# Patient Record
Sex: FEMALE | Race: BLACK OR AFRICAN AMERICAN | NOT HISPANIC OR LATINO | Employment: OTHER | URBAN - METROPOLITAN AREA
[De-identification: names, ages, dates, MRNs, and addresses within clinical notes are randomized per-mention and may not be internally consistent; named-entity substitution may affect disease eponyms.]

---

## 2017-02-16 RX ORDER — FLUTICASONE PROPIONATE 50 MCG
2 SPRAY, SUSPENSION (ML) NASAL DAILY PRN
Qty: 16 G | Refills: 5 | Status: SHIPPED | OUTPATIENT
Start: 2017-02-16 | End: 2017-06-26 | Stop reason: SDUPTHER

## 2017-02-23 ENCOUNTER — NUTRITION (OUTPATIENT)
Dept: DIABETES | Facility: CLINIC | Age: 67
End: 2017-02-23
Payer: MEDICARE

## 2017-02-23 VITALS
WEIGHT: 185.63 LBS | BODY MASS INDEX: 31.69 KG/M2 | SYSTOLIC BLOOD PRESSURE: 130 MMHG | DIASTOLIC BLOOD PRESSURE: 70 MMHG | HEIGHT: 64 IN

## 2017-02-23 DIAGNOSIS — N18.30 CKD (CHRONIC KIDNEY DISEASE) STAGE 3, GFR 30-59 ML/MIN: ICD-10-CM

## 2017-02-23 DIAGNOSIS — E11.9 TYPE II OR UNSPECIFIED TYPE DIABETES MELLITUS WITHOUT MENTION OF COMPLICATION, NOT STATED AS UNCONTROLLED: Primary | ICD-10-CM

## 2017-02-23 PROCEDURE — 99999 PR PBB SHADOW E&M-EST. PATIENT-LVL III: CPT | Mod: PBBFAC,,, | Performed by: DIETITIAN, REGISTERED

## 2017-02-23 PROCEDURE — 99213 OFFICE O/P EST LOW 20 MIN: CPT | Mod: PBBFAC,PO | Performed by: DIETITIAN, REGISTERED

## 2017-02-23 PROCEDURE — G0108 DIAB MANAGE TRN  PER INDIV: HCPCS | Mod: PBBFAC,PO | Performed by: DIETITIAN, REGISTERED
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
## 2017-02-23 NOTE — MR AVS SNAPSHOT
[facility]  [address] [facility] [address] [place] [address]  Phone: [phone]  Fax: [phone]                  Kristyn Garza   2017 9:30 AM   Nutrition    Description:  Female : 1950   Provider:  Arlet Stein RD, CDE   Department:  [facility]           Reason for Visit     Diabetes           Diagnoses this Visit        Comments    Type II or unspecified type diabetes mellitus without mention of complication, not stated as uncontrolled    -  Primary     CKD (chronic kidney disease) stage 3, GFR 30-59 ml/min                To Do List           Future Appointments        Provider Department Dept Phone    3/30/2017 8:30 AM Zoë Guzman MD [facility] [phone]    2017 9:00 AM Hammad Hardy OD [facility] [phone]      Goals (5 Years of Data)     None      Follow-Up and Disposition     Return in about 6 months (around 2017), or Rx accucheck sheela. E11.9 Jean Patel mid am.      [facility]     [facility] Nurse Care Line -  Assistance  Registered nurses in the [facility] provide clinical advisement, health education, appointment booking, and other advisory services.  Call for this free service at [phone].             Medications           Message regarding Medications     Verify the changes and/or additions to your medication regime listed below are the same as discussed with your clinician today.  If any of these changes or additions are incorrect, please notify your healthcare provider.             Verify that the below list of medications is an accurate representation of the medications you are currently taking.  If none reported, the list may be blank. If incorrect, please contact your healthcare provider. Carry this list with you in case of emergency.           Current Medications     alcohol swabs PadM Apply 1 each topically as needed.    amlodipine-benazepril 5-20 mg  "(LOTREL) 5-20 mg per capsule TAKE 1 CAPSULE BY MOUTH ONCE DAILY.    aspirin 81 MG Chew Take 81 mg by mouth once daily.    blood sugar diagnostic (ACCU-CHEK SMARTVIEW TEST STRIP) Strp 1 strip by Misc.(Non-Drug; Combo Route) route 3 (three) times daily.    calcium-vitamin D3 500 mg(1,250mg) -200 unit per tablet Take 1 tablet by mouth 2 (two) times daily with meals.    colesevelam (WELCHOL) 625 mg tablet Take 1 tablet (625 mg total) by mouth 2 (two) times daily with meals.    CRANBERRY EXTRACT ORAL Take 1 tablet by mouth once daily.    fluticasone (FLONASE) 50 mcg/actuation nasal spray 2 sprays by Each Nare route daily as needed.    lancets Misc 1 lancet by Misc.(Non-Drug; Combo Route) route 2 (two) times daily.    MULTIVITAMIN W-MINERALS/LUTEIN (CENTRUM SILVER ORAL) Take 1 tablet by mouth once daily.    spironolactone (ALDACTONE) 50 MG tablet Take 1 tablet (50 mg total) by mouth once daily.    BIOTIN ORAL Take 3 tablets by mouth once daily.    blood glucose control, normal Soln 1 Bottle by Misc.(Non-Drug; Combo Route) route once daily.    blood-glucose meter (ACCU-CHEK AMBER) Misc 1 kit by Misc.(Non-Drug; Combo Route) route once.    loratadine (CLARITIN) 10 mg tablet Take 1 tablet (10 mg total) by mouth once daily.    trazodone (DESYREL) 50 MG tablet Take 1 tablet (50 mg total) by mouth every evening.    UBIDECARENONE (CO Q-10 ORAL) Take by mouth once daily.           Clinical Reference Information           Your Vitals Were     BP Height Weight BMI       130/70 (BP Location: Right arm, Patient Position: Sitting, BP Method: Manual) 5' 4" (1.626 m) 84.2 kg (185 lb 10 oz) 31.86 kg/m2       Allergies as of 2/23/2017     No Known Allergies      Immunizations Administered on Date of Encounter - 2/23/2017     None      Language Assistance Services     ATTENTION: Language assistance services are available, free of charge. Please call [phone].      ATENCIÓN: Si nini mac, tiene a curry disposición servicios gratuitos " de asistencia lingüística. Cammy márquez [phone].     PATIENCE Ý: N?u b?n nói Ti?ng Vi?t, có các d?ch v? h? tr? ngôn ng? mi?n phí dành cho b?n. G?i s? [phone].         [facility] complies with applicable Federal civil rights laws and does not discriminate on the basis of race, color, national origin, age, disability, or sex.

## 2017-03-10 RX ORDER — COLESEVELAM HYDROCHLORIDE 625 MG/1
TABLET, FILM COATED ORAL
Qty: 180 TABLET | Refills: 0 | Status: SHIPPED | OUTPATIENT
Start: 2017-03-10 | End: 2017-06-08 | Stop reason: SDUPTHER

## 2017-03-30 ENCOUNTER — OFFICE VISIT (OUTPATIENT)
Dept: FAMILY MEDICINE | Facility: CLINIC | Age: 67
End: 2017-03-30
Payer: MEDICARE

## 2017-03-30 VITALS
DIASTOLIC BLOOD PRESSURE: 78 MMHG | RESPIRATION RATE: 18 BRPM | WEIGHT: 186.5 LBS | HEART RATE: 73 BPM | OXYGEN SATURATION: 99 % | BODY MASS INDEX: 31.84 KG/M2 | SYSTOLIC BLOOD PRESSURE: 134 MMHG | TEMPERATURE: 96 F | HEIGHT: 64 IN

## 2017-03-30 DIAGNOSIS — E66.9 OBESITY (BMI 30.0-34.9): ICD-10-CM

## 2017-03-30 DIAGNOSIS — E11.21 TYPE II DIABETES MELLITUS WITH NEPHROPATHY: ICD-10-CM

## 2017-03-30 DIAGNOSIS — E78.2 COMBINED HYPERLIPIDEMIA ASSOCIATED WITH TYPE 2 DIABETES MELLITUS: ICD-10-CM

## 2017-03-30 DIAGNOSIS — Z12.11 COLON CANCER SCREENING: ICD-10-CM

## 2017-03-30 DIAGNOSIS — N18.30 CKD (CHRONIC KIDNEY DISEASE) STAGE 3, GFR 30-59 ML/MIN: ICD-10-CM

## 2017-03-30 DIAGNOSIS — J30.9 ALLERGIC RHINITIS, CAUSE UNSPECIFIED: ICD-10-CM

## 2017-03-30 DIAGNOSIS — E11.59 HYPERTENSION ASSOCIATED WITH DIABETES: Primary | ICD-10-CM

## 2017-03-30 DIAGNOSIS — Z78.0 POSTMENOPAUSAL: ICD-10-CM

## 2017-03-30 DIAGNOSIS — E04.2 MULTIPLE THYROID NODULES: ICD-10-CM

## 2017-03-30 DIAGNOSIS — Z12.31 OTHER SCREENING MAMMOGRAM: ICD-10-CM

## 2017-03-30 DIAGNOSIS — E11.69 COMBINED HYPERLIPIDEMIA ASSOCIATED WITH TYPE 2 DIABETES MELLITUS: ICD-10-CM

## 2017-03-30 DIAGNOSIS — I15.2 HYPERTENSION ASSOCIATED WITH DIABETES: Primary | ICD-10-CM

## 2017-03-30 LAB
CTP QC/QA: YES
FECAL OCCULT BLOOD, POC: NEGATIVE

## 2017-03-30 PROCEDURE — 99999 PR PBB SHADOW E&M-EST. PATIENT-LVL IV: CPT | Mod: PBBFAC,,, | Performed by: FAMILY MEDICINE

## 2017-03-30 PROCEDURE — 99214 OFFICE O/P EST MOD 30 MIN: CPT | Mod: 25,S$PBB,, | Performed by: FAMILY MEDICINE

## 2017-03-30 PROCEDURE — G0101 CA SCREEN;PELVIC/BREAST EXAM: HCPCS | Mod: S$PBB,,, | Performed by: FAMILY MEDICINE

## 2017-03-30 PROCEDURE — 99214 OFFICE O/P EST MOD 30 MIN: CPT | Mod: PBBFAC,PO,25 | Performed by: FAMILY MEDICINE

## 2017-03-30 PROCEDURE — G0101 CA SCREEN;PELVIC/BREAST EXAM: HCPCS | Mod: PBBFAC,PO | Performed by: FAMILY MEDICINE

## 2017-03-30 PROCEDURE — 82270 OCCULT BLOOD FECES: CPT | Mod: PBBFAC,PO | Performed by: FAMILY MEDICINE

## 2017-03-30 PROCEDURE — 99499 UNLISTED E&M SERVICE: CPT | Mod: S$PBB,,, | Performed by: FAMILY MEDICINE

## 2017-03-30 RX ORDER — LORATADINE 10 MG/1
10 TABLET ORAL DAILY
Qty: 90 TABLET | Refills: 3 | Status: SHIPPED | OUTPATIENT
Start: 2017-03-30 | End: 2017-09-22 | Stop reason: SDUPTHER

## 2017-03-30 NOTE — PROGRESS NOTES
HISTORY OF PRESENT ILLNESS: Ms. Garza comes in today not fasting with taking blood pressure medication for annual wellness examination.    END OF LIFE DECISION: She has no living will and does not desire life support.     Current Outpatient Prescriptions   Medication Sig    alcohol swabs PadM Apply 1 each topically as needed.    amlodipine-benazepril 5-20 mg (LOTREL) 5-20 mg per capsule TAKE 1 CAPSULE BY MOUTH ONCE DAILY.    aspirin 81 MG Chew Take 81 mg by mouth once daily.    blood sugar diagnostic (ACCU-CHEK SMARTVIEW TEST STRIP) Strp 1 strip by Misc.(Non-Drug; Combo Route) route 3 (three) times daily.    calcium-vitamin D3 500 mg(1,250mg) -200 unit per tablet Take 1 tablet by mouth 2 (two) times daily with meals.    CRANBERRY EXTRACT ORAL Take 1 tablet by mouth once daily.    fluticasone (FLONASE) 50 mcg/actuation nasal spray 2 sprays by Each Nare route daily as needed.    lancets Misc 1 lancet by Misc.(Non-Drug; Combo Route) route 2 (two) times daily.    MULTIVITAMIN W-MINERALS/LUTEIN (CENTRUM SILVER ORAL) Take 1 tablet by mouth once daily.    spironolactone (ALDACTONE) 50 MG tablet Take 1 tablet (50 mg total) by mouth once daily.    WELCHOL 625 mg tablet TAKE 1 TABLET (625 MG TOTAL) BY MOUTH 2 (TWO) TIMES DAILY WITH MEALS.    blood glucose control, normal Soln 1 Bottle by Misc.(Non-Drug; Combo Route) route once daily.    blood-glucose meter (ACCU-CHEK AMBER) Misc 1 kit by Misc.(Non-Drug; Combo Route) route once.    loratadine (CLARITIN) 10 mg tablet Take 1 tablet (10 mg total) by mouth once daily.     SCREENINGS:   Cholesterol: March 30, 2016.  FFS/Colonoscopy: March 18, 2014 - okay; repeat in 10 years.  Mammogram: April 28, 2016 - okay.  GYN Exam: March 30, 2016 - okay.  Dexa Scan: April 28, 2016 - okay; repeat in 3 to 5 years.  Eye Exam: May 16, 2016; scheduled for May 17, 2017. She wears glasses.  Dental Exam: October or November 2014 per patient. She wears top dentures.  PPD: Negative in the  past.  Immunizations: Td/Tdap - February 23, 2011.  Gardasil - N./A.  Zostavax - February 23, 2011.  Pneumovax - March 3, 2014.  Prevnar-13 shot - March 23, 2015.  Seasonal Flu - November 1, 2016 at [facility] pharmacy per patient.     ROS:  GENERAL: Denies fever, chills, fatigue or unusual weight change. Appetite normal. Weight 84.8 kg (186 lb 15.2 oz) at September 28, 2016 visit. Exercises/walks a little and monitors her diet.   SKIN: Denies rashes, itching, changes in mole, color or texture of skin or easy bruising.   HEAD: Denies headaches or recent head trauma.  EYES: Denies change in vision, pain, diplopia, redness or discharge. She wears glasses.  EARS: Denies ear pain, discharge, tinnitus, vertigo or decreased hearing.  NOSE: Denies loss of smell, epistaxis or rhinitis except nasal congestion and uses Flonase and Claritin with help; requests refill of Claritin.   MOUTH & THROAT: Denies hoarseness or change in voice. Denies excessive gum bleeding or mouth sores. Denies sore throat.  NODES: Denies swollen glands.  CHEST: Denies ZHOU, wheezing, cough, or sputum production.  CARDIOVASCULAR: Denies chest pain, PND, orthopnea or reduced exercise tolerance. Denies palpitations.  ABDOMEN: Denies diarrhea, constipation, nausea, vomiting, abdominal pain, or blood in stool.  URINARY: Denies flank pain, dysuria or hematuria. Saw Nephrology Dr. Hernandez on December 29, 2016 for surveillance of chronic kidney disease with 6-month follow up advised.  GENITOURINARY: Denies flank pain, dysuria, frequency or hematuria. Performs monthly breast self exams.  ENDOCRINE: Performs home glucose checks every morning with levels ranging 's but 98 this morning. Saw Arlet Almanza, registered dietitian, for diabetes surveillance on February 23, 2017 with 6-month follow up advised. Saw ENT Dr. Irwin on October 20, 2015 for surveillance of thyroid nodules with 1-year follow up advised.   HEME//LYMPH: Denies bleeding  "problems.  PERIPHERAL VASCULAR:Denies claudication or cyanosis.  MUSCULOSKELETAL: Denies joint stiffness, pain or swelling.  NEUROLOGIC: Denies history of seizures, tremors, paralysis, alteration of gait or coordination.  PSYCHIATRIC: Denies mood swings, depression, anxiety, homicidal or suicidal thoughts. Denies sleep problems. Reports no longer takes Trazodone as states does not feel depressed or has sleep problems.    PE:   VS: /78 (BP Location: Left arm, Patient Position: Sitting, BP Method: Manual)  Pulse 73  Temp 96.1 °F (35.6 °C) (Tympanic)   Resp 18  Ht 5' 4" (1.626 m)  Wt 84.6 kg (186 lb 8.2 oz)  SpO2 99%  BMI 32.01 kg/m2  APPEARANCE: Well nourished, well developed female, pleasant and obese, alert and oriented in no acute distress.   HEAD: Non tender. Full range of motion.  EYES: PERRL, conjunctiva pink, lids no edema. She wears glasses.  EARS: External canal patent, no swelling or redness. TM's shiny and clear.  NOSE: Mucosa and turbinates pink, not swollen. No discharge. Non tender sinuses.  THROAT: No pharyngeal erythema or exudate. No stridor. She wears top dentures.   NECK: Supple, no mass. Chronic, non-tender goiter.  NODES: No cervical, axillary or inguinal lymph node enlargement.  CHEST: Normal respiratory effort. Lungs clear to auscultation.  CARDIOVASCULAR: Normal S1, S2. No rubs, murmurs or gallops. PMI not displaced. No carotid bruit. Pedal pulses palpable bilaterally. No edema. Feet look okay without ulcerations or skin breaks.  ABDOMEN: Bowel sounds present. Not distended. Soft. No tenderness, masses or organomegaly.  BREAST EXAM: Symmetrical, no external lesions, no discharge, no masses palpated.  PELVIC EXAM: No external lesions noted, no discharge, absent cervix and uterus, bimanual exam showed no adnexal tenderness or masses. Urethra and bladder intact.  RECTAL EXAM: No anal fissures but non-inflamed external hemorrhoids noted. Heme-negative stool in the rectal " vault.  MUSCULOSKELETAL: No joint deformities or stiffness. She is ambulatory without problems.  SKIN: No rashes or suspicious lesions, normal color and turgor.  NEUROLOGIC: Cranial Nerves: II-XII grossly intact. DTR's: Knees, Ankles 2+ and equal bilaterally. Gait & Posture: Normal gait and fine motion. Monofilament test unremarkable.  PSYCHIATRIC: Patient alert, oriented x 3. Mood/Affect normal without acute anxiety or depression noted. Judgment/insight good as she makes appropriate decisions on today's examination.    Protective Sensation (w/ 10 gram monofilament):  Right: Intact  Left: Intact    Visual Inspection:  Normal -  Bilateral    Pedal Pulses:   Right: Present  Left: Present    Posterior tibialis:   Right:Present  Left: Present    Lab Results   Component Value Date    HGBA1C 6.5 (H) 09/28/2016     Lab Results   Component Value Date    WBC 9.47 12/21/2016    HGB 13.1 12/21/2016    HCT 40.2 12/21/2016    MCV 92 12/21/2016     12/21/2016     Lab Results   Component Value Date    CREATININE 1.1 12/21/2016    BUN 15 12/21/2016     12/21/2016    K 3.0 (L) 12/21/2016     12/21/2016    CO2 32 (H) 12/21/2016     ASSESSMENT:    ICD-10-CM ICD-9-CM    1. Hypertension associated with diabetes E11.59 250.80 Comprehensive metabolic panel    I10 401.9 Lipid panel      TSH   2. Type II diabetes mellitus with nephropathy E11.21 250.40 Comprehensive metabolic panel     583.81 Hemoglobin A1c   3. Combined hyperlipidemia associated with type 2 diabetes mellitus E11.69 250.80 Comprehensive metabolic panel    E78.2 272.2 Lipid panel   4. CKD (chronic kidney disease) stage 3, GFR 30-59 ml/min N18.3 585.3    5. Multiple thyroid nodules E04.2 241.1 US Soft Tissue Head Neck Thyroid   6. Allergic rhinitis, cause unspecified J30.9 477.9    7. Obesity (BMI 30.0-34.9) E66.9 278.00    8. Postmenopausal Z78.0 V49.81    9. Colon cancer screening Z12.11 V76.51 POCT OCCULT BLOOD STOOL   10. Other screening mammogram  Z12.31 V76.12 Mammo Digital Screening Bilat with CAD     PLAN:  1. Age-appropriate counseling-appropriate low-sodium, low-cholesterol diet and exercise daily as tolerated, monthly breast self exam, annual wellness examination.  2. Patient advised to call for results.  3. Continue current medications.  4. Keep follow up with specialists - nephrology and diabetic dietitian for surveillance of chronic kidney disease/hypokalemia and diabetes respectively.  5. Annual dental and eye examination.  6. See me in 6 months for hypertension and diabetes follow up.  7. Flu shot this fall.  8. Prescription refill - Claritin 10 mg daily, #90, 3 refills.

## 2017-03-30 NOTE — MR AVS SNAPSHOT
[facility]  [address] [address]  Phone: [phone]                  Kristyn Garza   3/30/2017 8:30 AM   Office Visit    Description:  Female : 1950   Provider:  Zoë Guzman MD   Department:  [facility]           Reason for Visit     Annual Exam           Diagnoses this Visit        Comments    Hypertension associated with diabetes    -  Primary     Type II diabetes mellitus with nephropathy         Combined hyperlipidemia associated with type 2 diabetes mellitus         CKD (chronic kidney disease) stage 3, GFR 30-59 ml/min         Multiple thyroid nodules         Allergic rhinitis, cause unspecified         Obesity (BMI 30.0-34.9)         Postmenopausal         Colon cancer screening         Other screening mammogram                To Do List           Future Appointments        Provider Department Dept Phone    2017 7:50 AM LABORATORY, [facility] [phone]    2017 8:15 AM [facility] US2 [facility] [phone]    2017 9:45 AM [facility] MAMMO1-SCR [facility] [phone]    2017 9:00 AM Hammad Hardy, [facility] Op[facility] [phone]    10/2/2017 8:30 AM Zoë Guzman MD [facility] [phone]      Goals (5 Years of Data)     None      Follow-Up and Disposition     Return in about 6 months (around 2017) for diabetes and blood pressure follow up.       These Medications        Disp Refills Start End    loratadine (CLARITIN) 10 mg tablet 90 tablet 3 3/30/2017 3/30/2018    Take 1 tablet (10 mg total) by mouth once daily. - Oral    Pharmacy: [id number] - [place], [place] - [address].  #: [phone]         [facility]     [facility] -  Assistance  Unless otherwise directed by your provider, please contact [facility], our nurse care line that is available for  24/7 assistance.     Registered nurses in the [facility] provide: appointment scheduling, clinical advisement, health education, and other advisory services.  Call: [phone] (toll free)               Medications           Message regarding Medications     Verify the changes and/or additions to your medication regime listed below are the same as discussed with your clinician today.  If any of these changes or additions are incorrect, please notify your healthcare provider.        STOP taking these medications     BIOTIN ORAL Take 3 tablets by mouth once daily.    trazodone (DESYREL) 50 MG tablet Take 1 tablet (50 mg total) by mouth every evening.    UBIDECARENONE (CO Q-10 ORAL) Take by mouth once daily.           Verify that the below list of medications is an accurate representation of the medications you are currently taking.  If none reported, the list may be blank. If incorrect, please contact your healthcare provider. Carry this list with you in case of emergency.           Current Medications     alcohol swabs PadM Apply 1 each topically as needed.    amlodipine-benazepril 5-20 mg (LOTREL) 5-20 mg per capsule TAKE 1 CAPSULE BY MOUTH ONCE DAILY.    aspirin 81 MG Chew Take 81 mg by mouth once daily.    blood glucose control, normal Soln 1 Bottle by Misc.(Non-Drug; Combo Route) route once daily.    blood sugar diagnostic (ACCU-CHEK SMARTVIEW TEST STRIP) Strp 1 strip by Misc.(Non-Drug; Combo Route) route 3 (three) times daily.    blood-glucose meter (ACCU-CHEK AMBER) Misc 1 kit by Misc.(Non-Drug; Combo Route) route once.    calcium-vitamin D3 500 mg(1,250mg) -200 unit per tablet Take 1 tablet by mouth 2 (two) times daily with meals.    CRANBERRY EXTRACT ORAL Take 1 tablet by mouth once daily.    fluticasone (FLONASE) 50 mcg/actuation nasal spray 2 sprays by Each Nare route daily as needed.    lancets Misc 1 lancet by Misc.(Non-Drug; Combo Route) route 2 (two) times daily.    loratadine (CLARITIN)  "10 mg tablet Take 1 tablet (10 mg total) by mouth once daily.    MULTIVITAMIN W-MINERALS/LUTEIN (CENTRUM SILVER ORAL) Take 1 tablet by mouth once daily.    spironolactone (ALDACTONE) 50 MG tablet Take 1 tablet (50 mg total) by mouth once daily.    WELCHOL 625 mg tablet TAKE 1 TABLET (625 MG TOTAL) BY MOUTH 2 (TWO) TIMES DAILY WITH MEALS.           Clinical Reference Information           Your Vitals Were     BP Pulse Temp Resp    134/78 (BP Location: Left arm, Patient Position: Sitting, BP Method: Manual) 73 96.1 °F (35.6 °C) (Tympanic) 18    Height Weight SpO2 BMI    5' 4" (1.626 m) 84.6 kg (186 lb 8.2 oz) 99% 32.01 kg/m2      Blood Pressure          Most Recent Value    BP  134/78      Allergies as of 3/30/2017     No Known Allergies      Immunizations Administered on Date of Encounter - 3/30/2017     None      Orders Placed During Today's Visit      Normal Orders This Visit    POCT OCCULT BLOOD STOOL     Future Labs/Procedures Expected by Expires    Comprehensive metabolic panel  3/30/2017 5/29/2018    Hemoglobin A1c  3/30/2017 5/29/2018    Lipid panel  3/30/2017 5/29/2018    Mammo Digital Screening Bilat with CAD  3/30/2017 5/30/2018    TSH  3/30/2017 5/29/2018     Soft Tissue Head Neck Thyroid  3/30/2017 3/30/2018         3/30/2017  9:31 AM - oZë Guzman MD      Component Results     Component Value Flag Ref Range Units Status    Fecal Occult Blood Negative  Negative  Final     Acceptable Yes    Final            Instructions    Please call Dr. Guzman for your results.     Thanks.       Language Assistance Services     ATTENTION: Language assistance services are available, free of charge. Please call [phone].      ATENCIÓN: Si habla español, tiene a curry disposición servicios gratuitos de asistencia lingüística. Llame al [phone].     CHÚ Ý: N?u b?n nói Ti?ng Vi?t, có các d?ch v? h? tr? ngôn ng? mi?n phí dành cho b?n. G?i s? [phone].         [facility] " complies with applicable Federal civil rights laws and does not discriminate on the basis of race, color, national origin, age, disability, or sex.

## 2017-04-28 ENCOUNTER — TELEPHONE (OUTPATIENT)
Dept: RADIOLOGY | Facility: HOSPITAL | Age: 67
End: 2017-04-28

## 2017-05-01 ENCOUNTER — HOSPITAL ENCOUNTER (OUTPATIENT)
Dept: RADIOLOGY | Facility: HOSPITAL | Age: 67
Discharge: HOME | End: 2017-05-01
Attending: FAMILY MEDICINE
Payer: MEDICARE

## 2017-05-01 DIAGNOSIS — E04.2 MULTIPLE THYROID NODULES: ICD-10-CM

## 2017-05-01 DIAGNOSIS — Z12.31 OTHER SCREENING MAMMOGRAM: ICD-10-CM

## 2017-05-01 PROCEDURE — 76536 US EXAM OF HEAD AND NECK: CPT | Mod: TC,PO

## 2017-05-01 PROCEDURE — 76536 US EXAM OF HEAD AND NECK: CPT | Mod: 26,,, | Performed by: RADIOLOGY

## 2017-05-01 PROCEDURE — 77067 SCR MAMMO BI INCL CAD: CPT | Mod: 26,,, | Performed by: RADIOLOGY

## 2017-05-01 PROCEDURE — 77067 SCR MAMMO BI INCL CAD: CPT | Mod: TC

## 2017-05-01 PROCEDURE — 77063 BREAST TOMOSYNTHESIS BI: CPT | Mod: 26,,, | Performed by: RADIOLOGY

## 2017-05-17 ENCOUNTER — OFFICE VISIT (OUTPATIENT)
Dept: OPHTHALMOLOGY | Facility: CLINIC | Age: 67
End: 2017-05-17
Payer: MEDICARE

## 2017-05-17 DIAGNOSIS — H52.13 MYOPIA WITH PRESBYOPIA, BILATERAL: ICD-10-CM

## 2017-05-17 DIAGNOSIS — H25.013 CORTICAL AGE-RELATED CATARACT OF BOTH EYES: ICD-10-CM

## 2017-05-17 DIAGNOSIS — E11.3291 MILD NONPROLIFERATIVE DIABETIC RETINOPATHY OF RIGHT EYE WITHOUT MACULAR EDEMA ASSOCIATED WITH TYPE 2 DIABETES MELLITUS: Primary | ICD-10-CM

## 2017-05-17 DIAGNOSIS — H25.13 NUCLEAR SCLEROSIS, BILATERAL: ICD-10-CM

## 2017-05-17 DIAGNOSIS — H52.4 MYOPIA WITH PRESBYOPIA, BILATERAL: ICD-10-CM

## 2017-05-17 PROCEDURE — 99499 UNLISTED E&M SERVICE: CPT | Mod: S$PBB,,, | Performed by: OPTOMETRIST

## 2017-05-17 PROCEDURE — 99999 PR PBB SHADOW E&M-EST. PATIENT-LVL I: CPT | Mod: PBBFAC,,, | Performed by: OPTOMETRIST

## 2017-05-17 PROCEDURE — 92014 COMPRE OPH EXAM EST PT 1/>: CPT | Mod: S$PBB,,, | Performed by: OPTOMETRIST

## 2017-05-17 PROCEDURE — 99211 OFF/OP EST MAY X REQ PHY/QHP: CPT | Mod: PBBFAC,PO | Performed by: OPTOMETRIST

## 2017-05-17 PROCEDURE — 92015 DETERMINE REFRACTIVE STATE: CPT | Mod: ,,, | Performed by: OPTOMETRIST

## 2017-05-17 NOTE — PROGRESS NOTES
HPI     Diabetic Eye Exam    Additional comments: Yearly           Comments   Last seen by DNL on 5/16/16 for DM exam  1. DM  2. NSC OU  3. Myopia with Presbyopia  Diabetic eye exam  Diagnosed with diabetes 3-4 years ago  Recent vision fluctuations Yes  Blood sugar: 134 this morning    HPI    Any vision changes since last exam: Yes  Eye pain: No  Other ocular symptoms: No    Do you wear currently wear glasses or contacts? Glasses    Interested in contacts today? No    Do you plan on getting new glasses today? If needed         Last edited by Laverne Bernabe, PCT on 5/17/2017  9:07 AM. (History)              Assessment /Plan     For exam results, see Encounter Report.    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 Reviewed importance of yearly dilated eye exams. Continue close care with PCP regarding diabetes.    Nuclear sclerosis, bilateral  Cortical age-related cataract of both eyes  Surgery is not indicated at this time. Monitor 12 months.    Myopia with presbyopia, bilateral  Eyeglass Final Rx     Eyeglass Final Rx      Sphere Cylinder Axis Add   Right -3.25 +0.75 135 +2.50   Left -3.00 +0.25 140 +2.50       Expiration Date:  5/18/2018                  RTC 1 yr for dilated eye exam or PRN if any problems.   Discussed above and answered questions.

## 2017-06-08 RX ORDER — COLESEVELAM HYDROCHLORIDE 625 MG/1
TABLET, FILM COATED ORAL
Qty: 180 TABLET | Refills: 1 | Status: SHIPPED | OUTPATIENT
Start: 2017-06-08 | End: 2018-03-04 | Stop reason: SDUPTHER

## 2017-06-18 RX ORDER — AMLODIPINE AND BENAZEPRIL HYDROCHLORIDE 5; 20 MG/1; MG/1
CAPSULE ORAL
Qty: 90 CAPSULE | Refills: 1 | Status: SHIPPED | OUTPATIENT
Start: 2017-06-18 | End: 2017-12-11 | Stop reason: SDUPTHER

## 2017-06-21 DIAGNOSIS — E11.9 TYPE 2 DIABETES MELLITUS WITHOUT COMPLICATION, WITH LONG-TERM CURRENT USE OF INSULIN: ICD-10-CM

## 2017-06-21 DIAGNOSIS — Z79.4 TYPE 2 DIABETES MELLITUS WITHOUT COMPLICATION, WITH LONG-TERM CURRENT USE OF INSULIN: ICD-10-CM

## 2017-06-21 RX ORDER — LANCETS
1 EACH MISCELLANEOUS 3 TIMES DAILY
Qty: 100 EACH | Refills: 11 | Status: SHIPPED | OUTPATIENT
Start: 2017-06-21 | End: 2018-02-14 | Stop reason: SDUPTHER

## 2017-06-27 RX ORDER — FLUTICASONE PROPIONATE 50 MCG
2 SPRAY, SUSPENSION (ML) NASAL DAILY PRN
Qty: 16 G | Refills: 5 | Status: SHIPPED | OUTPATIENT
Start: 2017-06-27 | End: 2017-10-02

## 2017-09-22 ENCOUNTER — OFFICE VISIT (OUTPATIENT)
Dept: FAMILY MEDICINE | Facility: CLINIC | Age: 67
End: 2017-09-22
Payer: MEDICARE

## 2017-09-22 VITALS
RESPIRATION RATE: 16 BRPM | WEIGHT: 190.25 LBS | DIASTOLIC BLOOD PRESSURE: 81 MMHG | HEART RATE: 87 BPM | TEMPERATURE: 97 F | HEIGHT: 64 IN | SYSTOLIC BLOOD PRESSURE: 142 MMHG | OXYGEN SATURATION: 98 % | BODY MASS INDEX: 32.48 KG/M2

## 2017-09-22 DIAGNOSIS — H61.23 BILATERAL IMPACTED CERUMEN: ICD-10-CM

## 2017-09-22 DIAGNOSIS — J00 ACUTE RHINITIS, UNSPECIFIED TYPE: Primary | ICD-10-CM

## 2017-09-22 PROCEDURE — 3079F DIAST BP 80-89 MM HG: CPT | Mod: ,,, | Performed by: FAMILY MEDICINE

## 2017-09-22 PROCEDURE — 3077F SYST BP >= 140 MM HG: CPT | Mod: ,,, | Performed by: FAMILY MEDICINE

## 2017-09-22 PROCEDURE — 1159F MED LIST DOCD IN RCRD: CPT | Mod: ,,, | Performed by: FAMILY MEDICINE

## 2017-09-22 PROCEDURE — 99999 PR PBB SHADOW E&M-EST. PATIENT-LVL IV: CPT | Mod: PBBFAC,,, | Performed by: FAMILY MEDICINE

## 2017-09-22 PROCEDURE — 99214 OFFICE O/P EST MOD 30 MIN: CPT | Mod: PBBFAC,PO | Performed by: FAMILY MEDICINE

## 2017-09-22 PROCEDURE — 1126F AMNT PAIN NOTED NONE PRSNT: CPT | Mod: ,,, | Performed by: FAMILY MEDICINE

## 2017-09-22 PROCEDURE — 99214 OFFICE O/P EST MOD 30 MIN: CPT | Mod: S$PBB,,, | Performed by: FAMILY MEDICINE

## 2017-09-22 RX ORDER — MECLIZINE HCL 12.5 MG 12.5 MG/1
12.5 TABLET ORAL 3 TIMES DAILY PRN
Refills: 0 | Status: CANCELLED | OUTPATIENT
Start: 2017-09-22

## 2017-09-22 RX ORDER — AZELASTINE 1 MG/ML
1 SPRAY, METERED NASAL 2 TIMES DAILY
Qty: 30 ML | Refills: 0 | Status: SHIPPED | OUTPATIENT
Start: 2017-09-22 | End: 2018-08-06 | Stop reason: SDUPTHER

## 2017-09-22 RX ORDER — LORATADINE 10 MG/1
10 TABLET ORAL DAILY
Qty: 90 TABLET | Refills: 3 | Status: SHIPPED | OUTPATIENT
Start: 2017-09-22 | End: 2020-04-17 | Stop reason: SDUPTHER

## 2017-09-22 NOTE — PROGRESS NOTES
Subjective:       Patient ID: Kristyn Garza is a 66 y.o. female.    Chief Complaint: Dizziness and Nausea      HPI  Ms. Cabrales presents to clinic today for complaints of dizziness.   She states if she turns her head to the left she gets dizzy.   She states she feels the room is spinning.   She has not had any emesis, but has had nausea.   She also has had some congestion and sinus problems.   She was diagnosed with vertigo in the past and has taken meclizine.   She has been using otc Claritin and Flonase.   She states the dizziness has been going on for a few days.     Review of Systems   Constitutional: Negative for fever.   HENT: Positive for congestion and sinus pressure.    Respiratory: Negative for cough and shortness of breath.    Cardiovascular: Negative for chest pain.   Gastrointestinal: Positive for nausea. Negative for abdominal pain and vomiting.   Genitourinary: Negative for dysuria.   Neurological: Positive for dizziness.       Medication List with Changes/Refills   New Medications    AZELASTINE (ASTELIN) 137 MCG (0.1 %) NASAL SPRAY    1 spray (137 mcg total) by Nasal route 2 (two) times daily.    MECLIZINE (ANTIVERT) 25 MG TABLET    Take 1 tablet (25 mg total) by mouth 3 (three) times daily as needed.   Current Medications    ALCOHOL SWABS PADM    Apply 1 each topically as needed.    AMLODIPINE-BENAZEPRIL 5-20 MG (LOTREL) 5-20 MG PER CAPSULE    TAKE 1 CAPSULE BY MOUTH ONCE DAILY.    ASPIRIN 81 MG CHEW    Take 81 mg by mouth once daily.    BLOOD GLUCOSE CONTROL, NORMAL SOLN    1 Bottle by Misc.(Non-Drug; Combo Route) route once daily.    BLOOD SUGAR DIAGNOSTIC (ACCU-CHEK SMARTVIEW TEST STRIP) STRP    1 strip by Misc.(Non-Drug; Combo Route) route 3 (three) times daily. For Acc-Chek Amber   DX :E11.9    BLOOD-GLUCOSE METER (ACCU-CHEK AMBER) MISC    1 kit by Misc.(Non-Drug; Combo Route) route once.    CALCIUM-VITAMIN D3 500 MG(1,250MG) -200 UNIT PER TABLET    Take 1 tablet by mouth 2 (two) times daily  with meals.    CRANBERRY EXTRACT ORAL    Take 1 tablet by mouth once daily.    FLUTICASONE (FLONASE) 50 MCG/ACTUATION NASAL SPRAY    2 sprays by Each Nare route daily as needed.    LANCETS MISC    1 lancet by Misc.(Non-Drug; Combo Route) route 3 (three) times daily. For Acc-Chek Irais   DX :E11.9    MULTIVITAMIN W-MINERALS/LUTEIN (CENTRUM SILVER ORAL)    Take 1 tablet by mouth once daily.    SPIRONOLACTONE (ALDACTONE) 50 MG TABLET    Take 1 tablet (50 mg total) by mouth once daily.    WELCHOL 625 MG TABLET    TAKE 1 TABLET (625 MG TOTAL) BY MOUTH 2 (TWO) TIMES DAILY WITH MEALS.   Changed and/or Refilled Medications    Modified Medication Previous Medication    LORATADINE (CLARITIN) 10 MG TABLET loratadine (CLARITIN) 10 mg tablet       Take 1 tablet (10 mg total) by mouth once daily.    Take 1 tablet (10 mg total) by mouth once daily.       Patient Active Problem List   Diagnosis    Multiple thyroid nodules    Cupping of optic disc    Nuclear sclerosis    Combined hyperlipidemia associated with type 2 diabetes mellitus    Hypertension associated with diabetes    Type II diabetes mellitus with nephropathy    CKD (chronic kidney disease) stage 3, GFR 30-59 ml/min    Obesity (BMI 30.0-34.9)         Objective:     Physical Exam   Constitutional: She is oriented to person, place, and time. She appears well-developed and well-nourished. No distress.   HENT:   Head: Normocephalic and atraumatic.   Right Ear: External ear normal.   Left Ear: External ear normal.   B/l cerumen impaction that improved after ear irrigation    Eyes: EOM are normal. Right eye exhibits no discharge. Left eye exhibits no discharge.   Cardiovascular: Normal rate and regular rhythm.    Pulmonary/Chest: Effort normal and breath sounds normal. No respiratory distress. She has no wheezes.   Musculoskeletal: She exhibits no edema.   Neurological: She is alert and oriented to person, place, and time.   Skin: Skin is warm and dry. She is not  diaphoretic. No erythema.   Psychiatric: She has a normal mood and affect.   Vitals reviewed.    Vitals:    09/22/17 0853   BP: (!) 142/81   Pulse: 87   Resp: 16   Temp: 96.9 °F (36.1 °C)       Assessment/  PLAN     Acute rhinitis, unspecified type  -     loratadine (CLARITIN) 10 mg tablet; Take 1 tablet (10 mg total) by mouth once daily.  Dispense: 90 tablet; Refill: 3  -     azelastine (ASTELIN) 137 mcg (0.1 %) nasal spray; 1 spray (137 mcg total) by Nasal route 2 (two) times daily.  Dispense: 30 mL; Refill: 0    Bilateral impacted cerumen  - ear irrigated in clinic and dizziness has improved     Plan as above   rtc prn         Emma Friend MD  [facility]

## 2017-09-23 ENCOUNTER — NURSE TRIAGE (OUTPATIENT)
Dept: ADMINISTRATIVE | Facility: CLINIC | Age: 67
End: 2017-09-23

## 2017-09-23 ENCOUNTER — TELEPHONE (OUTPATIENT)
Dept: INTERNAL MEDICINE | Facility: CLINIC | Age: 67
End: 2017-09-23

## 2017-09-23 RX ORDER — MECLIZINE HYDROCHLORIDE 25 MG/1
25 TABLET ORAL 3 TIMES DAILY PRN
Qty: 30 TABLET | Refills: 0 | Status: SHIPPED | OUTPATIENT
Start: 2017-09-23 | End: 2017-12-01

## 2017-09-23 NOTE — TELEPHONE ENCOUNTER
"LM on VM at 1110am  Dizzy in am for past three mornings. Sinuses and ears - seen yest am. WC=671 today. Usually  normal range for pt.     Reason for Disposition   [1] MODERATE dizziness (e.g., interferes with normal activities) AND [2] has been evaluated by physician for this    Answer Assessment - Initial Assessment Questions  1. DESCRIPTION: "Describe your dizziness."     In mornings  2. LIGHTHEADED: "Do you feel lightheaded?" (e.g., somewhat faint, woozy, weak upon standing)     Starts when roll out bed and when about to get up   3. VERTIGO: "Do you feel like either you or the room is spinning or tilting?" (i.e. vertigo)    No   4. SEVERITY: "How bad is it?"  "Do you feel like you are going to faint?" "Can you stand and walk?"    - MILD - walking normally    - MODERATE - interferes with normal activities (e.g., work, school)     - SEVERE - unable to stand, requires support to walk, feels like passing out now.     Moderate - wait until feeling passing   5. ONSET:  "When did the dizziness begin?"    9/19-20  6. AGGRAVATING FACTORS: "Does anything make it worse?" (e.g., standing, change in head position)    Turn fast from one side to other   7. HEART RATE: "Can you tell me your heart rate?" "How many beats in 15 seconds?"  (Note: not all patients can do this)      /80 HR= 87  8. CAUSE: "What do you think is causing the dizziness?"     Got wax out of ear at OV   9. RECURRENT SYMPTOM: "Have you had dizziness before?" If so, ask: "When was the last time?" "What happened that time?"    Yes, years ago - vertigo and OM  10. OTHER SYMPTOMS: "Do you have any other symptoms?" (e.g., fever, chest pain, vomiting, diarrhea, bleeding)    No    Protocols used: ST DIZZINESS-A-AH  just ate bkft   Mentioned antivert - pt would like rx called in. Spoke with MD will call in now. Follow up with Dr Guzman on Monday or tues. Pt notified. Call back with questions.   [facility] [id number]   "

## 2017-09-23 NOTE — TELEPHONE ENCOUNTER
Called by [facility] for this patient. Needing antivert called in due to positional dizziness after having her ears cleaned out yesterday. BS good at 113. Worse when getting out of bed. Sent in anti-vert. Have pt see Dr Guzman on Monday if not improving for additional referrals/eval with Audiologist if not better with meds.

## 2017-10-02 ENCOUNTER — OFFICE VISIT (OUTPATIENT)
Dept: FAMILY MEDICINE | Facility: CLINIC | Age: 67
End: 2017-10-02
Payer: MEDICARE

## 2017-10-02 ENCOUNTER — LAB VISIT (OUTPATIENT)
Dept: LAB | Facility: HOSPITAL | Age: 67
End: 2017-10-02
Payer: MEDICARE

## 2017-10-02 VITALS
BODY MASS INDEX: 32.9 KG/M2 | HEART RATE: 80 BPM | SYSTOLIC BLOOD PRESSURE: 138 MMHG | OXYGEN SATURATION: 97 % | TEMPERATURE: 97 F | HEIGHT: 64 IN | WEIGHT: 192.69 LBS | RESPIRATION RATE: 18 BRPM | DIASTOLIC BLOOD PRESSURE: 62 MMHG

## 2017-10-02 DIAGNOSIS — E04.2 MULTIPLE THYROID NODULES: ICD-10-CM

## 2017-10-02 DIAGNOSIS — E11.69 COMBINED HYPERLIPIDEMIA ASSOCIATED WITH TYPE 2 DIABETES MELLITUS: ICD-10-CM

## 2017-10-02 DIAGNOSIS — E66.9 OBESITY (BMI 30.0-34.9): ICD-10-CM

## 2017-10-02 DIAGNOSIS — E11.59 HYPERTENSION ASSOCIATED WITH DIABETES: ICD-10-CM

## 2017-10-02 DIAGNOSIS — N18.30 CKD (CHRONIC KIDNEY DISEASE) STAGE 3, GFR 30-59 ML/MIN: ICD-10-CM

## 2017-10-02 DIAGNOSIS — E11.21 TYPE II DIABETES MELLITUS WITH NEPHROPATHY: Primary | ICD-10-CM

## 2017-10-02 DIAGNOSIS — I15.2 HYPERTENSION ASSOCIATED WITH DIABETES: ICD-10-CM

## 2017-10-02 DIAGNOSIS — E11.21 TYPE II DIABETES MELLITUS WITH NEPHROPATHY: ICD-10-CM

## 2017-10-02 DIAGNOSIS — E78.2 COMBINED HYPERLIPIDEMIA ASSOCIATED WITH TYPE 2 DIABETES MELLITUS: ICD-10-CM

## 2017-10-02 LAB
ALBUMIN SERPL BCP-MCNC: 3.8 G/DL
ALP SERPL-CCNC: 123 U/L
ALT SERPL W/O P-5'-P-CCNC: 26 U/L
ANION GAP SERPL CALC-SCNC: 12 MMOL/L
AST SERPL-CCNC: 27 U/L
BASOPHILS # BLD AUTO: 0.02 K/UL
BASOPHILS NFR BLD: 0.2 %
BILIRUB SERPL-MCNC: 0.4 MG/DL
BUN SERPL-MCNC: 14 MG/DL
CALCIUM SERPL-MCNC: 9.5 MG/DL
CHLORIDE SERPL-SCNC: 103 MMOL/L
CO2 SERPL-SCNC: 26 MMOL/L
CREAT SERPL-MCNC: 1.1 MG/DL
CREAT UR-MCNC: 102 MG/DL
DIFFERENTIAL METHOD: ABNORMAL
EOSINOPHIL # BLD AUTO: 0.1 K/UL
EOSINOPHIL NFR BLD: 1.4 %
ERYTHROCYTE [DISTWIDTH] IN BLOOD BY AUTOMATED COUNT: 14.5 %
EST. GFR  (AFRICAN AMERICAN): 59.6 ML/MIN/1.73 M^2
EST. GFR  (NON AFRICAN AMERICAN): 51.7 ML/MIN/1.73 M^2
ESTIMATED AVG GLUCOSE: 154 MG/DL
GLUCOSE SERPL-MCNC: 109 MG/DL
HBA1C MFR BLD HPLC: 7 %
HCT VFR BLD AUTO: 40.6 %
HGB BLD-MCNC: 13.4 G/DL
LYMPHOCYTES # BLD AUTO: 2.9 K/UL
LYMPHOCYTES NFR BLD: 31.5 %
MCH RBC QN AUTO: 31.4 PG
MCHC RBC AUTO-ENTMCNC: 33 G/DL
MCV RBC AUTO: 95 FL
MONOCYTES # BLD AUTO: 0.6 K/UL
MONOCYTES NFR BLD: 6.2 %
NEUTROPHILS # BLD AUTO: 5.6 K/UL
NEUTROPHILS NFR BLD: 60.4 %
PLATELET # BLD AUTO: 250 K/UL
PMV BLD AUTO: 10.5 FL
POTASSIUM SERPL-SCNC: 3.8 MMOL/L
PROT SERPL-MCNC: 8.3 G/DL
PROT UR-MCNC: <7 MG/DL
PROT/CREAT RATIO, UR: NORMAL
RBC # BLD AUTO: 4.27 M/UL
SODIUM SERPL-SCNC: 141 MMOL/L
WBC # BLD AUTO: 9.33 K/UL

## 2017-10-02 PROCEDURE — 1159F MED LIST DOCD IN RCRD: CPT | Mod: ,,, | Performed by: FAMILY MEDICINE

## 2017-10-02 PROCEDURE — 84156 ASSAY OF PROTEIN URINE: CPT

## 2017-10-02 PROCEDURE — 99999 PR PBB SHADOW E&M-EST. PATIENT-LVL IV: CPT | Mod: PBBFAC,,, | Performed by: FAMILY MEDICINE

## 2017-10-02 PROCEDURE — 99214 OFFICE O/P EST MOD 30 MIN: CPT | Mod: S$PBB,,, | Performed by: FAMILY MEDICINE

## 2017-10-02 PROCEDURE — 83036 HEMOGLOBIN GLYCOSYLATED A1C: CPT

## 2017-10-02 PROCEDURE — 3075F SYST BP GE 130 - 139MM HG: CPT | Mod: ,,, | Performed by: FAMILY MEDICINE

## 2017-10-02 PROCEDURE — 1126F AMNT PAIN NOTED NONE PRSNT: CPT | Mod: ,,, | Performed by: FAMILY MEDICINE

## 2017-10-02 PROCEDURE — 36415 COLL VENOUS BLD VENIPUNCTURE: CPT | Mod: PO

## 2017-10-02 PROCEDURE — 85025 COMPLETE CBC W/AUTO DIFF WBC: CPT

## 2017-10-02 PROCEDURE — 99214 OFFICE O/P EST MOD 30 MIN: CPT | Mod: PBBFAC,PO | Performed by: FAMILY MEDICINE

## 2017-10-02 PROCEDURE — 3078F DIAST BP <80 MM HG: CPT | Mod: ,,, | Performed by: FAMILY MEDICINE

## 2017-10-02 PROCEDURE — 80053 COMPREHEN METABOLIC PANEL: CPT
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
## 2017-11-27 ENCOUNTER — OFFICE VISIT (OUTPATIENT)
Dept: NEPHROLOGY | Facility: CLINIC | Age: 67
End: 2017-11-27
Payer: MEDICARE

## 2017-11-27 VITALS
DIASTOLIC BLOOD PRESSURE: 72 MMHG | WEIGHT: 194 LBS | SYSTOLIC BLOOD PRESSURE: 122 MMHG | BODY MASS INDEX: 33.12 KG/M2 | HEART RATE: 68 BPM | HEIGHT: 64 IN

## 2017-11-27 DIAGNOSIS — N18.30 CHRONIC KIDNEY DISEASE (CKD), STAGE III (MODERATE): Primary | ICD-10-CM

## 2017-11-27 PROCEDURE — 99499 UNLISTED E&M SERVICE: CPT | Mod: S$PBB,,, | Performed by: INTERNAL MEDICINE

## 2017-11-27 PROCEDURE — 99214 OFFICE O/P EST MOD 30 MIN: CPT | Mod: S$PBB,,, | Performed by: INTERNAL MEDICINE

## 2017-11-27 PROCEDURE — 99999 PR PBB SHADOW E&M-EST. PATIENT-LVL III: CPT | Mod: PBBFAC,,, | Performed by: INTERNAL MEDICINE

## 2017-11-27 PROCEDURE — 99213 OFFICE O/P EST LOW 20 MIN: CPT | Mod: PBBFAC,PO | Performed by: INTERNAL MEDICINE

## 2017-11-27 NOTE — LETTER
November 27, 2017      Zoë Guzman MD  [address] [place] [address]           [facility]  [address] [place] [place] [address]  Phone: [phone]  Fax: [phone]          Patient: Kristyn Garza   MR Number: [id number]   YOB: 1950   Date of Visit: 11/27/2017       Dear Dr. Zoë Guzman:    Thank you for referring Kristyn Garza to me for evaluation. Attached you will find relevant portions of my assessment and plan of care.    If you have questions, please do not hesitate to call me. I look forward to following Kristyn Garza along with you.    Sincerely,    Ronald Hernandez MD    Enclosure  CC:  No Recipients    If you would like to receive this communication electronically, please contact [email or web address] or [phone] to request more information on Referral[employer]IM Link access.    For providers and/or their staff who would like to refer a patient to [facility], please contact us through our one-stop-shop provider referral line, [facility], at [phone].    If you feel you have received this communication in error or would no longer like to receive these types of communications, please e-mail [email or web address]

## 2017-11-27 NOTE — PROGRESS NOTES
Subjective:       Patient ID: Kristyn Garza is a 66 y.o.   female who presents for follow-up evaluation of  CKD stage 3, HTN, Anemia, SHPT,     Zoë Guzman MD      HPI: Ms. Garza is a pleasant 66-year-old  woman seen in office today in f/u for above medical problems. I saw her in clinic about 10 months ago. She denies recent hospitalizations or ER visits. Her renal function remains stable with a serum creatinine of 1.1 mg/dl and GFR of about 50 mL per minute.  She has long-standing history of hypertension and diabetes.  recent labs discussed with pt,             Past Medical History:   Diagnosis Date    Carpal tunnel syndrome     CKD (chronic kidney disease) stage 3, GFR 30-59 ml/min     Followed by Nephrology    Colon cancer screening 3/18/2014    CTS (carpal tunnel syndrome) 6/18/2013    Diabetes mellitus, type 2     Eczema     Elevated CPK     History of hypokalemia 6/18/2013    History of hypokalemia 6/18/2013    Hypokalemia     Multinodular thyroid     Followed by ENT    Obesity     Other and unspecified hyperlipidemia     Pneumonia     in her 20s; hospitalized    Postmenopausal     No history of abnormal pap smear    Tobacco dependence     resolved    Unspecified essential hypertension          Current Outpatient Prescriptions on File Prior to Visit   Medication Sig Dispense Refill    alcohol swabs PadM Apply 1 each topically as needed. 100 each 0    amlodipine-benazepril 5-20 mg (LOTREL) 5-20 mg per capsule TAKE 1 CAPSULE BY MOUTH ONCE DAILY. 90 capsule 1    aspirin 81 MG Chew Take 81 mg by mouth once daily.      azelastine (ASTELIN) 137 mcg (0.1 %) nasal spray 1 spray (137 mcg total) by Nasal route 2 (two) times daily. 30 mL 0    blood glucose control, normal Soln 1 Bottle by Misc.(Non-Drug; Combo Route) route once daily. 1 each 0    blood sugar diagnostic (ACCU-CHEK SMARTVIEW TEST STRIP) Strp 1 strip by Misc.(Non-Drug; Combo Route) route 3 (three)  times daily. For Acc-Chek Amber   DX :E11.9 100 each 11    blood-glucose meter (ACCU-CHEK AMBER) Misc 1 kit by Misc.(Non-Drug; Combo Route) route once. 1 each 0    calcium-vitamin D3 500 mg(1,250mg) -200 unit per tablet Take 1 tablet by mouth once daily.       CRANBERRY EXTRACT ORAL Take 1 tablet by mouth once daily.      Lactobacillus rhamnosus GG (CULTURELLE) 10 billion cell capsule Take 1 capsule by mouth once daily.      lancets Misc 1 lancet by Misc.(Non-Drug; Combo Route) route 3 (three) times daily. For Acc-Chek Amber   DX :E11.9 100 each 11    loratadine (CLARITIN) 10 mg tablet Take 1 tablet (10 mg total) by mouth once daily. 90 tablet 3    meclizine (ANTIVERT) 25 mg tablet Take 1 tablet (25 mg total) by mouth 3 (three) times daily as needed. 30 tablet 0    MULTIVITAMIN W-MINERALS/LUTEIN (CENTRUM SILVER ORAL) Take 1 tablet by mouth once daily.      spironolactone (ALDACTONE) 50 MG tablet Take 1 tablet (50 mg total) by mouth once daily. 90 tablet 3    WELCHOL 625 mg tablet TAKE 1 TABLET (625 MG TOTAL) BY MOUTH 2 (TWO) TIMES DAILY WITH MEALS. 180 tablet 1     No current facility-administered medications on file prior to visit.                Review of Systems  :      Constitutional: Negative for fever, activity change, appetite change and fatigue.    HENT: Negative for congestion, sore throat, facial swelling, trouble swallowing and voice change.    Eyes: Negative for redness and visual disturbance.    Respiratory: Negative for apnea, cough, chest tightness, shortness of breath and wheezing.    Cardiovascular: Negative for chest pain, palpitations and leg swelling.    Gastrointestinal: Negative for nausea, vomiting, abdominal pain, diarrhea, constipation, blood in stool and abdominal distention.    Genitourinary: Negative for dysuria, urgency, frequency, hematuria, flank pain, decreased urine volume, difficulty urinating and pelvic pain.    Musculoskeletal: Negative for back pain, joint swelling and  gait problem.    Skin: Negative for color change and rash.    Neurological: Negative for dizziness, syncope, weakness and headaches.    Hematological: Does not bruise/bleed easily.    Psychiatric/Behavioral: Negative for behavioral problems, confusion and agitation. The patient is not nervous/anxious.              Objective:         Vitals:    11/27/17 0906   BP: 122/72   Pulse: 68       Respiratory rate 20, afebrile, weight 194 pounds, previous weight was 190 pounds      Physical Exam  :      Constitutional: She is oriented to person, place, and time. She appears well-developed and well-nourished. No distress.    HENT: Head: Normocephalic and atraumatic. Mouth/Throat: Oropharynx is clear and moist. No oropharyngeal exudate.    Eyes: Conjunctivae normal and EOM are normal. Pupils are equal, round, and reactive to light. No scleral icterus.    Neck: Normal range of motion. Neck supple. No JVD present. Carotid bruit is not present. No tracheal deviation present. No mass present.    Cardiovascular: Normal rate, regular rhythm, normal heart sounds and intact distal pulses. Exam reveals no gallop and no friction rub.    No murmur heard.    Pulmonary/Chest: Effort normal and breath sounds normal. No respiratory distress. She has no wheezes. She has no rales. She exhibits no tenderness.    Abdominal: Soft. Bowel sounds are normal. She exhibits no distension, no abdominal bruit, no ascites and no mass. There is no hepatosplenomegaly. There is no tenderness. There is no rebound, no guarding and no CVA tenderness.   Musculoskeletal: Normal range of motion. She exhibits no edema and no tenderness.   Lymphadenopathy: She has no cervical adenopathy.   Neurological: She is alert and oriented to person, place, and time. No cranial nerve deficit. She exhibits normal muscle tone. Coordination normal.    Skin: Skin is warm and intact. No rash noted. No erythema. No pallor. Burn scar right hand   Psychiatric: She has a normal mood and  affect. Her behavior is normal. Judgment normal.      Labs:    Lab Results   Component Value Date    CREATININE 1.1 10/02/2017    BUN 14 10/02/2017     10/02/2017    K 3.8 10/02/2017     10/02/2017    CO2 26 10/02/2017       Lab Results   Component Value Date    WBC 9.33 10/02/2017    HGB 13.4 10/02/2017    HCT 40.6 10/02/2017    MCV 95 10/02/2017     10/02/2017         Lab Results   Component Value Date    .0 (H) 12/21/2016    CALCIUM 9.5 10/02/2017    PHOS 3.2 12/21/2016       Lab Results   Component Value Date    ALBUMIN 3.8 10/02/2017       Lab Results   Component Value Date    URICACID 5.4 12/21/2016         Lab Results   Component Value Date    HGBA1C 7.0 (H) 10/02/2017       Lab Results   Component Value Date    CHOL 245 (H) 05/01/2017    CHOL 242 (H) 03/30/2016    CHOL 261 (H) 09/23/2015     Lab Results   Component Value Date    HDL 48 05/01/2017    HDL 49 03/30/2016    HDL 42 09/23/2015     Lab Results   Component Value Date    LDLCALC 180.6 (H) 05/01/2017    LDLCALC 175.4 (H) 03/30/2016    LDLCALC 197.8 (H) 09/23/2015     Lab Results   Component Value Date    TRIG 82 05/01/2017    TRIG 88 03/30/2016    TRIG 106 09/23/2015     Lab Results   Component Value Date    CHOLHDL 19.6 (L) 05/01/2017    CHOLHDL 20.2 03/30/2016    CHOLHDL 16.1 (L) 09/23/2015         ASSESSMENT AND PLAN: 66  -year-old  woman seen in office today in f/u for following medical problems    1. chronic kidney disease 3 - stable renal function. Most recent serum creatinine is 1.1 mg/dL.      2. Hypokalemia - K better , follow     3. Hypertension - blood pressure is well controlled on current medications.    4. hyperlipidemia - Discussed low cholesterol diet.  she is intolerant to statins.     5. Anemia - hemoglobin is currently stable at  13.4 g. No indication for Procrit therapy. We'll continue to follow.       6. Secondary hyperparathyroidism - PTH is acceptable at 121.  Vitamin D levels  acceptable.       7. Urine analysis is negative for proteinuria or hematuria       8. She is euvolemic on exam.      9. Diabetes mellitus type 2 - controlled    We will see her in followup in about 12 months. Time spent 25 mts , discussing labs, plan of care,      Sincerely,    Ronlad Hernandez M.D.

## 2017-12-01 ENCOUNTER — OFFICE VISIT (OUTPATIENT)
Dept: FAMILY MEDICINE | Facility: CLINIC | Age: 67
End: 2017-12-01
Payer: MEDICARE

## 2017-12-01 VITALS
RESPIRATION RATE: 16 BRPM | TEMPERATURE: 99 F | BODY MASS INDEX: 32.52 KG/M2 | HEIGHT: 64 IN | SYSTOLIC BLOOD PRESSURE: 131 MMHG | OXYGEN SATURATION: 98 % | DIASTOLIC BLOOD PRESSURE: 79 MMHG | HEART RATE: 89 BPM | WEIGHT: 190.5 LBS

## 2017-12-01 DIAGNOSIS — H66.002 ACUTE SUPPURATIVE OTITIS MEDIA OF LEFT EAR WITHOUT SPONTANEOUS RUPTURE OF TYMPANIC MEMBRANE, RECURRENCE NOT SPECIFIED: Primary | ICD-10-CM

## 2017-12-01 DIAGNOSIS — J00 ACUTE RHINITIS, UNSPECIFIED TYPE: ICD-10-CM

## 2017-12-01 DIAGNOSIS — I15.2 HYPERTENSION ASSOCIATED WITH DIABETES: ICD-10-CM

## 2017-12-01 DIAGNOSIS — E11.59 HYPERTENSION ASSOCIATED WITH DIABETES: ICD-10-CM

## 2017-12-01 DIAGNOSIS — N18.30 CKD (CHRONIC KIDNEY DISEASE) STAGE 3, GFR 30-59 ML/MIN: ICD-10-CM

## 2017-12-01 PROCEDURE — 99214 OFFICE O/P EST MOD 30 MIN: CPT | Mod: S$PBB,,, | Performed by: FAMILY MEDICINE

## 2017-12-01 PROCEDURE — 99214 OFFICE O/P EST MOD 30 MIN: CPT | Mod: PBBFAC,PO | Performed by: FAMILY MEDICINE

## 2017-12-01 PROCEDURE — 99999 PR PBB SHADOW E&M-EST. PATIENT-LVL IV: CPT | Mod: PBBFAC,,, | Performed by: FAMILY MEDICINE

## 2017-12-01 RX ORDER — AZITHROMYCIN 250 MG/1
TABLET, FILM COATED ORAL
Qty: 6 TABLET | Refills: 0 | Status: SHIPPED | OUTPATIENT
Start: 2017-12-01 | End: 2018-04-13

## 2017-12-01 NOTE — PROGRESS NOTES
Subjective:      Patient ID: Kristyn Garza is a 66 y.o. female.    Chief Complaint: Cough; Nasal Congestion; and Sore Throat      Past Medical History:   Diagnosis Date    Carpal tunnel syndrome     CKD (chronic kidney disease) stage 3, GFR 30-59 ml/min     Followed by Nephrology    Colon cancer screening 3/18/2014    CTS (carpal tunnel syndrome) 6/18/2013    Diabetes mellitus, type 2     Eczema     Elevated CPK     History of hypokalemia 6/18/2013    History of hypokalemia 6/18/2013    Hypokalemia     Multinodular thyroid     Followed by ENT    Obesity     Other and unspecified hyperlipidemia     Pneumonia     in her 20s; hospitalized    Postmenopausal     No history of abnormal pap smear    Tobacco dependence     resolved    Unspecified essential hypertension      Past Surgical History:   Procedure Laterality Date    COLONOSCOPY      CYST REMOVAL  2015    On the head    FINE NEEDLE ASPIRATION  3/2011    thyroid nodules x3 (negative per patient)    PARTIAL HYSTERECTOMY  1991    Due to fibroids     Family History   Problem Relation Age of Onset    Hypertension Mother     Diabetes Mother     Dementia Mother      Alzheimer's dementia    Hypertension Father     Heart disease Sister      MI/CAD    Cataracts Sister     Dementia Sister     No Known Problems Son     No Known Problems Son     Cataracts Brother     Cataracts Brother     Dementia Sister     Cancer Neg Hx     Stroke Neg Hx     Melanoma Neg Hx     Psoriasis Neg Hx     Lupus Neg Hx     Eczema Neg Hx     COPD Neg Hx     Kidney disease Neg Hx      Social History     Social History    Marital status:      Spouse name: N/A    Number of children: 2    Years of education: N/A     Occupational History    Retired      Social History Main Topics    Smoking status: Former Smoker     Packs/day: 0.50     Years: 25.00     Types: Cigarettes     Quit date: 5/20/2013    Smokeless tobacco: Never Used    Alcohol use 0.0  oz/week      Comment: Rarely drinks wine    Drug use: No    Sexual activity: Yes     Partners: Male     Birth control/ protection: Condom     Other Topics Concern    Not on file     Social History Narrative    She wears seatbelt. . 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        Current Outpatient Prescriptions:     alcohol swabs PadM, Apply 1 each topically as needed., Disp: 100 each, Rfl: 0    amlodipine-benazepril 5-20 mg (LOTREL) 5-20 mg per capsule, TAKE 1 CAPSULE BY MOUTH ONCE DAILY., Disp: 90 capsule, Rfl: 1    aspirin 81 MG Chew, Take 81 mg by mouth once daily., Disp: , Rfl:     azelastine (ASTELIN) 137 mcg (0.1 %) nasal spray, 1 spray (137 mcg total) by Nasal route 2 (two) times daily., Disp: 30 mL, Rfl: 0    blood glucose control, normal Soln, 1 Bottle by Misc.(Non-Drug; Combo Route) route once daily., Disp: 1 each, Rfl: 0    blood sugar diagnostic (ACCU-CHEK SMARTVIEW TEST STRIP) Strp, 1 strip by Misc.(Non-Drug; Combo Route) route 3 (three) times daily. For Acc-Chek Amber   DX :E11.9, Disp: 100 each, Rfl: 11    blood-glucose meter (ACCU-CHEK AMBER) Misc, 1 kit by Misc.(Non-Drug; Combo Route) route once., Disp: 1 each, Rfl: 0    calcium-vitamin D3 500 mg(1,250mg) -200 unit per tablet, Take 1 tablet by mouth once daily. , Disp: , Rfl:     CRANBERRY EXTRACT ORAL, Take 1 tablet by mouth once daily., Disp: , Rfl:     Lactobacillus rhamnosus GG (CULTURELLE) 10 billion cell capsule, Take 1 capsule by mouth once daily., Disp: , Rfl:     lancets Misc, 1 lancet by Misc.(Non-Drug; Combo Route) route 3 (three) times daily. For Acc-Chek Amber   DX :E11.9, Disp: 100 each, Rfl: 11    loratadine (CLARITIN) 10 mg tablet, Take 1 tablet (10 mg total) by mouth once daily., Disp: 90 tablet, Rfl: 3    meclizine (ANTIVERT) 25 mg tablet, Take 1 tablet (25 mg total) by mouth 3 (three) times daily as needed., Disp: 30 tablet, Rfl: 0    MULTIVITAMIN W-MINERALS/LUTEIN (CENTRUM SILVER ORAL), Take 1 tablet by  "mouth once daily., Disp: , Rfl:     spironolactone (ALDACTONE) 50 MG tablet, Take 1 tablet (50 mg total) by mouth once daily., Disp: 90 tablet, Rfl: 3    WELCHOL 625 mg tablet, TAKE 1 TABLET (625 MG TOTAL) BY MOUTH 2 (TWO) TIMES DAILY WITH MEALS., Disp: 180 tablet, Rfl: 1    azithromycin (ZITHROMAX Z-ALLAN) 250 MG tablet, Take 2 tablets for first dose today, then 1 tablet daily for 4 days., Disp: 6 tablet, Rfl: 0  Review of patient's allergies indicates:   Allergen Reactions    Statins-hmg-coa reductase inhibitors      Muscle Cramps       Review of Systems   Constitutional: Negative for chills and fever.   HENT: Positive for ear pain, postnasal drip, rhinorrhea and sore throat. Negative for sinus pain and sinus pressure.    Respiratory: Positive for cough. Negative for shortness of breath and wheezing.    Cardiovascular: Negative for chest pain and palpitations.   Gastrointestinal: Negative for abdominal pain and blood in stool.     Cough   Associated symptoms include ear pain, postnasal drip, rhinorrhea and a sore throat. Pertinent negatives include no chest pain, chills, fever, shortness of breath or wheezing.   Sore Throat    Associated symptoms include coughing and ear pain. Pertinent negatives include no abdominal pain or shortness of breath.   Main complaint is left ear pain and left sore throat.  Worse at night.  Persistent for one week.  She also has chronic allergy drainage, sneezing.  Mild intermittent cough.  BP's well controlled.  Renal function well controlled.      Objective:   /79 (BP Location: Right arm, Patient Position: Sitting, BP Method: Small (Manual))   Pulse 89   Temp 98.6 °F (37 °C) (Oral)   Resp 16   Ht 5' 4" (1.626 m)   Wt 86.4 kg (190 lb 7.6 oz)   SpO2 98%   BMI 32.70 kg/m²      Physical Exam   Constitutional: She is oriented to person, place, and time. She is cooperative. No distress.   HENT:   Right Ear: Hearing, tympanic membrane, external ear and ear canal normal.   Left " Ear: Hearing, external ear and ear canal normal. Tympanic membrane is erythematous. A middle ear effusion is present.   Mouth/Throat: Uvula is midline, oropharynx is clear and moist and mucous membranes are normal.   Eyes: Conjunctivae and EOM are normal.   Cardiovascular: Normal rate, regular rhythm and normal heart sounds.    Pulmonary/Chest: Effort normal and breath sounds normal.   Musculoskeletal: She exhibits no edema.   Neurological: She is alert and oriented to person, place, and time. Coordination and gait normal.   Skin: Skin is warm, dry and intact. No rash noted. She is not diaphoretic. No erythema.   Psychiatric: She has a normal mood and affect. Her speech is normal and behavior is normal.   Nursing note and vitals reviewed.          Assessment:       1. Acute suppurative otitis media of left ear without spontaneous rupture of tympanic membrane, recurrence not specified    2. CKD (chronic kidney disease) stage 3, GFR 30-59 ml/min    3. Hypertension associated with diabetes    4. Acute rhinitis, unspecified type            Plan:         Acute suppurative otitis media of left ear without spontaneous rupture of tympanic membrane, recurrence not specified  Comments:  Start zpack.  RTC if condition worsens/fails to improve in  a week.    CKD (chronic kidney disease) stage 3, GFR 30-59 ml/min  Comments:  Stable.    Hypertension associated with diabetes  Comments:  Well controlled.    Acute rhinitis, unspecified type  Comments:  Start back on azelastine and claritin.    Other orders  -     azithromycin (ZITHROMAX Z-ALLAN) 250 MG tablet; Take 2 tablets for first dose today, then 1 tablet daily for 4 days.  Dispense: 6 tablet; Refill: 0        Patient Care Team:  Zoë Guzman MD as PCP - General (Family Medicine)    Return if symptoms worsen or fail to improve.

## 2017-12-11 RX ORDER — AMLODIPINE AND BENAZEPRIL HYDROCHLORIDE 5; 20 MG/1; MG/1
CAPSULE ORAL
Qty: 90 CAPSULE | Refills: 1 | Status: SHIPPED | OUTPATIENT
Start: 2017-12-11 | End: 2018-06-06 | Stop reason: SDUPTHER

## 2017-12-12 RX ORDER — SPIRONOLACTONE 50 MG/1
50 TABLET, FILM COATED ORAL DAILY
Qty: 90 TABLET | Refills: 3 | Status: SHIPPED | OUTPATIENT
Start: 2017-12-12 | End: 2018-11-26 | Stop reason: SDUPTHER

## 2018-02-14 DIAGNOSIS — Z79.4 TYPE 2 DIABETES MELLITUS WITHOUT COMPLICATION, WITH LONG-TERM CURRENT USE OF INSULIN: ICD-10-CM

## 2018-02-14 DIAGNOSIS — E11.9 TYPE 2 DIABETES MELLITUS WITHOUT COMPLICATION, WITH LONG-TERM CURRENT USE OF INSULIN: ICD-10-CM

## 2018-02-15 RX ORDER — LANCETS
1 EACH MISCELLANEOUS 3 TIMES DAILY
Qty: 100 EACH | Refills: 11 | Status: SHIPPED | OUTPATIENT
Start: 2018-02-15 | End: 2020-04-17 | Stop reason: SDUPTHER

## 2018-03-05 RX ORDER — COLESEVELAM HYDROCHLORIDE 625 MG/1
TABLET, FILM COATED ORAL
Qty: 180 TABLET | Refills: 1 | Status: SHIPPED | OUTPATIENT
Start: 2018-03-05 | End: 2018-09-25 | Stop reason: SDUPTHER

## 2018-04-02 ENCOUNTER — TELEPHONE (OUTPATIENT)
Dept: FAMILY MEDICINE | Facility: CLINIC | Age: 68
End: 2018-04-02

## 2018-04-02 NOTE — TELEPHONE ENCOUNTER
----- Message from Zoë Guzman MD sent at 4/2/2018  1:16 PM CDT -----  Please reach out to pt. She missed physical appt today; that's unusual for her. Thanks

## 2018-04-13 ENCOUNTER — LAB VISIT (OUTPATIENT)
Dept: LAB | Facility: HOSPITAL | Age: 68
End: 2018-04-13
Attending: FAMILY MEDICINE
Payer: MEDICARE

## 2018-04-13 ENCOUNTER — OFFICE VISIT (OUTPATIENT)
Dept: FAMILY MEDICINE | Facility: CLINIC | Age: 68
End: 2018-04-13
Payer: MEDICARE

## 2018-04-13 VITALS
HEART RATE: 88 BPM | TEMPERATURE: 99 F | HEIGHT: 64 IN | DIASTOLIC BLOOD PRESSURE: 80 MMHG | RESPIRATION RATE: 17 BRPM | WEIGHT: 192.44 LBS | BODY MASS INDEX: 32.85 KG/M2 | SYSTOLIC BLOOD PRESSURE: 138 MMHG | OXYGEN SATURATION: 96 %

## 2018-04-13 DIAGNOSIS — E11.59 HYPERTENSION ASSOCIATED WITH DIABETES: Primary | ICD-10-CM

## 2018-04-13 DIAGNOSIS — E11.21 TYPE II DIABETES MELLITUS WITH NEPHROPATHY: ICD-10-CM

## 2018-04-13 DIAGNOSIS — E04.2 MULTIPLE THYROID NODULES: ICD-10-CM

## 2018-04-13 DIAGNOSIS — I15.2 HYPERTENSION ASSOCIATED WITH DIABETES: Primary | ICD-10-CM

## 2018-04-13 DIAGNOSIS — Z12.11 COLON CANCER SCREENING: ICD-10-CM

## 2018-04-13 DIAGNOSIS — E11.59 HYPERTENSION ASSOCIATED WITH DIABETES: ICD-10-CM

## 2018-04-13 DIAGNOSIS — E11.69 COMBINED HYPERLIPIDEMIA ASSOCIATED WITH TYPE 2 DIABETES MELLITUS: ICD-10-CM

## 2018-04-13 DIAGNOSIS — E78.2 COMBINED HYPERLIPIDEMIA ASSOCIATED WITH TYPE 2 DIABETES MELLITUS: ICD-10-CM

## 2018-04-13 DIAGNOSIS — I15.2 HYPERTENSION ASSOCIATED WITH DIABETES: ICD-10-CM

## 2018-04-13 DIAGNOSIS — N18.30 CKD (CHRONIC KIDNEY DISEASE) STAGE 3, GFR 30-59 ML/MIN: ICD-10-CM

## 2018-04-13 DIAGNOSIS — Z78.0 POSTMENOPAUSAL: ICD-10-CM

## 2018-04-13 DIAGNOSIS — E66.9 OBESITY (BMI 30.0-34.9): ICD-10-CM

## 2018-04-13 DIAGNOSIS — Z12.31 VISIT FOR SCREENING MAMMOGRAM: ICD-10-CM

## 2018-04-13 LAB
ALBUMIN SERPL BCP-MCNC: 3.8 G/DL
ALP SERPL-CCNC: 115 U/L
ALT SERPL W/O P-5'-P-CCNC: 23 U/L
ANION GAP SERPL CALC-SCNC: 8 MMOL/L
AST SERPL-CCNC: 23 U/L
BASOPHILS # BLD AUTO: 0.05 K/UL
BASOPHILS NFR BLD: 0.5 %
BILIRUB SERPL-MCNC: 0.4 MG/DL
BUN SERPL-MCNC: 15 MG/DL
CALCIUM SERPL-MCNC: 9.4 MG/DL
CHLORIDE SERPL-SCNC: 105 MMOL/L
CHOLEST SERPL-MCNC: 239 MG/DL
CHOLEST/HDLC SERPL: 4.7 {RATIO}
CO2 SERPL-SCNC: 28 MMOL/L
CREAT SERPL-MCNC: 1.1 MG/DL
CREAT UR-MCNC: 230 MG/DL
CTP QC/QA: YES
DIFFERENTIAL METHOD: ABNORMAL
EOSINOPHIL # BLD AUTO: 0.3 K/UL
EOSINOPHIL NFR BLD: 2.8 %
ERYTHROCYTE [DISTWIDTH] IN BLOOD BY AUTOMATED COUNT: 13.7 %
EST. GFR  (AFRICAN AMERICAN): 59.2 ML/MIN/1.73 M^2
EST. GFR  (NON AFRICAN AMERICAN): 51.3 ML/MIN/1.73 M^2
ESTIMATED AVG GLUCOSE: 140 MG/DL
FECAL OCCULT BLOOD, POC: NEGATIVE
GLUCOSE SERPL-MCNC: 97 MG/DL
HBA1C MFR BLD HPLC: 6.5 %
HCT VFR BLD AUTO: 39.2 %
HDLC SERPL-MCNC: 51 MG/DL
HDLC SERPL: 21.3 %
HGB BLD-MCNC: 12.8 G/DL
IMM GRANULOCYTES # BLD AUTO: 0.03 K/UL
IMM GRANULOCYTES NFR BLD AUTO: 0.3 %
LDLC SERPL CALC-MCNC: 170.8 MG/DL
LYMPHOCYTES # BLD AUTO: 3.5 K/UL
LYMPHOCYTES NFR BLD: 37.2 %
MCH RBC QN AUTO: 31.6 PG
MCHC RBC AUTO-ENTMCNC: 32.7 G/DL
MCV RBC AUTO: 97 FL
MONOCYTES # BLD AUTO: 0.7 K/UL
MONOCYTES NFR BLD: 7.8 %
NEUTROPHILS # BLD AUTO: 4.8 K/UL
NEUTROPHILS NFR BLD: 51.4 %
NONHDLC SERPL-MCNC: 188 MG/DL
NRBC BLD-RTO: 0 /100 WBC
PLATELET # BLD AUTO: 229 K/UL
PMV BLD AUTO: 11 FL
POTASSIUM SERPL-SCNC: 4.1 MMOL/L
PROT SERPL-MCNC: 7.7 G/DL
PROT UR-MCNC: 16 MG/DL
PROT/CREAT RATIO, UR: 0.07
RBC # BLD AUTO: 4.05 M/UL
SODIUM SERPL-SCNC: 141 MMOL/L
TRIGL SERPL-MCNC: 86 MG/DL
TSH SERPL DL<=0.005 MIU/L-ACNC: 0.97 UIU/ML
WBC # BLD AUTO: 9.32 K/UL

## 2018-04-13 PROCEDURE — 83036 HEMOGLOBIN GLYCOSYLATED A1C: CPT

## 2018-04-13 PROCEDURE — 80053 COMPREHEN METABOLIC PANEL: CPT

## 2018-04-13 PROCEDURE — 84443 ASSAY THYROID STIM HORMONE: CPT

## 2018-04-13 PROCEDURE — 99999 PR PBB SHADOW E&M-EST. PATIENT-LVL IV: CPT | Mod: PBBFAC,,, | Performed by: FAMILY MEDICINE

## 2018-04-13 PROCEDURE — 84156 ASSAY OF PROTEIN URINE: CPT

## 2018-04-13 PROCEDURE — 99214 OFFICE O/P EST MOD 30 MIN: CPT | Mod: S$PBB,,, | Performed by: FAMILY MEDICINE

## 2018-04-13 PROCEDURE — 36415 COLL VENOUS BLD VENIPUNCTURE: CPT | Mod: PO

## 2018-04-13 PROCEDURE — G0101 CA SCREEN;PELVIC/BREAST EXAM: HCPCS | Mod: 51,PBBFAC,PO | Performed by: FAMILY MEDICINE

## 2018-04-13 PROCEDURE — 85025 COMPLETE CBC W/AUTO DIFF WBC: CPT

## 2018-04-13 PROCEDURE — 80061 LIPID PANEL: CPT

## 2018-04-13 PROCEDURE — 99214 OFFICE O/P EST MOD 30 MIN: CPT | Mod: PBBFAC,PO | Performed by: FAMILY MEDICINE

## 2018-04-13 PROCEDURE — 82270 OCCULT BLOOD FECES: CPT | Mod: PBBFAC,PO | Performed by: FAMILY MEDICINE

## 2018-04-13 NOTE — PROGRESS NOTES
HISTORY OF PRESENT ILLNESS: Ms. Garza comes in today fasting with taking blood pressure medication for annual wellness examination.    END OF LIFE DECISION: She has no living will and does not desire life support.    Current Outpatient Prescriptions   Medication Sig    alcohol swabs PadM Apply 1 each topically as needed.    amlodipine-benazepril 5-20 mg (LOTREL) 5-20 mg per capsule TAKE 1 CAPSULE BY MOUTH ONCE DAILY.    aspirin 81 MG Chew Take 81 mg by mouth once daily.    azelastine (ASTELIN) 137 mcg (0.1 %) nasal spray 1 spray (137 mcg total) by Nasal route 2 (two) times daily.    blood sugar diagnostic Strp 1 strip by Misc.(Non-Drug; Combo Route) route 2 (two) times daily.    calcium-vitamin D3 500 mg(1,250mg) -200 unit per tablet Take 1 tablet by mouth once daily.     CRANBERRY EXTRACT ORAL Take 1 tablet by mouth once daily.    lancets Misc 1 lancet by Misc.(Non-Drug; Combo Route) route 3 (three) times daily. For Acc-Chek Amber   DX :E11.9    loratadine (CLARITIN) 10 mg tablet Take 1 tablet (10 mg total) by mouth once daily.    MULTIVITAMIN W-MINERALS/LUTEIN (CENTRUM SILVER ORAL) Take 1 tablet by mouth once daily.    spironolactone (ALDACTONE) 50 MG tablet TAKE 1 TABLET (50 MG TOTAL) BY MOUTH ONCE DAILY.    WELCHOL 625 mg tablet TAKE 1 TABLET (625 MG TOTAL) BY MOUTH 2 (TWO) TIMES DAILY WITH MEALS.    blood glucose control, normal Soln 1 Bottle by Misc.(Non-Drug; Combo Route) route once daily.    blood-glucose meter (ACCU-CHEK AMBER) Misc 1 kit by Misc.(Non-Drug; Combo Route) route once.    meclizine (ANTIVERT) 25 mg tablet Take 1 tablet (25 mg total) by mouth 3 (three) times daily as needed.      SCREENINGS:   Cholesterol: May 1, 2017.  FFS/Colonoscopy: March 18, 2014 - okay; repeat in 10 years.  Mammogram: May 1, 2017 - okay.  GYN Exam: March 30, 2017 - okay.  Dexa Scan: April 28, 2016 - okay; repeat in 3 to 5 years.  Eye Exam: May 17, 2017 with Dr. Hardy. She wears glasses.  Dental Exam:  October or November 2014 per patient. She wears top dentures.  PPD: Negative in the past.  Immunizations: Td/Tdap - February 23, 2011.  Gardasil - N./A.  Zostavax - February 23, 2011.  Pneumovax - March 3, 2014.  Prevnar-13 shot - March 23, 2015.  Seasonal Flu - September 29, 2017 at [facility] pharmacy per patient.     ROS:  GENERAL: Denies fever, chills, fatigue or unusual weight change. Appetite normal. Weight 87.4 kg (192 lb 10.9 oz) at October 2, 2017 visit. Exercises/walks 2 days then exercises 3 days at [employer] every week. Monitors her diet.   SKIN: Denies rashes, itching, changes in mole, color or texture of skin or easy bruising.   HEAD: Denies headaches or recent head trauma.  EYES: Denies change in vision, pain, diplopia, redness or discharge. She wears glasses.  EARS: Denies ear pain, discharge, tinnitus, vertigo or decreased hearing.  NOSE: Denies loss of smell, epistaxis or rhinitis except nasal congestion and uses Astelin and Claritin with help.   MOUTH & THROAT: Denies hoarseness or change in voice. Denies excessive gum bleeding or mouth sores. Denies sore throat.  NODES: Denies swollen glands.  CHEST: Denies ZHOU, wheezing, cough, or sputum production.  CARDIOVASCULAR: Denies chest pain, PND, orthopnea or reduced exercise tolerance. Denies palpitations.  ABDOMEN: Denies diarrhea, constipation, nausea, vomiting, abdominal pain, or blood in stool.  URINARY: Denies flank pain, dysuria or hematuria. Saw Nephrology Dr. Hernandez on November 27, 2017 for surveillance of stage 3 chronic kidney disease with 1-year follow up advised.  GENITOURINARY: Denies flank pain, dysuria, frequency or hematuria. Performs monthly breast self exams.  ENDOCRINE: Performs home glucose checks every morning with levels ranging 's but 140 this morning. Saw Arlet Almanza, registered dietitian, for diabetes surveillance on February 23, 2017 with 6-month follow up advised. Saw ENT Dr. Irwin on October 20, 2015 for  "surveillance of thyroid nodules with 1-year follow up advised. However, thyroid ultrasound on May 1, 2017 showed stable findings.  HEME//LYMPH: Denies bleeding problems.  PERIPHERAL VASCULAR:Denies claudication or cyanosis.  MUSCULOSKELETAL: Denies joint stiffness, pain or swelling today but mentions stiffness with getting up and after sitting for long periods.  NEUROLOGIC: Denies history of seizures, tremors, paralysis, alteration of gait or coordination.  PSYCHIATRIC: Denies mood swings, depression, anxiety, homicidal or suicidal thoughts. Denies sleep problems.     PE:   VS: /80 Comment: Rechecked by Dr. Guzman.  Pulse 88   Temp 98.6 °F (37 °C) (Tympanic)   Resp 17   Ht 5' 4" (1.626 m)   Wt 87.3 kg (192 lb 7.4 oz)   SpO2 96%   BMI 33.04 kg/m²   APPEARANCE: Well nourished, well developed female, pleasant and obese, alert and oriented in no acute distress.   HEAD: Non tender. Full range of motion.  EYES: PERRL, conjunctiva pink, lids no edema. She wears glasses.  EARS: External canal patent, no swelling or redness. TM's shiny and clear.  NOSE: Mucosa and turbinates pink, not swollen. No discharge. Non tender sinuses.  THROAT: No pharyngeal erythema or exudate. No stridor. She wears top dentures.   NECK: Supple, no mass. Chronic, non-tender goiter.  NODES: No cervical, axillary or inguinal lymph node enlargement.  CHEST: Normal respiratory effort. Lungs clear to auscultation.  CARDIOVASCULAR: Normal S1, S2. No rubs, murmurs or gallops. PMI not displaced. No carotid bruit. Pedal pulses palpable bilaterally. No edema. Feet look okay without ulcerations or skin breaks.  ABDOMEN: Bowel sounds present. Not distended. Soft. No tenderness, masses or organomegaly.  BREAST EXAM: Symmetrical, no external lesions, no discharge, no masses palpated.  PELVIC EXAM: No external lesions noted, no discharge, absent cervix and uterus, bimanual exam showed no adnexal tenderness or masses. Urethra and bladder intact.  RECTAL " EXAM: No anal fissures but non-inflamed external hemorrhoids noted. Heme-negative stool in the rectal vault.  MUSCULOSKELETAL: No joint deformities or stiffness. She is ambulatory without problems.  SKIN: No rashes or suspicious lesions, normal color and turgor.  NEUROLOGIC: Cranial Nerves: II-XII grossly intact. DTR's: Knees, Ankles 2+ and equal bilaterally. Gait & Posture: Normal gait and fine motion. Monofilament test unremarkable.  PSYCHIATRIC: Patient alert, oriented x 3. Mood/Affect normal without acute anxiety or depression noted. Judgment/insight good as she makes appropriate decisions on today's examination.    Protective Sensation (w/ 10 gram monofilament):  Right: Intact  Left: Intact    Visual Inspection:  Normal -  Bilateral    Pedal Pulses:   Right: Present  Left: Present    Posterior tibialis:   Right:Present  Left: Present     Lab Results   Component Value Date    HGBA1C 7.0 (H) 10/02/2017       ASSESSMENT:    ICD-10-CM ICD-9-CM    1. Hypertension associated with diabetes E11.59 250.80 Comprehensive metabolic panel    I10 401.9 Lipid panel      CBC auto differential      TSH   2. Combined hyperlipidemia associated with type 2 diabetes mellitus E11.69 250.80 Comprehensive metabolic panel    E78.2 272.2 Lipid panel   3. Type II diabetes mellitus with nephropathy E11.21 250.40 Protein / creatinine ratio, urine     583.81 Hemoglobin A1c      Ambulatory referral to Optometry   4. CKD (chronic kidney disease) stage 3, GFR 30-59 ml/min N18.3 585.3    5. Multiple thyroid nodules E04.2 241.1 US Soft Tissue Head Neck Thyroid   6. Obesity (BMI 30.0-34.9) E66.9 278.00    7. Postmenopausal Z78.0 V49.81    8. Colon cancer screening Z12.11 V76.51 POCT Occult Blood Stool   9. Visit for screening mammogram Z12.31 V76.12 Mammo Digital Screening Bilat with CAD     PLAN:  1. Age-appropriate counseling-appropriate low-sodium, low-cholesterol, low carbohydrate diet and exercise daily, monthly breast self exam, annual  wellness examination.   2. Patient advised to call for results.  3. Continue current medications.  4. Keep follow up with specialists.  5. Annual dental and eye examination.  6. Flu shot this fall.  7. Follow-up in about 6 months (around 10/12/2018) for diabetes and hypertension follow up.

## 2018-04-18 ENCOUNTER — TELEPHONE (OUTPATIENT)
Dept: RADIOLOGY | Facility: HOSPITAL | Age: 68
End: 2018-04-18

## 2018-04-19 ENCOUNTER — HOSPITAL ENCOUNTER (OUTPATIENT)
Dept: RADIOLOGY | Facility: HOSPITAL | Age: 68
Discharge: HOME | End: 2018-04-19
Attending: FAMILY MEDICINE
Payer: MEDICARE

## 2018-04-19 DIAGNOSIS — E04.2 MULTIPLE THYROID NODULES: ICD-10-CM

## 2018-04-19 PROCEDURE — 76536 US EXAM OF HEAD AND NECK: CPT | Mod: TC,PO

## 2018-04-19 PROCEDURE — 76536 US EXAM OF HEAD AND NECK: CPT | Mod: 26,,, | Performed by: RADIOLOGY

## 2018-04-24 ENCOUNTER — OFFICE VISIT (OUTPATIENT)
Dept: OPHTHALMOLOGY | Facility: CLINIC | Age: 68
End: 2018-04-24
Payer: MEDICARE

## 2018-04-24 DIAGNOSIS — H25.13 NUCLEAR SCLEROSIS, BILATERAL: ICD-10-CM

## 2018-04-24 DIAGNOSIS — E11.9 TYPE 2 DIABETES MELLITUS WITHOUT RETINOPATHY: Primary | ICD-10-CM

## 2018-04-24 DIAGNOSIS — H52.4 MYOPIA WITH PRESBYOPIA, BILATERAL: ICD-10-CM

## 2018-04-24 DIAGNOSIS — H52.13 MYOPIA WITH PRESBYOPIA, BILATERAL: ICD-10-CM

## 2018-04-24 DIAGNOSIS — H25.013 CORTICAL AGE-RELATED CATARACT OF BOTH EYES: ICD-10-CM

## 2018-04-24 PROCEDURE — 99211 OFF/OP EST MAY X REQ PHY/QHP: CPT | Mod: PBBFAC,PO | Performed by: OPTOMETRIST

## 2018-04-24 PROCEDURE — 92015 DETERMINE REFRACTIVE STATE: CPT | Mod: ,,, | Performed by: OPTOMETRIST

## 2018-04-24 PROCEDURE — 99999 PR PBB SHADOW E&M-EST. PATIENT-LVL I: CPT | Mod: PBBFAC,,, | Performed by: OPTOMETRIST

## 2018-04-24 PROCEDURE — 92014 COMPRE OPH EXAM EST PT 1/>: CPT | Mod: S$PBB,,, | Performed by: OPTOMETRIST

## 2018-04-24 NOTE — PROGRESS NOTES
HPI     HPI     Pts last exam was 5/17/17 with DNL. PT c/o overall blurred va and wears   gls full time.   Diabetic eye exam  Diagnosed with diabetes 4 years ago  Recent vision fluctuations no  Blood sugar: 6.5  HPI    Any vision changes since last exam: no  Eye pain: no  Other ocular symptoms: no     Do you wear currently wear glasses or contacts? gls     Interested in contacts today? no     Do you plan on getting new glasses today? If needed    Last edited by Joyce Garsia MA on 4/24/2018  8:08 AM. (History)            Assessment /Plan     For exam results, see Encounter Report.    Type 2 diabetes mellitus without retinopathy  No diabetic retinopathy OD, OS  Continue close care with PCP  Monitor 12 months    Nuclear sclerosis, bilateral  Cortical age-related cataract of both eyes  Surgery is not indicated at this time. Monitor 12 months.    Myopia with presbyopia, bilateral  Eyeglass Final Rx     Eyeglass Final Rx       Sphere Cylinder Axis Add    Right -3.00 +0.75 135 +2.50    Left -3.00 +0.50 050 +2.50    Expiration Date:  4/25/2019                  RTC 1 yr for dilated eye exam or PRN if any problems.   Discussed above and answered questions.

## 2018-04-24 NOTE — LETTER
April 24, 2018      Zoë Guzman MD  [address] [place] [address]           [facility]  [address] [place] [address]  Phone: [phone]  Fax: [phone]          Patient: Kristyn Garza   MR Number: [id number]   YOB: 1950   Date of Visit: 4/24/2018       Dear Dr. Zoë Guzman:    Thank you for referring Kristyn Garza to me for evaluation. Attached you will find relevant portions of my assessment and plan of care.    If you have questions, please do not hesitate to call me. I look forward to following Kristyn Garza along with you.    Sincerely,    Hammad Hardy, OD    Enclosure  CC:  No Recipients    If you would like to receive this communication electronically, please contact [email or web address] or [phone] to request more information on [employer]osk Link access.    For providers and/or their staff who would like to refer a patient to [facility], please contact us through our one-stop-shop provider referral line, Yennifer Simmons, at [phone].    If you feel you have received this communication in error or would no longer like to receive these types of communications, please e-mail [email or web address]

## 2018-05-22 ENCOUNTER — HOSPITAL ENCOUNTER (OUTPATIENT)
Dept: RADIOLOGY | Facility: HOSPITAL | Age: 68
Discharge: HOME | End: 2018-05-22
Attending: FAMILY MEDICINE
Payer: MEDICARE

## 2018-05-22 VITALS — BODY MASS INDEX: 32.78 KG/M2 | HEIGHT: 64 IN | WEIGHT: 192 LBS

## 2018-05-22 DIAGNOSIS — Z12.31 VISIT FOR SCREENING MAMMOGRAM: ICD-10-CM

## 2018-05-22 PROCEDURE — 77067 SCR MAMMO BI INCL CAD: CPT | Mod: TC,PO

## 2018-05-22 PROCEDURE — 77063 BREAST TOMOSYNTHESIS BI: CPT | Mod: 26,,, | Performed by: RADIOLOGY

## 2018-05-22 PROCEDURE — 77067 SCR MAMMO BI INCL CAD: CPT | Mod: 26,,, | Performed by: RADIOLOGY

## 2018-06-06 RX ORDER — AMLODIPINE AND BENAZEPRIL HYDROCHLORIDE 5; 20 MG/1; MG/1
CAPSULE ORAL
Qty: 90 CAPSULE | Refills: 1 | Status: SHIPPED | OUTPATIENT
Start: 2018-06-06 | End: 2018-11-26 | Stop reason: SDUPTHER

## 2018-06-08 ENCOUNTER — OFFICE VISIT (OUTPATIENT)
Dept: FAMILY MEDICINE | Facility: CLINIC | Age: 68
End: 2018-06-08
Payer: MEDICARE

## 2018-06-08 VITALS
TEMPERATURE: 97 F | RESPIRATION RATE: 16 BRPM | DIASTOLIC BLOOD PRESSURE: 81 MMHG | HEIGHT: 64 IN | SYSTOLIC BLOOD PRESSURE: 141 MMHG | HEART RATE: 61 BPM | OXYGEN SATURATION: 97 % | BODY MASS INDEX: 32.74 KG/M2 | WEIGHT: 191.81 LBS

## 2018-06-08 DIAGNOSIS — B35.1 ONYCHOMYCOSIS: ICD-10-CM

## 2018-06-08 DIAGNOSIS — L20.82 FLEXURAL ECZEMA: Primary | ICD-10-CM

## 2018-06-08 PROCEDURE — 99214 OFFICE O/P EST MOD 30 MIN: CPT | Mod: PBBFAC,PO | Performed by: FAMILY MEDICINE

## 2018-06-08 PROCEDURE — 99999 PR PBB SHADOW E&M-EST. PATIENT-LVL IV: CPT | Mod: PBBFAC,,, | Performed by: FAMILY MEDICINE

## 2018-06-08 PROCEDURE — 99214 OFFICE O/P EST MOD 30 MIN: CPT | Mod: S$PBB,,, | Performed by: FAMILY MEDICINE

## 2018-06-08 RX ORDER — CICLOPIROX 80 MG/ML
SOLUTION TOPICAL NIGHTLY
Qty: 6.6 ML | Refills: 0 | Status: SHIPPED | OUTPATIENT
Start: 2018-06-08 | End: 2020-07-29

## 2018-06-08 RX ORDER — TRIAMCINOLONE ACETONIDE 1 MG/G
OINTMENT TOPICAL 2 TIMES DAILY
Qty: 30 G | Refills: 0 | Status: SHIPPED | OUTPATIENT
Start: 2018-06-08 | End: 2019-02-28

## 2018-06-08 NOTE — PROGRESS NOTES
Subjective:      Patient ID: Kristyn Garza is a 67 y.o. female.    Chief Complaint: Rash      Past Medical History:   Diagnosis Date    Carpal tunnel syndrome     CKD (chronic kidney disease) stage 3, GFR 30-59 ml/min     Followed by Nephrology    Colon cancer screening 3/18/2014    CTS (carpal tunnel syndrome) 6/18/2013    Diabetes mellitus, type 2     Eczema     Elevated CPK     History of hypokalemia 6/18/2013    History of hypokalemia 6/18/2013    Hypokalemia     Multinodular thyroid     Followed by ENT    Obesity     Other and unspecified hyperlipidemia     Pneumonia     in her 20s; hospitalized    Postmenopausal     No history of abnormal pap smear    Statin intolerance     caused mylagia, elevated CPK    Tobacco dependence     resolved    Unspecified essential hypertension      Past Surgical History:   Procedure Laterality Date    COLONOSCOPY      CYST REMOVAL  2015    On the head    FINE NEEDLE ASPIRATION  3/2011    thyroid nodules x3 (negative per patient)    HYSTERECTOMY      PARTIAL HYSTERECTOMY  1991    Due to fibroids     Family History   Problem Relation Age of Onset    Hypertension Mother     Diabetes Mother     Dementia Mother         Alzheimer's dementia    Hypertension Father     Heart disease Sister         MI/CAD    Cataracts Sister     Dementia Sister     No Known Problems Son     No Known Problems Son     Cataracts Brother     Cataracts Brother     Dementia Sister     Cancer Neg Hx     Stroke Neg Hx     Melanoma Neg Hx     Psoriasis Neg Hx     Lupus Neg Hx     Eczema Neg Hx     COPD Neg Hx     Kidney disease Neg Hx      Social History     Social History    Marital status:      Spouse name: N/A    Number of children: 2    Years of education: N/A     Occupational History    Retired      Social History Main Topics    Smoking status: Former Smoker     Packs/day: 0.50     Years: 25.00     Types: Cigarettes     Quit date: 5/20/2013     Smokeless tobacco: Never Used    Alcohol use 0.0 oz/week      Comment: Rarely drinks wine    Drug use: No    Sexual activity: Yes     Partners: Male     Birth control/ protection: Condom     Other Topics Concern    Not on file     Social History Narrative    She wears seatbelt. . 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        Current Outpatient Prescriptions:     alcohol swabs PadM, Apply 1 each topically as needed., Disp: 100 each, Rfl: 0    amlodipine-benazepril 5-20 mg (LOTREL) 5-20 mg per capsule, TAKE 1 CAPSULE BY MOUTH ONCE DAILY., Disp: 90 capsule, Rfl: 1    aspirin 81 MG Chew, Take 81 mg by mouth once daily., Disp: , Rfl:     azelastine (ASTELIN) 137 mcg (0.1 %) nasal spray, 1 spray (137 mcg total) by Nasal route 2 (two) times daily., Disp: 30 mL, Rfl: 0    blood glucose control, normal Soln, 1 Bottle by Misc.(Non-Drug; Combo Route) route once daily., Disp: 1 each, Rfl: 0    blood sugar diagnostic Strp, 1 strip by Misc.(Non-Drug; Combo Route) route 2 (two) times daily., Disp: 200 each, Rfl: 3    blood-glucose meter (ACCU-CHEK AMBER) Misc, 1 kit by Misc.(Non-Drug; Combo Route) route once., Disp: 1 each, Rfl: 0    calcium-vitamin D3 500 mg(1,250mg) -200 unit per tablet, Take 1 tablet by mouth once daily. , Disp: , Rfl:     CRANBERRY EXTRACT ORAL, Take 1 tablet by mouth once daily., Disp: , Rfl:     lancets Misc, 1 lancet by Misc.(Non-Drug; Combo Route) route 3 (three) times daily. For Acc-Chek Amber   DX :E11.9, Disp: 100 each, Rfl: 11    loratadine (CLARITIN) 10 mg tablet, Take 1 tablet (10 mg total) by mouth once daily., Disp: 90 tablet, Rfl: 3    MULTIVITAMIN W-MINERALS/LUTEIN (CENTRUM SILVER ORAL), Take 1 tablet by mouth once daily., Disp: , Rfl:     spironolactone (ALDACTONE) 50 MG tablet, TAKE 1 TABLET (50 MG TOTAL) BY MOUTH ONCE DAILY., Disp: 90 tablet, Rfl: 3    WELCHOL 625 mg tablet, TAKE 1 TABLET (625 MG TOTAL) BY MOUTH 2 (TWO) TIMES DAILY WITH MEALS., Disp: 180 tablet,  "Rfl: 1    ciclopirox (PENLAC) 8 % Soln, Apply topically nightly., Disp: 6.6 mL, Rfl: 0    meclizine (ANTIVERT) 25 mg tablet, Take 1 tablet (25 mg total) by mouth 3 (three) times daily as needed., Disp: 30 tablet, Rfl: 0    triamcinolone acetonide 0.1% (KENALOG) 0.1 % ointment, Apply topically 2 (two) times daily., Disp: 30 g, Rfl: 0  Review of patient's allergies indicates:   Allergen Reactions    Statins-hmg-coa reductase inhibitors      Muscle Cramps       Review of Systems   Constitutional: Negative for chills and fever.   Respiratory: Negative for shortness of breath and wheezing.    Cardiovascular: Negative for chest pain and palpitations.   Gastrointestinal: Negative for abdominal pain and blood in stool.   Skin: Positive for color change and rash.     Rash   Pertinent negatives include no fever or shortness of breath.   Long h/o eczema - usually controlled with otc meds.  Needs stronger steroid at this time.  One week of itching and increased rash.  Also c/o fingernail fungus.      Objective:   BP (!) 141/81 (BP Location: Right arm, Patient Position: Sitting, BP Method: Small (Manual))   Pulse 61   Temp 97.3 °F (36.3 °C) (Tympanic)   Resp 16   Ht 5' 4" (1.626 m)   Wt 87 kg (191 lb 12.8 oz)   SpO2 97%   BMI 32.92 kg/m²      Physical Exam   Constitutional: She is oriented to person, place, and time. She is cooperative. No distress.   Eyes: Conjunctivae and EOM are normal.   Cardiovascular: Normal rate, regular rhythm and normal heart sounds.    Pulmonary/Chest: Effort normal and breath sounds normal.   Musculoskeletal: She exhibits no edema.   Neurological: She is alert and oriented to person, place, and time. No cranial nerve deficit. Coordination and gait normal.   Skin: Skin is warm, dry and intact. She is not diaphoretic.   Eczematous rash - right upper flexor crease of right arm - pruritic, lichenified patch  3rd digit right hand - thickened yellow distal nail   Psychiatric: She has a normal " mood and affect. Her speech is normal and behavior is normal.   Nursing note and vitals reviewed.          Assessment:       1. Flexural eczema    2. Onychomycosis            Plan:         Flexural eczema  Comments:  Eczema usually clears with otc hydrocortisone prn.  Has needed triamcinolone at times.  Refill today.    Onychomycosis  Comments:  One fingernail - penlac.  Discussed how to use.    Other orders  -     triamcinolone acetonide 0.1% (KENALOG) 0.1 % ointment; Apply topically 2 (two) times daily.  Dispense: 30 g; Refill: 0  -     ciclopirox (PENLAC) 8 % Soln; Apply topically nightly.  Dispense: 6.6 mL; Refill: 0        Patient Care Team:  Zoë Guzman MD as PCP - General (Family Medicine)  Emma Friend MD as PCP - [facility] Attributed    Follow-up if symptoms worsen or fail to improve.

## 2018-08-06 ENCOUNTER — OFFICE VISIT (OUTPATIENT)
Dept: FAMILY MEDICINE | Facility: CLINIC | Age: 68
End: 2018-08-06
Payer: MEDICARE

## 2018-08-06 VITALS
DIASTOLIC BLOOD PRESSURE: 80 MMHG | TEMPERATURE: 98 F | OXYGEN SATURATION: 97 % | WEIGHT: 190.69 LBS | HEART RATE: 70 BPM | HEIGHT: 64 IN | SYSTOLIC BLOOD PRESSURE: 140 MMHG | BODY MASS INDEX: 32.56 KG/M2

## 2018-08-06 DIAGNOSIS — M79.604 RIGHT LEG PAIN: ICD-10-CM

## 2018-08-06 DIAGNOSIS — R10.31 RIGHT GROIN PAIN: Primary | ICD-10-CM

## 2018-08-06 DIAGNOSIS — J30.1 SEASONAL ALLERGIC RHINITIS DUE TO POLLEN: ICD-10-CM

## 2018-08-06 PROCEDURE — 99214 OFFICE O/P EST MOD 30 MIN: CPT | Mod: PBBFAC,PO | Performed by: FAMILY MEDICINE

## 2018-08-06 PROCEDURE — 99999 PR PBB SHADOW E&M-EST. PATIENT-LVL IV: CPT | Mod: PBBFAC,,, | Performed by: FAMILY MEDICINE

## 2018-08-06 PROCEDURE — 99214 OFFICE O/P EST MOD 30 MIN: CPT | Mod: S$PBB,,, | Performed by: FAMILY MEDICINE

## 2018-08-06 RX ORDER — AZELASTINE 1 MG/ML
1 SPRAY, METERED NASAL 2 TIMES DAILY
Qty: 30 ML | Refills: 6 | Status: SHIPPED | OUTPATIENT
Start: 2018-08-06 | End: 2018-12-27 | Stop reason: SDUPTHER

## 2018-08-06 NOTE — PROGRESS NOTES
Subjective:      Patient ID: Kristyn Garza is a 67 y.o. female.    Chief Complaint: Hip Pain and Leg Pain      Past Medical History:   Diagnosis Date    Carpal tunnel syndrome     CKD (chronic kidney disease) stage 3, GFR 30-59 ml/min     Followed by Nephrology    Colon cancer screening 3/18/2014    CTS (carpal tunnel syndrome) 6/18/2013    Diabetes mellitus, type 2     Eczema     Elevated CPK     History of hypokalemia 6/18/2013    History of hypokalemia 6/18/2013    Hypokalemia     Multinodular thyroid     Followed by ENT    Obesity     Other and unspecified hyperlipidemia     Pneumonia     in her 20s; hospitalized    Postmenopausal     No history of abnormal pap smear    Statin intolerance     caused mylagia, elevated CPK    Tobacco dependence     resolved    Unspecified essential hypertension      Past Surgical History:   Procedure Laterality Date    COLONOSCOPY      CYST REMOVAL  2015    On the head    FINE NEEDLE ASPIRATION  3/2011    thyroid nodules x3 (negative per patient)    HYSTERECTOMY      PARTIAL HYSTERECTOMY  1991    Due to fibroids     Family History   Problem Relation Age of Onset    Hypertension Mother     Diabetes Mother     Dementia Mother         Alzheimer's dementia    Hypertension Father     Heart disease Sister         MI/CAD    Cataracts Sister     Dementia Sister     No Known Problems Son     No Known Problems Son     Cataracts Brother     Cataracts Brother     Dementia Sister     Cancer Neg Hx     Stroke Neg Hx     Melanoma Neg Hx     Psoriasis Neg Hx     Lupus Neg Hx     Eczema Neg Hx     COPD Neg Hx     Kidney disease Neg Hx      Social History     Social History    Marital status:      Spouse name: N/A    Number of children: 2    Years of education: N/A     Occupational History    Retired      Social History Main Topics    Smoking status: Former Smoker     Packs/day: 0.50     Years: 25.00     Types: Cigarettes     Quit date:  5/20/2013    Smokeless tobacco: Never Used    Alcohol use 0.0 oz/week      Comment: Rarely drinks wine    Drug use: No    Sexual activity: Yes     Partners: Male     Birth control/ protection: Condom     Other Topics Concern    Not on file     Social History Narrative    She wears seatbelt. . 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        Current Outpatient Prescriptions:     alcohol swabs PadM, Apply 1 each topically as needed., Disp: 100 each, Rfl: 0    amlodipine-benazepril 5-20 mg (LOTREL) 5-20 mg per capsule, TAKE 1 CAPSULE BY MOUTH ONCE DAILY., Disp: 90 capsule, Rfl: 1    aspirin 81 MG Chew, Take 81 mg by mouth once daily., Disp: , Rfl:     azelastine (ASTELIN) 137 mcg (0.1 %) nasal spray, 1 spray (137 mcg total) by Nasal route 2 (two) times daily., Disp: 30 mL, Rfl: 6    blood sugar diagnostic Strp, 1 strip by Misc.(Non-Drug; Combo Route) route 2 (two) times daily., Disp: 200 each, Rfl: 3    calcium-vitamin D3 500 mg(1,250mg) -200 unit per tablet, Take 1 tablet by mouth once daily. , Disp: , Rfl:     ciclopirox (PENLAC) 8 % Soln, Apply topically nightly., Disp: 6.6 mL, Rfl: 0    CRANBERRY EXTRACT ORAL, Take 1 tablet by mouth once daily., Disp: , Rfl:     lancets Misc, 1 lancet by Misc.(Non-Drug; Combo Route) route 3 (three) times daily. For Acc-Chek Irais   DX :E11.9, Disp: 100 each, Rfl: 11    loratadine (CLARITIN) 10 mg tablet, Take 1 tablet (10 mg total) by mouth once daily., Disp: 90 tablet, Rfl: 3    MULTIVITAMIN W-MINERALS/LUTEIN (CENTRUM SILVER ORAL), Take 1 tablet by mouth once daily., Disp: , Rfl:     spironolactone (ALDACTONE) 50 MG tablet, TAKE 1 TABLET (50 MG TOTAL) BY MOUTH ONCE DAILY., Disp: 90 tablet, Rfl: 3    WELCHOL 625 mg tablet, TAKE 1 TABLET (625 MG TOTAL) BY MOUTH 2 (TWO) TIMES DAILY WITH MEALS., Disp: 180 tablet, Rfl: 1    blood glucose control, normal Soln, 1 Bottle by Misc.(Non-Drug; Combo Route) route once daily., Disp: 1 each, Rfl: 0    blood-glucose  "meter (ACCU-CHEK AMBER) Misc, 1 kit by Misc.(Non-Drug; Combo Route) route once., Disp: 1 each, Rfl: 0    meclizine (ANTIVERT) 25 mg tablet, Take 1 tablet (25 mg total) by mouth 3 (three) times daily as needed., Disp: 30 tablet, Rfl: 0    triamcinolone acetonide 0.1% (KENALOG) 0.1 % ointment, Apply topically 2 (two) times daily., Disp: 30 g, Rfl: 0  Review of patient's allergies indicates:   Allergen Reactions    Statins-hmg-coa reductase inhibitors      Muscle Cramps       Review of Systems   Constitutional: Negative for chills and fever.   HENT: Positive for postnasal drip and rhinorrhea.    Respiratory: Negative for shortness of breath and wheezing.    Cardiovascular: Negative for chest pain and palpitations.   Gastrointestinal: Negative for abdominal pain and blood in stool.   Musculoskeletal: Positive for myalgias. Negative for back pain, gait problem and joint swelling.     Hip Pain      Leg Pain      Needs refill on allergy medication.      Hip and leg pain - On Friday, went to  and right groin pain came on suddenly along with anterior and posterior leg pain that radiated down posterior leg.  Her leg was giving way, but a friend steadied her.  No fall.  She exercises daily and walks a mile on the treadmill.  This past week she went to an incline of 10 to burn more calories likely straining anterior right leg which she favors.  Pain gone on Saturday and tylenol takes pain completely away. Pain comes back when she stretches anterior leg in certain way. She is no longer weak.  Pain localized to anterior thigh and right groin.  Currently 5/10 pain, but tylenol today.    Objective:   BP (!) 140/80   Pulse 70   Temp 98.3 °F (36.8 °C) (Oral)   Ht 5' 4" (1.626 m)   Wt 86.5 kg (190 lb 11.2 oz)   SpO2 97%   BMI 32.73 kg/m²      Physical Exam   Constitutional: She is oriented to person, place, and time. She is cooperative. No distress.   Eyes: Conjunctivae and EOM are normal.   Cardiovascular: Normal rate, " regular rhythm and normal heart sounds.    Pulmonary/Chest: Effort normal and breath sounds normal.   Musculoskeletal: She exhibits no edema.   Examination of right groin and upper thigh - no femoral or inguinal hernia appreciated; tender inguinal liagament - mild   Neurological: She is alert and oriented to person, place, and time. Coordination and gait normal.   Skin: Skin is warm, dry and intact. No rash noted. She is not diaphoretic. No erythema.   Psychiatric: She has a normal mood and affect. Her speech is normal and behavior is normal.   Nursing note and vitals reviewed.          Assessment:       1. Right groin pain    2. Right leg pain    3. Seasonal allergic rhinitis due to pollen            Plan:         Right groin pain  Comments:  Right groin pain - likely inguinal/thigh strain.  Improved this week.  Continue with tylenol twice daily.  Improving quickly.  consider PT if persistent.    Right leg pain  Comments:  Radicular leg pain resolved.  Now just anterior right leg/groin - 5/10 without any tylenol.  No weakness or radiating symptoms.  Can exercise lightly this week.  Walk without incline and lower speed.  If pain returns, rest 1-2 weeks.  Call if wrosening.    Seasonal allergic rhinitis due to pollen  Comments:  Refill on astelin requested.    Other orders  -     azelastine (ASTELIN) 137 mcg (0.1 %) nasal spray; 1 spray (137 mcg total) by Nasal route 2 (two) times daily.  Dispense: 30 mL; Refill: 6        Patient Care Team:  Zoë Guzman MD as PCP - General (Family Medicine)  Emma Friend MD as PCP - [facility]    Follow-up if symptoms worsen or fail to improve.

## 2018-09-25 RX ORDER — COLESEVELAM 180 1/1
625 TABLET ORAL 2 TIMES DAILY WITH MEALS
Qty: 180 TABLET | Refills: 1 | Status: SHIPPED | OUTPATIENT
Start: 2018-09-25 | End: 2019-10-16

## 2018-10-15 ENCOUNTER — LAB VISIT (OUTPATIENT)
Dept: LAB | Facility: HOSPITAL | Age: 68
End: 2018-10-15
Attending: FAMILY MEDICINE
Payer: MEDICARE

## 2018-10-15 ENCOUNTER — OFFICE VISIT (OUTPATIENT)
Dept: FAMILY MEDICINE | Facility: CLINIC | Age: 68
End: 2018-10-15
Payer: MEDICARE

## 2018-10-15 VITALS
RESPIRATION RATE: 18 BRPM | TEMPERATURE: 99 F | WEIGHT: 190.25 LBS | BODY MASS INDEX: 32.48 KG/M2 | HEIGHT: 64 IN | DIASTOLIC BLOOD PRESSURE: 80 MMHG | OXYGEN SATURATION: 95 % | SYSTOLIC BLOOD PRESSURE: 136 MMHG | HEART RATE: 71 BPM

## 2018-10-15 DIAGNOSIS — E11.69 COMBINED HYPERLIPIDEMIA ASSOCIATED WITH TYPE 2 DIABETES MELLITUS: ICD-10-CM

## 2018-10-15 DIAGNOSIS — E04.2 MULTIPLE THYROID NODULES: ICD-10-CM

## 2018-10-15 DIAGNOSIS — N18.30 CKD (CHRONIC KIDNEY DISEASE) STAGE 3, GFR 30-59 ML/MIN: ICD-10-CM

## 2018-10-15 DIAGNOSIS — E11.59 HYPERTENSION ASSOCIATED WITH DIABETES: ICD-10-CM

## 2018-10-15 DIAGNOSIS — E11.21 TYPE II DIABETES MELLITUS WITH NEPHROPATHY: ICD-10-CM

## 2018-10-15 DIAGNOSIS — E66.9 OBESITY (BMI 30.0-34.9): ICD-10-CM

## 2018-10-15 DIAGNOSIS — Z78.9 STATIN INTOLERANCE: ICD-10-CM

## 2018-10-15 DIAGNOSIS — M79.671 PAIN OF RIGHT HEEL: ICD-10-CM

## 2018-10-15 DIAGNOSIS — I15.2 HYPERTENSION ASSOCIATED WITH DIABETES: ICD-10-CM

## 2018-10-15 DIAGNOSIS — E78.2 COMBINED HYPERLIPIDEMIA ASSOCIATED WITH TYPE 2 DIABETES MELLITUS: ICD-10-CM

## 2018-10-15 LAB
ALBUMIN SERPL BCP-MCNC: 3.8 G/DL
ALP SERPL-CCNC: 114 U/L
ALT SERPL W/O P-5'-P-CCNC: 23 U/L
ANION GAP SERPL CALC-SCNC: 10 MMOL/L
AST SERPL-CCNC: 24 U/L
BILIRUB SERPL-MCNC: 0.5 MG/DL
BUN SERPL-MCNC: 13 MG/DL
CALCIUM SERPL-MCNC: 9.5 MG/DL
CHLORIDE SERPL-SCNC: 107 MMOL/L
CO2 SERPL-SCNC: 26 MMOL/L
CREAT SERPL-MCNC: 1 MG/DL
EST. GFR  (AFRICAN AMERICAN): >60 ML/MIN/1.73 M^2
EST. GFR  (NON AFRICAN AMERICAN): 57.6 ML/MIN/1.73 M^2
GLUCOSE SERPL-MCNC: 100 MG/DL
POTASSIUM SERPL-SCNC: 3.8 MMOL/L
PROT SERPL-MCNC: 8 G/DL
SODIUM SERPL-SCNC: 143 MMOL/L

## 2018-10-15 PROCEDURE — 99214 OFFICE O/P EST MOD 30 MIN: CPT | Mod: S$PBB,,, | Performed by: FAMILY MEDICINE

## 2018-10-15 PROCEDURE — 99213 OFFICE O/P EST LOW 20 MIN: CPT | Mod: PBBFAC,PO | Performed by: FAMILY MEDICINE

## 2018-10-15 PROCEDURE — 99999 PR PBB SHADOW E&M-EST. PATIENT-LVL III: CPT | Mod: PBBFAC,,, | Performed by: FAMILY MEDICINE

## 2018-10-15 PROCEDURE — 83036 HEMOGLOBIN GLYCOSYLATED A1C: CPT

## 2018-10-15 PROCEDURE — 36415 COLL VENOUS BLD VENIPUNCTURE: CPT | Mod: PO

## 2018-10-15 PROCEDURE — 80053 COMPREHEN METABOLIC PANEL: CPT

## 2018-10-16 LAB
ESTIMATED AVG GLUCOSE: 146 MG/DL
HBA1C MFR BLD HPLC: 6.7 %

## 2018-11-23 ENCOUNTER — OFFICE VISIT (OUTPATIENT)
Dept: FAMILY MEDICINE | Facility: CLINIC | Age: 68
End: 2018-11-23
Payer: MEDICARE

## 2018-11-23 VITALS
RESPIRATION RATE: 18 BRPM | DIASTOLIC BLOOD PRESSURE: 80 MMHG | SYSTOLIC BLOOD PRESSURE: 130 MMHG | BODY MASS INDEX: 32.45 KG/M2 | HEIGHT: 64 IN | OXYGEN SATURATION: 96 % | TEMPERATURE: 98 F | HEART RATE: 89 BPM | WEIGHT: 190.06 LBS

## 2018-11-23 DIAGNOSIS — J04.0 LARYNGITIS: ICD-10-CM

## 2018-11-23 DIAGNOSIS — H66.92 LEFT OTITIS MEDIA, UNSPECIFIED OTITIS MEDIA TYPE: Primary | ICD-10-CM

## 2018-11-23 DIAGNOSIS — E11.21 TYPE II DIABETES MELLITUS WITH NEPHROPATHY: ICD-10-CM

## 2018-11-23 PROCEDURE — 99999 PR PBB SHADOW E&M-EST. PATIENT-LVL III: CPT | Mod: PBBFAC,,, | Performed by: FAMILY MEDICINE

## 2018-11-23 PROCEDURE — 99213 OFFICE O/P EST LOW 20 MIN: CPT | Mod: PBBFAC,PO | Performed by: FAMILY MEDICINE

## 2018-11-23 PROCEDURE — 99214 OFFICE O/P EST MOD 30 MIN: CPT | Mod: S$PBB,,, | Performed by: FAMILY MEDICINE

## 2018-11-23 RX ORDER — PROMETHAZINE HYDROCHLORIDE AND DEXTROMETHORPHAN HYDROBROMIDE 6.25; 15 MG/5ML; MG/5ML
5 SYRUP ORAL
Qty: 180 ML | Refills: 0 | Status: SHIPPED | OUTPATIENT
Start: 2018-11-23 | End: 2018-12-03

## 2018-11-23 RX ORDER — AMOXICILLIN 875 MG/1
875 TABLET, FILM COATED ORAL 2 TIMES DAILY
Qty: 20 TABLET | Refills: 0 | Status: SHIPPED | OUTPATIENT
Start: 2018-11-23 | End: 2018-12-03

## 2018-11-23 NOTE — PROGRESS NOTES
CHIEF COMPLAINT:  This is a 67-year-old female complaining of respiratory illness.    SUBJECTIVE:  Patient complains of a 2 day history of sinus congestion, muscle aches, sore throat, left ear pain, and cough.  Patient reports increasingly worse hoarseness and stopped up feeling in left ear.  Her cough is productive of greenish mucus.  She denies fever, chills, chest congestion, shortness of breath or wheezing.  Positive ill contacts.  She has been taking Coricidin HBP, Claritin and Astelin nasal spray.    Patient has type 2 diabetes which is controlled on diet alone.  Last A1c was 6.7% 1 month ago.  She denies polyuria, polydipsia or polyphagia.  The patient monitors her blood sugar daily.    ROS:  GENERAL: Patient denies fever, chills, night sweats.  Patient denies weight gain or loss. Patient denies weakness or swollen glands.  Positive for anorexia and fatigue.  SKIN: Patient denies rash.  HEENT:  Patient denies runny nose.  Patient denies visual disturbance, eye irritation or discharge.  LUNGS: Patient denies wheeze or hemoptysis.  CARDIOVASCULAR: Patient denies chest pain, shortness of breath, palpitations, syncope or lower extremity edema.  GI: Patient denies abdominal pain, nausea, vomiting, diarrhea, constipation, blood in stool or melena.  MUSCULOSKELETAL: Patient denies joint pain, swelling, redness or warmth.  Positive for muscle aches.  NEUROLOGIC: Patient denies headache, vertigo, or abnormality of gait.    OBJECTIVE:   GENERAL: Well-developed well-nourished pleasant overweight black female alert and oriented x3 in no acute distress.  Memory, judgment and cognition without deficit.  No audible wheezing.  Hoarseness.  SKIN: Clear without rash.  Normal color and tone.  HEENT: Eyes: Clear conjunctivae.  No scleral icterus.  Ears: Clear canals.  Right TM clear.  Left TM dull and injected with fluid level.  Nose:  Moderate bilateral congestion.  Pharynx: Without injection or exudates.  NECK: Supple, normal  range of motion.  No lymphadenopathy.  No masses or enlarged thyroid.  No JVD.  Carotids 2+ and equal.  No bruits.  LUNGS: Clear to auscultation.  Normal respiratory effort.  CARDIOVASCULAR: Regular rhythm, normal S1, S2 without murmur, gallop or rub.  EXTREMITIES: Without cyanosis, clubbing or edema.  Distal pulses 2+ and equal.  Normal range of motion in all extremities.  No joint effusion, erythema or warmth.  NEUROLOGIC:   Gait without abnormality.  No tremor.      ASSESSMENT:  1. Left otitis media, unspecified otitis media type    2. Laryngitis    3. Type II diabetes mellitus with nephropathy      PLAN:   1.  Amoxicillin 875 mg twice daily for 10 days.  2.  Phenergan DM 1 tsp every 4-6 hours as needed for cough.  3.  Increase with daily water intake.  4.  Symptomatic treatment.  5.  Follow-up if no improvement or worsening symptoms.

## 2018-11-26 RX ORDER — SPIRONOLACTONE 50 MG/1
50 TABLET, FILM COATED ORAL DAILY
Qty: 90 TABLET | Refills: 3 | Status: SHIPPED | OUTPATIENT
Start: 2018-11-26 | End: 2019-05-24

## 2018-11-27 RX ORDER — AMLODIPINE AND BENAZEPRIL HYDROCHLORIDE 5; 20 MG/1; MG/1
1 CAPSULE ORAL DAILY
Qty: 90 CAPSULE | Refills: 1 | Status: SHIPPED | OUTPATIENT
Start: 2018-11-27 | End: 2019-05-06

## 2018-12-27 RX ORDER — AZELASTINE 1 MG/ML
1 SPRAY, METERED NASAL 2 TIMES DAILY
Qty: 30 ML | Refills: 1 | Status: SHIPPED | OUTPATIENT
Start: 2018-12-27 | End: 2019-04-16

## 2018-12-27 NOTE — TELEPHONE ENCOUNTER
Received a refill request from pt's pharmacy of [facility]. Pt is requesting 90 day rx for Azelastine 0.1%. Please review and advise. LV with  on 11/23/2018. Last rx was 08/06/2018.

## 2019-02-26 ENCOUNTER — HOSPITAL ENCOUNTER (EMERGENCY)
Facility: HOSPITAL | Age: 69
Discharge: HOME | End: 2019-02-26
Attending: EMERGENCY MEDICINE
Payer: MEDICARE

## 2019-02-26 VITALS
BODY MASS INDEX: 32.74 KG/M2 | SYSTOLIC BLOOD PRESSURE: 130 MMHG | HEART RATE: 66 BPM | HEIGHT: 64 IN | DIASTOLIC BLOOD PRESSURE: 70 MMHG | OXYGEN SATURATION: 98 % | WEIGHT: 191.81 LBS | TEMPERATURE: 98 F | RESPIRATION RATE: 16 BRPM

## 2019-02-26 DIAGNOSIS — R42 DIZZINESS: ICD-10-CM

## 2019-02-26 DIAGNOSIS — J32.2 ETHMOID SINUSITIS, UNSPECIFIED CHRONICITY: ICD-10-CM

## 2019-02-26 DIAGNOSIS — R42 VERTIGO: Primary | ICD-10-CM

## 2019-02-26 LAB
ALBUMIN SERPL BCP-MCNC: 3.9 G/DL
ALP SERPL-CCNC: 110 U/L
ALT SERPL W/O P-5'-P-CCNC: 21 U/L
ANION GAP SERPL CALC-SCNC: 12 MMOL/L
AST SERPL-CCNC: 20 U/L
BASOPHILS # BLD AUTO: 0.03 K/UL
BASOPHILS NFR BLD: 0.4 %
BILIRUB SERPL-MCNC: 0.3 MG/DL
BILIRUB UR QL STRIP: NEGATIVE
BUN SERPL-MCNC: 14 MG/DL
CALCIUM SERPL-MCNC: 9.5 MG/DL
CHLORIDE SERPL-SCNC: 104 MMOL/L
CLARITY UR: CLEAR
CO2 SERPL-SCNC: 24 MMOL/L
COLOR UR: YELLOW
CREAT SERPL-MCNC: 1.1 MG/DL
DIFFERENTIAL METHOD: ABNORMAL
EOSINOPHIL # BLD AUTO: 0.1 K/UL
EOSINOPHIL NFR BLD: 1.2 %
ERYTHROCYTE [DISTWIDTH] IN BLOOD BY AUTOMATED COUNT: 14.2 %
EST. GFR  (AFRICAN AMERICAN): 59 ML/MIN/1.73 M^2
EST. GFR  (NON AFRICAN AMERICAN): 51 ML/MIN/1.73 M^2
GLUCOSE SERPL-MCNC: 167 MG/DL
GLUCOSE UR QL STRIP: NEGATIVE
HCT VFR BLD AUTO: 40 %
HGB BLD-MCNC: 13.5 G/DL
HGB UR QL STRIP: NEGATIVE
KETONES UR QL STRIP: NEGATIVE
LEUKOCYTE ESTERASE UR QL STRIP: NEGATIVE
LYMPHOCYTES # BLD AUTO: 2.1 K/UL
LYMPHOCYTES NFR BLD: 25.6 %
MCH RBC QN AUTO: 31.7 PG
MCHC RBC AUTO-ENTMCNC: 33.8 G/DL
MCV RBC AUTO: 94 FL
MONOCYTES # BLD AUTO: 0.5 K/UL
MONOCYTES NFR BLD: 5.4 %
NEUTROPHILS # BLD AUTO: 5.6 K/UL
NEUTROPHILS NFR BLD: 67.4 %
NITRITE UR QL STRIP: NEGATIVE
PH UR STRIP: 8 [PH] (ref 5–8)
PLATELET # BLD AUTO: 226 K/UL
PMV BLD AUTO: 9.9 FL
POTASSIUM SERPL-SCNC: 3.7 MMOL/L
PROT SERPL-MCNC: 7.9 G/DL
PROT UR QL STRIP: NEGATIVE
RBC # BLD AUTO: 4.26 M/UL
SODIUM SERPL-SCNC: 140 MMOL/L
SP GR UR STRIP: 1.01 (ref 1–1.03)
TROPONIN I SERPL DL<=0.01 NG/ML-MCNC: 0.01 NG/ML
URN SPEC COLLECT METH UR: NORMAL
UROBILINOGEN UR STRIP-ACNC: NEGATIVE EU/DL
WBC # BLD AUTO: 8.35 K/UL

## 2019-02-26 PROCEDURE — 93010 ELECTROCARDIOGRAM REPORT: CPT | Mod: ,,, | Performed by: INTERNAL MEDICINE

## 2019-02-26 PROCEDURE — 99285 EMERGENCY DEPT VISIT HI MDM: CPT | Mod: 25

## 2019-02-26 PROCEDURE — 80053 COMPREHEN METABOLIC PANEL: CPT

## 2019-02-26 PROCEDURE — 93005 ELECTROCARDIOGRAM TRACING: CPT

## 2019-02-26 PROCEDURE — 93010 EKG 12-LEAD: ICD-10-PCS | Mod: ,,, | Performed by: INTERNAL MEDICINE

## 2019-02-26 PROCEDURE — 84484 ASSAY OF TROPONIN QUANT: CPT

## 2019-02-26 PROCEDURE — 25000003 PHARM REV CODE 250: Performed by: EMERGENCY MEDICINE

## 2019-02-26 PROCEDURE — 86803 HEPATITIS C AB TEST: CPT

## 2019-02-26 PROCEDURE — 81003 URINALYSIS AUTO W/O SCOPE: CPT

## 2019-02-26 PROCEDURE — 85025 COMPLETE CBC W/AUTO DIFF WBC: CPT

## 2019-02-26 RX ORDER — MECLIZINE HYDROCHLORIDE 25 MG/1
25 TABLET ORAL
Status: COMPLETED | OUTPATIENT
Start: 2019-02-26 | End: 2019-02-26

## 2019-02-26 RX ORDER — MECLIZINE HYDROCHLORIDE 25 MG/1
25 TABLET ORAL 3 TIMES DAILY PRN
Qty: 20 TABLET | Refills: 0 | Status: SHIPPED | OUTPATIENT
Start: 2019-02-26 | End: 2020-01-10 | Stop reason: SDUPTHER

## 2019-02-26 RX ORDER — MECLIZINE HYDROCHLORIDE 25 MG/1
25 TABLET ORAL 3 TIMES DAILY PRN
Qty: 20 TABLET | Refills: 0 | Status: SHIPPED | OUTPATIENT
Start: 2019-02-26 | End: 2019-02-26 | Stop reason: SDUPTHER

## 2019-02-26 RX ADMIN — MECLIZINE HYDROCLORIDE 25 MG: 25 TABLET ORAL at 07:02

## 2019-02-26 NOTE — ED PROVIDER NOTES
SCRIBE #1 NOTE: I, Diana Adorno, am scribing for, and in the presence of, Harinder Winter Jr., MD. I have scribed the entire note.       History     Chief Complaint   Patient presents with    Dizziness     with nausea upon waking.worse with movement     Review of patient's allergies indicates:   Allergen Reactions    Statins-hmg-coa reductase inhibitors      Muscle Cramps         History of Present Illness     HPI    2/26/2019, 7:10 AM  History obtained from the patient      History of Present Illness: Kristyn Garza is a 68 y.o. female patient with a PMHx of CKD who presents to the Emergency Department for evaluation of dizziness which onset suddenly this AM while turning over in bed. Sxs described as sensation of the rooming spinning. Symptoms are constant and moderate in severity. Sxs exacerbates with movement and mitigates when sitting still. Associated sxs include nausea, and L ear tinnitus, and mild HA. Patient denies any fever, chills, focal weakness, rhinorrhea, congestion, CP, palpitations, SOB, abd pain, and all other sxs at this time. Pt reports hx of vertigo and tx w/ Antivert in Nov 2018. She reports improvement with medication in the past. No further complaints or concerns at this time.       Arrival mode: Personal vehicle    PCP: Zoë Guzman MD        Past Medical History:  Past Medical History:   Diagnosis Date    Carpal tunnel syndrome     CKD (chronic kidney disease) stage 3, GFR 30-59 ml/min     Followed by Nephrology    Colon cancer screening 3/18/2014    CTS (carpal tunnel syndrome) 6/18/2013    Diabetes mellitus, type 2     Eczema     Elevated CPK     History of hypokalemia 6/18/2013    History of hypokalemia 6/18/2013    Hypokalemia     Multinodular thyroid     Followed by ENT    Obesity     Other and unspecified hyperlipidemia     Pneumonia     in her 20s; hospitalized    Postmenopausal     No history of abnormal pap smear    Statin intolerance     caused mylagia,  elevated CPK    Tobacco dependence     resolved    Unspecified essential hypertension        Past Surgical History:  Past Surgical History:   Procedure Laterality Date    COLONOSCOPY      COLONOSCOPY N/A 3/18/2014    [facility]    CYST REMOVAL  2015    On the head    FINE NEEDLE ASPIRATION  3/2011    thyroid nodules x3 (negative per patient)    HYSTERECTOMY      PARTIAL HYSTERECTOMY      Due to fibroids         Family History:  Family History   Problem Relation Age of Onset    Hypertension Mother     Diabetes Mother     Dementia Mother         Alzheimer's dementia    Hypertension Father     Heart disease Sister         MI/CAD    Cataracts Sister     Dementia Sister     No Known Problems Son     No Known Problems Son     Cataracts Brother     Cataracts Brother     Dementia Sister     Cancer Neg Hx     Stroke Neg Hx     Melanoma Neg Hx     Psoriasis Neg Hx     Lupus Neg Hx     Eczema Neg Hx     COPD Neg Hx     Kidney disease Neg Hx        Social History:  Social History     Tobacco Use    Smoking status: Former Smoker     Packs/day: 0.50     Years: 25.00     Pack years: 12.50     Types: Cigarettes     Last attempt to quit: 2013     Years since quittin.7    Smokeless tobacco: Never Used   Substance and Sexual Activity    Alcohol use: Yes     Alcohol/week: 0.0 oz     Comment: Rarely drinks wine    Drug use: No    Sexual activity: Yes     Partners: Male     Birth control/protection: Condom        Review of Systems     Review of Systems   Constitutional: Negative for fever.   HENT: Positive for tinnitus (L ear). Negative for sore throat.    Respiratory: Negative for shortness of breath.    Cardiovascular: Negative for chest pain and palpitations.   Gastrointestinal: Positive for nausea. Negative for abdominal pain.   Genitourinary: Negative for dysuria.   Musculoskeletal: Negative for back pain.   Skin: Negative for rash.   Neurological:  "Positive for dizziness and headaches (mild). Negative for weakness.   Hematological: Does not bruise/bleed easily.   All other systems reviewed and are negative.       Physical Exam     Initial Vitals [02/26/19 0706]   BP Pulse Resp Temp SpO2   (!) 169/83 75 18 98.3 °F (36.8 °C) 99 %      MAP       --          Physical Exam  Nursing Notes and Vital Signs Reviewed.  Constitutional: Patient is in no acute distress. Well-developed and well-nourished.  Head: Atraumatic. Normocephalic.  Eyes: PERRL. EOM intact. Conjunctivae are not pale. No scleral icterus.  ENT: Mucous membranes are moist. Oropharynx is clear and symmetric.    Neck: Supple. Full ROM. No lymphadenopathy.  Cardiovascular: Regular rate. Regular rhythm. No murmurs, rubs, or gallops. Distal pulses are 2+ and symmetric.  Pulmonary/Chest: No respiratory distress. Clear to auscultation bilaterally. No wheezing or rales.  Abdominal: Soft and non-distended.  There is no tenderness.  No rebound, guarding, or rigidity. Good bowel sounds.  Genitourinary: No CVA tenderness  Musculoskeletal: Moves all extremities. No obvious deformities. No edema. No calf tenderness.  Skin: Warm and dry.  Neurological: Patient is alert and oriented to person, place and time. Mild L nystagmus. Strength is full bilaterally; it is equal and 5/5 in bilateral upper and lower extremities. Speech is clear and normal. No acute focal neurological deficits noted.  Psychiatric: Normal affect. Good eye contact. Appropriate in content.     ED Course   Procedures  ED Vital Signs:  Vitals:    02/26/19 0706 02/26/19 0729 02/26/19 0749 02/26/19 0751   BP: (!) 169/83  (!) 160/78 (!) 154/77   Pulse: 75 69 67 78   Resp: 18  (!) 23 (!) 26   Temp: 98.3 °F (36.8 °C)      TempSrc: Oral      SpO2: 99%  99% 97%   Weight: 87 kg (191 lb 12.8 oz)      Height: 5' 4" (1.626 m)       02/26/19 0752 02/26/19 0830 02/26/19 0902   BP: (!) 155/88 (!) 146/85 134/65   Pulse: 79 65 64   Resp: (!) 26 18 16   Temp:    "   TempSrc:      SpO2: 99% 98% 95%   Weight:      Height:          Abnormal Lab Results:  Labs Reviewed   CBC W/ AUTO DIFFERENTIAL - Abnormal; Notable for the following components:       Result Value    MCH 31.7 (*)     All other components within normal limits   COMPREHENSIVE METABOLIC PANEL - Abnormal; Notable for the following components:    Glucose 167 (*)     eGFR if  59 (*)     eGFR if non  51 (*)     All other components within normal limits   TROPONIN I   URINALYSIS, REFLEX TO URINE CULTURE    Narrative:     Preferred Collection Type->Urine, Clean Catch   HEPATITIS C ANTIBODY        All Lab Results:  Results for orders placed or performed during the hospital encounter of 02/26/19   CBC auto differential   Result Value Ref Range    WBC 8.35 3.90 - 12.70 K/uL    RBC 4.26 4.00 - 5.40 M/uL    Hemoglobin 13.5 12.0 - 16.0 g/dL    Hematocrit 40.0 37.0 - 48.5 %    MCV 94 82 - 98 fL    MCH 31.7 (H) 27.0 - 31.0 pg    MCHC 33.8 32.0 - 36.0 g/dL    RDW 14.2 11.5 - 14.5 %    Platelets 226 150 - 350 K/uL    MPV 9.9 9.2 - 12.9 fL    Gran # (ANC) 5.6 1.8 - 7.7 K/uL    Lymph # 2.1 1.0 - 4.8 K/uL    Mono # 0.5 0.3 - 1.0 K/uL    Eos # 0.1 0.0 - 0.5 K/uL    Baso # 0.03 0.00 - 0.20 K/uL    Gran% 67.4 38.0 - 73.0 %    Lymph% 25.6 18.0 - 48.0 %    Mono% 5.4 4.0 - 15.0 %    Eosinophil% 1.2 0.0 - 8.0 %    Basophil% 0.4 0.0 - 1.9 %    Differential Method Automated    Comprehensive metabolic panel   Result Value Ref Range    Sodium 140 136 - 145 mmol/L    Potassium 3.7 3.5 - 5.1 mmol/L    Chloride 104 95 - 110 mmol/L    CO2 24 23 - 29 mmol/L    Glucose 167 (H) 70 - 110 mg/dL    BUN, Bld 14 8 - 23 mg/dL    Creatinine 1.1 0.5 - 1.4 mg/dL    Calcium 9.5 8.7 - 10.5 mg/dL    Total Protein 7.9 6.0 - 8.4 g/dL    Albumin 3.9 3.5 - 5.2 g/dL    Total Bilirubin 0.3 0.1 - 1.0 mg/dL    Alkaline Phosphatase 110 55 - 135 U/L    AST 20 10 - 40 U/L    ALT 21 10 - 44 U/L    Anion Gap 12 8 - 16 mmol/L    eGFR if African  American 59 (A) >60 mL/min/1.73 m^2    eGFR if non African American 51 (A) >60 mL/min/1.73 m^2   Troponin I   Result Value Ref Range    Troponin I 0.009 0.000 - 0.026 ng/mL   Urinalysis, Reflex to Urine Culture Urine, Clean Catch   Result Value Ref Range    Specimen UA Urine, Clean Catch     Color, UA Yellow Yellow, Straw, Anisa    Appearance, UA Clear Clear    pH, UA 8.0 5.0 - 8.0    Specific Gravity, UA 1.015 1.005 - 1.030    Protein, UA Negative Negative    Glucose, UA Negative Negative    Ketones, UA Negative Negative    Bilirubin (UA) Negative Negative    Occult Blood UA Negative Negative    Nitrite, UA Negative Negative    Urobilinogen, UA Negative <2.0 EU/dL    Leukocytes, UA Negative Negative         Imaging Results:  Imaging Results          CT Head Without Contrast (Final result)  Result time 02/26/19 08:27:39    Final result by AMELIA Munoz Sr., MD (02/26/19 08:27:39)                 Impression:      1. There are chronic appearing ischemic changes in the deep white matter both cerebral hemispheres. There is no evidence of an acute ischemic event.  2. There is no intracranial hemorrhage.  3. There is mild partial opacification of the posterior aspect of the right ethmoidal sinus.  This is characteristic of sinusitis.  All CT scans at this facility use dose modulation, iterative reconstruction, and/or weight base dosing when appropriate to reduce radiation dose when appropriate to reduce radiation dose to as low as reasonably achievable.      Electronically signed by: Jordan Munoz MD  Date:    02/26/2019  Time:    08:27             Narrative:    EXAMINATION:  CT HEAD WITHOUT CONTRAST    CLINICAL HISTORY:  Dizziness;Headache, acute, norm neuro exam;    TECHNIQUE:  Standard brain CT protocol without IV contrast was performed.    COMPARISON:  None    FINDINGS:  There are chronic appearing ischemic changes in the deep white matter both cerebral hemispheres.  There is no evidence of an acute ischemic  event.  There is no intracranial hemorrhage.  There is no calvarial fracture.  There is mild partial opacification of the posterior aspect of the right ethmoidal sinus.                                 The EKG was ordered, reviewed, and independently interpreted by the ED provider.  Interpretation time: 0736  Rate: 68 BPM  Rhythm: normal sinus rhythm  Interpretation: Moderate voltage criteria for LVH, may be normal variant. No STEMI.         The Emergency Provider reviewed the vital signs and test results, which are outlined above.     ED Discussion     9:27 AM: Reassessed pt at this time. Discussed with pt all pertinent ED information and results. Discussed pt dx and plan of tx. Gave pt all f/u and return to the ED instructions. All questions and concerns were addressed at this time. Pt expresses understanding of information and instructions, and is comfortable with plan to discharge. Pt is stable for discharge.    I discussed with patient and/or family/caretaker that evaluation in the ED does not suggest any emergent or life threatening medical conditions requiring immediate intervention beyond what was provided in the ED, and I believe patient is safe for discharge.  Regardless, an unremarkable evaluation in the ED does not preclude the development or presence of a serious of life threatening condition. As such, patient was instructed to return immediately for any worsening or change in current symptoms.    The patient was stable nontoxic at time of discharge. Had a long conversation with regarding diagnosis and pathophysiology of her symptoms. She has ethmoid all sinusitis with vertigo and history of vertigo.  She is symptomatically improved with interventions a negative cardiac workup.  She is safe for discharge in my opinion.      ED Medication(s):  Medications   meclizine tablet 25 mg (25 mg Oral Given 2/26/19 2894)       New Prescriptions    MECLIZINE (ANTIVERT) 25 MG TABLET    Take 1 tablet (25 mg total) by  mouth 3 (three) times daily as needed.       Follow-up Information     Zoë Guzman MD In 2 days.    Specialty:  Family Medicine  Contact information:  [address] [place] [id number] [phone]                         Medical Decision Making:   Clinical Tests:   Lab Tests: Ordered and Reviewed  Radiological Study: Ordered and Reviewed  Medical Tests: Ordered and Reviewed             Scribe Attestation:   Scribe #1: I performed the above scribed service and the documentation accurately describes the services I performed. I attest to the accuracy of the note.     Attending:   Physician Attestation Statement for Scribe #1: I, Harinder Winter Jr., MD, personally performed the services described in this documentation, as scribed by Diana Adorno, in my presence, and it is both accurate and complete.           Clinical Impression       ICD-10-CM ICD-9-CM   1. Vertigo R42 780.4   2. Dizziness R42 780.4   3. Ethmoid sinusitis, unspecified chronicity J32.2 473.2       Disposition:   Disposition: Discharged  Condition: Stable         Harinder Winter Jr., MD  02/26/19 1300

## 2019-02-26 NOTE — DISCHARGE INSTRUCTIONS
You have ethmoid sinusitis on her CT scan.  This is causing and change in the drainage from her in her ears and leading to vertigo.  Use Coricidin HBP which is available over-the-counter for congestion.  Use Antivert for dizziness.  Follow up with her doctor in 2 days.  Do not drive or operate any equipment or do any activities in which sleepiness will hinder her safety until he had been off of Antivert for 24 hr.  Return as needed for any worsening symptoms, problems, questions or concerns.

## 2019-02-27 LAB — HCV AB SERPL QL IA: NEGATIVE

## 2019-02-28 ENCOUNTER — OFFICE VISIT (OUTPATIENT)
Dept: FAMILY MEDICINE | Facility: CLINIC | Age: 69
End: 2019-02-28
Payer: MEDICARE

## 2019-02-28 VITALS
OXYGEN SATURATION: 96 % | BODY MASS INDEX: 32.6 KG/M2 | SYSTOLIC BLOOD PRESSURE: 118 MMHG | HEIGHT: 64 IN | HEART RATE: 82 BPM | TEMPERATURE: 98 F | WEIGHT: 190.94 LBS | DIASTOLIC BLOOD PRESSURE: 83 MMHG

## 2019-02-28 DIAGNOSIS — R42 VERTIGO: ICD-10-CM

## 2019-02-28 DIAGNOSIS — J32.2 ETHMOID SINUSITIS, UNSPECIFIED CHRONICITY: Primary | ICD-10-CM

## 2019-02-28 PROCEDURE — 99214 OFFICE O/P EST MOD 30 MIN: CPT | Mod: PBBFAC,PO | Performed by: REGISTERED NURSE

## 2019-02-28 PROCEDURE — 99214 OFFICE O/P EST MOD 30 MIN: CPT | Mod: S$PBB,,, | Performed by: REGISTERED NURSE

## 2019-02-28 PROCEDURE — 99999 PR PBB SHADOW E&M-EST. PATIENT-LVL IV: CPT | Mod: PBBFAC,,, | Performed by: REGISTERED NURSE

## 2019-02-28 PROCEDURE — 99999 PR PBB SHADOW E&M-EST. PATIENT-LVL IV: ICD-10-PCS | Mod: PBBFAC,,, | Performed by: REGISTERED NURSE

## 2019-02-28 PROCEDURE — 99214 PR OFFICE/OUTPT VISIT, EST, LEVL IV, 30-39 MIN: ICD-10-PCS | Mod: S$PBB,,, | Performed by: REGISTERED NURSE

## 2019-02-28 RX ORDER — AMOXICILLIN AND CLAVULANATE POTASSIUM 875; 125 MG/1; MG/1
1 TABLET, FILM COATED ORAL 2 TIMES DAILY
Qty: 20 TABLET | Refills: 0 | Status: SHIPPED | OUTPATIENT
Start: 2019-02-28 | End: 2019-03-10

## 2019-02-28 RX ORDER — TRIAMCINOLONE ACETONIDE 55 UG/1
2 SPRAY, METERED NASAL DAILY
Qty: 17 G | Refills: 3 | Status: SHIPPED | OUTPATIENT
Start: 2019-02-28 | End: 2019-05-06

## 2019-02-28 NOTE — PROGRESS NOTES
Subjective:       Patient ID: Kristyn Garza is a 68 y.o. female.    Chief Complaint   Patient presents with    ER follow up       HPI    Kristyn Garza is here today for ED follow-up appointment.  Seen at [facility] ED on 2/26/2019 for dizziness.  Per hospital note ---- History of Present Illness: Kristyn Garza is a 68 y.o. female patient with a PMHx of CKD who presents to the Emergency Department for evaluation of dizziness which onset suddenly this AM while turning over in bed. Sxs described as sensation of the rooming spinning. Symptoms are constant and moderate in severity. Sxs exacerbates with movement and mitigates when sitting still. Associated sxs include nausea, and L ear tinnitus, and mild HA. Patient denies any fever, chills, focal weakness, rhinorrhea, congestion, CP, palpitations, SOB, abd pain, and all other sxs at this time. Pt reports hx of vertigo and tx w/ Antivert in Nov 2018. She reports improvement with medication in the past. No further complaints or concerns at this time.    CT showed ethmoid sinusitis (not treated), Labs essentially WNL.  Given RX for Antivert.    She reports not feeling much better.  Having problems w/ LT ear fluid, tinnitus and nausea.  Ear pops w/ pain at times.      Review of Systems   Constitutional: Negative for chills and fever.   HENT: Positive for ear pain, sinus pressure and tinnitus. Negative for congestion, hearing loss and rhinorrhea.    Eyes: Negative.    Respiratory: Negative.    Cardiovascular: Negative.    Gastrointestinal: Positive for nausea. Negative for abdominal pain, diarrhea and vomiting.   Neurological: Positive for dizziness. Negative for weakness, numbness and headaches.   Hematological: Negative for adenopathy.       Review of patient's allergies indicates:   Allergen Reactions    Statins-hmg-coa reductase inhibitors      Muscle Cramps       Patient Active Problem List   Diagnosis    Multiple thyroid nodules    Cupping of optic disc     Nuclear sclerosis    Combined hyperlipidemia associated with type 2 diabetes mellitus    Hypertension associated with diabetes    Type II diabetes mellitus with nephropathy    CKD (chronic kidney disease) stage 3, GFR 30-59 ml/min    Obesity (BMI 30.0-34.9)    Postmenopausal    Statin intolerance       Current Outpatient Medications on File Prior to Visit   Medication Sig Dispense Refill    amlodipine-benazepril 5-20 mg (LOTREL) 5-20 mg per capsule Take 1 capsule by mouth once daily. 90 capsule 1    aspirin 81 MG Chew Take 81 mg by mouth once daily.      azelastine (ASTELIN) 137 mcg (0.1 %) nasal spray 1 spray (137 mcg total) by Nasal route 2 (two) times daily. 30 mL 1    blood glucose control, normal Soln 1 Bottle by Misc.(Non-Drug; Combo Route) route once daily. 1 each 0    blood sugar diagnostic Strp 1 strip by Misc.(Non-Drug; Combo Route) route 2 (two) times daily. 200 each 3    blood-glucose meter (ACCU-CHEK AMBER) Misc 1 kit by Misc.(Non-Drug; Combo Route) route once. 1 each 0    calcium-vitamin D3 500 mg(1,250mg) -200 unit per tablet Take 1 tablet by mouth once daily.       colesevelam (WELCHOL) 625 mg tablet Take 1 tablet (625 mg total) by mouth 2 (two) times daily with meals. 180 tablet 1    CRANBERRY EXTRACT ORAL Take 1 tablet by mouth once daily.      FLUZONE HIGH-DOSE 2018-19, PF, 180 mcg/0.5 mL vaccine TO BE ADMINISTERED BY PHARMACIST FOR IMMUNIZATION  0    lancets Misc 1 lancet by Misc.(Non-Drug; Combo Route) route 3 (three) times daily. For Acc-Chek Amber   DX :E11.9 100 each 11    loratadine (CLARITIN) 10 mg tablet Take 1 tablet (10 mg total) by mouth once daily. 90 tablet 3    meclizine (ANTIVERT) 25 mg tablet Take 1 tablet (25 mg total) by mouth 3 (three) times daily as needed. 20 tablet 0    MULTIVITAMIN W-MINERALS/LUTEIN (CENTRUM SILVER ORAL) Take 1 tablet by mouth once daily.      spironolactone (ALDACTONE) 50 MG tablet Take 1 tablet (50 mg total) by mouth once daily. 90  "tablet 3    ciclopirox (PENLAC) 8 % Soln Apply topically nightly. 6.6 mL 0     No current facility-administered medications on file prior to visit.        Past medical, surgical, family and social histories have been reviewed today.        Objective:     Vitals:    02/28/19 1048   BP: 118/83   Pulse: 82   Temp: 98.2 °F (36.8 °C)   TempSrc: Oral   SpO2: 96%   Weight: 86.6 kg (190 lb 14.7 oz)   Height: 5' 4" (1.626 m)   PainSc: 0-No pain         Physical Exam   Constitutional: She is oriented to person, place, and time. She appears well-developed and well-nourished.   HENT:   Head: Normocephalic and atraumatic.   Right Ear: External ear and ear canal normal. Tympanic membrane is retracted (B TM dull, absent LR). Tympanic membrane is not injected and not erythematous. No middle ear effusion.   Left Ear: External ear and ear canal normal. Tympanic membrane is retracted. Tympanic membrane is not injected and not erythematous.  No middle ear effusion.   Nose: No mucosal edema or rhinorrhea. Right sinus exhibits maxillary sinus tenderness. Left sinus exhibits maxillary sinus tenderness.   Mouth/Throat: Oropharynx is clear and moist and mucous membranes are normal.   Eyes: EOM are normal. Pupils are equal, round, and reactive to light.   Cardiovascular: Normal rate and regular rhythm.   Pulmonary/Chest: Effort normal and breath sounds normal.   Lymphadenopathy:     She has no cervical adenopathy.   Neurological: She is alert and oriented to person, place, and time.   Vitals reviewed.        Diagnosis       1. Ethmoid sinusitis, unspecified chronicity    2. Vertigo          Assessment/ Plan     Ethmoid sinusitis, unspecified chronicity  -     triamcinolone (NASACORT) 55 mcg nasal inhaler; 2 sprays by Nasal route once daily.  Dispense: 17 g; Refill: 3  -     amoxicillin-clavulanate 875-125mg (AUGMENTIN) 875-125 mg per tablet; Take 1 tablet by mouth 2 (two) times daily. for 10 days  Dispense: 20 tablet; Refill: " 0    Vertigo  · Continue Antivert prn.        Medication discussed, as directed.  May need to see ENT if symptoms worsen or persist.  Follow-up in clinic as needed.        [facility]

## 2019-03-08 ENCOUNTER — PES CALL (OUTPATIENT)
Dept: ADMINISTRATIVE | Facility: CLINIC | Age: 69
End: 2019-03-08

## 2019-03-15 ENCOUNTER — OFFICE VISIT (OUTPATIENT)
Dept: FAMILY MEDICINE | Facility: CLINIC | Age: 69
End: 2019-03-15
Payer: MEDICARE

## 2019-03-15 VITALS
WEIGHT: 191.38 LBS | SYSTOLIC BLOOD PRESSURE: 132 MMHG | OXYGEN SATURATION: 96 % | TEMPERATURE: 99 F | BODY MASS INDEX: 32.67 KG/M2 | HEIGHT: 64 IN | DIASTOLIC BLOOD PRESSURE: 67 MMHG | HEART RATE: 77 BPM

## 2019-03-15 DIAGNOSIS — H91.90 HEARING LOSS, UNSPECIFIED HEARING LOSS TYPE, UNSPECIFIED LATERALITY: Primary | ICD-10-CM

## 2019-03-15 DIAGNOSIS — H93.12 TINNITUS OF LEFT EAR: ICD-10-CM

## 2019-03-15 PROCEDURE — 99999 PR PBB SHADOW E&M-EST. PATIENT-LVL III: CPT | Mod: PBBFAC,,, | Performed by: FAMILY MEDICINE

## 2019-03-15 PROCEDURE — 99213 PR OFFICE/OUTPT VISIT, EST, LEVL III, 20-29 MIN: ICD-10-PCS | Mod: S$PBB,,, | Performed by: FAMILY MEDICINE

## 2019-03-15 PROCEDURE — 99213 OFFICE O/P EST LOW 20 MIN: CPT | Mod: S$PBB,,, | Performed by: FAMILY MEDICINE

## 2019-03-15 PROCEDURE — 99213 OFFICE O/P EST LOW 20 MIN: CPT | Mod: PBBFAC,PO | Performed by: FAMILY MEDICINE

## 2019-03-15 PROCEDURE — 99999 PR PBB SHADOW E&M-EST. PATIENT-LVL III: ICD-10-PCS | Mod: PBBFAC,,, | Performed by: FAMILY MEDICINE

## 2019-03-15 NOTE — PROGRESS NOTES
Subjective:      Patient ID: Kristyn Garza is a 68 y.o. female.    Chief Complaint: Follow-up and Dizziness    HPI    Notes she is having some ear issues   --left   --started 2-3 months ago   --did have an URI infection when it first started, was given abx and symptoms improved   --3 weeks ago - she started having dizziness with nausea, was given abx azithromycin, meclizine, and triamcinolone nasal spry   --patient notes she no longer has dizziness or nausea, resolved 2 weeks ago   --patient did go to the ER, 3 weeks ago - EKG and CT of head were performed CT showing ethmoid sinusitis   --now notes that the sound in her left ear is different, feels like she is listening to a seashell or a radio buzz   --no pain in the ear, no discharge from the ear   --constant       Past Medical History:   Diagnosis Date    Carpal tunnel syndrome     CKD (chronic kidney disease) stage 3, GFR 30-59 ml/min     Followed by Nephrology    Colon cancer screening 3/18/2014    CTS (carpal tunnel syndrome) 6/18/2013    Diabetes mellitus, type 2     Eczema     Elevated CPK     History of hypokalemia 6/18/2013    History of hypokalemia 6/18/2013    Hypokalemia     Multinodular thyroid     Followed by ENT    Obesity     Other and unspecified hyperlipidemia     Pneumonia     in her 20s; hospitalized    Postmenopausal     No history of abnormal pap smear    Statin intolerance     caused mylagia, elevated CPK    Tobacco dependence     resolved    Unspecified essential hypertension        Past Surgical History:   Procedure Laterality Date    COLONOSCOPY      COLONOSCOPY N/A 3/18/2014    [facility]    CYST REMOVAL  2015    On the head    FINE NEEDLE ASPIRATION  3/2011    thyroid nodules x3 (negative per patient)    HYSTERECTOMY      PARTIAL HYSTERECTOMY  1991    Due to fibroids       Family History   Problem Relation Age of Onset    Hypertension Mother     Diabetes Mother     Dementia  Mother         Alzheimer's dementia    Hypertension Father     Heart disease Sister         MI/CAD    Cataracts Sister     Dementia Sister     No Known Problems Son     No Known Problems Son     Cataracts Brother     Cataracts Brother     Dementia Sister     Cancer Neg Hx     Stroke Neg Hx     Melanoma Neg Hx     Psoriasis Neg Hx     Lupus Neg Hx     Eczema Neg Hx     COPD Neg Hx     Kidney disease Neg Hx        Social History     Socioeconomic History    Marital status:      Spouse name: Not on file    Number of children: 2    Years of education: Not on file    Highest education level: Not on file   Social Needs    Financial resource strain: Not on file    Food insecurity - worry: Not on file    Food insecurity - inability: Not on file    Transportation needs - medical: Not on file    Transportation needs - non-medical: Not on file   Occupational History    Occupation: Retired   Tobacco Use    Smoking status: Former Smoker     Packs/day: 0.50     Years: 25.00     Pack years: 12.50     Types: Cigarettes     Last attempt to quit: 2013     Years since quittin.8    Smokeless tobacco: Never Used   Substance and Sexual Activity    Alcohol use: Yes     Alcohol/week: 0.0 oz     Comment: Rarely drinks wine    Drug use: No    Sexual activity: Yes     Partners: Male     Birth control/protection: Condom   Other Topics Concern    Are you pregnant or think you may be? Not Asked    Breast-feeding Not Asked   Social History Narrative    She wears seatbelt. . Retired insulator at [employer]        Health Maintenance Topics with due status: Not Due       Topic Last Completion Date    TETANUS VACCINE 2011    Colonoscopy 2014    DEXA SCAN 2016    Lipid Panel 2018    Eye Exam 2018    Mammogram 2018    Foot Exam 10/15/2018    Hemoglobin A1c 10/15/2018       Medication List with Changes/Refills   Current Medications     AMLODIPINE-BENAZEPRIL 5-20 MG (LOTREL) 5-20 MG PER CAPSULE    Take 1 capsule by mouth once daily.    ASPIRIN 81 MG CHEW    Take 81 mg by mouth once daily.    AZELASTINE (ASTELIN) 137 MCG (0.1 %) NASAL SPRAY    1 spray (137 mcg total) by Nasal route 2 (two) times daily.    BLOOD GLUCOSE CONTROL, NORMAL SOLN    1 Bottle by Misc.(Non-Drug; Combo Route) route once daily.    BLOOD SUGAR DIAGNOSTIC STRP    1 strip by Misc.(Non-Drug; Combo Route) route 2 (two) times daily.    BLOOD-GLUCOSE METER (ACCU-CHEK AMBER) MISC    1 kit by Misc.(Non-Drug; Combo Route) route once.    CALCIUM-VITAMIN D3 500 MG(1,250MG) -200 UNIT PER TABLET    Take 1 tablet by mouth once daily.     CICLOPIROX (PENLAC) 8 % SOLN    Apply topically nightly.    COLESEVELAM (WELCHOL) 625 MG TABLET    Take 1 tablet (625 mg total) by mouth 2 (two) times daily with meals.    CRANBERRY EXTRACT ORAL    Take 1 tablet by mouth once daily.    FLUZONE HIGH-DOSE 2018-19, PF, 180 MCG/0.5 ML VACCINE    TO BE ADMINISTERED BY PHARMACIST FOR IMMUNIZATION    LANCETS MISC    1 lancet by Misc.(Non-Drug; Combo Route) route 3 (three) times daily. For Acc-Chek Amber   DX :E11.9    LORATADINE (CLARITIN) 10 MG TABLET    Take 1 tablet (10 mg total) by mouth once daily.    MECLIZINE (ANTIVERT) 25 MG TABLET    Take 1 tablet (25 mg total) by mouth 3 (three) times daily as needed.    MULTIVITAMIN W-MINERALS/LUTEIN (CENTRUM SILVER ORAL)    Take 1 tablet by mouth once daily.    SPIRONOLACTONE (ALDACTONE) 50 MG TABLET    Take 1 tablet (50 mg total) by mouth once daily.    TRIAMCINOLONE (NASACORT) 55 MCG NASAL INHALER    2 sprays by Nasal route once daily.       Review of patient's allergies indicates:   Allergen Reactions    Statins-hmg-coa reductase inhibitors      Muscle Cramps       Review of Systems   Constitutional: Negative for chills, fever and weight loss.   HENT: Positive for hearing loss and tinnitus. Negative for congestion, ear discharge and ear pain.    Eyes: Negative for  blurred vision.   Respiratory: Negative for shortness of breath.    Cardiovascular: Negative for chest pain.   Gastrointestinal: Negative for abdominal pain.   Genitourinary: Negative for urgency.   Musculoskeletal: Negative for myalgias.   Skin: Negative for rash.   Neurological: Negative for headaches.       Objective:     Vitals:    03/15/19 1430   BP: 132/67   Pulse: 77   Temp: 98.5 °F (36.9 °C)     Body mass index is 32.85 kg/m².    Physical Exam   Constitutional: She is oriented to person, place, and time. She appears well-developed and well-nourished. No distress.   HENT:   Head: Normocephalic and atraumatic.   Right Ear: External ear normal.   Left Ear: External ear normal.   Nose: Nose normal.   Mouth/Throat: Oropharynx is clear and moist.   Eyes: Conjunctivae are normal. Pupils are equal, round, and reactive to light.   Cardiovascular: Normal rate, regular rhythm, normal heart sounds and intact distal pulses.   No murmur heard.  Pulmonary/Chest: Effort normal and breath sounds normal. No respiratory distress. She has no wheezes. She has no rales. She exhibits no tenderness.   Abdominal: Soft. Bowel sounds are normal. She exhibits no distension. There is no tenderness.   Musculoskeletal: She exhibits no edema.   Lymphadenopathy:     She has no cervical adenopathy.   Neurological: She is alert and oriented to person, place, and time.   Skin: Skin is warm and dry.   Psychiatric: She has a normal mood and affect.   Nursing note and vitals reviewed.      Assessment and Plan:     Hearing loss, unspecified hearing loss type, unspecified laterality  -     Ambulatory referral to Audiology    Tinnitus of left ear  -     Ambulatory referral to Audiology        Follow-up for has follow up with Dr. Guzman .

## 2019-03-29 ENCOUNTER — CLINICAL SUPPORT (OUTPATIENT)
Dept: AUDIOLOGY | Facility: CLINIC | Age: 69
End: 2019-03-29
Payer: MEDICARE

## 2019-03-29 DIAGNOSIS — H93.12 TINNITUS, LEFT: ICD-10-CM

## 2019-03-29 DIAGNOSIS — H93.8X2 FULLNESS IN EAR, LEFT: ICD-10-CM

## 2019-03-29 DIAGNOSIS — R42 VERTIGO: ICD-10-CM

## 2019-03-29 PROCEDURE — 92567 TYMPANOMETRY: CPT | Mod: PBBFAC,PN | Performed by: AUDIOLOGIST-HEARING AID FITTER

## 2019-03-29 PROCEDURE — 92557 COMPREHENSIVE HEARING TEST: CPT | Mod: PBBFAC,PN | Performed by: AUDIOLOGIST-HEARING AID FITTER

## 2019-03-29 NOTE — PROGRESS NOTES
"Referring Provider:Dr. Thomas Garza was seen 03/29/2019 for an audiological evaluation.  Patient complains of vertigo and tinnitus, initial onset was November,2018. Her initial episode felt like a sensation of constant spinning and lasted days. Tinnitus sounded like a "seashell" and was in her left ear only. Her dizziness improved but she reports her tinnitus never improved. She reports having another episode of vertigo that woke her up out of her sleep several weeks ago. She went to the ED for this and reports that it took several days for her dizziness to resolve. The episode felt again like "spinning sensations and being off balance". Her tinnitus is still present today, left ear only. She denies hearing loss or hearing changes. Also denies otalgia. She does have a sensation of fullness in her left ear constantly. She has not taken any Meclizine recently. She denies having a high caffeine and sodium diet.     Otoscopy was unremarkable bilaterally. Results reveal a essentially normal hearing from 250-8000 Hz for the right ear, and a moderate rising to normal sensorineural hearing loss 250-8000 Hz for the left ear.   Speech Reception Thresholds were  20 dBHL for the right ear and 40 dBHL for the left ear.   Word recognition scores were excellent for the right ear and good for the left ear.   Tympanograms were Type A, normal for the right ear and Type A, normal for the left ear.    Patient was counseled on the above findings.    Recommendations:  1. Diagnostic EcochG and VNG to rule out Endolymphatic Hydrops/ Meniere's Disease. Patient was counseled regarding VNG test instructions.  2. ENT consult due to possible ELH/ Meniere's Disease as well as asymmetric SNHL of the left ear.    All appointments scheduled for 4/8/2019 prior to patient leaving office today.         "

## 2019-04-08 ENCOUNTER — OFFICE VISIT (OUTPATIENT)
Dept: OTOLARYNGOLOGY | Facility: CLINIC | Age: 69
End: 2019-04-08
Payer: MEDICARE

## 2019-04-08 ENCOUNTER — CLINICAL SUPPORT (OUTPATIENT)
Dept: AUDIOLOGY | Facility: CLINIC | Age: 69
End: 2019-04-08
Payer: MEDICARE

## 2019-04-08 VITALS — WEIGHT: 190.25 LBS | BODY MASS INDEX: 32.66 KG/M2

## 2019-04-08 DIAGNOSIS — H81.02 ENDOLYMPHATIC HYDROPS OF LEFT EAR: ICD-10-CM

## 2019-04-08 DIAGNOSIS — H81.02 ENDOLYMPHATIC HYDROPS OF LEFT EAR: Primary | ICD-10-CM

## 2019-04-08 DIAGNOSIS — H81.02 ENDOLYMPHATIC HYDROPS, LEFT: Primary | ICD-10-CM

## 2019-04-08 PROCEDURE — 99204 OFFICE O/P NEW MOD 45 MIN: CPT | Mod: S$PBB,,, | Performed by: ORTHOPAEDIC SURGERY

## 2019-04-08 PROCEDURE — 99204 PR OFFICE/OUTPT VISIT, NEW, LEVL IV, 45-59 MIN: ICD-10-PCS | Mod: S$PBB,,, | Performed by: ORTHOPAEDIC SURGERY

## 2019-04-08 PROCEDURE — 92540 BASIC VESTIBULAR EVALUATION: CPT | Mod: PBBFAC | Performed by: AUDIOLOGIST-HEARING AID FITTER

## 2019-04-08 PROCEDURE — 92537 CALORIC VSTBLR TEST W/REC: CPT | Mod: 26,S$PBB,, | Performed by: AUDIOLOGIST-HEARING AID FITTER

## 2019-04-08 PROCEDURE — 92540 PR VESTIBULAR EVAL NYSTAG FOVL&PERPH STIM OSCIL TRACKING: ICD-10-PCS | Mod: 26,S$PBB,, | Performed by: AUDIOLOGIST-HEARING AID FITTER

## 2019-04-08 PROCEDURE — 99999 PR PBB SHADOW E&M-EST. PATIENT-LVL III: CPT | Mod: PBBFAC,,, | Performed by: ORTHOPAEDIC SURGERY

## 2019-04-08 PROCEDURE — 92540 BASIC VESTIBULAR EVALUATION: CPT | Mod: 26,S$PBB,, | Performed by: AUDIOLOGIST-HEARING AID FITTER

## 2019-04-08 PROCEDURE — 99213 OFFICE O/P EST LOW 20 MIN: CPT | Mod: PBBFAC,25 | Performed by: ORTHOPAEDIC SURGERY

## 2019-04-08 PROCEDURE — 92537 PR CALORIC VSTBLR TEST W/REC BITHERMAL: ICD-10-PCS | Mod: 26,S$PBB,, | Performed by: AUDIOLOGIST-HEARING AID FITTER

## 2019-04-08 PROCEDURE — 99999 PR PBB SHADOW E&M-EST. PATIENT-LVL III: ICD-10-PCS | Mod: PBBFAC,,, | Performed by: ORTHOPAEDIC SURGERY

## 2019-04-08 PROCEDURE — 92584 ELECTROCOCHLEOGRAPHY: CPT | Mod: PBBFAC | Performed by: AUDIOLOGIST-HEARING AID FITTER

## 2019-04-08 PROCEDURE — 92537 CALORIC VSTBLR TEST W/REC: CPT | Mod: PBBFAC | Performed by: AUDIOLOGIST-HEARING AID FITTER

## 2019-04-08 NOTE — LETTER
April 8, 2019      Zoë Guzman MD  [address]  [facility] [address]           [facility]  [address] [place], [place] [address]on [facility] [address]  Phone: [phone]  Fax: [phone]          Patient: Kristyn Garza   MR Number: [id number]   YOB: 1950   Date of Visit: 4/8/2019       Dear Dr. Zoë Guzman:    Thank you for referring Kristyn Garza to me for evaluation. Attached you will find relevant portions of my assessment and plan of care.    If you have questions, please do not hesitate to call me. I look forward to following Kristyn Garza along with you.    Sincerely,    Olivia Irwin MD    Enclosure  CC:  No Recipients    If you would like to receive this communication electronically, please contact [email or web address] or [phone] to request more information on [employer] access.    For providers and/or their staff who would like to refer a patient to [facility], please contact us through our one-stop-shop provider referral line, [facility] , at [phone].    If you feel you have received this communication in error or would no longer like to receive these types of communications, please e-mail [email or web address]

## 2019-04-08 NOTE — PROGRESS NOTES
Referring provider: Dr. Alida Garza was seen 2019 for vestibular testing.    Patient reports fluctuating hearing loss in the left ear, tinnitus (seashell sounds) in the left ear, left aural fullness and episodic vertigo. This began in late 2018.  She has had problems with dizziness for years that would occur seasonally with allergies (typically winter season).  Recent audiogram 2019 revealed moderate low-frequency SNHL,left ear. She was previously seen in  for bilateral HF-tinnitus; audiogram was normal (was working around loud noises); and that tinnitus resolved after she retired.     Videonystagmography (VNG):  Oculomotor function tests (sinusoidal tracking, saccade and OPKs) were normal and symmetric.  Spontaneous nystagmus was absent.  Gaze nystagmus was absent.  Head-shake test was absent for after head shake nystagmus.  [place]-Hallpike Left was suspect positive for BPPV: 9 d/s UB + 4 d/s LB that was at times torsional and at times positional.  Did not fatigue but did suppress with fixation. Dizziness denied.   [place]-Hallpike Right was negative for BPPV.  Static Positional test:   Head Center: 5 d/s LB   Head Right: Absent   Head Left: 3 d/s LB  Bi-thermal caloric irrigations revealed a 9% caloric weakness in the right ear, which is within normal limits, and 1% directional preponderance to the right, which is within normal limits.  Fixation suppression following caloric irrigations were normal.  RC: 38 d/s    RW: 31 d/s   d/s    LW: 37 d/s    Hallpike Left was re-tested without the video goggles: very slight ageotropic nystagmus was observed; dizziness denied.     Impressions: Normal VNG. Left-beating nystagmus questionable for left BPPV vs positional nystagmus.   A Left Epley was completed due to possible left BPPV.  Patient tolerated the maneuver well.      Electrocochleography (ECoghG):  ECochG test results were obtained utilizing insert TIPTrodes with a click stimulus  rate of 9.3-11.3/sec presented at 95 dBnHL.  The average SP/AP ratio was .51 for the left ear.  The average SP/AP ratio was .31 for the right ear.    Summary: Utilizing an SP/AP criteria of .40 or greater as abnormal this finding is abnormal for the left ear, suggestive of Endolymphatic Hydrops (ELH) or Meniere's Disease (MD).      Recommendations:  1. ENT   2. Head restrictions X3 days post Epley    Tracings are scanned into computer.

## 2019-04-08 NOTE — PROGRESS NOTES
Referring provider: Dr. Irwin  DOS: 04/08/2019    Electrocochleography (ECoghG):  ECochG test results were obtained utilizing insert TIPTrodes with a click stimulus rate of 9.3-11.3/sec presented at 95 dBnHL.  The average SP/AP ratio was .51 for the left ear.  The average SP/AP ratio was .31 for the right ear.    Summary: Utilizing an SP/AP criteria of .40 or greater as abnormal this finding is abnormal for the left ear, suggestive of Endolymphatic Hydrops (ELH) or Meniere's Disease (MD).      Recommendations:  1. ENT   2. VNG today - WNL

## 2019-04-08 NOTE — PROGRESS NOTES
Subjective:       Patient ID: Kristyn Garza is a 68 y.o. female.    Chief Complaint: Follow-up (audio;VNG)    Patient is a very pleasant 68 y.o. female here to see me today for the first time for evaluation of dizziness.   She reports that the symptoms have been present for the last 1 month, but it has now improved.   She has not had any dizziness over the last month.  She describes the dizziness as a sensation of movement of surroundings and says that it lasts minutes.  She has noted that laying down and getting up acts as a trigger.  She has aural pressure, tinnitus and hearing loss in the left ear.  She has not started any new medications, and has not had any recent dietary changes.  I have seen her in the distant past for thyroid nodules, she is now following with US with her PCP and there has been no recent change in her nodules.      Review of Systems   Constitutional: Negative for chills, fatigue, fever and unexpected weight change.   HENT: Positive for congestion, hearing loss and tinnitus. Negative for dental problem, ear discharge, ear pain (left ear pressure), facial swelling, nosebleeds, postnasal drip, rhinorrhea, sinus pressure, sneezing, sore throat, trouble swallowing and voice change.    Eyes: Negative for redness, itching and visual disturbance.   Respiratory: Negative for cough, choking, shortness of breath and wheezing.    Cardiovascular: Negative for chest pain and palpitations.   Gastrointestinal: Negative for abdominal pain.        No reflux.   Musculoskeletal: Negative for gait problem.   Skin: Negative for rash.   Allergic/Immunologic: Positive for environmental allergies.   Neurological: Negative for dizziness, light-headedness and headaches.       Objective:      Physical Exam   Constitutional: She is oriented to person, place, and time. She appears well-developed and well-nourished. No distress.   HENT:   Head: Normocephalic and atraumatic.   Right Ear: Tympanic membrane, external ear  and ear canal normal.   Left Ear: Tympanic membrane, external ear and ear canal normal.   Nose: Nose normal. No mucosal edema, rhinorrhea, nasal deformity or septal deviation. No epistaxis. Right sinus exhibits no maxillary sinus tenderness and no frontal sinus tenderness. Left sinus exhibits no maxillary sinus tenderness and no frontal sinus tenderness.   Mouth/Throat: Uvula is midline, oropharynx is clear and moist and mucous membranes are normal. Mucous membranes are not pale and not dry. No dental caries. No oropharyngeal exudate or posterior oropharyngeal erythema.   Eyes: Pupils are equal, round, and reactive to light. Conjunctivae, EOM and lids are normal. Right eye exhibits no chemosis. Left eye exhibits no chemosis. Right conjunctiva is not injected. Left conjunctiva is not injected. No scleral icterus. Right eye exhibits normal extraocular motion and no nystagmus. Left eye exhibits normal extraocular motion and no nystagmus.   Neck: Trachea normal and phonation normal. No tracheal tenderness present. No tracheal deviation present. No thyroid mass and no thyromegaly present.   Cardiovascular: Intact distal pulses.   Pulmonary/Chest: Effort normal. No stridor. No respiratory distress.   Abdominal: She exhibits no distension.   Lymphadenopathy:        Head (right side): No submental, no submandibular, no preauricular, no posterior auricular and no occipital adenopathy present.        Head (left side): No submental, no submandibular, no preauricular, no posterior auricular and no occipital adenopathy present.     She has no cervical adenopathy.   Neurological: She is alert and oriented to person, place, and time. No cranial nerve deficit.   Skin: Skin is warm and dry. No rash noted. No erythema.   Psychiatric: She has a normal mood and affect. Her behavior is normal.       AUDIOGRAM:  Otoscopy was unremarkable bilaterally. Results reveal a essentially normal hearing from 250-8000 Hz for the right ear, and a  moderate rising to normal sensorineural hearing loss 250-8000 Hz for the left ear.   Speech Reception Thresholds were  20 dBHL for the right ear and 40 dBHL for the left ear.   Word recognition scores were excellent for the right ear and good for the left ear.   Tympanograms were Type A, normal for the right ear and Type A, normal for the left ear.    Videonystagmography (VNG):  Oculomotor function tests (sinusoidal tracking, saccade and OPKs) were normal and symmetric.  Spontaneous nystagmus was absent.  Gaze nystagmus was absent.  Head-shake test was absent for after head shake nystagmus.  Chet-Hallpike Left was suspect positive for BPPV: 9 d/s UB + 4 d/s LB that was at times torsional and at times positional.  Did not fatigue but did suppress with fixation. Dizziness denied.   Chet-Hallpike Right was negative for BPPV.  Static Positional test:              Head Center: 5 d/s LB              Head Right: Absent              Head Left: 3 d/s LB  Bi-thermal caloric irrigations revealed a 9% caloric weakness in the right ear, which is within normal limits, and 1% directional preponderance to the right, which is within normal limits.  Fixation suppression following caloric irrigations were normal.  RC: 38 d/s                                           RW: 31 d/s   d/s                                           LW: 37 d/s     Noépike Left was re-tested without the video goggles: very slight ageotropic nystagmus was observed; dizziness denied.      Impressions: Normal VNG. Left-beating nystagmus questionable for left BPPV vs positional nystagmus.   A Left Epley was completed due to possible left BPPV.  Patient tolerated the maneuver well.       Electrocochleography (ECoghG):  ECochG test results were obtained utilizing insert TIPTrodes with a click stimulus rate of 9.3-11.3/sec presented at 95 dBnHL.  The average SP/AP ratio was .51 for the left ear.  The average SP/AP ratio was .31 for the right ear.     Summary:  Utilizing an SP/AP criteria of .40 or greater as abnormal this finding is abnormal for the left ear, suggestive of Endolymphatic Hydrops (ELH) or Meniere's Disease (MD).         Assessment:       1. Asymmetrical left sensorineural hearing loss    2. Endolymphatic hydrops of left ear        Plan:       1.  Asymmetric left SNHL:  We reviewed the recent audiogram, and the fact that there is a distinct asymmetry of at least 20-40 dB across 4 frequencies.  We discussed that as humans we are not entirely symmetric, and that many people have one ear that hears better than the other.  However, considering the degree of asymmetry, I would recommend and MRI of the IAC with contrast to evaluate for any retrocochlear lesions.  If that is normal, the patient will continue to follow with annual audiograms.  She is a candidate for a hearing aid in the left ear if she is interested.  2.  Endolymphatic hydrops left ear:  We reviewed her recent testing in detail, and it supports the diagnosis of hydrops in the the left ear.  We discussed the relevant pathology of hydrops, and that it is caused by excess endolymphatic fluid in the inner ear.  Hydrops was previously classified as Meniere's disease in which the classic constellation of symptoms included dizziness, fullness, tinnitus, and fluctuating hearing loss.  However, as more research is being done, it is now accepted that a patient with hydrops can have one, some, or all of those symptoms.  I would recommend first starting on the hydrops diet, which is a low sodium diet.  If symptoms persist, she will then have a BMP done to check electrolytes and will then start on oral dyazide if approved by her nephrologist (she says she had potassium issues in the past).  Otherwise, may have her see otology for further evaluation.

## 2019-04-16 ENCOUNTER — LAB VISIT (OUTPATIENT)
Dept: LAB | Facility: HOSPITAL | Age: 69
End: 2019-04-16
Attending: FAMILY MEDICINE
Payer: MEDICARE

## 2019-04-16 ENCOUNTER — OFFICE VISIT (OUTPATIENT)
Dept: FAMILY MEDICINE | Facility: CLINIC | Age: 69
End: 2019-04-16
Payer: MEDICARE

## 2019-04-16 VITALS
HEART RATE: 76 BPM | BODY MASS INDEX: 32.48 KG/M2 | OXYGEN SATURATION: 99 % | RESPIRATION RATE: 16 BRPM | TEMPERATURE: 98 F | WEIGHT: 190.25 LBS | SYSTOLIC BLOOD PRESSURE: 138 MMHG | DIASTOLIC BLOOD PRESSURE: 78 MMHG | HEIGHT: 64 IN

## 2019-04-16 DIAGNOSIS — E11.69 COMBINED HYPERLIPIDEMIA ASSOCIATED WITH TYPE 2 DIABETES MELLITUS: ICD-10-CM

## 2019-04-16 DIAGNOSIS — E11.21 TYPE II DIABETES MELLITUS WITH NEPHROPATHY: ICD-10-CM

## 2019-04-16 DIAGNOSIS — E78.2 COMBINED HYPERLIPIDEMIA ASSOCIATED WITH TYPE 2 DIABETES MELLITUS: ICD-10-CM

## 2019-04-16 DIAGNOSIS — Z12.31 VISIT FOR SCREENING MAMMOGRAM: ICD-10-CM

## 2019-04-16 DIAGNOSIS — Z78.9 STATIN INTOLERANCE: ICD-10-CM

## 2019-04-16 DIAGNOSIS — E11.59 HYPERTENSION ASSOCIATED WITH DIABETES: ICD-10-CM

## 2019-04-16 DIAGNOSIS — I15.2 HYPERTENSION ASSOCIATED WITH DIABETES: ICD-10-CM

## 2019-04-16 DIAGNOSIS — E04.2 MULTIPLE THYROID NODULES: ICD-10-CM

## 2019-04-16 DIAGNOSIS — N18.30 CKD (CHRONIC KIDNEY DISEASE) STAGE 3, GFR 30-59 ML/MIN: ICD-10-CM

## 2019-04-16 DIAGNOSIS — Z12.11 SCREENING FOR COLON CANCER: ICD-10-CM

## 2019-04-16 DIAGNOSIS — E66.9 OBESITY (BMI 30.0-34.9): ICD-10-CM

## 2019-04-16 DIAGNOSIS — I15.2 HYPERTENSION ASSOCIATED WITH DIABETES: Primary | ICD-10-CM

## 2019-04-16 DIAGNOSIS — E11.59 HYPERTENSION ASSOCIATED WITH DIABETES: Primary | ICD-10-CM

## 2019-04-16 DIAGNOSIS — Z78.0 POSTMENOPAUSAL: ICD-10-CM

## 2019-04-16 LAB
ALBUMIN SERPL BCP-MCNC: 4 G/DL (ref 3.5–5.2)
ALP SERPL-CCNC: 117 U/L (ref 55–135)
ALT SERPL W/O P-5'-P-CCNC: 23 U/L (ref 10–44)
ANION GAP SERPL CALC-SCNC: 9 MMOL/L (ref 8–16)
AST SERPL-CCNC: 25 U/L (ref 10–40)
BASOPHILS # BLD AUTO: 0.07 K/UL (ref 0–0.2)
BASOPHILS NFR BLD: 0.7 % (ref 0–1.9)
BILIRUB SERPL-MCNC: 0.5 MG/DL (ref 0.1–1)
BUN SERPL-MCNC: 15 MG/DL (ref 8–23)
CALCIUM SERPL-MCNC: 10.1 MG/DL (ref 8.7–10.5)
CHLORIDE SERPL-SCNC: 104 MMOL/L (ref 95–110)
CHOLEST SERPL-MCNC: 242 MG/DL (ref 120–199)
CHOLEST/HDLC SERPL: 4.4 {RATIO} (ref 2–5)
CO2 SERPL-SCNC: 29 MMOL/L (ref 23–29)
CREAT SERPL-MCNC: 1 MG/DL (ref 0.5–1.4)
CREAT UR-MCNC: 155 MG/DL (ref 15–325)
CTP QC/QA: YES
DIFFERENTIAL METHOD: NORMAL
EOSINOPHIL # BLD AUTO: 0.2 K/UL (ref 0–0.5)
EOSINOPHIL NFR BLD: 2.2 % (ref 0–8)
ERYTHROCYTE [DISTWIDTH] IN BLOOD BY AUTOMATED COUNT: 14 % (ref 11.5–14.5)
EST. GFR  (AFRICAN AMERICAN): >60 ML/MIN/1.73 M^2
EST. GFR  (NON AFRICAN AMERICAN): 57.2 ML/MIN/1.73 M^2
ESTIMATED AVG GLUCOSE: 146 MG/DL (ref 68–131)
FECAL OCCULT BLOOD, POC: NEGATIVE
GLUCOSE SERPL-MCNC: 91 MG/DL (ref 70–110)
HBA1C MFR BLD HPLC: 6.7 % (ref 4–5.6)
HCT VFR BLD AUTO: 41.6 % (ref 37–48.5)
HDLC SERPL-MCNC: 55 MG/DL (ref 40–75)
HDLC SERPL: 22.7 % (ref 20–50)
HGB BLD-MCNC: 13.4 G/DL (ref 12–16)
IMM GRANULOCYTES # BLD AUTO: 0.03 K/UL (ref 0–0.04)
IMM GRANULOCYTES NFR BLD AUTO: 0.3 % (ref 0–0.5)
LDLC SERPL CALC-MCNC: 171.4 MG/DL (ref 63–159)
LYMPHOCYTES # BLD AUTO: 4.3 K/UL (ref 1–4.8)
LYMPHOCYTES NFR BLD: 40.3 % (ref 18–48)
MCH RBC QN AUTO: 30.9 PG (ref 27–31)
MCHC RBC AUTO-ENTMCNC: 32.2 G/DL (ref 32–36)
MCV RBC AUTO: 96 FL (ref 82–98)
MONOCYTES # BLD AUTO: 0.7 K/UL (ref 0.3–1)
MONOCYTES NFR BLD: 6.5 % (ref 4–15)
NEUTROPHILS # BLD AUTO: 5.4 K/UL (ref 1.8–7.7)
NEUTROPHILS NFR BLD: 50 % (ref 38–73)
NONHDLC SERPL-MCNC: 187 MG/DL
NRBC BLD-RTO: 0 /100 WBC
PLATELET # BLD AUTO: 252 K/UL (ref 150–350)
PMV BLD AUTO: 11.1 FL (ref 9.2–12.9)
POTASSIUM SERPL-SCNC: 3.6 MMOL/L (ref 3.5–5.1)
PROT SERPL-MCNC: 8.2 G/DL (ref 6–8.4)
PROT UR-MCNC: 10 MG/DL (ref 0–15)
PROT/CREAT UR: 0.06 MG/G{CREAT} (ref 0–0.2)
RBC # BLD AUTO: 4.33 M/UL (ref 4–5.4)
SODIUM SERPL-SCNC: 142 MMOL/L (ref 136–145)
TRIGL SERPL-MCNC: 78 MG/DL (ref 30–150)
TSH SERPL DL<=0.005 MIU/L-ACNC: 0.69 UIU/ML (ref 0.4–4)
WBC # BLD AUTO: 10.7 K/UL (ref 3.9–12.7)

## 2019-04-16 PROCEDURE — 82570 ASSAY OF URINE CREATININE: CPT

## 2019-04-16 PROCEDURE — 99214 OFFICE O/P EST MOD 30 MIN: CPT | Mod: 25,S$PBB,, | Performed by: FAMILY MEDICINE

## 2019-04-16 PROCEDURE — 84443 ASSAY THYROID STIM HORMONE: CPT

## 2019-04-16 PROCEDURE — 80053 COMPREHEN METABOLIC PANEL: CPT

## 2019-04-16 PROCEDURE — 36415 COLL VENOUS BLD VENIPUNCTURE: CPT | Mod: PO

## 2019-04-16 PROCEDURE — G0101 PR CA SCREEN;PELVIC/BREAST EXAM: ICD-10-PCS | Mod: S$PBB,,, | Performed by: FAMILY MEDICINE

## 2019-04-16 PROCEDURE — 85025 COMPLETE CBC W/AUTO DIFF WBC: CPT

## 2019-04-16 PROCEDURE — 82270 OCCULT BLOOD FECES: CPT | Mod: PBBFAC,PO | Performed by: FAMILY MEDICINE

## 2019-04-16 PROCEDURE — 99499 RISK ADDL DX/OHS AUDIT: ICD-10-PCS | Mod: S$PBB,,, | Performed by: FAMILY MEDICINE

## 2019-04-16 PROCEDURE — 99499 UNLISTED E&M SERVICE: CPT | Mod: S$PBB,,, | Performed by: FAMILY MEDICINE

## 2019-04-16 PROCEDURE — 99215 OFFICE O/P EST HI 40 MIN: CPT | Mod: PBBFAC,PO,25 | Performed by: FAMILY MEDICINE

## 2019-04-16 PROCEDURE — 80061 LIPID PANEL: CPT

## 2019-04-16 PROCEDURE — 99999 PR PBB SHADOW E&M-EST. PATIENT-LVL V: ICD-10-PCS | Mod: PBBFAC,,, | Performed by: FAMILY MEDICINE

## 2019-04-16 PROCEDURE — 83036 HEMOGLOBIN GLYCOSYLATED A1C: CPT

## 2019-04-16 PROCEDURE — 99214 PR OFFICE/OUTPT VISIT, EST, LEVL IV, 30-39 MIN: ICD-10-PCS | Mod: 25,S$PBB,, | Performed by: FAMILY MEDICINE

## 2019-04-16 PROCEDURE — 99999 PR PBB SHADOW E&M-EST. PATIENT-LVL V: CPT | Mod: PBBFAC,,, | Performed by: FAMILY MEDICINE

## 2019-04-16 PROCEDURE — G0101 CA SCREEN;PELVIC/BREAST EXAM: HCPCS | Mod: S$PBB,,, | Performed by: FAMILY MEDICINE

## 2019-04-16 PROCEDURE — G0101 CA SCREEN;PELVIC/BREAST EXAM: HCPCS | Mod: 51,PBBFAC,PO | Performed by: FAMILY MEDICINE

## 2019-04-16 NOTE — PROGRESS NOTES
HISTORY OF PRESENT ILLNESS: Ms. Garza comes in today fasting with taking blood pressure medication for annual wellness examination.    END OF LIFE DECISION: She has no living will and does not desire life support.    Current Outpatient Medications   Medication Sig    amlodipine-benazepril 5-20 mg (LOTREL) 5-20 mg per capsule Take 1 capsule by mouth once daily.    aspirin 81 MG Chew Take 81 mg by mouth once daily.    blood sugar diagnostic Strp 1 strip by Misc.(Non-Drug; Combo Route) route 2 (two) times daily.    calcium-vitamin D3 500 mg(1,250mg) -200 unit per tablet Take 1 tablet by mouth once daily.     ciclopirox (PENLAC) 8 % Soln Apply topically nightly.    colesevelam (WELCHOL) 625 mg tablet Take 1 tablet (625 mg total) by mouth 2 (two) times daily with meals.    CRANBERRY EXTRACT ORAL Take 1 tablet by mouth once daily.    FLUZONE HIGH-DOSE 2018-19, PF, 180 mcg/0.5 mL vaccine TO BE ADMINISTERED BY PHARMACIST FOR IMMUNIZATION    lancets Misc 1 lancet by Misc.(Non-Drug; Combo Route) route 3 (three) times daily. For Acc-Chek Amber   DX :E11.9    loratadine (CLARITIN) 10 mg tablet Take 1 tablet (10 mg total) by mouth once daily. (Patient taking differently: Take 10 mg by mouth daily as needed. )    meclizine (ANTIVERT) 25 mg tablet Take 1 tablet (25 mg total) by mouth 3 (three) times daily as needed.    MULTIVITAMIN W-MINERALS/LUTEIN (CENTRUM SILVER ORAL) Take 1 tablet by mouth once daily.    spironolactone (ALDACTONE) 50 MG tablet Take 1 tablet (50 mg total) by mouth once daily. (Patient taking differently: Take 100 mg by mouth once daily. )    triamcinolone (NASACORT) 55 mcg nasal inhaler 2 sprays by Nasal route once daily.    blood glucose control, normal Soln 1 Bottle by Misc.(Non-Drug; Combo Route) route once daily.    blood-glucose meter (ACCU-CHEK AMBER) Misc 1 kit by Misc.(Non-Drug; Combo Route) route once.      SCREENINGS:   Cholesterol: April 13, 2018.  FFS/Colonoscopy: March 18, 2014 -  edilia; repeat in 10 years.  Mammogram: May 22, 2018 - okay.  GYN Exam: April 13, 2018 - okay.  Dexa Scan: April 28, 2016 - okay; repeat in 3 to 5 years.  Eye Exam: April 24, 2018 with Dr. Hardy. She wears glasses.  Dental Exam: October or November 2014 per patient. She wears top dentures.  PPD: Negative in the past.  Immunizations: Td/Tdap - February 23, 2011.  Gardasil - N./A.  Zostavax - February 23, 2011.  Pneumovax - March 3, 2014.  Prevnar-13 shot - March 23, 2015.  Seasonal Flu - Fall 2018 [facility] per patient.     ROS:  GENERAL: Denies fever, chills, fatigue or unusual weight change. Appetite normal. Weight 86.3 kg (190 lb 4.1 oz) at October 15, 2018 visit. Exercises/walks every other day - 30 to 45 minutes daily. Monitors her diet.   SKIN: Denies rashes, itching, changes in mole, color or texture of skin or easy bruising.   HEAD: Denies headaches or recent head trauma.  EYES: Denies change in vision, pain, diplopia, redness or discharge. She wears glasses.  EARS: Denies ear pain, discharge, tinnitus, vertigo or decreased hearing. Saw ENT Dr. Irwin on April 8, 2019 for asymmetrical left sensorineural hearing loss, endolymphatic hydrops of left ear with MRI brain scheduled for April 25, 2019 with follow up visit on May 13, 2019. Reports now with occasional lightheadedness and no longer with dizziness.  NOSE: Denies loss of smell, epistaxis or rhinitis except nasal congestion and uses Nasacort and Claritin with help.   MOUTH & THROAT: Denies hoarseness or change in voice. Denies excessive gum bleeding or mouth sores. Denies sore throat.  NODES: Denies swollen glands.  CHEST: Denies ZHOU, wheezing, cough, or sputum production.  CARDIOVASCULAR: Denies chest pain, PND, orthopnea or reduced exercise tolerance. Denies palpitations.  ABDOMEN: Denies diarrhea, constipation, nausea, vomiting, abdominal pain, or blood in stool.  URINARY: Denies flank pain, dysuria or hematuria. Saw Nephrology Dr. Hernandez on  "November 27, 2017 for surveillance of stage 3 chronic kidney disease with 1-year follow up advised.  GENITOURINARY: Denies flank pain, dysuria, frequency or hematuria. Performs monthly breast self exams.  ENDOCRINE: Performs home glucose checks 1 to 2 times per day with levels ranging 's but 135 this morning. Saw ENT Dr. Irwin on October 20, 2015 for surveillance of thyroid nodules with 1-year follow up advised. However, thyroid ultrasound on April 19, 2018 showed stable findings.  HEME//LYMPH: Denies bleeding problems.  PERIPHERAL VASCULAR:Denies claudication or cyanosis.  MUSCULOSKELETAL: Denies joint stiffness, pain or swelling today but mentions chronic stiffness with getting up and after sitting for long periods but better as walks around during TV commercials.  NEUROLOGIC: Denies history of seizures, tremors, paralysis, alteration of gait or coordination.  PSYCHIATRIC: Denies mood swings, depression, anxiety, homicidal or suicidal thoughts. Denies sleep problems.     PE:   VS: /78 Comment: Rechecked by Dr. Guzman.  Pulse 76   Temp 98 °F (36.7 °C) (Oral)   Resp 16   Ht 5' 4" (1.626 m)   Wt 86.3 kg (190 lb 4.1 oz)   SpO2 99%   BMI 32.66 kg/m²   APPEARANCE: Well nourished, well developed female, pleasant and obese, alert and oriented in no acute distress.   HEAD: Non tender. Full range of motion.  EYES: PERRL, conjunctiva pink, lids no edema. She wears glasses.  EARS: External canal patent, no swelling or redness. TM's shiny and clear.  NOSE: Mucosa and turbinates pink, not swollen. No discharge. Non tender sinuses.  THROAT: No pharyngeal erythema or exudate. No stridor. She wears top dentures.   NECK: Supple, no mass. Chronic, non-tender goiter.  NODES: No cervical, axillary or inguinal lymph node enlargement.  CHEST: Normal respiratory effort. Lungs clear to auscultation.  CARDIOVASCULAR: Normal S1, S2. No rubs, murmurs or gallops. PMI not displaced. No carotid bruit. Pedal pulses palpable " bilaterally. No edema. Feet look okay without ulcerations or skin breaks.  ABDOMEN: Bowel sounds present. Not distended. Soft. No tenderness, masses or organomegaly.  BREAST EXAM: Symmetrical, no external lesions, no discharge, no masses palpated.  PELVIC EXAM: No external lesions noted, no discharge, absent cervix and uterus, bimanual exam showed no adnexal tenderness or masses. Urethra and bladder intact.  RECTAL EXAM: No anal fissures but non-inflamed external hemorrhoids noted. Heme-negative stool in the rectal vault.  MUSCULOSKELETAL: No joint deformities or stiffness. She is ambulatory without problems.  SKIN: No rashes or suspicious lesions, normal color and turgor.  NEUROLOGIC: Cranial Nerves: II-XII grossly intact. DTR's: Knees, Ankles 2+ and equal bilaterally. Gait & Posture: Normal gait and fine motion. Monofilament test unremarkable.  PSYCHIATRIC: Patient alert, oriented x 3. Mood/Affect normal without acute anxiety or depression noted. Judgment/insight good as she makes appropriate decisions on today's examination.    Protective Sensation (w/ 10 gram monofilament):  Right: Intact  Left: Intact    Visual Inspection:  Normal -  Bilateral    Pedal Pulses:   Right: Present  Left: Present    Posterior tibialis:   Right:Present  Left: Present     ASSESSMENT:    ICD-10-CM ICD-9-CM    1. Hypertension associated with diabetes E11.59 250.80 Comprehensive metabolic panel    I10 401.9 CBC auto differential      TSH      Lipid panel      Hypertension Digital Medicine ([facility]) Enrollment Order      Hypertension Digital Medicine ([facility]): Assign Onboarding Questionnaires   2. Combined hyperlipidemia associated with type 2 diabetes mellitus E11.69 250.80 Comprehensive metabolic panel    E78.2 272.2 Lipid panel   3. CKD (chronic kidney disease) stage 3, GFR 30-59 ml/min N18.3 585.3 Ambulatory referral to Nephrology   4. Multiple thyroid nodules E04.2 241.1 US Soft Tissue Head Neck Thyroid   5. Statin intolerance Z78.9  995.27    6. Type II diabetes mellitus with nephropathy E11.21 250.40 Comprehensive metabolic panel     583.81 Protein / creatinine ratio, urine      Ambulatory referral to Optometry      Hemoglobin A1c      Diabetes Digital Medicine (DDMP) Enrollment Order   7. Postmenopausal Z78.0 V49.81    8. Obesity (BMI 30.0-34.9) E66.9 278.00    9. Visit for screening mammogram Z12.31 V76.12 Mammo Digital Screening Bilat   10. Screening for colon cancer Z12.11 V76.51 POCT Occult Blood Stool     PLAN:  1. Age-appropriate counseling-appropriate low-sodium, low-cholesterol, low carbohydrate diet and exercise daily, monthly breast self exam, annual wellness examination.   2. Patient advised to call for results.  3. Continue current medications.  4. Annual dental and eye examination.  5. Keep follow up with specialists.  6. Flu shot this fall.  7. Follow up in about 6 months (around 10/16/2019) for diabetes and hypertension follow up.

## 2019-04-25 ENCOUNTER — HOSPITAL ENCOUNTER (OUTPATIENT)
Dept: RADIOLOGY | Facility: HOSPITAL | Age: 69
Discharge: HOME | End: 2019-04-25
Attending: ORTHOPAEDIC SURGERY
Payer: MEDICARE

## 2019-04-25 ENCOUNTER — TELEPHONE (OUTPATIENT)
Dept: OTOLARYNGOLOGY | Facility: CLINIC | Age: 69
End: 2019-04-25

## 2019-04-25 PROCEDURE — 25500020 PHARM REV CODE 255: Performed by: ORTHOPAEDIC SURGERY

## 2019-04-25 PROCEDURE — 70553 MRI BRAIN STEM W/O & W/DYE: CPT | Mod: TC

## 2019-04-25 PROCEDURE — A9585 GADOBUTROL INJECTION: HCPCS | Performed by: ORTHOPAEDIC SURGERY

## 2019-04-25 RX ORDER — GADOBUTROL 604.72 MG/ML
8 INJECTION INTRAVENOUS
Status: DISCONTINUED | OUTPATIENT
Start: 2019-04-25 | End: 2019-04-25

## 2019-04-25 RX ORDER — GADOBUTROL 604.72 MG/ML
10 INJECTION INTRAVENOUS
Status: COMPLETED | OUTPATIENT
Start: 2019-04-25 | End: 2019-04-25

## 2019-04-25 RX ADMIN — GADOBUTROL 8 ML: 604.72 INJECTION INTRAVENOUS at 10:04

## 2019-04-25 NOTE — TELEPHONE ENCOUNTER
Please let Ms. Garza know that her recent MRI of the brain and hearing and balance nerve was normal, and did not find any abnormal growths or lesions as the cause for her hearing loss in one ear.  she should continue with annual audiograms, and is a candidate for hearing aid on LEFT side if and when she is ready.

## 2019-04-29 DIAGNOSIS — E78.2 COMBINED HYPERLIPIDEMIA ASSOCIATED WITH TYPE 2 DIABETES MELLITUS: Primary | ICD-10-CM

## 2019-04-29 DIAGNOSIS — E11.69 COMBINED HYPERLIPIDEMIA ASSOCIATED WITH TYPE 2 DIABETES MELLITUS: Primary | ICD-10-CM

## 2019-04-30 ENCOUNTER — OFFICE VISIT (OUTPATIENT)
Dept: OPHTHALMOLOGY | Facility: CLINIC | Age: 69
End: 2019-04-30
Payer: MEDICARE

## 2019-04-30 DIAGNOSIS — E11.9 TYPE 2 DIABETES MELLITUS WITHOUT RETINOPATHY: Primary | ICD-10-CM

## 2019-04-30 DIAGNOSIS — H52.13 MYOPIA WITH PRESBYOPIA, BILATERAL: ICD-10-CM

## 2019-04-30 DIAGNOSIS — H52.4 MYOPIA WITH PRESBYOPIA, BILATERAL: ICD-10-CM

## 2019-04-30 DIAGNOSIS — H25.13 NUCLEAR SCLEROSIS, BILATERAL: ICD-10-CM

## 2019-04-30 DIAGNOSIS — H25.013 CORTICAL AGE-RELATED CATARACT OF BOTH EYES: ICD-10-CM

## 2019-04-30 DIAGNOSIS — H40.013 OPEN ANGLE WITH BORDERLINE FINDINGS OF BOTH EYES: ICD-10-CM

## 2019-04-30 PROCEDURE — 92014 PR EYE EXAM, EST PATIENT,COMPREHESV: ICD-10-PCS | Mod: S$PBB,,, | Performed by: OPTOMETRIST

## 2019-04-30 PROCEDURE — 92133 POSTERIOR SEGMENT OCT OPTIC NERVE(OCULAR COHERENCE TOMOGRAPHY) - OU - BOTH EYES: ICD-10-PCS | Mod: 26,S$PBB,, | Performed by: OPTOMETRIST

## 2019-04-30 PROCEDURE — 92014 COMPRE OPH EXAM EST PT 1/>: CPT | Mod: S$PBB,,, | Performed by: OPTOMETRIST

## 2019-04-30 PROCEDURE — 92015 DETERMINE REFRACTIVE STATE: CPT | Mod: ,,, | Performed by: OPTOMETRIST

## 2019-04-30 PROCEDURE — 92133 CPTRZD OPH DX IMG PST SGM ON: CPT | Mod: PBBFAC,PN | Performed by: OPTOMETRIST

## 2019-04-30 PROCEDURE — 99999 PR PBB SHADOW E&M-EST. PATIENT-LVL I: CPT | Mod: PBBFAC,,, | Performed by: OPTOMETRIST

## 2019-04-30 PROCEDURE — 99211 OFF/OP EST MAY X REQ PHY/QHP: CPT | Mod: PBBFAC,PN,25 | Performed by: OPTOMETRIST

## 2019-04-30 PROCEDURE — 92015 PR REFRACTION: ICD-10-PCS | Mod: ,,, | Performed by: OPTOMETRIST

## 2019-04-30 PROCEDURE — 99999 PR PBB SHADOW E&M-EST. PATIENT-LVL I: ICD-10-PCS | Mod: PBBFAC,,, | Performed by: OPTOMETRIST

## 2019-04-30 NOTE — PROGRESS NOTES
HPI     Pts last exam was 4/24/18  with DNL. PT c/o overall blurred va and wears   gls full time.   Diabetic eye exam  Diagnosed with diabetes 5 years ago  Recent vision fluctuations no  Blood sugar: 6.7  HPI    Any vision changes since last exam: no  Eye pain: no  Other ocular symptoms: no     Do you wear currently wear glasses or contacts? gls     Interested in contacts today? no     Do you plan on getting new glasses today? If needed       Last edited by Joyce Garsia MA on 4/30/2019 10:02 AM. (History)            Assessment /Plan     For exam results, see Encounter Report.    Type 2 diabetes mellitus without retinopathy  No diabetic retinopathy OD, OS  Continue close care with PCP  Monitor 12 months    Nuclear sclerosis, bilateral  Cortical age-related cataract of both eyes  Cataract accounts for change in vision. Patient is at the option stage. Cataract surgery will improve vision, but necessity is dependent on patient's symptoms. Patient prefers to wait on surgery at this time. Pt to call back if symptoms worsen before next appointment.    Open angle with borderline findings of both eyes  -     Posterior Segment OCT Optic Nerve- Both eyes  Suspect based on optic nerve cupping/appearance  Symmetric GCL  Monitor 12 months    Myopia with presbyopia, bilateral  Eyeglass Final Rx     Eyeglass Final Rx       Sphere Cylinder Axis Add    Right -3.00 +0.50 120 +2.50    Left -3.00 +0.50 065 +2.50    Expiration Date:  4/30/2020              RTC 1 yr for dilated eye exam and gOCT or PRN if any problems.   Discussed above and answered questions.

## 2019-04-30 NOTE — LETTER
April 30, 2019      Zoë Guzman MD  [address] [place] [address]           [facility]  [address]  [place] [address]  Phone: [phone]  Fax: [phone]          Patient: Kristyn Garza   MR Number: [id number]   YOB: 1950   Date of Visit: 4/30/2019       Dear Dr. Zoë Guzman:    Thank you for referring Kristyn Garza to me for evaluation. Attached you will find relevant portions of my assessment and plan of care.    If you have questions, please do not hesitate to call me. I look forward to following Kristyn Garza along with you.    Sincerely,    Hammad Hardy, OD    Enclosure  CC:  No Recipients    If you would like to receive this communication electronically, please contact [email or web address] or [phone] to request more information on [facility] access.    For providers and/or their staff who would like to refer a patient to [facility], please contact us through our one-stop-shop provider referral line, [facility], at [phone].    If you feel you have received this communication in error or would no longer like to receive these types of communications, please e-mail [email or web address]

## 2019-05-06 ENCOUNTER — OFFICE VISIT (OUTPATIENT)
Dept: CARDIOLOGY | Facility: CLINIC | Age: 69
End: 2019-05-06
Payer: MEDICARE

## 2019-05-06 ENCOUNTER — TELEPHONE (OUTPATIENT)
Dept: PHARMACY | Facility: CLINIC | Age: 69
End: 2019-05-06

## 2019-05-06 VITALS
HEART RATE: 60 BPM | SYSTOLIC BLOOD PRESSURE: 160 MMHG | HEIGHT: 64 IN | BODY MASS INDEX: 32.7 KG/M2 | DIASTOLIC BLOOD PRESSURE: 80 MMHG | WEIGHT: 191.56 LBS

## 2019-05-06 DIAGNOSIS — Z78.9 STATIN INTOLERANCE: Primary | ICD-10-CM

## 2019-05-06 DIAGNOSIS — R06.09 DOE (DYSPNEA ON EXERTION): ICD-10-CM

## 2019-05-06 DIAGNOSIS — Z82.49 FAMILY HISTORY OF CARDIOVASCULAR DISORDER: ICD-10-CM

## 2019-05-06 DIAGNOSIS — R94.31 ABNORMAL ECG: ICD-10-CM

## 2019-05-06 DIAGNOSIS — E66.9 OBESITY (BMI 30.0-34.9): ICD-10-CM

## 2019-05-06 DIAGNOSIS — I51.7 LVH (LEFT VENTRICULAR HYPERTROPHY): ICD-10-CM

## 2019-05-06 DIAGNOSIS — N18.30 CKD (CHRONIC KIDNEY DISEASE) STAGE 3, GFR 30-59 ML/MIN: ICD-10-CM

## 2019-05-06 DIAGNOSIS — I15.2 HYPERTENSION ASSOCIATED WITH DIABETES: ICD-10-CM

## 2019-05-06 DIAGNOSIS — E78.2 COMBINED HYPERLIPIDEMIA ASSOCIATED WITH TYPE 2 DIABETES MELLITUS: ICD-10-CM

## 2019-05-06 DIAGNOSIS — E11.59 HYPERTENSION ASSOCIATED WITH DIABETES: ICD-10-CM

## 2019-05-06 DIAGNOSIS — E11.21 TYPE II DIABETES MELLITUS WITH NEPHROPATHY: ICD-10-CM

## 2019-05-06 DIAGNOSIS — E11.69 COMBINED HYPERLIPIDEMIA ASSOCIATED WITH TYPE 2 DIABETES MELLITUS: ICD-10-CM

## 2019-05-06 PROCEDURE — 99999 PR PBB SHADOW E&M-EST. PATIENT-LVL III: ICD-10-PCS | Mod: PBBFAC,,, | Performed by: INTERNAL MEDICINE

## 2019-05-06 PROCEDURE — 99204 PR OFFICE/OUTPT VISIT, NEW, LEVL IV, 45-59 MIN: ICD-10-PCS | Mod: S$PBB,,, | Performed by: INTERNAL MEDICINE

## 2019-05-06 PROCEDURE — 99999 PR PBB SHADOW E&M-EST. PATIENT-LVL III: CPT | Mod: PBBFAC,,, | Performed by: INTERNAL MEDICINE

## 2019-05-06 PROCEDURE — 99213 OFFICE O/P EST LOW 20 MIN: CPT | Mod: PBBFAC,PN | Performed by: INTERNAL MEDICINE

## 2019-05-06 PROCEDURE — 99204 OFFICE O/P NEW MOD 45 MIN: CPT | Mod: S$PBB,,, | Performed by: INTERNAL MEDICINE

## 2019-05-06 RX ORDER — AMLODIPINE AND BENAZEPRIL HYDROCHLORIDE 5; 40 MG/1; MG/1
1 CAPSULE ORAL DAILY
Qty: 90 CAPSULE | Refills: 3 | Status: SHIPPED | OUTPATIENT
Start: 2019-05-06 | End: 2019-12-18 | Stop reason: SDUPTHER

## 2019-05-06 NOTE — TELEPHONE ENCOUNTER
Informed patient that [facility] received prescription for Repatha and prior authorization is required.  [facility] will be back in touch once insurance determination is received.

## 2019-05-06 NOTE — PROGRESS NOTES
Subjective:    Patient ID:  Kristyn Garza is a 68 y.o. female who presents for evaluation of Hypertension; Hyperlipidemia; and Risk Factor Management For Atherosclerosis    Pt referred by Dr. Zoë Guzman      HPI pt presents for cardiac eval.  Her current med conditions include DM, HTN, hyperlipidemia, CRI.  Former smoker (quit 4 years ago).  Statin intolerant.  Lipids elevated, above goal.  DM HGAIC well controlled.  Creatinine stable.   ecg 2/26/19 NSR, LVH.  She denies chest pain sxs.  Some ZHOU with real strenuous activity.  No pnd/orthopnea.  Some exercise.  No dyspnea with ordinary activity.   Brother has CAD, revascularization.   BP above goal.       Past Medical History:   Diagnosis Date    Carpal tunnel syndrome     CKD (chronic kidney disease) stage 3, GFR 30-59 ml/min     Followed by Nephrology    Colon cancer screening 3/18/2014    CTS (carpal tunnel syndrome) 6/18/2013    Diabetes mellitus, type 2     Eczema     Elevated CPK     History of hypokalemia 6/18/2013    History of hypokalemia 6/18/2013    Hypokalemia     Multinodular thyroid     Followed by ENT    Obesity     Other and unspecified hyperlipidemia     Pneumonia     in her 20s; hospitalized    Postmenopausal     No history of abnormal pap smear    Statin intolerance     caused mylagia, elevated CPK    Tobacco dependence     resolved    Unspecified essential hypertension        Current Outpatient Medications:     aspirin 81 MG Chew, Take 81 mg by mouth once daily., Disp: , Rfl:     blood sugar diagnostic Strp, 1 strip by Misc.(Non-Drug; Combo Route) route 2 (two) times daily., Disp: 200 each, Rfl: 3    calcium-vitamin D3 500 mg(1,250mg) -200 unit per tablet, Take 1 tablet by mouth once daily. , Disp: , Rfl:     ciclopirox (PENLAC) 8 % Soln, Apply topically nightly., Disp: 6.6 mL, Rfl: 0    colesevelam (WELCHOL) 625 mg tablet, Take 1 tablet (625 mg total) by mouth 2 (two) times daily with meals., Disp: 180 tablet, Rfl: 1     "CRANBERRY EXTRACT ORAL, Take 1 tablet by mouth once daily., Disp: , Rfl:     loratadine (CLARITIN) 10 mg tablet, Take 1 tablet (10 mg total) by mouth once daily. (Patient taking differently: Take 10 mg by mouth daily as needed. ), Disp: 90 tablet, Rfl: 3    meclizine (ANTIVERT) 25 mg tablet, Take 1 tablet (25 mg total) by mouth 3 (three) times daily as needed., Disp: 20 tablet, Rfl: 0    MULTIVITAMIN W-MINERALS/LUTEIN (CENTRUM SILVER ORAL), Take 1 tablet by mouth once daily., Disp: , Rfl:     spironolactone (ALDACTONE) 50 MG tablet, Take 1 tablet (50 mg total) by mouth once daily. (Patient taking differently: Take 100 mg by mouth once daily. ), Disp: 90 tablet, Rfl: 3    blood glucose control, normal Soln, 1 Bottle by Misc.(Non-Drug; Combo Route) route once daily., Disp: 1 each, Rfl: 0    blood-glucose meter (ACCU-CHEK AMBER) Misc, 1 kit by Misc.(Non-Drug; Combo Route) route once., Disp: 1 each, Rfl: 0    FLUZONE HIGH-DOSE 2018-19, PF, 180 mcg/0.5 mL vaccine, TO BE ADMINISTERED BY PHARMACIST FOR IMMUNIZATION, Disp: , Rfl: 0    lancets Misc, 1 lancet by Misc.(Non-Drug; Combo Route) route 3 (three) times daily. For Acc-Chek Amber   DX :E11.9, Disp: 100 each, Rfl: 11      Review of Systems   Constitution: Negative.   HENT: Negative.    Eyes: Negative.    Cardiovascular: Positive for dyspnea on exertion.   Respiratory: Positive for shortness of breath.    Endocrine: Negative.    Hematologic/Lymphatic: Negative.    Skin: Negative.    Musculoskeletal: Negative.    Gastrointestinal: Negative.    Genitourinary: Negative.    Neurological: Negative.    Psychiatric/Behavioral: Negative.    Allergic/Immunologic: Negative.        BP (!) 160/80 (BP Location: Right arm, Patient Position: Sitting, BP Method: Large (Manual))   Pulse 60   Ht 5' 4" (1.626 m)   Wt 86.9 kg (191 lb 9.3 oz)   BMI 32.88 kg/m²     Wt Readings from Last 3 Encounters:   05/06/19 86.9 kg (191 lb 9.3 oz)   04/16/19 86.3 kg (190 lb 4.1 oz)   04/08/19 " 86.3 kg (190 lb 4.1 oz)     Temp Readings from Last 3 Encounters:   04/16/19 98 °F (36.7 °C) (Oral)   03/15/19 98.5 °F (36.9 °C) (Oral)   02/28/19 98.2 °F (36.8 °C) (Oral)     BP Readings from Last 3 Encounters:   05/06/19 (!) 160/80   04/16/19 138/78   03/15/19 132/67     Pulse Readings from Last 3 Encounters:   05/06/19 60   04/16/19 76   03/15/19 77          Objective:    Physical Exam   Constitutional: She is oriented to person, place, and time. Vital signs are normal. She appears well-developed and well-nourished. She is active and cooperative. She does not have a sickly appearance. She does not appear ill. No distress.   HENT:   Head: Normocephalic.   Neck: Neck supple. Normal carotid pulses, no hepatojugular reflux and no JVD present. Carotid bruit is not present. No thyromegaly present.   Cardiovascular: Normal rate, regular rhythm, S1 normal, S2 normal, normal heart sounds and normal pulses. PMI is not displaced. Exam reveals no gallop and no friction rub.   No murmur heard.  Pulses:       Radial pulses are 2+ on the right side, and 2+ on the left side.   Pulmonary/Chest: Effort normal and breath sounds normal. She has no wheezes. She has no rales.   Abdominal: Soft. Normal appearance, normal aorta and bowel sounds are normal. She exhibits no pulsatile liver, no abdominal bruit, no ascites and no mass. There is no splenomegaly or hepatomegaly. There is no tenderness.   Musculoskeletal: She exhibits no edema.   Lymphadenopathy:     She has no cervical adenopathy.   Neurological: She is alert and oriented to person, place, and time.   Skin: Skin is warm. She is not diaphoretic.   Psychiatric: She has a normal mood and affect. Her behavior is normal.   Nursing note and vitals reviewed.      I have reviewed all pertinent labs and cardiac studies.    Lab Results   Component Value Date    HGBA1C 6.7 (H) 04/16/2019     Lab Results   Component Value Date    CHOL 242 (H) 04/16/2019    CHOL 239 (H) 04/13/2018    CHOL  245 (H) 05/01/2017     Lab Results   Component Value Date    HDL 55 04/16/2019    HDL 51 04/13/2018    HDL 48 05/01/2017     Lab Results   Component Value Date    LDLCALC 171.4 (H) 04/16/2019    LDLCALC 170.8 (H) 04/13/2018    LDLCALC 180.6 (H) 05/01/2017     Lab Results   Component Value Date    TRIG 78 04/16/2019    TRIG 86 04/13/2018    TRIG 82 05/01/2017     Lab Results   Component Value Date    CHOLHDL 22.7 04/16/2019    CHOLHDL 21.3 04/13/2018    CHOLHDL 19.6 (L) 05/01/2017       Chemistry        Component Value Date/Time     04/16/2019 1147    K 3.6 04/16/2019 1147     04/16/2019 1147    CO2 29 04/16/2019 1147    BUN 15 04/16/2019 1147    CREATININE 1.0 04/16/2019 1147    GLU 91 04/16/2019 1147        Component Value Date/Time    CALCIUM 10.1 04/16/2019 1147    ALKPHOS 117 04/16/2019 1147    AST 25 04/16/2019 1147    ALT 23 04/16/2019 1147    BILITOT 0.5 04/16/2019 1147    ESTGFRAFRICA >60.0 04/16/2019 1147    EGFRNONAA 57.2 (A) 04/16/2019 1147        Lab Results   Component Value Date    WBC 10.70 04/16/2019    HGB 13.4 04/16/2019    HCT 41.6 04/16/2019    MCV 96 04/16/2019     04/16/2019           Assessment:       1. Statin intolerance    2. Obesity (BMI 30.0-34.9)    3. CKD (chronic kidney disease) stage 3, GFR 30-59 ml/min    4. Type II diabetes mellitus with nephropathy    5. Hypertension associated with diabetes    6. Combined hyperlipidemia associated with type 2 diabetes mellitus    7. Abnormal ECG    8. LVH (left ventricular hypertrophy)    9. ZHOU (dyspnea on exertion)    10. Family history of cardiovascular disorder         Plan:             Increase Lotrel to 5/40 mg qd from 5/20 mg for more optimal HTN control.  Send in Repatha for hyperlipidemia, statin intolerance.  Indications for above meds, side effects discussed in detail.  Multiple risk factors for CAD.   Stress test + echo and rest AMITA for PAD screening.  Continue other meds.  Keep DM well controlled.  F/u with  Nephrology for CRI, stable.  Cardiac diet discussed.  Daily exercise  F/u 6 weeks at [facility].

## 2019-05-06 NOTE — LETTER
May 6, 2019      Zoë Guzman MD  [address] [place] [address]           [facility]  [address]  [place] [address]  Phone: [phone]  Fax: [phone]          Patient: Kristyn Garza   MR Number: [id number]   YOB: 1950   Date of Visit: 5/6/2019       Dear Dr. Zoë Guzman:    Thank you for referring Kristyn Garza to me for evaluation. Attached you will find relevant portions of my assessment and plan of care.    If you have questions, please do not hesitate to call me. I look forward to following Kristyn Garza along with you.    Sincerely,    Dale Matute MD    Enclosure  CC:  No Recipients    If you would like to receive this communication electronically, please contact [email or web address] or [phone] to request more information on [employer] Link access.    For providers and/or their staff who would like to refer a patient to [facility], please contact us through our one-stop-shop provider referral line, [facility], at [phone].    If you feel you have received this communication in error or would no longer like to receive these types of communications, please e-mail [email or web address]

## 2019-05-13 ENCOUNTER — OFFICE VISIT (OUTPATIENT)
Dept: OTOLARYNGOLOGY | Facility: CLINIC | Age: 69
End: 2019-05-13
Payer: MEDICARE

## 2019-05-13 VITALS
BODY MASS INDEX: 32.54 KG/M2 | WEIGHT: 189.63 LBS | TEMPERATURE: 98 F | SYSTOLIC BLOOD PRESSURE: 153 MMHG | DIASTOLIC BLOOD PRESSURE: 92 MMHG | HEART RATE: 76 BPM

## 2019-05-13 DIAGNOSIS — H81.02 ENDOLYMPHATIC HYDROPS OF LEFT EAR: ICD-10-CM

## 2019-05-13 PROCEDURE — 99213 OFFICE O/P EST LOW 20 MIN: CPT | Mod: S$PBB,,, | Performed by: ORTHOPAEDIC SURGERY

## 2019-05-13 PROCEDURE — 99214 OFFICE O/P EST MOD 30 MIN: CPT | Mod: PBBFAC | Performed by: ORTHOPAEDIC SURGERY

## 2019-05-13 PROCEDURE — 99999 PR PBB SHADOW E&M-EST. PATIENT-LVL IV: CPT | Mod: PBBFAC,,, | Performed by: ORTHOPAEDIC SURGERY

## 2019-05-13 PROCEDURE — 99213 PR OFFICE/OUTPT VISIT, EST, LEVL III, 20-29 MIN: ICD-10-PCS | Mod: S$PBB,,, | Performed by: ORTHOPAEDIC SURGERY

## 2019-05-13 PROCEDURE — 99999 PR PBB SHADOW E&M-EST. PATIENT-LVL IV: ICD-10-PCS | Mod: PBBFAC,,, | Performed by: ORTHOPAEDIC SURGERY

## 2019-05-13 RX ORDER — AZELASTINE 1 MG/ML
SPRAY, METERED NASAL
Refills: 1 | COMMUNITY
Start: 2019-04-23 | End: 2020-04-17

## 2019-05-13 NOTE — PROGRESS NOTES
Subjective:       Patient ID: Kristyn Garza is a 68 y.o. female.    Chief Complaint: Other (1 mo follow up)    Patient is a very pleasant 68-year-old female here to see me today in follow-up for evaluation of left endolymphatic hydrops.  She has had an audiogram showing a low frequency hearing loss in the left ear only.  She continues to note hearing loss in her left ear as well as left-sided tinnitus.  However, her dizziness has resolved at this time.  She is following a low-salt low caffeine diet, and has been for some time due to cardiac issues.  When she was extremely dizzy she also noted a pressure around her eyes, which is also now resolved.    Review of Systems   Constitutional: Negative for chills, fatigue, fever and unexpected weight change.   HENT: Positive for hearing loss and tinnitus. Negative for congestion, dental problem, ear discharge, ear pain (left ear pressure), facial swelling, nosebleeds, postnasal drip, rhinorrhea, sinus pressure, sneezing, sore throat, trouble swallowing and voice change.    Eyes: Negative for redness, itching and visual disturbance.   Respiratory: Negative for cough, choking, shortness of breath and wheezing.    Cardiovascular: Negative for chest pain and palpitations.   Gastrointestinal: Negative for abdominal pain.        No reflux.   Musculoskeletal: Negative for gait problem.   Skin: Negative for rash.   Allergic/Immunologic: Positive for environmental allergies.   Neurological: Negative for dizziness, light-headedness and headaches.       Objective:      Physical Exam   Constitutional: She is oriented to person, place, and time. She appears well-developed and well-nourished. No distress.   HENT:   Head: Normocephalic and atraumatic.   Right Ear: Tympanic membrane, external ear and ear canal normal.   Left Ear: Tympanic membrane, external ear and ear canal normal.   Nose: Nose normal. No mucosal edema, rhinorrhea, nasal deformity or septal deviation. No epistaxis.  Right sinus exhibits no maxillary sinus tenderness and no frontal sinus tenderness. Left sinus exhibits no maxillary sinus tenderness and no frontal sinus tenderness.   Mouth/Throat: Uvula is midline, oropharynx is clear and moist and mucous membranes are normal. Mucous membranes are not pale and not dry. No dental caries. No oropharyngeal exudate or posterior oropharyngeal erythema.   Eyes: Pupils are equal, round, and reactive to light. Conjunctivae, EOM and lids are normal. Right eye exhibits no chemosis. Left eye exhibits no chemosis. Right conjunctiva is not injected. Left conjunctiva is not injected. No scleral icterus. Right eye exhibits normal extraocular motion and no nystagmus. Left eye exhibits normal extraocular motion and no nystagmus.   Neck: Trachea normal and phonation normal. No tracheal tenderness present. No tracheal deviation present. No thyroid mass and no thyromegaly present.   Cardiovascular: Intact distal pulses.   Pulmonary/Chest: Effort normal. No stridor. No respiratory distress.   Abdominal: She exhibits no distension.   Lymphadenopathy:        Head (right side): No submental, no submandibular, no preauricular, no posterior auricular and no occipital adenopathy present.        Head (left side): No submental, no submandibular, no preauricular, no posterior auricular and no occipital adenopathy present.     She has no cervical adenopathy.   Neurological: She is alert and oriented to person, place, and time. No cranial nerve deficit.   Skin: Skin is warm and dry. No rash noted. No erythema.   Psychiatric: She has a normal mood and affect. Her behavior is normal.       MRI report reviewed:  Negative MRI, no acute intracranial process, no lesion of the internal auditory canal      Assessment:       1. Asymmetrical left sensorineural hearing loss    2. Endolymphatic hydrops of left ear        Plan:       1.  Endolymphatic hydrops of the left ear:  Stable at this time, she has not noted any  recent issues with dizziness.  Recommend she continue with a low-salt low caffeine diet, she will call if her dizziness returns and will then consider adjusting her medications.  If addition of a diuretic as needed, would have to be done in conjunction with her cardiologist as well as primary care physician.  2.  Asymmetric left sensorineural hearing loss:  She is a candidate for hearing aid on the left if and when she is interested.  Return to clinic in 1 year with audiogram for follow-up.

## 2019-05-17 NOTE — TELEPHONE ENCOUNTER
Repatha is approved by the patient's insurance with a high co pay. We will be assisting the patient in applying to the  for assistance.   Sending Dr Dale Matute a staff message regarding approval and faxing assistance application for their review and signature

## 2019-05-23 RX ORDER — AMLODIPINE AND BENAZEPRIL HYDROCHLORIDE 5; 20 MG/1; MG/1
CAPSULE ORAL
Qty: 90 CAPSULE | Refills: 1 | Status: SHIPPED | OUTPATIENT
Start: 2019-05-23 | End: 2019-09-04 | Stop reason: CLARIF

## 2019-05-24 ENCOUNTER — HOSPITAL ENCOUNTER (OUTPATIENT)
Dept: RADIOLOGY | Facility: HOSPITAL | Age: 69
Discharge: HOME | End: 2019-05-24
Attending: FAMILY MEDICINE
Payer: MEDICARE

## 2019-05-24 ENCOUNTER — OFFICE VISIT (OUTPATIENT)
Dept: NEPHROLOGY | Facility: CLINIC | Age: 69
End: 2019-05-24
Payer: MEDICARE

## 2019-05-24 VITALS — BODY MASS INDEX: 32.27 KG/M2 | HEIGHT: 64 IN | WEIGHT: 189 LBS

## 2019-05-24 VITALS
SYSTOLIC BLOOD PRESSURE: 136 MMHG | HEART RATE: 70 BPM | WEIGHT: 191.38 LBS | DIASTOLIC BLOOD PRESSURE: 77 MMHG | HEIGHT: 64 IN | BODY MASS INDEX: 32.67 KG/M2

## 2019-05-24 DIAGNOSIS — N18.30 CHRONIC KIDNEY DISEASE (CKD), STAGE III (MODERATE): Primary | ICD-10-CM

## 2019-05-24 DIAGNOSIS — Z12.31 VISIT FOR SCREENING MAMMOGRAM: ICD-10-CM

## 2019-05-24 DIAGNOSIS — E04.2 MULTIPLE THYROID NODULES: ICD-10-CM

## 2019-05-24 PROCEDURE — 99499 RISK ADDL DX/OHS AUDIT: ICD-10-PCS | Mod: S$PBB,,, | Performed by: INTERNAL MEDICINE

## 2019-05-24 PROCEDURE — 99999 PR PBB SHADOW E&M-EST. PATIENT-LVL III: CPT | Mod: PBBFAC,,, | Performed by: INTERNAL MEDICINE

## 2019-05-24 PROCEDURE — 99999 PR PBB SHADOW E&M-EST. PATIENT-LVL III: ICD-10-PCS | Mod: PBBFAC,,, | Performed by: INTERNAL MEDICINE

## 2019-05-24 PROCEDURE — 77063 MAMMO DIGITAL SCREENING BILAT WITH TOMOSYNTHESIS_CAD: ICD-10-PCS | Mod: 26,,, | Performed by: RADIOLOGY

## 2019-05-24 PROCEDURE — 76536 US SOFT TISSUE HEAD NECK THYROID: ICD-10-PCS | Mod: 26,,, | Performed by: RADIOLOGY

## 2019-05-24 PROCEDURE — 77067 SCR MAMMO BI INCL CAD: CPT | Mod: 26,,, | Performed by: RADIOLOGY

## 2019-05-24 PROCEDURE — 76536 US EXAM OF HEAD AND NECK: CPT | Mod: TC

## 2019-05-24 PROCEDURE — 77067 SCR MAMMO BI INCL CAD: CPT | Mod: TC

## 2019-05-24 PROCEDURE — 99213 OFFICE O/P EST LOW 20 MIN: CPT | Mod: PBBFAC,25,PN | Performed by: INTERNAL MEDICINE

## 2019-05-24 PROCEDURE — 99214 OFFICE O/P EST MOD 30 MIN: CPT | Mod: S$PBB,,, | Performed by: INTERNAL MEDICINE

## 2019-05-24 PROCEDURE — 77063 BREAST TOMOSYNTHESIS BI: CPT | Mod: 26,,, | Performed by: RADIOLOGY

## 2019-05-24 PROCEDURE — 77067 MAMMO DIGITAL SCREENING BILAT WITH TOMOSYNTHESIS_CAD: ICD-10-PCS | Mod: 26,,, | Performed by: RADIOLOGY

## 2019-05-24 PROCEDURE — 99499 UNLISTED E&M SERVICE: CPT | Mod: S$PBB,,, | Performed by: INTERNAL MEDICINE

## 2019-05-24 PROCEDURE — 99214 PR OFFICE/OUTPT VISIT, EST, LEVL IV, 30-39 MIN: ICD-10-PCS | Mod: S$PBB,,, | Performed by: INTERNAL MEDICINE

## 2019-05-24 PROCEDURE — 76536 US EXAM OF HEAD AND NECK: CPT | Mod: 26,,, | Performed by: RADIOLOGY

## 2019-05-24 RX ORDER — SPIRONOLACTONE 50 MG/1
50 TABLET, FILM COATED ORAL DAILY
Qty: 90 TABLET | Refills: 3
Start: 2019-05-24 | End: 2019-12-12 | Stop reason: SDUPTHER

## 2019-05-24 NOTE — LETTER
May 24, 2019      Zoë Guzman MD  [address] [place] [address]           [facility]  [address]  [place] [address]  Phone: [phone]  Fax: [phone]          Patient: Kristyn Garza   MR Number: [id number]   YOB: 1950   Date of Visit: 5/24/2019       Dear Dr. Zoë Guzman:    Thank you for referring Kristyn Garza to me for evaluation. Attached you will find relevant portions of my assessment and plan of care.    If you have questions, please do not hesitate to call me. I look forward to following Kristyn Garza along with you.    Sincerely,    Ronald Hernandez MD    Enclosure  CC:  No Recipients    If you would like to receive this communication electronically, please contact [email or web address] or [phone] to request more information on [email or web address] Link access.    For providers and/or their staff who would like to refer a patient to [facility], please contact us through our one-stop-shop provider referral line, [facility], at [phone].    If you feel you have received this communication in error or would no longer like to receive these types of communications, please e-mail [email or web address]

## 2019-05-24 NOTE — TELEPHONE ENCOUNTER
FOR DOCUMENTATION ONLY: Financial Assistance for Repatha is approved from 5/22/19 to 5/10/20  [employer] .Sending a staff message to Dr Dale Matute regarding assistance approval.

## 2019-05-24 NOTE — PROGRESS NOTES
Subjective:       Patient ID: Kristyn Garza is a 68 y.o.  female who presents for follow-up evaluation of CKD stage 2/3, HTN, Anemia, SHPT,        Zoë Guzman MD      HPI : Ms. Garza is a pleasant 68 year-old  woman seen in office today in f/u for above medical problems. I saw her in clinic about  2 years ago,  She denies recent hospitalizations or ER visits. Her renal function remains stable with a serum creatinine of 1 mg/dl and GFR of about 50 mL per minute.  She has long-standing history of hypertension and diabetes.  recent labs discussed with pt, recent ENT and cards note reviewed ,        Past Medical History:   Diagnosis Date    Carpal tunnel syndrome     CKD (chronic kidney disease) stage 3, GFR 30-59 ml/min     Followed by Nephrology    Colon cancer screening 3/18/2014    CTS (carpal tunnel syndrome) 6/18/2013    Diabetes mellitus, type 2     Eczema     Elevated CPK     History of hypokalemia 6/18/2013    History of hypokalemia 6/18/2013    Hypokalemia     Multinodular thyroid     Followed by ENT    Obesity     Other and unspecified hyperlipidemia     Pneumonia     in her 20s; hospitalized    Postmenopausal     No history of abnormal pap smear    Statin intolerance     caused mylagia, elevated CPK    Tobacco dependence     resolved    Unspecified essential hypertension        Current Outpatient Medications on File Prior to Visit   Medication Sig Dispense Refill    amlodipine-benazepril (LOTREL) 5-40 mg per capsule Take 1 capsule by mouth once daily. 90 capsule 3    aspirin 81 MG Chew Take 81 mg by mouth once daily.      azelastine (ASTELIN) 137 mcg (0.1 %) nasal spray USE 1 SPRAY (137 MCG TOTAL) BY NASAL ROUTE 2 (TWO) TIMES DAILY.  1    blood sugar diagnostic Strp 1 strip by Misc.(Non-Drug; Combo Route) route 2 (two) times daily. 200 each 3    calcium-vitamin D3 500 mg(1,250mg) -200 unit per tablet Take 1 tablet by mouth once daily.        ciclopirox (PENLAC) 8 % Soln Apply topically nightly. 6.6 mL 0    colesevelam (WELCHOL) 625 mg tablet Take 1 tablet (625 mg total) by mouth 2 (two) times daily with meals. 180 tablet 1    CRANBERRY EXTRACT ORAL Take 1 tablet by mouth once daily.      evolocumab (REPATHA SURECLICK) 140 mg/mL PnIj Inject 1 mL (140 mg total) into the skin every 14 (fourteen) days. 2 mL 12    FLUZONE HIGH-DOSE 2018-19, PF, 180 mcg/0.5 mL vaccine TO BE ADMINISTERED BY PHARMACIST FOR IMMUNIZATION  0    lancets Misc 1 lancet by Misc.(Non-Drug; Combo Route) route 3 (three) times daily. For Acc-Chek Amber   DX :E11.9 100 each 11    MULTIVITAMIN W-MINERALS/LUTEIN (CENTRUM SILVER ORAL) Take 1 tablet by mouth once daily.      spironolactone (ALDACTONE) 50 MG tablet Take 1 tablet (50 mg total) by mouth once daily. (Patient taking differently: Take 100 mg by mouth once daily. ) 90 tablet 3    amlodipine-benazepril 5-20 mg (LOTREL) 5-20 mg per capsule TAKE 1 CAPSULE BY MOUTH EVERY DAY 90 capsule 1    blood glucose control, normal Soln 1 Bottle by Misc.(Non-Drug; Combo Route) route once daily. 1 each 0    blood-glucose meter (ACCU-CHEK AMBER) Misc 1 kit by Misc.(Non-Drug; Combo Route) route once. 1 each 0    loratadine (CLARITIN) 10 mg tablet Take 1 tablet (10 mg total) by mouth once daily. (Patient taking differently: Take 10 mg by mouth daily as needed. ) 90 tablet 3    meclizine (ANTIVERT) 25 mg tablet Take 1 tablet (25 mg total) by mouth 3 (three) times daily as needed. 20 tablet 0     No current facility-administered medications on file prior to visit.          Review of Systems  :    Constitutional: Negative for fever, activity change, appetite change and fatigue.    HENT: Negative for congestion, sore throat, facial swelling, trouble swallowing and voice change.    Eyes: Negative for redness and visual disturbance.    Respiratory: Negative for apnea, cough, chest tightness, shortness of breath and wheezing.    Cardiovascular:  Negative for chest pain, palpitations and leg swelling.    Gastrointestinal: Negative for nausea, vomiting, abdominal pain, diarrhea, constipation, blood in stool and abdominal distention.    Genitourinary: Negative for dysuria, urgency, frequency, hematuria, flank pain, decreased urine volume, difficulty urinating and pelvic pain.    Musculoskeletal: Negative for back pain, joint swelling and gait problem.    Skin: Negative for color change and rash.    Neurological: Negative for dizziness, syncope, weakness and headaches.    Hematological: Does not bruise/bleed easily.    Psychiatric/Behavioral: Negative for behavioral problems, confusion and agitation. The patient is not nervous/anxious.         Objective:         Vitals:    05/24/19 1518   BP: 136/77   Pulse: 70     weight 191 lbs, stable       Physical Exam  :      Constitutional: She is oriented to person, place, and time. She appears well-developed and well-nourished. No distress.    HENT: Head: Normocephalic and atraumatic. Mouth/Throat: Oropharynx is clear and moist. No oropharyngeal exudate.    Eyes: Conjunctivae normal and EOM are normal. Pupils are equal, round, and reactive to light. No scleral icterus.    Neck: Normal range of motion. Neck supple. No JVD present. Carotid bruit is not present. No tracheal deviation present. No mass present.    Cardiovascular: Normal rate, regular rhythm, normal heart sounds and intact distal pulses. Exam reveals no gallop and no friction rub.    No murmur heard.    Pulmonary/Chest: Effort normal and breath sounds normal. No respiratory distress. She has no wheezes. She has no rales. She exhibits no tenderness.    Abdominal: Soft. Bowel sounds are normal. She exhibits no distension, no abdominal bruit, no ascites and no mass. There is no hepatosplenomegaly. There is no tenderness. There is no rebound, no guarding and no CVA tenderness.   Musculoskeletal: Normal range of motion. She exhibits no edema and no tenderness.    Lymphadenopathy: She has no cervical adenopathy.   Neurological: She is alert and oriented to person, place, and time. No cranial nerve deficit. She exhibits normal muscle tone. Coordination normal.    Skin: Skin is warm and intact. No rash noted. No erythema. No pallor. Burn scar right hand   Psychiatric: She has a normal mood and affect. Her behavior is normal. Judgment normal.         Labs:    Lab Results   Component Value Date    CREATININE 1.0 04/16/2019    BUN 15 04/16/2019     04/16/2019    K 3.6 04/16/2019     04/16/2019    CO2 29 04/16/2019       Lab Results   Component Value Date    WBC 10.70 04/16/2019    HGB 13.4 04/16/2019    HCT 41.6 04/16/2019    MCV 96 04/16/2019     04/16/2019       Lab Results   Component Value Date    .0 (H) 12/21/2016    CALCIUM 10.1 04/16/2019    PHOS 3.2 12/21/2016       Lab Results   Component Value Date    ALBUMIN 4.0 04/16/2019       Lab Results   Component Value Date    HGBA1C 6.7 (H) 04/16/2019          ASSESSMENT AND PLAN: 68  -year-old  woman seen in office today in f/u for following medical problems    1. chronic kidney disease 3 - stable renal function. Most recent serum creatinine is 1 mg/dL.      2. Hypokalemia - K better , follow , she is on aldactone 50 mg a day , tolerating well,     3. Hypertension - blood pressure is well controlled on current medications.    4. hyperlipidemia - Discussed low cholesterol diet.  she is intolerant to statins.     5. Anemia - hemoglobin is currently stable at  13.4 g. No indication for Procrit therapy. We'll continue to follow.       6. Secondary hyperparathyroidism - PTH is acceptable at 123.  Vitamin D levels acceptable.       7. Urine analysis is negative for proteinuria or hematuria       8. She is euvolemic on exam.      9. Diabetes mellitus type 2 - controlled    10. Hyperlipidemia - cards note reviewed     We will see her in followup in about 12 months. Time spent 25 mts ,  discussing labs, plan of care,      Sincerely,    Ronald Hernandez M.D.

## 2019-05-27 ENCOUNTER — CLINICAL SUPPORT (OUTPATIENT)
Dept: CARDIOLOGY | Facility: CLINIC | Age: 69
End: 2019-05-27
Attending: INTERNAL MEDICINE
Payer: MEDICARE

## 2019-05-27 ENCOUNTER — HOSPITAL ENCOUNTER (OUTPATIENT)
Dept: RADIOLOGY | Facility: HOSPITAL | Age: 69
Discharge: HOME | End: 2019-05-27
Attending: INTERNAL MEDICINE
Payer: MEDICARE

## 2019-05-27 DIAGNOSIS — N18.30 CKD (CHRONIC KIDNEY DISEASE) STAGE 3, GFR 30-59 ML/MIN: ICD-10-CM

## 2019-05-27 DIAGNOSIS — E11.21 TYPE II DIABETES MELLITUS WITH NEPHROPATHY: ICD-10-CM

## 2019-05-27 DIAGNOSIS — R06.09 DOE (DYSPNEA ON EXERTION): ICD-10-CM

## 2019-05-27 DIAGNOSIS — Z82.49 FAMILY HISTORY OF CARDIOVASCULAR DISORDER: ICD-10-CM

## 2019-05-27 DIAGNOSIS — E66.9 OBESITY (BMI 30.0-34.9): ICD-10-CM

## 2019-05-27 DIAGNOSIS — R94.31 ABNORMAL ECG: ICD-10-CM

## 2019-05-27 DIAGNOSIS — E78.2 COMBINED HYPERLIPIDEMIA ASSOCIATED WITH TYPE 2 DIABETES MELLITUS: ICD-10-CM

## 2019-05-27 DIAGNOSIS — E11.59 HYPERTENSION ASSOCIATED WITH DIABETES: ICD-10-CM

## 2019-05-27 DIAGNOSIS — E11.69 COMBINED HYPERLIPIDEMIA ASSOCIATED WITH TYPE 2 DIABETES MELLITUS: ICD-10-CM

## 2019-05-27 DIAGNOSIS — I51.7 LVH (LEFT VENTRICULAR HYPERTROPHY): ICD-10-CM

## 2019-05-27 DIAGNOSIS — I15.2 HYPERTENSION ASSOCIATED WITH DIABETES: ICD-10-CM

## 2019-05-27 LAB
DIASTOLIC DYSFUNCTION: NO
DIASTOLIC DYSFUNCTION: NO
ESTIMATED PA SYSTOLIC PRESSURE: 19
MITRAL VALVE MOBILITY: NORMAL
RETIRED EF AND QEF - SEE NOTES: 55 (ref 55–65)
VASCULAR ANKLE BRACHIAL INDEX (ABI) LEFT: 1.06 (ref 0.9–1.2)

## 2019-05-27 PROCEDURE — 93922 CAR US ANKLE BRACHIAL INDICES EXT LTD WO STR: ICD-10-PCS | Mod: 26,S$PBB,, | Performed by: INTERNAL MEDICINE

## 2019-05-27 PROCEDURE — 93922 UPR/L XTREMITY ART 2 LEVELS: CPT | Mod: PBBFAC,PN | Performed by: INTERNAL MEDICINE

## 2019-05-27 PROCEDURE — 78452 HT MUSCLE IMAGE SPECT MULT: CPT | Mod: 26,,, | Performed by: NUCLEAR MEDICINE

## 2019-05-27 PROCEDURE — A9502 TC99M TETROFOSMIN: HCPCS

## 2019-05-27 PROCEDURE — 93018 CV STRESS TEST I&R ONLY: CPT | Mod: S$PBB,,, | Performed by: NUCLEAR MEDICINE

## 2019-05-27 PROCEDURE — 93016 CV STRESS TEST SUPVJ ONLY: CPT | Mod: S$PBB,,, | Performed by: NUCLEAR MEDICINE

## 2019-05-27 PROCEDURE — 78452 NM MULTI TREAD STRESS CARDIAC COMPONENT: ICD-10-PCS | Mod: 26,,, | Performed by: NUCLEAR MEDICINE

## 2019-05-27 PROCEDURE — 93016 NM MULTI TREAD STRESS CARDIAC COMPONENT: ICD-10-PCS | Mod: S$PBB,,, | Performed by: NUCLEAR MEDICINE

## 2019-05-27 PROCEDURE — 93306 TTE W/DOPPLER COMPLETE: CPT | Mod: PBBFAC,PN | Performed by: NUCLEAR MEDICINE

## 2019-05-27 PROCEDURE — 93306 2D ECHO WITH COLOR FLOW DOPPLER: ICD-10-PCS | Mod: 26,S$PBB,, | Performed by: NUCLEAR MEDICINE

## 2019-05-27 PROCEDURE — 93018 NM MULTI TREAD STRESS CARDIAC COMPONENT: ICD-10-PCS | Mod: S$PBB,,, | Performed by: NUCLEAR MEDICINE

## 2019-07-15 ENCOUNTER — OUTPATIENT CASE MANAGEMENT (OUTPATIENT)
Dept: ADMINISTRATIVE | Facility: OTHER | Age: 69
End: 2019-07-15

## 2019-07-15 DIAGNOSIS — E78.2 COMBINED HYPERLIPIDEMIA ASSOCIATED WITH TYPE 2 DIABETES MELLITUS: Primary | ICD-10-CM

## 2019-07-15 DIAGNOSIS — I15.2 HYPERTENSION ASSOCIATED WITH DIABETES: ICD-10-CM

## 2019-07-15 DIAGNOSIS — E11.69 COMBINED HYPERLIPIDEMIA ASSOCIATED WITH TYPE 2 DIABETES MELLITUS: Primary | ICD-10-CM

## 2019-07-15 DIAGNOSIS — E11.59 HYPERTENSION ASSOCIATED WITH DIABETES: ICD-10-CM

## 2019-07-15 NOTE — PROGRESS NOTES
Please note, patient's information has been sent to Outpatient Care Management for high risk screening.    Reason for Referral:   Hypertension  Hyperlipidemia  Risk Score 62    Please contact [facility] with any questions (ext. [phone]).    Thank you,    Fatoumata Amaya RN,CCM

## 2019-07-23 ENCOUNTER — OUTPATIENT CASE MANAGEMENT (OUTPATIENT)
Dept: ADMINISTRATIVE | Facility: OTHER | Age: 69
End: 2019-07-23

## 2019-07-23 NOTE — LETTER
July 26, 2019    Kristyn Garza  [address] [place] [address]             [facility] [address] [place] [address]  Dear Ms. Garza,                   Welcome to the [facility] program. My goal is to help you function at the healthiest and highest level possible. You can contact me directly at [phone]. Here are the educational and resource materials that I believe you may find useful. Please take your time to review them. Do not hesitate to call me for any questions or concerns.      General appointment scheduling [phone]  [facility] (nurse line) [phone]    Sincerely,     Sari Dugan RN   Outpatient Case Management  [facility]  [phone]

## 2019-07-25 ENCOUNTER — OFFICE VISIT (OUTPATIENT)
Dept: FAMILY MEDICINE | Facility: CLINIC | Age: 69
End: 2019-07-25
Payer: MEDICARE

## 2019-07-25 VITALS
HEIGHT: 64 IN | BODY MASS INDEX: 31.81 KG/M2 | WEIGHT: 186.31 LBS | RESPIRATION RATE: 18 BRPM | OXYGEN SATURATION: 98 % | HEART RATE: 62 BPM | TEMPERATURE: 98 F | DIASTOLIC BLOOD PRESSURE: 90 MMHG | SYSTOLIC BLOOD PRESSURE: 140 MMHG

## 2019-07-25 DIAGNOSIS — F43.9 SITUATIONAL STRESS: ICD-10-CM

## 2019-07-25 DIAGNOSIS — I49.1 PAC (PREMATURE ATRIAL CONTRACTION): ICD-10-CM

## 2019-07-25 DIAGNOSIS — F51.02 ADJUSTMENT INSOMNIA: ICD-10-CM

## 2019-07-25 DIAGNOSIS — R00.2 PALPITATIONS: Primary | ICD-10-CM

## 2019-07-25 PROCEDURE — 99999 PR PBB SHADOW E&M-EST. PATIENT-LVL V: ICD-10-PCS | Mod: PBBFAC,,, | Performed by: FAMILY MEDICINE

## 2019-07-25 PROCEDURE — 99215 OFFICE O/P EST HI 40 MIN: CPT | Mod: PBBFAC,PO | Performed by: FAMILY MEDICINE

## 2019-07-25 PROCEDURE — 99999 PR PBB SHADOW E&M-EST. PATIENT-LVL V: CPT | Mod: PBBFAC,,, | Performed by: FAMILY MEDICINE

## 2019-07-25 PROCEDURE — 99214 PR OFFICE/OUTPT VISIT, EST, LEVL IV, 30-39 MIN: ICD-10-PCS | Mod: S$PBB,,, | Performed by: FAMILY MEDICINE

## 2019-07-25 PROCEDURE — 93010 ELECTROCARDIOGRAM REPORT: CPT | Mod: S$PBB,,, | Performed by: INTERNAL MEDICINE

## 2019-07-25 PROCEDURE — 93005 ELECTROCARDIOGRAM TRACING: CPT | Mod: PBBFAC,PO | Performed by: INTERNAL MEDICINE

## 2019-07-25 PROCEDURE — 99214 OFFICE O/P EST MOD 30 MIN: CPT | Mod: S$PBB,,, | Performed by: FAMILY MEDICINE

## 2019-07-25 PROCEDURE — 93010 EKG 12-LEAD: ICD-10-PCS | Mod: S$PBB,,, | Performed by: INTERNAL MEDICINE

## 2019-07-25 NOTE — PROGRESS NOTES
CHIEF COMPLAINT:  This is a 68-year-old female complaining of fluttering in chest.    SUBJECTIVE:  The patient complains of a 1-2 day history of fluttering in her chest that starts in substernal area and radiates into her throat.  The sensation comes and goes within a few seconds and causes the patient to feel anxious.  She has shortness of breath associated with a fluttering but denies chest pain, lightheadedness, syncope or lower extremity edema. Patient reports that fluttering is also associated with accumulation of thick phlegm in back of throat.  She complains of slightly stuffy nose but denies sore throat ear pain, fever or chills.  She denies heartburn.  She had negative cardiac workup including echocardiogram and nuclear stress test in May 2019.  Patient reports decreased sleep and increased situational stress recently.  She moved her granddaughter and great grandchild into her home.  She is having trouble sleeping because of sounds in her home which are different than usual since her granddaughter stays up late.  She is used to living alone.    Patient has type 2 diabetes which is controlled on diet alone.  Last A1c was 6.7% 3  month ago.  She denies polyuria, polydipsia or polyphagia.  The patient monitors her blood sugar daily.    ROS:  GENERAL: Patient denies fever, chills, night sweats.  Patient denies weight gain or loss. Patient denies anorexia, fatigue, weakness or swollen glands.  SKIN: Patient denies rash.  HEENT: Patient denies sore throat, ear pain, hearing loss, or runny nose. Patient denies visual disturbance, eye irritation or discharge. Positive for postnasal drip and stuffy nose.  LUNGS: Patient denies cough, wheeze or hemoptysis.  CARDIOVASCULAR: Patient denies chest pain, shortness of breath, syncope or lower extremity edema. Positive for fluttering in her chest.  GI: Patient denies abdominal pain, nausea, vomiting, diarrhea, constipation, blood in stool or melena.  GENITOURINARY:  Patient  denies dysuria, frequency, hematuria, nocturia, urgency or incontinence.  MUSCULOSKELETAL: Patient denies joint pain, swelling, redness or warmth.  NEUROLOGIC: Patient denies headache, vertigo, paresthesias, weakness in limb, dysarthria, dysphagia or abnormality of gait.  PSYCHIATRIC: Patient denies depression, anxiety or memory loss.    OBJECTIVE:   GENERAL: Well-developed well-nourished pleasant overweight black female alert and oriented x3 in no acute distress.  Memory, judgment and cognition without deficit.  No audible wheezing.  Hoarseness.  SKIN: Clear without rash.  Normal color and tone.  HEENT: Eyes: Clear conjunctivae.  No scleral icterus.  Ears: Clear canals.  Clear TMs.  Nose: Without congestion.  Pharynx: Without injection or exudates.  NECK: Supple, normal range of motion.  No lymphadenopathy.  No masses or enlarged thyroid.  No JVD.  Carotids 2+ and equal.  No bruits.  LUNGS: Clear to auscultation.  Normal respiratory effort.  CARDIOVASCULAR: Regular rhythm, with occasional irregular beat, normal S1, S2 without murmur, gallop or rub.  BACK: No CVA or spinal tenderness.  ABDOMEN:  Soft, nontender without mass or organomegaly.  No rebound or guarding.  EXTREMITIES: Without cyanosis, clubbing or edema.  Distal pulses 2+ and equal.  Normal range of motion in all extremities.  No joint effusion, erythema or warmth.  NEUROLOGIC:  Gait without abnormality.  No tremor.      EKG:  Sinus rhythm with premature atrial complexes.    ASSESSMENT:  1. Palpitations    2. PAC (premature atrial contraction)    3. Situational stress    4. Adjustment insomnia      PLAN:   1.  Discussed importance of sleep, at least 7-8 hours.  2.  Avoid caffeine and decongestants.  3.  Discuss situation with granddaughter and request decrease of noise in household after patient retires to bed.  4.  Consider Holter monitor if no improvement or worsening symptoms.    This note is generated with speech recognition software and is subject to  transcription error and sound alike phrases that may be missed by proofreading.

## 2019-07-26 NOTE — PATIENT INSTRUCTIONS
Established High Blood Pressure    High blood pressure (hypertension) is a chronic disease. Often, healthcare providers dont know what causes it. But it can be caused by certain health conditions and medicines.  If you have high blood pressure, you may not have any symptoms. If you do have symptoms, they may include headache, dizziness, changes in your vision, chest pain, and shortness of breath. But even without symptoms, high blood pressure thats not treated raises your risk for heart attack and stroke. High blood pressure is a serious health risk and shouldnt be ignored.  A blood pressure reading is made up of two numbers: a higher number over a lower number. The top number is the systolic pressure. The bottom number is the diastolic pressure. A normal blood pressure is a systolic pressure of  less than 120 over a diastolic pressure of less than 80. You will see your blood pressure readings written together. For example, a person with a systolic pressure of 188 and a diastolic pressure of 78 will have 118/78 written in the medical record.  High blood pressure is when either the top number is 140 or higher, or the bottom number is 90 or higher. This must be the result when taking your blood pressure a number of times. The blood pressures between normal and high are called prehypertension.  Home care  If you have high blood pressure, you should do what is listed below to lower your blood pressure. If you are taking medicines for high blood pressure, these methods may reduce or end your need for medicines in the future.  · Begin a weight-loss program if you are overweight.  · Cut back on how much salt you get in your diet. Heres how to do this:  ¨ Dont eat foods that have a lot of salt. These include olives, pickles, smoked meats, and salted potato chips.  ¨ Dont add salt to your food at the table.  ¨ Use only small amounts of salt when cooking.  · Start an exercise program. Talk with your healthcare  provider about the type of exercise program that would be best for you. It doesn't have to be hard. Even brisk walking for 20 minutes 3 times a week is a good form of exercise.  · Dont take medicines that stimulate the heart. This includes many over-the-counter cold and sinus decongestant pills and sprays, as well as diet pills. Check the warnings about hypertension on the label. Before buying any over-the-counter medicines or supplements, always ask the pharmacist about the product's potential interaction with your high blood pressure and your high blood pressure medicines.  · Stimulants such as amphetamine or cocaine could be deadly for someone with high blood pressure. Never take these.  · Limit how much caffeine you get in your diet. Switch to caffeine-free products.  · Stop smoking. If you are a long-time smoker, this can be hard. Talk to your healthcare provider about medicines and nicotine replacement options to help you. Also, enroll in a stop-smoking program to make it more likely that you will quit for good.  · Learn how to handle stress. This is an important part of any program to lower blood pressure. Learn about relaxation methods like meditation, yoga, or biofeedback.  · If your provider prescribed medicines, take them exactly as directed. Missing doses may cause your blood pressure get out of control.  · If you miss a dose or doses, check with your healthcare provider or pharmacist about what to do.  · Consider buying an automatic blood pressure machine. Ask your provider for a recommendation. You can get one of these at most pharmacies.     The American Heart Association recommends the following guidelines for home blood pressure monitoring:  · Don't smoke or drink coffee for 30 minutes before taking your blood pressure.  · Go to the bathroom before the test.  · Relax for 5 minutes before taking the measurement.  · Sit with your back supported (don't sit on a couch or soft chair); keep your feet on  the floor uncrossed. Place your arm on a solid flat surface (like a table) with the upper part of the arm at heart level. Place the middle of the cuff directly above the eye of the elbow. Check the monitor's instruction manual for an illustration.  · Take multiple readings. When you measure, take 2 to 3 readings one minute apart and record all of the results.  · Take your blood pressure at the same time every day, or as your healthcare provider recommends.  · Record the date, time, and blood pressure reading.  · Take the record with you to your next medical appointment. If your blood pressure monitor has a built-in memory, simply take the monitor with you to your next appointment.  · Call your provider if you have several high readings. Don't be frightened by a single high blood pressure reading, but if you get several high readings, check in with your healthcare provider.  · Note: When blood pressure reaches a systolic (top number) of 180 or higher OR diastolic (bottom number) of 110 or higher, seek emergency medical treatment.  Follow-up care  You will need to see your healthcare provider regularly. This is to check your blood pressure and to make changes to your medicines. Make a follow-up appointment as directed. Bring the record of your home blood pressure readings to the appointment.  When to seek medical advice  Call your healthcare provider right away if any of these occur:  · Blood pressure reaches a systolic (upper number) of 180 or higher OR a diastolic (bottom number) of 110 or higher  · Chest pain or shortness of breath  · Severe headache  · Throbbing or rushing sound in the ears  · Nosebleed  · Sudden severe pain in your belly (abdomen)  · Extreme drowsiness, confusion, or fainting  · Dizziness or spinning sensation (vertigo)  · Weakness of an arm or leg or one side of the face  · You have problems speaking or seeing   Date Last Reviewed: 12/1/2016  © 4963-1068 [email or web address]. [address] [place] [address]  [place] [address]. All rights reserved. This information is not intended as a substitute for professional medical care. Always follow your healthcare professional's instructions.        Diet: Diabetes  Food is an important tool that you can use to control diabetes and stay healthy. Eating well-balanced meals in the correct amounts will help you control your blood glucose levels and prevent low blood sugar reactions. It will also help you reduce the health risks of diabetes. There is no one specific diet that is right for everyone with diabetes. But there are general guidelines to follow. A registered dietitian (ARIN) will create a tailored diet approach thats just right for you. He or she will also help you plan healthy meals and snacks. If you have any questions, call your dietitian for advice.     Guidelines for success  Talk with your healthcare provider before starting a diabetes diet or weight loss program. If you haven't talked with a dietitian yet, ask your provider for a referral. The following guidelines can help you succeed:  · Select foods from the 6 food groups below. Your dietitian will help you find food choices within each group. He or she will also show you serving sizes and how many servings you can have at each meal.  ¨ Grains, beans, and starchy vegetables  ¨ Vegetables  ¨ Fruit  ¨ Milk or yogurt  ¨ Meat, poultry, fish, or tofu  ¨ Healthy fats  · Check your blood sugar levels as directed by your provider. Take any medicine as prescribed by your provider.  · Learn to read food labels and pick the right portion sizes.  · Eat only the amount of food in your meal plan. Eat about the same amount of food at regular times each day. Dont skip meals. Eat meals 4 to 5 hours apart, with snacks in between.  · Limit alcohol. It raises blood sugar levels. Drink water or calorie-free diet drinks that use safe sweeteners.  · Eat less fat to help lower your risk of heart disease. Use nonfat or low-fat  dairy products and lean meats. Avoid fried foods. Use cooking oils that are unsaturated, such as olive, canola, or peanut oil.  · Talk with your dietitian about safe sugar substitutes.  · Avoid added salt. It can contribute to high blood pressure, which can cause heart disease. People with diabetes already have a risk of high blood pressure and heart disease.  · Stay at a healthy weight. If you need to lose weight, cut down on your portion sizes. But dont skip meals. Exercise is an important part of any weight management program. Talk with your provider about an exercise program thats right for you.  · For more information about the best diet plan for you, talk with a registered dietitian (RD). To find an RD in your area, contact:  ¨ Academy of Nutrition and Dietetics www.eatright.org  ¨ The American Diabetes Association [phone] www.diabetes.org  Date Last Reviewed: 8/1/2016  © 3740-3333 [employer]. [address] [place] [address], [place] [address]. All rights reserved. This information is not intended as a substitute for professional medical care. Always follow your healthcare professional's instructions.        Eating Out When You Have Diabetes  Eating right is an important part of keeping your blood sugar in your target range. You just need to make healthy choices.    Tips for restaurant meals  When you eat away from home try these tips:  · Try to schedule your dining-out meal at your normal meal time. Make a reservation if possible, so you don't have to wait to eat. If you can't make a reservation, try to arrive at the restaurant at a less-busy time to cut down your wait time. Eat a small fruit or starch snack at your regular mealtime if your restaurant meal is going to be later than usual.   · Call ahead to see if the restaurant can meet your dietary needs if you've never been there before. Or you can go online to see the menu ahead of time.  · Carry some crackers with you in case the restaurant  needs you to wait until you can be served.  · Ask how foods are prepared before you order.  · Instead of fried, sautéed, or breaded foods, choose ones that are broiled, steamed, grilled, or baked.  · Ask for sauces, gravies, and dressings on the side.  · Only eat an amount that fits your meal plan. Remember: You can take home the leftovers.  · Save dessert for special occasions. Then choose a small dessert or share one with a friend or family member.  Make healthy choices  Fast food  · Garden salad with light dressing on the side  · Baked potato with vegetables or herbs  · Broiled, roasted, or grilled chicken sandwich  · Sliced turkey or lean roast beef sandwich  Mexican  · Chicken enchilada, without cheese or sour cream   · Small burrito with whole beans and chicken  · Whole beans (not refried) and rice  · Chicken or fish fajitas  Steakhouse  · Grilled or broiled lean cuts of beef  · Baked potato with vegetables or herbs  · Broiled or baked chicken. Dont eat the skin.  · Steamed vegetables  Asian  · Steamed dumplings or potstickers  · Broiled, boiled, or steamed meats or fish  · Sushi or sashimi  · Steamed rice or boiled noodles. One serving is equal to 1/3 cup.  Date Last Reviewed: 6/1/2016  © 5223-7090 [employer]. [address] [place] [address]. All rights reserved. This information is not intended as a substitute for professional medical care. Always follow your healthcare professional's instructions.        Healthy Meals for Diabetes     A healthcare provider will help you develop a meal plan that fits your needs.   Ask your healthcare team to help you make a meal plan that fits your needs. Your meal plan tells you when to eat your meals and snacks, what kinds of foods to eat, and how much of each food to eat. You dont have to give up all the foods you like. But you do need to follow some guidelines.  Choose healthy carbohydrates  Starches, sugars, and fiber are all types of  carbohydrates. Fiber can help lower your cholesterol and triglycerides. Fiber is also healthy for your heart. You should have 20 to 35 grams of total fiber each day. Fiber-rich foods include:  · Whole-grain breads and cereals  · Bulgur wheat  · Brown rice     · Whole-wheat pasta  · Fruits and vegetables  · Dry beans, and peas   Keep track of the amount of carbohydrates you eat. This can help you keep the right balance of physical activity and medicine. The amount of carbohydrates needed will vary for each person. It depends on many things such as your health, the medicines you take, and how active you are. Your healthcare team will help you figure out the right amount of carbohydrates for you. You may start with around 45 to 60 grams of carbohydrates per meal, depending on your situation.   Here are some examples of foods containing about 15 grams of carbohydrates (1 serving of carbohydrates):  · 1/2 cup of canned or frozen fruit  · A small piece of fresh fruit (4 ounces)  · 1 slice of bread  · 1/2 cup of oatmeal  · 1/3 cup of rice  · 4 to 6 crackers  · 1/2 English muffin  · 1/2 cup of black beans  · 1/4 of a large baked potato (3 ounces)  · 2/3 cup of plain fat-free yogurt  · 1 cup of soup  · 1/2 cup of casserole  · 6 chicken nuggets  · 2-inch-square brownie or cake without frosting  · 2 small cookies  · 1/2 cup of ice cream or sherbet  Choose healthy protein foods  Eating protein that is low in fat can help you control your weight. It also helps keep your heart healthy. Low-fat protein foods include:  · Fish  · Plant proteins, such as dry beans and peas, nuts, and soy products like tofu and soymilk  · Lean meat with all visible fat removed  · Poultry with the skin removed  · Low-fat or nonfat milk, cheese, and yogurt  Limit unhealthy fats and sugar  Saturated and trans fats are unhealthy for your heart. They raise LDL (bad) cholesterol. Fat is also high in calories, so it can make you gain weight. To cut down on  unhealthy fats and sugar, limit these foods:  · Butter or margarine  · Palm and palm kernel oils and coconut oil  · Cream  · Cheese  · Membreno  · Lunch meats     · Ice cream  · Sweet bakery goods such as pies, muffins, and donuts  · Jams and jellies  · Candy bars  · Regular sodas   How much to eat  The amount of food you eat affects your blood sugar. It also affects your weight. Your healthcare team will tell you how much of each type of food you should eat.  · Use measuring cups and spoons and a food scale to measure serving sizes.  · Learn what a correct serving size looks like on your plate. This will help when you are away from home and cant measure your servings.  · Eat only the number of servings given on your meal plan for each food. Dont take seconds.  · Learn to read food labels. Be sure to look at serving size, total carbohydrates, fiber, calories, sugar, and saturated and trans fats. Look for healthier alternatives to foods that have added sugar.  · Plan ahead for parties so you can still have a good time without going overboard with unhealthy food choices. Set a good example yourself by bringing a healthy dish to pot lucks.   Choose healthy snacks  When it comes to snacks, we usually think about foods with added sugar and fats. But there are many other options for healthier snack choices. Here are a few snack ideas to choose from:  Snacks with less than 5 grams of carbohydrates  · 1 piece of string cheese  · 3 celery sticks plus 1 tablespoon of peanut butter  · 5 cherry tomatoes plus 1 tablespoon of ranch dressing  · 1 hard-boiled egg  · 1/4 cup of fresh blueberries  ·  5 baby carrots  · 1 cup of light popcorn  · 1/2 cup of sugar-free gelatin  · 15 almonds  Snacks with about 10 to 20 grams of carbohydrates  · 1/3 cup of hummus plus 1 cup of fresh cut nonstarchy vegetables (carrots, green peppers, broccoli, celery, or a combination)  · 1/2 cup of fresh or canned fruit plus 1/4 cup of cottage cheese  · 1/2  cup of tuna salad with 4 crackers  · 2 rice cakes and a tablespoon of peanut butter  · 1 small apple or orange  · 3 cups light popcorn  · 1/2 of a turkey sandwich (1 slice of whole-wheat bread, 2 ounces of turkey, and mustard)  Portion sizes are important to controlling your blood sugar and staying at a healthy weight. Stock up on healthy snack items so you always have them on hand.  When to eat  Your meal plan will likely include breakfast, lunch, dinner, and some snacks.  · Try to eat your meals and snacks at about the same times each day.  · Eat all your meals and snacks. Skipping a meal or snack can make your blood sugar drop too low. It can also cause you to eat too much at the next meal or snack. Then your blood sugar could get too high.  Date Last Reviewed: 7/1/2016  © 5098-8393 [employer]. [address] [place] [address]. All rights reserved. This information is not intended as a substitute for professional medical care. Always follow your healthcare professional's instructions.        Diabetes: Meal Planning    You can help keep your blood sugar level in your target range by eating healthy foods. Your healthcare team can help you create a low-fat, nutritious meal plan. Take an active role in your diabetes management by following your meal plan and working with your healthcare team.  Make your meal plan  A meal plan gives guidelines for the types and amounts of food you should eat. The goal is to balance food and insulin (or other diabetes medications) so your blood sugars will be in your target range. Your dietitian will help you make a flexible meal plan that includes many foods that you like.  Watch serving sizes  Your meal plan will group foods by servings. To learn how much a serving is, start by measuring food portions at each meal. Soon youll know what a serving looks like on your plate. Ask your healthcare provider about how to balance servings of different foods.  Eat from  all the food groups  The basis of a healthy meal plan is variety (eating lots of different foods). Choose lean meats, fresh fruits and vegetables, whole grains, and low-fat or nonfat dairy products. Eating a wide variety of foods provides the nutrients your body needs. It can also keep you from getting bored with your meal plan.  Learn about carbohydrates, fats, and protein  · Carbohydrates are starches, sugars, and fiber. They are found in many foods, including fruit, bread, pasta, milk, and sweets. Of all the foods you eat, carbohydrates have the most effect on your blood sugar. Your dietitian may teach you about carb counting, a way to figure out the number of carbohydrates in a meal.  · Fats have the most calories. They also have the most effect on your weight and your risk of heart disease. When you have diabetes, its important to control your weight and protect your heart. Foods that are high in fat include whole milk, cheese, snack foods, and desserts.  · Protein is important for building and repairing muscles and bones. Choose low-fat protein sources, such as fish, egg whites, and skinless chicken.  Reduce liquid sugars  Extra calories from sodas, sports drinks, and fruit drinks make it hard to keep blood sugar in range. Cut as many liquid sugars from your meal plan as you can.  This includes most fruit juices, which are often high in natural or added sugar. Instead, drink plenty of water and other sugar-free beverages.  Eat less fat  If you need to lose weight, try to reduce the amount of fat in your diet. This can also help lower your cholesterol level to keep blood vessels healthier. Cut fat by using only small amounts of liquid oil for cooking. Read food labels carefully to avoid foods with unhealthy trans fats.  Timing your meals  When it comes to blood sugar control, when you eat is as important as what you eat. You may need to eat several small meals spaced evenly throughout the day to stay in your  target range. So dont skip breakfast or wait until late in the day to get most of your calories. Doing so can cause your blood sugar to rise too high or fall too low.   Date Last Reviewed: 3/1/2016  © 2450-7663 [employer]. [address] [place] [address], [place] [address]. All rights reserved. This information is not intended as a substitute for professional medical care. Always follow your healthcare professional's instructions.        Diabetes: Learning About Serving and Portion Sizes     A good rule of thumb: Devote half your plate to vegetables and green salad. Split the other half between protein and starchy carbohydrates. Fruit makes a good dessert.     Servings and portions. Whats the difference? These terms can be very confusing. But learning to measure serving sizes can help you figure out how many carbohydrates (carbs) and other foods you eat each day. They are also powerful tools for managing your weight.  Servings and portions  Many different words are used to describe amounts of food. If your health care provider uses a term youre not sure of, dont be afraid to ask. It helps to know the difference between servings and portions:  · A serving size is a fixed size. Food producers use this term to describe their products. For example, the label on a cereal box could say that 1 cup of dry cereal = 1 serving.  · A portion (also called a helping) is how much you eat or how much you put on your plate at a meal. For example, you might eat 2 cups of cereal at breakfast.  Using serving information  The portion you choose to eat (such as 2 cups of cereal) may be more than one serving as listed on the food label (such as 1 cup of cereal). Thats why it helps to measure or weigh the food you eat. Because the food label values are based on servings, youll need to know how many servings you eat at one sitting.     Ounces: 2 to 3 ounces is about the size of your palm.       1 Cup: 1 cup (or a  medium-sized piece) is about the size of your fist.       1/2 Cup: 1/2 cup is about the size of your cupped hand.      One tablespoon is about the size of your thumb.  One teaspoon is about the size of the tip of your thumb.  Keeping track of serving sizes  When youre planning for a snack or a meal, keep servings in mind. If you dont have measuring cups or a scale handy, there are ways to eyeball serving sizes, such as comparing your food to the size of your hand (see pictures above).  Managing portion sizes  If your weight is a concern, reducing your portions can help. You can eat more than one serving of a food at once. But to keep from eating too much at one meal, learn how to manage your portions. A portion is the amount of each type of food on your plate. See the plate diagram for an example of balanced portions.  Date Last Reviewed: 3/1/2016  © 6342-1982 [employer]. [address] [place] [address]. All rights reserved. This information is not intended as a substitute for professional medical care. Always follow your healthcare professional's instructions.        Understanding Carbohydrates    A car needs the right type of fuel to run. And you need the right kind of food to function. To keep your energy level up, your body needs food that has carbohydrates. But carbohydrates raise blood sugar levels higher and faster than other kinds of food. Your dietitian will work with you to figure out the amount of carbohydrates you need.  Starches  Starches are found in grains, some vegetables, and beans. Grain products include bread, pasta, cereal, and tortillas. Starchy vegetables include potatoes, peas, corn, lima beans, yams, and squash. Kidney beans, vela beans, black beans, garbanzo beans, and lentils also contain starches.  Sugars  Sugars are found naturally in many foods. Or sugar can be added. Foods that contain natural sugar include fruits and fruit juices, dairy products, honey,  and molasses. Added sugars are found in most desserts, processed foods, candy, regular soda, and fruit drinks. These are very helpful for treating low blood sugar, or hypoglycemia. They provide sugar quickly. Try to keep at least 15 to 20 grams of these simple sugars with you at all times. Eat this if you begin to have low blood sugar symptoms.  Fiber  Fiber comes from plant foods. Most fiber isnt digested by the body. Instead of raising blood sugar levels like other carbohydrates, it actually stops blood sugar from rising too fast. Fiber is found in fruits, vegetables, whole grains, beans, peas, and many nuts.  Carb counting  Keep track of the amount of carbohydrates you eat. This can help you keep the right balance of physical activity and medicine. The amount of carbohydrates needed will vary for each person. It depends on many things such as your health, the medicines you take, and how active you are. Your healthcare team will help you figure out the right amount of carbohydrates for you. You may start with around 45 to 60 grams of carbohydrate per meal, depending on your situation. Carb counting is a system that helps you keep track of the carbohydrates you eat at each meal.  Carbohydrates come from a variety of foods. These include grains, starchy vegetables, fruit, milk, beans, and snack foods. You can either count carbohydrate grams or carbohydrate servings. When you count carbohydrate servings, 1 carbohydrate serving = 15 grams of carbohydrates.  Here are some examples of foods containing about 15 grams of carbohydrates (1 serving of carbohydrates):  · 1/2 cup of canned or frozen fruit  · A small piece of fresh fruit (4 ounces)  · 1 slice of bread  · 1/2 cup of oatmeal  · 1/3 cup of rice  · 4 to 6 crackers  · 1/2 English muffin  · 1/2 cup of black beans  · 1/4 of a large baked potato (3 ounces)  · 2/3 cup of plain fat-free yogurt  · 1 cup of soup  · 1/2 cup of casserole  · 6 chicken  nuggets  · 2-inch-square brownie or cake without frosting  · 2 small cookies  · 1/2 cup of ice cream or sherbet  Carb counting is easier when food labels are available. Look at the label to see how many grams of total carbohydrates the food contains. Then you can figure out how much you should eat.  Two very important lines to look at on the label are the serving size and the total carbohydrate amount. Here are some tips for using food labels to count your carbohydrate intake:  · Check the serving size. The information on the label is based on that serving size. If you eat more than the listed serving size, you may have to double or triple the other information on the label.   · Check the total grams of carbohydrates. Total carbohydrate from the label includes sugar, starch, and fiber. Be sure to use the total carbohydrate number and not sugar alone.  · Know how many grams of carbohydrates you can have.  Be familiar with the matching portion sizes.  · Compare labels. Compare the labels of different products, looking at serving sizes and total carbohydrates to find the products that work best for you.   · Don't forget protein and fat. With all the focus on carb counting, it might be easy to forget protein and fat in your meals. Don't forget to include sources of protein and healthy fat to balance your meals.  Its also important to be consistent with the amount and time you eat when taking a fixed dose of diabetes medicine. Work with your healthcare provider or dietitian if you need additional help. He or she can help you keep track of your carbohydrate intake. He or she can also help you figure out how many grams of carbohydrates you should have.  Date Last Reviewed: 7/1/2016 © 2000-2017 [employer]. [address] [place] [address], [place] [address]. All rights reserved. This information is not intended as a substitute for professional medical care. Always follow your healthcare professional's  instructions.        Long-Term Complications of Diabetes    Diabetes can cause health problems over time. These are called complications. They are more likely to happen if your blood sugar is often too high. Over time, high blood sugar can damage blood vessels in your body. It is important to keep your blood sugar in your target range. This can help prevent or delay complications from diabetes.  Possible complications  Complications of diabetes include:  · Eye problems, including damage to the blood vessels in the eyes (retinopathy), pressure in the eye (glaucoma), and clouding of the eyes lens (a cataract). Eye problems can eventually lead to irreversible blindness.   · Tooth and gum problems (periodontal disease), causing loss of teeth and bone  · Blood vessel (vascular) disease leading to circulation problems, heart attack or stroke, or a need for amputation of a limb   · Problems with sexual function leading to erectile dysfunction in men and sexual discomfort in women   · Kidney disease (nephropathy) can eventually lead to kidney failure, which may require dialysis or kidney transplant   · Nerve problems (neuropathy), causing pain or loss of feeling in your feet and other parts of your body, potentially leading to an amputation of a limb   · High blood pressure (hypertension), putting strain on your heart and blood vessels  · Serious infections, possibly leading to loss of toes, feet, or limbs  How to avoid complications  The serious consequences of these complications may be avoidable for most people with diabetes by managing your blood glucose, blood pressure, and cholesterol levels. This can help you feel better and stay healthy. You can manage diabetes by tracking your blood sugar. You can also eat healthy and exercise to avoid gaining weight. And you should take medicine if directed by your healthcare provider.  Date Last Reviewed: 5/1/2016  © 2721-3433 [employer]. [address] [place] [address],  [place] [address]. All rights reserved. This information is not intended as a substitute for professional medical care. Always follow your healthcare professional's instructions.        Healthy Snacking  A common myth about snacking is that its not good for you. This might be true if youre helping yourself to candy, chips, or other junk foods throughout the day. But healthy snacking is possible -- its what you eat and how much you eat that matters.    How to make snacks work for you  The key to healthy snacking is to make smart choices about what youre eating. Snacks should come from one or more of the following food groups: grains, fruits, vegetables, dairy, and protein. They should also be high in nutrients, and low in fat, sugar, and salt. When snacking, its also important to watch how much youre eating. A snack should be treated as a very small meal. The point is to eat just enough to take the edge off your hunger, not to eat until youre full.  Pack snacks ahead of time  The bodys fuel runs out within several hours after eating a meal. If you dont eat, youll feel your energy level drop. You may also notice that youre less focused and alert. Try packing snacks to bring with you to work, school, or wherever your busy schedule takes you. Otherwise, youll end up relying on vending machines or convenience stores. Many of the snack options there are high in fat and low in nutrients.  Pick your snacks wisely  Low-fat choices to choose:  · Whole-wheat bagel with a smear of low-fat cream cheese  · Fat-free or low-fat muffin  · Pretzels  · Rice cakes  · Low-fat granola bars  · Whole-grain crackers and ej crackers  · Low-fat and unsalted microwave popcorn  High-fat choices to avoid:  · Belarusian and donuts  · Snack cakes, cupcakes  · Chips and crackers  · Chocolate bars  · Cream-filled cookies  · Ready-made popcorn  Snacks for anytime, anywhere  In addition to some of the snacks mentioned above, other good  packable snacks include:  · Single-serving boxes of dry and unsweetened cereal  · Fresh fruit (oranges, pears, grapes, and apples all travel well)  · Dried fruit  · Vegetable sticks or baby carrots  · Mini-boxes of raisins  · Unsweetened juice boxes  · Unsalted nuts and seeds  Snack supplies for home and work  · Raw vegetables  · Fat-free or low-fat yogurt  · Unsweetened applesauce or canned fruit  · Low-fat cheese slices or string cheese  · Fat-free or low-fat cottage cheese  · Whole-wheat tortillas  · Hard-boiled eggs  · Peanut butter  · Slices of lean turkey or chicken  Date Last Reviewed: 6/8/2015  © 8675-5794 [employer]. [address] [place] [address], [place] [address]. All rights reserved. This information is not intended as a substitute for professional medical care. Always follow your healthcare professional's instructions.        Eating Heart-Healthy Foods  Eating has a big impact on your heart health. In fact, eating healthier can improve several of your heart risks at once. For instance, it helps you manage weight, cholesterol, and blood pressure. Here are ideas to help you make heart-healthy changes without giving up all the foods and flavors you love.  Getting started  · Talk with your health care provider about eating plans, such as the DASH or Mediterranean diet. You may also be referred to a dietitian.  · Change a few things at a time. Give yourself time to get used to a few eating changes before adding more.  · Work to create a tasty, healthy eating plan that you can stick to for the rest of your life.    Goals for healthy eating  Below are some tips to improve your eating habits:  · Limit saturated fats and trans fats. Saturated fats raise your levels of cholesterol, so keep these fats to a minimum. They are found in foods such as fatty meats, whole milk, cheese, and palm and coconut oils. Avoid trans fats because they lower good cholesterol as well as raise bad cholesterol. Trans fats are  most often found in processed foods.  · Reduce sodium (salt) intake. Eating too much salt may increase your blood pressure. Limit your sodium intake to 2,300 milligrams (mg) per day, or less if your health care provider recommends it. Dining out less often and eating fewer processed foods are two great ways to decrease the amount of salt you consume.  · Managing calories. A calorie is a unit of energy. Your body burns calories for fuel, but if you eat more calories than your body burns, the extras are stored as fat. Your health care provider can help you create a diet plan to manage your calories. This will likely include eating healthier foods as well as exercising regularly. To help you track your progress, keep a diary to record what you eat and how often you exercise.  Choose the right foods  Aim to make these foods staples of your diet. If you have diabetes, you may have different recommendations than what is listed here:  · Fruits and vegetable provide plenty of nutrients without a lot of calories. At meals, fill half your plate with these foods. Split the other half of your plate between whole grains and lean protein.  · Whole grains are high in fiber and rich in vitamins and nutrients. Good choices include whole-wheat bread, pasta, and brown rice.  · Lean proteins give you nutrition with less fat. Good choices include fish, skinless chicken, and beans.  · Low-fat or nonfat dairy provides nutrients without a lot of fat. Try low-fat or nonfat milk, cheese, or yogurt.  · Healthy fats can be good for you in small amounts. These are unsaturated fats, such as olive oil, nuts, and fish. Try to have at least 2 servings per week of fatty fish such as salmon, sardines, mackerel, rainbow trout, and albacore tuna. These contain omega-3 fatty acids, which are good for your heart. Flaxseed is another source of a heart-healthy fat.  More on heart healthy eating    Read food labels  Healthy eating starts at the grocery  store. Be sure to pay attention to food labels on packaged foods. Look for products that are high in fiber and protein, and low in saturated fat, cholesterol, and sodium. Avoid products that contain trans fat. And pay close attention to serving size. For instance, if you plan to eat two servings, double all the numbers on the label.  Prepare food right  A key part of healthy cooking is cutting down on added fat and salt. Look on the internet for lower-fat, lower-sodium recipes. Also, try these tips:  · Remove fat from meat and skin from poultry before cooking.  · Skim fat from the surface of soups and sauces.  · Broil, boil, bake, steam, grill, and microwave food without added fats.  · Choose ingredients that spice up your food without adding calories, fat, or sodium. Try these items: horseradish, hot sauce, lemon, mustard, nonfat salad dressings, and vinegar. For salt-free herbs and spices, try basil, cilantro, cinnamon, pepper, and rosemary.  Date Last Reviewed: 6/25/2015  © 5197-8175 [employer]. [address] [place] [address]. All rights reserved. This information is not intended as a substitute for professional medical care. Always follow your healthcare professional's instructions.        Metabolic Syndrome: Lowering Your Blood Pressure    Metabolic syndrome is a set of five health factors that can lead to serious health problems. The factors greatly increase your risk for diabetes, heart attack, or stroke. High blood pressure (hypertension) is one of the factors of metabolic syndrome.  What is high blood pressure?  High blood pressure is when the force of blood against the inside of your blood vessels is higher than it should be. This makes your heart work harder. And it may damage your blood vessels. High blood pressure means a level of 130 mmHg/85 mm Hg or more.  Your healthcare provider will usually check your blood pressure each time you have an office visit. Having a high reading  at one office visit does not mean that you have high blood pressure. High blood pressure means that your blood pressure is high over a period of time. That is why your healthcare provider checks it at each office visit. He or she can then look at the pattern of your blood pressure. Try to arrive early enough to your appointment so you can rest before your blood pressure is taken. Avoid caffeine and smoking before your pressure is measured.   Making lifestyle changes  Lifestyle changes can help lower your blood pressure. These changes can include:  · Losing weight. Even a small amount of weight loss can lower your blood pressure. As you slowly lose weight, you may see your blood pressure gradually get lower.  · Quitting smoking. The nicotine in tobacco raises blood pressure. Smoking also increases the risk for other heart and blood vessel diseases, many cancers, and most lung conditions. The first step is to set a quit date. Talk with your healthcare provider about ways to quit smoking. There are programs and medicines that can help.  · Eating healthy. Eat plenty of fruits and vegetables. Eat fat-free or low-fat dairy foods, and drink less alcohol.  · Eating less salt (sodium). Salt can raise blood pressure. Try salt-free seasoning, or herbs and spices. Choose low-salt or no-salt snacks, deli meats, and canned foods.  · Being more physically active. Physical activity can help lower blood pressure. Get at least 30 minutes of activity on most days. If you are not very active, talk with your healthcare provider before you get started. He or she can help you figure out what is best for you.  · Managing stress. Stress can raise blood pressure. Talk with your healthcare provider about lowering your stress level. He or she may advise relaxation methods, a counselor, health , or class.  Taking medicine  Your healthcare provider may also prescribe medicine to help lower your blood pressure. The medicine will help lessen  the strain on your heart and help to protect your blood vessels. If you lose weight, you may be able to take less medicine. Or you may no longer need to take it.  Date Last Reviewed: 7/1/2016  © 7983-1371 [employer]. [address] [place] [address], [place] [address]. All rights reserved. This information is not intended as a substitute for professional medical care. Always follow your healthcare professional's instructions.        4 Steps for Eating Healthier  Changing the way you eat can improve your health. It can lower your cholesterol and blood pressure, and help you stay at a healthy weight. Your diet doesnt have to be bland and boring to be healthy. Just watch your calories and follow these steps:    1. Eat fewer unhealthy fats  · Choose more fish and lean meats instead of fatty cuts of meat.  · Skip butter and lard, and use less margarine.  · Pass on foods that have palm, coconut, or hydrogenated oils.  · Eat fewer high-fat dairy foods like cheese, ice cream, and whole milk.  · Get a heart-healthy cookbook and try some low-fat recipes.  2. Go light on salt  · Keep the saltshaker off the table.  · Limit high-salt ingredients, such as soy sauce, bouillon, and garlic salt.  · Instead of adding salt when cooking, season your food with herbs and flavorings. Try lemon, garlic, and onion.  · Limit convenience foods, such as boxed or canned foods and restaurant food.  · Read food labels and choose lower-sodium options.  3. Limit sugar  · Pause before you add sugars to pancakes, cereal, coffee, or tea. This includes white and brown table sugar, syrup, honey, and molasses. Cut your usual amount by half.  · Use non-sugar sweeteners. Stevia, aspartame, and sucralose can satisfy a sweet tooth without adding calories.  · Swap out sugar-filled soda and other drinks. Buy sugar-free or low-calorie beverages. Remember water is always the best choice.  · Read labels and choose foods with less added sugar. Keep in mind  "that dairy foods and foods with fruit will have some natural sugar.  · Cut the sugar in recipes by 1/3 to 1/2. Boost the flavor with extracts like almond, vanilla, or orange. Or add spices such as cinnamon or nutmeg.  4. Eat more fiber  · Eat fresh fruits and vegetables every day.  · Boost your diet with whole grains. Go for oats, whole-grain rice, and bran.  · Add beans and lentils to your meals.  · Drink more water to match your fiber increase. This is to help prevent constipation.  Date Last Reviewed: 5/11/2015  © 4612-6461 [employer]. [address] [place] [address], [place] [address]. All rights reserved. This information is not intended as a substitute for professional medical care. Always follow your healthcare professional's instructions.        Eating Healthy on the Run     Many grocery stores stock pre-made salads for eating on the run.     Need to eat on the run? This often means grabbing "junk" or fast food full of fat, salt, sugar, and cholesterol. But being in a rush doesn't mean that you can't eat healthy.  "Fast" food made healthy  Try these ways to get good nutrition, fast.  · Go to a grocery or convenience market instead of a fast-food restaurant. Look for choices like sandwiches, yogurt, fresh fruit, and juices.  · Buy precut, prepackaged fresh or frozen fruits and vegetables. You can open the package for a snack, salad, smoothie, or stir-hobson.  · Microwave a frozen dinner that has less than 15 grams (g) of fat and less than 800 milligrams (mg) of sodium. Complete the meal with a wholegrain roll, vegetables, and fresh fruit.  · If you must have fast food, consider your options. Go for veggie burgers, broiled and skinless chicken breast sandwiches, or dinner salads with low-fat dressing. Avoid large (super-sized) portions.  · Blot the extra oil from food with a napkin before you eat it.  · Instead of french fries, choose a baked potato with salsa.  Tip  Fast-food restaurants often have printed " nutrition information available. Ask for this information and look up your favorite items before you order.   Date Last Reviewed: 6/2/2015  © 7454-1388 [employer]. [address] [place] [address], [place] [address]. All rights reserved. This information is not intended as a substitute for professional medical care. Always follow your healthcare professional's instructions.        Healthier Fast Food Choices  These days its hard to go anywhere without spotting a fast food restaurant nearby. These restaurants offer simple and inexpensive meal options when you cant cook or eat at home. But there are some drawbacks to this convenience. Studies show that the more often you eat out and choose fast foods, the more likely you are to gain weight or become obese. This can lead to increased health risks over time. Does this mean you have to give up eating fast food? No. But you do need to start making healthier fast food choices. This includes choosing more nutrition-packed items and eating smaller portion sizes.  Fast food trade-offs  To begin with, dont get into a rut when you order. Choose healthier options over less healthy foods. Try the following trade-offs.  Instead of ordering a: Choose one of these:   Hamburger Grilled chicken or turkey sandwich   Side of fries Baked potato, cup of soup, or small salad with dressing on the side   Soda or milkshake Carton of fat-free or low-fat milk, water, unsweetened tea, sparkling water, or other drinks with no added sugars   Burrito or taco A lean-meat or all-vegetable sandwich wrap      Tips for Healthier Eating at Fast Food Restaurants  Fast food isn't limited to just whats on the menu. When you place an order, ask if you can make special requests. Most places will be glad to work with you.  · Choose the medium- or small-sized options when you order main dishes, sides, and beverages. If an adult meal is too large for you, try a kids' meal. Or split a meal with a  friend.  · Order your burger on whole-wheat buns or your sandwich on whole-wheat bread, if possible.  · Dont add salt to your meal. Fast foods tend to be higher in salt. Too much salt raises your risk for high blood pressure and heart disease.  · Avoid mayonnaise, sour cream, or special sauces. Ask for ketchup and mustard on the side.  · Avoid high-fat sides. If you dont want the fries or chips that come with your meal, ask that they not be included. If you can substitute something else, ask for extra lettuce and tomato instead.  · Choose fresh fruit or yogurt for dessert. Avoid ice cream, pies, and pastries. These are more likely to be high in fat and sugar.  · Dont overeat. If youre full, ask for a takeout box and save the leftovers for later.  · Put leftovers in the refrigerator as soon as you can. Any food left at room temperature for over 2 hours should be thrown out.  Date Last Reviewed: 6/8/2015 © 2000-2017 [employer]. [address] [place] [address]. All rights reserved. This information is not intended as a substitute for professional medical care. Always follow your healthcare professional's instructions.        Weight Management: Healthy Eating  Food is your bodys fuel. You cant live without it. The key is to give your body enough nutrients and energy without eating too much. Reading food labels can help you make healthy choices. Also, learn new eating habits to manage your weight.     All the values on the label are based on one serving. The serving size is the average portion. Remember to multiply the values on the label by the number of servings you eat.   Eat less fat  A gram of fat has almost 2.5 times the calories of a gram of protein or carbohydrates. Try to balance your food choices so that only 20% to 35% of your calories comes from total fat. This means an average of 2½ to 3½ grams of fat for each 100 calories you eat.  Eat more fiber  High-fiber foods are  digested more slowly than low-fiber foods, so you feel full longer. Try to get at least 25 grams of fiber each day for a 2000 calorie diet. Foods high in fiber include:  · Vegetables and fruits  · Whole-grain or bran breads, pastas, and cereals  · Legumes (beans) and peas  As you begin to eat more fiber, be sure to drink plenty of water to keep your digestive system working smoothly.  Tips  Do's and don'ts include:   · Dont skip meals. This often leads to overeating later on. Its best to spread your eating throughout the day.  · Eat a variety of foods, not just a few favorites.  · If you find yourself eating when youre not hungry, ask yourself why. Many of us eat when were bored, stressed, or just to be polite. Listen to your body. If youre not hungry, get busy doing something else instead of eating.  · Eat slower, shooting for 20 to 30 minutes for each meal. It takes 20 minutes for your stomach to tell your brain that its full. Slow eaters tend to eat less and are still satisfied, while fast eaters may tend to be overeaters.   · Pay attention to what you eat. Dont read or watch TV during your meal.  Date Last Reviewed: 1/31/2016  © 3880-1387 [employer]. [address] [place] [address], [place] [address]. All rights reserved. This information is not intended as a substitute for professional medical care. Always follow your healthcare professional's instructions.        Low-Salt Choices  Eating salt (sodium) can make your body retain too much water. Excess water makes your heart work harder. Canned, packaged, and frozen foods are easy to prepare, but they are often high in sodium. Here are some ideas for low-salt foods you can easily prepare yourself.    For breakfast  · Fruit or 100% fruit juice  · Whole-wheat bread or an English muffin. Compare sodium content on labels.  · Low-fat milk or yogurt  · Unsalted eggs  · Shredded wheat  · Corn tortillas  · Unsalted steamed rice  · Regular (not instant) hot  cereal, made without salt  Stay away from:  · Sausage, chang, and ham  · Flour tortillas  · Packaged muffins, pancakes, and biscuits  · Instant hot cereals  · Cottage cheese  For lunch and dinner  · Fresh fish, chicken, turkey, or meat--baked, broiled, or roasted without salt  · Dry beans, cooked without salt  · Tofu, stir-fried without salt  · Unsalted fresh fruit and vegetables, or frozen or canned fruit and vegetables with no added salt  Stay away from:  · Lunch or deli meat that is cured or smoked  · Cheese  · Tomato juice and catsup  · Canned vegetables, soups, and fish not labeled as no-salt-added or reduced sodium  · Packaged gravies and sauces  · Olives, pickles, and relish  · Bottled salad dressings  For snacks and desserts  · Yogurt  · Unsalted, air popped popcorn  · Unsalted nuts or seeds  Stay away from:  · Pies and cakes  · Packaged dessert mixes  · Pizza  · Canned and packaged puddings  · Pretzels, chips, crackers, and nuts--unless the label says unsalted  Date Last Reviewed: 6/17/2015  © 1145-8025 [employer] [address] [place] [address], [place] [address]. All rights reserved. This information is not intended as a substitute for professional medical care. Always follow your healthcare professional's instructions.        Tips for Using Less Salt    Most people with heart problems need to eat less salt (sodium). Reducing the amount of salt you eat may help control your blood pressure. The higher your blood pressure, the greater your risk for heart disease, stroke, blindness, and kidney problems.  At the store  · Make low-salt choices by reading labels carefully. Look for the total amount of sodium per serving.  · Use more fresh food. Buy more fruits and vegetables. Select lean meats, fish, and poultry.  · Use fewer frozen, canned, and packaged foods which often contain a lot of sodium.  · Use plain frozen vegetables without sauces or toppings. These products are often low- or no-sodium.  · Opt  for reduced-sodium or no-salt-added versions of canned vegetables and soups.  In the kitchen  · Don't add salt to food when you're cooking. Season with flavorings such as onion, garlic, pepper, salt-free herbal blends, and lemon or lime juice.  · Use a cookbook containing low-salt recipes. It can give you ideas for tasty meals that are healthy for your heart.  · Sprinkle salt-free herbal blends on vegetables and meat.  · Drain and rinse canned foods, such as canned beans and vegetables, before cooking or eating.  Eating out  · Tell the  you're on a low-salt diet. Ask questions about the menu.  · Order fish, chicken, and meat broiled, baked, poached, or grilled without salt, butter, or breading.  · Use lemon, pepper, and salt-free herb mixes to add flavor.  · Choose plain steamed rice, boiled noodles, and baked or boiled potatoes. Top potatoes with chives and a little sour cream.     Beware! Salt goes by many other names. Limit foods with these words listed as ingredients: salt, sodium, soy sauce, baking soda, baking powder, MSG, monosodium, Na (the chemical symbol for sodium). Some antacids are also high in salt.   Date Last Reviewed: 6/19/2015  © 7907-4979 [employer]. [address] [place] [address]. All rights reserved. This information is not intended as a substitute for professional medical care. Always follow your healthcare professional's instructions.        Low-Fat Cooking Tips  To eat less fat, you may need to learn some new ways to cook. But that doesn't mean you have to eat bland, boring food. And it doesn't mean cooking needs to take any more time. Here are some tips for cooking and seasoning foods with less fat.     Broil fish instead of frying it. And sprinkle on herbs to add flavor.    Try New Cooking Methods  · Broil, roast, bake, steam, or microwave fish, chicken, turkey, and meat.  · Remove skin from chicken and turkey and trim extra fat from meat before  cooking.  · Sprinkle herbs on meat, chicken, and fish, and in soups.  · Cook in broth instead of fat.  · Use nonstick cooking sprays or nonstick pans.  · Steam or microwave vegetables without adding fat. Serve with herbs, lemon juice, vinegar, or fat-free butter-flavored powder.  · To flavor beans and rice, add chopped onions, garlic, and peppers.  · Chill soups and stews. Before reheating and serving, skim off the fat.  · When you add fat, use canola or olive oil instead of butter or lard.  Lighten Up Your Recipes  · In soups and sauces: Replace whole milk or cream with low-fat milk, evaporated fat-free milk, or nonfat dry milk.  · In puddings and other desserts: Replace whole milk or cream with low-fat milk or fat-free condensed milk.  · To make dips and toppings: Use low-fat or nonfat cottage cheese or sour cream.  · To make salad dressings: Use nonfat yogurt or low-fat buttermilk.  · In place of 1 whole egg in recipes: Use 2 egg whites or 1/4 cup egg substitute.  · In place of regular cheese: Use fat-free or reduced-fat cheese.  Date Last Reviewed: 5/11/2015  © 8852-3139 [employer]. [address] [place] [address]. All rights reserved. This information is not intended as a substitute for professional medical care. Always follow your healthcare professional's instructions.        Quick, Healthy Ways to Cook  Here are some tips for quick and nutritious food preparation. These methods will save you time and help to cut down on fat.  Stir-frying  If you dont have a wok, use a cast-iron or non-stick skillet. Most dishes can be cooked with just a tablespoon of oil if you heat the pan first. Buy pre-cut vegetables to reduce preparation time. Try stir-frying sliced lean beef or boneless, skinless chicken and ready-cut broccoli. Add a dash of soy sauce and some slices of fresh nik root.  Microwaving  Microwaves cook very quickly. So most of the nutrients in the foods youre cooking dont have  time to escape. Read the cooking directions carefully. Its easy to overcook foods. Use the microwave to cook baked potatoes or winter squash. You can also reheat leftovers and soup. Fish filets can be microwaved in minutes. Just add seasoning and a dash of milk. Then cover with wax paper and cook.  Crock pots  This handy kitchen appliance cooks food slowly at low temperatures. Set it up in the morning. Dinner will be ready and waiting for you when you get home. Soups, stews, and pot roasts all make great crock pot meals. Be sure to trim fat from meat before cooking. Extra-lean, less marbled cuts of meat become tender and juicy when cooked in a crock pot. Add more flavor by using different types of canned tomatoes, herbs, and spices.  Baking, broiling, and grilling  Bake, broil, or grill foods on a rack to drain fats away during cooking. This is a healthier way to eat. And its delicious as well. [place] meat and vegetables, too. Add bell peppers, tomatoes, onions, and mushrooms to kebobs. Or put vegetables in foil-wrapped packets.  Steaming  Steaming can be done in a microwave or on the stove top. Either way, it keeps in nutrients and flavor without adding fat. Ready-cut broccoli, cauliflower, and baby carrots can go right from the bag to the steamer.  Pressure cooking  By using steam, pressure cookers can cook a pound of potatoes in just 4 minutes. Or a chicken stew in less than half an hour. A pressure cooker can also turn the toughest cut of meat into a tender main course. Dont over-season foods. Pressure cooking uses very little liquid, so flavors are stronger.  Poaching  In poaching, the food is covered with liquid. This could be broth, milk, or wine. The food is then gently simmered until done. Poaching uses less liquid than steaming or boiling. This means that delicate flavors are less diluted. Poaching works well for fish or eggs.  Date Last Reviewed: 6/8/2015  © 4940-9082 [employer] [address]  [place] [address]. All rights reserved. This information is not intended as a substitute for professional medical care. Always follow your healthcare professional's instructions.        Adding Flavor to Low-Fat Meals    There are endless ways to add more variety and flavor to your diet. You dont need salt or high-fat additions.  · Keep plenty of fresh fruit on hand. Try adding it to your main dishes. For example, peaches go well with chicken. They can be very flavorful in casseroles or with roasted poultry. Bananas or raisins add flavor to lyons dishes with a Tarik theme.  · Buy fruits and vegetables you havent tried before. Often, recipes or suggestions for their use will be listed in the produce section at the grocery store. This is often the case with more exotic or rare foods. Perrpers can add sweet or hot characteristics to your dish.  · Go to the cookbook section at the bookstore or library. Many of the unique flavors we associate with Dominican, , or Tarik dishes come from the seasonings used in their recipes. Try one new recipe a week. Youll soon know what spices to add to a dish to give it the taste of exotic places.  · Marinate meats, poultry, and fish before grilling or baking. Try mixtures of wine, fruit juice, low-sodium tomato juice, vinegar, lemon juice, herbs, and spices. Be aware that soy sauce and teriyaki sauce are high in sodium. Use low-sodium versions, and use less of them. Thelma, dry cesario, and sesame seeds can add Asian flavors to foods.  · High-heat cooking. Consider cooking meat, poultry and fish by pan-searing, grilling, or broiling. These methods use high-heat and add flavor.  · Hit the juice. Add citrus juice or peel to help boost flavor.  · [place] it up. Grilling vegetables add a different layer of flavor than other cooking methods.  Date Last Reviewed: 6/8/2015  © 1761-8063 [employer]. [address] [facility], [place] [address]. All  rights reserved. This information is not intended as a substitute for professional medical care. Always follow your healthcare professional's instructions.

## 2019-07-26 NOTE — PROGRESS NOTES
Summary:  Initial and RN Assessments completed with patient on the phone today. Patient has PMH of HTN, HLP, Diabetes Type 2, CKD 3 and Obesity. Patient reports she is totally independent with all of her ADLs and is ambulatory without assistance. Patient denies any falls in the last year. Patient reports she lives in the home she owns with a grandson and his wife. Patient reports she drives her self to her MD appointments and most places she needs to go. Patient reports she goes to the [facility] usually 4 times weekly for lunch and exercises. Patient states she does not have a blood pressure cuff at home but will buy one. We had discussions on that she needs to take her blood pressure reading daily and write it down on a log to share with MD at visits for possible medications adjustments and patient voiced understanding/agreement. We discussed how most people with HTN have no s/s. A few people may have headaches, dizziness, chest tightness, SOB or nosebleeds. Symptoms of Hypotension include fainting, dizziness, blurred vision and nausea and patient voiced understanding. Patient reports she checks her blood sugar one time daily and is averages between . Patient requested educational materials on Diabetic diet. Patient states she has and takes all of her medications as directed. Reminded patient of upcoming appointment with Dr. Rickie Matute on 9/4 at 2 pm and Annual Wellness eval on 10/16 at 0800 and she is aware. Encouraged patient to communicate with physician and call this RN if having any questions/concerns. OPCM will continue to follow. Patient is agreeable to follow up call in 2 weeks in am. Stacey RN OPCM     Interventions:  Mailed educational materials and discussed HTN (logs) and Diabetic/Heart Healthy diet  Medication Reconciliation completed    Plan:  F/u on receipt of educational materials. Continue education on HTN, and diets (Will discuss specific food and cooking techniques)    Todays OPCM  Self-Management Care Plan was developed with the patients/caregivers input and was based on identified barriers from todays assessment.  Goals were written today with the patient/caregiver and the patient has agreed to work towards these goals to improve his/her overall well-being. Patient verbalized understanding of the care plan, goals, and all of today's instructions. Encouraged patient/caregiver to communicate with his/her physician and health care team about health conditions and the treatment plan.  Provided my contact information today and encouraged patient/caregiver to call me with any questions as needed.

## 2019-07-29 RX ORDER — INSULIN PUMP SYRINGE, 3 ML
1 EACH MISCELLANEOUS DAILY
Qty: 1 EACH | Refills: 0 | Status: SHIPPED | OUTPATIENT
Start: 2019-07-29 | End: 2019-08-02 | Stop reason: SDUPTHER

## 2019-07-29 RX ORDER — DEXTROSE 4 G
1 TABLET,CHEWABLE ORAL ONCE
Qty: 1 EACH | Refills: 0 | Status: SHIPPED | OUTPATIENT
Start: 2019-07-29 | End: 2019-08-02 | Stop reason: SDUPTHER

## 2019-07-29 NOTE — TELEPHONE ENCOUNTER
Patient came into [facility] with blood glucose machine Accu Check Irais broken. Patient needs new rx sent to pharmacy thank you.

## 2019-08-01 ENCOUNTER — TELEPHONE (OUTPATIENT)
Dept: FAMILY MEDICINE | Facility: CLINIC | Age: 69
End: 2019-08-01

## 2019-08-01 NOTE — TELEPHONE ENCOUNTER
----- Message from Olivia Kaplan sent at 7/31/2019  4:54 PM CDT -----  Contact: [facility] [phone]  Type:  Pharmacy Calling to Clarify an RX    Name of Caller:Anshu  Pharmacy Name:[facility]  Prescription Name: Accu Check Irais Meter  What do they need to clarify?:   Best Call Back Number:[phone]  Additional Information: states that there is some information missing that is needed for ins. States that she needs diagnosis code and specific directions (how often pt test).

## 2019-08-01 NOTE — TELEPHONE ENCOUNTER
Can you give verbal order or does it need to be on the Rx? Use twice daily prn - dx: E11.9. Thanks

## 2019-08-02 ENCOUNTER — TELEPHONE (OUTPATIENT)
Dept: FAMILY MEDICINE | Facility: CLINIC | Age: 69
End: 2019-08-02

## 2019-08-02 DIAGNOSIS — E11.21 TYPE II DIABETES MELLITUS WITH NEPHROPATHY: Primary | ICD-10-CM

## 2019-08-02 RX ORDER — INSULIN PUMP SYRINGE, 3 ML
1 EACH MISCELLANEOUS DAILY
Qty: 1 EACH | Refills: 0 | Status: SHIPPED | OUTPATIENT
Start: 2019-08-02 | End: 2020-09-08 | Stop reason: SDUPTHER

## 2019-08-02 RX ORDER — DEXTROSE 4 G
1 TABLET,CHEWABLE ORAL ONCE
Qty: 1 EACH | Refills: 0 | Status: SHIPPED | OUTPATIENT
Start: 2019-08-02 | End: 2020-09-08 | Stop reason: SDUPTHER

## 2019-08-02 NOTE — TELEPHONE ENCOUNTER
----- Message from Zoë Guzman MD sent at 7/31/2019  5:57 PM CDT -----  Contact: [facility] [phone]  Please call pharmacy - advise Rxs sent were for meter and solution. Why instruction is needed for use? It's a meter and solution not strips or lancets (ie use twice daily prn)? Advise pharmacist - dx: E11.9 - diabetes. Thanks.  ----- Message -----  From: Audelia Mcgowan MA  Sent: 7/31/2019   5:01 PM  To: Zoë Guzman MD        ----- Message -----  From: Olivia Kaplan  Sent: 7/31/2019   4:54 PM  To: Thomas Monte[facility] Staff    Type:  Pharmacy Calling to Clarify an RX    Name of Caller:Anshu  [facility]  Prescription Name: Accu Check Irais Meter  What do they need to clarify?:   Best Call Back Number:[phone]  Additional Information: states that there is some information missing that is needed for ins. States that she needs diagnosis code and specific directions (how often pt test).

## 2019-08-02 NOTE — TELEPHONE ENCOUNTER
----- Message from Zoë Guzman MD sent at 7/31/2019  5:57 PM CDT -----  Contact: [facility] [phone]  Please call pharmacy - advise Rxs sent were for meter and solution. Why instruction is needed for use? It's a meter and solution not strips or lancets (ie use twice daily prn)? Advise pharmacist - dx: E11.9 - diabetes. Thanks.  ----- Message -----  From: Audelia Mcgowan MA  Sent: 7/31/2019   5:01 PM  To: Zoë Guzman MD        ----- Message -----  From: Olivia Kaplan  Sent: 7/31/2019   4:54 PM  To: Thomas Montejo Staff    Type:  Pharmacy Calling to Clarify an RX    Name of Caller:Anshu  [facility]  Prescription Name: Accu Check Irais Meter  What do they need to clarify?:   Best Call Back Number:[phone]  Additional Information: states that there is some information missing that is needed for ins. States that she needs diagnosis code and specific directions (how often pt test).

## 2019-08-02 NOTE — TELEPHONE ENCOUNTER
----- Message from Sofia Jaquez sent at 8/2/2019 10:23 AM CDT -----  Contact[facility]  Type:  Pharmacy Calling to Clarify an RX    Name of Caller[facility]  [facility]  Prescription Name:Diabetic Supplies/Escript  What do they need to clarify?:diagnose,how often test/  Best Call Back Number:[phone]  Additional Information: pharmacy need strip sent back over, can not take over phone/can not take it verbally

## 2019-08-08 ENCOUNTER — OUTPATIENT CASE MANAGEMENT (OUTPATIENT)
Dept: ADMINISTRATIVE | Facility: OTHER | Age: 69
End: 2019-08-08

## 2019-08-12 NOTE — PROGRESS NOTES
Summary:  Contacted patient by phone today for follow up visit. Patient reports she has continued blood sugar checks one time daily but her meter broke this past weekend and she is in need of another one. Patient states she requested it through her insurance at [facility] on Saturday and will check to see if they have heard anything today. OPCM advised patient to contact me if she has a problem getting meter and patient voiced agreement. We discussed DM preferred food choices like whole wheat cereals, breads, pasta, brown rice and fresh fruits, vegetables and lean protein choices like chicken, fish, yogurt and beans. Patient states she is taking all of her medications as directed. Patient reports she continues to attend [employer] usually 4 times weekly for lunch and exercises. Patient reports she still has not been able to afford her blood pressure cuff for home use yet but she has plans to get one soon. We continued discussions on that she needs to take her blood pressure reading daily and write it down on a log to share with MD at visits for possible medications adjustments and patient voiced understanding/agreement. We discussed how most people with HTN have no s/s. A few people may have headaches, dizziness, chest tightness, SOB or nosebleeds. Symptoms of Hypotension include fainting, dizziness, blurred vision and nausea and patient voiced understanding. Patient states she did receive educational materials on diet and HTN. Reminded patient of upcoming appointment with Dr. Rickie Matute on 9/4 at 2 pm and Annual Wellness eval on 10/16 at 0800 and she is aware. Encouraged patient to communicate with physician and call this RN if having any questions/concerns. OPCM will continue to follow. Patient is agreeable to follow up call in 2 weeks in am. MAXWELL Ibarra OPCM      Interventions:  Continued discussions on HTN (logs) and Diabetic/Heart Healthy diet     Plan:  Continue education on HTN, and diets (Will discuss specific food and  cooking techniques)      OPCM Self-Management Care Plan reviewed with patient/Caregiver. Goals were reviewed with patient/Caregiver and the patient/Caregiver has agreed to work towards these goals to improve his/her overall well-being. Patient/Caregiver verbalized understanding of the care plan, goals, and all of today's instructions. Encouraged patient/Caregiver to communicate with his/her physician and health care team about health conditions and the treatment plan. Provided my contact information today and encouraged patient/Caregiver to call me with any questions as needed.

## 2019-08-23 NOTE — PATIENT INSTRUCTIONS
Avoid NSAID pain medications such as advil, aleve, motrin, ibuprofen, naprosyn, meloxicam, diclofenac, mobic.           
n/a

## 2019-08-27 ENCOUNTER — OUTPATIENT CASE MANAGEMENT (OUTPATIENT)
Dept: ADMINISTRATIVE | Facility: OTHER | Age: 69
End: 2019-08-27

## 2019-08-27 NOTE — LETTER
September 5, 2019    Kristyn Garza  [address] [place] [address]             [facility] [address] [place] [address] Dear Ms. Garza,              I have been unsuccessful at reaching you to follow-up to see how you have been doing. I work with [facility] Outpatient Case Management Department. Please call me back at your earliest convenience to discuss your health care needs.            I can be reached at [phone] from 8:00AM to 4:30 PM on Monday thru Friday. [facility] is a program offered through [facility] where a nurse is available 24/7 to answer questions or provide medical advice, their number is [phone].    Thanks,    Sari Dugan, MAXWELL  Outpatient Case Management

## 2019-09-04 ENCOUNTER — OFFICE VISIT (OUTPATIENT)
Dept: CARDIOLOGY | Facility: CLINIC | Age: 69
End: 2019-09-04
Payer: MEDICARE

## 2019-09-04 VITALS
SYSTOLIC BLOOD PRESSURE: 120 MMHG | HEART RATE: 81 BPM | HEIGHT: 64 IN | DIASTOLIC BLOOD PRESSURE: 74 MMHG | BODY MASS INDEX: 32.18 KG/M2 | WEIGHT: 188.5 LBS

## 2019-09-04 DIAGNOSIS — I15.2 HYPERTENSION ASSOCIATED WITH DIABETES: ICD-10-CM

## 2019-09-04 DIAGNOSIS — R94.31 ABNORMAL ECG: ICD-10-CM

## 2019-09-04 DIAGNOSIS — Z82.49 FAMILY HISTORY OF CARDIOVASCULAR DISORDER: ICD-10-CM

## 2019-09-04 DIAGNOSIS — E11.59 HYPERTENSION ASSOCIATED WITH DIABETES: ICD-10-CM

## 2019-09-04 DIAGNOSIS — I51.7 LVH (LEFT VENTRICULAR HYPERTROPHY): ICD-10-CM

## 2019-09-04 DIAGNOSIS — N18.30 CKD (CHRONIC KIDNEY DISEASE) STAGE 3, GFR 30-59 ML/MIN: ICD-10-CM

## 2019-09-04 DIAGNOSIS — E78.2 COMBINED HYPERLIPIDEMIA ASSOCIATED WITH TYPE 2 DIABETES MELLITUS: Primary | ICD-10-CM

## 2019-09-04 DIAGNOSIS — Z78.9 STATIN INTOLERANCE: ICD-10-CM

## 2019-09-04 DIAGNOSIS — E66.9 OBESITY (BMI 30.0-34.9): ICD-10-CM

## 2019-09-04 DIAGNOSIS — E11.9 TYPE 2 DIABETES MELLITUS WITHOUT COMPLICATION, WITHOUT LONG-TERM CURRENT USE OF INSULIN: ICD-10-CM

## 2019-09-04 DIAGNOSIS — R06.09 DOE (DYSPNEA ON EXERTION): ICD-10-CM

## 2019-09-04 DIAGNOSIS — R00.2 PALPITATIONS: ICD-10-CM

## 2019-09-04 DIAGNOSIS — E11.69 COMBINED HYPERLIPIDEMIA ASSOCIATED WITH TYPE 2 DIABETES MELLITUS: Primary | ICD-10-CM

## 2019-09-04 DIAGNOSIS — I49.1 PAC (PREMATURE ATRIAL CONTRACTION): ICD-10-CM

## 2019-09-04 PROCEDURE — 99999 PR PBB SHADOW E&M-EST. PATIENT-LVL III: CPT | Mod: PBBFAC,,, | Performed by: INTERNAL MEDICINE

## 2019-09-04 PROCEDURE — 99214 OFFICE O/P EST MOD 30 MIN: CPT | Mod: S$PBB,,, | Performed by: INTERNAL MEDICINE

## 2019-09-04 PROCEDURE — 99999 PR PBB SHADOW E&M-EST. PATIENT-LVL III: ICD-10-PCS | Mod: PBBFAC,,, | Performed by: INTERNAL MEDICINE

## 2019-09-04 PROCEDURE — 99213 OFFICE O/P EST LOW 20 MIN: CPT | Mod: PBBFAC | Performed by: INTERNAL MEDICINE

## 2019-09-04 PROCEDURE — 99214 PR OFFICE/OUTPT VISIT, EST, LEVL IV, 30-39 MIN: ICD-10-PCS | Mod: S$PBB,,, | Performed by: INTERNAL MEDICINE

## 2019-09-04 NOTE — PROGRESS NOTES
Subjective:    Patient ID:  Kristyn Garza is a 68 y.o. female who presents for evaluation of Test Results, Palpitations,  Hypertension; Hyperlipidemia; Shortness of Breath; and Risk Factor Management For Atherosclerosis      HPI pt presents for f/u.  Her current med conditions include DM, PACs, HTN, hyperlipidemia, CRI.  Former smoker (quit 4 years ago).  Statin intolerant.  ecg 2/26/19 NSR, LVH.  Brother has CAD, revascularization.   Now here.  Seen earlier this year, Repatha prescribed.  She has been on for last 2 months.  Due for lipids to be checked.  Has some palpitations with stress, and saw PCP 7/19 and ECG 7/25/19 showed NSR, PACs, LVH.  Stable ZHOU, improved with exercise.  No angina.  BP is stable on current tx.  Lotrel increased last appt.  Weight stable.   Stress MPI 5/19 no ischemia.  Echo 5/19 normal LV function.  AMITA 5/19 normal.   DM HGACI 6.7%  HTN well controlled.  Compliant with meds, no adverse side effects.  CRI stable on last labs.      Current Outpatient Medications:     amlodipine-benazepril (LOTREL) 5-40 mg per capsule, Take 1 capsule by mouth once daily., Disp: 90 capsule, Rfl: 3    aspirin 81 MG Chew, Take 81 mg by mouth once daily., Disp: , Rfl:     azelastine (ASTELIN) 137 mcg (0.1 %) nasal spray, USE 1 SPRAY (137 MCG TOTAL) BY NASAL ROUTE 2 (TWO) TIMES DAILY., Disp: , Rfl: 1    calcium-vitamin D3 500 mg(1,250mg) -200 unit per tablet, Take 1 tablet by mouth once daily. , Disp: , Rfl:     ciclopirox (PENLAC) 8 % Soln, Apply topically nightly., Disp: 6.6 mL, Rfl: 0    colesevelam (WELCHOL) 625 mg tablet, Take 1 tablet (625 mg total) by mouth 2 (two) times daily with meals., Disp: 180 tablet, Rfl: 1    CRANBERRY EXTRACT ORAL, Take 1 tablet by mouth once daily., Disp: , Rfl:     evolocumab (REPATHA SURECLICK) 140 mg/mL PnIj, Inject 1 mL (140 mg total) into the skin every 14 (fourteen) days., Disp: 2 mL, Rfl: 12    FLUZONE HIGH-DOSE 2018-19, PF, 180 mcg/0.5 mL vaccine, TO BE  "ADMINISTERED BY PHARMACIST FOR IMMUNIZATION, Disp: , Rfl: 0    lancets Misc, 1 lancet by Misc.(Non-Drug; Combo Route) route 3 (three) times daily. For Acc-Chek Amber   DX :E11.9, Disp: 100 each, Rfl: 11    loratadine (CLARITIN) 10 mg tablet, Take 1 tablet (10 mg total) by mouth once daily. (Patient taking differently: Take 10 mg by mouth daily as needed. ), Disp: 90 tablet, Rfl: 3    meclizine (ANTIVERT) 25 mg tablet, Take 1 tablet (25 mg total) by mouth 3 (three) times daily as needed., Disp: 20 tablet, Rfl: 0    MULTIVITAMIN W-MINERALS/LUTEIN (CENTRUM SILVER ORAL), Take 1 tablet by mouth once daily., Disp: , Rfl:     spironolactone (ALDACTONE) 50 MG tablet, Take 1 tablet (50 mg total) by mouth once daily., Disp: 90 tablet, Rfl: 3    blood glucose control, normal Soln, 1 Bottle by Misc.(Non-Drug; Combo Route) route once daily. For Accu Check Amber  use twice daily prn dx: E11.9, Disp: 1 each, Rfl: 0    blood sugar diagnostic Strp, 1 strip by Misc.(Non-Drug; Combo Route) route 2 (two) times daily., Disp: 200 each, Rfl: 3    blood-glucose meter (ACCU-CHEK AMBER) Misc, 1 kit by Misc.(Non-Drug; Combo Route) route once. For Accu Check Amber  use twice daily prn dx: E11.9 for 1 dose, Disp: 1 each, Rfl: 0      Review of Systems   Constitution: Negative.   HENT: Negative.    Eyes: Negative.    Cardiovascular: Positive for dyspnea on exertion and palpitations.   Respiratory: Positive for shortness of breath.    Endocrine: Negative.    Hematologic/Lymphatic: Negative.    Skin: Negative.    Musculoskeletal: Negative.    Gastrointestinal: Negative.    Genitourinary: Negative.    Neurological: Negative.    Psychiatric/Behavioral: Negative.    Allergic/Immunologic: Negative.        /74 (BP Location: Right arm, Patient Position: Sitting, BP Method: Medium (Manual))   Pulse 81   Ht 5' 4" (1.626 m)   Wt 85.5 kg (188 lb 7.9 oz)   BMI 32.35 kg/m²     Wt Readings from Last 3 Encounters:   09/04/19 85.5 kg (188 lb 7.9 oz) "   07/25/19 84.5 kg (186 lb 4.6 oz)   05/24/19 86.8 kg (191 lb 5.8 oz)     Temp Readings from Last 3 Encounters:   07/25/19 98.4 °F (36.9 °C) (Oral)   05/13/19 97.8 °F (36.6 °C) (Tympanic)   04/16/19 98 °F (36.7 °C) (Oral)     BP Readings from Last 3 Encounters:   09/04/19 120/74   07/25/19 (!) 140/90   05/24/19 136/77     Pulse Readings from Last 3 Encounters:   09/04/19 81   07/25/19 62   05/24/19 70          Objective:    Physical Exam   Constitutional: She is oriented to person, place, and time. Vital signs are normal. She appears well-developed and well-nourished. She is active and cooperative. She does not have a sickly appearance. She does not appear ill. No distress.   HENT:   Head: Normocephalic.   Neck: Neck supple. Normal carotid pulses, no hepatojugular reflux and no JVD present. Carotid bruit is not present. No thyromegaly present.   Cardiovascular: Normal rate, regular rhythm, S1 normal, S2 normal, normal heart sounds and normal pulses. PMI is not displaced. Exam reveals no gallop and no friction rub.   No murmur heard.  Pulses:       Radial pulses are 2+ on the right side, and 2+ on the left side.   Pulmonary/Chest: Effort normal and breath sounds normal. She has no wheezes. She has no rales.   Abdominal: Soft. Normal appearance, normal aorta and bowel sounds are normal. She exhibits no pulsatile liver, no abdominal bruit, no ascites and no mass. There is no splenomegaly or hepatomegaly. There is no tenderness.   Musculoskeletal: She exhibits no edema.   Lymphadenopathy:     She has no cervical adenopathy.   Neurological: She is alert and oriented to person, place, and time.   Skin: Skin is warm. She is not diaphoretic.   Psychiatric: She has a normal mood and affect. Her behavior is normal.   Nursing note and vitals reviewed.      I have reviewed all pertinent labs and cardiac studies.    Lab Results   Component Value Date    WBC 10.70 04/16/2019    HGB 13.4 04/16/2019    HCT 41.6 04/16/2019    MCV 96  04/16/2019     04/16/2019           Chemistry        Component Value Date/Time     04/16/2019 1147    K 3.6 04/16/2019 1147     04/16/2019 1147    CO2 29 04/16/2019 1147    BUN 15 04/16/2019 1147    CREATININE 1.0 04/16/2019 1147    GLU 91 04/16/2019 1147        Component Value Date/Time    CALCIUM 10.1 04/16/2019 1147    ALKPHOS 117 04/16/2019 1147    AST 25 04/16/2019 1147    ALT 23 04/16/2019 1147    BILITOT 0.5 04/16/2019 1147    ESTGFRAFRICA >60.0 04/16/2019 1147    EGFRNONAA 57.2 (A) 04/16/2019 1147        Lab Results   Component Value Date    WBC 10.70 04/16/2019    HGB 13.4 04/16/2019    HCT 41.6 04/16/2019    MCV 96 04/16/2019     04/16/2019     Lab Results   Component Value Date    HGBA1C 6.7 (H) 04/16/2019     Lab Results   Component Value Date    CHOL 242 (H) 04/16/2019    CHOL 239 (H) 04/13/2018    CHOL 245 (H) 05/01/2017     Lab Results   Component Value Date    HDL 55 04/16/2019    HDL 51 04/13/2018    HDL 48 05/01/2017     Lab Results   Component Value Date    LDLCALC 171.4 (H) 04/16/2019    LDLCALC 170.8 (H) 04/13/2018    LDLCALC 180.6 (H) 05/01/2017     Lab Results   Component Value Date    TRIG 78 04/16/2019    TRIG 86 04/13/2018    TRIG 82 05/01/2017     Lab Results   Component Value Date    CHOLHDL 22.7 04/16/2019    CHOLHDL 21.3 04/13/2018    CHOLHDL 19.6 (L) 05/01/2017     Nuclear Quantitative Functional Analysis:   LVEF: >= 70 %    Impression: NORMAL MYOCARDIAL PERFUSION  1. The perfusion scan is free of evidence for myocardial ischemia or injury.   2. Resting wall motion is physiologic.   3. Resting LV function is normal.   4. The ventricular volumes are normal at rest and stress.   5. The extracardiac distribution of radioactivity is normal.                Narrative     Date of Procedure: 05/27/2019        TEST DESCRIPTION       Aorta: The aortic root is normal in size, measuring 2.3 cm at sinotubular junction and 2.9 cm at Sinuses of Valsalva. The proximal  ascending aorta is normal in size, measuring 3.1 cm across.     Left Atrium: The left atrial volume index is normal, measuring 22.11 cc/m2.     Left Ventricle: The left ventricle is normal in size, with an end-diastolic diameter of 3.7 cm, and an end-systolic diameter of 2.4 cm. LV wall thickness is normal, with the septum and the posterior wall each measuring 1.0 cm across. Relative wall   thickness was increased at 0.54, and the LV mass index was 65.8 g/m2 consistent with concentric remodeling. There are no regional wall motion abnormalities. Left ventricular systolic function appears normal. Visually estimated ejection fraction is   55-60%. The LV Doppler derived stroke volume equals 61.0 ccs.     Diastolic indices: E wave velocity 0.8 m/s, E/A ratio 0.9,  msec., E/e' ratio(avg) 10. Diastolic function is normal.     Right Atrium: The right atrium is normal in size, measuring 3.8 cm in length and 3.4 cm in width in the apical view.     Right Ventricle: The right ventricle is normal in size measuring 2.6 cm at the base in the apical right ventricle-focused view. Global right ventricular systolic function appears normal. Tricuspid annular plane systolic excursion (TAPSE) is 1.9 cm. The   estimated PA systolic pressure is 19 mmHg.     Aortic Valve:  The aortic valve is mildly sclerotic with normal leaflet mobility. The aortic valve is tri-leaflet in structure.     Mitral Valve:  The mitral valve is normal in structure with normal leaflet mobility.     Tricuspid Valve:  The tricuspid valve is normal in structure.     Pulmonary Valve:  The pulmonic valve is normal in structure. There is trivial to mild pulmonic regurgitation.     IVC: IVC is normal in size and collapses > 50% with a sniff, suggesting normal right atrial pressure of 3 mmHg.     Intracavitary: There is no evidence of pericardial effusion, intracavity mass, thrombi, or vegetation.         CONCLUSIONS     1 - No wall motion abnormalities.     2 -  Normal left ventricular systolic function (EF 55-60%).     3 - Normal left ventricular diastolic function.     4 - Normal right ventricular systolic function .     5 - The estimated PA systolic pressure is 19 mmHg.     6 - Trivial to mild pulmonic regurgitation.             This document has been electronically    SIGNED BY: Tye Rosales MD On: 05/27/2019 11:11         Narrative     Date of Procedure: 05/27/2019    PRE-TEST DATA   Resting AMITA:    The right ankle brachial index was 1.11 which is normal.   The left ankle brachial index was 1.06 which is normal.   The right TBI is 0.80.   The left TBI is 0.70.             This document has been electronically    SIGNED BY: Rudi Ortega MD On: 05/27/2019 14:54           Assessment:       1. Combined hyperlipidemia associated with type 2 diabetes mellitus    2. CKD (chronic kidney disease) stage 3, GFR 30-59 ml/min    3. Abnormal ECG    4. ZHOU (dyspnea on exertion)    5. Family history of cardiovascular disorder    6. Hypertension associated with diabetes    7. LVH (left ventricular hypertrophy)    8. Statin intolerance    9. Obesity (BMI 30.0-34.9)    10. PAC (premature atrial contraction)    11. Palpitations         Plan:             Stable CV conditions on current medical tx.  Reviewed all tests and above medical conditions with pt in detail and formulated tx plan.  Continue current medical tx.  Check lipids -- should be much improved on Repatha.  CMP + HGAIC.  Keep DM well controlled.  Cardiac diet.  Weight loss.  Daily exercise.  She will either call and/or see me should any palpitation sxs get worse, but seems resolved for now and will just sit tight and monitor at present time.  Phone review for lab results.    F/u 6 months.

## 2019-09-05 NOTE — PROGRESS NOTES
This patient will be closed by the OPCM RN, as I have been unable to reach patient times 3 unsuccessful attempts.

## 2019-09-11 ENCOUNTER — LAB VISIT (OUTPATIENT)
Dept: LAB | Facility: HOSPITAL | Age: 69
End: 2019-09-11
Attending: INTERNAL MEDICINE
Payer: MEDICARE

## 2019-09-11 DIAGNOSIS — E11.9 TYPE 2 DIABETES MELLITUS WITHOUT COMPLICATION, WITHOUT LONG-TERM CURRENT USE OF INSULIN: ICD-10-CM

## 2019-09-11 DIAGNOSIS — E78.2 COMBINED HYPERLIPIDEMIA ASSOCIATED WITH TYPE 2 DIABETES MELLITUS: ICD-10-CM

## 2019-09-11 DIAGNOSIS — E11.69 COMBINED HYPERLIPIDEMIA ASSOCIATED WITH TYPE 2 DIABETES MELLITUS: ICD-10-CM

## 2019-09-11 LAB
ALBUMIN SERPL BCP-MCNC: 4 G/DL (ref 3.5–5.2)
ALP SERPL-CCNC: 109 U/L (ref 55–135)
ALT SERPL W/O P-5'-P-CCNC: 21 U/L (ref 10–44)
ANION GAP SERPL CALC-SCNC: 9 MMOL/L (ref 8–16)
AST SERPL-CCNC: 22 U/L (ref 10–40)
BILIRUB SERPL-MCNC: 0.5 MG/DL (ref 0.1–1)
BUN SERPL-MCNC: 15 MG/DL (ref 8–23)
CALCIUM SERPL-MCNC: 9.5 MG/DL (ref 8.7–10.5)
CHLORIDE SERPL-SCNC: 104 MMOL/L (ref 95–110)
CHOLEST SERPL-MCNC: 150 MG/DL (ref 120–199)
CHOLEST/HDLC SERPL: 3.5 {RATIO} (ref 2–5)
CO2 SERPL-SCNC: 29 MMOL/L (ref 23–29)
CREAT SERPL-MCNC: 1.2 MG/DL (ref 0.5–1.4)
EST. GFR  (AFRICAN AMERICAN): 52.9 ML/MIN/1.73 M^2
EST. GFR  (NON AFRICAN AMERICAN): 45.9 ML/MIN/1.73 M^2
GLUCOSE SERPL-MCNC: 131 MG/DL (ref 70–110)
HDLC SERPL-MCNC: 43 MG/DL (ref 40–75)
HDLC SERPL: 28.7 % (ref 20–50)
LDLC SERPL CALC-MCNC: 90.2 MG/DL (ref 63–159)
NONHDLC SERPL-MCNC: 107 MG/DL
POTASSIUM SERPL-SCNC: 4.3 MMOL/L (ref 3.5–5.1)
PROT SERPL-MCNC: 7.9 G/DL (ref 6–8.4)
SODIUM SERPL-SCNC: 142 MMOL/L (ref 136–145)
TRIGL SERPL-MCNC: 84 MG/DL (ref 30–150)

## 2019-09-11 PROCEDURE — 80053 COMPREHEN METABOLIC PANEL: CPT

## 2019-09-11 PROCEDURE — 83036 HEMOGLOBIN GLYCOSYLATED A1C: CPT

## 2019-09-11 PROCEDURE — 36415 COLL VENOUS BLD VENIPUNCTURE: CPT | Mod: PO

## 2019-09-11 PROCEDURE — 80061 LIPID PANEL: CPT

## 2019-09-12 ENCOUNTER — TELEPHONE (OUTPATIENT)
Dept: CARDIOLOGY | Facility: HOSPITAL | Age: 69
End: 2019-09-12

## 2019-09-12 LAB
ESTIMATED AVG GLUCOSE: 140 MG/DL (ref 68–131)
HBA1C MFR BLD HPLC: 6.5 % (ref 4–5.6)

## 2019-09-13 NOTE — TELEPHONE ENCOUNTER
Please call pt.  Cholesterol is much improved, by about 100 points.  Stay on Repatha.  DM well controlled on labs.  Fu next appt      Dr Matute

## 2019-09-13 NOTE — TELEPHONE ENCOUNTER
Spoke with patient to advise her that her Cholesterol is much improved, by about 100 points.  Stay on Repatha.  DM well controlled on labs.  Fu next appt.  Denies questions/concerns.

## 2019-09-19 ENCOUNTER — OUTPATIENT CASE MANAGEMENT (OUTPATIENT)
Dept: ADMINISTRATIVE | Facility: OTHER | Age: 69
End: 2019-09-19

## 2019-09-19 NOTE — LETTER
October 23, 2019    Kristyn Garza  [address] [place] [address]             [facility] [address] [place] [address] Dear Ms Garza,     I work with [facility] Outpatient Case Management Department. I have been unsuccessful at reaching you to follow-up to see how you have been doing. Please call me back at your earliest convenience to discuss your health care needs.      I can be reached at [phone] from 8:00AM to 4:30 PM on Monday thru Friday. [facility] is a program offered through [facility] where a nurse is available 24/7 to answer questions or provide medical advice, their number is [phone].    Thanks,    Sari Dugan, RN   Outpatient Case Management

## 2019-10-16 ENCOUNTER — OFFICE VISIT (OUTPATIENT)
Dept: FAMILY MEDICINE | Facility: CLINIC | Age: 69
End: 2019-10-16
Payer: MEDICARE

## 2019-10-16 VITALS
OXYGEN SATURATION: 98 % | BODY MASS INDEX: 32.11 KG/M2 | RESPIRATION RATE: 18 BRPM | WEIGHT: 188.06 LBS | DIASTOLIC BLOOD PRESSURE: 72 MMHG | SYSTOLIC BLOOD PRESSURE: 136 MMHG | HEART RATE: 68 BPM | HEIGHT: 64 IN | TEMPERATURE: 98 F

## 2019-10-16 VITALS
TEMPERATURE: 98 F | DIASTOLIC BLOOD PRESSURE: 72 MMHG | OXYGEN SATURATION: 98 % | SYSTOLIC BLOOD PRESSURE: 136 MMHG | BODY MASS INDEX: 32.11 KG/M2 | HEIGHT: 64 IN | HEART RATE: 68 BPM | WEIGHT: 188.06 LBS

## 2019-10-16 DIAGNOSIS — E04.2 MULTIPLE THYROID NODULES: ICD-10-CM

## 2019-10-16 DIAGNOSIS — E66.9 OBESITY (BMI 30.0-34.9): ICD-10-CM

## 2019-10-16 DIAGNOSIS — N18.30 TYPE 2 DIABETES MELLITUS WITH STAGE 3 CHRONIC KIDNEY DISEASE, WITHOUT LONG-TERM CURRENT USE OF INSULIN: ICD-10-CM

## 2019-10-16 DIAGNOSIS — E78.2 COMBINED HYPERLIPIDEMIA ASSOCIATED WITH TYPE 2 DIABETES MELLITUS: ICD-10-CM

## 2019-10-16 DIAGNOSIS — Z23 NEED FOR INFLUENZA VACCINATION: ICD-10-CM

## 2019-10-16 DIAGNOSIS — I15.2 HYPERTENSION ASSOCIATED WITH DIABETES: ICD-10-CM

## 2019-10-16 DIAGNOSIS — Z78.9 STATIN INTOLERANCE: ICD-10-CM

## 2019-10-16 DIAGNOSIS — E11.22 TYPE 2 DIABETES MELLITUS WITH STAGE 3 CHRONIC KIDNEY DISEASE, WITHOUT LONG-TERM CURRENT USE OF INSULIN: ICD-10-CM

## 2019-10-16 DIAGNOSIS — E11.59 HYPERTENSION ASSOCIATED WITH DIABETES: ICD-10-CM

## 2019-10-16 DIAGNOSIS — E11.69 COMBINED HYPERLIPIDEMIA ASSOCIATED WITH TYPE 2 DIABETES MELLITUS: ICD-10-CM

## 2019-10-16 DIAGNOSIS — Z00.00 ENCOUNTER FOR PREVENTIVE HEALTH EXAMINATION: Primary | ICD-10-CM

## 2019-10-16 DIAGNOSIS — N18.30 CKD (CHRONIC KIDNEY DISEASE) STAGE 3, GFR 30-59 ML/MIN: ICD-10-CM

## 2019-10-16 DIAGNOSIS — I51.7 LVH (LEFT VENTRICULAR HYPERTROPHY): ICD-10-CM

## 2019-10-16 PROBLEM — R06.09 DOE (DYSPNEA ON EXERTION): Status: RESOLVED | Noted: 2019-05-06 | Resolved: 2019-10-16

## 2019-10-16 PROBLEM — E11.29 TYPE 2 DIABETES MELLITUS WITH KIDNEY COMPLICATION, WITHOUT LONG-TERM CURRENT USE OF INSULIN: Status: ACTIVE | Noted: 2019-09-04

## 2019-10-16 PROBLEM — R00.2 PALPITATIONS: Status: RESOLVED | Noted: 2019-09-04 | Resolved: 2019-10-16

## 2019-10-16 PROCEDURE — 99214 PR OFFICE/OUTPT VISIT, EST, LEVL IV, 30-39 MIN: ICD-10-PCS | Mod: S$PBB,,, | Performed by: FAMILY MEDICINE

## 2019-10-16 PROCEDURE — 99214 OFFICE O/P EST MOD 30 MIN: CPT | Mod: PBBFAC,PO,25 | Performed by: NURSE PRACTITIONER

## 2019-10-16 PROCEDURE — 99999 PR PBB SHADOW E&M-EST. PATIENT-LVL III: CPT | Mod: PBBFAC,,, | Performed by: FAMILY MEDICINE

## 2019-10-16 PROCEDURE — 99999 PR PBB SHADOW E&M-EST. PATIENT-LVL III: ICD-10-PCS | Mod: PBBFAC,,, | Performed by: FAMILY MEDICINE

## 2019-10-16 PROCEDURE — G0439 PR MEDICARE ANNUAL WELLNESS SUBSEQUENT VISIT: ICD-10-PCS | Mod: S$GLB,,, | Performed by: NURSE PRACTITIONER

## 2019-10-16 PROCEDURE — 99999 PR PBB SHADOW E&M-EST. PATIENT-LVL IV: CPT | Mod: PBBFAC,,, | Performed by: NURSE PRACTITIONER

## 2019-10-16 PROCEDURE — 99999 PR PBB SHADOW E&M-EST. PATIENT-LVL IV: ICD-10-PCS | Mod: PBBFAC,,, | Performed by: NURSE PRACTITIONER

## 2019-10-16 PROCEDURE — 99214 OFFICE O/P EST MOD 30 MIN: CPT | Mod: S$PBB,,, | Performed by: FAMILY MEDICINE

## 2019-10-16 PROCEDURE — 90662 IIV NO PRSV INCREASED AG IM: CPT | Mod: PBBFAC,PO

## 2019-10-16 PROCEDURE — G0439 PPPS, SUBSEQ VISIT: HCPCS | Mod: S$GLB,,, | Performed by: NURSE PRACTITIONER

## 2019-10-16 PROCEDURE — 99213 OFFICE O/P EST LOW 20 MIN: CPT | Mod: PBBFAC,27,PO | Performed by: FAMILY MEDICINE

## 2019-10-16 NOTE — Clinical Note
Your patient was seen today for a HRA visit. No acute changes.I have included a copy of my visit note, please review the note and feel free to contact me with any questions. Thank you for allowing me to participate in the care of your patients. Liz Neal NP

## 2019-10-16 NOTE — PROGRESS NOTES
Kristyn Garza    Chief Complaint   Patient presents with    Diabetes    Hypertension    Follow-up       History of Present Illness:   Ms. Garza comes in today for 6-month hypertension and diabetes follow-up.  She is fasting but has taken medications today.  She states she exercises and monitors her diet.  She performs home glucose checks every morning with levels ranging 110; 126 this morning.    She states she feels okay today.  She denies having fever, chills, fatigue, appetite changes; shortness of breath, cough, wheezing; chest pain, palpitations, leg swelling; abdominal pain, nausea, vomiting, diarrhea, constipation; polydipsia, polyuria, polyphagia; unusual urinary symptoms; back pain; headache; anxiety, depression, homicidal or suicidal thoughts.    She saw Dr. Matute, cardiologist, September 4, 2019 for surveillance of hyperlipidemia (now treated with Rapatha every 2 weeks), CKD stage 3, hypertension, statin intolerance, abnormal ECG with 6-month follow-up advised.  She saw Dr. Hernandez, nephrologist, May 24, 2019 for surveillance of CKD 3 with 1-year follow-up advised.    Labs:                     WBC                      10.70               04/16/2019                 HGB                      13.4                04/16/2019                 HCT                      41.6                04/16/2019                 PLT                      252                 04/16/2019                 CHOL                     150                 09/11/2019                 TRIG                     84                  09/11/2019                 HDL                      43                  09/11/2019                 ALT                      21                  09/11/2019                 AST                      22                  09/11/2019                 NA                       142                 09/11/2019                 K                        4.3                 09/11/2019                 CL                       104                  09/11/2019                 CREATININE               1.2                 09/11/2019                 BUN                      15                  09/11/2019                 CO2                      29                  09/11/2019                 TSH                      0.691               04/16/2019                 HGBA1C                   6.5 (H)             09/11/2019             LDLCALC                  90.2                09/11/2019                Current Outpatient Medications   Medication Sig    amlodipine-benazepril (LOTREL) 5-40 mg per capsule Take 1 capsule by mouth once daily.    aspirin 81 MG Chew Take 81 mg by mouth once daily.    azelastine (ASTELIN) 137 mcg (0.1 %) nasal spray USE 1 SPRAY (137 MCG TOTAL) BY NASAL ROUTE 2 (TWO) TIMES DAILY.    blood glucose control, normal Soln 1 Bottle by Misc.(Non-Drug; Combo Route) route once daily. For Accu Check Amber  use twice daily prn dx: E11.9    blood sugar diagnostic Strp 1 strip by Misc.(Non-Drug; Combo Route) route 2 (two) times daily.    calcium-vitamin D3 500 mg(1,250mg) -200 unit per tablet Take 1 tablet by mouth once daily.     ciclopirox (PENLAC) 8 % Soln Apply topically nightly.    CRANBERRY EXTRACT ORAL Take 1 tablet by mouth once daily.    evolocumab (REPATHA SURECLICK) 140 mg/mL PnIj Inject 1 mL (140 mg total) into the skin every 14 (fourteen) days.    FLUZONE HIGH-DOSE 2018-19, PF, 180 mcg/0.5 mL vaccine TO BE ADMINISTERED BY PHARMACIST FOR IMMUNIZATION    lancets Misc 1 lancet by Misc.(Non-Drug; Combo Route) route 3 (three) times daily. For Acc-Chek Amber   DX :E11.9    meclizine (ANTIVERT) 25 mg tablet Take 1 tablet (25 mg total) by mouth 3 (three) times daily as needed.    MULTIVITAMIN W-MINERALS/LUTEIN (CENTRUM SILVER ORAL) Take 1 tablet by mouth once daily.    spironolactone (ALDACTONE) 50 MG tablet Take 1 tablet (50 mg total) by mouth once daily.    blood-glucose meter (ACCU-CHEK AMBER) Misc 1 kit by  Misc.(Non-Drug; Combo Route) route once. For Accu Check Irais  use twice daily prn dx: E11.9 for 1 dose    loratadine (CLARITIN) 10 mg tablet Take 1 tablet (10 mg total) by mouth once daily. (Patient taking differently: Take 10 mg by mouth daily as needed. )       Review of Systems   Constitutional: Negative for activity change, appetite change, chills, fatigue and fever.        Weight 86.3 kg (190 lb 4.1 oz) at April 16, 2019 visit.   Respiratory: Negative for cough, shortness of breath and wheezing.    Cardiovascular: Negative for chest pain, palpitations and leg swelling.        See history of present illness.   Gastrointestinal: Negative for abdominal pain, constipation, diarrhea, nausea and vomiting.   Endocrine: Negative for polydipsia, polyphagia and polyuria.        See history of present illness.   Genitourinary: Negative for difficulty urinating.        See history of present illness.   Musculoskeletal: Negative for back pain and myalgias.        See history of present illness.   Neurological: Negative for headaches.   Psychiatric/Behavioral: Negative for dysphoric mood, sleep disturbance and suicidal ideas. The patient is not nervous/anxious.         Negative for homicidal ideas.        Objective:  Physical Exam   Constitutional: She is oriented to person, place, and time. She appears well-developed and well-nourished. No distress.   Pleasant.   Eyes:   She wears glasses.   Neck: Normal range of motion. Neck supple. Thyromegaly present.   Slight, chronic.   Cardiovascular: Normal rate, regular rhythm and intact distal pulses.   No murmur heard.  Pulses:       Dorsalis pedis pulses are 3+ on the right side, and 3+ on the left side.        Posterior tibial pulses are 3+ on the right side, and 3+ on the left side.   Pulmonary/Chest: Effort normal and breath sounds normal. No respiratory distress. She has no wheezes.   Abdominal: Soft. Bowel sounds are normal. She exhibits no distension and no mass. There is  no tenderness. There is no rebound and no guarding.   Musculoskeletal: Normal range of motion. She exhibits no edema or tenderness.   She is ambulatory without problems. Feet look okay without ulcerations or skin breaks noted.    Feet:   Right Foot:   Protective Sensation: 5 sites tested. 5 sites sensed.   Skin Integrity: Negative for ulcer or skin breakdown.   Left Foot:   Protective Sensation: 5 sites tested. 5 sites sensed.   Skin Integrity: Negative for ulcer or skin breakdown.   Lymphadenopathy:     She has no cervical adenopathy.   Neurological: She is alert and oriented to person, place, and time.   Skin: She is not diaphoretic.   Psychiatric: She has a normal mood and affect. Her behavior is normal. Judgment and thought content normal.   Vitals reviewed.      ASSESSMENT:  1. Hypertension associated with diabetes    2. Type 2 diabetes mellitus with stage 3 chronic kidney disease, without long-term current use of insulin    3. Combined hyperlipidemia associated with type 2 diabetes mellitus    4. Statin intolerance    5. LVH (left ventricular hypertrophy)    6. Multiple thyroid nodules    7. CKD (chronic kidney disease) stage 3, GFR 30-59 ml/min    8. Obesity (BMI 30.0-34.9)    9. Need for influenza vaccination        PLAN:  Kristyn was seen today for diabetes, hypertension and follow-up.    Diagnoses and all orders for this visit:    Hypertension associated with diabetes    Type 2 diabetes mellitus with stage 3 chronic kidney disease, without long-term current use of insulin    Combined hyperlipidemia associated with type 2 diabetes mellitus    Statin intolerance    LVH (left ventricular hypertrophy)    Multiple thyroid nodules    CKD (chronic kidney disease) stage 3, GFR 30-59 ml/min    Obesity (BMI 30.0-34.9)    Need for influenza vaccination  -     Influenza - High Dose (65+) (PF) (IM)    No labs today.  Continue current medications, follow low sodium, low cholesterol, low carb diet, daily walks.  Keep  follow up with specialists.  Follow up in about 6 months (around 4/16/2020) for physical.

## 2019-10-16 NOTE — PATIENT INSTRUCTIONS
Counseling and Referral of Other Preventative  (Italic type indicates deductible and co-insurance are waived)    Patient Name: Kristyn Garza  Today's Date: 10/16/2019    Health Maintenance       Date Due Completion Date    Shingles Vaccine (2 of 3) 04/20/2011 2/23/2011    Influenza Vaccine (1) 09/01/2019 9/15/2018 (Done)    Override on 9/15/2018: Done (At pharmacy per patient.)    Override on 9/26/2017: [facility])    Override on 11/1/2016: Done (at [facility])    Hemoglobin A1c 03/11/2020 9/11/2019    Foot Exam 04/16/2020 4/16/2019    Override on 9/23/2015: Done    Override on 7/16/2015: Done    Override on 3/23/2015: Done    Override on 12/3/2014: Done    Override on 4/29/2014: Done    Eye Exam 04/30/2020 4/30/2019    Override on 4/30/2019: Done    Override on 4/24/2018: Done    Override on 5/17/2017: Done    Override on 5/16/2016: Done    Override on 5/13/2015: Done    Mammogram 05/24/2020 5/24/2019    Override on 3/13/2013: Done    Lipid Panel 09/11/2020 9/11/2019    TETANUS VACCINE 02/23/2021 2/23/2011    DEXA SCAN 04/28/2021 4/28/2016    Override on 3/10/2011: Done (normal repeat in 5 years)    Colonoscopy 03/18/2024 3/18/2014    Override on 6/1/2006: Done        No orders of the defined types were placed in this encounter.    The following information is provided to all patients.  This information is to help you find resources for any of the problems found today that may be affecting your health:                Living healthy guide: www.[facility].[place].gov      Understanding Diabetes: www.diabetes.org      Eating healthy: www.cdc.gov/healthyweight      CDC home safety checklist: www.cdc.gov/steadi/patient.html      Agency on Aging: www.goea.[place].gov      Alcoholics anonymous (AA): www.aa.org      Physical Activity: www.barney.nih.gov/tg5ysff      Tobacco use: www.quitwithusla.org     Counseling and Referral of Other Preventative  (Italic type indicates deductible and co-insurance are  waived)    Patient Name: Kristyn Garza  Today's Date: 10/18/2019    Health Maintenance       Date Due Completion Date    Shingles Vaccine (2 of 3) 04/20/2011 2/23/2011    Hemoglobin A1c 03/11/2020 9/11/2019    Eye Exam 04/30/2020 4/30/2019    Override on 4/30/2019: Done    Override on 4/24/2018: Done    Override on 5/17/2017: Done    Override on 5/16/2016: Done    Override on 5/13/2015: Done    Mammogram 05/24/2020 5/24/2019    Override on 3/13/2013: Done    Lipid Panel 09/11/2020 9/11/2019    Foot Exam 10/16/2020 10/16/2019    Override on 9/23/2015: Done    Override on 7/16/2015: Done    Override on 3/23/2015: Done    Override on 12/3/2014: Done    Override on 4/29/2014: Done    TETANUS VACCINE 02/23/2021 2/23/2011    DEXA SCAN 04/28/2021 4/28/2016    Override on 3/10/2011: Done (normal repeat in 5 years)    Colonoscopy 03/18/2024 3/18/2014    Override on 6/1/2006: Done        No orders of the defined types were placed in this encounter.    The following information is provided to all patients.  This information is to help you find resources for any of the problems found today that may be affecting your health:                Living healthy guide: www.[facility].[place].[facility]      Understanding Diabetes: www.diabetes.org      Eating healthy: www.cdc.gov/healthyweight      CDC home safety checklist: www.cdc.gov/steadi/patient.html      Agency on Aging: www.goea.[place].[facility]      Alcoholics anonymous (AA): www.aa.org      Physical Activity: www.barney.nih.gov/kv7dcvv      Tobacco use: www.quitwithusla.org

## 2019-10-16 NOTE — PROGRESS NOTES
"Kristyn Garza presented for a  Medicare AWV and comprehensive Health Risk Assessment today. The following components were reviewed and updated:    · Medical history  · Family History  · Social history  · Allergies and Current Medications  · Health Risk Assessment  · Health Maintenance  · Care Team     ** See Completed Assessments for Annual Wellness Visit within the encounter summary.**       The following assessments were completed:  · Living Situation  · CAGE  · Depression Screening  · Timed Get Up and Go  · Whisper Test  · Cognitive Function Screening  · Nutrition Screening  · ADL Screening  · PAQ Screening    Vitals:    10/16/19 0806   BP: 136/72   BP Location: Right arm   Patient Position: Sitting   BP Method: Medium (Automatic)   Pulse: 68   Resp: 18   Temp: 98.2 °F (36.8 °C)   TempSrc: Oral   SpO2: 98%   Weight: 85.3 kg (188 lb 0.8 oz)   Height: 5' 4" (1.626 m)     Body mass index is 32.28 kg/m².  Physical Exam   Constitutional: She appears well-developed.   HENT:   Head: Normocephalic and atraumatic.   Eyes: Pupils are equal, round, and reactive to light.   Neck: Carotid bruit is not present.   Cardiovascular: Normal rate, regular rhythm, normal heart sounds, intact distal pulses and normal pulses. Exam reveals no gallop.   No murmur heard.  Pulmonary/Chest: Effort normal and breath sounds normal.   Abdominal: Soft. Normal appearance and bowel sounds are normal. She exhibits no distension. There is no tenderness.   Musculoskeletal: Normal range of motion. She exhibits no edema or tenderness.   Neurological: She is alert. She exhibits normal muscle tone. Gait normal.   Skin: Skin is warm, dry and intact.   Psychiatric: She has a normal mood and affect. Her speech is normal and behavior is normal. Judgment and thought content normal. Cognition and memory are normal.   Nursing note and vitals reviewed.        Diagnoses and health risks identified today and associated recommendations/orders:    1. Encounter for " preventive health examination  Completed     2. Type 2 diabetes mellitus with stage 3 chronic kidney disease, without long-term current use of insulin  Component      Latest Ref Rng & Units 9/11/2019   Hemoglobin A1C External      4.0 - 5.6 % 6.5 (H)   Estimated Avg Glucose      68 - 131 mg/dL 140 (H)   Chronic and Stable on no medications- UTD on DM eye exam. Continue current treatment plan as previously prescribed with your PCP    3. Hypertension associated with diabetes  Chronic and Stable on Lotrel and Aldactone  Continue current treatment plan as previously prescribed with your PCP    4. CKD (chronic kidney disease) stage 3, GFR 30-59 ml/min  eGFR if non African American >60 mL/min/1.73 m^2 45.Abnormal   57.Abnormal  CM 51 Abnormal  CM   Chronic and Stable renal function and blood pressure Continue current treatment plan as previously prescribed with your PCP    5. Combined hyperlipidemia associated with type 2 diabetes mellitus  Component      Latest Ref Rng & Units 9/11/2019   Cholesterol      120 - 199 mg/dL 150   Triglycerides      30 - 150 mg/dL 84   HDL      40 - 75 mg/dL 43   LDL Cholesterol External      63.0 - 159.0 mg/dL 90.2   Hdl/Cholesterol Ratio      20.0 - 50.0 % 28.7   Total Cholesterol/HDL Ratio      2.0 - 5.0 3.5   Non-HDL Cholesterol      mg/dL 107   Chronic and Stable on Lotrel and Welchol - unable to tolerate statin . Continue current treatment plan as previously prescribed with your PCP and cardiologist     6. Obesity (BMI 30.0-34.9)  This problem is currently not controlled  BMI 32. 28 kg - encourage diet and exercise . Please follow up with your PCP as planned to discuss adjustments to your treatment plan.    I offered to discuss end of life issues, including information on how to make advance directives that the patient could use to name someone who would make medical decisions on their behalf if they became too ill to make themselves.  _X_Patient is interested, I provided paper work  and offered to discuss.    Provided Kristyn with a 5-10 year written screening schedule and personal prevention plan. Recommendations were developed using the USPSTF age appropriate recommendations. Education, counseling, and referrals were provided as needed. After Visit Summary printed and given to patient which includes a list of additional screenings\tests needed.    Follow up in about 1 year (around 10/16/2020).    Liz Neal NP

## 2019-11-22 NOTE — TELEPHONE ENCOUNTER
Please check with pt to see if Rx requested is what she has been taking as we have Same medication with different dosage listed on medication list. Thanks.

## 2019-11-27 RX ORDER — AMLODIPINE AND BENAZEPRIL HYDROCHLORIDE 5; 20 MG/1; MG/1
CAPSULE ORAL
Qty: 90 CAPSULE | Refills: 1 | OUTPATIENT
Start: 2019-11-27

## 2019-12-12 RX ORDER — SPIRONOLACTONE 50 MG/1
50 TABLET, FILM COATED ORAL DAILY
Qty: 90 TABLET | Refills: 1
Start: 2019-12-12 | End: 2019-12-17 | Stop reason: SDUPTHER

## 2019-12-12 NOTE — TELEPHONE ENCOUNTER
----- Message from Gogo Ángel sent at 12/12/2019  3:38 PM CST -----  Contact: pt  1. What is the name of the medication you are requesting? Spironilacton  2. What is the dose? n/a  3. How do you take the medication? Orally, topically, etc? n/a  4. How often do you take this medication? n/a  5. Do you need a 30 day or 90 day supply? n/a  6. How many refills are you requesting? n/a  7. What is your preferred [facility]  8. Who can we contact with further questions? The pt at [phone]

## 2019-12-17 RX ORDER — SPIRONOLACTONE 50 MG/1
50 TABLET, FILM COATED ORAL DAILY
Qty: 90 TABLET | Refills: 1
Start: 2019-12-17 | End: 2020-06-07 | Stop reason: SDUPTHER

## 2019-12-18 DIAGNOSIS — E11.59 HYPERTENSION ASSOCIATED WITH DIABETES: ICD-10-CM

## 2019-12-18 DIAGNOSIS — I15.2 HYPERTENSION ASSOCIATED WITH DIABETES: ICD-10-CM

## 2019-12-18 NOTE — TELEPHONE ENCOUNTER
----- Message from Marj Swain sent at 12/18/2019  2:02 PM CST -----  Contact: Patient   .Type:  RX Refill Request    Who Called: Patient   Refill or New Rx: refill   RX Name and Strength: high blood pressure   How is the patient currently taking it? (ex. 1XDay):  Is this a 30 day or 90 day RX:  Preferred Pharmacy with phone number:   [facility]/pharmacy #[id number] - [place] - [address]  [address]  [place] [address]  Phone: [phone] Fax: [phone]          Local or Mail Order: local   Ordering Provider: tuan land   Would the patient rather a call back or a response via [facility]?  Call   Best Call Back Number: [phone]  Additional Information:  Patient stated the pharmacy is requesting a staff member to call the medication in because the computer is not pulling it up .

## 2019-12-19 RX ORDER — AMLODIPINE AND BENAZEPRIL HYDROCHLORIDE 5; 40 MG/1; MG/1
1 CAPSULE ORAL DAILY
Qty: 90 CAPSULE | Refills: 1 | Status: SHIPPED | OUTPATIENT
Start: 2019-12-19 | End: 2020-08-23

## 2019-12-19 RX ORDER — SPIRONOLACTONE 50 MG/1
TABLET, FILM COATED ORAL
Qty: 90 TABLET | Refills: 3 | OUTPATIENT
Start: 2019-12-19

## 2019-12-19 NOTE — TELEPHONE ENCOUNTER
----- Message from Phuong Bernabe sent at 12/19/2019 12:57 PM CST -----  Contact: pt  Pt states that she has called several times and getting the run around about high blood pressure medication. Pt can be reached at [phone]        Thanks,  Phuong Bernabe

## 2020-01-10 ENCOUNTER — HOSPITAL ENCOUNTER (EMERGENCY)
Facility: HOSPITAL | Age: 70
Discharge: HOME | End: 2020-01-10
Attending: EMERGENCY MEDICINE
Payer: MEDICARE

## 2020-01-10 VITALS
WEIGHT: 187.81 LBS | HEIGHT: 64 IN | HEART RATE: 60 BPM | BODY MASS INDEX: 32.06 KG/M2 | OXYGEN SATURATION: 97 % | DIASTOLIC BLOOD PRESSURE: 72 MMHG | RESPIRATION RATE: 20 BRPM | SYSTOLIC BLOOD PRESSURE: 142 MMHG | TEMPERATURE: 98 F

## 2020-01-10 DIAGNOSIS — R42 VERTIGO: Primary | ICD-10-CM

## 2020-01-10 DIAGNOSIS — R53.1 WEAKNESS: ICD-10-CM

## 2020-01-10 LAB
ALBUMIN SERPL BCP-MCNC: 3.9 G/DL (ref 3.5–5.2)
ALP SERPL-CCNC: 106 U/L (ref 55–135)
ALT SERPL W/O P-5'-P-CCNC: 27 U/L (ref 10–44)
ANION GAP SERPL CALC-SCNC: 9 MMOL/L (ref 8–16)
AST SERPL-CCNC: 24 U/L (ref 10–40)
BASOPHILS # BLD AUTO: 0.04 K/UL (ref 0–0.2)
BASOPHILS NFR BLD: 0.5 % (ref 0–1.9)
BILIRUB SERPL-MCNC: 0.5 MG/DL (ref 0.1–1)
BILIRUB UR QL STRIP: NEGATIVE
BUN SERPL-MCNC: 11 MG/DL (ref 8–23)
CALCIUM SERPL-MCNC: 9.2 MG/DL (ref 8.7–10.5)
CHLORIDE SERPL-SCNC: 104 MMOL/L (ref 95–110)
CLARITY UR: CLEAR
CO2 SERPL-SCNC: 28 MMOL/L (ref 23–29)
COLOR UR: YELLOW
CREAT SERPL-MCNC: 1 MG/DL (ref 0.5–1.4)
DIFFERENTIAL METHOD: ABNORMAL
EOSINOPHIL # BLD AUTO: 0.2 K/UL (ref 0–0.5)
EOSINOPHIL NFR BLD: 1.8 % (ref 0–8)
ERYTHROCYTE [DISTWIDTH] IN BLOOD BY AUTOMATED COUNT: 13.1 % (ref 11.5–14.5)
EST. GFR  (AFRICAN AMERICAN): >60 ML/MIN/1.73 M^2
EST. GFR  (NON AFRICAN AMERICAN): 57 ML/MIN/1.73 M^2
GLUCOSE SERPL-MCNC: 126 MG/DL (ref 70–110)
GLUCOSE UR QL STRIP: NEGATIVE
HCT VFR BLD AUTO: 41.3 % (ref 37–48.5)
HGB BLD-MCNC: 13.5 G/DL (ref 12–16)
HGB UR QL STRIP: NEGATIVE
IMM GRANULOCYTES # BLD AUTO: 0.03 K/UL (ref 0–0.04)
IMM GRANULOCYTES NFR BLD AUTO: 0.4 % (ref 0–0.5)
KETONES UR QL STRIP: NEGATIVE
LEUKOCYTE ESTERASE UR QL STRIP: NEGATIVE
LIPASE SERPL-CCNC: 15 U/L (ref 4–60)
LYMPHOCYTES # BLD AUTO: 2.6 K/UL (ref 1–4.8)
LYMPHOCYTES NFR BLD: 30.7 % (ref 18–48)
MCH RBC QN AUTO: 31.3 PG (ref 27–31)
MCHC RBC AUTO-ENTMCNC: 32.7 G/DL (ref 32–36)
MCV RBC AUTO: 96 FL (ref 82–98)
MONOCYTES # BLD AUTO: 0.7 K/UL (ref 0.3–1)
MONOCYTES NFR BLD: 7.7 % (ref 4–15)
NEUTROPHILS # BLD AUTO: 5 K/UL (ref 1.8–7.7)
NEUTROPHILS NFR BLD: 58.9 % (ref 38–73)
NITRITE UR QL STRIP: NEGATIVE
NRBC BLD-RTO: 0 /100 WBC
PH UR STRIP: 7 [PH] (ref 5–8)
PLATELET # BLD AUTO: 249 K/UL (ref 150–350)
PMV BLD AUTO: 10.4 FL (ref 9.2–12.9)
POTASSIUM SERPL-SCNC: 3.4 MMOL/L (ref 3.5–5.1)
PROT SERPL-MCNC: 8 G/DL (ref 6–8.4)
PROT UR QL STRIP: NEGATIVE
RBC # BLD AUTO: 4.32 M/UL (ref 4–5.4)
SODIUM SERPL-SCNC: 141 MMOL/L (ref 136–145)
SP GR UR STRIP: 1.01 (ref 1–1.03)
URN SPEC COLLECT METH UR: NORMAL
UROBILINOGEN UR STRIP-ACNC: NEGATIVE EU/DL
WBC # BLD AUTO: 8.46 K/UL (ref 3.9–12.7)

## 2020-01-10 PROCEDURE — 80053 COMPREHEN METABOLIC PANEL: CPT

## 2020-01-10 PROCEDURE — 93010 ELECTROCARDIOGRAM REPORT: CPT | Mod: ,,, | Performed by: INTERNAL MEDICINE

## 2020-01-10 PROCEDURE — 81003 URINALYSIS AUTO W/O SCOPE: CPT

## 2020-01-10 PROCEDURE — 99284 EMERGENCY DEPT VISIT MOD MDM: CPT | Mod: 25

## 2020-01-10 PROCEDURE — 85025 COMPLETE CBC W/AUTO DIFF WBC: CPT

## 2020-01-10 PROCEDURE — 93005 ELECTROCARDIOGRAM TRACING: CPT

## 2020-01-10 PROCEDURE — 25000003 PHARM REV CODE 250: Performed by: EMERGENCY MEDICINE

## 2020-01-10 PROCEDURE — 93010 EKG 12-LEAD: ICD-10-PCS | Mod: ,,, | Performed by: INTERNAL MEDICINE

## 2020-01-10 PROCEDURE — 83690 ASSAY OF LIPASE: CPT

## 2020-01-10 RX ORDER — MECLIZINE HYDROCHLORIDE 25 MG/1
25 TABLET ORAL 3 TIMES DAILY PRN
Qty: 20 TABLET | Refills: 0 | Status: SHIPPED | OUTPATIENT
Start: 2020-01-10 | End: 2020-07-21 | Stop reason: SDUPTHER

## 2020-01-10 RX ORDER — MECLIZINE HYDROCHLORIDE 25 MG/1
25 TABLET ORAL
Status: COMPLETED | OUTPATIENT
Start: 2020-01-10 | End: 2020-01-10

## 2020-01-10 RX ADMIN — MECLIZINE HYDROCHLORIDE 25 MG: 25 TABLET ORAL at 08:01

## 2020-01-10 NOTE — ED PROVIDER NOTES
SCRIBE #1 NOTE: I, Azalia Banks, am scribing for, and in the presence of, Joaquín Maria MD. I have scribed the entire note.       History     Chief Complaint   Patient presents with    Dizziness     c/o dizziness and nausea that started this morning      Review of patient's allergies indicates:   Allergen Reactions    Statins-hmg-coa reductase inhibitors      Muscle Cramps         History of Present Illness     HPI    1/10/2020, 8:39 AM  History obtained from the patient      History of Present Illness: Kristyn Garza is a 69 y.o. female patient with a PMHx of vertigo, CKD, and DM who presents to the Emergency Department for evaluation of dizziness which onset suddenly last night. She described symptoms as room-spinning. Patient states she is out of her vertigo medications so symptoms have not been resolved. Symptoms are constant and moderate in severity. No mitigating or exacerbating factors reported. Associated sxs include n/v. Patient denies any fever, chills, n/v/d, CP, SOB, cough, palpitations, leg swelling, HA, and all other sxs at this time. No prior tx. No further complaints or concerns at this time.         Arrival mode: Personal vehicle    PCP: Zoë Guzman MD        Past Medical History:  Past Medical History:   Diagnosis Date    Carpal tunnel syndrome     CKD (chronic kidney disease) stage 3, GFR 30-59 ml/min     Followed by Nephrology    Colon cancer screening 3/18/2014    CTS (carpal tunnel syndrome) 6/18/2013    Diabetes mellitus, type 2     Eczema     Elevated CPK     History of hypokalemia 6/18/2013    History of hypokalemia 6/18/2013    Hypokalemia     Multinodular thyroid     Followed by ENT    Obesity     Other and unspecified hyperlipidemia     Pneumonia     in her 20s; hospitalized    Postmenopausal     No history of abnormal pap smear    Statin intolerance     caused mylagia, elevated CPK    Tobacco dependence     resolved    Unspecified essential hypertension         Past Surgical History:  Past Surgical History:   Procedure Laterality Date    COLONOSCOPY      CYST REMOVAL      On the head    FINE NEEDLE ASPIRATION  3/2011    thyroid nodules x3 (negative per patient)    HYSTERECTOMY      PARTIAL HYSTERECTOMY      Due to fibroids         Family History:  Family History   Problem Relation Age of Onset    Hypertension Mother     Diabetes Mother     Dementia Mother         Alzheimer's dementia    Hypertension Father     Heart disease Sister         MI/CAD    Cataracts Sister     Dementia Sister     No Known Problems Son     No Known Problems Son     Cataracts Brother     Cataracts Brother     Dementia Sister     Cancer Neg Hx     Stroke Neg Hx     Melanoma Neg Hx     Psoriasis Neg Hx     Lupus Neg Hx     Eczema Neg Hx     COPD Neg Hx     Kidney disease Neg Hx        Social History:  Social History     Tobacco Use    Smoking status: Former Smoker     Packs/day: 0.50     Years: 25.00     Pack years: 12.50     Types: Cigarettes     Last attempt to quit: 2013     Years since quittin.6    Smokeless tobacco: Never Used   Substance and Sexual Activity    Alcohol use: Yes     Alcohol/week: 0.0 standard drinks     Frequency: 2-4 times a month     Drinks per session: 1 or 2     Binge frequency: Never     Comment: Rarely drinks wine    Drug use: No    Sexual activity: Yes     Partners: Male     Birth control/protection: Condom        Review of Systems     Review of Systems   Constitutional: Negative for activity change, appetite change, chills, diaphoresis, fatigue and fever.   HENT: Negative for congestion, ear pain, nosebleeds, rhinorrhea, sinus pain, sore throat and trouble swallowing.    Eyes: Negative for pain and discharge.   Respiratory: Negative for cough, chest tightness, shortness of breath, wheezing and stridor.    Cardiovascular: Negative for chest pain, palpitations and leg swelling.   Gastrointestinal: Positive for nausea and  vomiting. Negative for abdominal distention, abdominal pain, blood in stool, constipation and diarrhea.   Genitourinary: Negative for difficulty urinating, dysuria, flank pain, frequency, hematuria and urgency.   Musculoskeletal: Negative for arthralgias, back pain, myalgias and neck pain.   Skin: Negative for pallor, rash and wound.   Neurological: Positive for dizziness. Negative for syncope, weakness, light-headedness, numbness and headaches.   Hematological: Does not bruise/bleed easily.   Psychiatric/Behavioral: Negative for confusion and self-injury.   All other systems reviewed and are negative.       Physical Exam     Initial Vitals [01/10/20 0820]   BP Pulse Resp Temp SpO2   (!) 176/84 61 16 98.2 °F (36.8 °C) 98 %      MAP       --          Physical Exam  Nursing Notes and Vital Signs Reviewed.  Constitutional: Patient is in no acute distress. Well-developed and well-nourished.  Head: Atraumatic. Normocephalic.  Eyes: PERRL. EOM intact. Conjunctivae are not pale. No scleral icterus.  ENT: Mucous membranes are moist. Oropharynx is clear and symmetric.    Neck: Supple. Full ROM. No lymphadenopathy.  Cardiovascular: Regular rate. Regular rhythm. No murmurs, rubs, or gallops. Distal pulses are 2+ and symmetric.  Pulmonary/Chest: No respiratory distress. Clear to auscultation bilaterally. No wheezing or rales.  Abdominal: Soft and non-distended.  There is no tenderness.  No rebound, guarding, or rigidity. Good bowel sounds.  Genitourinary: No CVA tenderness  Musculoskeletal: Moves all extremities. No obvious deformities. No edema. No calf tenderness.  Skin: Warm and dry.  Neurological:  Alert, awake, and appropriate.  Normal speech.  No acute focal neurological deficits are appreciated.  Psychiatric: Normal affect. Good eye contact. Appropriate in content.     ED Course   Procedures  ED Vital Signs:  Vitals:    01/10/20 0820 01/10/20 0841 01/10/20 0931   BP: (!) 176/84  (!) 158/74   Pulse: 61 62 (!) 55   Resp:  "16 20   Temp: 98.2 °F (36.8 °C)  98.1 °F (36.7 °C)   TempSrc: Oral     SpO2: 98%  95%   Weight: 85.2 kg (187 lb 12.8 oz)     Height: 5' 4" (1.626 m)         Abnormal Lab Results:  Labs Reviewed   CBC W/ AUTO DIFFERENTIAL - Abnormal; Notable for the following components:       Result Value    Mean Corpuscular Hemoglobin 31.3 (*)     All other components within normal limits   COMPREHENSIVE METABOLIC PANEL - Abnormal; Notable for the following components:    Potassium 3.4 (*)     Glucose 126 (*)     eGFR if non  57 (*)     All other components within normal limits   LIPASE   URINALYSIS, REFLEX TO URINE CULTURE        All Lab Results:  Results for orders placed or performed during the hospital encounter of 01/10/20   CBC auto differential   Result Value Ref Range    WBC 8.46 3.90 - 12.70 K/uL    RBC 4.32 4.00 - 5.40 M/uL    Hemoglobin 13.5 12.0 - 16.0 g/dL    Hematocrit 41.3 37.0 - 48.5 %    Mean Corpuscular Volume 96 82 - 98 fL    Mean Corpuscular Hemoglobin 31.3 (H) 27.0 - 31.0 pg    Mean Corpuscular Hemoglobin Conc 32.7 32.0 - 36.0 g/dL    RDW 13.1 11.5 - 14.5 %    Platelets 249 150 - 350 K/uL    MPV 10.4 9.2 - 12.9 fL    Immature Granulocytes 0.4 0.0 - 0.5 %    Gran # (ANC) 5.0 1.8 - 7.7 K/uL    Immature Grans (Abs) 0.03 0.00 - 0.04 K/uL    Lymph # 2.6 1.0 - 4.8 K/uL    Mono # 0.7 0.3 - 1.0 K/uL    Eos # 0.2 0.0 - 0.5 K/uL    Baso # 0.04 0.00 - 0.20 K/uL    nRBC 0 0 /100 WBC    Gran% 58.9 38.0 - 73.0 %    Lymph% 30.7 18.0 - 48.0 %    Mono% 7.7 4.0 - 15.0 %    Eosinophil% 1.8 0.0 - 8.0 %    Basophil% 0.5 0.0 - 1.9 %    Differential Method Automated    Comprehensive metabolic panel   Result Value Ref Range    Sodium 141 136 - 145 mmol/L    Potassium 3.4 (L) 3.5 - 5.1 mmol/L    Chloride 104 95 - 110 mmol/L    CO2 28 23 - 29 mmol/L    Glucose 126 (H) 70 - 110 mg/dL    BUN, Bld 11 8 - 23 mg/dL    Creatinine 1.0 0.5 - 1.4 mg/dL    Calcium 9.2 8.7 - 10.5 mg/dL    Total Protein 8.0 6.0 - 8.4 g/dL    " Albumin 3.9 3.5 - 5.2 g/dL    Total Bilirubin 0.5 0.1 - 1.0 mg/dL    Alkaline Phosphatase 106 55 - 135 U/L    AST 24 10 - 40 U/L    ALT 27 10 - 44 U/L    Anion Gap 9 8 - 16 mmol/L    eGFR if African American >60 >60 mL/min/1.73 m^2    eGFR if non African American 57 (A) >60 mL/min/1.73 m^2   Lipase   Result Value Ref Range    Lipase 15 4 - 60 U/L              The Emergency Provider reviewed the vital signs and test results, which are outlined above.     ED Discussion     9:48 AM: Reassessed pt at this time.  Pt states her condition has improved at this time after the meclizine. Discussed with pt all pertinent ED information and results. Discussed pt dx and plan of tx. Gave pt all f/u and return to the ED instructions. All questions and concerns were addressed at this time. Pt expresses understanding of information and instructions, and is comfortable with plan to discharge. Pt is stable for discharge.    I discussed with patient that evaluation in the ED does not suggest any emergent or life threatening medical conditions requiring immediate intervention beyond what was provided in the ED, and I believe patient is safe for discharge.  Regardless, an unremarkable evaluation in the ED does not preclude the development or presence of a serious of life threatening condition. As such, patient was instructed to return immediately for any worsening or change in current symptoms.         Medical Decision Making:   Clinical Tests:   Lab Tests: Ordered and Reviewed           ED Medication(s):  Medications   meclizine tablet 25 mg (25 mg Oral Given 1/10/20 0857)       Current Discharge Medication List          Follow-up Information     Zoë Guzman MD.    Specialty:  Family Medicine  Contact information:  [address] [place] [address] [phone]                       Scribe Attestation:   Scribe #1: I performed the above scribed service and the documentation accurately describes the services I performed. I  attest to the accuracy of the note.     Attending:   Physician Attestation Statement for Scribe #1: I, Joaquín Maria MD, personally performed the services described in this documentation, as scribed by Azalia Banks, in my presence, and it is both accurate and complete.           Clinical Impression       ICD-10-CM ICD-9-CM   1. Vertigo R42 780.4   2. Weakness R53.1 780.79       Disposition:   Disposition: Discharged  Condition: Stable         Joaquín Maria MD  01/10/20 0972

## 2020-01-13 ENCOUNTER — LAB VISIT (OUTPATIENT)
Dept: LAB | Facility: HOSPITAL | Age: 70
End: 2020-01-13
Payer: MEDICARE

## 2020-01-13 ENCOUNTER — OFFICE VISIT (OUTPATIENT)
Dept: FAMILY MEDICINE | Facility: CLINIC | Age: 70
End: 2020-01-13
Payer: MEDICARE

## 2020-01-13 VITALS
SYSTOLIC BLOOD PRESSURE: 122 MMHG | HEART RATE: 72 BPM | TEMPERATURE: 98 F | WEIGHT: 184.94 LBS | HEIGHT: 64 IN | BODY MASS INDEX: 31.57 KG/M2 | DIASTOLIC BLOOD PRESSURE: 78 MMHG

## 2020-01-13 DIAGNOSIS — I15.2 HYPERTENSION ASSOCIATED WITH DIABETES: ICD-10-CM

## 2020-01-13 DIAGNOSIS — E11.59 HYPERTENSION ASSOCIATED WITH DIABETES: ICD-10-CM

## 2020-01-13 DIAGNOSIS — N18.30 CKD (CHRONIC KIDNEY DISEASE) STAGE 3, GFR 30-59 ML/MIN: ICD-10-CM

## 2020-01-13 DIAGNOSIS — R42 DIZZINESS: ICD-10-CM

## 2020-01-13 DIAGNOSIS — H69.93 DYSFUNCTION OF BOTH EUSTACHIAN TUBES: Primary | ICD-10-CM

## 2020-01-13 LAB
ALBUMIN SERPL BCP-MCNC: 3.9 G/DL (ref 3.5–5.2)
ALP SERPL-CCNC: 110 U/L (ref 55–135)
ALT SERPL W/O P-5'-P-CCNC: 23 U/L (ref 10–44)
ANION GAP SERPL CALC-SCNC: 5 MMOL/L (ref 8–16)
AST SERPL-CCNC: 21 U/L (ref 10–40)
BASOPHILS # BLD AUTO: 0.05 K/UL (ref 0–0.2)
BASOPHILS NFR BLD: 0.5 % (ref 0–1.9)
BILIRUB SERPL-MCNC: 0.4 MG/DL (ref 0.1–1)
BUN SERPL-MCNC: 12 MG/DL (ref 8–23)
CALCIUM SERPL-MCNC: 9.4 MG/DL (ref 8.7–10.5)
CHLORIDE SERPL-SCNC: 104 MMOL/L (ref 95–110)
CO2 SERPL-SCNC: 33 MMOL/L (ref 23–29)
CREAT SERPL-MCNC: 1 MG/DL (ref 0.5–1.4)
DIFFERENTIAL METHOD: ABNORMAL
EOSINOPHIL # BLD AUTO: 0.2 K/UL (ref 0–0.5)
EOSINOPHIL NFR BLD: 1.5 % (ref 0–8)
ERYTHROCYTE [DISTWIDTH] IN BLOOD BY AUTOMATED COUNT: 13.5 % (ref 11.5–14.5)
EST. GFR  (AFRICAN AMERICAN): >60 ML/MIN/1.73 M^2
EST. GFR  (NON AFRICAN AMERICAN): 56.8 ML/MIN/1.73 M^2
GLUCOSE SERPL-MCNC: 107 MG/DL (ref 70–110)
HCT VFR BLD AUTO: 41.7 % (ref 37–48.5)
HGB BLD-MCNC: 13.1 G/DL (ref 12–16)
IMM GRANULOCYTES # BLD AUTO: 0.02 K/UL (ref 0–0.04)
IMM GRANULOCYTES NFR BLD AUTO: 0.2 % (ref 0–0.5)
LYMPHOCYTES # BLD AUTO: 4 K/UL (ref 1–4.8)
LYMPHOCYTES NFR BLD: 36.3 % (ref 18–48)
MCH RBC QN AUTO: 31.7 PG (ref 27–31)
MCHC RBC AUTO-ENTMCNC: 31.4 G/DL (ref 32–36)
MCV RBC AUTO: 101 FL (ref 82–98)
MONOCYTES # BLD AUTO: 0.9 K/UL (ref 0.3–1)
MONOCYTES NFR BLD: 7.8 % (ref 4–15)
NEUTROPHILS # BLD AUTO: 6 K/UL (ref 1.8–7.7)
NEUTROPHILS NFR BLD: 53.7 % (ref 38–73)
NRBC BLD-RTO: 0 /100 WBC
PLATELET # BLD AUTO: 252 K/UL (ref 150–350)
PMV BLD AUTO: 10.6 FL (ref 9.2–12.9)
POTASSIUM SERPL-SCNC: 3.6 MMOL/L (ref 3.5–5.1)
PROT SERPL-MCNC: 7.8 G/DL (ref 6–8.4)
RBC # BLD AUTO: 4.13 M/UL (ref 4–5.4)
SODIUM SERPL-SCNC: 142 MMOL/L (ref 136–145)
WBC # BLD AUTO: 11.08 K/UL (ref 3.9–12.7)

## 2020-01-13 PROCEDURE — 36415 COLL VENOUS BLD VENIPUNCTURE: CPT | Mod: PO

## 2020-01-13 PROCEDURE — 1159F PR MEDICATION LIST DOCUMENTED IN MEDICAL RECORD: ICD-10-PCS | Mod: ,,, | Performed by: REGISTERED NURSE

## 2020-01-13 PROCEDURE — 99999 PR PBB SHADOW E&M-EST. PATIENT-LVL IV: ICD-10-PCS | Mod: PBBFAC,,, | Performed by: REGISTERED NURSE

## 2020-01-13 PROCEDURE — 99214 OFFICE O/P EST MOD 30 MIN: CPT | Mod: S$PBB,,, | Performed by: REGISTERED NURSE

## 2020-01-13 PROCEDURE — 1126F PR PAIN SEVERITY QUANTIFIED, NO PAIN PRESENT: ICD-10-PCS | Mod: ,,, | Performed by: REGISTERED NURSE

## 2020-01-13 PROCEDURE — 1159F MED LIST DOCD IN RCRD: CPT | Mod: ,,, | Performed by: REGISTERED NURSE

## 2020-01-13 PROCEDURE — 99214 PR OFFICE/OUTPT VISIT, EST, LEVL IV, 30-39 MIN: ICD-10-PCS | Mod: S$PBB,,, | Performed by: REGISTERED NURSE

## 2020-01-13 PROCEDURE — 99214 OFFICE O/P EST MOD 30 MIN: CPT | Mod: PBBFAC,PO | Performed by: REGISTERED NURSE

## 2020-01-13 PROCEDURE — 80053 COMPREHEN METABOLIC PANEL: CPT

## 2020-01-13 PROCEDURE — 85025 COMPLETE CBC W/AUTO DIFF WBC: CPT

## 2020-01-13 PROCEDURE — 99999 PR PBB SHADOW E&M-EST. PATIENT-LVL IV: CPT | Mod: PBBFAC,,, | Performed by: REGISTERED NURSE

## 2020-01-13 PROCEDURE — 1126F AMNT PAIN NOTED NONE PRSNT: CPT | Mod: ,,, | Performed by: REGISTERED NURSE

## 2020-01-13 RX ORDER — MONTELUKAST SODIUM 10 MG/1
10 TABLET ORAL DAILY
Qty: 30 TABLET | Refills: 6 | Status: SHIPPED | OUTPATIENT
Start: 2020-01-13 | End: 2020-07-02

## 2020-01-13 RX ORDER — TRIAMCINOLONE ACETONIDE 55 UG/1
2 SPRAY, METERED NASAL DAILY
Qty: 17 G | Refills: 3 | Status: SHIPPED | OUTPATIENT
Start: 2020-01-13 | End: 2020-04-17 | Stop reason: SDUPTHER

## 2020-01-13 NOTE — PROGRESS NOTES
"Subjective:       Patient ID: Kristyn Garza is a 69 y.o. female.    Chief Complaint   Patient presents with    Dizziness    Hospital Follow Up       HPI    Kristyn Garza is here today for hospital follow-up visit.  Seen at [facility] ED on 1/10/2020 for c/o dizziness and weakness.    Per ED note:  History of Present Illness: Kristyn Garza is a 69 y.o. female patient with a PMHx of vertigo, CKD, and DM who presents to the Emergency Department for evaluation of dizziness which onset suddenly last night. She described symptoms as room-spinning. Patient states she is out of her vertigo medications so symptoms have not been resolved. Symptoms are constant and moderate in severity. No mitigating or exacerbating factors reported. Associated sxs include n/v. Patient denies any fever, chills, n/v/d, CP, SOB, cough, palpitations, leg swelling, HA, and all other sxs at this time. No prior tx. No further complaints or concerns at this time.     ED Medication(s):  Medications   meclizine tablet 25 mg (25 mg Oral Given 1/10/20 0857)     9:48 AM: Reassessed pt at this time.  Pt states her condition has improved at this time after the meclizine. Discussed with pt all pertinent ED information and results. Discussed pt dx and plan of tx. Gave pt all f/u and return to the ED instructions. All questions and concerns were addressed at this time. Pt expresses understanding of information and instructions, and is comfortable with plan to discharge. Pt is stable for discharge.      She reports having had this before, was an inner ear issue.  Thinks the dizziness is due to her sinuses.  Last week started, using nasal spray but out of Claritin.  No f/c, or cough.  Dizzy when going from lying to sitting position, c/o "room spinning".  With some nausea at times.  Meclizine has helped some but causes increased sleepiness, trying 1/2 tab now b/c of the sedation.  Reports tinnitus, described as "paper crunching" to RT ear, "ocean noise" in " LT ear.  No problems sleeping.        Lab Results   Component Value Date    HGBA1C 6.5 (H) 09/11/2019       BP Readings from Last 3 Encounters:   01/13/20 122/78   01/10/20 (!) 142/72   10/16/19 136/72         Review of Systems   Constitutional: Negative.    HENT: Positive for tinnitus. Negative for ear discharge and ear pain.    Respiratory: Negative.    Cardiovascular: Negative.    Gastrointestinal: Negative.    Genitourinary: Negative.    Neurological: Positive for dizziness and light-headedness. Negative for tremors, syncope, weakness, numbness and headaches.         Review of patient's allergies indicates:   Allergen Reactions    Statins-hmg-coa reductase inhibitors      Muscle Cramps         Medication List with Changes/Refills   Current Medications    AMLODIPINE-BENAZEPRIL (LOTREL) 5-40 MG PER CAPSULE    Take 1 capsule by mouth once daily.    ASPIRIN 81 MG CHEW    Take 81 mg by mouth once daily.    AZELASTINE (ASTELIN) 137 MCG (0.1 %) NASAL SPRAY    USE 1 SPRAY (137 MCG TOTAL) BY NASAL ROUTE 2 (TWO) TIMES DAILY.    BLOOD GLUCOSE CONTROL, NORMAL SOLN    1 Bottle by Misc.(Non-Drug; Combo Route) route once daily. For Accu Check Amber  use twice daily prn dx: E11.9    BLOOD SUGAR DIAGNOSTIC STRP    1 strip by Misc.(Non-Drug; Combo Route) route 2 (two) times daily.    BLOOD-GLUCOSE METER (ACCU-CHEK AMBER) MISC    1 kit by Misc.(Non-Drug; Combo Route) route once. For Accu Check Amber  use twice daily prn dx: E11.9 for 1 dose    CALCIUM-VITAMIN D3 500 MG(1,250MG) -200 UNIT PER TABLET    Take 1 tablet by mouth once daily.     CICLOPIROX (PENLAC) 8 % SOLN    Apply topically nightly.    CRANBERRY EXTRACT ORAL    Take 1 tablet by mouth once daily.    EVOLOCUMAB (REPATHA SURECLICK) 140 MG/ML PNIJ    Inject 1 mL (140 mg total) into the skin every 14 (fourteen) days.    LANCETS MISC    1 lancet by Misc.(Non-Drug; Combo Route) route 3 (three) times daily. For Acc-Chek Amber   DX :E11.9    LORATADINE (CLARITIN) 10 MG TABLET     "Take 1 tablet (10 mg total) by mouth once daily.    MECLIZINE (ANTIVERT) 25 MG TABLET    Take 1 tablet (25 mg total) by mouth 3 (three) times daily as needed.    MULTIVITAMIN W-MINERALS/LUTEIN (CENTRUM SILVER ORAL)    Take 1 tablet by mouth once daily.    SPIRONOLACTONE (ALDACTONE) 50 MG TABLET    Take 1 tablet (50 mg total) by mouth once daily.   Discontinued Medications    FLUZONE HIGH-DOSE 2018-19, PF, 180 MCG/0.5 ML VACCINE    TO BE ADMINISTERED BY PHARMACIST FOR IMMUNIZATION       Patient Active Problem List   Diagnosis    Multiple thyroid nodules    Cupping of optic disc    Nuclear sclerosis    Combined hyperlipidemia associated with type 2 diabetes mellitus    Hypertension associated with diabetes    CKD (chronic kidney disease) stage 3, GFR 30-59 ml/min    Obesity (BMI 30.0-34.9)    Postmenopausal    Statin intolerance    Abnormal ECG    LVH (left ventricular hypertrophy)    Family history of cardiovascular disorder    PAC (premature atrial contraction)    Type 2 diabetes mellitus with kidney complication, without long-term current use of insulin         Past medical, surgical, family and social histories have been reviewed today.        Objective:     Vitals:    01/13/20 1310   BP: 122/78   Pulse: 72   Temp: 98.4 °F (36.9 °C)   Weight: 83.9 kg (184 lb 15.5 oz)   Height: 5' 4" (1.626 m)   PainSc: 0-No pain         Physical Exam   Constitutional: She appears well-developed and well-nourished.   HENT:   Head: Normocephalic and atraumatic.   Right Ear: Tympanic membrane is retracted (TM and LR dull on both sides). Tympanic membrane is not injected, not perforated and not erythematous. No middle ear effusion.   Left Ear: Tympanic membrane is retracted. Tympanic membrane is not injected, not perforated and not erythematous.  No middle ear effusion.   Nose: Mucosal edema present. No rhinorrhea.   Mouth/Throat: Oropharynx is clear and moist and mucous membranes are normal.   Eyes: Pupils are equal, " round, and reactive to light. EOM are normal.   Neck: Normal range of motion. Neck supple. No JVD present. No tracheal deviation present. No thyromegaly present.   Cardiovascular: Normal rate and regular rhythm.   Pulmonary/Chest: Effort normal and breath sounds normal.   Vitals reviewed.        Diagnosis       1. Dysfunction of both eustachian tubes    2. Dizziness    3. Hypertension associated with diabetes    4. CKD (chronic kidney disease) stage 3, GFR 30-59 ml/min          Assessment/ Plan     Dysfunction of both eustachian tubes  -     montelukast (SINGULAIR) 10 mg tablet; Take 1 tablet (10 mg total) by mouth once daily.  Dispense: 30 tablet; Refill: 6  -     triamcinolone (NASACORT) 55 mcg nasal inhaler; 2 sprays by Nasal route once daily.  Dispense: 17 g; Refill: 3    Dizziness  · Likely due to ETD and inner ear issue, meclizine prn.    Hypertension associated with diabetes  -     CBC auto differential; Future; Expected date: 01/13/2020  -     Comprehensive metabolic panel; Future; Expected date: 01/13/2020    CKD (chronic kidney disease) stage 3, GFR 30-59 ml/min  -     CBC auto differential; Future; Expected date: 01/13/2020  -     Comprehensive metabolic panel; Future; Expected date: 01/13/2020        Check lab post-ED visit.  Medication discussed, as directed.  To ENT if s/s worsen or persist.  Follow-up in clinic as needed.          Patient Care Team:  Zoë Guzman MD as PCP - General (Family Medicine)  Delia Liu LPN as Care Coordinator (Internal Medicine)      SHIRA Baig  [facility]

## 2020-03-06 ENCOUNTER — OFFICE VISIT (OUTPATIENT)
Dept: CARDIOLOGY | Facility: CLINIC | Age: 70
End: 2020-03-06
Payer: MEDICARE

## 2020-03-06 VITALS
WEIGHT: 181 LBS | DIASTOLIC BLOOD PRESSURE: 74 MMHG | HEART RATE: 68 BPM | BODY MASS INDEX: 31.07 KG/M2 | SYSTOLIC BLOOD PRESSURE: 136 MMHG | RESPIRATION RATE: 16 BRPM | OXYGEN SATURATION: 96 %

## 2020-03-06 DIAGNOSIS — R94.31 ABNORMAL ECG: ICD-10-CM

## 2020-03-06 DIAGNOSIS — I49.1 PAC (PREMATURE ATRIAL CONTRACTION): ICD-10-CM

## 2020-03-06 DIAGNOSIS — I51.7 LVH (LEFT VENTRICULAR HYPERTROPHY): ICD-10-CM

## 2020-03-06 DIAGNOSIS — Z82.49 FAMILY HISTORY OF CARDIOVASCULAR DISORDER: ICD-10-CM

## 2020-03-06 DIAGNOSIS — E11.22 TYPE 2 DIABETES MELLITUS WITH STAGE 3 CHRONIC KIDNEY DISEASE, WITHOUT LONG-TERM CURRENT USE OF INSULIN: ICD-10-CM

## 2020-03-06 DIAGNOSIS — Z78.9 STATIN INTOLERANCE: ICD-10-CM

## 2020-03-06 DIAGNOSIS — E11.59 HYPERTENSION ASSOCIATED WITH DIABETES: Primary | ICD-10-CM

## 2020-03-06 DIAGNOSIS — E66.9 OBESITY (BMI 30.0-34.9): ICD-10-CM

## 2020-03-06 DIAGNOSIS — E78.2 COMBINED HYPERLIPIDEMIA ASSOCIATED WITH TYPE 2 DIABETES MELLITUS: ICD-10-CM

## 2020-03-06 DIAGNOSIS — I15.2 HYPERTENSION ASSOCIATED WITH DIABETES: Primary | ICD-10-CM

## 2020-03-06 DIAGNOSIS — E11.69 COMBINED HYPERLIPIDEMIA ASSOCIATED WITH TYPE 2 DIABETES MELLITUS: ICD-10-CM

## 2020-03-06 DIAGNOSIS — N18.30 CKD (CHRONIC KIDNEY DISEASE) STAGE 3, GFR 30-59 ML/MIN: ICD-10-CM

## 2020-03-06 DIAGNOSIS — N18.30 TYPE 2 DIABETES MELLITUS WITH STAGE 3 CHRONIC KIDNEY DISEASE, WITHOUT LONG-TERM CURRENT USE OF INSULIN: ICD-10-CM

## 2020-03-06 PROCEDURE — 99214 OFFICE O/P EST MOD 30 MIN: CPT | Mod: S$PBB,,, | Performed by: INTERNAL MEDICINE

## 2020-03-06 PROCEDURE — 99213 OFFICE O/P EST LOW 20 MIN: CPT | Mod: PBBFAC | Performed by: INTERNAL MEDICINE

## 2020-03-06 PROCEDURE — 99999 PR PBB SHADOW E&M-EST. PATIENT-LVL III: CPT | Mod: PBBFAC,,, | Performed by: INTERNAL MEDICINE

## 2020-03-06 PROCEDURE — 99214 PR OFFICE/OUTPT VISIT, EST, LEVL IV, 30-39 MIN: ICD-10-PCS | Mod: S$PBB,,, | Performed by: INTERNAL MEDICINE

## 2020-03-06 PROCEDURE — 99999 PR PBB SHADOW E&M-EST. PATIENT-LVL III: ICD-10-PCS | Mod: PBBFAC,,, | Performed by: INTERNAL MEDICINE

## 2020-03-06 NOTE — PROGRESS NOTES
Subjective:    Patient ID:  Kristyn Garza is a 69 y.o. female who presents for evaluation of Hypertension; Hyperlipidemia; and Risk Factor Management For Atherosclerosis        HPI  Pt presents for f/u.  Her current med conditions include DM, PACs, HTN, hyperlipidemia, CRI.  Former smoker (quit 4 years ago).  Past hx pertinent for following:   Statin intolerant.  ecg 2/26/19 NSR, LVH.  Brother has CAD + revascularization.   ECG 7/25/19 showed NSR, PACs, LVH.  Stress MPI 5/19 no ischemia.  Echo 5/19 normal LV function.  AMITA 5/19 normal.   Now here.  On Repatha, and lipids much improved.  No chest pain sxs.  No CHF sxs.  HTN controlled on current meds.  Obesity stable, down 7 pounds since last visit.  DM well controlled.  bs 93 this am, 93 - 110.  No palpitations.  Compliant with meds.  CRI stable on last labs.  H/o abnl ecg, stable.    Current Outpatient Medications:     amlodipine-benazepril (LOTREL) 5-40 mg per capsule, Take 1 capsule by mouth once daily., Disp: 90 capsule, Rfl: 1    aspirin 81 MG Chew, Take 81 mg by mouth once daily., Disp: , Rfl:     azelastine (ASTELIN) 137 mcg (0.1 %) nasal spray, USE 1 SPRAY (137 MCG TOTAL) BY NASAL ROUTE 2 (TWO) TIMES DAILY., Disp: , Rfl: 1    blood glucose control, normal Soln, 1 Bottle by Misc.(Non-Drug; Combo Route) route once daily. For Accu Check Amber  use twice daily prn dx: E11.9, Disp: 1 each, Rfl: 0    blood sugar diagnostic Strp, 1 strip by Misc.(Non-Drug; Combo Route) route 2 (two) times daily., Disp: 200 each, Rfl: 3    blood-glucose meter (ACCU-CHEK AMBER) Misc, 1 kit by Misc.(Non-Drug; Combo Route) route once. For Accu Check Amber  use twice daily prn dx: E11.9 for 1 dose, Disp: 1 each, Rfl: 0    calcium-vitamin D3 500 mg(1,250mg) -200 unit per tablet, Take 1 tablet by mouth once daily. , Disp: , Rfl:     ciclopirox (PENLAC) 8 % Soln, Apply topically nightly., Disp: 6.6 mL, Rfl: 0    CRANBERRY EXTRACT ORAL, Take 1 tablet by mouth once daily., Disp: ,  Rfl:     evolocumab (REPATHA SURECLICK) 140 mg/mL PnIj, Inject 1 mL (140 mg total) into the skin every 14 (fourteen) days., Disp: 2 mL, Rfl: 12    lancets Misc, 1 lancet by Misc.(Non-Drug; Combo Route) route 3 (three) times daily. For Acc-Chek Irais   DX :E11.9, Disp: 100 each, Rfl: 11    loratadine (CLARITIN) 10 mg tablet, Take 1 tablet (10 mg total) by mouth once daily. (Patient taking differently: Take 10 mg by mouth daily as needed. ), Disp: 90 tablet, Rfl: 3    meclizine (ANTIVERT) 25 mg tablet, Take 1 tablet (25 mg total) by mouth 3 (three) times daily as needed., Disp: 20 tablet, Rfl: 0    MULTIVITAMIN W-MINERALS/LUTEIN (CENTRUM SILVER ORAL), Take 1 tablet by mouth once daily., Disp: , Rfl:     spironolactone (ALDACTONE) 50 MG tablet, Take 1 tablet (50 mg total) by mouth once daily., Disp: 90 tablet, Rfl: 1    triamcinolone (NASACORT) 55 mcg nasal inhaler, 2 sprays by Nasal route once daily., Disp: 17 g, Rfl: 3    Review of Systems   Constitution: Positive for weight loss.   HENT: Negative.    Eyes: Negative.    Cardiovascular: Negative.    Respiratory: Negative.    Endocrine: Negative.    Hematologic/Lymphatic: Negative.    Skin: Negative.    Musculoskeletal: Negative.    Gastrointestinal: Negative.    Genitourinary: Negative.    Neurological: Negative.    Psychiatric/Behavioral: Negative.    Allergic/Immunologic: Negative.        /74   Pulse 68   Resp 16   Wt 82.1 kg (181 lb)   SpO2 96%   BMI 31.07 kg/m²     Wt Readings from Last 3 Encounters:   03/06/20 82.1 kg (181 lb)   01/13/20 83.9 kg (184 lb 15.5 oz)   01/10/20 85.2 kg (187 lb 12.8 oz)     Temp Readings from Last 3 Encounters:   01/13/20 98.4 °F (36.9 °C)   01/10/20 98 °F (36.7 °C)   10/16/19 98.2 °F (36.8 °C) (Oral)     BP Readings from Last 3 Encounters:   03/06/20 136/74   01/13/20 122/78   01/10/20 (!) 142/72     Pulse Readings from Last 3 Encounters:   03/06/20 68   01/13/20 72   01/10/20 60          Objective:    Physical Exam    Constitutional: She is oriented to person, place, and time. Vital signs are normal. She appears well-developed and well-nourished. She is active and cooperative. She does not have a sickly appearance. She does not appear ill. No distress.   HENT:   Head: Normocephalic.   Neck: Neck supple. Normal carotid pulses, no hepatojugular reflux and no JVD present. Carotid bruit is not present. No thyromegaly present.   Cardiovascular: Normal rate, regular rhythm, S1 normal, S2 normal, normal heart sounds and normal pulses. PMI is not displaced. Exam reveals no gallop and no friction rub.   No murmur heard.  Pulses:       Radial pulses are 2+ on the right side, and 2+ on the left side.   Pulmonary/Chest: Effort normal and breath sounds normal. She has no wheezes. She has no rales.   Abdominal: Soft. Normal appearance, normal aorta and bowel sounds are normal. She exhibits no pulsatile liver, no abdominal bruit, no ascites and no mass. There is no splenomegaly or hepatomegaly. There is no tenderness.   Musculoskeletal: She exhibits no edema.   Lymphadenopathy:     She has no cervical adenopathy.   Neurological: She is alert and oriented to person, place, and time.   Skin: Skin is warm. She is not diaphoretic.   Psychiatric: She has a normal mood and affect. Her behavior is normal.   Nursing note and vitals reviewed.      I have reviewed all pertinent labs and cardiac studies.      Chemistry        Component Value Date/Time     01/13/2020 1355    K 3.6 01/13/2020 1355     01/13/2020 1355    CO2 33 (H) 01/13/2020 1355    BUN 12 01/13/2020 1355    CREATININE 1.0 01/13/2020 1355     01/13/2020 1355        Component Value Date/Time    CALCIUM 9.4 01/13/2020 1355    ALKPHOS 110 01/13/2020 1355    AST 21 01/13/2020 1355    ALT 23 01/13/2020 1355    BILITOT 0.4 01/13/2020 1355    ESTGFRAFRICA >60.0 01/13/2020 1355    EGFRNONAA 56.8 (A) 01/13/2020 1355        Lab Results   Component Value Date    HGBA1C 6.5 (H)  09/11/2019     Lab Results   Component Value Date    CHOL 150 09/11/2019    CHOL 242 (H) 04/16/2019    CHOL 239 (H) 04/13/2018     Lab Results   Component Value Date    HDL 43 09/11/2019    HDL 55 04/16/2019    HDL 51 04/13/2018     Lab Results   Component Value Date    LDLCALC 90.2 09/11/2019    LDLCALC 171.4 (H) 04/16/2019    LDLCALC 170.8 (H) 04/13/2018     Lab Results   Component Value Date    TRIG 84 09/11/2019    TRIG 78 04/16/2019    TRIG 86 04/13/2018     Lab Results   Component Value Date    CHOLHDL 28.7 09/11/2019    CHOLHDL 22.7 04/16/2019    CHOLHDL 21.3 04/13/2018           Assessment:       1. Hypertension associated with diabetes    2. CKD (chronic kidney disease) stage 3, GFR 30-59 ml/min    3. Abnormal ECG    4. Combined hyperlipidemia associated with type 2 diabetes mellitus    5. Family history of cardiovascular disorder    6. LVH (left ventricular hypertrophy)    7. Type 2 diabetes mellitus with stage 3 chronic kidney disease, without long-term current use of insulin    8. Statin intolerance    9. PAC (premature atrial contraction)    10. Obesity (BMI 30.0-34.9)         Plan:             Stable cardiovascular conditions at present time on current medical treatment.  Reviewed all tests and above medical conditions with patient in detail and formulated treatment plan.  Continue optimal medical treatment for cardiovascular conditions.  Cardiac low salt diet discussed.  Daily exercise encouraged, goal 30 +  minutes aerobic exercise as tolerated.  Maintaining healthy weight and weight loss goals (if needed) were discussed in clinic.  Keep DM well controlled.  Stay on Repatha.  Update lipids, HGAIC.  Monitor renal function in future w labs.  Phone review.  F/u in 6 months.

## 2020-03-09 ENCOUNTER — LAB VISIT (OUTPATIENT)
Dept: LAB | Facility: HOSPITAL | Age: 70
End: 2020-03-09
Attending: INTERNAL MEDICINE
Payer: MEDICARE

## 2020-03-09 DIAGNOSIS — E78.2 COMBINED HYPERLIPIDEMIA ASSOCIATED WITH TYPE 2 DIABETES MELLITUS: ICD-10-CM

## 2020-03-09 DIAGNOSIS — I15.2 HYPERTENSION ASSOCIATED WITH DIABETES: ICD-10-CM

## 2020-03-09 DIAGNOSIS — N18.30 CKD (CHRONIC KIDNEY DISEASE) STAGE 3, GFR 30-59 ML/MIN: ICD-10-CM

## 2020-03-09 DIAGNOSIS — N18.30 TYPE 2 DIABETES MELLITUS WITH STAGE 3 CHRONIC KIDNEY DISEASE, WITHOUT LONG-TERM CURRENT USE OF INSULIN: ICD-10-CM

## 2020-03-09 DIAGNOSIS — Z82.49 FAMILY HISTORY OF CARDIOVASCULAR DISORDER: ICD-10-CM

## 2020-03-09 DIAGNOSIS — E66.9 OBESITY (BMI 30.0-34.9): ICD-10-CM

## 2020-03-09 DIAGNOSIS — E11.22 TYPE 2 DIABETES MELLITUS WITH STAGE 3 CHRONIC KIDNEY DISEASE, WITHOUT LONG-TERM CURRENT USE OF INSULIN: ICD-10-CM

## 2020-03-09 DIAGNOSIS — I51.7 LVH (LEFT VENTRICULAR HYPERTROPHY): ICD-10-CM

## 2020-03-09 DIAGNOSIS — E11.59 HYPERTENSION ASSOCIATED WITH DIABETES: ICD-10-CM

## 2020-03-09 DIAGNOSIS — R94.31 ABNORMAL ECG: ICD-10-CM

## 2020-03-09 DIAGNOSIS — Z78.9 STATIN INTOLERANCE: ICD-10-CM

## 2020-03-09 DIAGNOSIS — E11.69 COMBINED HYPERLIPIDEMIA ASSOCIATED WITH TYPE 2 DIABETES MELLITUS: ICD-10-CM

## 2020-03-09 DIAGNOSIS — I49.1 PAC (PREMATURE ATRIAL CONTRACTION): ICD-10-CM

## 2020-03-09 LAB
CHOLEST SERPL-MCNC: 145 MG/DL (ref 120–199)
CHOLEST/HDLC SERPL: 3 {RATIO} (ref 2–5)
HDLC SERPL-MCNC: 49 MG/DL (ref 40–75)
HDLC SERPL: 33.8 % (ref 20–50)
LDLC SERPL CALC-MCNC: 86 MG/DL (ref 63–159)
NONHDLC SERPL-MCNC: 96 MG/DL
TRIGL SERPL-MCNC: 50 MG/DL (ref 30–150)

## 2020-03-09 PROCEDURE — 80061 LIPID PANEL: CPT

## 2020-03-09 PROCEDURE — 83036 HEMOGLOBIN GLYCOSYLATED A1C: CPT

## 2020-03-09 PROCEDURE — 80053 COMPREHEN METABOLIC PANEL: CPT

## 2020-03-09 PROCEDURE — 36415 COLL VENOUS BLD VENIPUNCTURE: CPT | Mod: PO

## 2020-03-10 ENCOUNTER — TELEPHONE (OUTPATIENT)
Dept: CARDIOLOGY | Facility: CLINIC | Age: 70
End: 2020-03-10

## 2020-03-10 LAB
ALBUMIN SERPL BCP-MCNC: 3.7 G/DL (ref 3.5–5.2)
ALP SERPL-CCNC: 100 U/L (ref 55–135)
ALT SERPL W/O P-5'-P-CCNC: 18 U/L (ref 10–44)
ANION GAP SERPL CALC-SCNC: 9 MMOL/L (ref 8–16)
AST SERPL-CCNC: 22 U/L (ref 10–40)
BILIRUB SERPL-MCNC: 0.5 MG/DL (ref 0.1–1)
BUN SERPL-MCNC: 13 MG/DL (ref 8–23)
CALCIUM SERPL-MCNC: 9.2 MG/DL (ref 8.7–10.5)
CHLORIDE SERPL-SCNC: 106 MMOL/L (ref 95–110)
CO2 SERPL-SCNC: 29 MMOL/L (ref 23–29)
CREAT SERPL-MCNC: 1 MG/DL (ref 0.5–1.4)
EST. GFR  (AFRICAN AMERICAN): >60 ML/MIN/1.73 M^2
EST. GFR  (NON AFRICAN AMERICAN): 56.8 ML/MIN/1.73 M^2
ESTIMATED AVG GLUCOSE: 140 MG/DL (ref 68–131)
GLUCOSE SERPL-MCNC: 82 MG/DL (ref 70–110)
HBA1C MFR BLD HPLC: 6.5 % (ref 4–5.6)
POTASSIUM SERPL-SCNC: 3.8 MMOL/L (ref 3.5–5.1)
PROT SERPL-MCNC: 7.5 G/DL (ref 6–8.4)
SODIUM SERPL-SCNC: 144 MMOL/L (ref 136–145)

## 2020-03-10 NOTE — TELEPHONE ENCOUNTER
Please call pt  Labs are stable.  Lipids and DM well controlled.  Continue current meds.  F/u next appt.    Dr Matute

## 2020-03-10 NOTE — TELEPHONE ENCOUNTER
Spoke with patient to advise her that Labs are stable.  Lipids and DM well controlled.  Continue current meds.  F/u next appt.  Denies questions/concerns.

## 2020-03-26 DIAGNOSIS — Z78.9 STATIN INTOLERANCE: ICD-10-CM

## 2020-03-26 DIAGNOSIS — N18.30 CKD (CHRONIC KIDNEY DISEASE) STAGE 3, GFR 30-59 ML/MIN: ICD-10-CM

## 2020-03-26 DIAGNOSIS — I51.7 LVH (LEFT VENTRICULAR HYPERTROPHY): ICD-10-CM

## 2020-03-26 DIAGNOSIS — E11.59 HYPERTENSION ASSOCIATED WITH DIABETES: ICD-10-CM

## 2020-03-26 DIAGNOSIS — R94.31 ABNORMAL ECG: ICD-10-CM

## 2020-03-26 DIAGNOSIS — E11.21 TYPE II DIABETES MELLITUS WITH NEPHROPATHY: ICD-10-CM

## 2020-03-26 DIAGNOSIS — E78.2 COMBINED HYPERLIPIDEMIA ASSOCIATED WITH TYPE 2 DIABETES MELLITUS: ICD-10-CM

## 2020-03-26 DIAGNOSIS — Z82.49 FAMILY HISTORY OF CARDIOVASCULAR DISORDER: ICD-10-CM

## 2020-03-26 DIAGNOSIS — E11.69 COMBINED HYPERLIPIDEMIA ASSOCIATED WITH TYPE 2 DIABETES MELLITUS: ICD-10-CM

## 2020-03-26 DIAGNOSIS — I15.2 HYPERTENSION ASSOCIATED WITH DIABETES: ICD-10-CM

## 2020-03-26 NOTE — TELEPHONE ENCOUNTER
Returned patient's call, patient called regarding some paper work fr Dr. Matute to fill out. She is requesting a refill on her Repatha. She says she filled out her portion of the paper work. I advised her that she needs to send her portion to [employer], and after they receive it they will contact us to have the  Complete his portion.

## 2020-03-30 NOTE — TELEPHONE ENCOUNTER
DOCUMENTATION ONLY:  Prior authorization for Repatha approved from 3/26/2020 to 3/25/2021.    Case ID# [id number]    Co-pay: $80.00    Patient Assistance IS required.     Forward to patient assistance for review. -HBR

## 2020-03-31 NOTE — TELEPHONE ENCOUNTER
Financial Assistance for Repatha approved from 03/01/20 to 02/28/21 [facility] BIN:[id number] PCN: [id number] Id: [id number] GRP: [id number] SAVINGS $ [id number]

## 2020-04-17 ENCOUNTER — OFFICE VISIT (OUTPATIENT)
Dept: FAMILY MEDICINE | Facility: CLINIC | Age: 70
End: 2020-04-17
Payer: MEDICARE

## 2020-04-17 DIAGNOSIS — N18.30 TYPE 2 DIABETES MELLITUS WITH STAGE 3 CHRONIC KIDNEY DISEASE, WITHOUT LONG-TERM CURRENT USE OF INSULIN: ICD-10-CM

## 2020-04-17 DIAGNOSIS — Z79.4 TYPE 2 DIABETES MELLITUS WITHOUT COMPLICATION, WITH LONG-TERM CURRENT USE OF INSULIN: ICD-10-CM

## 2020-04-17 DIAGNOSIS — I51.7 LVH (LEFT VENTRICULAR HYPERTROPHY): ICD-10-CM

## 2020-04-17 DIAGNOSIS — E04.2 MULTIPLE THYROID NODULES: ICD-10-CM

## 2020-04-17 DIAGNOSIS — J30.2 SEASONAL ALLERGIC RHINITIS, UNSPECIFIED TRIGGER: ICD-10-CM

## 2020-04-17 DIAGNOSIS — E11.59 HYPERTENSION ASSOCIATED WITH DIABETES: ICD-10-CM

## 2020-04-17 DIAGNOSIS — Z78.0 POSTMENOPAUSAL: ICD-10-CM

## 2020-04-17 DIAGNOSIS — Z78.9 STATIN INTOLERANCE: ICD-10-CM

## 2020-04-17 DIAGNOSIS — Z12.31 VISIT FOR SCREENING MAMMOGRAM: ICD-10-CM

## 2020-04-17 DIAGNOSIS — N18.30 CKD (CHRONIC KIDNEY DISEASE) STAGE 3, GFR 30-59 ML/MIN: ICD-10-CM

## 2020-04-17 DIAGNOSIS — E78.2 COMBINED HYPERLIPIDEMIA ASSOCIATED WITH TYPE 2 DIABETES MELLITUS: ICD-10-CM

## 2020-04-17 DIAGNOSIS — E11.22 TYPE 2 DIABETES MELLITUS WITH STAGE 3 CHRONIC KIDNEY DISEASE, WITHOUT LONG-TERM CURRENT USE OF INSULIN: ICD-10-CM

## 2020-04-17 DIAGNOSIS — E11.69 COMBINED HYPERLIPIDEMIA ASSOCIATED WITH TYPE 2 DIABETES MELLITUS: ICD-10-CM

## 2020-04-17 DIAGNOSIS — E11.9 TYPE 2 DIABETES MELLITUS WITHOUT COMPLICATION, WITH LONG-TERM CURRENT USE OF INSULIN: ICD-10-CM

## 2020-04-17 DIAGNOSIS — I15.2 HYPERTENSION ASSOCIATED WITH DIABETES: ICD-10-CM

## 2020-04-17 DIAGNOSIS — H69.93 DYSFUNCTION OF BOTH EUSTACHIAN TUBES: ICD-10-CM

## 2020-04-17 PROCEDURE — 99214 OFFICE O/P EST MOD 30 MIN: CPT | Mod: 95,,, | Performed by: FAMILY MEDICINE

## 2020-04-17 PROCEDURE — 99214 PR OFFICE/OUTPT VISIT, EST, LEVL IV, 30-39 MIN: ICD-10-PCS | Mod: 95,,, | Performed by: FAMILY MEDICINE

## 2020-04-17 RX ORDER — LANCETS
1 EACH MISCELLANEOUS 3 TIMES DAILY
Qty: 100 EACH | Refills: 11 | Status: SHIPPED | OUTPATIENT
Start: 2020-04-17 | End: 2020-06-16 | Stop reason: SDUPTHER

## 2020-04-17 RX ORDER — TRIAMCINOLONE ACETONIDE 55 UG/1
2 SPRAY, METERED NASAL DAILY
Qty: 17 G | Refills: 3 | Status: SHIPPED | OUTPATIENT
Start: 2020-04-17 | End: 2020-11-03

## 2020-04-17 RX ORDER — LORATADINE 10 MG/1
10 TABLET ORAL DAILY
Qty: 90 TABLET | Refills: 1 | Status: SHIPPED | OUTPATIENT
Start: 2020-04-17 | End: 2020-04-21 | Stop reason: SDUPTHER

## 2020-04-17 NOTE — PROGRESS NOTES
The patient location is: Home (due to Covid-19 pandemic)  The chief complaint leading to consultation is: Annual wellness examination  Visit type: audiovisual  Total time spent with patient: 25 minutes  Each patient to whom he or she provides medical services by telemedicine is:  (1) informed of the relationship between the physician and patient and the respective role of any other health care provider with respect to management of the patient; and (2) notified that he or she may decline to receive medical services by telemedicine and may withdraw from such care at any time.    Notes:     HISTORY OF PRESENT ILLNESS: Ms. Garza visits with me today with taking blood pressure medication for annual wellness examination.    END OF LIFE DECISION: She has no living will and does not desire life support.    Current Outpatient Medications   Medication Sig    amlodipine-benazepril (LOTREL) 5-40 mg per capsule Take 1 capsule by mouth once daily.    aspirin 81 MG Chew Take 81 mg by mouth once daily.    blood glucose control, normal Soln 1 Bottle by Misc.(Non-Drug; Combo Route) route once daily. For Accu Check Irais  use twice daily prn dx: E11.9    blood sugar diagnostic Strp 1 strip by Misc.(Non-Drug; Combo Route) route 2 (two) times daily.    calcium-vitamin D3 500 mg(1,250mg) -200 unit per tablet Take 1 tablet by mouth once daily.     CRANBERRY EXTRACT ORAL Take 1 tablet by mouth once daily.    evolocumab (REPATHA SURECLICK) 140 mg/mL PnIj Inject 1 mL (140 mg total) into the skin every 14 (fourteen) days.    lancets Misc 1 lancet by Misc.(Non-Drug; Combo Route) route 3 (three) times daily. For Acc-Chek Irais   DX :E11.9    meclizine (ANTIVERT) 25 mg tablet Take 1 tablet (25 mg total) by mouth 3 (three) times daily as needed.    MULTIVITAMIN W-MINERALS/LUTEIN (CENTRUM SILVER ORAL) Take 1 tablet by mouth once daily.    spironolactone (ALDACTONE) 50 MG tablet Take 1 tablet (50 mg total) by mouth once daily.     triamcinolone (NASACORT) 55 mcg nasal inhaler 2 sprays by Nasal route once daily.    blood-glucose meter (ACCU-CHEK AMBER) Misc 1 kit by Misc.(Non-Drug; Combo Route) route once. For Accu Check Amber  use twice daily prn dx: E11.9 for 1 dose    ciclopirox (PENLAC) 8 % Soln Apply topically nightly. (Patient not taking: Reported on 4/17/2020)    loratadine (CLARITIN) 10 mg tablet Take 1 tablet (10 mg total) by mouth once daily. (Patient not taking: Reported on 4/17/2020)     SCREENINGS:   Cholesterol: March 9, 2020.  FFS/Colonoscopy: March 18, 2014 - okay; repeat in 10 years.  Mammogram: May 24, 2019 - okay.  GYN Exam: April 16, 2019 - okay.  Dexa Scan: April 28, 2016 - okay; repeat in 3 to 5 years.  Eye Exam: April 30, 2019 with Dr. Hardy. She wears glasses.  Dental Exam: October or November 2014 per patient. She wears top dentures.   PPD: Negative in the past.  Immunizations: Td/Tdap - February 23, 2011.  Gardasil - N./A.  Zostavax - February 23, 2011.  Shingrix - Never. Advised patient insurance-covered benefit only at local pharmacy.  Pneumovax - March 3, 2014.  Prevnar-13 shot - March 23, 2015.  Seasonal Flu - October 16, 2019.     ROS:  GENERAL: Denies fever, chills, fatigue or unusual weight change. Appetite normal. Weight 85.3 kg (188 lb 0.8 oz) at October 16, 2019 visit. Exercises/walks around her home and wears facemask and gloves when outside of her home during Covid-19 pandemic. Monitors her diet.    SKIN: Denies rashes, itching, changes in mole, color or texture of skin or easy bruising.   HEAD: Denies headaches or recent head trauma.  EYES: Denies change in vision, pain, diplopia, redness or discharge. She wears glasses.  EARS: Denies ear pain, discharge, tinnitus, vertigo or decreased hearing. Saw ENT Dr. Irwin on May 13, 2019 for asymmetrical left sensorineural hearing loss, endolymphatic hydrops of left ear follow up.    NOSE: Denies loss of smell, epistaxis or rhinitis except nasal congestion  and uses Nasacort and Claritin with help.  Requests refill of Claritin.  MOUTH & THROAT: Denies hoarseness or change in voice. Denies excessive gum bleeding or mouth sores. Denies sore throat.  NODES: Denies swollen glands.  CHEST: Denies ZHOU, wheezing, cough, or sputum production.  CARDIOVASCULAR: Denies chest pain, PND, orthopnea or reduced exercise tolerance. Denies palpitations.  Saw Dr. Matute, cardiologist, on March 6, 2020 for surveillance of hypertension, hyperlipidemia, statin intolerance, LVH and PAC with 6-month follow up advised.  ABDOMEN: Denies diarrhea, constipation, nausea, vomiting, abdominal pain, or blood in stool.  URINARY: Denies flank pain, dysuria or hematuria. Saw Nephrology Dr. Hernandez on May 29, 2019 for surveillance of stage 3 chronic kidney disease with 1-year follow up advised.  GENITOURINARY: Denies flank pain, dysuria, frequency or hematuria. Performs monthly breast self exams.  ENDOCRINE: Performs home glucose checks 1 time per day with levels ranging 's but 119 this morning. Saw ENT Dr. Irwin on May 13, 2019 for surveillance of hearing loss but has also seen her in the past for thyroid nodules. However, thyroid ultrasound on May 24, 2019 showed stable findings and I recommended continue to follow annually with thyroid ultrasound.   HEME//LYMPH: Denies bleeding problems.  PERIPHERAL VASCULAR:Denies claudication or cyanosis.  MUSCULOSKELETAL: Denies joint stiffness, pain or swelling today but mentions chronic stiffness with getting up and after sitting for long periods but better as walks around during TV commercials.   NEUROLOGIC: Denies history of seizures, tremors, paralysis, alteration of gait or coordination.  PSYCHIATRIC: Denies mood swings, depression, anxiety, homicidal or suicidal thoughts. Denies sleep problems.     PE:   VS: There were no vitals taken for this visit.  APPEARANCE: Well nourished, well developed female, pleasant and obese, alert and oriented in no  acute distress.   CHEST: Normal respiratory effort.   BREAST EXAM: Not examined today.   BREAST EXAM: Not examined today.   BREAST EXAM: Not examined today.   MUSCULOSKELETAL: She moves around her home without problems during virtual visit.   NEUROLOGIC: Cranial Nerves: II-XII grossly intact.  Gait & Posture: Normal gait and fine motion.   PSYCHIATRIC: Patient alert, oriented x 3. Mood/Affect normal without acute anxiety or depression noted. Judgment/insight good as she makes appropriate decisions on today's examination.    Lab Results   Component Value Date    HGBA1C 6.5 (H) 03/09/2020     Lab Results   Component Value Date    LDLCALC 86.0 03/09/2020     CMP  Sodium   Date Value Ref Range Status   03/09/2020 144 136 - 145 mmol/L Final     Potassium   Date Value Ref Range Status   03/09/2020 3.8 3.5 - 5.1 mmol/L Final     Chloride   Date Value Ref Range Status   03/09/2020 106 95 - 110 mmol/L Final     CO2   Date Value Ref Range Status   03/09/2020 29 23 - 29 mmol/L Final     Glucose   Date Value Ref Range Status   03/09/2020 82 70 - 110 mg/dL Final     BUN, Bld   Date Value Ref Range Status   03/09/2020 13 8 - 23 mg/dL Final     Creatinine   Date Value Ref Range Status   03/09/2020 1.0 0.5 - 1.4 mg/dL Final     Calcium   Date Value Ref Range Status   03/09/2020 9.2 8.7 - 10.5 mg/dL Final     Total Protein   Date Value Ref Range Status   03/09/2020 7.5 6.0 - 8.4 g/dL Final     Albumin   Date Value Ref Range Status   03/09/2020 3.7 3.5 - 5.2 g/dL Final     Total Bilirubin   Date Value Ref Range Status   03/09/2020 0.5 0.1 - 1.0 mg/dL Final     Comment:     For infants and newborns, interpretation of results should be based  on gestational age, weight and in agreement with clinical  observations.  Premature Infant recommended reference ranges:  Up to 24 hours.............<8.0 mg/dL  Up to 48 hours............<12.0 mg/dL  3-5 days..................<15.0 mg/dL  6-29 days.................<15.0 mg/dL       Alkaline  Phosphatase   Date Value Ref Range Status   03/09/2020 100 55 - 135 U/L Final     AST   Date Value Ref Range Status   03/09/2020 22 10 - 40 U/L Final     ALT   Date Value Ref Range Status   03/09/2020 18 10 - 44 U/L Final     Anion Gap   Date Value Ref Range Status   03/09/2020 9 8 - 16 mmol/L Final     eGFR if    Date Value Ref Range Status   03/09/2020 >60.0 >60 mL/min/1.73 m^2 Final     eGFR if non    Date Value Ref Range Status   03/09/2020 56.8 (A) >60 mL/min/1.73 m^2 Final     Comment:     Calculation used to obtain the estimated glomerular filtration  rate (eGFR) is the CKD-EPI equation.        ASSESSMENT:    ICD-10-CM ICD-9-CM    1. Hypertension associated with diabetes E11.59 250.80 CBC auto differential    I10 401.9 TSH   2. Type 2 diabetes mellitus with stage 3 chronic kidney disease, without long-term current use of insulin E11.22 250.40 Protein / creatinine ratio, urine    N18.3 585.3 CANCELED: Protein / creatinine ratio, urine   3. CKD (chronic kidney disease) stage 3, GFR 30-59 ml/min N18.3 585.3 Ambulatory referral/consult to Nephrology   4. Combined hyperlipidemia associated with type 2 diabetes mellitus E11.69 250.80     E78.2 272.2    5. Statin intolerance Z78.9 995.27    6. LVH (left ventricular hypertrophy) I51.7 429.3    7. Multiple thyroid nodules E04.2 241.1 US Soft Tissue Head Neck Thyroid   8. Asymmetrical left sensorineural hearing loss H90.42 389.16 Ambulatory referral/consult to ENT   9. Seasonal allergic rhinitis, unspecified trigger J30.2 477.9 loratadine (CLARITIN) 10 mg tablet   10. Postmenopausal Z78.0 V49.81    11. Visit for screening mammogram Z12.31 V76.12 Mammo Digital Screening Bilat     PLAN:  1. Age-appropriate counseling-appropriate low-sodium, low-cholesterol, low carbohydrate diet and exercise daily, monthly breast self exam, annual wellness examination.   2. Patient advised to call for results.  3. Continue current medications.  4.  Prescription refill as noted above.  5. Annual dental and eye examination.  6. Keep follow up with specialists.  7. Flu shot this fall.  8. Follow up in about 26 weeks (around 10/16/2020) for diabetes and hypertension follow up.     Answers for HPI/ROS submitted by the patient on 4/17/2020   activity change: No  unexpected weight change: No  neck pain: No  hearing loss: Yes  rhinorrhea: No  trouble swallowing: No  eye discharge: No  visual disturbance: No  chest tightness: No  wheezing: No  chest pain: No  palpitations: No  blood in stool: No  constipation: No  vomiting: No  diarrhea: No  polydipsia: No  polyuria: No  difficulty urinating: No  hematuria: No  menstrual problem: No  dysuria: No  joint swelling: No  arthralgias: No  headaches: No  weakness: No  confusion: No  dysphoric mood: No

## 2020-04-21 DIAGNOSIS — J30.2 SEASONAL ALLERGIC RHINITIS, UNSPECIFIED TRIGGER: ICD-10-CM

## 2020-04-21 RX ORDER — LORATADINE 10 MG/1
10 TABLET ORAL DAILY
Qty: 90 TABLET | Refills: 1 | Status: SHIPPED | OUTPATIENT
Start: 2020-04-21 | End: 2020-10-22

## 2020-04-29 ENCOUNTER — TELEPHONE (OUTPATIENT)
Dept: PHARMACY | Facility: CLINIC | Age: 70
End: 2020-04-29

## 2020-05-07 DIAGNOSIS — E78.2 COMBINED HYPERLIPIDEMIA ASSOCIATED WITH TYPE 2 DIABETES MELLITUS: ICD-10-CM

## 2020-05-07 DIAGNOSIS — I15.2 HYPERTENSION ASSOCIATED WITH DIABETES: ICD-10-CM

## 2020-05-07 DIAGNOSIS — R94.31 ABNORMAL ECG: ICD-10-CM

## 2020-05-07 DIAGNOSIS — Z78.9 STATIN INTOLERANCE: ICD-10-CM

## 2020-05-07 DIAGNOSIS — Z82.49 FAMILY HISTORY OF CARDIOVASCULAR DISORDER: ICD-10-CM

## 2020-05-07 DIAGNOSIS — E11.21 TYPE II DIABETES MELLITUS WITH NEPHROPATHY: ICD-10-CM

## 2020-05-07 DIAGNOSIS — E11.69 COMBINED HYPERLIPIDEMIA ASSOCIATED WITH TYPE 2 DIABETES MELLITUS: ICD-10-CM

## 2020-05-07 DIAGNOSIS — E11.59 HYPERTENSION ASSOCIATED WITH DIABETES: ICD-10-CM

## 2020-05-07 DIAGNOSIS — N18.30 CKD (CHRONIC KIDNEY DISEASE) STAGE 3, GFR 30-59 ML/MIN: ICD-10-CM

## 2020-05-07 DIAGNOSIS — I51.7 LVH (LEFT VENTRICULAR HYPERTROPHY): ICD-10-CM

## 2020-05-08 ENCOUNTER — TELEPHONE (OUTPATIENT)
Dept: PHARMACY | Facility: CLINIC | Age: 70
End: 2020-05-08

## 2020-05-22 ENCOUNTER — TELEPHONE (OUTPATIENT)
Dept: RADIOLOGY | Facility: HOSPITAL | Age: 70
End: 2020-05-22

## 2020-05-25 ENCOUNTER — HOSPITAL ENCOUNTER (OUTPATIENT)
Dept: RADIOLOGY | Facility: HOSPITAL | Age: 70
Discharge: HOME | End: 2020-05-25
Attending: FAMILY MEDICINE
Payer: MEDICARE

## 2020-05-25 VITALS — WEIGHT: 180.75 LBS | HEIGHT: 64 IN | BODY MASS INDEX: 30.86 KG/M2

## 2020-05-25 DIAGNOSIS — E04.2 MULTIPLE THYROID NODULES: ICD-10-CM

## 2020-05-25 DIAGNOSIS — Z12.31 VISIT FOR SCREENING MAMMOGRAM: ICD-10-CM

## 2020-05-25 PROCEDURE — 77063 BREAST TOMOSYNTHESIS BI: CPT | Mod: 26,,, | Performed by: RADIOLOGY

## 2020-05-25 PROCEDURE — 76536 US SOFT TISSUE HEAD NECK THYROID: ICD-10-PCS | Mod: 26,,, | Performed by: RADIOLOGY

## 2020-05-25 PROCEDURE — 76536 US EXAM OF HEAD AND NECK: CPT | Mod: 26,,, | Performed by: RADIOLOGY

## 2020-05-25 PROCEDURE — 76536 US EXAM OF HEAD AND NECK: CPT | Mod: TC

## 2020-05-25 PROCEDURE — 77067 MAMMO DIGITAL SCREENING BILAT WITH TOMOSYNTHESIS_CAD: ICD-10-PCS | Mod: 26,,, | Performed by: RADIOLOGY

## 2020-05-25 PROCEDURE — 77067 SCR MAMMO BI INCL CAD: CPT | Mod: 26,,, | Performed by: RADIOLOGY

## 2020-05-25 PROCEDURE — 77063 MAMMO DIGITAL SCREENING BILAT WITH TOMOSYNTHESIS_CAD: ICD-10-PCS | Mod: 26,,, | Performed by: RADIOLOGY

## 2020-05-25 PROCEDURE — 77067 SCR MAMMO BI INCL CAD: CPT | Mod: TC

## 2020-05-28 ENCOUNTER — PATIENT OUTREACH (OUTPATIENT)
Dept: ADMINISTRATIVE | Facility: OTHER | Age: 70
End: 2020-05-28

## 2020-05-29 ENCOUNTER — TELEPHONE (OUTPATIENT)
Dept: NEPHROLOGY | Facility: CLINIC | Age: 70
End: 2020-05-29

## 2020-05-29 NOTE — TELEPHONE ENCOUNTER
----- Message from Jalen Clark sent at 5/29/2020  4:10 PM CDT -----  Contact: pt  Pt is calling the staff regarding ptient 3:00 up coming appt  Pt call back [phone]    Thanks

## 2020-06-01 ENCOUNTER — OFFICE VISIT (OUTPATIENT)
Dept: NEPHROLOGY | Facility: CLINIC | Age: 70
End: 2020-06-01
Payer: MEDICARE

## 2020-06-01 DIAGNOSIS — I12.9 STAGE 2 CHRONIC KIDNEY DISEASE DUE TO BENIGN HYPERTENSION: ICD-10-CM

## 2020-06-01 DIAGNOSIS — N18.30 CKD (CHRONIC KIDNEY DISEASE) STAGE 3, GFR 30-59 ML/MIN: ICD-10-CM

## 2020-06-01 DIAGNOSIS — Z71.89 ENCOUNTER FOR MEDICATION REVIEW AND COUNSELING: ICD-10-CM

## 2020-06-01 DIAGNOSIS — N18.2 STAGE 2 CHRONIC KIDNEY DISEASE DUE TO BENIGN HYPERTENSION: ICD-10-CM

## 2020-06-01 DIAGNOSIS — E87.6 HYPOKALEMIA: ICD-10-CM

## 2020-06-01 DIAGNOSIS — I10 ESSENTIAL HYPERTENSION: ICD-10-CM

## 2020-06-01 DIAGNOSIS — R94.4 DECREASED GFR: Primary | ICD-10-CM

## 2020-06-01 PROCEDURE — 99215 OFFICE O/P EST HI 40 MIN: CPT | Mod: 95,,, | Performed by: INTERNAL MEDICINE

## 2020-06-01 PROCEDURE — 99215 PR OFFICE/OUTPT VISIT, EST, LEVL V, 40-54 MIN: ICD-10-PCS | Mod: 95,,, | Performed by: INTERNAL MEDICINE

## 2020-06-01 NOTE — PROGRESS NOTES
Kristyn Garza is a 69 y.o. female for whom nephrology consult has been requested to evaluate and give opinion.   Renal clinic f/u note:  Pt was seen on virtual video visit  Date of clinic visit: 6/1/20  Reason for f/u and chief c/o: Decrease in GFR    HPI: Pt was seen and examined. Chart and h/o reviewed. Pt is a 70 y/o female with mild decrease in GFR (Cr 1.0), HTN, and DM, who presents for f/u. Pt was seen by me for the first time today. She was previously seen by Dr. Hernandez, last seen in May 2019. Pt has no new c/o's, no new events, no questions, no discomfort, no SOB.    PAST MEDICAL HISTORY:  CKD stage 1-2, HTN, DM-2, carpal tunnel syndrome, Eczema, Elevated CPK, Multinodular thyroid, Obesity, hyperlipidemia, Pneumonia, Postmenopausal, Statin intolerance, Tobacco dependence    PAST SURGICAL HISTORY:  She  has a past surgical history that includes Partial hysterectomy (1991); Colonoscopy; Fine needle aspiration (3/2011); Cyst Removal (2015); and Hysterectomy.    SOCIAL HISTORY:  She  reports that she quit smoking about 7 years ago. Her smoking use included cigarettes. She has a 12.50 pack-year smoking history. She has never used smokeless tobacco. She reports that she drinks alcohol. She reports that she does not use drugs.    FAMILY MEDICAL HISTORY:  Her family history includes Cataracts in her brother, brother, and sister; Dementia in her mother, sister, and sister; Diabetes in her mother; Heart disease in her sister; Hypertension in her father and mother; No Known Problems in her son and son.    Review of patient's allergies indicates:   Allergen Reactions    Statins-hmg-coa reductase inhibitors      Muscle Cramps           Prior to Admission medications    Medication Sig Start Date End Date Taking? Authorizing Provider   amlodipine-benazepril (LOTREL) 5-40 mg per capsule Take 1 capsule by mouth once daily. 12/19/19 6/16/20  Zoë Guzman MD   aspirin 81 MG Chew Take 81 mg by mouth once daily.     Historical Provider, MD   blood glucose control, normal Soln 1 Bottle by Misc.(Non-Drug; Combo Route) route once daily. For Accu Check Irais  use twice daily prn dx: E11.9 8/2/19 8/1/20  Zoë Guzman MD   blood sugar diagnostic Strp 1 strip by Misc.(Non-Drug; Combo Route) route 2 (two) times daily. 4/17/20   Zoë Guzman MD   blood-glucose meter (ACCU-CHEK IRAIS) Misc 1 kit by Misc.(Non-Drug; Combo Route) route once. For Accu Check Irais  use twice daily prn dx: E11.9 for 1 dose 8/2/19 8/2/19  Zoë Guzman MD   calcium-vitamin D3 500 mg(1,250mg) -200 unit per tablet Take 1 tablet by mouth once daily.     Historical Provider, MD   ciclopirox (PENLAC) 8 % Soln Apply topically nightly.  Patient not taking: Reported on 4/17/2020 6/8/18   Edwige Berry MD   CRANBERRY EXTRACT ORAL Take 1 tablet by mouth once daily.    Historical Provider, MD   evolocumab (REPATHA SURECLICK) 140 mg/mL PnIj Inject 1 mL (140 mg total) into the skin every 14 (fourteen) days. 5/7/20   Dale Matute MD   lancets Misc 1 lancet by Misc.(Non-Drug; Combo Route) route 3 (three) times daily. For Acc-Chek Irais   DX :E11.9 4/17/20   Zoë Guzman MD   loratadine (CLARITIN) 10 mg tablet Take 1 tablet (10 mg total) by mouth once daily. 4/21/20 4/21/21  Zoë Guzman MD   meclizine (ANTIVERT) 25 mg tablet Take 1 tablet (25 mg total) by mouth 3 (three) times daily as needed. 1/10/20   Joaquín Maria MD   MULTIVITAMIN W-MINERALS/LUTEIN (CENTRUM SILVER ORAL) Take 1 tablet by mouth once daily.    Historical Provider, MD   spironolactone (ALDACTONE) 50 MG tablet Take 1 tablet (50 mg total) by mouth once daily. 12/17/19 6/14/20  Zoë Guzman MD   triamcinolone (NASACORT) 55 mcg nasal inhaler 2 sprays by Nasal route once daily. 4/17/20   Zoë Guzman MD        REVIEW OF SYSTEMS:  Patient has no fever, fatigue, visual changes, chest pain, edema, cough, dyspnea, nausea, vomiting, constipation, diarrhea, arthralgias,  pruritis, dizziness, weakness, depression, confusion.      PHYSICAL EXAM:   vitals were not taken for this visit.  Pt was seen on video visit  Looks comfortable, in NAD  Speech and thought process normal    Labs reviewed  BMP  Lab Results   Component Value Date     03/09/2020    K 3.8 03/09/2020     03/09/2020    CO2 29 03/09/2020    BUN 13 03/09/2020    CREATININE 1.0 03/09/2020    CALCIUM 9.2 03/09/2020    ANIONGAP 9 03/09/2020    ESTGFRAFRICA >60.0 03/09/2020    EGFRNONAA 56.8 (A) 03/09/2020     Lab Results   Component Value Date    WBC 9.24 05/25/2020    HGB 13.0 05/25/2020    HCT 41.1 05/25/2020    MCV 99 (H) 05/25/2020     05/25/2020     Urine prot/cr zero      IMPRESSION AND RECOMMENDATIONS:  68 y/o female with mild decrease in GFR who presents for f/u:    1. Renal: stable renal function. No change. No active issues  The mild decrease in GFR is likely physiological.  CKD stage 1-2    K normal: noted prior mild and borderline hypokalemia  Prior VS's reviewed, mostly normal  Hyperaldosteronism is not suspected  K currently normal    2. HTN: BP mostly well controlled in prior VS's  Meds reviewed    3. DM-2: noted, reviewed.  No proteinuria noted.  Lack of proteinuria likely suggests that diabetic nephropathy is not present    Plans and recommendations:  As reviewed above  Opportunity for questions provided  Pt was new to me.  Total time spent 40 minutes including time needed to review the records, the   patient evaluation, documentation, face-to-face discussion with the patient,   more than 50% of the time was spent on coordination of care and counseling.    Level V visit.  RTC 1 year or sooner as needed.    Brenden Landaverde MD

## 2020-06-01 NOTE — LETTER
June 1, 2020      Zoë Guzman MD  [address] [place] [address]           [facility]  [address] [facility]  [place] [address]  Phone: [phone]  Fax: [phone]          Patient: Kristyn Garza   MR Number: [id number]   YOB: 1950   Date of Visit: 6/1/2020       Dear Dr. Zoë Guzman:    Thank you for referring Kristny Garza to me for evaluation. Attached you will find relevant portions of my assessment and plan of care.    If you have questions, please do not hesitate to call me. I look forward to following Kristyn Garza along with you.    Sincerely,    Brenden Landaverde MD    Enclosure  CC:  No Recipients    If you would like to receive this communication electronically, please contact [email or web address] or [phone] to request more information on [employer] Link access.    For providers and/or their staff who would like to refer a patient to [facility], please contact us through our one-stop-shop provider referral line, [facility], at [phone].    If you feel you have received this communication in error or would no longer like to receive these types of communications, please e-mail [email or web address]

## 2020-06-02 ENCOUNTER — CLINICAL SUPPORT (OUTPATIENT)
Dept: AUDIOLOGY | Facility: CLINIC | Age: 70
End: 2020-06-02
Payer: MEDICARE

## 2020-06-02 ENCOUNTER — OFFICE VISIT (OUTPATIENT)
Dept: OTOLARYNGOLOGY | Facility: CLINIC | Age: 70
End: 2020-06-02
Payer: MEDICARE

## 2020-06-02 VITALS
HEART RATE: 75 BPM | SYSTOLIC BLOOD PRESSURE: 148 MMHG | TEMPERATURE: 98 F | WEIGHT: 184.94 LBS | BODY MASS INDEX: 31.75 KG/M2 | DIASTOLIC BLOOD PRESSURE: 85 MMHG

## 2020-06-02 DIAGNOSIS — H81.02 ENDOLYMPHATIC HYDROPS OF LEFT EAR: Primary | ICD-10-CM

## 2020-06-02 DIAGNOSIS — H81.02 ENDOLYMPHATIC HYDROPS OF LEFT EAR: ICD-10-CM

## 2020-06-02 PROCEDURE — 99213 OFFICE O/P EST LOW 20 MIN: CPT | Mod: PBBFAC,25 | Performed by: PHYSICIAN ASSISTANT

## 2020-06-02 PROCEDURE — 99213 PR OFFICE/OUTPT VISIT, EST, LEVL III, 20-29 MIN: ICD-10-PCS | Mod: S$PBB,,, | Performed by: PHYSICIAN ASSISTANT

## 2020-06-02 PROCEDURE — 99999 PR PBB SHADOW E&M-EST. PATIENT-LVL III: ICD-10-PCS | Mod: PBBFAC,,, | Performed by: PHYSICIAN ASSISTANT

## 2020-06-02 PROCEDURE — 99213 OFFICE O/P EST LOW 20 MIN: CPT | Mod: S$PBB,,, | Performed by: PHYSICIAN ASSISTANT

## 2020-06-02 PROCEDURE — 99211 OFF/OP EST MAY X REQ PHY/QHP: CPT | Mod: PBBFAC,27 | Performed by: AUDIOLOGIST-HEARING AID FITTER

## 2020-06-02 PROCEDURE — 99999 PR PBB SHADOW E&M-EST. PATIENT-LVL III: CPT | Mod: PBBFAC,,, | Performed by: PHYSICIAN ASSISTANT

## 2020-06-02 PROCEDURE — 92567 TYMPANOMETRY: CPT | Mod: PBBFAC | Performed by: AUDIOLOGIST-HEARING AID FITTER

## 2020-06-02 PROCEDURE — 92557 COMPREHENSIVE HEARING TEST: CPT | Mod: PBBFAC | Performed by: AUDIOLOGIST-HEARING AID FITTER

## 2020-06-02 PROCEDURE — 99999 PR PBB SHADOW E&M-EST. PATIENT-LVL I: ICD-10-PCS | Mod: PBBFAC,,, | Performed by: AUDIOLOGIST-HEARING AID FITTER

## 2020-06-02 PROCEDURE — 99999 PR PBB SHADOW E&M-EST. PATIENT-LVL I: CPT | Mod: PBBFAC,,, | Performed by: AUDIOLOGIST-HEARING AID FITTER

## 2020-06-02 NOTE — PATIENT INSTRUCTIONS
"What is Meniere disease? -- Meniere disease is a condition that causes problems with balance and hearing. It is sometimes called Meniere's disease.  What causes Meniere disease? -- Meniere disease is caused by a build-up of fluid in the inside of the ear. Doctors are not sure why the fluid builds up.  What are the symptoms of Meniere disease? -- People with Meniere disease have these 3 symptoms:  ?Vertigo - This is the medical term for when you feel dizzy and like you are spinning, swaying, or tilting. You might also feel like the room is moving around you. Some people with vertigo feel sick to their stomach (nausea) and throw up. People with Meniere disease usually have episodes of vertigo which can start suddenly and last several minutes to a few hours.  ?Tinnitus - This is the medical term for when you hear a buzzing, ringing, or hissing noise in 1 or both ears. People with Meniere disease often say the noise sounds like listening to machinery or the inside of a seashell. This can come and go with vertigo episodes, or happen separately.  ?Hearing loss - You might lose your hearing during an episode of vertigo. But over time, usually about 8 to 10 years, hearing loss can slowly become permanent.  Some people with Meniere disease also get a feeling of pressure in their ears.  Is there a test for Meniere disease? -- There is no test for Meniere, but there are tests for some of its symptoms. Your doctor or nurse should be able to tell if you have Meniere disease by learning about your symptoms and doing an exam.  You will probably be tested for hearing loss and might be tested for balance problems.  ?Balance testing - For one of these tests, you will have small devices called "electrodes" put near your eyes to measure your eye movements while you move your head and eyes. For other tests, you sit in a special chair that spins, or you stand on a platform that moves. Special devices measure how your body responds to the " "movements.  ?A hearing test (also called "audiometry")  Your doctor might also do tests to make sure you do not have a different problem. These tests might include:  ?Blood tests to look for an infection or hormone problem, such as diabetes  ?An MRI of the head - This is an imaging test that allows the doctor to see parts of your brain.  Is there anything I can do on my own to feel better? -- Yes. You might feel better if you avoid eating and drinking things that make your symptoms worse. These things can include:  ?Salt and salty foods  ?Monosodium glutamate (also called "MSG") - This is a substance sometimes added to certain foods (such as Chinese food and packaged foods) to make them taste better.  ?Coffee, tea, sodas, or other drinks that have caffeine in them  ?Alcohol  You should also stop smoking, if you smoke.  How is Meniere disease treated? -- Treatments include:  ?Medicines to treat dizziness and nausea  ?Medicines called "diuretics - These can help with fluid or swelling in the ear.  ?A hearing aid  ?Special exercises to help with your balance - Your doctor might refer you to a physical therapist (exercise expert) to teach you these exercises.  ?Medicines that go into your ear - These are given as an injection (shot). Your doctor might recommend these if medicines taken in pill form do not work.  ?Surgery - Your doctor might recommend surgery if your symptoms are so bad that you are not able to drive, work, or do active things you used to be able to do. But surgery might make your hearing worse.  Some patients say that they feel better taking special vitamins or herbal treatments. But there are no studies that prove these treatments are helpful, and some might be costly.    "

## 2020-06-02 NOTE — PROGRESS NOTES
"Referring Provider:Dr. Thomas Garza was seen 06/02/2020 for an audiological evaluation.  Patient has a hx of left SNHL and endolymphatic hydrops of the left ear diagnosed one year ago. She reports a possible decrease in left ear hearing since her last audio in 2019. She reports hearing sounds in her left ear that sound like "a busted speaker" . She has a low salt and low caffeine diet.     Results reveal normal hearing from 250-8000 Hz for the right ear, and a severe rising to normal sensorineural hearing loss 250-8000 Hz for the left ear.   Speech Reception Thresholds were  20 dBHL for the right ear and 40 dBHL for the left ear.   Word recognition scores were excellent for the right ear and good for the left ear.   Tympanograms were Type A, normal for the right ear and Type A, normal for the left ear.    Patient was counseled on the above findings.    Recommendations:  1. ENT review.  2. Annual Audiograms.  3. Continue Hydrops diet.            "

## 2020-06-02 NOTE — LETTER
Ninoska 3, 2020      Zoë Guzman MD  [address] [place] [address]           [facility]  [address] [facility]  [place] [address]  Phone: [phone]  Fax: [phone]          Patient: Kristyn Garza   MR Number: [id number]   YOB: 1950   Date of Visit: 6/2/2020       Dear Dr. Zoë Guzman:    Thank you for referring Kristyn Garza to me for evaluation. Attached you will find relevant portions of my assessment and plan of care.    If you have questions, please do not hesitate to call me. I look forward to following Kristyn Garza along with you.    Sincerely,    Nia Paredes PA-C    Enclosure  CC:  No Recipients    If you would like to receive this communication electronically, please contact [email or web address] or [phone] to request more information on [employer] Link access.    For providers and/or their staff who would like to refer a patient to [facility], please contact us through our one-stop-shop provider referral line, [facility] , at [phone].    If you feel you have received this communication in error or would no longer like to receive these types of communications, please e-mail [email or web address]

## 2020-06-02 NOTE — PROGRESS NOTES
Subjective:   Patient ID: Kristyn Garza is a 69 y.o. female.    Chief Complaint: Hearing Loss and Dizziness    Patient is a very pleasant 69-year-old female here to see me today in follow-up for evaluation of left endolymphatic hydrops. She continues to note hearing loss in her left ear as well as left-sided tinnitus.  However, her dizziness has resolved at this time.  She is following a low-salt low caffeine diet, and has been for some time due to cardiac issues.  When she was extremely dizzy she also noted a pressure around her eyes, which is also now resolved.     Review of patient's allergies indicates:   Allergen Reactions    Statins-hmg-coa reductase inhibitors      Muscle Cramps           Review of Systems   Constitutional: Negative for chills, fatigue, fever and unexpected weight change.   HENT: Positive for hearing loss and tinnitus. Negative for congestion, dental problem, ear discharge, ear pain (left ear pressure), facial swelling, nosebleeds, postnasal drip, rhinorrhea, sinus pressure, sneezing, sore throat, trouble swallowing and voice change.    Eyes: Negative for redness, itching and visual disturbance.   Respiratory: Negative for cough, choking, shortness of breath and wheezing.    Cardiovascular: Negative for chest pain and palpitations.   Gastrointestinal: Negative for abdominal pain.        No reflux.   Musculoskeletal: Negative for gait problem.   Skin: Negative for rash.   Allergic/Immunologic: Positive for environmental allergies.   Neurological: Negative for dizziness, light-headedness and headaches.         Objective:   BP (!) 148/85   Pulse 75   Temp 97.5 °F (36.4 °C) (Tympanic)   Wt 83.9 kg (184 lb 15.5 oz)   BMI 31.75 kg/m²     Physical Exam   Constitutional: She is oriented to person, place, and time. She appears well-developed and well-nourished. No distress.   HENT:   Head: Normocephalic and atraumatic.   Right Ear: Tympanic membrane, external ear and ear canal normal.   Left  Ear: Tympanic membrane, external ear and ear canal normal.   Nose: Nose normal. No mucosal edema, rhinorrhea, nasal deformity or septal deviation. No epistaxis. Right sinus exhibits no maxillary sinus tenderness and no frontal sinus tenderness. Left sinus exhibits no maxillary sinus tenderness and no frontal sinus tenderness.   Mouth/Throat: Uvula is midline, oropharynx is clear and moist and mucous membranes are normal. Mucous membranes are not pale and not dry. No dental caries. No oropharyngeal exudate or posterior oropharyngeal erythema.   Eyes: Pupils are equal, round, and reactive to light. Conjunctivae, EOM and lids are normal. Right eye exhibits no chemosis. Left eye exhibits no chemosis. Right conjunctiva is not injected. Left conjunctiva is not injected. No scleral icterus. Right eye exhibits normal extraocular motion and no nystagmus. Left eye exhibits normal extraocular motion and no nystagmus.   Neck: Trachea normal and phonation normal. No tracheal tenderness present. No tracheal deviation present. No thyroid mass and no thyromegaly present.   Cardiovascular: Intact distal pulses.   Pulmonary/Chest: Effort normal. No stridor. No respiratory distress.   Abdominal: She exhibits no distension.   Lymphadenopathy:        Head (right side): No submental, no submandibular, no preauricular, no posterior auricular and no occipital adenopathy present.        Head (left side): No submental, no submandibular, no preauricular, no posterior auricular and no occipital adenopathy present.     She has no cervical adenopathy.   Neurological: She is alert and oriented to person, place, and time. No cranial nerve deficit.   Skin: Skin is warm and dry. No rash noted. No erythema.   Psychiatric: She has a normal mood and affect. Her behavior is normal.          Annual Audiograms    Assessment:     1. Asymmetrical left sensorineural hearing loss    2. Endolymphatic hydrops of left ear        Plan:     Asymmetrical left  sensorineural hearing loss    Endolymphatic hydrops of left ear      She had a normal MRI in 2018. She would like to go forth with hearing aid. All information was provided to patient. She will call her insurance company to check coverage.     Endolymphatic hydrops of the left ear:  Stable at this time, she has not noted any recent issues with dizziness.  Recommend she continue with a low-salt low caffeine diet, she will call if her dizziness returns and will then consider adjusting her medications.  If addition of a diuretic as needed, would have to be done in conjunction with her cardiologist as well as primary care physician.

## 2020-06-03 ENCOUNTER — TELEPHONE (OUTPATIENT)
Dept: PHARMACY | Facility: CLINIC | Age: 70
End: 2020-06-03

## 2020-06-16 DIAGNOSIS — Z79.4 TYPE 2 DIABETES MELLITUS WITHOUT COMPLICATION, WITH LONG-TERM CURRENT USE OF INSULIN: ICD-10-CM

## 2020-06-16 DIAGNOSIS — E11.9 TYPE 2 DIABETES MELLITUS WITHOUT COMPLICATION, WITH LONG-TERM CURRENT USE OF INSULIN: ICD-10-CM

## 2020-06-16 RX ORDER — SPIRONOLACTONE 50 MG/1
50 TABLET, FILM COATED ORAL DAILY
Qty: 90 TABLET | Refills: 1 | Status: SHIPPED | OUTPATIENT
Start: 2020-06-16 | End: 2020-12-14

## 2020-06-16 RX ORDER — LANCETS
1 EACH MISCELLANEOUS 3 TIMES DAILY
Qty: 100 EACH | Refills: 11 | Status: SHIPPED | OUTPATIENT
Start: 2020-06-16 | End: 2020-09-08 | Stop reason: SDUPTHER

## 2020-07-01 ENCOUNTER — TELEPHONE (OUTPATIENT)
Dept: PHARMACY | Facility: CLINIC | Age: 70
End: 2020-07-01

## 2020-07-17 ENCOUNTER — PATIENT OUTREACH (OUTPATIENT)
Dept: ADMINISTRATIVE | Facility: OTHER | Age: 70
End: 2020-07-17

## 2020-07-17 NOTE — PROGRESS NOTES
Patient's chart was reviewed for overdue LUIS topics.  Immunizations not reconciled due to LINKS not refreshing.    Eye exam scheduled 7/20/20.

## 2020-07-20 ENCOUNTER — OFFICE VISIT (OUTPATIENT)
Dept: OPHTHALMOLOGY | Facility: CLINIC | Age: 70
End: 2020-07-20
Payer: MEDICARE

## 2020-07-20 DIAGNOSIS — H40.013 OPEN ANGLE WITH BORDERLINE FINDINGS OF BOTH EYES: ICD-10-CM

## 2020-07-20 DIAGNOSIS — H25.013 CORTICAL AGE-RELATED CATARACT OF BOTH EYES: ICD-10-CM

## 2020-07-20 DIAGNOSIS — H52.13 MYOPIA WITH PRESBYOPIA, BILATERAL: ICD-10-CM

## 2020-07-20 DIAGNOSIS — H52.4 MYOPIA WITH PRESBYOPIA, BILATERAL: ICD-10-CM

## 2020-07-20 DIAGNOSIS — H25.13 NUCLEAR SCLEROSIS, BILATERAL: ICD-10-CM

## 2020-07-20 DIAGNOSIS — E11.9 TYPE 2 DIABETES MELLITUS WITHOUT RETINOPATHY: Primary | ICD-10-CM

## 2020-07-20 PROCEDURE — 92015 DETERMINE REFRACTIVE STATE: CPT | Mod: ,,, | Performed by: OPTOMETRIST

## 2020-07-20 PROCEDURE — 92015 PR REFRACTION: ICD-10-PCS | Mod: ,,, | Performed by: OPTOMETRIST

## 2020-07-20 PROCEDURE — 92133 POSTERIOR SEGMENT OCT OPTIC NERVE(OCULAR COHERENCE TOMOGRAPHY) - OU - BOTH EYES: ICD-10-PCS | Mod: 26,S$PBB,, | Performed by: OPTOMETRIST

## 2020-07-20 PROCEDURE — 92133 CPTRZD OPH DX IMG PST SGM ON: CPT | Mod: PBBFAC | Performed by: OPTOMETRIST

## 2020-07-20 PROCEDURE — 92014 PR EYE EXAM, EST PATIENT,COMPREHESV: ICD-10-PCS | Mod: S$PBB,,, | Performed by: OPTOMETRIST

## 2020-07-20 PROCEDURE — 99213 OFFICE O/P EST LOW 20 MIN: CPT | Mod: PBBFAC,25 | Performed by: OPTOMETRIST

## 2020-07-20 PROCEDURE — 92014 COMPRE OPH EXAM EST PT 1/>: CPT | Mod: S$PBB,,, | Performed by: OPTOMETRIST

## 2020-07-20 PROCEDURE — 99999 PR PBB SHADOW E&M-EST. PATIENT-LVL III: ICD-10-PCS | Mod: PBBFAC,,, | Performed by: OPTOMETRIST

## 2020-07-20 PROCEDURE — 99999 PR PBB SHADOW E&M-EST. PATIENT-LVL III: CPT | Mod: PBBFAC,,, | Performed by: OPTOMETRIST

## 2020-07-20 NOTE — PROGRESS NOTES
HPI     Diabetic Eye Exam     Comments: Yearly              Comments     Last seen by DNL on 4/30/19 for yearly DM eye exam  Patient here today for yearly eye exam  Diabetic eye exam  Diagnosed with diabetes 5+ years ago  Recent vision fluctuations No  Lab Results       Component                Value               Date                       HGBA1C                   6.5 (H)             03/09/2020              HPI    Any vision changes since last exam: No   Eye pain: No  Other ocular symptoms: No    Do you wear currently wear glasses or contacts? Glasses    Interested in contacts today? No    Do you plan on getting new glasses today? If needed            Last edited by Laverne Bernabe, PCT on 7/20/2020 10:02 AM. (History)              Assessment /Plan     For exam results, see Encounter Report.    Type 2 diabetes mellitus without retinopathy  No diabetic retinopathy in either eye  Continue close care with PCP   Monitor 12 months    Open angle with borderline findings of both eyes  -     Posterior Segment OCT Optic Nerve- Both eyes  Stable gOCT today compared to baseline  Monitor 12 months    Nuclear sclerosis, bilateral  Cortical age-related cataract of both eyes  Surgery is not indicated at this time.   Monitor 12 months.    Myopia with presbyopia, bilateral  Eyeglass Final Rx     Eyeglass Final Rx       Sphere Cylinder Axis Add    Right -3.00 +0.75 135 +2.50    Left -3.00 +0.25 050 +2.50    Expiration Date: 7/21/2021                  RTC 1 yr for dilated eye exam with gOCT or PRN if any problems.   Discussed above and answered questions.

## 2020-07-21 RX ORDER — MECLIZINE HYDROCHLORIDE 25 MG/1
25 TABLET ORAL 3 TIMES DAILY PRN
Qty: 20 TABLET | Refills: 0 | Status: SHIPPED | OUTPATIENT
Start: 2020-07-21 | End: 2020-07-29 | Stop reason: SDUPTHER

## 2020-07-21 NOTE — TELEPHONE ENCOUNTER
----- Message from Gogo Neal sent at 7/21/2020  7:56 AM CDT -----  Regarding: refill  Contact: pt  1. What is the name of the medication you are requesting? Vertigo medication  2. What is the dose? N/a  3. How do you take the medication? Orally, topically, etc? N/a  4. How often do you take this medication? N/a  5. Do you need a 30 day or 90 day supply? N/a  6. How many refills are you requesting? N/a  7. What is your preferred pharmacy and location of the pharmacy? [facility] [address]  8. Who can we contact with further questions? The pt at [phone]

## 2020-07-21 NOTE — TELEPHONE ENCOUNTER
----- Message from Jalen Clark sent at 7/21/2020  3:06 PM CDT -----  Regarding: Vertigo  Contact: Pt  Pt is calling the staff regarding a refill RX meclizine (ANTIVERT) 25 mg tablet  Pt 3 TIMES A DAY    Pt call back to be advised [phone]    Thanks    [facility]/pharmacy #[id number] - [place] - [address]  [address]  [place] [address]  Phone: [phone] Fax: [phone]

## 2020-07-29 ENCOUNTER — OFFICE VISIT (OUTPATIENT)
Dept: FAMILY MEDICINE | Facility: CLINIC | Age: 70
End: 2020-07-29
Payer: MEDICARE

## 2020-07-29 VITALS
WEIGHT: 182.56 LBS | OXYGEN SATURATION: 99 % | HEIGHT: 64 IN | SYSTOLIC BLOOD PRESSURE: 148 MMHG | BODY MASS INDEX: 31.17 KG/M2 | DIASTOLIC BLOOD PRESSURE: 68 MMHG | TEMPERATURE: 99 F | HEART RATE: 66 BPM

## 2020-07-29 DIAGNOSIS — K21.9 GASTROESOPHAGEAL REFLUX DISEASE, ESOPHAGITIS PRESENCE NOT SPECIFIED: ICD-10-CM

## 2020-07-29 DIAGNOSIS — E11.69 COMBINED HYPERLIPIDEMIA ASSOCIATED WITH TYPE 2 DIABETES MELLITUS: ICD-10-CM

## 2020-07-29 DIAGNOSIS — E78.2 COMBINED HYPERLIPIDEMIA ASSOCIATED WITH TYPE 2 DIABETES MELLITUS: ICD-10-CM

## 2020-07-29 DIAGNOSIS — Z78.9 STATIN INTOLERANCE: ICD-10-CM

## 2020-07-29 DIAGNOSIS — H81.02 ENDOLYMPHATIC HYDROPS OF LEFT EAR: Primary | ICD-10-CM

## 2020-07-29 PROCEDURE — 99999 PR PBB SHADOW E&M-EST. PATIENT-LVL IV: ICD-10-PCS | Mod: PBBFAC,,, | Performed by: REGISTERED NURSE

## 2020-07-29 PROCEDURE — 99214 OFFICE O/P EST MOD 30 MIN: CPT | Mod: PBBFAC,PO | Performed by: REGISTERED NURSE

## 2020-07-29 PROCEDURE — 99214 PR OFFICE/OUTPT VISIT, EST, LEVL IV, 30-39 MIN: ICD-10-PCS | Mod: S$PBB,,, | Performed by: REGISTERED NURSE

## 2020-07-29 PROCEDURE — 99214 OFFICE O/P EST MOD 30 MIN: CPT | Mod: S$PBB,,, | Performed by: REGISTERED NURSE

## 2020-07-29 PROCEDURE — 99999 PR PBB SHADOW E&M-EST. PATIENT-LVL IV: CPT | Mod: PBBFAC,,, | Performed by: REGISTERED NURSE

## 2020-07-29 RX ORDER — MECLIZINE HYDROCHLORIDE 25 MG/1
25 TABLET ORAL 3 TIMES DAILY PRN
Qty: 60 TABLET | Refills: 1 | Status: SHIPPED | OUTPATIENT
Start: 2020-07-29 | End: 2023-06-19 | Stop reason: SDUPTHER

## 2020-07-29 RX ORDER — PANTOPRAZOLE SODIUM 40 MG/1
40 TABLET, DELAYED RELEASE ORAL DAILY PRN
Qty: 30 TABLET | Refills: 2 | Status: SHIPPED | OUTPATIENT
Start: 2020-07-29 | End: 2020-10-19

## 2020-07-29 NOTE — PROGRESS NOTES
"Subjective:       Patient ID: Kristyn Garza is a 69 y.o. female.    Chief Complaint   Patient presents with    Dizziness    GI Problem     "bubbles coming up in my throat"       HPI    Patient here today with c/o chronic intermittent vertigo.  Needs meclizine refill.  She has tried Bonine otc but has not helped.  She has been previously evaluated by ENT and was told it was due to Meniere's.  Does report occ tinnitus, nausea and vomiting when the dizzy spells occur.  Last episode prior to this one was in Feb 2020.  This occurrence started a few days ago after eye exam/dilated.    Reports "bubbling in my throat" last night and today.  History of GERD, not currently taking any medication for such.  Usually tries to control with diet.  She ate chicken nuggets last night but cooked in the air fryer.  Does report irritated throat, burping and regurg.  No water brash or chest burning.      Review of Systems   Constitutional: Negative.    HENT: Positive for hearing loss, sore throat (irritation) and tinnitus. Negative for ear pain, facial swelling and trouble swallowing.    Respiratory: Negative.    Cardiovascular: Negative.    Gastrointestinal: Positive for nausea and vomiting. Negative for abdominal pain and diarrhea.        GERD   Genitourinary: Negative.    Neurological: Positive for dizziness and light-headedness. Negative for tremors, syncope, weakness, numbness and headaches.         Review of patient's allergies indicates:   Allergen Reactions    Statins-hmg-coa reductase inhibitors      Muscle Cramps         Patient Active Problem List   Diagnosis    Multiple thyroid nodules    Cupping of optic disc    Nuclear sclerosis    Combined hyperlipidemia associated with type 2 diabetes mellitus    Hypertension associated with diabetes    CKD (chronic kidney disease) stage 3, GFR 30-59 ml/min    Obesity (BMI 30.0-34.9)    Postmenopausal    Statin intolerance    Abnormal ECG    LVH (left ventricular " hypertrophy)    Family history of cardiovascular disorder    PAC (premature atrial contraction)    Type 2 diabetes mellitus with kidney complication, without long-term current use of insulin         Current Outpatient Medications:     amlodipine-benazepril (LOTREL) 5-40 mg per capsule, Take 1 capsule by mouth once daily., Disp: 90 capsule, Rfl: 1    aspirin 81 MG Chew, Take 81 mg by mouth once daily., Disp: , Rfl:     blood glucose control, normal Soln, 1 Bottle by Misc.(Non-Drug; Combo Route) route once daily. For Accu Check Amber  use twice daily prn dx: E11.9, Disp: 1 each, Rfl: 0    blood sugar diagnostic Strp, 1 strip by Misc.(Non-Drug; Combo Route) route 2 (two) times daily., Disp: 200 each, Rfl: 3    blood-glucose meter (ACCU-CHEK AMBER) Misc, 1 kit by Misc.(Non-Drug; Combo Route) route once. For Accu Check Amber  use twice daily prn dx: E11.9 for 1 dose, Disp: 1 each, Rfl: 0    calcium-vitamin D3 500 mg(1,250mg) -200 unit per tablet, Take 1 tablet by mouth once daily. , Disp: , Rfl:     CRANBERRY EXTRACT ORAL, Take 1 tablet by mouth once daily., Disp: , Rfl:     evolocumab (REPATHA SURECLICK) 140 mg/mL PnIj, Inject 1 mL (140 mg total) into the skin every 14 (fourteen) days., Disp: 2 mL, Rfl: 12    lancets Misc, 1 lancet by Misc.(Non-Drug; Combo Route) route 3 (three) times daily. For Acc-Chek Amber   DX :E11.9, Disp: 100 each, Rfl: 11    loratadine (CLARITIN) 10 mg tablet, Take 1 tablet (10 mg total) by mouth once daily., Disp: 90 tablet, Rfl: 1    meclizine (ANTIVERT) 25 mg tablet, Take 1 tablet (25 mg total) by mouth 3 (three) times daily as needed., Disp: 20 tablet, Rfl: 0    MULTIVITAMIN W-MINERALS/LUTEIN (CENTRUM SILVER ORAL), Take 1 tablet by mouth once daily., Disp: , Rfl:     spironolactone (ALDACTONE) 50 MG tablet, Take 1 tablet (50 mg total) by mouth once daily., Disp: 90 tablet, Rfl: 1    triamcinolone (NASACORT) 55 mcg nasal inhaler, 2 sprays by Nasal route once daily., Disp: 17 g,  "Rfl: 3        Past medical, surgical, family and social histories have been reviewed today.      Objective:     Vitals:    07/29/20 0845   BP: (!) 148/68   Pulse: 66   Temp: 98.6 °F (37 °C)   TempSrc: Temporal   SpO2: 99%   Weight: 82.8 kg (182 lb 8.7 oz)   Height: 5' 4" (1.626 m)   PainSc: 0-No pain         Physical Exam  Vitals signs reviewed.   Constitutional:       General: She is not in acute distress.  HENT:      Head: Normocephalic and atraumatic.      Right Ear: Tympanic membrane normal.      Left Ear: Tympanic membrane normal.      Nose: Nose normal.   Eyes:      Pupils: Pupils are equal, round, and reactive to light.   Neck:      Musculoskeletal: Normal range of motion and neck supple. No neck rigidity or muscular tenderness.      Vascular: No carotid bruit.   Cardiovascular:      Rate and Rhythm: Normal rate and regular rhythm.      Pulses: Normal pulses.      Heart sounds: Normal heart sounds. No murmur. No gallop.    Pulmonary:      Effort: Pulmonary effort is normal.      Breath sounds: Normal breath sounds.   Abdominal:      General: Bowel sounds are normal. There is no distension.      Palpations: Abdomen is soft.      Tenderness: There is no abdominal tenderness. There is no guarding.   Musculoskeletal: Normal range of motion.         General: No swelling or deformity.   Lymphadenopathy:      Cervical: No cervical adenopathy.   Skin:     General: Skin is warm and dry.      Capillary Refill: Capillary refill takes less than 2 seconds.   Neurological:      Mental Status: She is alert and oriented to person, place, and time.   Psychiatric:         Mood and Affect: Mood normal.         Behavior: Behavior normal.         Thought Content: Thought content normal.         Judgment: Judgment normal.           Diagnosis       1. Endolymphatic hydrops of left ear    2. Gastroesophageal reflux disease, esophagitis presence not specified    3. Combined hyperlipidemia associated with type 2 diabetes mellitus  "   4. Statin intolerance          Assessment/ Plan     Endolymphatic hydrops of left ear ---- per ENT note diagnosis --- Antivert refilled.  Advised on low-salt diet, low caffeine intake.  Ribo-flavanoid if needed.  -     meclizine (ANTIVERT) 25 mg tablet; Take 1 tablet (25 mg total) by mouth 3 (three) times daily as needed for Dizziness or Nausea.  Dispense: 60 tablet; Refill: 1    Gastroesophageal reflux disease, esophagitis presence not specified --- healthy diet, no late pm eating, elevate HOB if needed.  GERD triggers and prevention discussed.  -     pantoprazole (PROTONIX) 40 MG tablet; Take 1 tablet (40 mg total) by mouth daily as needed.  Dispense: 30 tablet; Refill: 2    Combined hyperlipidemia associated with type 2 diabetes mellitus --- stable, medication refilled.  Home FBS running 98 to 112.  -     evolocumab (REPATHA SURECLICK) 140 mg/mL PnIj; Inject 1 mL (140 mg total) into the skin every 14 (fourteen) days.  Dispense: 2 mL; Refill: 2    Statin intolerance -- med refilled.  -     evolocumab (REPATHA SURECLICK) 140 mg/mL PnIj; Inject 1 mL (140 mg total) into the skin every 14 (fourteen) days.  Dispense: 2 mL; Refill: 2        Follow-up:    Return prn.        SHIRA Baig  [facility]

## 2020-07-30 ENCOUNTER — TELEPHONE (OUTPATIENT)
Dept: PHARMACY | Facility: CLINIC | Age: 70
End: 2020-07-30

## 2020-08-27 ENCOUNTER — TELEPHONE (OUTPATIENT)
Dept: PHARMACY | Facility: CLINIC | Age: 70
End: 2020-08-27

## 2020-09-08 DIAGNOSIS — E11.9 TYPE 2 DIABETES MELLITUS WITHOUT COMPLICATION, WITH LONG-TERM CURRENT USE OF INSULIN: ICD-10-CM

## 2020-09-08 DIAGNOSIS — Z79.4 TYPE 2 DIABETES MELLITUS WITHOUT COMPLICATION, WITH LONG-TERM CURRENT USE OF INSULIN: ICD-10-CM

## 2020-09-08 DIAGNOSIS — E11.21 TYPE II DIABETES MELLITUS WITH NEPHROPATHY: ICD-10-CM

## 2020-09-08 RX ORDER — INSULIN PUMP SYRINGE, 3 ML
1 EACH MISCELLANEOUS DAILY
Qty: 1 EACH | Refills: 0 | Status: SHIPPED | OUTPATIENT
Start: 2020-09-08 | End: 2020-09-10 | Stop reason: SDUPTHER

## 2020-09-08 RX ORDER — LANCETS
1 EACH MISCELLANEOUS 3 TIMES DAILY
Qty: 100 EACH | Refills: 11 | Status: SHIPPED | OUTPATIENT
Start: 2020-09-08 | End: 2020-09-09 | Stop reason: SDUPTHER

## 2020-09-08 RX ORDER — DEXTROSE 4 G
1 TABLET,CHEWABLE ORAL ONCE
Qty: 1 EACH | Refills: 0 | Status: SHIPPED | OUTPATIENT
Start: 2020-09-08 | End: 2023-07-26

## 2020-09-08 NOTE — TELEPHONE ENCOUNTER
----- Message from Rebecca Griffin sent at 9/8/2020  8:09 AM CDT -----  Regarding: Diabetes supplies  Contact: Patient  Please have  the nurse to call patient's pharmacy to give diagnoses code for patient to get a refill on her diabetes test strips and supplies, patient is completely out. Please call patient to advise at  [phone]    [facility]/pharmacy #[id number] - [place] - [address]  [address]  [place] [address]  Phone: [phone] Fax: [phone]

## 2020-09-09 DIAGNOSIS — Z79.4 TYPE 2 DIABETES MELLITUS WITHOUT COMPLICATION, WITH LONG-TERM CURRENT USE OF INSULIN: ICD-10-CM

## 2020-09-09 DIAGNOSIS — E11.9 TYPE 2 DIABETES MELLITUS WITHOUT COMPLICATION, WITH LONG-TERM CURRENT USE OF INSULIN: ICD-10-CM

## 2020-09-12 RX ORDER — LANCETS
1 EACH MISCELLANEOUS 3 TIMES DAILY
Qty: 100 EACH | Refills: 11 | Status: SHIPPED | OUTPATIENT
Start: 2020-09-12 | End: 2021-08-19 | Stop reason: SDUPTHER

## 2020-09-18 DIAGNOSIS — E11.9 TYPE 2 DIABETES MELLITUS WITHOUT COMPLICATION, WITH LONG-TERM CURRENT USE OF INSULIN: ICD-10-CM

## 2020-09-18 DIAGNOSIS — Z79.4 TYPE 2 DIABETES MELLITUS WITHOUT COMPLICATION, WITH LONG-TERM CURRENT USE OF INSULIN: ICD-10-CM

## 2020-09-18 DIAGNOSIS — E11.9 TYPE 2 DIABETES MELLITUS WITHOUT COMPLICATION: ICD-10-CM

## 2020-09-23 ENCOUNTER — TELEPHONE (OUTPATIENT)
Dept: FAMILY MEDICINE | Facility: CLINIC | Age: 70
End: 2020-09-23

## 2020-09-23 NOTE — TELEPHONE ENCOUNTER
Pt still having issues getting test strips refilled. Called pt pharmacy [facility] on [address], per pharmacy [facility], new prescription needs to be sent in with diagnosis code E11.29 in order for insurance to cover the test strips.     ----- Message from Silvina West sent at 9/23/2020  3:20 PM CDT -----  Contact: patient   Patient need to speak to nurse regarding her medication.    Call back number [phone]. Beatrice

## 2020-09-25 DIAGNOSIS — E11.9 TYPE 2 DIABETES MELLITUS WITHOUT COMPLICATION: ICD-10-CM

## 2020-09-26 ENCOUNTER — TELEPHONE (OUTPATIENT)
Dept: PHARMACY | Facility: CLINIC | Age: 70
End: 2020-09-26

## 2020-10-01 ENCOUNTER — OFFICE VISIT (OUTPATIENT)
Dept: CARDIOLOGY | Facility: CLINIC | Age: 70
End: 2020-10-01
Payer: MEDICARE

## 2020-10-01 ENCOUNTER — PATIENT OUTREACH (OUTPATIENT)
Dept: ADMINISTRATIVE | Facility: OTHER | Age: 70
End: 2020-10-01

## 2020-10-01 ENCOUNTER — IMMUNIZATION (OUTPATIENT)
Dept: FAMILY MEDICINE | Facility: CLINIC | Age: 70
End: 2020-10-01
Payer: MEDICARE

## 2020-10-01 VITALS
RESPIRATION RATE: 16 BRPM | SYSTOLIC BLOOD PRESSURE: 144 MMHG | HEART RATE: 69 BPM | BODY MASS INDEX: 31.24 KG/M2 | HEIGHT: 64 IN | WEIGHT: 183 LBS | DIASTOLIC BLOOD PRESSURE: 80 MMHG | OXYGEN SATURATION: 97 %

## 2020-10-01 DIAGNOSIS — R94.31 ABNORMAL ECG: ICD-10-CM

## 2020-10-01 DIAGNOSIS — I15.2 HYPERTENSION ASSOCIATED WITH DIABETES: Primary | ICD-10-CM

## 2020-10-01 DIAGNOSIS — E11.59 HYPERTENSION ASSOCIATED WITH DIABETES: Primary | ICD-10-CM

## 2020-10-01 DIAGNOSIS — Z78.9 STATIN INTOLERANCE: ICD-10-CM

## 2020-10-01 DIAGNOSIS — R06.09 DOE (DYSPNEA ON EXERTION): ICD-10-CM

## 2020-10-01 DIAGNOSIS — E11.69 COMBINED HYPERLIPIDEMIA ASSOCIATED WITH TYPE 2 DIABETES MELLITUS: ICD-10-CM

## 2020-10-01 DIAGNOSIS — E66.9 OBESITY (BMI 30.0-34.9): ICD-10-CM

## 2020-10-01 DIAGNOSIS — I51.7 LVH (LEFT VENTRICULAR HYPERTROPHY): ICD-10-CM

## 2020-10-01 DIAGNOSIS — E78.2 COMBINED HYPERLIPIDEMIA ASSOCIATED WITH TYPE 2 DIABETES MELLITUS: ICD-10-CM

## 2020-10-01 DIAGNOSIS — E11.22 TYPE 2 DIABETES MELLITUS WITH CHRONIC KIDNEY DISEASE, WITHOUT LONG-TERM CURRENT USE OF INSULIN, UNSPECIFIED CKD STAGE: ICD-10-CM

## 2020-10-01 DIAGNOSIS — I49.1 PAC (PREMATURE ATRIAL CONTRACTION): ICD-10-CM

## 2020-10-01 DIAGNOSIS — Z82.49 FAMILY HISTORY OF CARDIOVASCULAR DISORDER: ICD-10-CM

## 2020-10-01 PROCEDURE — 99999 PR PBB SHADOW E&M-EST. PATIENT-LVL I: ICD-10-PCS | Mod: PBBFAC,,,

## 2020-10-01 PROCEDURE — 99213 OFFICE O/P EST LOW 20 MIN: CPT | Mod: PBBFAC,PO | Performed by: INTERNAL MEDICINE

## 2020-10-01 PROCEDURE — 99214 OFFICE O/P EST MOD 30 MIN: CPT | Mod: S$PBB,,, | Performed by: INTERNAL MEDICINE

## 2020-10-01 PROCEDURE — 99214 PR OFFICE/OUTPT VISIT, EST, LEVL IV, 30-39 MIN: ICD-10-PCS | Mod: S$PBB,,, | Performed by: INTERNAL MEDICINE

## 2020-10-01 PROCEDURE — 90694 VACC AIIV4 NO PRSRV 0.5ML IM: CPT | Mod: PBBFAC,PO

## 2020-10-01 PROCEDURE — 99211 OFF/OP EST MAY X REQ PHY/QHP: CPT | Mod: PBBFAC,27,PO

## 2020-10-01 PROCEDURE — 99999 PR PBB SHADOW E&M-EST. PATIENT-LVL I: CPT | Mod: PBBFAC,,,

## 2020-10-01 PROCEDURE — G0008 ADMIN INFLUENZA VIRUS VAC: HCPCS | Mod: PBBFAC,PO

## 2020-10-01 PROCEDURE — 99999 PR PBB SHADOW E&M-EST. PATIENT-LVL III: CPT | Mod: PBBFAC,,, | Performed by: INTERNAL MEDICINE

## 2020-10-01 PROCEDURE — 99999 PR PBB SHADOW E&M-EST. PATIENT-LVL III: ICD-10-PCS | Mod: PBBFAC,,, | Performed by: INTERNAL MEDICINE

## 2020-10-01 NOTE — PROGRESS NOTES
Subjective:    Patient ID:  Kristyn Garza is a 69 y.o. female who presents for evaluation of HTN, LVH, hyperlipidemia, Risk Factor Modification.        HPI  Pt presents for f/u.  Her current med conditions include DM, PACs, LVH, HTN, hyperlipidemia, CRI.  Former smoker (quit 4 years ago).  Past hx pertinent for following:   Statin intolerant.  ecg 2/26/19 NSR, LVH.  Brother has CAD + revascularization.   ECG 7/25/19 showed NSR, PACs, LVH.  Stress MPI 5/19 no ischemia.  Echo 5/19 normal LV function.  AMITA 5/19 normal.   Now here.  Some chronic ZHOU, unchanged.  No cp sxs.  BP above goal.  She states can run high in clinic.  Some exercise.  DM HGAIC at goal.  Lipids controlled on Repatha.  Weight stable.  CRI stable on 3/20 labs, normal creatinine, stable GFR.  No palpitations.       Current Outpatient Medications   Medication Instructions    amLODIPine-benazepriL (LOTREL) 5-40 mg per capsule TAKE 1 CAPSULE BY MOUTH EVERY DAY    aspirin 81 mg, Daily    blood glucose control, normal Soln 1 Bottle, Misc.(Non-Drug; Combo Route), Daily, For Accu Check Amber  use twice daily prn dx: E11.9    blood sugar diagnostic Strp 1 strip, Misc.(Non-Drug; Combo Route), 2 times daily    blood sugar diagnostic Strp 1 strip, Misc.(Non-Drug; Combo Route), 2 times daily    blood-glucose meter (ACCU-CHEK AMBER) Misc 1 kit, Misc.(Non-Drug; Combo Route), Once, For Accu Check Amber  use twice daily prn dx: E11.9    calcium-vitamin D3 500 mg(1,250mg) -200 unit per tablet 1 tablet, Oral, Daily    CRANBERRY EXTRACT ORAL 1 tablet, Oral, Daily    lancets Misc 1 lancet, Misc.(Non-Drug; Combo Route), 3 times daily, For Acc-Chek Amber   DX :E11.9    loratadine (CLARITIN) 10 mg, Oral, Daily    meclizine (ANTIVERT) 25 mg, Oral, 3 times daily PRN    MULTIVITAMIN W-MINERALS/LUTEIN (CENTRUM SILVER ORAL) 1 tablet, Daily    pantoprazole (PROTONIX) 40 mg, Oral, Daily PRN    REPATHA SURECLICK 140 mg, Subcutaneous, Every 14 days    spironolactone  "(ALDACTONE) 50 mg, Oral, Daily    triamcinolone (NASACORT) 55 mcg nasal inhaler 2 sprays, Nasal, Daily         Review of Systems   Constitution: Negative.   HENT: Negative.    Eyes: Negative.    Cardiovascular: Positive for dyspnea on exertion.   Respiratory: Positive for shortness of breath.    Endocrine: Negative.    Hematologic/Lymphatic: Negative.    Skin: Negative.    Musculoskeletal: Negative.    Gastrointestinal: Negative.    Genitourinary: Negative.    Neurological: Negative.    Psychiatric/Behavioral: Negative.    Allergic/Immunologic: Negative.        BP (!) 144/80 (BP Location: Left arm, Patient Position: Sitting, BP Method: Large (Manual))   Pulse 69   Resp 16   Ht 5' 4" (1.626 m)   Wt 83 kg (182 lb 15.7 oz)   SpO2 97%   BMI 31.41 kg/m²     Wt Readings from Last 3 Encounters:   10/01/20 83 kg (182 lb 15.7 oz)   07/29/20 82.8 kg (182 lb 8.7 oz)   06/02/20 83.9 kg (184 lb 15.5 oz)     Temp Readings from Last 3 Encounters:   07/29/20 98.6 °F (37 °C) (Temporal)   06/02/20 97.5 °F (36.4 °C) (Tympanic)   01/13/20 98.4 °F (36.9 °C)     BP Readings from Last 3 Encounters:   10/01/20 (!) 144/80   07/29/20 (!) 148/68   06/02/20 (!) 148/85     Pulse Readings from Last 3 Encounters:   10/01/20 69   07/29/20 66   06/02/20 75          Objective:    Physical Exam   Constitutional: She is oriented to person, place, and time. Vital signs are normal. She appears well-developed and well-nourished. She is active and cooperative. She does not have a sickly appearance. She does not appear ill. No distress.   HENT:   Head: Normocephalic.   Neck: Neck supple. Normal carotid pulses, no hepatojugular reflux and no JVD present. Carotid bruit is not present. No thyromegaly present.   Cardiovascular: Normal rate, regular rhythm, S1 normal, S2 normal, normal heart sounds and normal pulses. PMI is not displaced. Exam reveals no gallop and no friction rub.   No murmur heard.  Pulses:       Radial pulses are 2+ on the right side " and 2+ on the left side.   Pulmonary/Chest: Effort normal and breath sounds normal. She has no wheezes. She has no rales.   Abdominal: Soft. Normal appearance, normal aorta and bowel sounds are normal. She exhibits no pulsatile liver, no abdominal bruit, no ascites and no mass. There is no splenomegaly or hepatomegaly. There is no abdominal tenderness.   Musculoskeletal:         General: No edema.   Lymphadenopathy:     She has no cervical adenopathy.   Neurological: She is alert and oriented to person, place, and time.   Skin: Skin is warm. She is not diaphoretic.   Psychiatric: She has a normal mood and affect. Her behavior is normal.   Nursing note and vitals reviewed.      I have reviewed all pertinent labs and cardiac studies.    Lab Results   Component Value Date    HGBA1C 6.5 (H) 03/09/2020           Chemistry        Component Value Date/Time     03/09/2020 0812    K 3.8 03/09/2020 0812     03/09/2020 0812    CO2 29 03/09/2020 0812    BUN 13 03/09/2020 0812    CREATININE 1.0 03/09/2020 0812    GLU 82 03/09/2020 0812        Component Value Date/Time    CALCIUM 9.2 03/09/2020 0812    ALKPHOS 100 03/09/2020 0812    AST 22 03/09/2020 0812    ALT 18 03/09/2020 0812    BILITOT 0.5 03/09/2020 0812    ESTGFRAFRICA >60.0 03/09/2020 0812    EGFRNONAA 56.8 (A) 03/09/2020 0812        Lab Results   Component Value Date    WBC 9.24 05/25/2020    HGB 13.0 05/25/2020    HCT 41.1 05/25/2020    MCV 99 (H) 05/25/2020     05/25/2020       Lab Results   Component Value Date    HGBA1C 6.5 (H) 03/09/2020     Lab Results   Component Value Date    CHOL 145 03/09/2020    CHOL 150 09/11/2019    CHOL 242 (H) 04/16/2019     Lab Results   Component Value Date    HDL 49 03/09/2020    HDL 43 09/11/2019    HDL 55 04/16/2019     Lab Results   Component Value Date    LDLCALC 86.0 03/09/2020    LDLCALC 90.2 09/11/2019    LDLCALC 171.4 (H) 04/16/2019     Lab Results   Component Value Date    TRIG 50 03/09/2020    TRIG 84  09/11/2019    TRIG 78 04/16/2019     Lab Results   Component Value Date    CHOLHDL 33.8 03/09/2020    CHOLHDL 28.7 09/11/2019    CHOLHDL 22.7 04/16/2019           Assessment:       1. Hypertension associated with diabetes    2. ZHOU (dyspnea on exertion)    3. PAC (premature atrial contraction)    4. Family history of cardiovascular disorder    5. LVH (left ventricular hypertrophy)    6. Abnormal ECG    7. Statin intolerance    8. Obesity (BMI 30.0-34.9)    9. Combined hyperlipidemia associated with type 2 diabetes mellitus    10. Type 2 diabetes mellitus with chronic kidney disease, without long-term current use of insulin, unspecified CKD stage         Plan:               Stable cardiovascular conditions at present time on current medical treatment.  Enroll in digital HTN program  Goal BP < 130/80.  Continue current meds.  Reviewed all tests and above medical conditions with patient in detail and formulated treatment plan.  Continue optimal medical treatment for cardiovascular conditions.  Cardiac low salt diet discussed.  Daily exercise encouraged, goal 30 +  minutes aerobic exercise as tolerated.  Maintaining healthy weight and weight loss goals (if needed) were discussed in clinic.  Optimal DM HGAIC control discussed.  Update lipids -- phone review.  F/u in 6 months.

## 2020-10-01 NOTE — PROGRESS NOTES
Health Maintenance Due   Topic Date Due    Shingles Vaccine (2 of 3) 04/20/2011    Influenza Vaccine (1) 08/01/2020    Hemoglobin A1c  09/09/2020    Foot Exam  10/16/2020       Chart was reviewed for overdue Proactive [facility] Encounters (LUIS) topics (CRS, Breast Cancer Screening, Eye exam)  Health Maintenance has been updated.  LINKS immunization registry triggered.  Immunizations were reconciled.

## 2020-10-02 ENCOUNTER — LAB VISIT (OUTPATIENT)
Dept: LAB | Facility: HOSPITAL | Age: 70
End: 2020-10-02
Attending: INTERNAL MEDICINE
Payer: MEDICARE

## 2020-10-02 ENCOUNTER — PATIENT MESSAGE (OUTPATIENT)
Dept: ADMINISTRATIVE | Facility: OTHER | Age: 70
End: 2020-10-02

## 2020-10-02 DIAGNOSIS — E78.2 COMBINED HYPERLIPIDEMIA ASSOCIATED WITH TYPE 2 DIABETES MELLITUS: ICD-10-CM

## 2020-10-02 DIAGNOSIS — E11.69 COMBINED HYPERLIPIDEMIA ASSOCIATED WITH TYPE 2 DIABETES MELLITUS: ICD-10-CM

## 2020-10-02 LAB
ALBUMIN SERPL BCP-MCNC: 3.8 G/DL (ref 3.5–5.2)
ALP SERPL-CCNC: 94 U/L (ref 55–135)
ALT SERPL W/O P-5'-P-CCNC: 23 U/L (ref 10–44)
ANION GAP SERPL CALC-SCNC: 8 MMOL/L (ref 8–16)
AST SERPL-CCNC: 21 U/L (ref 10–40)
BILIRUB SERPL-MCNC: 0.4 MG/DL (ref 0.1–1)
BUN SERPL-MCNC: 13 MG/DL (ref 8–23)
CALCIUM SERPL-MCNC: 9.1 MG/DL (ref 8.7–10.5)
CHLORIDE SERPL-SCNC: 104 MMOL/L (ref 95–110)
CHOLEST SERPL-MCNC: 158 MG/DL (ref 120–199)
CHOLEST/HDLC SERPL: 3.2 {RATIO} (ref 2–5)
CO2 SERPL-SCNC: 30 MMOL/L (ref 23–29)
CREAT SERPL-MCNC: 1.2 MG/DL (ref 0.5–1.4)
EST. GFR  (AFRICAN AMERICAN): 52.5 ML/MIN/1.73 M^2
EST. GFR  (NON AFRICAN AMERICAN): 45.6 ML/MIN/1.73 M^2
GLUCOSE SERPL-MCNC: 92 MG/DL (ref 70–110)
HDLC SERPL-MCNC: 50 MG/DL (ref 40–75)
HDLC SERPL: 31.6 % (ref 20–50)
LDLC SERPL CALC-MCNC: 97.2 MG/DL (ref 63–159)
NONHDLC SERPL-MCNC: 108 MG/DL
POTASSIUM SERPL-SCNC: 4.1 MMOL/L (ref 3.5–5.1)
PROT SERPL-MCNC: 7.6 G/DL (ref 6–8.4)
SODIUM SERPL-SCNC: 142 MMOL/L (ref 136–145)
TRIGL SERPL-MCNC: 54 MG/DL (ref 30–150)

## 2020-10-02 PROCEDURE — 80061 LIPID PANEL: CPT

## 2020-10-02 PROCEDURE — 36415 COLL VENOUS BLD VENIPUNCTURE: CPT | Mod: PO

## 2020-10-02 PROCEDURE — 80053 COMPREHEN METABOLIC PANEL: CPT

## 2020-10-04 ENCOUNTER — TELEPHONE (OUTPATIENT)
Dept: CARDIOLOGY | Facility: CLINIC | Age: 70
End: 2020-10-04

## 2020-10-07 ENCOUNTER — PES CALL (OUTPATIENT)
Dept: ADMINISTRATIVE | Facility: CLINIC | Age: 70
End: 2020-10-07

## 2020-10-08 ENCOUNTER — TELEPHONE (OUTPATIENT)
Dept: INTERNAL MEDICINE | Facility: CLINIC | Age: 70
End: 2020-10-08

## 2020-10-08 NOTE — TELEPHONE ENCOUNTER
----- Message from Leslienae  sent at 10/8/2020  8:39 AM CDT -----  Type:  Pharmacy Calling to Clarify an RX    Name of Caller:Michelle  Pharmacy Name:[facility]  Prescription Name:  What do they need to clarify?:  Best Call Back Number:[phone]  Additional Information:   Never received diagnosis code on prescription sent on 9/8/2020. Please resend

## 2020-10-08 NOTE — TELEPHONE ENCOUNTER
----- Message from Leslienae  sent at 10/8/2020  8:39 AM CDT -----  Type:  Pharmacy Calling to Clarify an RX    Name of Caller:Michelle  Pharmacy Name:[facility]  Prescription Name:  What do they need to clarify?:  Best Call Back Number:[phone]  Additional Information:   Never received diagnosis code on prescription sent on 9/8/2020. Please resend

## 2020-10-08 NOTE — TELEPHONE ENCOUNTER
Spoke with pharmacy, they stated they have the ICD10 code but the insurance company is rejecting due to her already getting a new meter within the last 5 years. Insurance will not cover another one until 5 years. Pt will have to come out of pocket. Called pt's number, no answer.

## 2020-10-09 ENCOUNTER — PATIENT MESSAGE (OUTPATIENT)
Dept: ADMINISTRATIVE | Facility: OTHER | Age: 70
End: 2020-10-09

## 2020-10-13 ENCOUNTER — OFFICE VISIT (OUTPATIENT)
Dept: INTERNAL MEDICINE | Facility: CLINIC | Age: 70
End: 2020-10-13
Payer: MEDICARE

## 2020-10-13 VITALS
DIASTOLIC BLOOD PRESSURE: 82 MMHG | BODY MASS INDEX: 31.27 KG/M2 | WEIGHT: 183.19 LBS | HEART RATE: 61 BPM | OXYGEN SATURATION: 98 % | HEIGHT: 64 IN | SYSTOLIC BLOOD PRESSURE: 150 MMHG

## 2020-10-13 DIAGNOSIS — N18.31 STAGE 3A CHRONIC KIDNEY DISEASE: ICD-10-CM

## 2020-10-13 DIAGNOSIS — E78.5 HYPERLIPIDEMIA ASSOCIATED WITH TYPE 2 DIABETES MELLITUS: ICD-10-CM

## 2020-10-13 DIAGNOSIS — R20.2 NUMBNESS AND TINGLING: ICD-10-CM

## 2020-10-13 DIAGNOSIS — E11.69 HYPERLIPIDEMIA ASSOCIATED WITH TYPE 2 DIABETES MELLITUS: ICD-10-CM

## 2020-10-13 DIAGNOSIS — E04.2 MULTIPLE THYROID NODULES: ICD-10-CM

## 2020-10-13 DIAGNOSIS — R20.0 NUMBNESS AND TINGLING: ICD-10-CM

## 2020-10-13 DIAGNOSIS — I10 HYPERTENSION, UNSPECIFIED TYPE: ICD-10-CM

## 2020-10-13 DIAGNOSIS — E66.9 OBESITY (BMI 30.0-34.9): ICD-10-CM

## 2020-10-13 DIAGNOSIS — Z00.00 ENCOUNTER FOR PREVENTIVE HEALTH EXAMINATION: Primary | ICD-10-CM

## 2020-10-13 PROCEDURE — G0439 PR MEDICARE ANNUAL WELLNESS SUBSEQUENT VISIT: ICD-10-PCS | Mod: S$GLB,,, | Performed by: NURSE PRACTITIONER

## 2020-10-13 PROCEDURE — 99999 PR PBB SHADOW E&M-EST. PATIENT-LVL V: CPT | Mod: PBBFAC,,, | Performed by: NURSE PRACTITIONER

## 2020-10-13 PROCEDURE — 99999 PR PBB SHADOW E&M-EST. PATIENT-LVL V: ICD-10-PCS | Mod: PBBFAC,,, | Performed by: NURSE PRACTITIONER

## 2020-10-13 PROCEDURE — G0439 PPPS, SUBSEQ VISIT: HCPCS | Mod: S$GLB,,, | Performed by: NURSE PRACTITIONER

## 2020-10-13 PROCEDURE — 99215 OFFICE O/P EST HI 40 MIN: CPT | Mod: PBBFAC | Performed by: NURSE PRACTITIONER

## 2020-10-13 NOTE — Clinical Note
Your patient was seen today for AWV. Abnormalities bolded. Please review.   Thank you,   YSABEL Briones

## 2020-10-13 NOTE — PROGRESS NOTES
"  Kristyn Garza presented for a  Medicare AWV and comprehensive Health Risk Assessment today. The following components were reviewed and updated:    · Medical history  · Family History  · Social history  · Allergies and Current Medications  · Health Risk Assessment  · Health Maintenance  · Care Team     ** See Completed Assessments for Annual Wellness Visit within the encounter summary.**         The following assessments were completed:  · Living Situation  · CAGE  · Depression Screening  · Timed Get Up and Go  · Whisper Test-na  · Cognitive Function Screening  · Nutrition Screening  · ADL Screening  · PAQ Screening        Vitals:    10/13/20 0841   BP: (!) 150/82   BP Location: Right arm   Patient Position: Sitting   Pulse: 61   SpO2: 98%   Weight: 83.1 kg (183 lb 3.2 oz)   Height: 5' 4" (1.626 m)     Body mass index is 31.45 kg/m².  Physical Exam  Vitals signs and nursing note reviewed.   Constitutional:       Appearance: She is well-developed.   HENT:      Head: Normocephalic.   Cardiovascular:      Rate and Rhythm: Normal rate and regular rhythm.      Heart sounds: Normal heart sounds. No murmur.   Pulmonary:      Effort: Pulmonary effort is normal. No respiratory distress.      Breath sounds: Normal breath sounds.   Abdominal:      Palpations: Abdomen is soft. There is no mass.      Tenderness: There is no abdominal tenderness.   Musculoskeletal: Normal range of motion.   Skin:     General: Skin is warm and dry.   Neurological:      Mental Status: She is alert and oriented to person, place, and time.      Motor: No abnormal muscle tone.   Psychiatric:         Speech: Speech normal.         Behavior: Behavior normal.               Diagnoses and health risks identified today and associated recommendations/orders:    1. Encounter for preventive health examination  Patient will receive Shingrix at pharmacy.      2. Hypertension, unspecified type  BP elevated at today's visit. Discussed s/s of MI and stroke " (patient denies any s/s) and advised to go to the ER if occur. Advised patient to monitor BP (keep a log). She will follow up with PCP at upcoming visit. She is enrolled in digital HTN medicine program.   Advised to follow up with PCP for further evaluation and treatment. SHe expressed understanding.    - Ambulatory referral/consult to Outpatient Case Management    3. Hyperlipidemia associated with type 2 diabetes mellitus  A1C 6.5  Continue current treatment plan as previously prescribed with your PCP and cardiologist.   - Ambulatory referral/consult to Outpatient Case Management    4. Stage 3a chronic kidney disease  Continue current treatment plan as previously prescribed with your PCP and nephrologist.   - Ambulatory referral/consult to Outpatient Case Management    5. Numbness and tingling  Reports numbness and tingling to bilateral feet, not new and not worsening.   Encouraged daily foot inspections.   Advised to follow up with PCP for further evaluation and treatment. She expressed understanding.      6. Multiple thyroid nodules  US 5/2020  Continue current treatment plan as previously prescribed with your PCP.     7. Obesity (BMI 30.0-34.9)  Continue current treatment plan as previously prescribed with your PCP.       Provided Kristyn with a 5-10 year written screening schedule and personal prevention plan. Recommendations were developed using the USPSTF age appropriate recommendations. Education, counseling, and referrals were provided as needed. After Visit Summary printed and given to patient which includes a list of additional screenings\tests needed.    Follow up in about 1 year (around 10/13/2021) for awv.    Liz Rich NP  I offered to discuss end of life issues, including information on how to make advance directives that the patient could use to name someone who would make medical decisions on their behalf if they became too ill to make themselves.    ___Patient declined  _X_Patient is  interested, I provided paper work and offered to discuss.

## 2020-10-13 NOTE — PATIENT INSTRUCTIONS
Counseling and Referral of Other Preventative  (Italic type indicates deductible and co-insurance are waived)    Patient Name: Kristyn Garza  Today's Date: 10/13/2020    Health Maintenance       Date Due Completion Date    Shingles Vaccine (2 of 3) 04/20/2011 2/23/2011    Hemoglobin A1c 09/09/2020 3/9/2020    Foot Exam 10/16/2020 10/16/2019    Override on 9/23/2015: Done    Override on 7/16/2015: Done    Override on 3/23/2015: Done    Override on 12/3/2014: Done    Override on 4/29/2014: Done    TETANUS VACCINE 02/23/2021 2/23/2011    DEXA SCAN 04/28/2021 4/28/2016    Override on 3/10/2011: Done (normal repeat in 5 years)    Mammogram 05/25/2021 5/25/2020    Override on 3/13/2013: Done    Eye Exam 07/20/2021 7/20/2020 (Done)    Override on 7/20/2020: Done    Override on 4/30/2019: Done    Override on 4/24/2018: Done    Override on 5/17/2017: Done    Override on 5/16/2016: Done    Override on 5/13/2015: Done    Lipid Panel 10/02/2021 10/2/2020    Colorectal Cancer Screening 03/18/2024 4/16/2019    Override on 6/1/2006: Done        No orders of the defined types were placed in this encounter.    The following information is provided to all patients.  This information is to help you find resources for any of the problems found today that may be affecting your health:                Living healthy guide: www.[facility].gov      Understanding Diabetes: www.diabetes.org      Eating healthy: www.cdc.gov/healthyweight      CDC home safety checklist: www.cdc.gov/steadi/patient.html      Agency on Aging: www.goea.[place].gov      Alcoholics anonymous (AA): www.aa.org      Physical Activity: www.barney.nih.gov/fi7tgpd      Tobacco use: www.quitwithusla.org

## 2020-10-14 ENCOUNTER — OUTPATIENT CASE MANAGEMENT (OUTPATIENT)
Dept: ADMINISTRATIVE | Facility: OTHER | Age: 70
End: 2020-10-14

## 2020-10-14 NOTE — LETTER
October 15, 2020    Kristyn Garza  [address] [place] [address]             [facility]  [address]  [place] [address] Welcome to [facility]s Complex Care Management Program.  It was a pleasure talking with you today.  My name is Elsy Jolley. I look forward to working with you as your Care Manager.  My goal is to help you function at the healthiest and highest level possible.  You can contact me directly at [phone].    As an [facility] patient, some of the services we may be able to provide include:      Development of an individualized care plan with a Registered Nurse    Connection with a    Connection with available resources and services     Coordinate communication among your care team members    Provide coaching and education    Help you understand your doctors treatment plan   Help you obtain information about your insurance coverage.     All services provided by [facility]s Complex Care Managers and other care team members are coordinated with and communicated to your primary care team.    As part of your enrollment, you will be receiving education materials and more information about these services in your [facility] account, by phone or through the mail.  If you do not wish to participate or receive information, please contact our office at [phone].      Sincerely,        Elsy Jolley RN, CCM [facility]   Out-patient RN Complex Care Manager

## 2020-10-14 NOTE — LETTER
October 15, 2020    Kristyn Garza  [address] [place] [address]             [facility] [address] [facility] [place] [address] Dear Ms Garza,    I work with [facility] Outpatient Case Management Department. We received a referral to call you to discuss your medical history.These services are free of charge and are offered to [facility] patients who have recently been discharged from any of our facilities or who have complex medical conditions that may require the skill of a nurse to assist with management.             I am a Registered Nurse who specializes in connecting patients with available medical and financial resources as well as addressing any educational needs that may be indicated.      I attempted to reach you by telephone, but I was unsuccessful. Please call our department so that we can go over some questions with you regarding your health.    The Outpatient Case Management Department can be reached at [phone] from 8:00AM to 4:30 PM on Monday thru Friday. [facility] also has a program where a nurse is available 24/7 to answer questions or provide medical advice. Their number is [phone].      Thank you,      Elsy Galicia RN  Outpatient Case Management  [phone]

## 2020-10-15 ENCOUNTER — PATIENT OUTREACH (OUTPATIENT)
Dept: OTHER | Facility: OTHER | Age: 70
End: 2020-10-15

## 2020-10-15 ENCOUNTER — TELEPHONE (OUTPATIENT)
Dept: FAMILY MEDICINE | Facility: CLINIC | Age: 70
End: 2020-10-15

## 2020-10-15 DIAGNOSIS — Z79.4 TYPE 2 DIABETES MELLITUS WITHOUT COMPLICATION, WITH LONG-TERM CURRENT USE OF INSULIN: ICD-10-CM

## 2020-10-15 DIAGNOSIS — E11.9 TYPE 2 DIABETES MELLITUS WITHOUT COMPLICATION, WITH LONG-TERM CURRENT USE OF INSULIN: ICD-10-CM

## 2020-10-15 PROCEDURE — 99453 REM MNTR PHYSIOL PARAM SETUP: CPT | Mod: S$GLB,,, | Performed by: INTERNAL MEDICINE

## 2020-10-15 PROCEDURE — 99453 PR REMOTE MONITR, PHYSIOL PARAM, INITIAL: ICD-10-PCS | Mod: S$GLB,,, | Performed by: INTERNAL MEDICINE

## 2020-10-15 NOTE — PATIENT INSTRUCTIONS
Eating Heart-Healthy Foods  Eating has a big impact on your heart health. In fact, eating healthier can improve several of your heart risks at once. For instance, it helps you manage weight, cholesterol, and blood pressure. Here are ideas to help you make heart-healthy changes without giving up all the foods and flavors you love.  Getting started  · Talk with your health care provider about eating plans, such as the DASH or Mediterranean diet. You may also be referred to a dietitian.  · Change a few things at a time. Give yourself time to get used to a few eating changes before adding more.  · Work to create a tasty, healthy eating plan that you can stick to for the rest of your life.    Goals for healthy eating  Below are some tips to improve your eating habits:  · Limit saturated fats and trans fats. Saturated fats raise your levels of cholesterol, so keep these fats to a minimum. They are found in foods such as fatty meats, whole milk, cheese, and palm and coconut oils. Avoid trans fats because they lower good cholesterol as well as raise bad cholesterol. Trans fats are most often found in processed foods.  · Reduce sodium (salt) intake. Eating too much salt may increase your blood pressure. Limit your sodium intake to 2,300 milligrams (mg) per day, or less if your health care provider recommends it. Dining out less often and eating fewer processed foods are two great ways to decrease the amount of salt you consume.  · Managing calories. A calorie is a unit of energy. Your body burns calories for fuel, but if you eat more calories than your body burns, the extras are stored as fat. Your health care provider can help you create a diet plan to manage your calories. This will likely include eating healthier foods as well as exercising regularly. To help you track your progress, keep a diary to record what you eat and how often you exercise.  Choose the right foods  Aim to make these foods staples of your diet. If  you have diabetes, you may have different recommendations than what is listed here:  · Fruits and vegetable provide plenty of nutrients without a lot of calories. At meals, fill half your plate with these foods. Split the other half of your plate between whole grains and lean protein.  · Whole grains are high in fiber and rich in vitamins and nutrients. Good choices include whole-wheat bread, pasta, and brown rice.  · Lean proteins give you nutrition with less fat. Good choices include fish, skinless chicken, and beans.  · Low-fat or nonfat dairy provides nutrients without a lot of fat. Try low-fat or nonfat milk, cheese, or yogurt.  · Healthy fats can be good for you in small amounts. These are unsaturated fats, such as olive oil, nuts, and fish. Try to have at least 2 servings per week of fatty fish such as salmon, sardines, mackerel, rainbow trout, and albacore tuna. These contain omega-3 fatty acids, which are good for your heart. Flaxseed is another source of a heart-healthy fat.  More on heart healthy eating    Read food labels  Healthy eating starts at the grocery store. Be sure to pay attention to food labels on packaged foods. Look for products that are high in fiber and protein, and low in saturated fat, cholesterol, and sodium. Avoid products that contain trans fat. And pay close attention to serving size. For instance, if you plan to eat two servings, double all the numbers on the label.  Prepare food right  A key part of healthy cooking is cutting down on added fat and salt. Look on the internet for lower-fat, lower-sodium recipes. Also, try these tips:  · Remove fat from meat and skin from poultry before cooking.  · Skim fat from the surface of soups and sauces.  · Broil, boil, bake, steam, grill, and microwave food without added fats.  · Choose ingredients that spice up your food without adding calories, fat, or sodium. Try these items: horseradish, hot sauce, lemon, mustard, nonfat salad dressings,  and vinegar. For salt-free herbs and spices, try basil, cilantro, cinnamon, pepper, and rosemary.  Date Last Reviewed: 6/25/2015  © 8030-0076 [employer]. [address] [place] [address], [place] [address]. All rights reserved. This information is not intended as a substitute for professional medical care. Always follow your healthcare professional's instructions.        Metabolic Syndrome: Lowering Your Blood Pressure    Metabolic syndrome is a set of five health factors that can lead to serious health problems. The factors greatly increase your risk for diabetes, heart attack, or stroke. High blood pressure (hypertension) is one of the factors of metabolic syndrome.  What is high blood pressure?  High blood pressure is when the force of blood against the inside of your blood vessels is higher than it should be. This makes your heart work harder. And it may damage your blood vessels. High blood pressure means a level of 130 mmHg/85 mm Hg or more.  Your healthcare provider will usually check your blood pressure each time you have an office visit. Having a high reading at one office visit does not mean that you have high blood pressure. High blood pressure means that your blood pressure is high over a period of time. That is why your healthcare provider checks it at each office visit. He or she can then look at the pattern of your blood pressure. Try to arrive early enough to your appointment so you can rest before your blood pressure is taken. Avoid caffeine and smoking before your pressure is measured.   Making lifestyle changes  Lifestyle changes can help lower your blood pressure. These changes can include:  · Losing weight. Even a small amount of weight loss can lower your blood pressure. As you slowly lose weight, you may see your blood pressure gradually get lower.  · Quitting smoking. The nicotine in tobacco raises blood pressure. Smoking also increases the risk for other heart and blood vessel diseases,  many cancers, and most lung conditions. The first step is to set a quit date. Talk with your healthcare provider about ways to quit smoking. There are programs and medicines that can help.  · Eating healthy. Eat plenty of fruits and vegetables. Eat fat-free or low-fat dairy foods, and drink less alcohol.  · Eating less salt (sodium). Salt can raise blood pressure. Try salt-free seasoning, or herbs and spices. Choose low-salt or no-salt snacks, deli meats, and canned foods.  · Being more physically active. Physical activity can help lower blood pressure. Get at least 30 minutes of activity on most days. If you are not very active, talk with your healthcare provider before you get started. He or she can help you figure out what is best for you.  · Managing stress. Stress can raise blood pressure. Talk with your healthcare provider about lowering your stress level. He or she may advise relaxation methods, a counselor, health , or class.  Taking medicine  Your healthcare provider may also prescribe medicine to help lower your blood pressure. The medicine will help lessen the strain on your heart and help to protect your blood vessels. If you lose weight, you may be able to take less medicine. Or you may no longer need to take it.  Date Last Reviewed: 7/1/2016 © 2000-2017 [employer]. [address] [place] [address], [place] [address]. All rights reserved. This information is not intended as a substitute for professional medical care. Always follow your healthcare professional's instructions.        Chronic Kidney Disease (CKD)     The role of the kidneys is to remove waste products and extra water from the blood.  When the kidneys do not work as they should, waste products begin to build up in the blood. This is called chronic kidney disease (CKD). CKD means that you have kidney damage or a decrease in kidney function lasting at least 3 months. CKD allows extra water, waste, and toxins to build up in the  body. This can eventually become life-threatening. You might need dialysis or a kidney transplant to stay alive. This most severe form is called end stage renal disease.  Diabetes is the leading causes of chronic renal failure. Other causes include high blood pressure, hardening of the arteries (atherosclerosis), lupus, inflammation of the blood vessels (vasculitis), and past viral or bacterial infections. Certain over-the-counter pain medicines can cause renal failure when taken often over a long period of time. These include aspirin, ibuprofen, and related anti-inflammatory medicines called NSAIDs (nonsteroidal anti-inflammatory drugs).  Home care  The following guidelines will help you care for yourself at home:  · If you have diabetes, talk with your healthcare provider about keeping your blood sugar under control. Ask if you need to make and changes to your diet, lifestyle, or medicines.  · If you have high blood pressure:  ¨ Take prescribed medicine to lower your blood pressure to the recommended goal of less than 130/80.  ¨ Start a regular exercise program that you enjoy. Check with your healthcare provider to be sure your planned exercise program is right for you.  ¨ Eat less salt (sodium). Your healthcare provider can tell you how much salt per day is safe for you.  · If you are overweight, talk with your healthcare provider about a weight loss plan.  · If you smoke, you must quit. Smoking makes kidney disease worse. Talk with your healthcare provider about ways to help you quit.  For more information, visit the following links:  ¨ www.smokefree.gov/sites/default/files/pdf/clearing-the-air-accessible.pdf  ¨ www.smokefree.gov  ¨ www.cancer.org/healthy/stayawayfromtobacco/guidetoquittingsmoking/  · Most people with CKD need to follow a special diet.  Be sure you understand yours. In general, you will need to limit protein, salt, potassium, and phosphorus. You also need to limit how much fluid you  drink.   · CKD is a risk factor for heart disease. Talk with your healthcare provider about any other risk factors you might have and what you can do to lessen them.  · Talk with your healthcare provider about any medicines you are taking to find out if they need to be reduced or stopped.  · Don't use the following over-the-counter medicines, or consult your healthcare provider before using:  ¨ Aspirin and NSAIDs such as ibuprofen or naproxen. Using acetaminophen for fever or pain is OK.  ¨ Laxatives and antacids containing magnesium or aluminum  ¨ Fleet or phospho soda enemas containing phosphorus  ¨ Certain stomach acid-blocking medicine such as cimetidine or ranitidine   ¨ Decongestants containing pseudoephedrine   ¨ Herbal supplements  Follow-up care  Follow up with your healthcare provider, or as advised. Contact one of the following for more information:  · American Association of Kidney Patients 248-879-8299 www.aakp.org  · National Kidney Foundation 513-732-7403 www.kidney.org  · American Kidney Fund 133-681-7199 www.kidneyfund.org  · National Kidney Disease Education Program 866-4KIDNEY www.nkdep.nih.gov  If an X-ray, ECG (cardiogram), or other diagnostic test was taken, you will be told of any new findings that may affect your care.  Call 911  Call 911 if you have any of the following:  · Severe weakness, dizziness, fainting, drowsiness, or confusion  · Chest pain or shortness of breath  · Heart beating fast, slow, or irregularly  When to seek medical advice  Call your healthcare provider right away if any of these occur:  · Nausea or vomiting  · Fever of 100.4°F (38°C) or higher, or as directed by your healthcare provider  · Unexpected weight gain or swelling in the legs, ankles, or around the eyes  · Decrease or absent urine output  Date Last Reviewed: 9/1/2016 © 2000-2017 [employer]. [address] [place] [address], [place] [address]. All rights reserved. This information is not intended as a  substitute for professional medical care. Always follow your healthcare professional's instructions.        Diet for Chronic Kidney Disease  Following a special diet when you have kidney disease can help you stay as healthy as possible. Your healthcare provider or dietitian should make a special diet plan just for you.    Eating right  Here are some good eating rules to follow:  · Protein. Eating protein is important for your body. But too much protein can put a strain on your kidneys. Eating less protein may slow the progression of chronic kidney disease. Foods high in protein include meat, fish, eggs, cheese, and other dairy products. A registered dietitian can help you plan a diet that has the right amount of protein for you.  · Sodium. Having too much salt in your diet can make your body hold onto (retain) water. Ask your provider or dietitian how much sodium per day you are allowed. This will help you avoid fluid buildup in your body (fluid retention). It can also help control high blood pressure. Learn to read food labels to know how much sodium is in one serving. Foods high in salt include processed meats, canned and boxed foods, sauces, salted chips and snacks, pickled foods, frozen dinners, and restaurant and fast food.  · Fluids. If you have advanced kidney disease, you will need to limit the water and fluids you drink. If you dont, then too much water will build up in your body. The exact amount of fluid you can drink depends on how well your kidneys are working. Ask your provider how much water you can safely drink each day.  · Potassium. In advanced kidney disease, your potassium level can go dangerously high. This affects your heart. It can cause an irregular heartbeat (arrhythmia). Ask your provider or dietitian if you should limit potassium in your diet. Foods high in potassium include dairy products (milk, yogurt, cheese), dried beans, bananas, oranges, potatoes, tomatoes, spinach, cantaloupe,  honeydew melon, dried fruits, and nuts.   · Calcium. Calcium is important to build strong bones. But foods high in calcium are also high in phosphorus, which can take calcium from your bones. Limiting foods high in phosphorus will help keep calcium in your bones. Ask your provider how much calcium you should get each day.  · Phosphorus. In advanced kidney disease, your phosphorus level can go dangerously high. This affects many systems in the body and can damage your heart. Limit your intake of phosphorus-rich foods. These include dried beans and peas, nuts, peanut butter, cocoa, beer, cola drinks, and dairy products.  Date Last Reviewed: 8/1/2016  © 0810-0710 [employer]. [address] [place] [address], [place] [address]. All rights reserved. This information is not intended as a substitute for professional medical care. Always follow your healthcare professional's instructions.

## 2020-10-15 NOTE — PROGRESS NOTES
Outpatient Care Management  Initial Patient Assessment    Patient: Kristyn Garza  MRN: [id number]  Date of Service: 10/14/2020  Completed by: Elsy Jolley RN  Referral Date: 10/13/2020  Program: Case Management (High Risk)    Reason for Visit   Patient presents with    \[facility]\"" Enrollment Call     Medication Reconciliation    Initial Assessment    PHQ-9    Plan Of Care       Brief Summary: Ms Garza is a 70 y/o female that was referred to \[facility]\"" related to diagnoses of CKD3, HTN and hyperlipidemia. This CM contacted pt and explained program and she agreed to participate and complete initial screen at this time. She reports she lives in her own home alone, but her grand daughter stays with her 4-5 nites a week. She has steps up to the door, but reports there is a hand rail and she does not feel she needs any assistance with any home modifications at this time. She does not use any type of assistive device for mobility and has had no falls in the past 12 months. She is independent with all daily activities, including housekeeping, driving and shopping. She reports she tries to follow low Na and carb restrictions in her diet, but feels she could benefit from some teaching about heart healthy and renal diets as well as CKD disease process. Advised this CM will mail some educational literature about heart healthy and renal diets and CKD and will include an advanced directive packet as she reports she has never completed one and would like to. Advised this CM will also refer her to the \[facility]\"" LCSW to assist with applying for financial assistance with hearing aid cost as she reports she has been told she could benefit from having a hearing aid in her L ear, but she has not gotten one related to the cost. Advised this CM will follow up in two weeks to see if she has rec'd literature and had the chance to review it and answer any questions she may have. She voiced understanding and agreement with this plan for follow up.   This CM contacted her pharmacy also as she reported she had to pay for her diabetic test strips the last time she had them filled. After some research the pharmacist advised her insurance will only pay for blood sugar testing one time daily as she is a non-insulin dependent diabetic and her current test strip prescription is for BID testing frequency. This CM messaged her PCP to explain this and request a new test strip script for daily testing frequency.     Assessment Documentation     OPCM Initial Assessment    Involvement of Care  Do I have permission to speak with other family members about your care?: Yes (Comment: Dale, Son)  Assessment completed by: Patient  Identified Areas of Need  Advanced Care Planning: Yes  Housing: no  Medication Adherence: No  *Active medication list was reviewed and reconciled with patient and/or caregiver:   Nutrition: yes (Comment: Needs education about renal and heart heatlhy diet. )  Lab Adherence: no  Depression: No  Cognitive/Behavioral Health: no  Communication: no  Health Literacy: no  Fall risk?: No  Equipment/Supplies/Services: no          Problem List and History     Problems Addressed This Visit    Hypertension: Identified as current problem  Diabetes: Not identified by patient as current problem  Chronic Kidney Disease: Identified as current problem  Hyperlipidemia: Identified as current problem  Glaucoma: Not identified by patient as current problem  Heart Disease: Not identified by patient as current problem         Reviewed medical and social history with patient and/or caregiver. A complex care plan was discussed and completed today, with input from patient and/or caregiver.    Patient Instructions     Instructions were provided via the [employer] patient resources and are available for the patient to view on the patient portal, if active.    Next steps: Discuss mailed literature and answer any questions. Begin to review care plan tasks. Elsy Jolley RN    Follow up  in about 1 day (around 10/15/2020).    Todays OPCM Self-Management Care Plan was developed with the patients/caregivers input and was based on identified barriers from todays assessment.  Goals were written today with the patient/caregiver and the patient has agreed to work towards these goals to improve his/her overall well-being. Patient verbalized understanding of the care plan, goals, and all of today's instructions. Encouraged patient/caregiver to communicate with his/her physician and health care team about health conditions and the treatment plan.  Provided my contact information today and encouraged patient/caregiver to call me with any questions as needed.

## 2020-10-15 NOTE — PROGRESS NOTES
Digital Medicine: Health  Introduction    Introduced Kristyn Garza to Digital Medicine. Discussed health  role and recommended lifestyle modifications.    The history is provided by the patient.           Additional Enrollment Details: Patient reports feeling well.     She decided to join the digital medicine program by the recommendation of her cardiologist.    She confirmed taking her medications every morning.         HYPERTENSION  Explained hypertension digital medicine goals including BP goal less than or equal to 130/80mmHg, improved convenience of BP management and reduced risk of heart attack, kidney failure, stroke, eye disease, dementia, and death.      Explained non-pharmacologic therapies like low salt diet and physical activity can reduce blood pressure. .      Explained that we expect patient to submit several blood pressure readings per week at random times of the day, but at least 30 minutes after taking blood pressure medications. Instructed patient not to allow anyone else to use their blood pressure monitor and phone as data submitted is directly entered into medical record. Reviewed and confirmed appropriate blood pressure monitoring technique.         Patient's BP goal is less than or equal to 130/80.Patient's BP average is 153/86 mmHg, which is above goal, per 2017 ACC/AHA Hypertension Guidelines.              Diet-Assessed  24 hour dietary recall  Breakfast is typically between. Toast + decaf coffee.  Lunch is typically between. White lima beans.   Dinner is typically between. Baked chicken + rice.   Snacks are typically. Fruit.    Patient reports eating or drinking the following: Patient uses [employer] to cook.  She does not read food labels for sodium content.  She drinks 4 16 oz water bottles daily.     Patient and I discussed how to read the food labels and what to look for; sodium, serving size. Also reviewed the recommendations from the AHA to limit her sodium intake <1500mg  per day. Encouraged a salt alternative like . Patient was receptive.        Physical Activity-Assessed      Additional physical activity details: She started doing [place] for 30 min 3x per week.   She tracks about 3000 steps daily using a fitbit.      Medication Adherence-Medication adherence was assessed.  Patient continue taking medication as prescribed.            Provided patient education. Low sodium/ Dash   Reviewed Device Techniques.     Addressed patient questions and patient has my contact information if needed prior to next outreach. Patient verbalizes understanding.      Explained the importance of self-monitoring and medication adherence. Encouraged the patient to communicate with their health  for lifestyle modifications to help improve or maintain a healthy lifestyle.                   Topic    Hemoglobin A1C          Last 5 Patient Entered Readings                                      Current 30 Day Average: 153/86     Recent Readings 10/14/2020 10/13/2020 10/10/2020 10/9/2020 10/8/2020    SBP (mmHg) 119 160 148 148 192    DBP (mmHg) 76 81 88 85 99    Pulse 65 69 74 71 92

## 2020-10-16 ENCOUNTER — PATIENT OUTREACH (OUTPATIENT)
Dept: OTHER | Facility: OTHER | Age: 70
End: 2020-10-16

## 2020-10-16 NOTE — PROGRESS NOTES
Digital Medicine: Clinician Introduction    Kristyn Garza is a 69 y.o. female who is newly enrolled in the Digital Medicine Clinic.    Patient reports two false readings at [facility] and requests deletion from chart. Went to [facility] on 10/13 and found out she had incorrect technique. Since changing, her BP have been lower.    The history is provided by the patient.      Review of patient's allergies indicates:   -- Statins-hmg-coa reductase inhibitors     --  Muscle Cramps  Completed Medication Reconciliation  Verified pharmacy information.    HYPERTENSION  Explained hypertension digital medicine goals including BP goal less than or equal to 130/80mmHg, improved convenience of BP management and reduced risk of heart attack, kidney failure, stroke, eye disease, dementia, and death.   Reviewed signs/symptoms of hypertension (headache, changes in vision, chest pain, shortness of breath)   Reviewed signs/symptoms of hypotension (lightheaded, dizziness, weakness)             Last 5 Patient Entered Readings                                      Current 30 Day Average: 148/84     Recent Readings 10/16/2020 10/15/2020 10/14/2020 10/13/2020 10/10/2020    SBP (mmHg) 141 129 119 160 148    DBP (mmHg) 77 84 76 81 88    Pulse 69 77 65 69 74                Depression Screening  Kristyn Garza screened negative on the depression screening.     Sleep Apnea Screening  Patient not previously diagnosed with JACINTO       Medication Affordability Screening  Patient did not answer the medication affordability questionnaires.     Medication Adherence-Medication adherence was assessed.  Patient continue taking medication as prescribed.            ASSESSMENT(S)  Patients BP average is 148/84 mmHg, which is above goal. Patient's BP goal is less than or equal to 130/80.     Hypertension Plan  Continue current therapy.       Addressed patient questions and patient has my contact information if needed prior to next outreach. Patient verbalizes  understanding.      Explained the importance of self-monitoring and medication adherence. Encouraged the patient to communicate with their health  for lifestyle modifications to help improve or maintain a healthy lifestyle.        Sent link to [facility] webpages and my contact information via [employer] for future questions.        Explained to the patient that the Digital Medicine team is not available for emergencies. Advised patient call [facility] ([phone] or [phone]) or [phone] if needed.                Topic    Hemoglobin A1C           Current Medication Regimen:  Hypertension Medications             amLODIPine-benazepriL (LOTREL) 5-40 mg per capsule TAKE 1 CAPSULE BY MOUTH EVERY DAY    spironolactone (ALDACTONE) 50 MG tablet Take 1 tablet (50 mg total) by mouth once daily.

## 2020-10-22 ENCOUNTER — PATIENT MESSAGE (OUTPATIENT)
Dept: ADMINISTRATIVE | Facility: OTHER | Age: 70
End: 2020-10-22

## 2020-10-24 DIAGNOSIS — Z78.9 STATIN INTOLERANCE: ICD-10-CM

## 2020-10-24 DIAGNOSIS — E11.69 COMBINED HYPERLIPIDEMIA ASSOCIATED WITH TYPE 2 DIABETES MELLITUS: ICD-10-CM

## 2020-10-24 DIAGNOSIS — E78.2 COMBINED HYPERLIPIDEMIA ASSOCIATED WITH TYPE 2 DIABETES MELLITUS: ICD-10-CM

## 2020-10-26 ENCOUNTER — LAB VISIT (OUTPATIENT)
Dept: LAB | Facility: HOSPITAL | Age: 70
End: 2020-10-26
Payer: MEDICARE

## 2020-10-26 ENCOUNTER — OFFICE VISIT (OUTPATIENT)
Dept: FAMILY MEDICINE | Facility: CLINIC | Age: 70
End: 2020-10-26
Payer: MEDICARE

## 2020-10-26 VITALS
SYSTOLIC BLOOD PRESSURE: 126 MMHG | HEART RATE: 91 BPM | HEIGHT: 64 IN | BODY MASS INDEX: 31.17 KG/M2 | WEIGHT: 182.56 LBS | DIASTOLIC BLOOD PRESSURE: 70 MMHG | RESPIRATION RATE: 16 BRPM | OXYGEN SATURATION: 98 % | TEMPERATURE: 98 F

## 2020-10-26 DIAGNOSIS — E11.22 TYPE 2 DIABETES MELLITUS WITH STAGE 3A CHRONIC KIDNEY DISEASE, WITHOUT LONG-TERM CURRENT USE OF INSULIN: ICD-10-CM

## 2020-10-26 DIAGNOSIS — N18.31 TYPE 2 DIABETES MELLITUS WITH STAGE 3A CHRONIC KIDNEY DISEASE, WITHOUT LONG-TERM CURRENT USE OF INSULIN: Primary | ICD-10-CM

## 2020-10-26 DIAGNOSIS — E11.22 TYPE 2 DIABETES MELLITUS WITH STAGE 3A CHRONIC KIDNEY DISEASE, WITHOUT LONG-TERM CURRENT USE OF INSULIN: Primary | ICD-10-CM

## 2020-10-26 DIAGNOSIS — E66.9 OBESITY (BMI 30.0-34.9): ICD-10-CM

## 2020-10-26 DIAGNOSIS — E11.69 COMBINED HYPERLIPIDEMIA ASSOCIATED WITH TYPE 2 DIABETES MELLITUS: ICD-10-CM

## 2020-10-26 DIAGNOSIS — E11.59 HYPERTENSION ASSOCIATED WITH DIABETES: ICD-10-CM

## 2020-10-26 DIAGNOSIS — N18.31 STAGE 3A CHRONIC KIDNEY DISEASE: ICD-10-CM

## 2020-10-26 DIAGNOSIS — E04.2 MULTIPLE THYROID NODULES: ICD-10-CM

## 2020-10-26 DIAGNOSIS — N18.31 TYPE 2 DIABETES MELLITUS WITH STAGE 3A CHRONIC KIDNEY DISEASE, WITHOUT LONG-TERM CURRENT USE OF INSULIN: ICD-10-CM

## 2020-10-26 DIAGNOSIS — I15.2 HYPERTENSION ASSOCIATED WITH DIABETES: ICD-10-CM

## 2020-10-26 DIAGNOSIS — E78.2 COMBINED HYPERLIPIDEMIA ASSOCIATED WITH TYPE 2 DIABETES MELLITUS: ICD-10-CM

## 2020-10-26 PROCEDURE — 99999 PR PBB SHADOW E&M-EST. PATIENT-LVL IV: ICD-10-PCS | Mod: PBBFAC,,, | Performed by: FAMILY MEDICINE

## 2020-10-26 PROCEDURE — 83036 HEMOGLOBIN GLYCOSYLATED A1C: CPT

## 2020-10-26 PROCEDURE — 99214 OFFICE O/P EST MOD 30 MIN: CPT | Mod: S$PBB,,, | Performed by: FAMILY MEDICINE

## 2020-10-26 PROCEDURE — 36415 COLL VENOUS BLD VENIPUNCTURE: CPT | Mod: PO

## 2020-10-26 PROCEDURE — 99214 PR OFFICE/OUTPT VISIT, EST, LEVL IV, 30-39 MIN: ICD-10-PCS | Mod: S$PBB,,, | Performed by: FAMILY MEDICINE

## 2020-10-26 PROCEDURE — 99214 OFFICE O/P EST MOD 30 MIN: CPT | Mod: PBBFAC,PO | Performed by: FAMILY MEDICINE

## 2020-10-26 PROCEDURE — 99999 PR PBB SHADOW E&M-EST. PATIENT-LVL IV: CPT | Mod: PBBFAC,,, | Performed by: FAMILY MEDICINE

## 2020-10-26 NOTE — PROGRESS NOTES
Kristyn Garza    Chief Complaint   Patient presents with    Diabetes    Hypertension    Follow-up       History of Present Illness:   Ms. Garza comes in today for 6-month diabetes and hypertension follow-up.  She is fasting but has taken medications today.  She states she exercises by walking 3K steps daily and monitors her diet.  She performs home glucose checks every morning with levels ranging 80's; 75 yesterday morning and 101 this morning. She states she now follows with [facility] and notes blood pressure levels range 116-136/60-70's.     She states she feels okay today except reports having occasional left ear congestion, decreased hearing x few days.  She states she has been clearing wax from her ears.  She states she uses Nasacort nasal spray with help.  She does not smoke.  She saw RADHIKA Paredes with ENT on June 2, 2020 and states she was told she need hearing aid for her left ear.    Otherwise, she denies having fever, chills, fatigue, appetite changes; shortness of breath, cough, wheezing; other sinus or ocular symptoms; chest pain, palpitations, leg swelling; abdominal pain, nausea, vomiting, diarrhea, constipation; polydipsia, polyuria, polyphagia; unusual urinary symptoms; back pain; headache; anxiety, depression, homicidal or suicidal thoughts.     She saw Dr. Matute, cardiologist, on October 1, 2020 for surveillance of hyperlipidemia (now treated with Rapatha every 2 weeks), CKD stage 3, hypertension, statin intolerance, LVH, abnormal ECG with 6-month follow-up advised.  She saw Dr. Landaverde, nephrologist, on June 1, 2020 for surveillance of CKD 3 with 1-year follow-up advised.    Labs:                       WBC                      9.24                05/25/2020                 HGB                      13.0                05/25/2020                 HCT                      41.1                05/25/2020                 PLT                      241                 05/25/2020                 CHOL                      158                 10/02/2020                 TRIG                     54                  10/02/2020                 HDL                      50                  10/02/2020                 ALT                      23                  10/02/2020                 AST                      21                  10/02/2020                 NA                       142                 10/02/2020                 K                        4.1                 10/02/2020                 CL                       104                 10/02/2020                 CREATININE               1.2                 10/02/2020                 BUN                      13                  10/02/2020                 CO2                      30 (H)              10/02/2020                 TSH                      0.809               05/25/2020                 HGBA1C                   6.5 (H)             03/09/2020             LDLCALC                  97.2                10/02/2020                Current Outpatient Medications   Medication Sig    amLODIPine-benazepriL (LOTREL) 5-40 mg per capsule TAKE 1 CAPSULE BY MOUTH EVERY DAY    aspirin 81 MG Chew Take 81 mg by mouth once daily.    blood glucose control, normal Soln 1 Bottle by Misc.(Non-Drug; Combo Route) route once daily. For Accu Check Irais  use twice daily prn dx: E11.9    blood sugar diagnostic Strp 1 strip by Misc.(Non-Drug; Combo Route) route once daily.    calcium-vitamin D3 500 mg(1,250mg) -200 unit per tablet Take 1 tablet by mouth once daily.     CRANBERRY EXTRACT ORAL Take 1 tablet by mouth once daily.    evolocumab (REPATHA SURECLICK) 140 mg/mL PnIj Inject 1 mL (140 mg total) into the skin every 14 (fourteen) days.    lancets Misc 1 lancet by Misc.(Non-Drug; Combo Route) route 3 (three) times daily. For Acc-Chek Irais   DX :E11.9    loratadine (CLARITIN) 10 mg tablet TAKE 1 TABLET BY MOUTH EVERY DAY    meclizine (ANTIVERT) 25 mg tablet Take 1 tablet (25 mg total)  by mouth 3 (three) times daily as needed for Dizziness or Nausea.    MULTIVITAMIN W-MINERALS/LUTEIN (CENTRUM SILVER ORAL) Take 1 tablet by mouth once daily.    pantoprazole (PROTONIX) 40 MG tablet TAKE 1 TABLET BY MOUTH DAILY AS NEEDED    spironolactone (ALDACTONE) 50 MG tablet Take 1 tablet (50 mg total) by mouth once daily.    triamcinolone (NASACORT) 55 mcg nasal inhaler 2 sprays by Nasal route once daily.    blood-glucose meter (ACCU-CHEK AMBER) Misc 1 kit by Misc.(Non-Drug; Combo Route) route once. For Accu Check Amber  use twice daily prn dx: E11.9 for 1 dose     Review of Systems   Constitutional: Negative for activity change, appetite change, chills, fatigue, fever and unexpected weight change.        Weight 85.3 kg (188 lb 0.8 oz) at October 16, 2019 visit.   HENT: Positive for ear pain and hearing loss. Negative for congestion, postnasal drip, rhinorrhea, sinus pressure, sinus pain, sneezing, sore throat and tinnitus.         See history of present illness.   Eyes: Negative for pain, discharge, redness and itching.   Respiratory: Negative for cough, shortness of breath and wheezing.    Cardiovascular: Negative for chest pain, palpitations and leg swelling.        See history of present illness.   Gastrointestinal: Negative for abdominal pain, constipation, diarrhea, nausea and vomiting.   Endocrine: Negative for polydipsia, polyphagia and polyuria.        See history of present illness.   Genitourinary: Negative for difficulty urinating.        See history of present illness.   Musculoskeletal: Negative for back pain.   Neurological: Negative for headaches.   Psychiatric/Behavioral: Negative for dysphoric mood, sleep disturbance and suicidal ideas. The patient is not nervous/anxious.         Negative for homicidal ideas.        Objective:  Physical Exam  Vitals signs reviewed.   Constitutional:       General: She is not in acute distress.     Appearance: Normal appearance. She is well-developed. She is  obese. She is not ill-appearing or diaphoretic.      Comments: Pleasant.   HENT:      Head: Normocephalic and atraumatic.      Right Ear: Tympanic membrane, ear canal and external ear normal. There is no impacted cerumen.      Left Ear: Tympanic membrane, ear canal and external ear normal. There is no impacted cerumen.      Nose: Nose normal. No congestion or rhinorrhea.      Comments: Non tender sinuses.     Mouth/Throat:      Mouth: Mucous membranes are moist.      Pharynx: Oropharynx is clear. No oropharyngeal exudate or posterior oropharyngeal erythema.   Eyes:      General:         Right eye: No discharge.         Left eye: No discharge.      Extraocular Movements: Extraocular movements intact.      Conjunctiva/sclera: Conjunctivae normal.      Pupils: Pupils are equal, round, and reactive to light.      Comments: She wears glasses.   Neck:      Musculoskeletal: Normal range of motion and neck supple.      Thyroid: Thyromegaly present.      Comments: Slight, chronic.  Cardiovascular:      Rate and Rhythm: Normal rate and regular rhythm.      Pulses:           Dorsalis pedis pulses are 3+ on the right side and 3+ on the left side.        Posterior tibial pulses are 3+ on the right side and 3+ on the left side.      Heart sounds: No murmur.   Pulmonary:      Effort: Pulmonary effort is normal. No respiratory distress.      Breath sounds: Normal breath sounds. No wheezing.   Abdominal:      General: Bowel sounds are normal. There is no distension.      Palpations: Abdomen is soft. There is no mass.      Tenderness: There is no abdominal tenderness. There is no guarding or rebound.   Musculoskeletal: Normal range of motion.         General: No swelling or tenderness.      Comments: She is ambulatory without problems. Feet look okay without ulcerations or skin breaks noted.    Feet:      Right foot:      Protective Sensation: 5 sites tested. 5 sites sensed.      Skin integrity: No ulcer or skin breakdown.      Left  foot:      Protective Sensation: 5 sites tested. 5 sites sensed.      Skin integrity: No ulcer or skin breakdown.   Lymphadenopathy:      Cervical: No cervical adenopathy.   Neurological:      General: No focal deficit present.      Mental Status: She is alert and oriented to person, place, and time.   Psychiatric:         Mood and Affect: Mood normal.         Behavior: Behavior normal.         Thought Content: Thought content normal.         Judgment: Judgment normal.         ASSESSMENT:  1. Type 2 diabetes mellitus with stage 3a chronic kidney disease, without long-term current use of insulin    2. Hypertension associated with diabetes    3. Combined hyperlipidemia associated with type 2 diabetes mellitus    4. Stage 3a chronic kidney disease    5. Multiple thyroid nodules    6. Obesity (BMI 30.0-34.9)        PLAN:  Kristyn was seen today for diabetes, hypertension and follow-up.    Diagnoses and all orders for this visit:    Type 2 diabetes mellitus with stage 3a chronic kidney disease, without long-term current use of insulin  -     Hemoglobin A1C; Future    Hypertension associated with diabetes    Combined hyperlipidemia associated with type 2 diabetes mellitus    Stage 3a chronic kidney disease    Multiple thyroid nodules    Obesity (BMI 30.0-34.9)       Patient advised to call for results.  Continue current medications, follow low sodium, low cholesterol, low carb diet, daily walks.  Keep follow up with specialists.  Follow up in about 25 weeks (around 4/19/2021) for physical.

## 2020-10-27 LAB
ESTIMATED AVG GLUCOSE: 131 MG/DL (ref 68–131)
HBA1C MFR BLD HPLC: 6.2 % (ref 4–5.6)

## 2020-10-29 ENCOUNTER — OUTPATIENT CASE MANAGEMENT (OUTPATIENT)
Dept: ADMINISTRATIVE | Facility: OTHER | Age: 70
End: 2020-10-29

## 2020-10-29 NOTE — PROGRESS NOTES
Outpatient Care Management  Plan of Care Follow Up Visit    Patient: Kristyn Garza  MRN: [id number]  Date of Service: 10/29/2020  Completed by: Elsy Jolley RN  Referral Date: 10/13/2020  Program: Case Management (High Risk)    No chief complaint on file.      Brief Summary: Phone contact with pt for follow up. She has not yet rec'd mail out, but asks appropriate questions when discussing HTN and CKD disease processes. She is taking HTN meds as prescribed and has recently started participating in Digital HTN program. She was able to get her diabetic test strip for just regular copay after Dr Guzman corrected script to one time a day testing. Several care plan tasks discussed as documented on care plan. Advised this CM will follow up in two weeks. Encouraged to read mailed literature when it arrives and she agreed she will.     Patient Summary     Involvement of Care:  Do I have permission to speak with other family members about your care?   Yes, son, Dale    Patient Reported Labs & Vitals:  1.  Any Patient Reported Labs & Vitals?   Yes  2.  Patient Reported Blood Pressure:    123/79, 148/78, 138/71  3.  Patient Reported Pulse:     4.  Patient Reported Weight (Kg):     5.  Patient Reported Blood Glucose (mg/dl):       Medical and social history was reviewed with patient and/or caregiver.     Clinical Assessment     Reviewed and provided basic information on available community resources for mental health, transportation, wellness resources, and palliative care programs with patient and/or caregiver.     Complex Care Plan     Care plan was discussed and completed today with input from patient and/or caregiver.    Patient Instructions     Instructions were provided via the [employer] patient resources and are available for the patient to view on the patient portal.    Next Steps: Continue to review care plan tasks. Elsy Jolley RN    No follow-ups on file.    Todays OPCM Self-Management Care Plan was developed with  the patients/caregivers input and was based on identified barriers from todays assessment.  Goals were written today with the patient/caregiver and the patient has agreed to work towards these goals to improve his/her overall well-being. Patient verbalized understanding of the care plan, goals, and all of today's instructions. Encouraged patient/caregiver to communicate with his/her physician and health care team about health conditions and the treatment plan.  Provided my contact information today and encouraged patient/caregiver to call me with any questions as needed.

## 2020-11-02 ENCOUNTER — OUTPATIENT CASE MANAGEMENT (OUTPATIENT)
Dept: ADMINISTRATIVE | Facility: OTHER | Age: 70
End: 2020-11-02

## 2020-11-02 NOTE — PROGRESS NOTES
Outpatient Care Management   - High Risk Patient Assessment    Patient: Kristyn Garza  MRN:  [id number]  Date of Service:  11/2/2020  Completed by:  Araseli Carballo LCSW  Referral Date: 10/13/2020  Program: Case Management (High Risk)    Reason for Visit   Patient presents with    Social Work Assessment - High Risk     11/02/20    Update Plan Of Care     11/02/20    OPC Chart Review     11/02/20       Brief Summary:  received a referral from [facility] RN Elsy Jolley for the following patient identified psycho-social needs hearing aid resources.  Patient is a 70 yo female whose granddaughter lives with her. 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 She is currently independent with ADLs and iADLs.    Care plan was created in collaboration with patient/caregiver input. By next encounter, patient agrees to review resources for hearing aid. Next steps mail hearing aid information.     Patient Summary     [facility] Social Work Assessment (High Risk)    Involvement of Care  Do I have permission to speak with other family members about your care?: Yes (Comment: son)  Assessment completed by: Patient  Cognitive  Which of the following can you state?: Name, Date of birth, Address, Year, President  Cognitive barriers?: No  General  Marital status:   Current employment status: Retired and not working  Support  Level of Caregiver support: Member independent and does not need caregiver assistance  Support system: Grandchildren  Is the caregiver reporting burnout?: No  Support Barriers?: No  Advanced Care Planning  Do you have any of the following?: None  If yes, do we have a copy?: N/A  If no, would you like Advance Directive resources?: Yes  Advance Care Planning resources provided?: Yes (Comment: Mailed by [facility] RN, has copies, intends to complete and bring to clinic)  Is Advance Care Planning an area of need?: Yes  Financial  Current medical coverage: Medicare, Private  Insurance  Have you applied for government assistance programs?: No  Are you unable to pay any of the following?: None  Gross monthly income:  (Comment: Declined)  Other assets: owns home  Financial Support Barriers?: No  Safety  Safety barriers?: No   History  Do you or your spouse currently or formerly serve in the ?: Yes, formerly   branch: [employer]  Wartime service?: Yes   discharge type: Honorable  Current use of VA services?: Yes  Disaster Plan  Established evacuation plan?: No  Sargeant residence: [facility]  Evacuation location: with family  Registered for evacuation?:  (Comment: NA)  Ability to evacuate: Able to ride bus  Mental Health Status  Emotional status: Telephonic/Unable to assess  Have you recenetly lost a loved one?: No  Psychiatric diagnosis: None  Current mental health treatment: No  Would you like mental health resources?: No  Current symptoms: None  Mental/Behavioral/Environmental risk: None  Mental Health Barriers?: No  Addictive Behaviors  Current alcohol consumption?: No  Current substance abuse?: No  Gambling habits?: No  Was the PHQ depression screening completed?: No  Was the MARIE-7 completed?: No         Complex Care Plan     Care plan was discussed and completed today with input from patient and/or caregiver.    Patient Instructions     Follow up in about 2 weeks (around 11/16/2020) for review of resources.    [facility] OPCM Self-Management Care Plan was developed with the patients/caregivers input and was based on identified barriers from todays assessment.  Goals were written today with the patient/caregiver and the patient has agreed to work towards these goals to improve his/her overall well-being. Patient verbalized understanding of the care plan, goals, and all of today's instructions. Encouraged patient/caregiver to communicate with his/her physician and health care team about health conditions and the treatment plan.  Provided my contact information today and  encouraged patient/caregiver to call me with any questions as needed.

## 2020-11-02 NOTE — LETTER
November 2, 2020    Kristyn Garza  [address] [place] [address]             [facility]  [address] [address] Dear  Greg:    I have enclosed hearing aid resources as discussed via telephone today. I am also including [facility].  I hope this will be helpful.    If any additional needs arise, please contact me at [phone].    Sincerely,        Araseli Carballo LCSW

## 2020-11-03 DIAGNOSIS — H69.93 DYSFUNCTION OF BOTH EUSTACHIAN TUBES: ICD-10-CM

## 2020-11-03 RX ORDER — TRIAMCINOLONE ACETONIDE 55 UG/1
SPRAY, METERED NASAL
Qty: 16.9 G | Refills: 5 | Status: SHIPPED | OUTPATIENT
Start: 2020-11-03 | End: 2021-04-19 | Stop reason: SDUPTHER

## 2020-11-05 ENCOUNTER — PATIENT OUTREACH (OUTPATIENT)
Dept: OTHER | Facility: OTHER | Age: 70
End: 2020-11-05

## 2020-11-05 NOTE — PROGRESS NOTES
Digital Medicine: Health  Follow-Up    The history is provided by the patient.                     Topics Covered on Call: physical activity and Diet    Additional Follow-up details: Patient feels wel.            Diet-Change    Patient reports eating or drinking the following: Eats mostly beans and vegetables.    Dietary Improvements:Started using Mrs. Levine to cook.           Additional diet details: She mentioned that she's looking for a diabetic sweet potato pie or pound cake recipe. We also discussed alternatives to sugar like Stevia and Monkfruit. Patient requested resources to be emaild to [email or web address]    Physical Activity-no change to routine  No change to exercise routine.       Additional physical activity details: [place] and walking 20 min 2 times per week             Continue current diet/physical activity routine.       Addressed patient questions and patient has my contact information if needed prior to next outreach. Patient verbalizes understanding.      Explained the importance of self-monitoring and medication adherence. Encouraged the patient to communicate with their health  for lifestyle modifications to help improve or maintain a healthy lifestyle.               There are no preventive care reminders to display for this patient.      Last 5 Patient Entered Readings                                      Current 30 Day Average: 134/76     Recent Readings 11/5/2020 11/4/2020 11/3/2020 11/2/2020 11/1/2020    SBP (mmHg) 136 137 131 151 145    DBP (mmHg) 69 82 74 82 78    Pulse 64 64 68 59 60

## 2020-11-13 ENCOUNTER — SPECIALTY PHARMACY (OUTPATIENT)
Dept: PHARMACY | Facility: CLINIC | Age: 70
End: 2020-11-13

## 2020-11-16 ENCOUNTER — OUTPATIENT CASE MANAGEMENT (OUTPATIENT)
Dept: ADMINISTRATIVE | Facility: OTHER | Age: 70
End: 2020-11-16

## 2020-11-16 NOTE — PROGRESS NOTES
Outpatient Care Management   - Care Plan Follow Up    Patient: Kristyn Garza  MRN:  [id number]  Date of Service:  11/16/2020  Completed by:  Araseli Carballo LCSW  Referral Date: 10/13/2020  Program: Case Management (High Risk)    Reason for Visit   Patient presents with    Update Plan Of Care     11/16/20    Case Closure     11/16/20       Brief Summary: Phoned patient, confirmed receipt of resources for hearing aids. Patient stated that she had reviewed the resources, no questions voiced.   Case closure, encouraged patient to call if she had questions. Patient agreed.   In basket message sent to OPCM RN.     Complex Care Plan     Care plan was discussed and completed today with input from patient and/or caregiver.    Patient Instructions     Instructions were provided via the [employer] patient [employer] and are available for the patient to view on the patient portal.    No follow-ups on file.    Todays OPCM Self-Management Care Plan was developed with the patients/caregivers input and was based on identified barriers from todays assessment.  Goals were written today with the patient/caregiver and the patient has agreed to work towards these goals to improve his/her overall well-being. Patient verbalized understanding of the care plan, goals, and all of today's instructions. Encouraged patient/caregiver to communicate with his/her physician and health care team about health conditions and the treatment plan.  Provided my contact information today and encouraged patient/caregiver to call me with any questions as needed.

## 2020-11-19 ENCOUNTER — OUTPATIENT CASE MANAGEMENT (OUTPATIENT)
Dept: ADMINISTRATIVE | Facility: OTHER | Age: 70
End: 2020-11-19

## 2020-11-19 NOTE — PROGRESS NOTES
Outpatient Care Management  Plan of Care Follow Up Visit    Patient: Kristyn Garza  MRN: [id number]  Date of Service: 11/19/2020  Completed by: Elsy Jolley RN  Referral Date: 10/13/2020  Program: Case Management (High Risk)    Reason for Visit   Patient presents with    Update Plan Of Care       Brief Summary: Phone contact with pt today and reviewed several care plan tasks. She has rec'd mailed literature and looked over it, but not reviewed it closely. Discussed low Na diet to help with both HTN and CKD. Discussed other restrictions to help with management of CKD. Encouraged her to read all literature and advised this CM will follow up in 3 weeks to review again. She voiced understanding and agreement with this plan for follow up.     Patient Summary     Involvement of Care:  Do I have permission to speak with other family members about your care?   Yes, son     Patient Reported Labs & Vitals:  1.  Any Patient Reported Labs & Vitals?   No  2.  Patient Reported Blood Pressure:     3.  Patient Reported Pulse:     4.  Patient Reported Weight (Kg):     5.  Patient Reported Blood Glucose (mg/dl):       Medical and social history was reviewed with patient and/or caregiver.     Clinical Assessment     Reviewed and provided basic information on available community resources for mental health, transportation, wellness resources, and palliative care programs with patient and/or caregiver.     Complex Care Plan     Care plan was discussed and completed today with input from patient and/or caregiver.    Patient Instructions     Instructions were provided via the [employer] patient resources and are available for the patient to view on the patient portal.    Next Steps: Continue reviewing care plan tasks. Elsy Jolley RN    No follow-ups on file.    Todays OPCM Self-Management Care Plan was developed with the patients/caregivers input and was based on identified barriers from todays assessment.  Goals were written  today with the patient/caregiver and the patient has agreed to work towards these goals to improve his/her overall well-being. Patient verbalized understanding of the care plan, goals, and all of today's instructions. Encouraged patient/caregiver to communicate with his/her physician and health care team about health conditions and the treatment plan.  Provided my contact information today and encouraged patient/caregiver to call me with any questions as needed.

## 2020-12-07 ENCOUNTER — PATIENT OUTREACH (OUTPATIENT)
Dept: OTHER | Facility: OTHER | Age: 70
End: 2020-12-07

## 2020-12-07 NOTE — PROGRESS NOTES
"Digital Medicine: Health  Follow-Up    The history is provided by the patient.             Reason for review: Blood pressure at goal        Topics Covered on Call: physical activity and Diet    Additional Follow-up details: Patient feels well today. She states that she's been checking her blood pressure at random times, and she noticed that when she's up doing things around the house her blood pressure tends to be elevated. We reviewed the appropriate blood pressure monitoring technique and I encouraged patient to chose the best time when she can sit and rest for 3-5 min prior to checking her blood pressure to ensure accurate readings. Patient was receptive.    She also mentioned that she would like to lose about 30 lbs for better blood pressure control. She explained that she's been cutting back on starchy type of foods and she's focused on eating more seafood and vegetables.             Diet-Change      Dietary Improvements:Less starchy food          Dietary Indiscretions:She feels that she snacks " all day ". Patient states that she would like to start a food journal starting today to monitor her food intake.       Physical Activity-Change      Additional physical activity details: Her daughter in law gave her a pedometer watch. She would like to meet a 5,000 daily step goal.                Addressed patient questions and patient has my contact information if needed prior to next outreach. Patient verbalizes understanding.      Explained the importance of self-monitoring and medication adherence. Encouraged the patient to communicate with their health  for lifestyle modifications to help improve or maintain a healthy lifestyle.               There are no preventive care reminders to display for this patient.      Last 5 Patient Entered Readings                                      Current 30 Day Average: 130/77     Recent Readings 12/7/2020 12/6/2020 12/5/2020 12/4/2020 12/3/2020    SBP (mmHg) 127 140 " 126 144 127    DBP (mmHg) 77 69 76 79 71    Pulse 67 82 66 70 68

## 2020-12-09 ENCOUNTER — SPECIALTY PHARMACY (OUTPATIENT)
Dept: PHARMACY | Facility: CLINIC | Age: 70
End: 2020-12-09

## 2020-12-09 NOTE — TELEPHONE ENCOUNTER
Specialty Pharmacy - Refill Coordination    Specialty Medication Orders Linked to Encounter      Most Recent Value   Medication #1  evolocumab (REPATHA SURECLICK) 140 mg/mL PnIj (Order#[id number], Rx#[id number])          Refill Questions - Documented Responses      Most Recent Value   Relationship to patient of person spoken to?  Self   HIPAA/medical authority confirmed?  Yes   How will the patient receive the medication?  Mail   When does the patient need to receive the medication?  12/15/20   Shipping Address  Home   Address in [facility] confirmed and updated if neccessary?  Yes   Expected Copay ($)  0   Is the patient able to afford the medication copay?  Yes   Payment Method  zero copay   Days supply of Refill  28   Would patient like to speak to a pharmacist?  No   Do you want to trigger an intervention?  No   Do you want to trigger an additional referral task?  No   Refill activity completed?  Yes   Refill activity plan  Refill scheduled   Shipment/Pickup Date:  12/10/20          Current Outpatient Medications   Medication Sig    amLODIPine-benazepriL (LOTREL) 5-40 mg per capsule TAKE 1 CAPSULE BY MOUTH EVERY DAY    aspirin 81 MG Chew Take 81 mg by mouth once daily.    blood glucose control, normal Soln 1 Bottle by Misc.(Non-Drug; Combo Route) route once daily. For Accu Check Amber  use twice daily prn dx: E11.9    blood sugar diagnostic Strp 1 strip by Misc.(Non-Drug; Combo Route) route once daily.    blood-glucose meter (ACCU-CHEK AMBER) Misc 1 kit by Misc.(Non-Drug; Combo Route) route once. For Accu Check Amber  use twice daily prn dx: E11.9 for 1 dose    calcium-vitamin D3 500 mg(1,250mg) -200 unit per tablet Take 1 tablet by mouth once daily.     CRANBERRY EXTRACT ORAL Take 1 tablet by mouth once daily.    evolocumab (REPATHA SURECLICK) 140 mg/mL PnIj Inject 1 mL (140 mg total) into the skin every 14 (fourteen) days.    lancets Misc 1 lancet by Misc.(Non-Drug; Combo Route) route 3 (three)  times daily. For Acc-Chek Irais   DX :E11.9    loratadine (CLARITIN) 10 mg tablet TAKE 1 TABLET BY MOUTH EVERY DAY    meclizine (ANTIVERT) 25 mg tablet Take 1 tablet (25 mg total) by mouth 3 (three) times daily as needed for Dizziness or Nausea.    MULTIVITAMIN W-MINERALS/LUTEIN (CENTRUM SILVER ORAL) Take 1 tablet by mouth once daily.    pantoprazole (PROTONIX) 40 MG tablet TAKE 1 TABLET BY MOUTH DAILY AS NEEDED    spironolactone (ALDACTONE) 50 MG tablet Take 1 tablet (50 mg total) by mouth once daily.    triamcinolone (NASACORT) 55 mcg nasal inhaler USE 2 SPRAYS BY NASAL ROUTE ONCE DAILY   Last reviewed on 10/26/2020  8:50 AM by Zoë Guzman MD    Review of patient's allergies indicates:   Allergen Reactions    Statins-hmg-coa reductase inhibitors      Muscle Cramps    Last reviewed on  11/13/2020 1:25 PM by Geeta Oquendo      Tasks added this encounter   No tasks added.   Tasks due within next 3 months   12/6/2020 - Refill Call (Auto Added)     Omaira Davis  [facility]  [address] [place] [id number]  Phone: [phone]  Fax: [phone]

## 2020-12-10 ENCOUNTER — OUTPATIENT CASE MANAGEMENT (OUTPATIENT)
Dept: ADMINISTRATIVE | Facility: OTHER | Age: 70
End: 2020-12-10

## 2020-12-17 NOTE — PROGRESS NOTES
Outpatient Care Management  Plan of Care Follow Up Visit    Patient: Kristyn Garza  MRN: [id number]  Date of Service: 12/10/2020  Completed by: Elsy Jolley RN  Referral Date: 10/13/2020  Program: Case Management (High Risk)    Reason for Visit   Patient presents with    Update Plan Of Care       Brief Summary: Chart review completed and phone contact made with pt. She is participating in the Digital HTN program and following a low Na diet as much as possible. She is currently cooking vegetable beef soup while talking with this CM. This CM commended her for all the heart healthy life choices she is making. Remaining HTN care plan tasks reviewed and advised this CM will follow up in 2-3 weeks to discuss remaining CKD care plan tasks. She voiced understanding and agreement with this plan for follow up.     Patient Summary     Involvement of Care:  Do I have permission to speak with other family members about your care?   Yes, son    Patient Reported Labs & Vitals:  1.  Any Patient Reported Labs & Vitals?   No  2.  Patient Reported Blood Pressure:     3.  Patient Reported Pulse:     4.  Patient Reported Weight (Kg):     5.  Patient Reported Blood Glucose (mg/dl):       Medical and social history was reviewed with patient and/or caregiver.     Clinical Assessment     Reviewed and provided basic information on available community resources for mental health, transportation, wellness resources, and palliative care programs with patient and/or caregiver.     Complex Care Plan     Care plan was discussed and completed today with input from patient and/or caregiver.    Patient Instructions     Instructions were provided via the [employer] patient resources and are available for the patient to view on the patient portal.    Next Steps: Review remaining CKD care plan tasks and advise of eligibility to participate in Digital DM program if interested. Plan to d/c from OPCM if no new problems or concerns. Elsy Jolley  RN    Follow up in about 1 week (around 12/17/2020) for address care plan tasks.    Todays OPCM Self-Management Care Plan was developed with the patients/caregivers input and was based on identified barriers from todays assessment.  Goals were written today with the patient/caregiver and the patient has agreed to work towards these goals to improve his/her overall well-being. Patient verbalized understanding of the care plan, goals, and all of today's instructions. Encouraged patient/caregiver to communicate with his/her physician and health care team about health conditions and the treatment plan.  Provided my contact information today and encouraged patient/caregiver to call me with any questions as needed.

## 2020-12-18 ENCOUNTER — PATIENT OUTREACH (OUTPATIENT)
Dept: OTHER | Facility: OTHER | Age: 70
End: 2020-12-18

## 2020-12-18 NOTE — PROGRESS NOTES
Digital Medicine: Clinician Follow-Up    Pt indicates very happy with BP results. Takes BP meds 7-8am and BP reading 7-9pm.     The history is provided by the patient.   Follow-up reason(s): routine follow up.     Hypertension    Patient's blood pressure is stable.   Patient is not experiencing signs/symptoms of hypotension.  Patient is not experiencing signs/symptoms of hypertension.            Last 5 Patient Entered Readings                                      Current 30 Day Average: 129/76     Recent Readings 12/17/2020 12/16/2020 12/15/2020 12/14/2020 12/13/2020    SBP (mmHg) 132 130 126 123 123    DBP (mmHg) 76 70 71 68 69    Pulse 71 75 68 70 79                 Depression Screening  Did not address depression screening.    Sleep Apnea Screening    Did not address sleep apnea screening.     Medication Affordability Screening  Did not address medication affordability screening.     Medication Adherence-Medication adherence was assessed.          ASSESSMENT(S)  Patients BP average is 129/76 mmHg, which is at goal. Patient's BP goal is less than or equal to 130/80.    Hypertension Plan  Continue current therapy.  Provided patient education. Reviewed pharmacokinetics and pharmacodynamics of BP meds and why dosed once daily.       Addressed patient questions and patient has my contact information if needed prior to next outreach. Patient verbalizes understanding.             There are no preventive care reminders to display for this patient.  There are no preventive care reminders to display for this patient.      Hypertension Medications             amLODIPine-benazepriL (LOTREL) 5-40 mg per capsule TAKE 1 CAPSULE BY MOUTH EVERY DAY    spironolactone (ALDACTONE) 50 MG tablet TAKE 1 TABLET BY MOUTH EVERY DAY

## 2020-12-31 PROCEDURE — 99457 PR MONITORING, PHYSIOL PARAM, REMOTE, 1ST 20 MINS, PER MONTH: ICD-10-PCS | Mod: S$GLB,ICN,, | Performed by: INTERNAL MEDICINE

## 2020-12-31 PROCEDURE — 99457 RPM TX MGMT 1ST 20 MIN: CPT | Mod: S$GLB,ICN,, | Performed by: INTERNAL MEDICINE

## 2021-01-07 ENCOUNTER — OUTPATIENT CASE MANAGEMENT (OUTPATIENT)
Dept: ADMINISTRATIVE | Facility: OTHER | Age: 71
End: 2021-01-07

## 2021-01-11 ENCOUNTER — SPECIALTY PHARMACY (OUTPATIENT)
Dept: PHARMACY | Facility: CLINIC | Age: 71
End: 2021-01-11

## 2021-01-15 ENCOUNTER — PATIENT OUTREACH (OUTPATIENT)
Dept: OTHER | Facility: OTHER | Age: 71
End: 2021-01-15

## 2021-02-01 ENCOUNTER — SPECIALTY PHARMACY (OUTPATIENT)
Dept: PHARMACY | Facility: CLINIC | Age: 71
End: 2021-02-01

## 2021-02-04 ENCOUNTER — SPECIALTY PHARMACY (OUTPATIENT)
Dept: PHARMACY | Facility: CLINIC | Age: 71
End: 2021-02-04

## 2021-02-17 ENCOUNTER — PATIENT OUTREACH (OUTPATIENT)
Dept: OTHER | Facility: OTHER | Age: 71
End: 2021-02-17

## 2021-02-17 PROCEDURE — 99454 PR REMOTE MNTR, PHYS PARAM, INITIAL, EA 30 DAYS: ICD-10-PCS | Mod: S$GLB,,, | Performed by: INTERNAL MEDICINE

## 2021-02-17 PROCEDURE — 99454 REM MNTR PHYSIOL PARAM 16-30: CPT | Mod: S$GLB,,, | Performed by: INTERNAL MEDICINE

## 2021-03-08 ENCOUNTER — SPECIALTY PHARMACY (OUTPATIENT)
Dept: PHARMACY | Facility: CLINIC | Age: 71
End: 2021-03-08

## 2021-03-18 ENCOUNTER — PATIENT OUTREACH (OUTPATIENT)
Dept: OTHER | Facility: OTHER | Age: 71
End: 2021-03-18

## 2021-03-23 ENCOUNTER — TELEPHONE (OUTPATIENT)
Dept: CARDIOLOGY | Facility: CLINIC | Age: 71
End: 2021-03-23

## 2021-03-23 DIAGNOSIS — I51.7 LVH (LEFT VENTRICULAR HYPERTROPHY): Primary | ICD-10-CM

## 2021-04-01 ENCOUNTER — CLINICAL SUPPORT (OUTPATIENT)
Dept: CARDIOLOGY | Facility: CLINIC | Age: 71
End: 2021-04-01
Payer: MEDICARE

## 2021-04-01 ENCOUNTER — PATIENT OUTREACH (OUTPATIENT)
Dept: ADMINISTRATIVE | Facility: OTHER | Age: 71
End: 2021-04-01

## 2021-04-01 ENCOUNTER — OFFICE VISIT (OUTPATIENT)
Dept: CARDIOLOGY | Facility: CLINIC | Age: 71
End: 2021-04-01
Payer: MEDICARE

## 2021-04-01 VITALS
DIASTOLIC BLOOD PRESSURE: 70 MMHG | OXYGEN SATURATION: 98 % | WEIGHT: 182.13 LBS | BODY MASS INDEX: 31.09 KG/M2 | SYSTOLIC BLOOD PRESSURE: 136 MMHG | HEIGHT: 64 IN | HEART RATE: 66 BPM | RESPIRATION RATE: 16 BRPM

## 2021-04-01 DIAGNOSIS — I49.1 PAC (PREMATURE ATRIAL CONTRACTION): ICD-10-CM

## 2021-04-01 DIAGNOSIS — R06.09 DOE (DYSPNEA ON EXERTION): ICD-10-CM

## 2021-04-01 DIAGNOSIS — E11.22 TYPE 2 DIABETES MELLITUS WITH STAGE 3A CHRONIC KIDNEY DISEASE, WITHOUT LONG-TERM CURRENT USE OF INSULIN: ICD-10-CM

## 2021-04-01 DIAGNOSIS — N18.31 STAGE 3A CHRONIC KIDNEY DISEASE: ICD-10-CM

## 2021-04-01 DIAGNOSIS — I15.2 HYPERTENSION ASSOCIATED WITH DIABETES: Primary | ICD-10-CM

## 2021-04-01 DIAGNOSIS — R94.31 ABNORMAL ECG: ICD-10-CM

## 2021-04-01 DIAGNOSIS — Z82.49 FAMILY HISTORY OF CARDIOVASCULAR DISORDER: ICD-10-CM

## 2021-04-01 DIAGNOSIS — E66.9 OBESITY (BMI 30.0-34.9): ICD-10-CM

## 2021-04-01 DIAGNOSIS — Z78.9 STATIN INTOLERANCE: ICD-10-CM

## 2021-04-01 DIAGNOSIS — E78.2 COMBINED HYPERLIPIDEMIA ASSOCIATED WITH TYPE 2 DIABETES MELLITUS: ICD-10-CM

## 2021-04-01 DIAGNOSIS — I51.7 LVH (LEFT VENTRICULAR HYPERTROPHY): ICD-10-CM

## 2021-04-01 DIAGNOSIS — N18.31 TYPE 2 DIABETES MELLITUS WITH STAGE 3A CHRONIC KIDNEY DISEASE, WITHOUT LONG-TERM CURRENT USE OF INSULIN: ICD-10-CM

## 2021-04-01 DIAGNOSIS — E11.59 HYPERTENSION ASSOCIATED WITH DIABETES: Primary | ICD-10-CM

## 2021-04-01 DIAGNOSIS — E11.69 COMBINED HYPERLIPIDEMIA ASSOCIATED WITH TYPE 2 DIABETES MELLITUS: ICD-10-CM

## 2021-04-01 PROCEDURE — 3075F SYST BP GE 130 - 139MM HG: CPT | Mod: CPTII,S$GLB,, | Performed by: INTERNAL MEDICINE

## 2021-04-01 PROCEDURE — 99999 PR PBB SHADOW E&M-EST. PATIENT-LVL IV: CPT | Mod: PBBFAC,,, | Performed by: INTERNAL MEDICINE

## 2021-04-01 PROCEDURE — 3288F FALL RISK ASSESSMENT DOCD: CPT | Mod: CPTII,S$GLB,, | Performed by: INTERNAL MEDICINE

## 2021-04-01 PROCEDURE — 3044F HG A1C LEVEL LT 7.0%: CPT | Mod: CPTII,S$GLB,, | Performed by: INTERNAL MEDICINE

## 2021-04-01 PROCEDURE — 1159F PR MEDICATION LIST DOCUMENTED IN MEDICAL RECORD: ICD-10-PCS | Mod: S$GLB,,, | Performed by: INTERNAL MEDICINE

## 2021-04-01 PROCEDURE — 93005 EKG 12-LEAD: ICD-10-PCS | Mod: S$GLB,,, | Performed by: INTERNAL MEDICINE

## 2021-04-01 PROCEDURE — 99214 OFFICE O/P EST MOD 30 MIN: CPT | Mod: S$GLB,,, | Performed by: INTERNAL MEDICINE

## 2021-04-01 PROCEDURE — 3008F PR BODY MASS INDEX (BMI) DOCUMENTED: ICD-10-PCS | Mod: CPTII,S$GLB,, | Performed by: INTERNAL MEDICINE

## 2021-04-01 PROCEDURE — 99499 RISK ADDL DX/OHS AUDIT: ICD-10-PCS | Mod: S$PBB,,, | Performed by: INTERNAL MEDICINE

## 2021-04-01 PROCEDURE — 3072F PR LOW RISK FOR RETINOPATHY: ICD-10-PCS | Mod: S$GLB,,, | Performed by: INTERNAL MEDICINE

## 2021-04-01 PROCEDURE — 3288F PR FALLS RISK ASSESSMENT DOCUMENTED: ICD-10-PCS | Mod: CPTII,S$GLB,, | Performed by: INTERNAL MEDICINE

## 2021-04-01 PROCEDURE — 99999 PR PBB SHADOW E&M-EST. PATIENT-LVL IV: ICD-10-PCS | Mod: PBBFAC,,, | Performed by: INTERNAL MEDICINE

## 2021-04-01 PROCEDURE — 93005 ELECTROCARDIOGRAM TRACING: CPT | Mod: S$GLB,,, | Performed by: INTERNAL MEDICINE

## 2021-04-01 PROCEDURE — 93010 EKG 12-LEAD: ICD-10-PCS | Mod: S$GLB,,, | Performed by: INTERNAL MEDICINE

## 2021-04-01 PROCEDURE — 3044F PR MOST RECENT HEMOGLOBIN A1C LEVEL <7.0%: ICD-10-PCS | Mod: CPTII,S$GLB,, | Performed by: INTERNAL MEDICINE

## 2021-04-01 PROCEDURE — 1159F MED LIST DOCD IN RCRD: CPT | Mod: S$GLB,,, | Performed by: INTERNAL MEDICINE

## 2021-04-01 PROCEDURE — 99499 UNLISTED E&M SERVICE: CPT | Mod: S$PBB,,, | Performed by: INTERNAL MEDICINE

## 2021-04-01 PROCEDURE — 1101F PR PT FALLS ASSESS DOC 0-1 FALLS W/OUT INJ PAST YR: ICD-10-PCS | Mod: CPTII,S$GLB,, | Performed by: INTERNAL MEDICINE

## 2021-04-01 PROCEDURE — 3078F PR MOST RECENT DIASTOLIC BLOOD PRESSURE < 80 MM HG: ICD-10-PCS | Mod: CPTII,S$GLB,, | Performed by: INTERNAL MEDICINE

## 2021-04-01 PROCEDURE — 93010 ELECTROCARDIOGRAM REPORT: CPT | Mod: S$GLB,,, | Performed by: INTERNAL MEDICINE

## 2021-04-01 PROCEDURE — 1126F PR PAIN SEVERITY QUANTIFIED, NO PAIN PRESENT: ICD-10-PCS | Mod: S$GLB,,, | Performed by: INTERNAL MEDICINE

## 2021-04-01 PROCEDURE — 3072F LOW RISK FOR RETINOPATHY: CPT | Mod: S$GLB,,, | Performed by: INTERNAL MEDICINE

## 2021-04-01 PROCEDURE — 3008F BODY MASS INDEX DOCD: CPT | Mod: CPTII,S$GLB,, | Performed by: INTERNAL MEDICINE

## 2021-04-01 PROCEDURE — 1126F AMNT PAIN NOTED NONE PRSNT: CPT | Mod: S$GLB,,, | Performed by: INTERNAL MEDICINE

## 2021-04-01 PROCEDURE — 99214 PR OFFICE/OUTPT VISIT, EST, LEVL IV, 30-39 MIN: ICD-10-PCS | Mod: S$GLB,,, | Performed by: INTERNAL MEDICINE

## 2021-04-01 PROCEDURE — 3075F PR MOST RECENT SYSTOLIC BLOOD PRESS GE 130-139MM HG: ICD-10-PCS | Mod: CPTII,S$GLB,, | Performed by: INTERNAL MEDICINE

## 2021-04-01 PROCEDURE — 1101F PT FALLS ASSESS-DOCD LE1/YR: CPT | Mod: CPTII,S$GLB,, | Performed by: INTERNAL MEDICINE

## 2021-04-01 PROCEDURE — 3078F DIAST BP <80 MM HG: CPT | Mod: CPTII,S$GLB,, | Performed by: INTERNAL MEDICINE

## 2021-04-05 ENCOUNTER — SPECIALTY PHARMACY (OUTPATIENT)
Dept: PHARMACY | Facility: CLINIC | Age: 71
End: 2021-04-05

## 2021-04-05 ENCOUNTER — LAB VISIT (OUTPATIENT)
Dept: LAB | Facility: HOSPITAL | Age: 71
End: 2021-04-05
Attending: INTERNAL MEDICINE
Payer: MEDICARE

## 2021-04-05 DIAGNOSIS — E11.69 COMBINED HYPERLIPIDEMIA ASSOCIATED WITH TYPE 2 DIABETES MELLITUS: ICD-10-CM

## 2021-04-05 DIAGNOSIS — E78.2 COMBINED HYPERLIPIDEMIA ASSOCIATED WITH TYPE 2 DIABETES MELLITUS: ICD-10-CM

## 2021-04-05 LAB
ALBUMIN SERPL BCP-MCNC: 3.7 G/DL (ref 3.5–5.2)
ALP SERPL-CCNC: 89 U/L (ref 55–135)
ALT SERPL W/O P-5'-P-CCNC: 20 U/L (ref 10–44)
ANION GAP SERPL CALC-SCNC: 9 MMOL/L (ref 8–16)
AST SERPL-CCNC: 23 U/L (ref 10–40)
BILIRUB SERPL-MCNC: 0.4 MG/DL (ref 0.1–1)
BUN SERPL-MCNC: 14 MG/DL (ref 8–23)
CALCIUM SERPL-MCNC: 9.1 MG/DL (ref 8.7–10.5)
CHLORIDE SERPL-SCNC: 103 MMOL/L (ref 95–110)
CHOLEST SERPL-MCNC: 143 MG/DL (ref 120–199)
CHOLEST/HDLC SERPL: 3.1 {RATIO} (ref 2–5)
CO2 SERPL-SCNC: 31 MMOL/L (ref 23–29)
CREAT SERPL-MCNC: 1 MG/DL (ref 0.5–1.4)
EST. GFR  (AFRICAN AMERICAN): >60 ML/MIN/1.73 M^2
EST. GFR  (NON AFRICAN AMERICAN): 56.4 ML/MIN/1.73 M^2
GLUCOSE SERPL-MCNC: 102 MG/DL (ref 70–110)
HDLC SERPL-MCNC: 46 MG/DL (ref 40–75)
HDLC SERPL: 32.2 % (ref 20–50)
LDLC SERPL CALC-MCNC: 86.4 MG/DL (ref 63–159)
NONHDLC SERPL-MCNC: 97 MG/DL
POTASSIUM SERPL-SCNC: 3.2 MMOL/L (ref 3.5–5.1)
PROT SERPL-MCNC: 7.5 G/DL (ref 6–8.4)
SODIUM SERPL-SCNC: 143 MMOL/L (ref 136–145)
TRIGL SERPL-MCNC: 53 MG/DL (ref 30–150)

## 2021-04-05 PROCEDURE — 36415 COLL VENOUS BLD VENIPUNCTURE: CPT | Mod: PO | Performed by: INTERNAL MEDICINE

## 2021-04-05 PROCEDURE — 80061 LIPID PANEL: CPT | Performed by: INTERNAL MEDICINE

## 2021-04-05 PROCEDURE — 80053 COMPREHEN METABOLIC PANEL: CPT | Performed by: INTERNAL MEDICINE

## 2021-04-06 ENCOUNTER — TELEPHONE (OUTPATIENT)
Dept: CARDIOLOGY | Facility: CLINIC | Age: 71
End: 2021-04-06

## 2021-04-19 ENCOUNTER — OFFICE VISIT (OUTPATIENT)
Dept: FAMILY MEDICINE | Facility: CLINIC | Age: 71
End: 2021-04-19
Payer: MEDICARE

## 2021-04-19 ENCOUNTER — LAB VISIT (OUTPATIENT)
Dept: LAB | Facility: HOSPITAL | Age: 71
End: 2021-04-19
Attending: FAMILY MEDICINE
Payer: MEDICARE

## 2021-04-19 VITALS
TEMPERATURE: 98 F | DIASTOLIC BLOOD PRESSURE: 70 MMHG | HEART RATE: 71 BPM | RESPIRATION RATE: 16 BRPM | SYSTOLIC BLOOD PRESSURE: 122 MMHG | WEIGHT: 181.56 LBS | BODY MASS INDEX: 31.16 KG/M2 | OXYGEN SATURATION: 98 %

## 2021-04-19 DIAGNOSIS — N18.31 TYPE 2 DIABETES MELLITUS WITH STAGE 3A CHRONIC KIDNEY DISEASE, WITHOUT LONG-TERM CURRENT USE OF INSULIN: ICD-10-CM

## 2021-04-19 DIAGNOSIS — E04.2 MULTIPLE THYROID NODULES: ICD-10-CM

## 2021-04-19 DIAGNOSIS — N18.31 STAGE 3A CHRONIC KIDNEY DISEASE: ICD-10-CM

## 2021-04-19 DIAGNOSIS — I15.2 HYPERTENSION ASSOCIATED WITH DIABETES: ICD-10-CM

## 2021-04-19 DIAGNOSIS — I51.7 LVH (LEFT VENTRICULAR HYPERTROPHY): ICD-10-CM

## 2021-04-19 DIAGNOSIS — E66.9 OBESITY (BMI 30.0-34.9): ICD-10-CM

## 2021-04-19 DIAGNOSIS — E11.59 HYPERTENSION ASSOCIATED WITH DIABETES: ICD-10-CM

## 2021-04-19 DIAGNOSIS — E11.22 TYPE 2 DIABETES MELLITUS WITH STAGE 3A CHRONIC KIDNEY DISEASE, WITHOUT LONG-TERM CURRENT USE OF INSULIN: ICD-10-CM

## 2021-04-19 DIAGNOSIS — E11.69 COMBINED HYPERLIPIDEMIA ASSOCIATED WITH TYPE 2 DIABETES MELLITUS: ICD-10-CM

## 2021-04-19 DIAGNOSIS — K21.9 GASTROESOPHAGEAL REFLUX DISEASE, UNSPECIFIED WHETHER ESOPHAGITIS PRESENT: ICD-10-CM

## 2021-04-19 DIAGNOSIS — Z78.9 STATIN INTOLERANCE: ICD-10-CM

## 2021-04-19 DIAGNOSIS — I49.1 PAC (PREMATURE ATRIAL CONTRACTION): ICD-10-CM

## 2021-04-19 DIAGNOSIS — H69.93 DYSFUNCTION OF BOTH EUSTACHIAN TUBES: ICD-10-CM

## 2021-04-19 DIAGNOSIS — Z00.00 ANNUAL PHYSICAL EXAM: ICD-10-CM

## 2021-04-19 DIAGNOSIS — Z78.0 POSTMENOPAUSAL: ICD-10-CM

## 2021-04-19 DIAGNOSIS — E78.2 COMBINED HYPERLIPIDEMIA ASSOCIATED WITH TYPE 2 DIABETES MELLITUS: ICD-10-CM

## 2021-04-19 DIAGNOSIS — Z12.31 OTHER SCREENING MAMMOGRAM: ICD-10-CM

## 2021-04-19 LAB
BASOPHILS # BLD AUTO: 0.06 K/UL (ref 0–0.2)
BASOPHILS NFR BLD: 0.6 % (ref 0–1.9)
CREAT UR-MCNC: 273 MG/DL (ref 15–325)
DIFFERENTIAL METHOD: ABNORMAL
EOSINOPHIL # BLD AUTO: 0.2 K/UL (ref 0–0.5)
EOSINOPHIL NFR BLD: 2.2 % (ref 0–8)
ERYTHROCYTE [DISTWIDTH] IN BLOOD BY AUTOMATED COUNT: 13.7 % (ref 11.5–14.5)
ESTIMATED AVG GLUCOSE: 131 MG/DL (ref 68–131)
HBA1C MFR BLD: 6.2 % (ref 4–5.6)
HCT VFR BLD AUTO: 41.7 % (ref 37–48.5)
HGB BLD-MCNC: 13.6 G/DL (ref 12–16)
IMM GRANULOCYTES # BLD AUTO: 0.04 K/UL (ref 0–0.04)
IMM GRANULOCYTES NFR BLD AUTO: 0.4 % (ref 0–0.5)
LYMPHOCYTES # BLD AUTO: 4.1 K/UL (ref 1–4.8)
LYMPHOCYTES NFR BLD: 41 % (ref 18–48)
MCH RBC QN AUTO: 31.3 PG (ref 27–31)
MCHC RBC AUTO-ENTMCNC: 32.6 G/DL (ref 32–36)
MCV RBC AUTO: 96 FL (ref 82–98)
MONOCYTES # BLD AUTO: 0.7 K/UL (ref 0.3–1)
MONOCYTES NFR BLD: 7.1 % (ref 4–15)
NEUTROPHILS # BLD AUTO: 4.9 K/UL (ref 1.8–7.7)
NEUTROPHILS NFR BLD: 48.7 % (ref 38–73)
NRBC BLD-RTO: 0 /100 WBC
PLATELET # BLD AUTO: 272 K/UL (ref 150–450)
PMV BLD AUTO: 10.6 FL (ref 9.2–12.9)
PROT UR-MCNC: 15 MG/DL (ref 0–15)
PROT/CREAT UR: 0.05 MG/G{CREAT} (ref 0–0.2)
RBC # BLD AUTO: 4.34 M/UL (ref 4–5.4)
TSH SERPL DL<=0.005 MIU/L-ACNC: 0.84 UIU/ML (ref 0.4–4)
WBC # BLD AUTO: 10.02 K/UL (ref 3.9–12.7)

## 2021-04-19 PROCEDURE — 84156 ASSAY OF PROTEIN URINE: CPT | Performed by: FAMILY MEDICINE

## 2021-04-19 PROCEDURE — 99397 PER PM REEVAL EST PAT 65+ YR: CPT | Mod: S$GLB,,, | Performed by: FAMILY MEDICINE

## 2021-04-19 PROCEDURE — 1126F AMNT PAIN NOTED NONE PRSNT: CPT | Mod: S$GLB,,, | Performed by: FAMILY MEDICINE

## 2021-04-19 PROCEDURE — 3008F PR BODY MASS INDEX (BMI) DOCUMENTED: ICD-10-PCS | Mod: CPTII,S$GLB,, | Performed by: FAMILY MEDICINE

## 2021-04-19 PROCEDURE — 3008F BODY MASS INDEX DOCD: CPT | Mod: CPTII,S$GLB,, | Performed by: FAMILY MEDICINE

## 2021-04-19 PROCEDURE — 3288F PR FALLS RISK ASSESSMENT DOCUMENTED: ICD-10-PCS | Mod: CPTII,S$GLB,, | Performed by: FAMILY MEDICINE

## 2021-04-19 PROCEDURE — 1126F PR PAIN SEVERITY QUANTIFIED, NO PAIN PRESENT: ICD-10-PCS | Mod: S$GLB,,, | Performed by: FAMILY MEDICINE

## 2021-04-19 PROCEDURE — 83036 HEMOGLOBIN GLYCOSYLATED A1C: CPT | Performed by: FAMILY MEDICINE

## 2021-04-19 PROCEDURE — 85025 COMPLETE CBC W/AUTO DIFF WBC: CPT | Performed by: FAMILY MEDICINE

## 2021-04-19 PROCEDURE — 99397 PR PREVENTIVE VISIT,EST,65 & OVER: ICD-10-PCS | Mod: S$GLB,,, | Performed by: FAMILY MEDICINE

## 2021-04-19 PROCEDURE — 1101F PR PT FALLS ASSESS DOC 0-1 FALLS W/OUT INJ PAST YR: ICD-10-PCS | Mod: CPTII,S$GLB,, | Performed by: FAMILY MEDICINE

## 2021-04-19 PROCEDURE — 36415 COLL VENOUS BLD VENIPUNCTURE: CPT | Mod: PO | Performed by: FAMILY MEDICINE

## 2021-04-19 PROCEDURE — 3072F LOW RISK FOR RETINOPATHY: CPT | Mod: S$GLB,,, | Performed by: FAMILY MEDICINE

## 2021-04-19 PROCEDURE — 84443 ASSAY THYROID STIM HORMONE: CPT | Performed by: FAMILY MEDICINE

## 2021-04-19 PROCEDURE — 99999 PR PBB SHADOW E&M-EST. PATIENT-LVL V: ICD-10-PCS | Mod: PBBFAC,,, | Performed by: FAMILY MEDICINE

## 2021-04-19 PROCEDURE — 3288F FALL RISK ASSESSMENT DOCD: CPT | Mod: CPTII,S$GLB,, | Performed by: FAMILY MEDICINE

## 2021-04-19 PROCEDURE — 3072F PR LOW RISK FOR RETINOPATHY: ICD-10-PCS | Mod: S$GLB,,, | Performed by: FAMILY MEDICINE

## 2021-04-19 PROCEDURE — 1101F PT FALLS ASSESS-DOCD LE1/YR: CPT | Mod: CPTII,S$GLB,, | Performed by: FAMILY MEDICINE

## 2021-04-19 PROCEDURE — 99999 PR PBB SHADOW E&M-EST. PATIENT-LVL V: CPT | Mod: PBBFAC,,, | Performed by: FAMILY MEDICINE

## 2021-04-19 RX ORDER — AMLODIPINE AND BENAZEPRIL HYDROCHLORIDE 5; 40 MG/1; MG/1
1 CAPSULE ORAL DAILY
Qty: 90 CAPSULE | Refills: 1 | Status: SHIPPED | OUTPATIENT
Start: 2021-04-19 | End: 2021-10-25

## 2021-04-19 RX ORDER — TRIAMCINOLONE ACETONIDE 55 UG/1
SPRAY, METERED NASAL
Qty: 50.7 G | Refills: 1 | Status: SHIPPED | OUTPATIENT
Start: 2021-04-19 | End: 2021-05-03 | Stop reason: SDUPTHER

## 2021-04-19 RX ORDER — SPIRONOLACTONE 50 MG/1
50 TABLET, FILM COATED ORAL DAILY
Qty: 90 TABLET | Refills: 1 | Status: SHIPPED | OUTPATIENT
Start: 2021-04-19 | End: 2021-11-01

## 2021-04-20 ENCOUNTER — PATIENT OUTREACH (OUTPATIENT)
Dept: OTHER | Facility: OTHER | Age: 71
End: 2021-04-20

## 2021-05-03 DIAGNOSIS — E11.69 COMBINED HYPERLIPIDEMIA ASSOCIATED WITH TYPE 2 DIABETES MELLITUS: ICD-10-CM

## 2021-05-03 DIAGNOSIS — Z78.9 STATIN INTOLERANCE: ICD-10-CM

## 2021-05-03 DIAGNOSIS — E78.2 COMBINED HYPERLIPIDEMIA ASSOCIATED WITH TYPE 2 DIABETES MELLITUS: ICD-10-CM

## 2021-05-03 RX ORDER — EVOLOCUMAB 140 MG/ML
140 INJECTION, SOLUTION SUBCUTANEOUS
Qty: 2 ML | Refills: 5 | OUTPATIENT
Start: 2021-05-03

## 2021-05-04 DIAGNOSIS — Z78.9 STATIN INTOLERANCE: ICD-10-CM

## 2021-05-04 DIAGNOSIS — E11.69 COMBINED HYPERLIPIDEMIA ASSOCIATED WITH TYPE 2 DIABETES MELLITUS: ICD-10-CM

## 2021-05-04 DIAGNOSIS — E78.2 COMBINED HYPERLIPIDEMIA ASSOCIATED WITH TYPE 2 DIABETES MELLITUS: ICD-10-CM

## 2021-05-04 RX ORDER — EVOLOCUMAB 140 MG/ML
140 INJECTION, SOLUTION SUBCUTANEOUS
Qty: 2 ML | Refills: 5 | Status: SHIPPED | OUTPATIENT
Start: 2021-05-04 | End: 2021-11-03 | Stop reason: SDUPTHER

## 2021-05-07 ENCOUNTER — SPECIALTY PHARMACY (OUTPATIENT)
Dept: PHARMACY | Facility: CLINIC | Age: 71
End: 2021-05-07

## 2021-05-25 ENCOUNTER — PATIENT OUTREACH (OUTPATIENT)
Dept: OTHER | Facility: OTHER | Age: 71
End: 2021-05-25

## 2021-05-26 ENCOUNTER — HOSPITAL ENCOUNTER (OUTPATIENT)
Dept: RADIOLOGY | Facility: HOSPITAL | Age: 71
Discharge: HOME | End: 2021-05-26
Attending: FAMILY MEDICINE
Payer: MEDICARE

## 2021-05-26 DIAGNOSIS — E04.2 MULTIPLE THYROID NODULES: ICD-10-CM

## 2021-05-26 DIAGNOSIS — Z12.31 OTHER SCREENING MAMMOGRAM: ICD-10-CM

## 2021-05-26 PROCEDURE — 76536 US SOFT TISSUE HEAD NECK THYROID: ICD-10-PCS | Mod: 26,,, | Performed by: RADIOLOGY

## 2021-05-26 PROCEDURE — 77063 MAMMO DIGITAL SCREENING BILAT WITH TOMO: ICD-10-PCS | Mod: 26,,, | Performed by: RADIOLOGY

## 2021-05-26 PROCEDURE — 76536 US EXAM OF HEAD AND NECK: CPT | Mod: 26,,, | Performed by: RADIOLOGY

## 2021-05-26 PROCEDURE — 77063 BREAST TOMOSYNTHESIS BI: CPT | Mod: 26,,, | Performed by: RADIOLOGY

## 2021-05-26 PROCEDURE — 77067 SCR MAMMO BI INCL CAD: CPT | Mod: 26,,, | Performed by: RADIOLOGY

## 2021-05-26 PROCEDURE — 77067 SCR MAMMO BI INCL CAD: CPT | Mod: TC

## 2021-05-26 PROCEDURE — 77067 MAMMO DIGITAL SCREENING BILAT WITH TOMO: ICD-10-PCS | Mod: 26,,, | Performed by: RADIOLOGY

## 2021-05-26 PROCEDURE — 76536 US EXAM OF HEAD AND NECK: CPT | Mod: TC

## 2021-06-04 ENCOUNTER — SPECIALTY PHARMACY (OUTPATIENT)
Dept: PHARMACY | Facility: CLINIC | Age: 71
End: 2021-06-04

## 2021-06-21 ENCOUNTER — PATIENT OUTREACH (OUTPATIENT)
Dept: ADMINISTRATIVE | Facility: HOSPITAL | Age: 71
End: 2021-06-21

## 2021-07-02 ENCOUNTER — SPECIALTY PHARMACY (OUTPATIENT)
Dept: PHARMACY | Facility: CLINIC | Age: 71
End: 2021-07-02

## 2021-07-12 ENCOUNTER — PATIENT OUTREACH (OUTPATIENT)
Dept: OTHER | Facility: OTHER | Age: 71
End: 2021-07-12

## 2021-08-05 ENCOUNTER — SPECIALTY PHARMACY (OUTPATIENT)
Dept: PHARMACY | Facility: CLINIC | Age: 71
End: 2021-08-05

## 2021-08-06 ENCOUNTER — PATIENT OUTREACH (OUTPATIENT)
Dept: OTHER | Facility: OTHER | Age: 71
End: 2021-08-06
Payer: MEDICARE

## 2021-08-12 ENCOUNTER — TELEPHONE (OUTPATIENT)
Dept: FAMILY MEDICINE | Facility: CLINIC | Age: 71
End: 2021-08-12

## 2021-09-13 DIAGNOSIS — H69.93 DYSFUNCTION OF BOTH EUSTACHIAN TUBES: ICD-10-CM

## 2021-09-14 ENCOUNTER — SPECIALTY PHARMACY (OUTPATIENT)
Dept: PHARMACY | Facility: CLINIC | Age: 71
End: 2021-09-14

## 2021-09-14 RX ORDER — TRIAMCINOLONE ACETONIDE 55 UG/1
SPRAY, METERED NASAL
Qty: 50.7 G | Refills: 1 | Status: SHIPPED | OUTPATIENT
Start: 2021-09-14 | End: 2023-05-08

## 2021-10-03 ENCOUNTER — PATIENT MESSAGE (OUTPATIENT)
Dept: ADMINISTRATIVE | Facility: OTHER | Age: 71
End: 2021-10-03

## 2021-10-05 ENCOUNTER — SPECIALTY PHARMACY (OUTPATIENT)
Dept: PHARMACY | Facility: CLINIC | Age: 71
End: 2021-10-05

## 2021-10-06 ENCOUNTER — PATIENT OUTREACH (OUTPATIENT)
Dept: ADMINISTRATIVE | Facility: OTHER | Age: 71
End: 2021-10-06

## 2021-10-07 ENCOUNTER — OFFICE VISIT (OUTPATIENT)
Dept: CARDIOLOGY | Facility: CLINIC | Age: 71
End: 2021-10-07
Payer: MEDICARE

## 2021-10-07 VITALS
DIASTOLIC BLOOD PRESSURE: 70 MMHG | HEART RATE: 75 BPM | OXYGEN SATURATION: 98 % | WEIGHT: 181.19 LBS | BODY MASS INDEX: 30.93 KG/M2 | HEIGHT: 64 IN | SYSTOLIC BLOOD PRESSURE: 130 MMHG

## 2021-10-07 DIAGNOSIS — N18.31 TYPE 2 DIABETES MELLITUS WITH STAGE 3A CHRONIC KIDNEY DISEASE, WITHOUT LONG-TERM CURRENT USE OF INSULIN: ICD-10-CM

## 2021-10-07 DIAGNOSIS — R94.31 ABNORMAL ECG: ICD-10-CM

## 2021-10-07 DIAGNOSIS — Z86.39 HISTORY OF HYPOKALEMIA: ICD-10-CM

## 2021-10-07 DIAGNOSIS — E11.59 HYPERTENSION ASSOCIATED WITH DIABETES: Primary | ICD-10-CM

## 2021-10-07 DIAGNOSIS — K21.9 GASTROESOPHAGEAL REFLUX DISEASE, UNSPECIFIED WHETHER ESOPHAGITIS PRESENT: ICD-10-CM

## 2021-10-07 DIAGNOSIS — R00.2 PALPITATIONS: ICD-10-CM

## 2021-10-07 DIAGNOSIS — I51.7 LVH (LEFT VENTRICULAR HYPERTROPHY): ICD-10-CM

## 2021-10-07 DIAGNOSIS — I15.2 HYPERTENSION ASSOCIATED WITH DIABETES: Primary | ICD-10-CM

## 2021-10-07 DIAGNOSIS — E66.9 OBESITY (BMI 30.0-34.9): ICD-10-CM

## 2021-10-07 DIAGNOSIS — I49.1 PAC (PREMATURE ATRIAL CONTRACTION): ICD-10-CM

## 2021-10-07 DIAGNOSIS — Z82.49 FAMILY HISTORY OF CARDIOVASCULAR DISORDER: ICD-10-CM

## 2021-10-07 DIAGNOSIS — E11.22 TYPE 2 DIABETES MELLITUS WITH STAGE 3A CHRONIC KIDNEY DISEASE, WITHOUT LONG-TERM CURRENT USE OF INSULIN: ICD-10-CM

## 2021-10-07 DIAGNOSIS — E78.2 COMBINED HYPERLIPIDEMIA ASSOCIATED WITH TYPE 2 DIABETES MELLITUS: ICD-10-CM

## 2021-10-07 DIAGNOSIS — R06.09 DOE (DYSPNEA ON EXERTION): ICD-10-CM

## 2021-10-07 DIAGNOSIS — E11.69 COMBINED HYPERLIPIDEMIA ASSOCIATED WITH TYPE 2 DIABETES MELLITUS: ICD-10-CM

## 2021-10-07 PROCEDURE — 1160F PR REVIEW ALL MEDS BY PRESCRIBER/CLIN PHARMACIST DOCUMENTED: ICD-10-PCS | Mod: HCNC,CPTII,S$GLB, | Performed by: INTERNAL MEDICINE

## 2021-10-07 PROCEDURE — 99999 PR PBB SHADOW E&M-EST. PATIENT-LVL III: ICD-10-PCS | Mod: PBBFAC,HCNC,, | Performed by: INTERNAL MEDICINE

## 2021-10-07 PROCEDURE — 99999 PR PBB SHADOW E&M-EST. PATIENT-LVL III: CPT | Mod: PBBFAC,HCNC,, | Performed by: INTERNAL MEDICINE

## 2021-10-07 PROCEDURE — 99214 OFFICE O/P EST MOD 30 MIN: CPT | Mod: HCNC,S$GLB,, | Performed by: INTERNAL MEDICINE

## 2021-10-07 PROCEDURE — 3044F HG A1C LEVEL LT 7.0%: CPT | Mod: HCNC,CPTII,S$GLB, | Performed by: INTERNAL MEDICINE

## 2021-10-07 PROCEDURE — 4010F ACE/ARB THERAPY RXD/TAKEN: CPT | Mod: HCNC,CPTII,S$GLB, | Performed by: INTERNAL MEDICINE

## 2021-10-07 PROCEDURE — 3075F PR MOST RECENT SYSTOLIC BLOOD PRESS GE 130-139MM HG: ICD-10-PCS | Mod: HCNC,CPTII,S$GLB, | Performed by: INTERNAL MEDICINE

## 2021-10-07 PROCEDURE — 4010F PR ACE/ARB THEARPY RXD/TAKEN: ICD-10-PCS | Mod: HCNC,CPTII,S$GLB, | Performed by: INTERNAL MEDICINE

## 2021-10-07 PROCEDURE — 1159F PR MEDICATION LIST DOCUMENTED IN MEDICAL RECORD: ICD-10-PCS | Mod: HCNC,CPTII,S$GLB, | Performed by: INTERNAL MEDICINE

## 2021-10-07 PROCEDURE — 1160F RVW MEDS BY RX/DR IN RCRD: CPT | Mod: HCNC,CPTII,S$GLB, | Performed by: INTERNAL MEDICINE

## 2021-10-07 PROCEDURE — 3078F PR MOST RECENT DIASTOLIC BLOOD PRESSURE < 80 MM HG: ICD-10-PCS | Mod: HCNC,CPTII,S$GLB, | Performed by: INTERNAL MEDICINE

## 2021-10-07 PROCEDURE — 3078F DIAST BP <80 MM HG: CPT | Mod: HCNC,CPTII,S$GLB, | Performed by: INTERNAL MEDICINE

## 2021-10-07 PROCEDURE — 3075F SYST BP GE 130 - 139MM HG: CPT | Mod: HCNC,CPTII,S$GLB, | Performed by: INTERNAL MEDICINE

## 2021-10-07 PROCEDURE — 3072F PR LOW RISK FOR RETINOPATHY: ICD-10-PCS | Mod: HCNC,CPTII,S$GLB, | Performed by: INTERNAL MEDICINE

## 2021-10-07 PROCEDURE — 3008F BODY MASS INDEX DOCD: CPT | Mod: HCNC,CPTII,S$GLB, | Performed by: INTERNAL MEDICINE

## 2021-10-07 PROCEDURE — 1126F AMNT PAIN NOTED NONE PRSNT: CPT | Mod: HCNC,CPTII,S$GLB, | Performed by: INTERNAL MEDICINE

## 2021-10-07 PROCEDURE — 3044F PR MOST RECENT HEMOGLOBIN A1C LEVEL <7.0%: ICD-10-PCS | Mod: HCNC,CPTII,S$GLB, | Performed by: INTERNAL MEDICINE

## 2021-10-07 PROCEDURE — 1159F MED LIST DOCD IN RCRD: CPT | Mod: HCNC,CPTII,S$GLB, | Performed by: INTERNAL MEDICINE

## 2021-10-07 PROCEDURE — 99214 PR OFFICE/OUTPT VISIT, EST, LEVL IV, 30-39 MIN: ICD-10-PCS | Mod: HCNC,S$GLB,, | Performed by: INTERNAL MEDICINE

## 2021-10-07 PROCEDURE — 1126F PR PAIN SEVERITY QUANTIFIED, NO PAIN PRESENT: ICD-10-PCS | Mod: HCNC,CPTII,S$GLB, | Performed by: INTERNAL MEDICINE

## 2021-10-07 PROCEDURE — 3072F LOW RISK FOR RETINOPATHY: CPT | Mod: HCNC,CPTII,S$GLB, | Performed by: INTERNAL MEDICINE

## 2021-10-07 PROCEDURE — 3008F PR BODY MASS INDEX (BMI) DOCUMENTED: ICD-10-PCS | Mod: HCNC,CPTII,S$GLB, | Performed by: INTERNAL MEDICINE

## 2021-10-15 ENCOUNTER — PATIENT MESSAGE (OUTPATIENT)
Dept: ADMINISTRATIVE | Facility: OTHER | Age: 71
End: 2021-10-15
Payer: MEDICARE

## 2021-10-15 ENCOUNTER — PATIENT MESSAGE (OUTPATIENT)
Dept: ADMINISTRATIVE | Facility: OTHER | Age: 71
End: 2021-10-15

## 2021-10-26 PROBLEM — N25.81 SECONDARY HYPERPARATHYROIDISM OF RENAL ORIGIN: Status: ACTIVE | Noted: 2021-10-26

## 2021-10-27 ENCOUNTER — OFFICE VISIT (OUTPATIENT)
Dept: INTERNAL MEDICINE | Facility: CLINIC | Age: 71
End: 2021-10-27
Payer: MEDICARE

## 2021-10-27 VITALS
DIASTOLIC BLOOD PRESSURE: 60 MMHG | HEIGHT: 64 IN | SYSTOLIC BLOOD PRESSURE: 116 MMHG | WEIGHT: 181 LBS | BODY MASS INDEX: 30.9 KG/M2

## 2021-10-27 DIAGNOSIS — E78.2 COMBINED HYPERLIPIDEMIA ASSOCIATED WITH TYPE 2 DIABETES MELLITUS: ICD-10-CM

## 2021-10-27 DIAGNOSIS — E04.2 MULTIPLE THYROID NODULES: ICD-10-CM

## 2021-10-27 DIAGNOSIS — R45.0 JITTERY FEELING: ICD-10-CM

## 2021-10-27 DIAGNOSIS — N25.81 SECONDARY HYPERPARATHYROIDISM OF RENAL ORIGIN: ICD-10-CM

## 2021-10-27 DIAGNOSIS — J30.9 ALLERGIC RHINITIS, UNSPECIFIED SEASONALITY, UNSPECIFIED TRIGGER: ICD-10-CM

## 2021-10-27 DIAGNOSIS — N18.31 TYPE 2 DIABETES MELLITUS WITH STAGE 3A CHRONIC KIDNEY DISEASE, WITHOUT LONG-TERM CURRENT USE OF INSULIN: ICD-10-CM

## 2021-10-27 DIAGNOSIS — E11.59 HYPERTENSION ASSOCIATED WITH DIABETES: ICD-10-CM

## 2021-10-27 DIAGNOSIS — E66.9 OBESITY (BMI 30.0-34.9): ICD-10-CM

## 2021-10-27 DIAGNOSIS — H81.02 ENDOLYMPHATIC HYDROPS OF LEFT EAR: ICD-10-CM

## 2021-10-27 DIAGNOSIS — Z00.00 ENCOUNTER FOR PREVENTIVE HEALTH EXAMINATION: Primary | ICD-10-CM

## 2021-10-27 DIAGNOSIS — H90.3 ASYMMETRICAL SENSORINEURAL HEARING LOSS: ICD-10-CM

## 2021-10-27 DIAGNOSIS — E11.22 TYPE 2 DIABETES MELLITUS WITH STAGE 3A CHRONIC KIDNEY DISEASE, WITHOUT LONG-TERM CURRENT USE OF INSULIN: ICD-10-CM

## 2021-10-27 DIAGNOSIS — I15.2 HYPERTENSION ASSOCIATED WITH DIABETES: ICD-10-CM

## 2021-10-27 DIAGNOSIS — E11.69 COMBINED HYPERLIPIDEMIA ASSOCIATED WITH TYPE 2 DIABETES MELLITUS: ICD-10-CM

## 2021-10-27 DIAGNOSIS — N18.31 STAGE 3A CHRONIC KIDNEY DISEASE: ICD-10-CM

## 2021-10-27 PROCEDURE — G0439 PR MEDICARE ANNUAL WELLNESS SUBSEQUENT VISIT: ICD-10-PCS | Mod: HCNC,S$GLB,, | Performed by: PHYSICIAN ASSISTANT

## 2021-10-27 PROCEDURE — G0439 PPPS, SUBSEQ VISIT: HCPCS | Mod: HCNC,S$GLB,, | Performed by: PHYSICIAN ASSISTANT

## 2021-10-27 PROCEDURE — 99499 RISK ADDL DX/OHS AUDIT: ICD-10-PCS | Mod: S$GLB,,, | Performed by: PHYSICIAN ASSISTANT

## 2021-10-27 PROCEDURE — 1101F PT FALLS ASSESS-DOCD LE1/YR: CPT | Mod: HCNC,CPTII,S$GLB, | Performed by: PHYSICIAN ASSISTANT

## 2021-10-27 PROCEDURE — 99999 PR PBB SHADOW E&M-EST. PATIENT-LVL III: ICD-10-PCS | Mod: PBBFAC,HCNC,, | Performed by: PHYSICIAN ASSISTANT

## 2021-10-27 PROCEDURE — 1159F PR MEDICATION LIST DOCUMENTED IN MEDICAL RECORD: ICD-10-PCS | Mod: HCNC,CPTII,S$GLB, | Performed by: PHYSICIAN ASSISTANT

## 2021-10-27 PROCEDURE — 3288F FALL RISK ASSESSMENT DOCD: CPT | Mod: HCNC,CPTII,S$GLB, | Performed by: PHYSICIAN ASSISTANT

## 2021-10-27 PROCEDURE — 3074F PR MOST RECENT SYSTOLIC BLOOD PRESSURE < 130 MM HG: ICD-10-PCS | Mod: HCNC,CPTII,S$GLB, | Performed by: PHYSICIAN ASSISTANT

## 2021-10-27 PROCEDURE — 1126F PR PAIN SEVERITY QUANTIFIED, NO PAIN PRESENT: ICD-10-PCS | Mod: HCNC,CPTII,S$GLB, | Performed by: PHYSICIAN ASSISTANT

## 2021-10-27 PROCEDURE — 99499 UNLISTED E&M SERVICE: CPT | Mod: S$GLB,,, | Performed by: PHYSICIAN ASSISTANT

## 2021-10-27 PROCEDURE — 99999 PR PBB SHADOW E&M-EST. PATIENT-LVL III: CPT | Mod: PBBFAC,HCNC,, | Performed by: PHYSICIAN ASSISTANT

## 2021-10-27 PROCEDURE — 1101F PR PT FALLS ASSESS DOC 0-1 FALLS W/OUT INJ PAST YR: ICD-10-PCS | Mod: HCNC,CPTII,S$GLB, | Performed by: PHYSICIAN ASSISTANT

## 2021-10-27 PROCEDURE — 3078F DIAST BP <80 MM HG: CPT | Mod: HCNC,CPTII,S$GLB, | Performed by: PHYSICIAN ASSISTANT

## 2021-10-27 PROCEDURE — 3008F PR BODY MASS INDEX (BMI) DOCUMENTED: ICD-10-PCS | Mod: HCNC,CPTII,S$GLB, | Performed by: PHYSICIAN ASSISTANT

## 2021-10-27 PROCEDURE — 1170F PR FUNCTIONAL STATUS ASSESSED: ICD-10-PCS | Mod: HCNC,CPTII,S$GLB, | Performed by: PHYSICIAN ASSISTANT

## 2021-10-27 PROCEDURE — 3072F LOW RISK FOR RETINOPATHY: CPT | Mod: HCNC,CPTII,S$GLB, | Performed by: PHYSICIAN ASSISTANT

## 2021-10-27 PROCEDURE — 1160F PR REVIEW ALL MEDS BY PRESCRIBER/CLIN PHARMACIST DOCUMENTED: ICD-10-PCS | Mod: HCNC,CPTII,S$GLB, | Performed by: PHYSICIAN ASSISTANT

## 2021-10-27 PROCEDURE — 1126F AMNT PAIN NOTED NONE PRSNT: CPT | Mod: HCNC,CPTII,S$GLB, | Performed by: PHYSICIAN ASSISTANT

## 2021-10-27 PROCEDURE — 3044F HG A1C LEVEL LT 7.0%: CPT | Mod: HCNC,CPTII,S$GLB, | Performed by: PHYSICIAN ASSISTANT

## 2021-10-27 PROCEDURE — 3288F PR FALLS RISK ASSESSMENT DOCUMENTED: ICD-10-PCS | Mod: HCNC,CPTII,S$GLB, | Performed by: PHYSICIAN ASSISTANT

## 2021-10-27 PROCEDURE — 3044F PR MOST RECENT HEMOGLOBIN A1C LEVEL <7.0%: ICD-10-PCS | Mod: HCNC,CPTII,S$GLB, | Performed by: PHYSICIAN ASSISTANT

## 2021-10-27 PROCEDURE — 3072F PR LOW RISK FOR RETINOPATHY: ICD-10-PCS | Mod: HCNC,CPTII,S$GLB, | Performed by: PHYSICIAN ASSISTANT

## 2021-10-27 PROCEDURE — 1160F RVW MEDS BY RX/DR IN RCRD: CPT | Mod: HCNC,CPTII,S$GLB, | Performed by: PHYSICIAN ASSISTANT

## 2021-10-27 PROCEDURE — 4010F PR ACE/ARB THEARPY RXD/TAKEN: ICD-10-PCS | Mod: HCNC,CPTII,S$GLB, | Performed by: PHYSICIAN ASSISTANT

## 2021-10-27 PROCEDURE — 1170F FXNL STATUS ASSESSED: CPT | Mod: HCNC,CPTII,S$GLB, | Performed by: PHYSICIAN ASSISTANT

## 2021-10-27 PROCEDURE — 3008F BODY MASS INDEX DOCD: CPT | Mod: HCNC,CPTII,S$GLB, | Performed by: PHYSICIAN ASSISTANT

## 2021-10-27 PROCEDURE — 3078F PR MOST RECENT DIASTOLIC BLOOD PRESSURE < 80 MM HG: ICD-10-PCS | Mod: HCNC,CPTII,S$GLB, | Performed by: PHYSICIAN ASSISTANT

## 2021-10-27 PROCEDURE — 1159F MED LIST DOCD IN RCRD: CPT | Mod: HCNC,CPTII,S$GLB, | Performed by: PHYSICIAN ASSISTANT

## 2021-10-27 PROCEDURE — 3074F SYST BP LT 130 MM HG: CPT | Mod: HCNC,CPTII,S$GLB, | Performed by: PHYSICIAN ASSISTANT

## 2021-10-27 PROCEDURE — 4010F ACE/ARB THERAPY RXD/TAKEN: CPT | Mod: HCNC,CPTII,S$GLB, | Performed by: PHYSICIAN ASSISTANT

## 2021-10-29 PROCEDURE — 99454 REM MNTR PHYSIOL PARAM 16-30: CPT | Mod: S$GLB,,, | Performed by: INTERNAL MEDICINE

## 2021-10-29 PROCEDURE — 99454 PR REMOTE MNTR, PHYS PARAM, INITIAL, EA 30 DAYS: ICD-10-PCS | Mod: S$GLB,,, | Performed by: INTERNAL MEDICINE

## 2021-11-01 ENCOUNTER — LAB VISIT (OUTPATIENT)
Dept: LAB | Facility: HOSPITAL | Age: 71
End: 2021-11-01
Attending: FAMILY MEDICINE
Payer: MEDICARE

## 2021-11-01 ENCOUNTER — OFFICE VISIT (OUTPATIENT)
Dept: FAMILY MEDICINE | Facility: CLINIC | Age: 71
End: 2021-11-01
Payer: MEDICARE

## 2021-11-01 VITALS
HEIGHT: 64 IN | SYSTOLIC BLOOD PRESSURE: 150 MMHG | BODY MASS INDEX: 31.32 KG/M2 | TEMPERATURE: 98 F | HEART RATE: 86 BPM | WEIGHT: 183.44 LBS | OXYGEN SATURATION: 99 % | DIASTOLIC BLOOD PRESSURE: 70 MMHG

## 2021-11-01 DIAGNOSIS — E78.2 COMBINED HYPERLIPIDEMIA ASSOCIATED WITH TYPE 2 DIABETES MELLITUS: ICD-10-CM

## 2021-11-01 DIAGNOSIS — E66.9 OBESITY (BMI 30.0-34.9): ICD-10-CM

## 2021-11-01 DIAGNOSIS — Z78.9 STATIN INTOLERANCE: ICD-10-CM

## 2021-11-01 DIAGNOSIS — N18.31 TYPE 2 DIABETES MELLITUS WITH STAGE 3A CHRONIC KIDNEY DISEASE, WITHOUT LONG-TERM CURRENT USE OF INSULIN: ICD-10-CM

## 2021-11-01 DIAGNOSIS — E11.69 COMBINED HYPERLIPIDEMIA ASSOCIATED WITH TYPE 2 DIABETES MELLITUS: ICD-10-CM

## 2021-11-01 DIAGNOSIS — E11.59 HYPERTENSION ASSOCIATED WITH DIABETES: ICD-10-CM

## 2021-11-01 DIAGNOSIS — E04.2 MULTIPLE THYROID NODULES: ICD-10-CM

## 2021-11-01 DIAGNOSIS — I15.2 HYPERTENSION ASSOCIATED WITH DIABETES: ICD-10-CM

## 2021-11-01 DIAGNOSIS — I15.2 HYPERTENSION ASSOCIATED WITH DIABETES: Primary | ICD-10-CM

## 2021-11-01 DIAGNOSIS — E11.22 TYPE 2 DIABETES MELLITUS WITH STAGE 3A CHRONIC KIDNEY DISEASE, WITHOUT LONG-TERM CURRENT USE OF INSULIN: ICD-10-CM

## 2021-11-01 DIAGNOSIS — N18.31 STAGE 3A CHRONIC KIDNEY DISEASE: ICD-10-CM

## 2021-11-01 DIAGNOSIS — F41.8 SITUATIONAL ANXIETY: ICD-10-CM

## 2021-11-01 DIAGNOSIS — E11.59 HYPERTENSION ASSOCIATED WITH DIABETES: Primary | ICD-10-CM

## 2021-11-01 LAB
ALBUMIN SERPL BCP-MCNC: 3.8 G/DL (ref 3.5–5.2)
ALBUMIN/CREAT UR: 11.9 UG/MG (ref 0–30)
ALP SERPL-CCNC: 99 U/L (ref 55–135)
ALT SERPL W/O P-5'-P-CCNC: 21 U/L (ref 10–44)
ANION GAP SERPL CALC-SCNC: 10 MMOL/L (ref 8–16)
AST SERPL-CCNC: 19 U/L (ref 10–40)
BILIRUB SERPL-MCNC: 0.3 MG/DL (ref 0.1–1)
BUN SERPL-MCNC: 12 MG/DL (ref 8–23)
CALCIUM SERPL-MCNC: 9.9 MG/DL (ref 8.7–10.5)
CHLORIDE SERPL-SCNC: 103 MMOL/L (ref 95–110)
CO2 SERPL-SCNC: 28 MMOL/L (ref 23–29)
CREAT SERPL-MCNC: 1.2 MG/DL (ref 0.5–1.4)
CREAT UR-MCNC: 243 MG/DL (ref 15–325)
EST. GFR  (AFRICAN AMERICAN): 52.2 ML/MIN/1.73 M^2
EST. GFR  (NON AFRICAN AMERICAN): 45.3 ML/MIN/1.73 M^2
ESTIMATED AVG GLUCOSE: 137 MG/DL (ref 68–131)
GLUCOSE SERPL-MCNC: 157 MG/DL (ref 70–110)
HBA1C MFR BLD: 6.4 % (ref 4–5.6)
MICROALBUMIN UR DL<=1MG/L-MCNC: 29 UG/ML
POTASSIUM SERPL-SCNC: 3.9 MMOL/L (ref 3.5–5.1)
PROT SERPL-MCNC: 7.7 G/DL (ref 6–8.4)
SODIUM SERPL-SCNC: 141 MMOL/L (ref 136–145)

## 2021-11-01 PROCEDURE — 36415 COLL VENOUS BLD VENIPUNCTURE: CPT | Mod: HCNC,PO | Performed by: FAMILY MEDICINE

## 2021-11-01 PROCEDURE — 99499 RISK ADDL DX/OHS AUDIT: ICD-10-PCS | Mod: S$GLB,,, | Performed by: FAMILY MEDICINE

## 2021-11-01 PROCEDURE — 3077F PR MOST RECENT SYSTOLIC BLOOD PRESSURE >= 140 MM HG: ICD-10-PCS | Mod: HCNC,CPTII,S$GLB, | Performed by: FAMILY MEDICINE

## 2021-11-01 PROCEDURE — 3008F BODY MASS INDEX DOCD: CPT | Mod: HCNC,CPTII,S$GLB, | Performed by: FAMILY MEDICINE

## 2021-11-01 PROCEDURE — 80053 COMPREHEN METABOLIC PANEL: CPT | Mod: HCNC | Performed by: FAMILY MEDICINE

## 2021-11-01 PROCEDURE — 4010F PR ACE/ARB THEARPY RXD/TAKEN: ICD-10-PCS | Mod: HCNC,CPTII,S$GLB, | Performed by: FAMILY MEDICINE

## 2021-11-01 PROCEDURE — 3044F HG A1C LEVEL LT 7.0%: CPT | Mod: HCNC,CPTII,S$GLB, | Performed by: FAMILY MEDICINE

## 2021-11-01 PROCEDURE — 3078F DIAST BP <80 MM HG: CPT | Mod: HCNC,CPTII,S$GLB, | Performed by: FAMILY MEDICINE

## 2021-11-01 PROCEDURE — 3072F PR LOW RISK FOR RETINOPATHY: ICD-10-PCS | Mod: HCNC,CPTII,S$GLB, | Performed by: FAMILY MEDICINE

## 2021-11-01 PROCEDURE — 1159F MED LIST DOCD IN RCRD: CPT | Mod: HCNC,CPTII,S$GLB, | Performed by: FAMILY MEDICINE

## 2021-11-01 PROCEDURE — 3044F PR MOST RECENT HEMOGLOBIN A1C LEVEL <7.0%: ICD-10-PCS | Mod: HCNC,CPTII,S$GLB, | Performed by: FAMILY MEDICINE

## 2021-11-01 PROCEDURE — 1126F AMNT PAIN NOTED NONE PRSNT: CPT | Mod: HCNC,CPTII,S$GLB, | Performed by: FAMILY MEDICINE

## 2021-11-01 PROCEDURE — 3008F PR BODY MASS INDEX (BMI) DOCUMENTED: ICD-10-PCS | Mod: HCNC,CPTII,S$GLB, | Performed by: FAMILY MEDICINE

## 2021-11-01 PROCEDURE — 3072F LOW RISK FOR RETINOPATHY: CPT | Mod: HCNC,CPTII,S$GLB, | Performed by: FAMILY MEDICINE

## 2021-11-01 PROCEDURE — 1159F PR MEDICATION LIST DOCUMENTED IN MEDICAL RECORD: ICD-10-PCS | Mod: HCNC,CPTII,S$GLB, | Performed by: FAMILY MEDICINE

## 2021-11-01 PROCEDURE — 1160F RVW MEDS BY RX/DR IN RCRD: CPT | Mod: HCNC,CPTII,S$GLB, | Performed by: FAMILY MEDICINE

## 2021-11-01 PROCEDURE — 99499 UNLISTED E&M SERVICE: CPT | Mod: S$GLB,,, | Performed by: FAMILY MEDICINE

## 2021-11-01 PROCEDURE — 99214 PR OFFICE/OUTPT VISIT, EST, LEVL IV, 30-39 MIN: ICD-10-PCS | Mod: HCNC,S$GLB,, | Performed by: FAMILY MEDICINE

## 2021-11-01 PROCEDURE — 99999 PR PBB SHADOW E&M-EST. PATIENT-LVL V: ICD-10-PCS | Mod: PBBFAC,HCNC,, | Performed by: FAMILY MEDICINE

## 2021-11-01 PROCEDURE — 82570 ASSAY OF URINE CREATININE: CPT | Mod: HCNC | Performed by: FAMILY MEDICINE

## 2021-11-01 PROCEDURE — 83036 HEMOGLOBIN GLYCOSYLATED A1C: CPT | Mod: HCNC | Performed by: FAMILY MEDICINE

## 2021-11-01 PROCEDURE — 3077F SYST BP >= 140 MM HG: CPT | Mod: HCNC,CPTII,S$GLB, | Performed by: FAMILY MEDICINE

## 2021-11-01 PROCEDURE — 1160F PR REVIEW ALL MEDS BY PRESCRIBER/CLIN PHARMACIST DOCUMENTED: ICD-10-PCS | Mod: HCNC,CPTII,S$GLB, | Performed by: FAMILY MEDICINE

## 2021-11-01 PROCEDURE — 3288F PR FALLS RISK ASSESSMENT DOCUMENTED: ICD-10-PCS | Mod: HCNC,CPTII,S$GLB, | Performed by: FAMILY MEDICINE

## 2021-11-01 PROCEDURE — 3288F FALL RISK ASSESSMENT DOCD: CPT | Mod: HCNC,CPTII,S$GLB, | Performed by: FAMILY MEDICINE

## 2021-11-01 PROCEDURE — 99214 OFFICE O/P EST MOD 30 MIN: CPT | Mod: HCNC,S$GLB,, | Performed by: FAMILY MEDICINE

## 2021-11-01 PROCEDURE — 1101F PR PT FALLS ASSESS DOC 0-1 FALLS W/OUT INJ PAST YR: ICD-10-PCS | Mod: HCNC,CPTII,S$GLB, | Performed by: FAMILY MEDICINE

## 2021-11-01 PROCEDURE — 1101F PT FALLS ASSESS-DOCD LE1/YR: CPT | Mod: HCNC,CPTII,S$GLB, | Performed by: FAMILY MEDICINE

## 2021-11-01 PROCEDURE — 3078F PR MOST RECENT DIASTOLIC BLOOD PRESSURE < 80 MM HG: ICD-10-PCS | Mod: HCNC,CPTII,S$GLB, | Performed by: FAMILY MEDICINE

## 2021-11-01 PROCEDURE — 4010F ACE/ARB THERAPY RXD/TAKEN: CPT | Mod: HCNC,CPTII,S$GLB, | Performed by: FAMILY MEDICINE

## 2021-11-01 PROCEDURE — 99999 PR PBB SHADOW E&M-EST. PATIENT-LVL V: CPT | Mod: PBBFAC,HCNC,, | Performed by: FAMILY MEDICINE

## 2021-11-01 PROCEDURE — 1126F PR PAIN SEVERITY QUANTIFIED, NO PAIN PRESENT: ICD-10-PCS | Mod: HCNC,CPTII,S$GLB, | Performed by: FAMILY MEDICINE

## 2021-11-01 RX ORDER — ALPRAZOLAM 0.5 MG/1
0.5 TABLET ORAL DAILY PRN
Qty: 30 TABLET | Refills: 0 | Status: SHIPPED | OUTPATIENT
Start: 2021-11-01 | End: 2023-06-02

## 2021-11-03 ENCOUNTER — PATIENT MESSAGE (OUTPATIENT)
Dept: PHARMACY | Facility: CLINIC | Age: 71
End: 2021-11-03
Payer: MEDICARE

## 2021-11-03 DIAGNOSIS — Z78.9 STATIN INTOLERANCE: ICD-10-CM

## 2021-11-03 DIAGNOSIS — E78.2 COMBINED HYPERLIPIDEMIA ASSOCIATED WITH TYPE 2 DIABETES MELLITUS: ICD-10-CM

## 2021-11-03 DIAGNOSIS — E11.69 COMBINED HYPERLIPIDEMIA ASSOCIATED WITH TYPE 2 DIABETES MELLITUS: ICD-10-CM

## 2021-11-03 RX ORDER — EVOLOCUMAB 140 MG/ML
140 INJECTION, SOLUTION SUBCUTANEOUS
Qty: 2 ML | Refills: 5 | Status: SHIPPED | OUTPATIENT
Start: 2021-11-03 | End: 2022-05-23 | Stop reason: SDUPTHER

## 2021-11-09 ENCOUNTER — SPECIALTY PHARMACY (OUTPATIENT)
Dept: PHARMACY | Facility: CLINIC | Age: 71
End: 2021-11-09
Payer: MEDICARE

## 2021-11-11 ENCOUNTER — PATIENT OUTREACH (OUTPATIENT)
Dept: ADMINISTRATIVE | Facility: OTHER | Age: 71
End: 2021-11-11
Payer: MEDICARE

## 2021-11-12 ENCOUNTER — OFFICE VISIT (OUTPATIENT)
Dept: NEPHROLOGY | Facility: CLINIC | Age: 71
End: 2021-11-12
Payer: MEDICARE

## 2021-11-12 VITALS
HEART RATE: 88 BPM | BODY MASS INDEX: 31.24 KG/M2 | SYSTOLIC BLOOD PRESSURE: 164 MMHG | WEIGHT: 183 LBS | RESPIRATION RATE: 20 BRPM | HEIGHT: 64 IN | DIASTOLIC BLOOD PRESSURE: 82 MMHG

## 2021-11-12 DIAGNOSIS — N18.31 STAGE 3A CHRONIC KIDNEY DISEASE: ICD-10-CM

## 2021-11-12 DIAGNOSIS — I15.2 HYPERTENSION DUE TO ENDOCRINE DISORDER: Primary | ICD-10-CM

## 2021-11-12 PROCEDURE — 3072F LOW RISK FOR RETINOPATHY: CPT | Mod: HCNC,CPTII,S$GLB, | Performed by: INTERNAL MEDICINE

## 2021-11-12 PROCEDURE — 3077F SYST BP >= 140 MM HG: CPT | Mod: HCNC,CPTII,S$GLB, | Performed by: INTERNAL MEDICINE

## 2021-11-12 PROCEDURE — 3072F PR LOW RISK FOR RETINOPATHY: ICD-10-PCS | Mod: HCNC,CPTII,S$GLB, | Performed by: INTERNAL MEDICINE

## 2021-11-12 PROCEDURE — 4010F ACE/ARB THERAPY RXD/TAKEN: CPT | Mod: HCNC,CPTII,S$GLB, | Performed by: INTERNAL MEDICINE

## 2021-11-12 PROCEDURE — 3008F PR BODY MASS INDEX (BMI) DOCUMENTED: ICD-10-PCS | Mod: HCNC,CPTII,S$GLB, | Performed by: INTERNAL MEDICINE

## 2021-11-12 PROCEDURE — 3066F PR DOCUMENTATION OF TREATMENT FOR NEPHROPATHY: ICD-10-PCS | Mod: HCNC,CPTII,S$GLB, | Performed by: INTERNAL MEDICINE

## 2021-11-12 PROCEDURE — 3008F BODY MASS INDEX DOCD: CPT | Mod: HCNC,CPTII,S$GLB, | Performed by: INTERNAL MEDICINE

## 2021-11-12 PROCEDURE — 1126F PR PAIN SEVERITY QUANTIFIED, NO PAIN PRESENT: ICD-10-PCS | Mod: HCNC,CPTII,S$GLB, | Performed by: INTERNAL MEDICINE

## 2021-11-12 PROCEDURE — 3079F DIAST BP 80-89 MM HG: CPT | Mod: HCNC,CPTII,S$GLB, | Performed by: INTERNAL MEDICINE

## 2021-11-12 PROCEDURE — 3077F PR MOST RECENT SYSTOLIC BLOOD PRESSURE >= 140 MM HG: ICD-10-PCS | Mod: HCNC,CPTII,S$GLB, | Performed by: INTERNAL MEDICINE

## 2021-11-12 PROCEDURE — 3288F PR FALLS RISK ASSESSMENT DOCUMENTED: ICD-10-PCS | Mod: HCNC,CPTII,S$GLB, | Performed by: INTERNAL MEDICINE

## 2021-11-12 PROCEDURE — 3061F PR NEG MICROALBUMINURIA RESULT DOCUMENTED/REVIEW: ICD-10-PCS | Mod: HCNC,CPTII,S$GLB, | Performed by: INTERNAL MEDICINE

## 2021-11-12 PROCEDURE — 1126F AMNT PAIN NOTED NONE PRSNT: CPT | Mod: HCNC,CPTII,S$GLB, | Performed by: INTERNAL MEDICINE

## 2021-11-12 PROCEDURE — 99999 PR PBB SHADOW E&M-EST. PATIENT-LVL IV: CPT | Mod: PBBFAC,HCNC,, | Performed by: INTERNAL MEDICINE

## 2021-11-12 PROCEDURE — 1159F PR MEDICATION LIST DOCUMENTED IN MEDICAL RECORD: ICD-10-PCS | Mod: HCNC,CPTII,S$GLB, | Performed by: INTERNAL MEDICINE

## 2021-11-12 PROCEDURE — 1101F PT FALLS ASSESS-DOCD LE1/YR: CPT | Mod: HCNC,CPTII,S$GLB, | Performed by: INTERNAL MEDICINE

## 2021-11-12 PROCEDURE — 1101F PR PT FALLS ASSESS DOC 0-1 FALLS W/OUT INJ PAST YR: ICD-10-PCS | Mod: HCNC,CPTII,S$GLB, | Performed by: INTERNAL MEDICINE

## 2021-11-12 PROCEDURE — 99215 OFFICE O/P EST HI 40 MIN: CPT | Mod: HCNC,S$GLB,, | Performed by: INTERNAL MEDICINE

## 2021-11-12 PROCEDURE — 99999 PR PBB SHADOW E&M-EST. PATIENT-LVL IV: ICD-10-PCS | Mod: PBBFAC,HCNC,, | Performed by: INTERNAL MEDICINE

## 2021-11-12 PROCEDURE — 3079F PR MOST RECENT DIASTOLIC BLOOD PRESSURE 80-89 MM HG: ICD-10-PCS | Mod: HCNC,CPTII,S$GLB, | Performed by: INTERNAL MEDICINE

## 2021-11-12 PROCEDURE — 4010F PR ACE/ARB THEARPY RXD/TAKEN: ICD-10-PCS | Mod: HCNC,CPTII,S$GLB, | Performed by: INTERNAL MEDICINE

## 2021-11-12 PROCEDURE — 3066F NEPHROPATHY DOC TX: CPT | Mod: HCNC,CPTII,S$GLB, | Performed by: INTERNAL MEDICINE

## 2021-11-12 PROCEDURE — 1159F MED LIST DOCD IN RCRD: CPT | Mod: HCNC,CPTII,S$GLB, | Performed by: INTERNAL MEDICINE

## 2021-11-12 PROCEDURE — 3288F FALL RISK ASSESSMENT DOCD: CPT | Mod: HCNC,CPTII,S$GLB, | Performed by: INTERNAL MEDICINE

## 2021-11-12 PROCEDURE — 3044F PR MOST RECENT HEMOGLOBIN A1C LEVEL <7.0%: ICD-10-PCS | Mod: HCNC,CPTII,S$GLB, | Performed by: INTERNAL MEDICINE

## 2021-11-12 PROCEDURE — 3044F HG A1C LEVEL LT 7.0%: CPT | Mod: HCNC,CPTII,S$GLB, | Performed by: INTERNAL MEDICINE

## 2021-11-12 PROCEDURE — 99215 PR OFFICE/OUTPT VISIT, EST, LEVL V, 40-54 MIN: ICD-10-PCS | Mod: HCNC,S$GLB,, | Performed by: INTERNAL MEDICINE

## 2021-11-12 PROCEDURE — 3061F NEG MICROALBUMINURIA REV: CPT | Mod: HCNC,CPTII,S$GLB, | Performed by: INTERNAL MEDICINE

## 2021-11-12 RX ORDER — BENAZEPRIL HYDROCHLORIDE 40 MG/1
40 TABLET ORAL DAILY
Qty: 90 TABLET | Refills: 3 | Status: SHIPPED | OUTPATIENT
Start: 2021-11-12 | End: 2022-09-13

## 2021-11-12 RX ORDER — AMLODIPINE BESYLATE 10 MG/1
10 TABLET ORAL DAILY
Qty: 30 TABLET | Refills: 11 | Status: SHIPPED | OUTPATIENT
Start: 2021-11-12 | End: 2022-09-13

## 2021-11-18 ENCOUNTER — PATIENT MESSAGE (OUTPATIENT)
Dept: OTHER | Facility: OTHER | Age: 71
End: 2021-11-18
Payer: MEDICARE

## 2021-11-30 ENCOUNTER — OFFICE VISIT (OUTPATIENT)
Dept: OPHTHALMOLOGY | Facility: CLINIC | Age: 71
End: 2021-11-30
Payer: MEDICARE

## 2021-11-30 DIAGNOSIS — H25.013 CORTICAL AGE-RELATED CATARACT OF BOTH EYES: ICD-10-CM

## 2021-11-30 DIAGNOSIS — H52.13 MYOPIA WITH ASTIGMATISM AND PRESBYOPIA, BILATERAL: ICD-10-CM

## 2021-11-30 DIAGNOSIS — E11.36 DIABETIC CATARACT OF BOTH EYES: ICD-10-CM

## 2021-11-30 DIAGNOSIS — H40.013 OPEN ANGLE WITH BORDERLINE FINDINGS OF BOTH EYES: ICD-10-CM

## 2021-11-30 DIAGNOSIS — H52.4 MYOPIA WITH ASTIGMATISM AND PRESBYOPIA, BILATERAL: ICD-10-CM

## 2021-11-30 DIAGNOSIS — E11.9 TYPE 2 DIABETES MELLITUS WITHOUT RETINOPATHY: Primary | ICD-10-CM

## 2021-11-30 DIAGNOSIS — H52.203 MYOPIA WITH ASTIGMATISM AND PRESBYOPIA, BILATERAL: ICD-10-CM

## 2021-11-30 DIAGNOSIS — H25.13 NUCLEAR SCLEROSIS, BILATERAL: ICD-10-CM

## 2021-11-30 DIAGNOSIS — E11.22 TYPE 2 DIABETES MELLITUS WITH STAGE 3A CHRONIC KIDNEY DISEASE, WITHOUT LONG-TERM CURRENT USE OF INSULIN: ICD-10-CM

## 2021-11-30 DIAGNOSIS — N18.31 TYPE 2 DIABETES MELLITUS WITH STAGE 3A CHRONIC KIDNEY DISEASE, WITHOUT LONG-TERM CURRENT USE OF INSULIN: ICD-10-CM

## 2021-11-30 PROCEDURE — 2023F DILAT RTA XM W/O RTNOPTHY: CPT | Mod: HCNC,CPTII,S$GLB, | Performed by: OPTOMETRIST

## 2021-11-30 PROCEDURE — 92015 PR REFRACTION: ICD-10-PCS | Mod: HCNC,S$GLB,, | Performed by: OPTOMETRIST

## 2021-11-30 PROCEDURE — 3061F NEG MICROALBUMINURIA REV: CPT | Mod: HCNC,CPTII,S$GLB, | Performed by: OPTOMETRIST

## 2021-11-30 PROCEDURE — 99499 UNLISTED E&M SERVICE: CPT | Mod: S$GLB,,, | Performed by: OPTOMETRIST

## 2021-11-30 PROCEDURE — 92014 PR EYE EXAM, EST PATIENT,COMPREHESV: ICD-10-PCS | Mod: HCNC,S$GLB,, | Performed by: OPTOMETRIST

## 2021-11-30 PROCEDURE — 4010F PR ACE/ARB THEARPY RXD/TAKEN: ICD-10-PCS | Mod: HCNC,CPTII,S$GLB, | Performed by: OPTOMETRIST

## 2021-11-30 PROCEDURE — 99999 PR PBB SHADOW E&M-EST. PATIENT-LVL III: CPT | Mod: PBBFAC,HCNC,, | Performed by: OPTOMETRIST

## 2021-11-30 PROCEDURE — 92014 COMPRE OPH EXAM EST PT 1/>: CPT | Mod: HCNC,S$GLB,, | Performed by: OPTOMETRIST

## 2021-11-30 PROCEDURE — 2023F PR DILATED RETINAL EXAM W/O EVID OF RETINOPATHY: ICD-10-PCS | Mod: HCNC,CPTII,S$GLB, | Performed by: OPTOMETRIST

## 2021-11-30 PROCEDURE — 4010F ACE/ARB THERAPY RXD/TAKEN: CPT | Mod: HCNC,CPTII,S$GLB, | Performed by: OPTOMETRIST

## 2021-11-30 PROCEDURE — 92133 POSTERIOR SEGMENT OCT OPTIC NERVE(OCULAR COHERENCE TOMOGRAPHY) - OU - BOTH EYES: ICD-10-PCS | Mod: HCNC,S$GLB,, | Performed by: OPTOMETRIST

## 2021-11-30 PROCEDURE — 3066F PR DOCUMENTATION OF TREATMENT FOR NEPHROPATHY: ICD-10-PCS | Mod: HCNC,CPTII,S$GLB, | Performed by: OPTOMETRIST

## 2021-11-30 PROCEDURE — 3061F PR NEG MICROALBUMINURIA RESULT DOCUMENTED/REVIEW: ICD-10-PCS | Mod: HCNC,CPTII,S$GLB, | Performed by: OPTOMETRIST

## 2021-11-30 PROCEDURE — 92015 DETERMINE REFRACTIVE STATE: CPT | Mod: HCNC,S$GLB,, | Performed by: OPTOMETRIST

## 2021-11-30 PROCEDURE — 92133 CPTRZD OPH DX IMG PST SGM ON: CPT | Mod: HCNC,S$GLB,, | Performed by: OPTOMETRIST

## 2021-11-30 PROCEDURE — 99999 PR PBB SHADOW E&M-EST. PATIENT-LVL III: ICD-10-PCS | Mod: PBBFAC,HCNC,, | Performed by: OPTOMETRIST

## 2021-11-30 PROCEDURE — 3066F NEPHROPATHY DOC TX: CPT | Mod: HCNC,CPTII,S$GLB, | Performed by: OPTOMETRIST

## 2021-11-30 PROCEDURE — 99499 RISK ADDL DX/OHS AUDIT: ICD-10-PCS | Mod: S$GLB,,, | Performed by: OPTOMETRIST

## 2021-12-09 ENCOUNTER — SPECIALTY PHARMACY (OUTPATIENT)
Dept: PHARMACY | Facility: CLINIC | Age: 71
End: 2021-12-09
Payer: MEDICARE

## 2021-12-20 ENCOUNTER — PATIENT MESSAGE (OUTPATIENT)
Dept: ADMINISTRATIVE | Facility: OTHER | Age: 71
End: 2021-12-20
Payer: MEDICARE

## 2021-12-22 ENCOUNTER — PATIENT MESSAGE (OUTPATIENT)
Dept: ADMINISTRATIVE | Facility: OTHER | Age: 71
End: 2021-12-22
Payer: MEDICARE

## 2021-12-22 ENCOUNTER — LAB VISIT (OUTPATIENT)
Dept: LAB | Facility: HOSPITAL | Age: 71
End: 2021-12-22
Payer: MEDICARE

## 2021-12-22 DIAGNOSIS — I15.2 HYPERTENSION DUE TO ENDOCRINE DISORDER: ICD-10-CM

## 2021-12-22 LAB
POTASSIUM 24H UR-SRATE: 1.8 MMOL/HR (ref 1–5)
POTASSIUM UR-SCNC: 28 MMOL/L (ref 15–95)
POTASSIUM URINE (MMOL/SPEC): 42.7 MMOL/SPEC
URINE COLLECTION DURATION: 24 HR
URINE VOLUME: 1525 ML

## 2021-12-22 PROCEDURE — 82384 ASSAY THREE CATECHOLAMINES: CPT | Mod: HCNC | Performed by: INTERNAL MEDICINE

## 2021-12-22 PROCEDURE — 84133 ASSAY OF URINE POTASSIUM: CPT | Mod: HCNC | Performed by: INTERNAL MEDICINE

## 2021-12-22 PROCEDURE — 82530 CORTISOL FREE: CPT | Mod: HCNC | Performed by: INTERNAL MEDICINE

## 2021-12-22 PROCEDURE — 82088 ASSAY OF ALDOSTERONE: CPT | Mod: HCNC | Performed by: INTERNAL MEDICINE

## 2021-12-26 ENCOUNTER — PATIENT MESSAGE (OUTPATIENT)
Dept: ADMINISTRATIVE | Facility: OTHER | Age: 71
End: 2021-12-26
Payer: MEDICARE

## 2021-12-27 LAB
COLLECT DURATION TIME UR: NORMAL H
DOPAMINE 24H UR-MRATE: NORMAL
EPINEPH 24H UR-MRATE: NORMAL
NOREPINEPH 24H UR-MRATE: NORMAL
SPECIMEN VOL 24H UR: NORMAL L

## 2021-12-28 LAB
COLLECT DURATION TIME UR: 24 H
CORTIS 24H UR-MRATE: 14 MCG/24 H (ref 3.5–45)
SPECIMEN VOL ?TM UR: 1525 ML

## 2021-12-29 LAB
ALDOST 24H UR-MRATE: 34.3 UG/D (ref 1.2–28.1)
COLLECT DURATION TIME SPEC: 24 HR
CREAT 24H UR-MRATE: 762 MG/D (ref 500–1400)
CREAT UR-MCNC: 50 MG/DL
SPECIMEN VOL ?TM UR: 1525 ML

## 2022-01-05 DIAGNOSIS — N18.30 STAGE 3 CHRONIC KIDNEY DISEASE, UNSPECIFIED WHETHER STAGE 3A OR 3B CKD: Primary | ICD-10-CM

## 2022-01-18 ENCOUNTER — LAB VISIT (OUTPATIENT)
Dept: LAB | Facility: HOSPITAL | Age: 72
End: 2022-01-18
Attending: INTERNAL MEDICINE
Payer: MEDICARE

## 2022-01-18 DIAGNOSIS — I15.2 HYPERTENSION DUE TO ENDOCRINE DISORDER: ICD-10-CM

## 2022-01-18 DIAGNOSIS — N18.30 STAGE 3 CHRONIC KIDNEY DISEASE, UNSPECIFIED WHETHER STAGE 3A OR 3B CKD: ICD-10-CM

## 2022-01-18 LAB
ALBUMIN SERPL BCP-MCNC: 3.9 G/DL (ref 3.5–5.2)
ALP SERPL-CCNC: 118 U/L (ref 55–135)
ALT SERPL W/O P-5'-P-CCNC: 19 U/L (ref 10–44)
ANION GAP SERPL CALC-SCNC: 9 MMOL/L (ref 8–16)
AST SERPL-CCNC: 20 U/L (ref 10–40)
BILIRUB SERPL-MCNC: 0.3 MG/DL (ref 0.1–1)
BUN SERPL-MCNC: 17 MG/DL (ref 8–23)
CALCIUM SERPL-MCNC: 9.3 MG/DL (ref 8.7–10.5)
CHLORIDE SERPL-SCNC: 105 MMOL/L (ref 95–110)
CO2 SERPL-SCNC: 26 MMOL/L (ref 23–29)
CREAT SERPL-MCNC: 1 MG/DL (ref 0.5–1.4)
EST. GFR  (AFRICAN AMERICAN): >60 ML/MIN/1.73 M^2
EST. GFR  (NON AFRICAN AMERICAN): 56 ML/MIN/1.73 M^2
GLUCOSE SERPL-MCNC: 110 MG/DL (ref 70–110)
POTASSIUM SERPL-SCNC: 4.2 MMOL/L (ref 3.5–5.1)
PROT SERPL-MCNC: 8 G/DL (ref 6–8.4)
SODIUM SERPL-SCNC: 140 MMOL/L (ref 136–145)

## 2022-01-18 PROCEDURE — 85025 COMPLETE CBC W/AUTO DIFF WBC: CPT | Mod: HCNC | Performed by: INTERNAL MEDICINE

## 2022-01-18 PROCEDURE — 84244 ASSAY OF RENIN: CPT | Mod: HCNC | Performed by: INTERNAL MEDICINE

## 2022-01-18 PROCEDURE — 83835 ASSAY OF METANEPHRINES: CPT | Mod: HCNC | Performed by: INTERNAL MEDICINE

## 2022-01-18 PROCEDURE — 80053 COMPREHEN METABOLIC PANEL: CPT | Mod: HCNC | Performed by: INTERNAL MEDICINE

## 2022-01-18 PROCEDURE — 36415 COLL VENOUS BLD VENIPUNCTURE: CPT | Mod: HCNC | Performed by: INTERNAL MEDICINE

## 2022-01-19 LAB
BASOPHILS # BLD AUTO: 0.06 K/UL (ref 0–0.2)
BASOPHILS NFR BLD: 0.5 % (ref 0–1.9)
DIFFERENTIAL METHOD: ABNORMAL
EOSINOPHIL # BLD AUTO: 0.2 K/UL (ref 0–0.5)
EOSINOPHIL NFR BLD: 2.1 % (ref 0–8)
ERYTHROCYTE [DISTWIDTH] IN BLOOD BY AUTOMATED COUNT: 13.3 % (ref 11.5–14.5)
HCT VFR BLD AUTO: 40.3 % (ref 37–48.5)
HGB BLD-MCNC: 13 G/DL (ref 12–16)
IMM GRANULOCYTES # BLD AUTO: 0.03 K/UL (ref 0–0.04)
IMM GRANULOCYTES NFR BLD AUTO: 0.3 % (ref 0–0.5)
LYMPHOCYTES # BLD AUTO: 4.2 K/UL (ref 1–4.8)
LYMPHOCYTES NFR BLD: 37.1 % (ref 18–48)
MCH RBC QN AUTO: 31.1 PG (ref 27–31)
MCHC RBC AUTO-ENTMCNC: 32.3 G/DL (ref 32–36)
MCV RBC AUTO: 96 FL (ref 82–98)
MONOCYTES # BLD AUTO: 0.9 K/UL (ref 0.3–1)
MONOCYTES NFR BLD: 8.2 % (ref 4–15)
NEUTROPHILS # BLD AUTO: 5.8 K/UL (ref 1.8–7.7)
NEUTROPHILS NFR BLD: 51.8 % (ref 38–73)
NRBC BLD-RTO: 0 /100 WBC
PLATELET # BLD AUTO: 284 K/UL (ref 150–450)
PMV BLD AUTO: 10.8 FL (ref 9.2–12.9)
RBC # BLD AUTO: 4.18 M/UL (ref 4–5.4)
WBC # BLD AUTO: 11.28 K/UL (ref 3.9–12.7)

## 2022-01-21 ENCOUNTER — TELEPHONE (OUTPATIENT)
Dept: NEPHROLOGY | Facility: CLINIC | Age: 72
End: 2022-01-21
Payer: MEDICARE

## 2022-01-21 ENCOUNTER — PATIENT OUTREACH (OUTPATIENT)
Dept: ADMINISTRATIVE | Facility: OTHER | Age: 72
End: 2022-01-21
Payer: MEDICARE

## 2022-01-21 ENCOUNTER — OFFICE VISIT (OUTPATIENT)
Dept: NEPHROLOGY | Facility: CLINIC | Age: 72
End: 2022-01-21
Payer: MEDICARE

## 2022-01-21 VITALS
RESPIRATION RATE: 18 BRPM | BODY MASS INDEX: 31.35 KG/M2 | HEART RATE: 80 BPM | HEIGHT: 64 IN | WEIGHT: 183.63 LBS | DIASTOLIC BLOOD PRESSURE: 76 MMHG | SYSTOLIC BLOOD PRESSURE: 124 MMHG

## 2022-01-21 DIAGNOSIS — E26.9 HYPERALDOSTERONISM: Primary | ICD-10-CM

## 2022-01-21 DIAGNOSIS — I15.9 SECONDARY HYPERTENSION, UNSPECIFIED: ICD-10-CM

## 2022-01-21 PROCEDURE — 99215 OFFICE O/P EST HI 40 MIN: CPT | Mod: HCNC,S$GLB,, | Performed by: INTERNAL MEDICINE

## 2022-01-21 PROCEDURE — 99499 RISK ADDL DX/OHS AUDIT: ICD-10-PCS | Mod: S$GLB,,, | Performed by: INTERNAL MEDICINE

## 2022-01-21 PROCEDURE — 99215 PR OFFICE/OUTPT VISIT, EST, LEVL V, 40-54 MIN: ICD-10-PCS | Mod: HCNC,S$GLB,, | Performed by: INTERNAL MEDICINE

## 2022-01-21 PROCEDURE — 3074F PR MOST RECENT SYSTOLIC BLOOD PRESSURE < 130 MM HG: ICD-10-PCS | Mod: HCNC,CPTII,S$GLB, | Performed by: INTERNAL MEDICINE

## 2022-01-21 PROCEDURE — 3008F BODY MASS INDEX DOCD: CPT | Mod: HCNC,CPTII,S$GLB, | Performed by: INTERNAL MEDICINE

## 2022-01-21 PROCEDURE — 1126F PR PAIN SEVERITY QUANTIFIED, NO PAIN PRESENT: ICD-10-PCS | Mod: HCNC,CPTII,S$GLB, | Performed by: INTERNAL MEDICINE

## 2022-01-21 PROCEDURE — 1101F PR PT FALLS ASSESS DOC 0-1 FALLS W/OUT INJ PAST YR: ICD-10-PCS | Mod: HCNC,CPTII,S$GLB, | Performed by: INTERNAL MEDICINE

## 2022-01-21 PROCEDURE — 3078F PR MOST RECENT DIASTOLIC BLOOD PRESSURE < 80 MM HG: ICD-10-PCS | Mod: HCNC,CPTII,S$GLB, | Performed by: INTERNAL MEDICINE

## 2022-01-21 PROCEDURE — 3074F SYST BP LT 130 MM HG: CPT | Mod: HCNC,CPTII,S$GLB, | Performed by: INTERNAL MEDICINE

## 2022-01-21 PROCEDURE — 1159F MED LIST DOCD IN RCRD: CPT | Mod: HCNC,CPTII,S$GLB, | Performed by: INTERNAL MEDICINE

## 2022-01-21 PROCEDURE — 1101F PT FALLS ASSESS-DOCD LE1/YR: CPT | Mod: HCNC,CPTII,S$GLB, | Performed by: INTERNAL MEDICINE

## 2022-01-21 PROCEDURE — 99999 PR PBB SHADOW E&M-EST. PATIENT-LVL IV: ICD-10-PCS | Mod: PBBFAC,HCNC,, | Performed by: INTERNAL MEDICINE

## 2022-01-21 PROCEDURE — 3072F LOW RISK FOR RETINOPATHY: CPT | Mod: HCNC,CPTII,S$GLB, | Performed by: INTERNAL MEDICINE

## 2022-01-21 PROCEDURE — 3066F NEPHROPATHY DOC TX: CPT | Mod: HCNC,CPTII,S$GLB, | Performed by: INTERNAL MEDICINE

## 2022-01-21 PROCEDURE — 3288F FALL RISK ASSESSMENT DOCD: CPT | Mod: HCNC,CPTII,S$GLB, | Performed by: INTERNAL MEDICINE

## 2022-01-21 PROCEDURE — 3288F PR FALLS RISK ASSESSMENT DOCUMENTED: ICD-10-PCS | Mod: HCNC,CPTII,S$GLB, | Performed by: INTERNAL MEDICINE

## 2022-01-21 PROCEDURE — 3072F PR LOW RISK FOR RETINOPATHY: ICD-10-PCS | Mod: HCNC,CPTII,S$GLB, | Performed by: INTERNAL MEDICINE

## 2022-01-21 PROCEDURE — 3078F DIAST BP <80 MM HG: CPT | Mod: HCNC,CPTII,S$GLB, | Performed by: INTERNAL MEDICINE

## 2022-01-21 PROCEDURE — 99999 PR PBB SHADOW E&M-EST. PATIENT-LVL IV: CPT | Mod: PBBFAC,HCNC,, | Performed by: INTERNAL MEDICINE

## 2022-01-21 PROCEDURE — 1159F PR MEDICATION LIST DOCUMENTED IN MEDICAL RECORD: ICD-10-PCS | Mod: HCNC,CPTII,S$GLB, | Performed by: INTERNAL MEDICINE

## 2022-01-21 PROCEDURE — 99499 UNLISTED E&M SERVICE: CPT | Mod: S$GLB,,, | Performed by: INTERNAL MEDICINE

## 2022-01-21 PROCEDURE — 1126F AMNT PAIN NOTED NONE PRSNT: CPT | Mod: HCNC,CPTII,S$GLB, | Performed by: INTERNAL MEDICINE

## 2022-01-21 PROCEDURE — 3008F PR BODY MASS INDEX (BMI) DOCUMENTED: ICD-10-PCS | Mod: HCNC,CPTII,S$GLB, | Performed by: INTERNAL MEDICINE

## 2022-01-21 PROCEDURE — 3066F PR DOCUMENTATION OF TREATMENT FOR NEPHROPATHY: ICD-10-PCS | Mod: HCNC,CPTII,S$GLB, | Performed by: INTERNAL MEDICINE

## 2022-01-21 NOTE — TELEPHONE ENCOUNTER
----- Message from Echo Harris LPN sent at 1/21/2022  4:09 PM CST -----  Hey would you please schedule her follow up on march 8th in afternoon? I cant open it. thanks

## 2022-01-21 NOTE — PROGRESS NOTES
Renal clinic f/u note:  Date of clinic visit: 1/21/22  Reason for f/u and chief c/o: HTN and hypokalemia     HPI: Pt is a 70 y/o female with HTN and DM who presents for f/u. Pt was noted to have periodic hypokalemia despite being on spironolactone and an ACE-I. Secondary HTN due to an endocrine cause was suspected. Pt presents for f/u after w/u was initiated. Pt has no new c/o's, no new events, no discomfort, no SOB.     PAST MEDICAL HISTORY: HTN, DM-2, carpal tunnel syndrome, Eczema, Elevated CPK, Multinodular thyroid, Obesity, hyperlipidemia, Pneumonia, Postmenopausal, Statin intolerance, Tobacco dependence     PAST SURGICAL HISTORY:  She  has a past surgical history that includes Partial hysterectomy (1991); Colonoscopy; Fine needle aspiration (3/2011); Cyst Removal (2015); and Hysterectomy.     SOCIAL HISTORY:  She  reports that she quit smoking about 7 years ago. Her smoking use included cigarettes. She has a 12.50 pack-year smoking history. She has never used smokeless tobacco. She reports that she drinks alcohol. She reports that she does not use drugs.     FAMILY MEDICAL HISTORY:  Her family history includes Cataracts in her brother, brother, and sister; Dementia in her mother, sister, and sister; Diabetes in her mother; Heart disease in her sister; Hypertension in her father and mother; No Known Problems in her son and son.               Review of patient's allergies indicates:   Allergen Reactions    Statins-hmg-coa reductase inhibitors         Muscle Cramps       Meds reviewed     Current Outpatient Medications:     ACCU-CHEK SMARTVIEW TEST STRIP Strp, 1 STRIP BY MISC.(NON-DRUG COMBO ROUTE) ROUTE ONCE DAILY., Disp: 100 strip, Rfl: 1    ALPRAZolam (XANAX) 0.5 MG tablet, Take 1 tablet (0.5 mg total) by mouth daily as needed for Anxiety., Disp: 30 tablet, Rfl: 0    amLODIPine (NORVASC) 10 MG tablet, Take 1 tablet (10 mg total) by mouth once daily., Disp: 30 tablet, Rfl: 11    aspirin 81 MG Chew, Take 81  mg by mouth once daily., Disp: , Rfl:     benazepriL (LOTENSIN) 40 MG tablet, Take 1 tablet (40 mg total) by mouth once daily., Disp: 90 tablet, Rfl: 3    blood glucose control, normal Soln, 1 Bottle by Misc.(Non-Drug; Combo Route) route once daily. For Accu Check Amber  use twice daily prn dx: E11.9, Disp: 1 each, Rfl: 0    blood-glucose meter (ACCU-CHEK AMBER) Misc, 1 kit by Misc.(Non-Drug; Combo Route) route once. For Accu Check Amber  use twice daily prn dx: E11.9 for 1 dose, Disp: 1 each, Rfl: 0    calcium-vitamin D3 500 mg(1,250mg) -200 unit per tablet, Take 1 tablet by mouth once daily. , Disp: , Rfl:     CRANBERRY EXTRACT ORAL, Take 1 tablet by mouth once daily., Disp: , Rfl:     evolocumab (REPATHA SURECLICK) 140 mg/mL PnIj, Inject 1 mL (140 mg total) into the skin every 14 (fourteen) days., Disp: 2 mL, Rfl: 5    Lactobacillus rhamnosus GG (CULTURELLE) 10 billion cell capsule, Take 1 capsule by mouth once daily., Disp: , Rfl:     lancets Misc, 1 lancet by Misc.(Non-Drug; Combo Route) route 3 (three) times daily. For Acc-Chek Amber   DX :E11.9, Disp: 100 each, Rfl: 0    loratadine (CLARITIN) 10 mg tablet, Take 1 tablet (10 mg total) by mouth once daily., Disp: 90 tablet, Rfl: 1    meclizine (ANTIVERT) 25 mg tablet, Take 1 tablet (25 mg total) by mouth 3 (three) times daily as needed for Dizziness or Nausea., Disp: 60 tablet, Rfl: 1    MULTIVITAMIN W-MINERALS/LUTEIN (CENTRUM SILVER ORAL), Take 1 tablet by mouth once daily., Disp: , Rfl:     spironolactone (ALDACTONE) 50 MG tablet, TAKE 1 TABLET BY MOUTH EVERY DAY, Disp: 90 tablet, Rfl: 1    triamcinolone (NASACORT) 55 mcg nasal inhaler, USE 2 SPRAYS BY NASAL ROUTE ONCE DAILY, Disp: 50.7 g, Rfl: 1        REVIEW OF SYSTEMS:  Patient has no fever, fatigue, visual changes, chest pain, edema, cough, dyspnea, nausea, vomiting, constipation, diarrhea, arthralgias, pruritis, dizziness, weakness, depression, confusion.        PHYSICAL EXAM: Blood pressure  154/76, pulse 88, weight 83.3 Kg  in NAD, ambulatory  Speech and thought process: normal  RRR s1 and s2 audible  CTA B no rales no wheezed  Abd soft, NT  Ext's no edema        Labs reviewed  BMP  Lab Results   Component Value Date     01/18/2022    K 4.2 01/18/2022     01/18/2022    CO2 26 01/18/2022    BUN 17 01/18/2022    CREATININE 1.0 01/18/2022    CALCIUM 9.3 01/18/2022    ANIONGAP 9 01/18/2022    ESTGFRAFRICA >60.0 01/18/2022    EGFRNONAA 56.0 (A) 01/18/2022     Lab Results   Component Value Date    WBC 11.28 01/18/2022    HGB 13.0 01/18/2022    HCT 40.3 01/18/2022    MCV 96 01/18/2022     01/18/2022     24 hour urine reviewed, K 42, aldosterone 34, cortisol normal  Serum metanephrine and early am ziggy/renin pending     Urine prot/cr zero        IMPRESSION AND RECOMMENDATIONS:  70 y/o female with periodic hypokalemia and uncontrolled BP presents for f/u     1. Renal: stable renal function. Cr normal.     HTN: uncontrolled, though not severe  Intermittent hypokalemia, despite being on RAAS inhibitors  Metabolic alkalosis  Elevated 24 hour urine K: c/w renal route being the source for hypokalemia  Elevated 24 hour urine for aldosterone  Urine cortisol was normal    Based on above hyperaldosteronism is suspected     Will order CT abd to r/o adrenal gland adenomas    2. HTN: BP better controlled  Meds reviewed     3. DM-2: noted, reviewed.  No proteinuria noted.  Lack of proteinuria likely suggests that diabetic nephropathy is not present     Plans and recommendations:  As reviewed above  Opportunity for questions provided.  Total time spent 40 minutes including time needed to review the records, the   patient evaluation, documentation, face-to-face discussion with the patient,   more than 50% of the time was spent on coordination of care and counseling.    Level V visit.  RTC 2 months, OK to overbook     Brenden Landaverde MD

## 2022-01-24 LAB
ALDOST SERPL-MCNC: 95 NG/DL
ALDOST/RENIN PLAS-RTO: 12.8 RATIO
RENIN PLAS-CCNC: 7.4 NG/ML/HR

## 2022-01-25 ENCOUNTER — SPECIALTY PHARMACY (OUTPATIENT)
Dept: PHARMACY | Facility: CLINIC | Age: 72
End: 2022-01-25
Payer: MEDICARE

## 2022-01-25 NOTE — TELEPHONE ENCOUNTER
Specialty Pharmacy - Refill Coordination    Specialty Medication Orders Linked to Encounter    Flowsheet Row Most Recent Value   Medication #1 evolocumab (REPATHA SURECLICK) 140 mg/mL PnIj (Order#[id number], Rx#[id number])          Refill Questions - Documented Responses    Flowsheet Row Most Recent Value   Patient Availability and HIPAA Verification    Does patient want to proceed with activity? Yes   HIPAA/medical authority confirmed? Yes   Relationship to patient of person spoken to? Self   Refill Screening Questions    Would patient like to speak to a pharmacist? No   When does the patient need to receive the medication? 02/01/22   Refill Delivery Questions    How will the patient receive the medication? Delivery Jessica   When does the patient need to receive the medication? 02/01/22   Shipping Address Home   Address in [facility] confirmed and updated if neccessary? Yes   Expected Copay ($) 0   Is the patient able to afford the medication copay? Yes   Payment Method zero copay   Days supply of Refill 28   Supplies needed? No supplies needed   Refill activity completed? Yes   Refill activity plan Refill scheduled   Shipment/Pickup Date: 01/27/22          Current Outpatient Medications   Medication Sig    ACCU-CHEK SMARTVIEW TEST STRIP Strp 1 STRIP [place] [facility]NON-DRUG COMBO ROUTE) ROUTE ONCE DAILY.    ALPRAZolam (XANAX) 0.5 MG tablet Take 1 tablet (0.5 mg total) by mouth daily as needed for Anxiety.    amLODIPine (NORVASC) 10 MG tablet Take 1 tablet (10 mg total) by mouth once daily.    aspirin 81 MG Chew Take 81 mg by mouth once daily.    benazepriL (LOTENSIN) 40 MG tablet Take 1 tablet (40 mg total) by mouth once daily.    blood glucose control, normal Soln 1 Bottle by Misc.(Non-Drug; Combo Route) route once daily. For Accu Check Amber  use twice daily prn dx: E11.9    blood-glucose meter (ACCU-CHEK AMBER) Misc 1 kit by Misc.(Non-Drug; Combo Route) route once. For Accu Check Amber  use twice daily prn  dx: E11.9 for 1 dose    calcium-vitamin D3 500 mg(1,250mg) -200 unit per tablet Take 1 tablet by mouth once daily.     CRANBERRY EXTRACT ORAL Take 1 tablet by mouth once daily.    evolocumab (REPATHA SURECLICK) 140 mg/mL PnIj Inject 1 mL (140 mg total) into the skin every 14 (fourteen) days.    Lactobacillus rhamnosus GG (CULTURELLE) 10 billion cell capsule Take 1 capsule by mouth once daily.    lancets Misc 1 lancet by Misc.(Non-Drug; Combo Route) route 3 (three) times daily. For Acc-Chek Irais   DX :E11.9    loratadine (CLARITIN) 10 mg tablet Take 1 tablet (10 mg total) by mouth once daily.    meclizine (ANTIVERT) 25 mg tablet Take 1 tablet (25 mg total) by mouth 3 (three) times daily as needed for Dizziness or Nausea.    MULTIVITAMIN W-MINERALS/LUTEIN (CENTRUM SILVER ORAL) Take 1 tablet by mouth once daily.    spironolactone (ALDACTONE) 50 MG tablet TAKE 1 TABLET BY MOUTH EVERY DAY    triamcinolone (NASACORT) 55 mcg nasal inhaler USE 2 SPRAYS BY NASAL ROUTE ONCE DAILY   Last reviewed on 1/21/2022  3:14 PM by Echo Harris LPN    Review of patient's allergies indicates:   Allergen Reactions    Statins-hmg-coa reductase inhibitors      Muscle Cramps    Last reviewed on  11/30/2021 2:30 PM by Hammad Hardy      Tasks added this encounter   2/22/2022 - Refill Call (Auto Added)   Tasks due within next 3 months   No tasks due.     Trinity Pino  [facility]  [address] [place] [id number]  Phone: [phone]  Fax: [phone]

## 2022-01-27 LAB
METANEPH FREE SERPL-MCNC: <25 PG/ML
METANEPHS SERPL-MCNC: 138 PG/ML
NORMETANEPH FREE SERPL-MCNC: 138 PG/ML

## 2022-02-11 NOTE — PROGRESS NOTES
Digital Medicine: Health  Follow-Up    The history is provided by the patient.             Reviewed BG readings. Patient's A1C goal is less than or equal to 7. Patient's most recent A1C result is at goal. Lab Results     Component                Value               Date                     HGBA1C                   6.4 (H)             11/01/2021          .  Reviewed BP Readings. Patients BP average is 111/64 mmHg, which is at goal. Patient's BP goal is less than 130/80 mmHg.             Topics Covered on Call: physical activity and Diet    Additional Follow-up details: Patient is doing well today. She is pleased with her bp and sugar readings.            Diet-no change to diet    No change to diet.  Patient reports eating or drinking the following: Patient doesn't add salt to her meals  She drinks a cup of coffee with brown sugar  She loves to eat vegetables green beans, cauliflower, carrots  She eats banana every day- she tries to eat a lot of fiber    She drinks 4 16 oz water bottles      Physical Activity-no change to routine  No change to exercise routine.       Additional physical activity details: She keeps track of her steps with a fitbit; 5000 steps daily.            Continue current diet/physical activity routine.       Addressed patient questions and patient has my contact information if needed prior to next outreach.   Explained the importance of self-monitoring and medication adherence. Encouraged the patient to communicate with their health  for lifestyle modifications to help improve or maintain a healthy lifestyle.            There are no preventive care reminders to display for this patient.       Last 5 Patient Entered Readings                Current 30 Day Average: 111/64  Recent Readings 2/10/2022 2/9/2022 2/8/2022 2/7/2022 2/6/2022   SBP (mmHg) 111 102 99 101 109   DBP (mmHg) 66 63 65 58 60   Pulse 77 71 83 72 73             Last 6 Patient Entered Readings                                           Most Recent A1c:      Recent Readings 2/11/2022 2/10/2022 2/9/2022 2/8/2022 2/7/2022    Blood Glucose (mg/dL) 120 111 122 117 112

## 2022-02-16 DIAGNOSIS — I15.2 HYPERTENSION ASSOCIATED WITH DIABETES: ICD-10-CM

## 2022-02-16 DIAGNOSIS — E11.59 HYPERTENSION ASSOCIATED WITH DIABETES: ICD-10-CM

## 2022-02-16 RX ORDER — SPIRONOLACTONE 50 MG/1
50 TABLET, FILM COATED ORAL DAILY
Qty: 90 TABLET | Refills: 2 | Status: SHIPPED | OUTPATIENT
Start: 2022-02-16 | End: 2022-06-28 | Stop reason: SDUPTHER

## 2022-02-16 NOTE — TELEPHONE ENCOUNTER
No new care gaps identified.  Powered by [employer]. Reference number: [id number].   2/16/2022 10:09:39 AM CST

## 2022-02-16 NOTE — TELEPHONE ENCOUNTER
Refill Authorization Note   Kristyn Garza  is requesting a refill authorization.  Brief Assessment and Rationale for Refill:  Approve     Medication Therapy Plan:       Medication Reconciliation Completed: No   Comments:   --->Care Gap information included below if applicable.   Orders Placed This Encounter    spironolactone (ALDACTONE) 50 MG tablet      Requested Prescriptions   Signed Prescriptions Disp Refills    spironolactone (ALDACTONE) 50 MG tablet 90 tablet 2     Sig: Take 1 tablet (50 mg total) by mouth once daily.       Cardiovascular: Diuretics - Aldosterone Antagonist Passed - 2/16/2022 10:09 AM        Passed - Patient is at least 18 years old        Passed - Last BP in normal range within 360 days     BP Readings from Last 1 Encounters:   01/21/22 124/76               Passed - Valid encounter within last 15 months     Recent Visits  Date Type Provider Dept   11/01/21 Office Visit Zoë Guzman MD [facility] Family Medicine   04/19/21 Office Visit Zoë Guzman MD [facility]   10/26/20 Office Visit Zoë Guzman MD [facility]   04/17/20 Office Visit Zoë Guzman MD [facility]   Showing recent visits within past 720 days and meeting all other requirements  Future Appointments  No visits were found meeting these conditions.  Showing future appointments within next 150 days and meeting all other requirements      Future Appointments              In 2 weeks [facility] CT1 LIMIT 500 LBS [facility] [place] - Ct Scan 1st Fl, [place]    In 1 month LABORATORY, [facility] [place] - Lab 1st Fl, [place]    In 1 month Brenden Landaverde MD [facility] Nephrology 4th Fl, [place]    In 1 month [facility] NM2 CAM1 CARDIAC [facility] Nuclear Medicine 3rdfl, [facility]    In 1 month [place], NUCLEAR MEDICINE [facility] Cardiology, [facility]    In 1 month HG NM2 CAM1 CARDIAC [facility] Nuclear Medicine 3rdfl, [place]    In 1 month [facility]    In  1 month Dale Matute MD [facility] - Cardiology, [address]    In 2 months Zoë Guzman MD [facility] - Family Medicine, [address]                Passed - Cr is 1.4 or below and within 360 days     Lab Results   Component Value Date    CREATININE 1.0 01/18/2022    CREATININE 1.2 11/01/2021    CREATININE 1.0 04/05/2021              Passed - K in normal range and within 360 days     Potassium   Date Value Ref Range Status   01/18/2022 4.2 3.5 - 5.1 mmol/L Final   11/01/2021 3.9 3.5 - 5.1 mmol/L Final   04/05/2021 3.2 (L) 3.5 - 5.1 mmol/L Final              Passed - Na is between 130 and 148 and within 360 days     Sodium   Date Value Ref Range Status   01/18/2022 140 136 - 145 mmol/L Final   11/01/2021 141 136 - 145 mmol/L Final   04/05/2021 143 136 - 145 mmol/L Final              Passed - eGFR within 360 days     Lab Results   Component Value Date    EGFRNONAA 56.0 (A) 01/18/2022    EGFRNONAA 45.3 (A) 11/01/2021    EGFRNONAA 56.4 (A) 04/05/2021                    Appointments  past 12m or future 3m with PCP    Date Provider   Last Visit   11/1/2021 Zoë Guzman MD   Next Visit   4/19/2022 Zoë Guzman MD   ED visits in past 90 days: 0     Note composed:11:52 AM 02/16/2022

## 2022-02-17 PROCEDURE — 99454 PR REMOTE MNTR, PHYS PARAM, INITIAL, EA 30 DAYS: ICD-10-PCS | Mod: S$GLB,,, | Performed by: FAMILY MEDICINE

## 2022-02-17 PROCEDURE — 99454 REM MNTR PHYSIOL PARAM 16-30: CPT | Mod: S$GLB,,, | Performed by: FAMILY MEDICINE

## 2022-02-22 ENCOUNTER — SPECIALTY PHARMACY (OUTPATIENT)
Dept: PHARMACY | Facility: CLINIC | Age: 72
End: 2022-02-22
Payer: MEDICARE

## 2022-02-28 ENCOUNTER — TELEPHONE (OUTPATIENT)
Dept: NEPHROLOGY | Facility: CLINIC | Age: 72
End: 2022-02-28
Payer: MEDICARE

## 2022-02-28 DIAGNOSIS — I15.2 HYPERTENSION ASSOCIATED WITH DIABETES: Primary | ICD-10-CM

## 2022-02-28 DIAGNOSIS — E11.59 HYPERTENSION ASSOCIATED WITH DIABETES: Primary | ICD-10-CM

## 2022-02-28 PROCEDURE — 99457 PR MONITORING, PHYSIOL PARAM, REMOTE, 1ST 20 MINS, PER MONTH: ICD-10-PCS | Mod: S$GLB,,, | Performed by: FAMILY MEDICINE

## 2022-02-28 PROCEDURE — 99457 RPM TX MGMT 1ST 20 MIN: CPT | Mod: S$GLB,,, | Performed by: FAMILY MEDICINE

## 2022-02-28 PROCEDURE — 99457 RPM TX MGMT 1ST 20 MIN: CPT | Mod: S$GLB,,, | Performed by: INTERNAL MEDICINE

## 2022-02-28 PROCEDURE — 99457 PR MONITORING, PHYSIOL PARAM, REMOTE, 1ST 20 MINS, PER MONTH: ICD-10-PCS | Mod: S$GLB,,, | Performed by: INTERNAL MEDICINE

## 2022-02-28 NOTE — TELEPHONE ENCOUNTER
----- Message from Karolina Watson, RT sent at 2/28/2022  8:35 AM CST -----  Regarding: STAT Labs for CT  Ms Garza needs a STAT CREATININE SERUM ordered and booked at least 1 1/2 hours prior to her CT on Wednesday.   If the patient prefers, she can come in any day before then to have them drawn so she will not have to wait on results the day of the scan.     Thanks,   Ольга   [id number]

## 2022-03-01 ENCOUNTER — LAB VISIT (OUTPATIENT)
Dept: LAB | Facility: HOSPITAL | Age: 72
End: 2022-03-01
Attending: INTERNAL MEDICINE
Payer: MEDICARE

## 2022-03-01 DIAGNOSIS — I15.2 HYPERTENSION ASSOCIATED WITH DIABETES: ICD-10-CM

## 2022-03-01 DIAGNOSIS — E11.59 HYPERTENSION ASSOCIATED WITH DIABETES: ICD-10-CM

## 2022-03-01 LAB
CREAT SERPL-MCNC: 1.1 MG/DL (ref 0.5–1.4)
EST. GFR  (AFRICAN AMERICAN): 58 ML/MIN/1.73 M^2
EST. GFR  (NON AFRICAN AMERICAN): 50 ML/MIN/1.73 M^2

## 2022-03-01 PROCEDURE — 82565 ASSAY OF CREATININE: CPT | Performed by: INTERNAL MEDICINE

## 2022-03-01 PROCEDURE — 36415 COLL VENOUS BLD VENIPUNCTURE: CPT | Mod: PO | Performed by: INTERNAL MEDICINE

## 2022-03-02 ENCOUNTER — HOSPITAL ENCOUNTER (OUTPATIENT)
Dept: RADIOLOGY | Facility: HOSPITAL | Age: 72
Discharge: HOME | End: 2022-03-02
Attending: INTERNAL MEDICINE
Payer: MEDICARE

## 2022-03-02 DIAGNOSIS — I15.9 SECONDARY HYPERTENSION, UNSPECIFIED: ICD-10-CM

## 2022-03-02 PROCEDURE — 74178 CT ABD&PLV WO CNTR FLWD CNTR: CPT | Mod: 26,,, | Performed by: RADIOLOGY

## 2022-03-02 PROCEDURE — 74178 CT ABD&PLV WO CNTR FLWD CNTR: CPT | Mod: TC

## 2022-03-02 PROCEDURE — 74178 CT ABDOMEN PELVIS W WO CONTRAST: ICD-10-PCS | Mod: 26,,, | Performed by: RADIOLOGY

## 2022-03-02 PROCEDURE — A9698 NON-RAD CONTRAST MATERIALNOC: HCPCS | Performed by: INTERNAL MEDICINE

## 2022-03-02 PROCEDURE — 25500020 PHARM REV CODE 255: Performed by: INTERNAL MEDICINE

## 2022-03-02 RX ADMIN — IOHEXOL 1000 ML: 12 SOLUTION ORAL at 07:03

## 2022-03-02 RX ADMIN — IOHEXOL 100 ML: 350 INJECTION, SOLUTION INTRAVENOUS at 08:03

## 2022-03-03 NOTE — TELEPHONE ENCOUNTER
Specialty Pharmacy - Refill Coordination  Specialty Pharmacy - Medication/Referral Authorization    Specialty Medication Orders Linked to Encounter    Flowsheet Row Most Recent Value   Medication #1 evolocumab (REPATHA SURECLICK) 140 mg/mL PnIj (Order#[id number], Rx#[id number])        Patient called with updated tata information. Added on file. Copay $0.   Refill Questions - Documented Responses    Flowsheet Row Most Recent Value   Patient Availability and HIPAA Verification    Does patient want to proceed with activity? Yes   HIPAA/medical authority confirmed? Yes   Relationship to patient of person spoken to? Self   Refill Screening Questions    Would patient like to speak to a pharmacist? No   When does the patient need to receive the medication? 03/04/22   Refill Delivery Questions    How will the patient receive the medication? Delivery Jessica   When does the patient need to receive the medication? 03/04/22   Shipping Address Home   Address in [facility] confirmed and updated if neccessary? Yes   Expected Copay ($) 0   Is the patient able to afford the medication copay? Yes   Payment Method zero copay   Days supply of Refill 28   Supplies needed? No supplies needed   Refill activity completed? Yes   Refill activity plan Refill scheduled   Shipment/Pickup Date: 03/04/22          Current Outpatient Medications   Medication Sig    ACCU-CHEK SMARTVIEW TEST STRIP Strp 1 STRIP BY [facility]NON-DRUG COMBO ROUTE) ROUTE ONCE DAILY.    ALPRAZolam (XANAX) 0.5 MG tablet Take 1 tablet (0.5 mg total) by mouth daily as needed for Anxiety.    amLODIPine (NORVASC) 10 MG tablet Take 1 tablet (10 mg total) by mouth once daily.    aspirin 81 MG Chew Take 81 mg by mouth once daily.    benazepriL (LOTENSIN) 40 MG tablet Take 1 tablet (40 mg total) by mouth once daily.    blood glucose control, normal Soln 1 Bottle by Misc.(Non-Drug; Combo Route) route once daily. For Accu Check Irais  use twice daily prn dx: E11.9     blood-glucose meter (ACCU-CHEK IRAIS) Misc 1 kit by Misc.(Non-Drug; Combo Route) route once. For Accu Check Irais  use twice daily prn dx: E11.9 for 1 dose    calcium-vitamin D3 500 mg(1,250mg) -200 unit per tablet Take 1 tablet by mouth once daily.     CRANBERRY EXTRACT ORAL Take 1 tablet by mouth once daily.    evolocumab (REPATHA SURECLICK) 140 mg/mL PnIj Inject 1 mL (140 mg total) into the skin every 14 (fourteen) days.    Lactobacillus rhamnosus GG (CULTURELLE) 10 billion cell capsule Take 1 capsule by mouth once daily.    lancets Misc 1 lancet by Misc.(Non-Drug; Combo Route) route 3 (three) times daily. For Acc-Chek Irais   DX :E11.9    loratadine (CLARITIN) 10 mg tablet TAKE 1 TABLET BY MOUTH EVERY DAY    meclizine (ANTIVERT) 25 mg tablet Take 1 tablet (25 mg total) by mouth 3 (three) times daily as needed for Dizziness or Nausea.    MULTIVITAMIN W-MINERALS/LUTEIN (CENTRUM SILVER ORAL) Take 1 tablet by mouth once daily.    spironolactone (ALDACTONE) 50 MG tablet Take 1 tablet (50 mg total) by mouth once daily.    triamcinolone (NASACORT) 55 mcg nasal inhaler USE 2 SPRAYS BY NASAL ROUTE ONCE DAILY   Last reviewed on 1/21/2022  3:14 PM by Echo Harris LPN    Review of patient's allergies indicates:   Allergen Reactions    Statins-hmg-coa reductase inhibitors      Muscle Cramps    Last reviewed on  3/2/2022 8:33 AM by Karolina Watson      Tasks added this encounter   2/22/2022 - Referral Authorization  3/25/2022 - Refill Call (Auto Added)   Tasks due within next 3 months   No tasks due.     Miki Mccoy, PharmD  [facility]  [address] [place] [id number]  Phone: [phone]  Fax: [phone]

## 2022-03-05 NOTE — PROGRESS NOTES
Kristyn Garza    Chief Complaint   Patient presents with    Diabetes    Hypertension    Follow-up       History of Present Illness:   Ms. Garza comes in today for 6-month diabetes and hypertension follow-up.  She is fasting and has not taken medications today.  She states she exercises 3 to 5 times a week, 1 hr each time.  She monitors her diet.  She states she performs home glucose checks fasting daily with levels ranging  (when using Splenda) but 130 this morning with using raw sugar.    She states she received flu shot last month at the pharmacy.    She reports having occasional intermittent right heel pain with bearing weight for 1 week.  She denies associated trauma, swelling, warmth or redness.  She states pain does not interfere with her activity.  She states she has been soaking her heel and Epson salt soaks with help.    Otherwise, she states she has no other acute problems. She denies having fever, chills, fatigue, appetite changes; shortness of breath, cough, wheezing; chest pain, palpitations, leg swelling; abdominal pain, nausea, vomiting, diarrhea, constipation; unusual urinary symptoms; polydipsia, polyuria, polyphagia; back pain; headache; anxiety, depression, homicidal or suicidal thoughts.    She saw Nephrology Dr. Hernandez on November 27, 2017 for surveillance of stage 3 chronic kidney disease with 1-year follow up advised. She saw ENT Dr. Irwin on October 20, 2015 for surveillance of thyroid nodules with 1-year follow up advised; thyroid ultrasound was performed on April 19, 2018 with stable, chronic thyroid nodules noted; I recommended we continue to monitor/repeat thyroid ultrasound in 1 - 2 years.     She saw Dr. Hardy on April 24, 2018 for type 2 diabetes mellitus without retinopathy with 1-year follow up advised.        04/19/2018  Thyroid ultrasound - Multinodular thyroid gland.      Labs:                     WBC                      9.32                04/13/2018                  HGB                      12.8                04/13/2018                 HCT                      39.2                04/13/2018                 PLT                      229                 04/13/2018                 CHOL                     239 (H)             04/13/2018                 TRIG                     86                  04/13/2018                 HDL                      51                  04/13/2018                 ALT                      23                  04/13/2018                 AST                      23                  04/13/2018                 NA                       141                 04/13/2018                 K                        4.1                 04/13/2018                 CL                       105                 04/13/2018                 CREATININE               1.1                 04/13/2018                 BUN                      15                  04/13/2018                 CO2                      28                  04/13/2018                 TSH                      0.972               04/13/2018                 HGBA1C                   6.5 (H)             04/13/2018          LDLCALC                  170.8 (H)           04/13/2018                  Current Outpatient Medications   Medication Sig    alcohol swabs PadM Apply 1 each topically as needed.    amlodipine-benazepril 5-20 mg (LOTREL) 5-20 mg per capsule TAKE 1 CAPSULE BY MOUTH ONCE DAILY.    aspirin 81 MG Chew Take 81 mg by mouth once daily.    azelastine (ASTELIN) 137 mcg (0.1 %) nasal spray 1 spray (137 mcg total) by Nasal route 2 (two) times daily.    blood sugar diagnostic Strp 1 strip by Misc.(Non-Drug; Combo Route) route 2 (two) times daily.    calcium-vitamin D3 500 mg(1,250mg) -200 unit per tablet Take 1 tablet by mouth once daily.     ciclopirox (PENLAC) 8 % Soln Apply topically nightly.    colesevelam (WELCHOL) 625 mg tablet Take 1 tablet (625 mg total) by mouth 2 (two) times daily  with meals.    CRANBERRY EXTRACT ORAL Take 1 tablet by mouth once daily.    lancets Misc 1 lancet by Misc.(Non-Drug; Combo Route) route 3 (three) times daily. For Acc-Chek Amber   DX :E11.9    loratadine (CLARITIN) 10 mg tablet Take 1 tablet (10 mg total) by mouth once daily.    MULTIVITAMIN W-MINERALS/LUTEIN (CENTRUM SILVER ORAL) Take 1 tablet by mouth once daily.    spironolactone (ALDACTONE) 50 MG tablet TAKE 1 TABLET (50 MG TOTAL) BY MOUTH ONCE DAILY.    blood glucose control, normal Soln 1 Bottle by Misc.(Non-Drug; Combo Route) route once daily.    blood-glucose meter (ACCU-CHEK AMBER) Misc 1 kit by Misc.(Non-Drug; Combo Route) route once.    meclizine (ANTIVERT) 25 mg tablet Take 1 tablet (25 mg total) by mouth 3 (three) times daily as needed.    triamcinolone acetonide 0.1% (KENALOG) 0.1 % ointment Apply topically 2 (two) times daily.       Review of Systems   Constitutional: Negative for activity change, appetite change, chills, fatigue and fever.        Weight  87.3 kg (192 lb 7.4 oz) at April 13, 2018 visit.   Eyes:        See history of present illness.   Respiratory: Negative for cough, shortness of breath and wheezing.    Cardiovascular: Negative for chest pain, palpitations and leg swelling.   Gastrointestinal: Negative for abdominal pain, constipation, diarrhea, nausea and vomiting.   Endocrine: Negative for polydipsia, polyphagia and polyuria.        See history of present illness.   Genitourinary: Negative for difficulty urinating.        See history of present illness.   Musculoskeletal: Positive for myalgias. Negative for back pain.        See history of present illness.   Skin:        See history of present illness.   Neurological: Negative for headaches.   Psychiatric/Behavioral: Negative for dysphoric mood, sleep disturbance and suicidal ideas. The patient is not nervous/anxious.         Negative for homicidal ideas.        Objective:  Physical Exam   Constitutional: She is oriented to  person, place, and time. She appears well-developed and well-nourished. No distress.   Pleasant.   Eyes:   She wears glasses.   Neck: Normal range of motion. Neck supple. Thyromegaly present.   Slight, chronic.   Cardiovascular: Normal rate, regular rhythm and intact distal pulses.   No murmur heard.  Pulses:       Dorsalis pedis pulses are 3+ on the right side, and 3+ on the left side.        Posterior tibial pulses are 3+ on the right side, and 3+ on the left side.   Pulmonary/Chest: Effort normal and breath sounds normal. No respiratory distress. She has no wheezes.   Abdominal: Soft. Bowel sounds are normal. She exhibits no distension and no mass. There is no tenderness. There is no rebound and no guarding.   Musculoskeletal: Normal range of motion. She exhibits tenderness. She exhibits no edema.   She is ambulatory without problems. Feet look okay without ulcerations or skin breaks noted. Slightly tender at right sole/heel without swelling and with full range motion noted.   Feet:   Right Foot:   Protective Sensation: 5 sites tested. 5 sites sensed.   Skin Integrity: Negative for ulcer or skin breakdown.   Left Foot:   Protective Sensation: 5 sites tested. 5 sites sensed.   Skin Integrity: Negative for ulcer or skin breakdown.   Lymphadenopathy:     She has no cervical adenopathy.   Neurological: She is alert and oriented to person, place, and time.   Skin: She is not diaphoretic.   Psychiatric: She has a normal mood and affect. Her behavior is normal. Judgment and thought content normal.   Vitals reviewed.      ASSESSMENT:  1. Type II diabetes mellitus with nephropathy    2. Hypertension associated with diabetes    3. Combined hyperlipidemia associated with type 2 diabetes mellitus    4. CKD (chronic kidney disease) stage 3, GFR 30-59 ml/min    5. Multiple thyroid nodules    6. Statin intolerance    7. Pain of right heel    8. Obesity (BMI 30.0-34.9)        PLAN:  Kristyn was seen today for diabetes,  hypertension and follow-up.    Diagnoses and all orders for this visit:    Type II diabetes mellitus with nephropathy  -     Hemoglobin A1c; Future  -     Comprehensive metabolic panel; Future    Hypertension associated with diabetes    Combined hyperlipidemia associated with type 2 diabetes mellitus    CKD (chronic kidney disease) stage 3, GFR 30-59 ml/min    Multiple thyroid nodules    Statin intolerance    Pain of right heel    Obesity (BMI 30.0-34.9)    Patient advised to call for results.  Continue current medications, follow low sodium, low cholesterol, low carb diet, daily walks.  Keep follow up with specialists.  Encouraged patient to wear inserts in her shoes.  Continue symptomatic treatment but follow up sooner if worsening symptoms noted.  Follow-up in about 6 months (around 4/15/2019) for physical.       Yes

## 2022-03-08 ENCOUNTER — PATIENT MESSAGE (OUTPATIENT)
Dept: OTHER | Facility: OTHER | Age: 72
End: 2022-03-08
Payer: MEDICARE

## 2022-03-17 PROCEDURE — 99454 REM MNTR PHYSIOL PARAM 16-30: CPT | Mod: S$GLB,,, | Performed by: FAMILY MEDICINE

## 2022-03-17 PROCEDURE — 99454 PR REMOTE MNTR, PHYS PARAM, INITIAL, EA 30 DAYS: ICD-10-PCS | Mod: S$GLB,,, | Performed by: FAMILY MEDICINE

## 2022-03-24 ENCOUNTER — LAB VISIT (OUTPATIENT)
Dept: LAB | Facility: HOSPITAL | Age: 72
End: 2022-03-24
Attending: INTERNAL MEDICINE
Payer: MEDICARE

## 2022-03-24 DIAGNOSIS — E26.9 HYPERALDOSTERONISM: ICD-10-CM

## 2022-03-24 DIAGNOSIS — E78.49 OTHER HYPERLIPIDEMIA: ICD-10-CM

## 2022-03-24 LAB
ALBUMIN SERPL BCP-MCNC: 3.8 G/DL (ref 3.5–5.2)
ANION GAP SERPL CALC-SCNC: 8 MMOL/L (ref 8–16)
BUN SERPL-MCNC: 13 MG/DL (ref 8–23)
CALCIUM SERPL-MCNC: 9.8 MG/DL (ref 8.7–10.5)
CHLORIDE SERPL-SCNC: 102 MMOL/L (ref 95–110)
CHOLEST SERPL-MCNC: 139 MG/DL (ref 120–199)
CHOLEST/HDLC SERPL: 2.8 {RATIO} (ref 2–5)
CO2 SERPL-SCNC: 31 MMOL/L (ref 23–29)
CREAT SERPL-MCNC: 1.1 MG/DL (ref 0.5–1.4)
EST. GFR  (AFRICAN AMERICAN): 57.6 ML/MIN/1.73 M^2
EST. GFR  (NON AFRICAN AMERICAN): 49.9 ML/MIN/1.73 M^2
GLUCOSE SERPL-MCNC: 122 MG/DL (ref 70–110)
HDLC SERPL-MCNC: 49 MG/DL (ref 40–75)
HDLC SERPL: 35.3 % (ref 20–50)
LDLC SERPL CALC-MCNC: 76.6 MG/DL (ref 63–159)
NONHDLC SERPL-MCNC: 90 MG/DL
PHOSPHATE SERPL-MCNC: 3.2 MG/DL (ref 2.7–4.5)
POTASSIUM SERPL-SCNC: 4.2 MMOL/L (ref 3.5–5.1)
SODIUM SERPL-SCNC: 141 MMOL/L (ref 136–145)
TRIGL SERPL-MCNC: 67 MG/DL (ref 30–150)

## 2022-03-24 PROCEDURE — 80069 RENAL FUNCTION PANEL: CPT | Performed by: INTERNAL MEDICINE

## 2022-03-24 PROCEDURE — 36415 COLL VENOUS BLD VENIPUNCTURE: CPT | Performed by: FAMILY MEDICINE

## 2022-03-24 PROCEDURE — 80061 LIPID PANEL: CPT | Performed by: FAMILY MEDICINE

## 2022-03-25 ENCOUNTER — SPECIALTY PHARMACY (OUTPATIENT)
Dept: PHARMACY | Facility: CLINIC | Age: 72
End: 2022-03-25
Payer: MEDICARE

## 2022-03-25 NOTE — TELEPHONE ENCOUNTER
Specialty Pharmacy - Refill Coordination    Specialty Medication Orders Linked to Encounter    Flowsheet Row Most Recent Value   Medication #1 evolocumab (REPATHA SURECLICK) 140 mg/mL PnIj (Order#[id number], Rx#[id number])          Refill Questions - Documented Responses    Flowsheet Row Most Recent Value   Patient Availability and HIPAA Verification    Does patient want to proceed with activity? Yes   HIPAA/medical authority confirmed? Yes   Relationship to patient of person spoken to? Self   Refill Screening Questions    Would patient like to speak to a pharmacist? No   When does the patient need to receive the medication? 04/01/22   Refill Delivery Questions    How will the patient receive the medication? Delivery Jessica   When does the patient need to receive the medication? 04/01/22   Shipping Address Home   Address in [facility] confirmed and updated if neccessary? Yes   Expected Copay ($) 0   Is the patient able to afford the medication copay? Yes   Payment Method zero copay   Days supply of Refill 28   Supplies needed? No supplies needed   Refill activity completed? Yes   Refill activity plan Refill scheduled   Shipment/Pickup Date: 03/29/22          Current Outpatient Medications   Medication Sig    ACCU-CHEK SMARTVIEW TEST STRIP Strp 1 STRIP BY [facility]NON-DRUG COMBO ROUTE) ROUTE ONCE DAILY.    ALPRAZolam (XANAX) 0.5 MG tablet Take 1 tablet (0.5 mg total) by mouth daily as needed for Anxiety.    amLODIPine (NORVASC) 10 MG tablet Take 1 tablet (10 mg total) by mouth once daily.    aspirin 81 MG Chew Take 81 mg by mouth once daily.    benazepriL (LOTENSIN) 40 MG tablet Take 1 tablet (40 mg total) by mouth once daily.    blood glucose control, normal Soln 1 Bottle by Misc.(Non-Drug; Combo Route) route once daily. For Accu Check Amber  use twice daily prn dx: E11.9    blood-glucose meter (ACCU-CHEK AMBER) Misc 1 kit by Misc.(Non-Drug; Combo Route) route once. For Accu Check Amber  use twice daily prn  dx: E11.9 for 1 dose    calcium-vitamin D3 500 mg(1,250mg) -200 unit per tablet Take 1 tablet by mouth once daily.     CRANBERRY EXTRACT ORAL Take 1 tablet by mouth once daily.    evolocumab (REPATHA SURECLICK) 140 mg/mL PnIj Inject 1 mL (140 mg total) into the skin every 14 (fourteen) days.    Lactobacillus rhamnosus GG (CULTURELLE) 10 billion cell capsule Take 1 capsule by mouth once daily.    lancets Misc 1 lancet by Misc.(Non-Drug; Combo Route) route 3 (three) times daily. For Acc-Chek Irais   DX :E11.9    loratadine (CLARITIN) 10 mg tablet TAKE 1 TABLET BY MOUTH EVERY DAY    meclizine (ANTIVERT) 25 mg tablet Take 1 tablet (25 mg total) by mouth 3 (three) times daily as needed for Dizziness or Nausea.    MULTIVITAMIN W-MINERALS/LUTEIN (CENTRUM SILVER ORAL) Take 1 tablet by mouth once daily.    spironolactone (ALDACTONE) 50 MG tablet Take 1 tablet (50 mg total) by mouth once daily.    triamcinolone (NASACORT) 55 mcg nasal inhaler USE 2 SPRAYS BY NASAL ROUTE ONCE DAILY   Last reviewed on 1/21/2022  3:14 PM by Echo Harris LPN    Review of patient's allergies indicates:   Allergen Reactions    Statins-hmg-coa reductase inhibitors      Muscle Cramps    Last reviewed on  3/2/2022 8:33 AM by Karolina Watson      Tasks added this encounter   4/22/2022 - Refill Call (Auto Added)   Tasks due within next 3 months   No tasks due.     Diane Barnhart, PharmD  [facility]  [address] [place] [id number]  Phone: [phone]  Fax: [phone]

## 2022-03-31 ENCOUNTER — PATIENT OUTREACH (OUTPATIENT)
Dept: ADMINISTRATIVE | Facility: OTHER | Age: 72
End: 2022-03-31
Payer: MEDICARE

## 2022-03-31 ENCOUNTER — OFFICE VISIT (OUTPATIENT)
Dept: NEPHROLOGY | Facility: CLINIC | Age: 72
End: 2022-03-31
Payer: MEDICARE

## 2022-03-31 VITALS
HEIGHT: 64 IN | HEART RATE: 77 BPM | BODY MASS INDEX: 31.32 KG/M2 | RESPIRATION RATE: 18 BRPM | SYSTOLIC BLOOD PRESSURE: 150 MMHG | WEIGHT: 183.44 LBS | DIASTOLIC BLOOD PRESSURE: 82 MMHG

## 2022-03-31 DIAGNOSIS — I15.8 OTHER SECONDARY HYPERTENSION: ICD-10-CM

## 2022-03-31 DIAGNOSIS — I10 PRIMARY HYPERTENSION: Primary | ICD-10-CM

## 2022-03-31 PROCEDURE — 3077F SYST BP >= 140 MM HG: CPT | Mod: CPTII,S$GLB,, | Performed by: INTERNAL MEDICINE

## 2022-03-31 PROCEDURE — 3008F PR BODY MASS INDEX (BMI) DOCUMENTED: ICD-10-PCS | Mod: CPTII,S$GLB,, | Performed by: INTERNAL MEDICINE

## 2022-03-31 PROCEDURE — 3066F PR DOCUMENTATION OF TREATMENT FOR NEPHROPATHY: ICD-10-PCS | Mod: CPTII,S$GLB,, | Performed by: INTERNAL MEDICINE

## 2022-03-31 PROCEDURE — 99499 UNLISTED E&M SERVICE: CPT | Mod: S$GLB,,, | Performed by: INTERNAL MEDICINE

## 2022-03-31 PROCEDURE — 1101F PT FALLS ASSESS-DOCD LE1/YR: CPT | Mod: CPTII,S$GLB,, | Performed by: INTERNAL MEDICINE

## 2022-03-31 PROCEDURE — 3288F PR FALLS RISK ASSESSMENT DOCUMENTED: ICD-10-PCS | Mod: CPTII,S$GLB,, | Performed by: INTERNAL MEDICINE

## 2022-03-31 PROCEDURE — 4010F ACE/ARB THERAPY RXD/TAKEN: CPT | Mod: CPTII,S$GLB,, | Performed by: INTERNAL MEDICINE

## 2022-03-31 PROCEDURE — 1126F PR PAIN SEVERITY QUANTIFIED, NO PAIN PRESENT: ICD-10-PCS | Mod: CPTII,S$GLB,, | Performed by: INTERNAL MEDICINE

## 2022-03-31 PROCEDURE — 99215 OFFICE O/P EST HI 40 MIN: CPT | Mod: S$GLB,,, | Performed by: INTERNAL MEDICINE

## 2022-03-31 PROCEDURE — 3066F NEPHROPATHY DOC TX: CPT | Mod: CPTII,S$GLB,, | Performed by: INTERNAL MEDICINE

## 2022-03-31 PROCEDURE — 3072F LOW RISK FOR RETINOPATHY: CPT | Mod: CPTII,S$GLB,, | Performed by: INTERNAL MEDICINE

## 2022-03-31 PROCEDURE — 99999 PR PBB SHADOW E&M-EST. PATIENT-LVL IV: ICD-10-PCS | Mod: PBBFAC,,, | Performed by: INTERNAL MEDICINE

## 2022-03-31 PROCEDURE — 1101F PR PT FALLS ASSESS DOC 0-1 FALLS W/OUT INJ PAST YR: ICD-10-PCS | Mod: CPTII,S$GLB,, | Performed by: INTERNAL MEDICINE

## 2022-03-31 PROCEDURE — 3072F PR LOW RISK FOR RETINOPATHY: ICD-10-PCS | Mod: CPTII,S$GLB,, | Performed by: INTERNAL MEDICINE

## 2022-03-31 PROCEDURE — 1159F MED LIST DOCD IN RCRD: CPT | Mod: CPTII,S$GLB,, | Performed by: INTERNAL MEDICINE

## 2022-03-31 PROCEDURE — 1159F PR MEDICATION LIST DOCUMENTED IN MEDICAL RECORD: ICD-10-PCS | Mod: CPTII,S$GLB,, | Performed by: INTERNAL MEDICINE

## 2022-03-31 PROCEDURE — 3008F BODY MASS INDEX DOCD: CPT | Mod: CPTII,S$GLB,, | Performed by: INTERNAL MEDICINE

## 2022-03-31 PROCEDURE — 3288F FALL RISK ASSESSMENT DOCD: CPT | Mod: CPTII,S$GLB,, | Performed by: INTERNAL MEDICINE

## 2022-03-31 PROCEDURE — 3079F DIAST BP 80-89 MM HG: CPT | Mod: CPTII,S$GLB,, | Performed by: INTERNAL MEDICINE

## 2022-03-31 PROCEDURE — 99215 PR OFFICE/OUTPT VISIT, EST, LEVL V, 40-54 MIN: ICD-10-PCS | Mod: S$GLB,,, | Performed by: INTERNAL MEDICINE

## 2022-03-31 PROCEDURE — 4010F PR ACE/ARB THEARPY RXD/TAKEN: ICD-10-PCS | Mod: CPTII,S$GLB,, | Performed by: INTERNAL MEDICINE

## 2022-03-31 PROCEDURE — 3079F PR MOST RECENT DIASTOLIC BLOOD PRESSURE 80-89 MM HG: ICD-10-PCS | Mod: CPTII,S$GLB,, | Performed by: INTERNAL MEDICINE

## 2022-03-31 PROCEDURE — 3077F PR MOST RECENT SYSTOLIC BLOOD PRESSURE >= 140 MM HG: ICD-10-PCS | Mod: CPTII,S$GLB,, | Performed by: INTERNAL MEDICINE

## 2022-03-31 PROCEDURE — 1126F AMNT PAIN NOTED NONE PRSNT: CPT | Mod: CPTII,S$GLB,, | Performed by: INTERNAL MEDICINE

## 2022-03-31 PROCEDURE — 99499 RISK ADDL DX/OHS AUDIT: ICD-10-PCS | Mod: S$GLB,,, | Performed by: INTERNAL MEDICINE

## 2022-03-31 PROCEDURE — 99999 PR PBB SHADOW E&M-EST. PATIENT-LVL IV: CPT | Mod: PBBFAC,,, | Performed by: INTERNAL MEDICINE

## 2022-03-31 RX ORDER — METOPROLOL SUCCINATE 25 MG/1
25 TABLET, EXTENDED RELEASE ORAL DAILY
Qty: 30 TABLET | Refills: 11 | Status: SHIPPED | OUTPATIENT
Start: 2022-03-31 | End: 2023-03-02

## 2022-03-31 NOTE — PROGRESS NOTES
Renal clinic f/u note:  Date of clinic visit: 3/31/22  Reason for f/u and chief c/o: HTN and hypokalemia     HPI: Pt is a 70 y/o female with HTN and DM who presents for f/u. Based on seeing hypokalemia, a secondary (endocrine) form of HTn was suspected. Hypokalemia is periodic despite pt being on spironolactone and an ACE-I. TSH has been normal. Pt has no thyroid issues. W/u for endocrine causes of HTN was initiated. A 24 hour urine showed somewhat elevated aldosterone. CT abd was ordered. Pt presents for f/u. Pt has no new c/o's, no new events, no discomfort, no SOB. Per pt, BP has been well controlled.     PAST MEDICAL HISTORY: HTN, DM-2, carpal tunnel syndrome, Eczema, Elevated CPK, Multinodular thyroid, Obesity, hyperlipidemia, Pneumonia, Postmenopausal, Statin intolerance, Tobacco dependence     PAST SURGICAL HISTORY:  She  has a past surgical history that includes Partial hysterectomy (1991); Colonoscopy; Fine needle aspiration (3/2011); Cyst Removal (2015); and Hysterectomy.     SOCIAL HISTORY:  She  reports that she quit smoking about 7 years ago. Her smoking use included cigarettes. She has a 12.50 pack-year smoking history. She has never used smokeless tobacco. She reports that she drinks alcohol. She reports that she does not use drugs.     FAMILY MEDICAL HISTORY:  Her family history includes Cataracts in her brother, brother, and sister; Dementia in her mother, sister, and sister; Diabetes in her mother; Heart disease in her sister; Hypertension in her father and mother; No Known Problems in her son and son.               Review of patient's allergies indicates:   Allergen Reactions    Statins-hmg-coa reductase inhibitors         Muscle Cramps       Meds reviewed     Current Outpatient Medications:     ACCU-CHEK SMARTVIEW TEST STRIP Strp, 1 STRIP BY MISC.(NON-DRUG COMBO ROUTE) ROUTE ONCE DAILY., Disp: 100 strip, Rfl: 1    ALPRAZolam (XANAX) 0.5 MG tablet, Take 1 tablet (0.5 mg total) by mouth daily as  needed for Anxiety., Disp: 30 tablet, Rfl: 0    amLODIPine (NORVASC) 10 MG tablet, Take 1 tablet (10 mg total) by mouth once daily., Disp: 30 tablet, Rfl: 11    aspirin 81 MG Chew, Take 81 mg by mouth once daily., Disp: , Rfl:     benazepriL (LOTENSIN) 40 MG tablet, Take 1 tablet (40 mg total) by mouth once daily., Disp: 90 tablet, Rfl: 3    blood glucose control, normal Soln, 1 Bottle by Misc.(Non-Drug; Combo Route) route once daily. For Accu Check Amber  use twice daily prn dx: E11.9, Disp: 1 each, Rfl: 0    blood-glucose meter (ACCU-CHEK AMBER) Misc, 1 kit by Misc.(Non-Drug; Combo Route) route once. For Accu Check Amber  use twice daily prn dx: E11.9 for 1 dose, Disp: 1 each, Rfl: 0    calcium-vitamin D3 500 mg(1,250mg) -200 unit per tablet, Take 1 tablet by mouth once daily. , Disp: , Rfl:     CRANBERRY EXTRACT ORAL, Take 1 tablet by mouth once daily., Disp: , Rfl:     evolocumab (REPATHA SURECLICK) 140 mg/mL PnIj, Inject 1 mL (140 mg total) into the skin every 14 (fourteen) days., Disp: 2 mL, Rfl: 5    lancets Misc, 1 lancet by Misc.(Non-Drug; Combo Route) route 3 (three) times daily. For Acc-Chek Amber   DX :E11.9, Disp: 100 each, Rfl: 0    loratadine (CLARITIN) 10 mg tablet, TAKE 1 TABLET BY MOUTH EVERY DAY, Disp: 90 tablet, Rfl: 1    meclizine (ANTIVERT) 25 mg tablet, Take 1 tablet (25 mg total) by mouth 3 (three) times daily as needed for Dizziness or Nausea., Disp: 60 tablet, Rfl: 1    MULTIVITAMIN W-MINERALS/LUTEIN (CENTRUM SILVER ORAL), Take 1 tablet by mouth once daily., Disp: , Rfl:     spironolactone (ALDACTONE) 50 MG tablet, Take 1 tablet (50 mg total) by mouth once daily., Disp: 90 tablet, Rfl: 2    triamcinolone (NASACORT) 55 mcg nasal inhaler, USE 2 SPRAYS BY NASAL ROUTE ONCE DAILY, Disp: 50.7 g, Rfl: 1    Lactobacillus rhamnosus GG (CULTURELLE) 10 billion cell capsule, Take 1 capsule by mouth once daily., Disp: , Rfl:     metoprolol succinate (TOPROL-XL) 25 MG 24 hr tablet, Take 1  tablet (25 mg total) by mouth once daily., Disp: 30 tablet, Rfl: 11        REVIEW OF SYSTEMS:  Patient has no fever, fatigue, visual changes, chest pain, edema, cough, dyspnea, nausea, vomiting, constipation, diarrhea, arthralgias, pruritis, dizziness, weakness, depression, confusion.        PHYSICAL EXAM: Blood pressure 150/82 on arrival, repeat after 5 min of rest 140/70, pulse 88, weight 83.3 Kg  in NAD, ambulatory  Speech and thought process: normal  RRR s1 and s2 audible  CTA B no rales no wheezed  Abd soft, NT  Ext's no edema        Labs reviewed  BMP  Lab Results   Component Value Date     03/24/2022    K 4.2 03/24/2022     03/24/2022    CO2 31 (H) 03/24/2022    BUN 13 03/24/2022    CREATININE 1.1 03/24/2022    CALCIUM 9.8 03/24/2022    ANIONGAP 8 03/24/2022    ESTGFRAFRICA 57.6 (A) 03/24/2022    EGFRNONAA 49.9 (A) 03/24/2022     Lab Results   Component Value Date    WBC 11.28 01/18/2022    HGB 13.0 01/18/2022    HCT 40.3 01/18/2022    MCV 96 01/18/2022     01/18/2022            24 hour urine reviewed, K 42, aldosterone 34, cortisol normal  Serum metanephrine 25  Early am ziggy/renin 95/7.4     Urine prot/cr zero    CT abd: reviewed  Bilateral small adrenal gland nodules which are technically indeterminate.  Further evaluation/MRI could be performed for characterization.        IMPRESSION AND RECOMMENDATIONS:  70 y/o female with periodic hypokalemia and h/o of HTN presents for f/u     1. Renal: stable renal function. Cr normal.      HTN: uncontrolled but not to a severe degree.  BP in fact quite controlled recently on minimal meds (3), though not severe  Hypokalemia is not consistent, K currently normal/ TSH has been normal  Metabolic alkalosis  Elevated 24 hour urine K: c/w renal route being the source for hypokalemia  Elevated 24 hour urine for aldosterone  But early am aldosterone/renin ratio < 20  Urine cortisol was normal  Has small adrenal nodules bilaterally, not unilaterally    Case  is challenging and indeterminate for hyperaldosteronism  Most striking observation is that BP is relatively well controlled with minimal meds  This is not what one would expect from hyperaldosteronism    DDX: primary HTN. The adrenal nodules are incidentalomas  VS: mild hyperaldosteronism with hyperplasia  VS: early stage adrenal gland adenoma    Recommend:  Continue to take spironolactone  Control BP: toprol XL 25 mg po qd was added  Refer to endocrinology for their opinion       2. HTN: BP controlled  Meds reviewed     3. DM-2: noted, reviewed.  No proteinuria noted.  Lack of proteinuria likely suggests that diabetic nephropathy is not present  Noted normal 24 hour urine for cortisol      Plans and recommendations:  As reviewed above  Opportunity for questions provided.  Total time spent 40 minutes including time needed to review the records, the   patient evaluation, documentation, face-to-face discussion with the patient,   more than 50% of the time was spent on coordination of care and counseling.    Level V visit.  RTC 6 months     Brenden Landaverde MD

## 2022-04-07 ENCOUNTER — HOSPITAL ENCOUNTER (OUTPATIENT)
Dept: CARDIOLOGY | Facility: HOSPITAL | Age: 72
Discharge: HOME | End: 2022-04-07
Attending: INTERNAL MEDICINE
Payer: MEDICARE

## 2022-04-07 ENCOUNTER — HOSPITAL ENCOUNTER (OUTPATIENT)
Dept: RADIOLOGY | Facility: HOSPITAL | Age: 72
Discharge: HOME | End: 2022-04-07
Attending: INTERNAL MEDICINE
Payer: MEDICARE

## 2022-04-07 VITALS — BODY MASS INDEX: 31.24 KG/M2 | HEIGHT: 64 IN | WEIGHT: 183 LBS

## 2022-04-07 DIAGNOSIS — N18.31 TYPE 2 DIABETES MELLITUS WITH STAGE 3A CHRONIC KIDNEY DISEASE, WITHOUT LONG-TERM CURRENT USE OF INSULIN: ICD-10-CM

## 2022-04-07 DIAGNOSIS — Z82.49 FAMILY HISTORY OF CARDIOVASCULAR DISORDER: ICD-10-CM

## 2022-04-07 DIAGNOSIS — E11.22 TYPE 2 DIABETES MELLITUS WITH STAGE 3A CHRONIC KIDNEY DISEASE, WITHOUT LONG-TERM CURRENT USE OF INSULIN: ICD-10-CM

## 2022-04-07 DIAGNOSIS — R00.2 PALPITATIONS: ICD-10-CM

## 2022-04-07 DIAGNOSIS — I15.2 HYPERTENSION ASSOCIATED WITH DIABETES: ICD-10-CM

## 2022-04-07 DIAGNOSIS — R94.31 ABNORMAL ECG: ICD-10-CM

## 2022-04-07 DIAGNOSIS — I51.7 LVH (LEFT VENTRICULAR HYPERTROPHY): ICD-10-CM

## 2022-04-07 DIAGNOSIS — E66.9 OBESITY (BMI 30.0-34.9): ICD-10-CM

## 2022-04-07 DIAGNOSIS — I49.1 PAC (PREMATURE ATRIAL CONTRACTION): ICD-10-CM

## 2022-04-07 DIAGNOSIS — R06.09 DOE (DYSPNEA ON EXERTION): ICD-10-CM

## 2022-04-07 DIAGNOSIS — E11.59 HYPERTENSION ASSOCIATED WITH DIABETES: ICD-10-CM

## 2022-04-07 LAB
AV INDEX (PROSTH): 0.78
AV MEAN GRADIENT: 5 MMHG
AV PEAK GRADIENT: 9 MMHG
AV VALVE AREA: 2.24 CM2
AV VELOCITY RATIO: 0.77
BSA FOR ECHO PROCEDURE: 1.94 M2
CV ECHO LV RWT: 0.46 CM
CV STRESS BASE HR: 76 BPM
DIASTOLIC BLOOD PRESSURE: 67 MMHG
DOP CALC AO PEAK VEL: 1.54 M/S
DOP CALC AO VTI: 31 CM
DOP CALC LVOT AREA: 2.9 CM2
DOP CALC LVOT DIAMETER: 1.91 CM
DOP CALC LVOT PEAK VEL: 1.19 M/S
DOP CALC LVOT STROKE VOLUME: 69.59 CM3
DOP CALC RVOT PEAK VEL: 0.64 M/S
DOP CALC RVOT VTI: 12.9 CM
DOP CALCLVOT PEAK VEL VTI: 24.3 CM
E WAVE DECELERATION TIME: 259.48 MSEC
E/A RATIO: 0.74
ECHO EF ESTIMATED: 61 %
ECHO LV POSTERIOR WALL: 0.94 CM (ref 0.6–1.1)
EJECTION FRACTION: 60 %
FRACTIONAL SHORTENING: 32 % (ref 28–44)
INTERVENTRICULAR SEPTUM: 0.81 CM (ref 0.6–1.1)
IVRT: 77.07 MSEC
LA WIDTH: 4.08 CM
LEFT ATRIUM SIZE: 3.72 CM
LEFT INTERNAL DIMENSION IN SYSTOLE: 2.78 CM (ref 2.1–4)
LEFT VENTRICLE DIASTOLIC VOLUME INDEX: 39.34 ML/M2
LEFT VENTRICLE DIASTOLIC VOLUME: 73.95 ML
LEFT VENTRICLE MASS INDEX: 58 G/M2
LEFT VENTRICLE SYSTOLIC VOLUME INDEX: 15.4 ML/M2
LEFT VENTRICLE SYSTOLIC VOLUME: 28.98 ML
LEFT VENTRICULAR INTERNAL DIMENSION IN DIASTOLE: 4.09 CM (ref 3.5–6)
LEFT VENTRICULAR MASS: 109.39 G
LVOT MG: 2.74 MMHG
LVOT MV: 0.78 CM/S
MV PEAK A VEL: 1.13 M/S
MV PEAK E VEL: 0.84 M/S
NUC REST EJECTION FRACTION: 76
NUC STRESS EJECTION FRACTION: 72 %
OHS CV CPX 85 PERCENT MAX PREDICTED HEART RATE MALE: 120
OHS CV CPX MAX PREDICTED HEART RATE: 142
OHS CV CPX PATIENT IS FEMALE: 1
OHS CV CPX PATIENT IS MALE: 0
OHS CV CPX PEAK DIASTOLIC BLOOD PRESSURE: 73 MMHG
OHS CV CPX PEAK HEAR RATE: 127 BPM
OHS CV CPX PEAK RATE PRESSURE PRODUCT: NORMAL
OHS CV CPX PEAK SYSTOLIC BLOOD PRESSURE: 173 MMHG
OHS CV CPX PERCENT MAX PREDICTED HEART RATE ACHIEVED: 90
OHS CV CPX RATE PRESSURE PRODUCT PRESENTING: 9500
PULM VEIN S/D RATIO: 1.77
PV MEAN GRADIENT: 0.82 MMHG
PV PEAK D VEL: 0.42 M/S
PV PEAK S VEL: 0.74 M/S
PV PEAK VELOCITY: 1.12 CM/S
RA MAJOR: 4.21 CM
RA PRESSURE: 3 MMHG
RIGHT VENTRICULAR END-DIASTOLIC DIMENSION: 2.6 CM
STJ: 2.18 CM
STRESS ECHO POST EXERCISE DUR MIN: 1 MINUTES
STRESS ECHO POST EXERCISE DUR SEC: 7 SECONDS
SYSTOLIC BLOOD PRESSURE: 125 MMHG

## 2022-04-07 PROCEDURE — 93018 CV STRESS TEST I&R ONLY: CPT | Mod: ,,, | Performed by: INTERNAL MEDICINE

## 2022-04-07 PROCEDURE — 78452 STRESS TEST WITH MYOCARDIAL PERFUSION (CUPID ONLY): ICD-10-PCS | Mod: 26,,, | Performed by: INTERNAL MEDICINE

## 2022-04-07 PROCEDURE — 93018 STRESS TEST WITH MYOCARDIAL PERFUSION (CUPID ONLY): ICD-10-PCS | Mod: ,,, | Performed by: INTERNAL MEDICINE

## 2022-04-07 PROCEDURE — A9502 TC99M TETROFOSMIN: HCPCS

## 2022-04-07 PROCEDURE — 78452 HT MUSCLE IMAGE SPECT MULT: CPT | Mod: 26,,, | Performed by: INTERNAL MEDICINE

## 2022-04-07 PROCEDURE — 93016 STRESS TEST WITH MYOCARDIAL PERFUSION (CUPID ONLY): ICD-10-PCS | Mod: ,,, | Performed by: INTERNAL MEDICINE

## 2022-04-07 PROCEDURE — 93017 CV STRESS TEST TRACING ONLY: CPT

## 2022-04-07 PROCEDURE — 93306 ECHO (CUPID ONLY): ICD-10-PCS | Mod: 26,,, | Performed by: INTERNAL MEDICINE

## 2022-04-07 PROCEDURE — 93306 TTE W/DOPPLER COMPLETE: CPT | Mod: 26,,, | Performed by: INTERNAL MEDICINE

## 2022-04-07 PROCEDURE — 93306 TTE W/DOPPLER COMPLETE: CPT

## 2022-04-07 PROCEDURE — 93016 CV STRESS TEST SUPVJ ONLY: CPT | Mod: ,,, | Performed by: INTERNAL MEDICINE

## 2022-04-07 PROCEDURE — 63600175 PHARM REV CODE 636 W HCPCS: Performed by: INTERNAL MEDICINE

## 2022-04-07 RX ORDER — REGADENOSON 0.08 MG/ML
0.4 INJECTION, SOLUTION INTRAVENOUS ONCE
Status: COMPLETED | OUTPATIENT
Start: 2022-04-07 | End: 2022-04-07

## 2022-04-07 RX ADMIN — REGADENOSON 0.4 MG: 0.08 INJECTION, SOLUTION INTRAVENOUS at 09:04

## 2022-04-13 ENCOUNTER — PATIENT MESSAGE (OUTPATIENT)
Dept: ADMINISTRATIVE | Facility: OTHER | Age: 72
End: 2022-04-13
Payer: MEDICARE

## 2022-04-13 DIAGNOSIS — I15.2 HYPERTENSION ASSOCIATED WITH DIABETES: Primary | ICD-10-CM

## 2022-04-13 DIAGNOSIS — E11.59 HYPERTENSION ASSOCIATED WITH DIABETES: Primary | ICD-10-CM

## 2022-04-14 ENCOUNTER — OFFICE VISIT (OUTPATIENT)
Dept: CARDIOLOGY | Facility: CLINIC | Age: 72
End: 2022-04-14
Payer: MEDICARE

## 2022-04-14 ENCOUNTER — CLINICAL SUPPORT (OUTPATIENT)
Dept: CARDIOLOGY | Facility: CLINIC | Age: 72
End: 2022-04-14
Payer: MEDICARE

## 2022-04-14 VITALS
WEIGHT: 182.31 LBS | OXYGEN SATURATION: 100 % | DIASTOLIC BLOOD PRESSURE: 72 MMHG | HEART RATE: 75 BPM | SYSTOLIC BLOOD PRESSURE: 132 MMHG | HEIGHT: 64 IN | BODY MASS INDEX: 31.12 KG/M2

## 2022-04-14 DIAGNOSIS — E78.2 COMBINED HYPERLIPIDEMIA ASSOCIATED WITH TYPE 2 DIABETES MELLITUS: ICD-10-CM

## 2022-04-14 DIAGNOSIS — R06.09 DOE (DYSPNEA ON EXERTION): ICD-10-CM

## 2022-04-14 DIAGNOSIS — Z78.9 STATIN INTOLERANCE: ICD-10-CM

## 2022-04-14 DIAGNOSIS — E11.59 HYPERTENSION ASSOCIATED WITH DIABETES: Primary | ICD-10-CM

## 2022-04-14 DIAGNOSIS — E11.59 HYPERTENSION ASSOCIATED WITH DIABETES: ICD-10-CM

## 2022-04-14 DIAGNOSIS — I49.1 PAC (PREMATURE ATRIAL CONTRACTION): ICD-10-CM

## 2022-04-14 DIAGNOSIS — I51.89 DIASTOLIC DYSFUNCTION: ICD-10-CM

## 2022-04-14 DIAGNOSIS — E66.9 OBESITY (BMI 30.0-34.9): ICD-10-CM

## 2022-04-14 DIAGNOSIS — Z82.49 FAMILY HISTORY OF CARDIOVASCULAR DISORDER: ICD-10-CM

## 2022-04-14 DIAGNOSIS — E11.69 COMBINED HYPERLIPIDEMIA ASSOCIATED WITH TYPE 2 DIABETES MELLITUS: ICD-10-CM

## 2022-04-14 DIAGNOSIS — E11.22 TYPE 2 DIABETES MELLITUS WITH STAGE 3A CHRONIC KIDNEY DISEASE, WITHOUT LONG-TERM CURRENT USE OF INSULIN: ICD-10-CM

## 2022-04-14 DIAGNOSIS — N18.31 TYPE 2 DIABETES MELLITUS WITH STAGE 3A CHRONIC KIDNEY DISEASE, WITHOUT LONG-TERM CURRENT USE OF INSULIN: ICD-10-CM

## 2022-04-14 DIAGNOSIS — I51.7 LVH (LEFT VENTRICULAR HYPERTROPHY): ICD-10-CM

## 2022-04-14 DIAGNOSIS — N18.31 STAGE 3A CHRONIC KIDNEY DISEASE: ICD-10-CM

## 2022-04-14 DIAGNOSIS — I15.2 HYPERTENSION ASSOCIATED WITH DIABETES: Primary | ICD-10-CM

## 2022-04-14 DIAGNOSIS — R94.31 ABNORMAL ECG: ICD-10-CM

## 2022-04-14 DIAGNOSIS — I15.2 HYPERTENSION ASSOCIATED WITH DIABETES: ICD-10-CM

## 2022-04-14 PROCEDURE — 99499 UNLISTED E&M SERVICE: CPT | Mod: S$GLB,,, | Performed by: INTERNAL MEDICINE

## 2022-04-14 PROCEDURE — 3008F BODY MASS INDEX DOCD: CPT | Mod: CPTII,S$GLB,, | Performed by: INTERNAL MEDICINE

## 2022-04-14 PROCEDURE — 3075F PR MOST RECENT SYSTOLIC BLOOD PRESS GE 130-139MM HG: ICD-10-PCS | Mod: CPTII,S$GLB,, | Performed by: INTERNAL MEDICINE

## 2022-04-14 PROCEDURE — 1126F PR PAIN SEVERITY QUANTIFIED, NO PAIN PRESENT: ICD-10-PCS | Mod: CPTII,S$GLB,, | Performed by: INTERNAL MEDICINE

## 2022-04-14 PROCEDURE — 3075F SYST BP GE 130 - 139MM HG: CPT | Mod: CPTII,S$GLB,, | Performed by: INTERNAL MEDICINE

## 2022-04-14 PROCEDURE — 3078F PR MOST RECENT DIASTOLIC BLOOD PRESSURE < 80 MM HG: ICD-10-PCS | Mod: CPTII,S$GLB,, | Performed by: INTERNAL MEDICINE

## 2022-04-14 PROCEDURE — 1160F RVW MEDS BY RX/DR IN RCRD: CPT | Mod: CPTII,S$GLB,, | Performed by: INTERNAL MEDICINE

## 2022-04-14 PROCEDURE — 93005 ELECTROCARDIOGRAM TRACING: CPT | Mod: S$GLB,,, | Performed by: INTERNAL MEDICINE

## 2022-04-14 PROCEDURE — 99999 PR PBB SHADOW E&M-EST. PATIENT-LVL III: CPT | Mod: PBBFAC,,, | Performed by: INTERNAL MEDICINE

## 2022-04-14 PROCEDURE — 1126F AMNT PAIN NOTED NONE PRSNT: CPT | Mod: CPTII,S$GLB,, | Performed by: INTERNAL MEDICINE

## 2022-04-14 PROCEDURE — 99999 PR PBB SHADOW E&M-EST. PATIENT-LVL III: ICD-10-PCS | Mod: PBBFAC,,, | Performed by: INTERNAL MEDICINE

## 2022-04-14 PROCEDURE — 99499 RISK ADDL DX/OHS AUDIT: ICD-10-PCS | Mod: S$GLB,,, | Performed by: INTERNAL MEDICINE

## 2022-04-14 PROCEDURE — 1160F PR REVIEW ALL MEDS BY PRESCRIBER/CLIN PHARMACIST DOCUMENTED: ICD-10-PCS | Mod: CPTII,S$GLB,, | Performed by: INTERNAL MEDICINE

## 2022-04-14 PROCEDURE — 4010F ACE/ARB THERAPY RXD/TAKEN: CPT | Mod: CPTII,S$GLB,, | Performed by: INTERNAL MEDICINE

## 2022-04-14 PROCEDURE — 3072F PR LOW RISK FOR RETINOPATHY: ICD-10-PCS | Mod: CPTII,S$GLB,, | Performed by: INTERNAL MEDICINE

## 2022-04-14 PROCEDURE — 1159F PR MEDICATION LIST DOCUMENTED IN MEDICAL RECORD: ICD-10-PCS | Mod: CPTII,S$GLB,, | Performed by: INTERNAL MEDICINE

## 2022-04-14 PROCEDURE — 93010 EKG 12-LEAD: ICD-10-PCS | Mod: S$GLB,,, | Performed by: INTERNAL MEDICINE

## 2022-04-14 PROCEDURE — 3066F NEPHROPATHY DOC TX: CPT | Mod: CPTII,S$GLB,, | Performed by: INTERNAL MEDICINE

## 2022-04-14 PROCEDURE — 3008F PR BODY MASS INDEX (BMI) DOCUMENTED: ICD-10-PCS | Mod: CPTII,S$GLB,, | Performed by: INTERNAL MEDICINE

## 2022-04-14 PROCEDURE — 4010F PR ACE/ARB THEARPY RXD/TAKEN: ICD-10-PCS | Mod: CPTII,S$GLB,, | Performed by: INTERNAL MEDICINE

## 2022-04-14 PROCEDURE — 93010 ELECTROCARDIOGRAM REPORT: CPT | Mod: S$GLB,,, | Performed by: INTERNAL MEDICINE

## 2022-04-14 PROCEDURE — 3066F PR DOCUMENTATION OF TREATMENT FOR NEPHROPATHY: ICD-10-PCS | Mod: CPTII,S$GLB,, | Performed by: INTERNAL MEDICINE

## 2022-04-14 PROCEDURE — 3078F DIAST BP <80 MM HG: CPT | Mod: CPTII,S$GLB,, | Performed by: INTERNAL MEDICINE

## 2022-04-14 PROCEDURE — 1159F MED LIST DOCD IN RCRD: CPT | Mod: CPTII,S$GLB,, | Performed by: INTERNAL MEDICINE

## 2022-04-14 PROCEDURE — 99214 OFFICE O/P EST MOD 30 MIN: CPT | Mod: S$GLB,,, | Performed by: INTERNAL MEDICINE

## 2022-04-14 PROCEDURE — 99214 PR OFFICE/OUTPT VISIT, EST, LEVL IV, 30-39 MIN: ICD-10-PCS | Mod: S$GLB,,, | Performed by: INTERNAL MEDICINE

## 2022-04-14 PROCEDURE — 93005 EKG 12-LEAD: ICD-10-PCS | Mod: S$GLB,,, | Performed by: INTERNAL MEDICINE

## 2022-04-14 PROCEDURE — 3072F LOW RISK FOR RETINOPATHY: CPT | Mod: CPTII,S$GLB,, | Performed by: INTERNAL MEDICINE

## 2022-04-14 NOTE — PROGRESS NOTES
Subjective:    Patient ID:  Kristyn Garza is a 71 y.o. female who presents for evaluation of Test Results,  Hyperlipidemia, Risk Factor Management For Atherosclerosis, and Hypertension      HPI Pt presents for eval.   Her current med conditions include DM, PACs, LVH, DD, HTN, hyperlipidemia, CRI.  Former smoker (quit 4+ years ago).  Past hx pertinent for following:   Statin intolerant.  Brother has CAD + revascularization.   Stress MPI 5/19 no ischemia.  Echo 5/19 normal LV function.  AMITA 5/19 normal B LE.  ecg 4/1/21 NSR, LVH.   Now here.  Nuclear stress MPI 4/22 reviewed: no ischemia, normal EF.  Echo 4/22 reviewed: normal EF, grade I DD on my review, no LVH noted.  No active CP sxs.  Stable ZHOU, chronic.  No pnd/orthopnea.  Labs reviewed.  CRI stable.  Lipids well controlled on Repatha.  DM HGAIC at goal.  Weight stable.  HTN controlled on current med tx.  Compliant w meds.  ecg 4/7/22 NSR, low voltage.  ecg 4/14/22 NSR, minimal voltage criteria for LVH.  No palpitations.      Past Medical History:   Diagnosis Date    Carpal tunnel syndrome     CKD (chronic kidney disease) stage 3, GFR 30-59 ml/min     Followed by Nephrology    Colon cancer screening 3/18/2014    CTS (carpal tunnel syndrome) 6/18/2013    Diabetes mellitus, type 2     Eczema     Elevated CPK     History of hypokalemia 6/18/2013    History of hypokalemia 6/18/2013    Hypokalemia     Multinodular thyroid     Followed by ENT    Obesity     Other and unspecified hyperlipidemia     Pneumonia     in her 20s; hospitalized    Postmenopausal     No history of abnormal pap smear    Statin intolerance     caused mylagia, elevated CPK    Tobacco dependence     resolved    Unspecified essential hypertension        Current Outpatient Medications   Medication Instructions    [facility]UG [facility]BO [facility]OUTE) ROUTE ONCE DAILY.    ALPRAZolam (XANAX) 0.5 mg, Oral, Daily PRN    amLODIPine (NORVASC) 10  "mg, Oral, Daily    aspirin 81 mg, Daily    benazepriL (LOTENSIN) 40 mg, Oral, Daily    blood glucose control, normal Soln 1 Bottle, Misc.(Non-Drug; Combo Route), Daily, For Accu Check Amber  use twice daily prn dx: E11.9    blood-glucose meter (ACCU-CHEK AMBER) Misc 1 kit, Misc.(Non-Drug; Combo Route), Once, For Accu Check Amber  use twice daily prn dx: E11.9    calcium-vitamin D3 500 mg(1,250mg) -200 unit per tablet 1 tablet, Oral, Daily    CRANBERRY EXTRACT ORAL 1 tablet, Oral, Daily    Lactobacillus rhamnosus GG (CULTURELLE) 10 billion cell capsule 1 capsule, Oral, Daily    lancets Misc 1 lancet, Misc.(Non-Drug; Combo Route), 3 times daily, For Acc-Chek Amber   DX :E11.9    loratadine (CLARITIN) 10 mg tablet TAKE 1 TABLET BY MOUTH EVERY DAY    meclizine (ANTIVERT) 25 mg, Oral, 3 times daily PRN    metoprolol succinate (TOPROL-XL) 25 mg, Oral, Daily    MULTIVITAMIN W-MINERALS/LUTEIN (CENTRUM SILVER ORAL) 1 tablet, Daily    REPATHA SURECLICK 140 mg, Subcutaneous, Every 14 days    spironolactone (ALDACTONE) 50 mg, Oral, Daily    triamcinolone (NASACORT) 55 mcg nasal inhaler USE 2 SPRAYS BY NASAL ROUTE ONCE DAILY         Review of Systems   Constitutional: Negative.   HENT: Negative.    Eyes: Negative.    Cardiovascular: Positive for dyspnea on exertion.   Respiratory: Positive for shortness of breath.    Endocrine: Negative.    Hematologic/Lymphatic: Negative.    Skin: Negative.    Musculoskeletal: Negative.    Gastrointestinal: Negative.    Genitourinary: Negative.    Neurological: Negative.    Psychiatric/Behavioral: Negative.    Allergic/Immunologic: Negative.        /72 (BP Location: Left arm, Patient Position: Sitting, BP Method: Large (Manual))   Pulse 75   Ht 5' 4" (1.626 m)   Wt 82.7 kg (182 lb 5.1 oz)   SpO2 100%   BMI 31.30 kg/m²     Wt Readings from Last 3 Encounters:   04/14/22 82.7 kg (182 lb 5.1 oz)   04/07/22 83 kg (183 lb)   03/31/22 83.2 kg (183 lb 6.8 oz)     Temp Readings " from Last 3 Encounters:   11/01/21 97.6 °F (36.4 °C) (Temporal)   04/19/21 97.9 °F (36.6 °C)   10/26/20 97.7 °F (36.5 °C) (Temporal)     BP Readings from Last 3 Encounters:   04/14/22 132/72   03/31/22 (!) 150/82   01/21/22 124/76     Pulse Readings from Last 3 Encounters:   04/14/22 75   03/31/22 77   01/21/22 80          Objective:    Physical Exam  Vitals and nursing note reviewed.   Constitutional:       General: She is not in acute distress.     Appearance: Normal appearance. She is well-developed. She is not ill-appearing or diaphoretic.   HENT:      Head: Normocephalic.   Neck:      Thyroid: No thyromegaly.      Vascular: No carotid bruit or JVD.   Cardiovascular:      Rate and Rhythm: Normal rate and regular rhythm.      Pulses: Normal pulses.           Radial pulses are 2+ on the right side and 2+ on the left side.      Heart sounds: Normal heart sounds, S1 normal and S2 normal. No murmur heard.    No friction rub. No gallop.   Pulmonary:      Effort: Pulmonary effort is normal.      Breath sounds: Normal breath sounds. No wheezing or rales.   Abdominal:      General: Bowel sounds are normal. There is no abdominal bruit.      Palpations: Abdomen is soft.      Tenderness: There is no abdominal tenderness.   Musculoskeletal:      Cervical back: Neck supple.   Lymphadenopathy:      Cervical: No cervical adenopathy.   Skin:     General: Skin is warm.   Neurological:      Mental Status: She is alert and oriented to person, place, and time.   Psychiatric:         Behavior: Behavior normal. Behavior is cooperative.       I have reviewed all pertinent labs and cardiac studies.      Chemistry        Component Value Date/Time     03/24/2022 0959    K 4.2 03/24/2022 0959     03/24/2022 0959    CO2 31 (H) 03/24/2022 0959    BUN 13 03/24/2022 0959    CREATININE 1.1 03/24/2022 0959     (H) 03/24/2022 0959        Component Value Date/Time    CALCIUM 9.8 03/24/2022 0959    ALKPHOS 118 01/18/2022 1324     AST 20 01/18/2022 1324    ALT 19 01/18/2022 1324    BILITOT 0.3 01/18/2022 1324    ESTGFRAFRICA 57.6 (A) 03/24/2022 0959    EGFRNONAA 49.9 (A) 03/24/2022 0959        Lab Results   Component Value Date    WBC 11.28 01/18/2022    HGB 13.0 01/18/2022    HCT 40.3 01/18/2022    MCV 96 01/18/2022     01/18/2022       Lab Results   Component Value Date    HGBA1C 6.4 (H) 11/01/2021     Lab Results   Component Value Date    CHOL 139 03/24/2022    CHOL 143 04/05/2021    CHOL 158 10/02/2020     Lab Results   Component Value Date    HDL 49 03/24/2022    HDL 46 04/05/2021    HDL 50 10/02/2020     Lab Results   Component Value Date    LDLCALC 76.6 03/24/2022    LDLCALC 86.4 04/05/2021    LDLCALC 97.2 10/02/2020     Lab Results   Component Value Date    TRIG 67 03/24/2022    TRIG 53 04/05/2021    TRIG 54 10/02/2020     Lab Results   Component Value Date    CHOLHDL 35.3 03/24/2022    CHOLHDL 32.2 04/05/2021    CHOLHDL 31.6 10/02/2020       Results for orders placed during the hospital encounter of 04/07/22    Echo    Interpretation Summary  · The left ventricle is normal in size with concentric remodeling and normal systolic function.  · The estimated ejection fraction is 60%.  · Normal left ventricular diastolic function.  · Normal right ventricular size with normal right ventricular systolic function.  · Mild pulmonic regurgitation.  · Normal central venous pressure (3 mmHg).        Results for orders placed during the hospital encounter of 04/07/22    Nuclear Stress - Cardiology Interpreted    Interpretation Summary    Normal myocardial perfusion scan. There is no evidence of myocardial ischemia or infarction.    The gated perfusion images showed an ejection fraction of 76% at rest. The gated perfusion images showed an ejection fraction of 72% post stress.    There is normal wall motion at rest and post stress.    The EKG portion of this study is negative for ischemia.    The patient reported no chest pain during the  stress test.    There were no arrhythmias during stress.         Assessment:       1. Hypertension associated with diabetes    2. Statin intolerance    3. PAC (premature atrial contraction)    4. Obesity (BMI 30.0-34.9)    5. LVH (left ventricular hypertrophy)    6. Family history of cardiovascular disorder    7. ZHOU (dyspnea on exertion)    8. Combined hyperlipidemia associated with type 2 diabetes mellitus    9. Abnormal ECG    10. Type 2 diabetes mellitus with stage 3a chronic kidney disease, without long-term current use of insulin    11. Stage 3a chronic kidney disease    12. Diastolic dysfunction         Plan:                 Stable cardiovascular conditions at present time on current medical treatment.  No ischemia on stress MPI.  Continue risk factor modification.  Reviewed all tests and above medical conditions with patient in detail and formulated treatment plan.  Continue optimal medical treatment for cardiovascular conditions.  Cardiac low salt diet advised.  Daily exercise encouraged, with the goal 30 +  minutes aerobic exercise as tolerated.  Maintaining healthy weight and weight loss goals (if needed) were discussed in clinic.  Need for BP control and HTN goals (if needed) were discussed and tx plan formulated.  Importance of optimal lipid control were discussed in detail as well as possible pharmacologic and lifestyle changes that may be needed.  Continue Repatha.  DM HGAIC control discussed.  F/u w Nephrology for CRI.  No changes in meds today.    F/u in 6 - 12 months.      I have reviewed all pertinent labs and cardiac studies independently. Plans and recommendations have been formulated under my direct supervision. All questions answered and patient voiced understanding.

## 2022-04-18 ENCOUNTER — PATIENT MESSAGE (OUTPATIENT)
Dept: ADMINISTRATIVE | Facility: OTHER | Age: 72
End: 2022-04-18
Payer: MEDICARE

## 2022-04-19 ENCOUNTER — LAB VISIT (OUTPATIENT)
Dept: LAB | Facility: HOSPITAL | Age: 72
End: 2022-04-19
Attending: FAMILY MEDICINE
Payer: MEDICARE

## 2022-04-19 ENCOUNTER — OFFICE VISIT (OUTPATIENT)
Dept: FAMILY MEDICINE | Facility: CLINIC | Age: 72
End: 2022-04-19
Payer: MEDICARE

## 2022-04-19 VITALS
HEART RATE: 82 BPM | WEIGHT: 183.19 LBS | TEMPERATURE: 97 F | SYSTOLIC BLOOD PRESSURE: 112 MMHG | DIASTOLIC BLOOD PRESSURE: 60 MMHG | HEIGHT: 64 IN | OXYGEN SATURATION: 99 % | BODY MASS INDEX: 31.27 KG/M2

## 2022-04-19 DIAGNOSIS — E11.22 TYPE 2 DIABETES MELLITUS WITH STAGE 3A CHRONIC KIDNEY DISEASE, WITHOUT LONG-TERM CURRENT USE OF INSULIN: ICD-10-CM

## 2022-04-19 DIAGNOSIS — Z78.0 POSTMENOPAUSAL: ICD-10-CM

## 2022-04-19 DIAGNOSIS — Z78.9 STATIN INTOLERANCE: ICD-10-CM

## 2022-04-19 DIAGNOSIS — E04.2 MULTIPLE THYROID NODULES: ICD-10-CM

## 2022-04-19 DIAGNOSIS — I15.2 HYPERTENSION ASSOCIATED WITH DIABETES: ICD-10-CM

## 2022-04-19 DIAGNOSIS — E66.9 OBESITY (BMI 30.0-34.9): ICD-10-CM

## 2022-04-19 DIAGNOSIS — N18.31 TYPE 2 DIABETES MELLITUS WITH STAGE 3A CHRONIC KIDNEY DISEASE, WITHOUT LONG-TERM CURRENT USE OF INSULIN: ICD-10-CM

## 2022-04-19 DIAGNOSIS — I51.89 DIASTOLIC DYSFUNCTION: ICD-10-CM

## 2022-04-19 DIAGNOSIS — N18.31 STAGE 3A CHRONIC KIDNEY DISEASE: ICD-10-CM

## 2022-04-19 DIAGNOSIS — I51.7 LVH (LEFT VENTRICULAR HYPERTROPHY): ICD-10-CM

## 2022-04-19 DIAGNOSIS — E11.59 HYPERTENSION ASSOCIATED WITH DIABETES: ICD-10-CM

## 2022-04-19 DIAGNOSIS — Z00.00 ANNUAL PHYSICAL EXAM: Primary | ICD-10-CM

## 2022-04-19 DIAGNOSIS — Z12.31 OTHER SCREENING MAMMOGRAM: ICD-10-CM

## 2022-04-19 DIAGNOSIS — E11.69 COMBINED HYPERLIPIDEMIA ASSOCIATED WITH TYPE 2 DIABETES MELLITUS: ICD-10-CM

## 2022-04-19 DIAGNOSIS — E78.2 COMBINED HYPERLIPIDEMIA ASSOCIATED WITH TYPE 2 DIABETES MELLITUS: ICD-10-CM

## 2022-04-19 LAB
ALBUMIN/CREAT UR: 8.8 UG/MG (ref 0–30)
CREAT UR-MCNC: 272 MG/DL (ref 15–325)
ESTIMATED AVG GLUCOSE: 157 MG/DL (ref 68–131)
HBA1C MFR BLD: 7.1 % (ref 4–5.6)
MICROALBUMIN UR DL<=1MG/L-MCNC: 24 UG/ML
TSH SERPL DL<=0.005 MIU/L-ACNC: 0.52 UIU/ML (ref 0.4–4)

## 2022-04-19 PROCEDURE — 99499 UNLISTED E&M SERVICE: CPT | Mod: S$GLB,,, | Performed by: FAMILY MEDICINE

## 2022-04-19 PROCEDURE — 82043 UR ALBUMIN QUANTITATIVE: CPT | Performed by: FAMILY MEDICINE

## 2022-04-19 PROCEDURE — 83036 HEMOGLOBIN GLYCOSYLATED A1C: CPT | Performed by: FAMILY MEDICINE

## 2022-04-19 PROCEDURE — 3072F PR LOW RISK FOR RETINOPATHY: ICD-10-PCS | Mod: CPTII,S$GLB,, | Performed by: FAMILY MEDICINE

## 2022-04-19 PROCEDURE — 3008F BODY MASS INDEX DOCD: CPT | Mod: CPTII,S$GLB,, | Performed by: FAMILY MEDICINE

## 2022-04-19 PROCEDURE — 99397 PER PM REEVAL EST PAT 65+ YR: CPT | Mod: S$GLB,,, | Performed by: FAMILY MEDICINE

## 2022-04-19 PROCEDURE — 3061F NEG MICROALBUMINURIA REV: CPT | Mod: CPTII,S$GLB,, | Performed by: FAMILY MEDICINE

## 2022-04-19 PROCEDURE — 3078F DIAST BP <80 MM HG: CPT | Mod: CPTII,S$GLB,, | Performed by: FAMILY MEDICINE

## 2022-04-19 PROCEDURE — 99999 PR PBB SHADOW E&M-EST. PATIENT-LVL V: CPT | Mod: PBBFAC,,, | Performed by: FAMILY MEDICINE

## 2022-04-19 PROCEDURE — 3288F PR FALLS RISK ASSESSMENT DOCUMENTED: ICD-10-PCS | Mod: CPTII,S$GLB,, | Performed by: FAMILY MEDICINE

## 2022-04-19 PROCEDURE — 3072F LOW RISK FOR RETINOPATHY: CPT | Mod: CPTII,S$GLB,, | Performed by: FAMILY MEDICINE

## 2022-04-19 PROCEDURE — 3061F PR NEG MICROALBUMINURIA RESULT DOCUMENTED/REVIEW: ICD-10-PCS | Mod: CPTII,S$GLB,, | Performed by: FAMILY MEDICINE

## 2022-04-19 PROCEDURE — 4010F ACE/ARB THERAPY RXD/TAKEN: CPT | Mod: CPTII,S$GLB,, | Performed by: FAMILY MEDICINE

## 2022-04-19 PROCEDURE — 1101F PT FALLS ASSESS-DOCD LE1/YR: CPT | Mod: CPTII,S$GLB,, | Performed by: FAMILY MEDICINE

## 2022-04-19 PROCEDURE — 1160F RVW MEDS BY RX/DR IN RCRD: CPT | Mod: CPTII,S$GLB,, | Performed by: FAMILY MEDICINE

## 2022-04-19 PROCEDURE — 3078F PR MOST RECENT DIASTOLIC BLOOD PRESSURE < 80 MM HG: ICD-10-PCS | Mod: CPTII,S$GLB,, | Performed by: FAMILY MEDICINE

## 2022-04-19 PROCEDURE — 1126F AMNT PAIN NOTED NONE PRSNT: CPT | Mod: CPTII,S$GLB,, | Performed by: FAMILY MEDICINE

## 2022-04-19 PROCEDURE — 3288F FALL RISK ASSESSMENT DOCD: CPT | Mod: CPTII,S$GLB,, | Performed by: FAMILY MEDICINE

## 2022-04-19 PROCEDURE — 3074F SYST BP LT 130 MM HG: CPT | Mod: CPTII,S$GLB,, | Performed by: FAMILY MEDICINE

## 2022-04-19 PROCEDURE — 82570 ASSAY OF URINE CREATININE: CPT | Performed by: FAMILY MEDICINE

## 2022-04-19 PROCEDURE — 1159F PR MEDICATION LIST DOCUMENTED IN MEDICAL RECORD: ICD-10-PCS | Mod: CPTII,S$GLB,, | Performed by: FAMILY MEDICINE

## 2022-04-19 PROCEDURE — 3074F PR MOST RECENT SYSTOLIC BLOOD PRESSURE < 130 MM HG: ICD-10-PCS | Mod: CPTII,S$GLB,, | Performed by: FAMILY MEDICINE

## 2022-04-19 PROCEDURE — 99999 PR PBB SHADOW E&M-EST. PATIENT-LVL V: ICD-10-PCS | Mod: PBBFAC,,, | Performed by: FAMILY MEDICINE

## 2022-04-19 PROCEDURE — 1159F MED LIST DOCD IN RCRD: CPT | Mod: CPTII,S$GLB,, | Performed by: FAMILY MEDICINE

## 2022-04-19 PROCEDURE — 4010F PR ACE/ARB THEARPY RXD/TAKEN: ICD-10-PCS | Mod: CPTII,S$GLB,, | Performed by: FAMILY MEDICINE

## 2022-04-19 PROCEDURE — 3051F PR MOST RECENT HEMOGLOBIN A1C LEVEL 7.0 - < 8.0%: ICD-10-PCS | Mod: CPTII,S$GLB,, | Performed by: FAMILY MEDICINE

## 2022-04-19 PROCEDURE — 99499 RISK ADDL DX/OHS AUDIT: ICD-10-PCS | Mod: S$GLB,,, | Performed by: FAMILY MEDICINE

## 2022-04-19 PROCEDURE — 36415 COLL VENOUS BLD VENIPUNCTURE: CPT | Mod: PO | Performed by: FAMILY MEDICINE

## 2022-04-19 PROCEDURE — 3066F PR DOCUMENTATION OF TREATMENT FOR NEPHROPATHY: ICD-10-PCS | Mod: CPTII,S$GLB,, | Performed by: FAMILY MEDICINE

## 2022-04-19 PROCEDURE — 99397 PR PREVENTIVE VISIT,EST,65 & OVER: ICD-10-PCS | Mod: S$GLB,,, | Performed by: FAMILY MEDICINE

## 2022-04-19 PROCEDURE — 84443 ASSAY THYROID STIM HORMONE: CPT | Performed by: FAMILY MEDICINE

## 2022-04-19 PROCEDURE — 3008F PR BODY MASS INDEX (BMI) DOCUMENTED: ICD-10-PCS | Mod: CPTII,S$GLB,, | Performed by: FAMILY MEDICINE

## 2022-04-19 PROCEDURE — 1101F PR PT FALLS ASSESS DOC 0-1 FALLS W/OUT INJ PAST YR: ICD-10-PCS | Mod: CPTII,S$GLB,, | Performed by: FAMILY MEDICINE

## 2022-04-19 PROCEDURE — 3051F HG A1C>EQUAL 7.0%<8.0%: CPT | Mod: CPTII,S$GLB,, | Performed by: FAMILY MEDICINE

## 2022-04-19 PROCEDURE — 1160F PR REVIEW ALL MEDS BY PRESCRIBER/CLIN PHARMACIST DOCUMENTED: ICD-10-PCS | Mod: CPTII,S$GLB,, | Performed by: FAMILY MEDICINE

## 2022-04-19 PROCEDURE — 3066F NEPHROPATHY DOC TX: CPT | Mod: CPTII,S$GLB,, | Performed by: FAMILY MEDICINE

## 2022-04-19 PROCEDURE — 1126F PR PAIN SEVERITY QUANTIFIED, NO PAIN PRESENT: ICD-10-PCS | Mod: CPTII,S$GLB,, | Performed by: FAMILY MEDICINE

## 2022-04-19 NOTE — PROGRESS NOTES
HISTORY OF PRESENT ILLNESS: Ms. Garza visits with me today fasting with taking blood pressure medication for annual wellness examination.    END OF LIFE DECISION: She has no living will and does not desire life support.    Current Outpatient Medications   Medication Sig    ACCU-CHEK SMARTVIEW TEST STRIP Strp 1 STRIP BY [facility]NON-DRUG COMBO ROUTE) ROUTE ONCE DAILY.    ALPRAZolam (XANAX) 0.5 MG tablet Take 1 tablet (0.5 mg total) by mouth daily as needed for Anxiety.    amLODIPine (NORVASC) 10 MG tablet Take 1 tablet (10 mg total) by mouth once daily.    aspirin 81 MG Chew Take 81 mg by mouth once daily.    benazepriL (LOTENSIN) 40 MG tablet Take 1 tablet (40 mg total) by mouth once daily.    calcium-vitamin D3 500 mg(1,250mg) -200 unit per tablet Take 1 tablet by mouth once daily.     CRANBERRY EXTRACT ORAL Take 1 tablet by mouth once daily.    evolocumab (REPATHA SURECLICK) 140 mg/mL PnIj Inject 1 mL (140 mg total) into the skin every 14 (fourteen) days.    lancets Misc 1 lancet by Misc.(Non-Drug; Combo Route) route 3 (three) times daily. For Acc-Chek Amber   DX :E11.9    loratadine (CLARITIN) 10 mg tablet TAKE 1 TABLET BY MOUTH EVERY DAY    meclizine (ANTIVERT) 25 mg tablet Take 1 tablet (25 mg total) by mouth 3 (three) times daily as needed for Dizziness or Nausea.    metoprolol succinate (TOPROL-XL) 25 MG 24 hr tablet Take 1 tablet (25 mg total) by mouth once daily.    MULTIVITAMIN W-MINERALS/LUTEIN (CENTRUM SILVER ORAL) Take 1 tablet by mouth once daily.    spironolactone (ALDACTONE) 50 MG tablet Take 1 tablet (50 mg total) by mouth once daily.    triamcinolone (NASACORT) 55 mcg nasal inhaler USE 2 SPRAYS BY NASAL ROUTE ONCE DAILY    blood glucose control, normal Soln 1 Bottle by Misc.(Non-Drug; Combo Route) route once daily. For Accu Check Amber  use twice daily prn dx: E11.9    blood-glucose meter (ACCU-CHEK AMBER) Misc 1 kit by Misc.(Non-Drug; Combo Route) route once. For Accu Check Amber  use  twice daily prn dx: E11.9 for 1 dose     SCREENINGS:   Cholesterol, CMP: March 24, 2022.  FFS/Colonoscopy: March 18, 2014 - okay; repeat in 10 years.  Mammogram: May 26, 2021 - okay.  GYN Exam: April 19, 2021 - okay.  Dexa Scan: May 26, 2021 - okay; repeat in 3 to 5 years.  Eye Exam: November 30, 2021 with Dr. Hardy. She wears glasses.  Dental Exam: October or November 2014 per patient. She wears top dentures.  Due.  PPD: Negative in the past.  Immunizations: Td/Tdap - February 23, 2011. Advised patient insurance-covered benefit only at local pharmacy.  Gardasil - N./A.  Zostavax - February 23, 2011.  Shingrix - Never. Advised patient insurance-covered benefit only at local pharmacy.  Pneumovax - March 3, 2014.  Prevnar-13 shot - March 23, 2015.  Seasonal Flu - October 31, 2021.   Covid-19 (Moderna) vaccine -  February 26, 2021, March 26, 2021, December 15, 2021.    ROS:  GENERAL: Denies fever, chills, fatigue or unusual weight change. Appetite normal. Weight 83.2 kg (183 lb 6.8 oz) at November 1, 2021 visit. Exercises/walks around her home at least 7K steps per day. Monitors her diet.    SKIN: Denies rashes, itching, changes in mole, color or texture of skin or easy bruising.   HEAD: Denies headaches or recent head trauma.  EYES: Denies change in vision, pain, diplopia, redness or discharge. She wears glasses.  EARS: Denies ear pain, discharge, tinnitus, vertigo or decreased hearing. Saw ENT Nia Paredes PA-C on June 2, 2020 for asymmetrical left sensorineural hearing loss, endolymphatic hydrops of left ear follow up.  But wears bilateral hearing aids but not wears them today.  NOSE: Denies loss of smell, epistaxis or rhinitis except nasal congestion and uses Nasacort and Claritin with help.   MOUTH & THROAT: Denies hoarseness or change in voice. Denies excessive gum bleeding or mouth sores. Denies sore throat.  NODES: Denies swollen glands.  CHEST: Denies ZHOU, wheezing, cough, or sputum  "production.  CARDIOVASCULAR: Denies chest pain, PND, orthopnea or reduced exercise tolerance. Denies palpitations.  Saw Dr. Matute, cardiologist, on April 14, 2022 for surveillance of hypertension, hyperlipidemia, statin intolerance, LVH and PAC with 6-month follow up advised. Reports follows with HDMP and reports blood pressure levels average 110/70 and 116/70 on last night; however, reports elevated levels at doctors' visits.  ABDOMEN: Denies diarrhea, constipation, nausea, vomiting, abdominal pain, or blood in stool.  URINARY: Denies flank pain, dysuria or hematuria. Saw Nephrology Dr. Landaverde on March 31, 2022 for surveillance of stage 3 chronic kidney disease at which time he added Metoprolol 25 mg daily and advised she consult with endocrinologist for probable hyperaldosteronism (scheduled for July 22, 2022) and with 6-month follow up advised.    GENITOURINARY: Denies flank pain, dysuria, frequency or hematuria. Performs monthly breast self exams.  ENDOCRINE: Reports follows with DDMP and Cholesterol-DMP. Performs home glucose checks fasting with levels ranging 's but 116 this morning. Saw ENT Dr. Irwin on May 13, 2019 for surveillance of hearing loss but has also seen her in the past for thyroid nodules. However, thyroid ultrasound on May 26, 2021 showed stable findings and I recommended continue to follow annually with thyroid ultrasound.   HEME//LYMPH: Denies bleeding problems.  PERIPHERAL VASCULAR:Denies claudication or cyanosis.  MUSCULOSKELETAL: Denies joint stiffness, pain or swelling today but mentions chronic stiffness with getting up and after sitting for long periods but better as walks around during TV commercials.   NEUROLOGIC: Denies history of seizures, tremors, paralysis, alteration of gait or coordination.  PSYCHIATRIC: Denies mood swings, depression, anxiety, homicidal or suicidal thoughts. Denies sleep problems.      PE:   /60   Pulse 82   Temp 97 °F (36.1 °C)   Ht 5' 4" " (1.626 m)   Wt 83.1 kg (183 lb 3.2 oz)   SpO2 99%   BMI 31.45 kg/m²   APPEARANCE: Well nourished, well developed female, elderly, pleasant and obese, alert and oriented in no acute distress.   HEAD: Non tender. Full range of motion.  EYES: PERRL, conjunctiva pink, lids no edema. She wears glasses.  EARS: External canal patent, no swelling or redness. TM's shiny and clear.  NOSE: Mucosa and turbinates pink, not swollen. No discharge. Non tender sinuses.  THROAT: No pharyngeal erythema or exudate. No stridor. She wears top dentures.   NECK: Supple, no mass. Chronic, non-tender goiter.  NODES: No cervical, axillary or inguinal lymph node enlargement.  CHEST: Normal respiratory effort. Lungs clear to auscultation.  CARDIOVASCULAR: Normal S1, S2. No rubs, murmurs or gallops. PMI not displaced. No carotid bruit. Pedal pulses palpable bilaterally. No edema. Feet look okay without ulcerations or skin breaks.  ABDOMEN: Bowel sounds present. Not distended. Soft. No tenderness, masses or organomegaly.  BREAST EXAM: Symmetrical, no external lesions, no discharge, no masses palpated.  PELVIC EXAM: No external lesions noted, no discharge, absent cervix and uterus, bimanual exam showed no adnexal tenderness or masses. Urethra and bladder intact.  RECTAL EXAM: No anal fissures but non-inflamed external hemorrhoids noted. Heme-negative stool in the rectal vault.  MUSCULOSKELETAL: No joint deformities or stiffness. She is ambulatory without problems.  SKIN: No rashes or suspicious lesions, normal color and turgor.  NEUROLOGIC: Cranial Nerves: II-XII grossly intact. DTR's: Knees, Ankles 2+ and equal bilaterally. Gait & Posture: Normal gait and fine motion. Monofilament test unremarkable.  PSYCHIATRIC: Patient alert, oriented x 3. Mood/Affect normal without acute anxiety or depression noted. Judgment/insight good as she makes appropriate decisions on today's examination.    Protective Sensation (w/ 10 gram monofilament):  Right:  Intact  Left: Intact    Visual Inspection:  Normal -  Bilateral    Pedal Pulses:   Right: Present  Left: Present    Posterior tibialis:   Right:Present  Left: Present     BMP  Lab Results   Component Value Date     03/24/2022    K 4.2 03/24/2022     03/24/2022    CO2 31 (H) 03/24/2022    BUN 13 03/24/2022    CREATININE 1.1 03/24/2022    CALCIUM 9.8 03/24/2022    ANIONGAP 8 03/24/2022    ESTGFRAFRICA 57.6 (A) 03/24/2022    EGFRNONAA 49.9 (A) 03/24/2022     Lab Results   Component Value Date    LDLCALC 76.6 03/24/2022     Lab Results   Component Value Date    WBC 11.28 01/18/2022    HGB 13.0 01/18/2022    HCT 40.3 01/18/2022    MCV 96 01/18/2022     01/18/2022     Lab Results   Component Value Date    ALT 19 01/18/2022    AST 20 01/18/2022    ALKPHOS 118 01/18/2022    BILITOT 0.3 01/18/2022       ASSESSMENT:    ICD-10-CM ICD-9-CM    1. Annual physical exam  Z00.00 V70.0    2. Hypertension associated with diabetes  E11.59 250.80 TSH    I15.2 401.9    3. Type 2 diabetes mellitus with stage 3a chronic kidney disease, without long-term current use of insulin  E11.22 250.40 Hemoglobin A1C    N18.31 585.3 Microalbumin/Creatinine Ratio, Urine   4. Combined hyperlipidemia associated with type 2 diabetes mellitus  E11.69 250.80     E78.2 272.2    5. Statin intolerance  Z78.9 995.27    6. Diastolic dysfunction  I51.89 429.9    7. LVH (left ventricular hypertrophy)  I51.7 429.3    8. Stage 3a chronic kidney disease  N18.31 585.3    9. Multiple thyroid nodules  E04.2 241.1 US Soft Tissue Head Neck Thyroid   10. Obesity (BMI 30.0-34.9)  E66.9 278.00    11. Postmenopausal  Z78.0 V49.81    12. Other screening mammogram  Z12.31 V76.12 Mammo Digital Screening Bilat w/ Curtis     PLAN:  1. Age-appropriate counseling-appropriate low-sodium, low-cholesterol, low carbohydrate diet and exercise daily, monthly breast self exam, annual wellness examination.   2. Patient advised to call for results.  3. Continue current  medications.  4. Keep follow up with specialists.  5. Annual eye and dental examinations advised.   6. Follow up in about 6 months (around 10/19/2022) for diabetes and hypertension follow up.

## 2022-04-22 ENCOUNTER — SPECIALTY PHARMACY (OUTPATIENT)
Dept: PHARMACY | Facility: CLINIC | Age: 72
End: 2022-04-22
Payer: MEDICARE

## 2022-04-22 NOTE — TELEPHONE ENCOUNTER
Specialty Pharmacy - Refill Coordination    Specialty Medication Orders Linked to Encounter    Flowsheet Row Most Recent Value   Medication #1 evolocumab (REPATHA SURECLICK) 140 mg/mL PnIj (Order#[id number], Rx#[id number])          Refill Questions - Documented Responses    Flowsheet Row Most Recent Value   Patient Availability and HIPAA Verification    Does patient want to proceed with activity? Yes   HIPAA/medical authority confirmed? Yes   Relationship to patient of person spoken to? Self   Refill Screening Questions    Would patient like to speak to a pharmacist? No   When does the patient need to receive the medication? 05/01/22   Refill Delivery Questions    How will the patient receive the medication? Delivery Jessica   When does the patient need to receive the medication? 05/01/22   Shipping Address Home   Address in [facility] confirmed and updated if neccessary? Yes   Expected Copay ($) 0   Is the patient able to afford the medication copay? Yes   Payment Method zero copay   Days supply of Refill 28   Refill activity completed? Yes   Refill activity plan Refill scheduled   Shipment/Pickup Date: 04/29/22          Current Outpatient Medications   Medication Sig    ACCU-CHEK SMARTVIEW TEST STRIP Strp 1 STRIP BY [facility]NON-DRUG COMBO ROUTE) ROUTE ONCE DAILY.    ALPRAZolam (XANAX) 0.5 MG tablet Take 1 tablet (0.5 mg total) by mouth daily as needed for Anxiety.    amLODIPine (NORVASC) 10 MG tablet Take 1 tablet (10 mg total) by mouth once daily.    aspirin 81 MG Chew Take 81 mg by mouth once daily.    benazepriL (LOTENSIN) 40 MG tablet Take 1 tablet (40 mg total) by mouth once daily.    blood glucose control, normal Soln 1 Bottle by Misc.(Non-Drug; Combo Route) route once daily. For Accu Check Amber  use twice daily prn dx: E11.9    blood-glucose meter (ACCU-CHEK AMBER) Misc 1 kit by Misc.(Non-Drug; Combo Route) route once. For Accu Check Amber  use twice daily prn dx: E11.9 for 1 dose    calcium-vitamin  D3 500 mg(1,250mg) -200 unit per tablet Take 1 tablet by mouth once daily.     CRANBERRY EXTRACT ORAL Take 1 tablet by mouth once daily.    evolocumab (REPATHA SURECLICK) 140 mg/mL PnIj Inject 1 mL (140 mg total) into the skin every 14 (fourteen) days.    lancets Misc 1 lancet by Misc.(Non-Drug; Combo Route) route 3 (three) times daily. For Acc-Chek Irais   DX :E11.9    loratadine (CLARITIN) 10 mg tablet TAKE 1 TABLET BY MOUTH EVERY DAY    meclizine (ANTIVERT) 25 mg tablet Take 1 tablet (25 mg total) by mouth 3 (three) times daily as needed for Dizziness or Nausea.    metoprolol succinate (TOPROL-XL) 25 MG 24 hr tablet Take 1 tablet (25 mg total) by mouth once daily.    MULTIVITAMIN W-MINERALS/LUTEIN (CENTRUM SILVER ORAL) Take 1 tablet by mouth once daily.    spironolactone (ALDACTONE) 50 MG tablet Take 1 tablet (50 mg total) by mouth once daily.    triamcinolone (NASACORT) 55 mcg nasal inhaler USE 2 SPRAYS BY NASAL ROUTE ONCE DAILY   Last reviewed on 4/19/2022  8:15 AM by Zoë Guzman MD    Review of patient's allergies indicates:   Allergen Reactions    Statins-hmg-coa reductase inhibitors      Muscle Cramps    Last reviewed on  4/19/2022 8:14 AM by Zoë Guzman      Tasks added this encounter   5/22/2022 - Refill Call (Auto Added)   Tasks due within next 3 months   No tasks due.     [facility]  [address] [place] [id number]  Phone: [phone]  Fax: [phone]

## 2022-05-01 ENCOUNTER — PATIENT MESSAGE (OUTPATIENT)
Dept: OTHER | Facility: OTHER | Age: 72
End: 2022-05-01
Payer: MEDICARE

## 2022-05-02 ENCOUNTER — TELEPHONE (OUTPATIENT)
Dept: ADMINISTRATIVE | Facility: HOSPITAL | Age: 72
End: 2022-05-02
Payer: MEDICARE

## 2022-05-10 ENCOUNTER — OFFICE VISIT (OUTPATIENT)
Dept: FAMILY MEDICINE | Facility: CLINIC | Age: 72
End: 2022-05-10
Payer: MEDICARE

## 2022-05-10 VITALS
HEART RATE: 68 BPM | OXYGEN SATURATION: 98 % | DIASTOLIC BLOOD PRESSURE: 50 MMHG | BODY MASS INDEX: 31.47 KG/M2 | RESPIRATION RATE: 20 BRPM | SYSTOLIC BLOOD PRESSURE: 102 MMHG | WEIGHT: 184.31 LBS | TEMPERATURE: 97 F | HEIGHT: 64 IN

## 2022-05-10 DIAGNOSIS — N25.81 SECONDARY HYPERPARATHYROIDISM OF RENAL ORIGIN: ICD-10-CM

## 2022-05-10 DIAGNOSIS — E11.22 TYPE 2 DIABETES MELLITUS WITH STAGE 3A CHRONIC KIDNEY DISEASE, WITHOUT LONG-TERM CURRENT USE OF INSULIN: ICD-10-CM

## 2022-05-10 DIAGNOSIS — I15.2 HYPERTENSION ASSOCIATED WITH DIABETES: ICD-10-CM

## 2022-05-10 DIAGNOSIS — Z00.00 ENCOUNTER FOR PREVENTIVE HEALTH EXAMINATION: Primary | ICD-10-CM

## 2022-05-10 DIAGNOSIS — E11.59 HYPERTENSION ASSOCIATED WITH DIABETES: ICD-10-CM

## 2022-05-10 DIAGNOSIS — E11.69 COMBINED HYPERLIPIDEMIA ASSOCIATED WITH TYPE 2 DIABETES MELLITUS: ICD-10-CM

## 2022-05-10 DIAGNOSIS — E04.2 MULTIPLE THYROID NODULES: ICD-10-CM

## 2022-05-10 DIAGNOSIS — I51.89 DIASTOLIC DYSFUNCTION: ICD-10-CM

## 2022-05-10 DIAGNOSIS — E66.9 OBESITY (BMI 30.0-34.9): ICD-10-CM

## 2022-05-10 DIAGNOSIS — N18.31 TYPE 2 DIABETES MELLITUS WITH STAGE 3A CHRONIC KIDNEY DISEASE, WITHOUT LONG-TERM CURRENT USE OF INSULIN: ICD-10-CM

## 2022-05-10 DIAGNOSIS — N18.31 STAGE 3A CHRONIC KIDNEY DISEASE: ICD-10-CM

## 2022-05-10 DIAGNOSIS — E78.2 COMBINED HYPERLIPIDEMIA ASSOCIATED WITH TYPE 2 DIABETES MELLITUS: ICD-10-CM

## 2022-05-10 PROCEDURE — 1159F PR MEDICATION LIST DOCUMENTED IN MEDICAL RECORD: ICD-10-PCS | Mod: CPTII,S$GLB,, | Performed by: NURSE PRACTITIONER

## 2022-05-10 PROCEDURE — 3074F SYST BP LT 130 MM HG: CPT | Mod: CPTII,S$GLB,, | Performed by: NURSE PRACTITIONER

## 2022-05-10 PROCEDURE — 1170F FXNL STATUS ASSESSED: CPT | Mod: CPTII,S$GLB,, | Performed by: NURSE PRACTITIONER

## 2022-05-10 PROCEDURE — 1160F PR REVIEW ALL MEDS BY PRESCRIBER/CLIN PHARMACIST DOCUMENTED: ICD-10-PCS | Mod: CPTII,S$GLB,, | Performed by: NURSE PRACTITIONER

## 2022-05-10 PROCEDURE — 4010F ACE/ARB THERAPY RXD/TAKEN: CPT | Mod: CPTII,S$GLB,, | Performed by: NURSE PRACTITIONER

## 2022-05-10 PROCEDURE — 3288F PR FALLS RISK ASSESSMENT DOCUMENTED: ICD-10-PCS | Mod: CPTII,S$GLB,, | Performed by: NURSE PRACTITIONER

## 2022-05-10 PROCEDURE — 3072F PR LOW RISK FOR RETINOPATHY: ICD-10-PCS | Mod: CPTII,S$GLB,, | Performed by: NURSE PRACTITIONER

## 2022-05-10 PROCEDURE — G0439 PPPS, SUBSEQ VISIT: HCPCS | Mod: S$GLB,,, | Performed by: NURSE PRACTITIONER

## 2022-05-10 PROCEDURE — 1126F AMNT PAIN NOTED NONE PRSNT: CPT | Mod: CPTII,S$GLB,, | Performed by: NURSE PRACTITIONER

## 2022-05-10 PROCEDURE — 3061F NEG MICROALBUMINURIA REV: CPT | Mod: CPTII,S$GLB,, | Performed by: NURSE PRACTITIONER

## 2022-05-10 PROCEDURE — 3008F BODY MASS INDEX DOCD: CPT | Mod: CPTII,S$GLB,, | Performed by: NURSE PRACTITIONER

## 2022-05-10 PROCEDURE — 99499 RISK ADDL DX/OHS AUDIT: ICD-10-PCS | Mod: ,,, | Performed by: NURSE PRACTITIONER

## 2022-05-10 PROCEDURE — 1126F PR PAIN SEVERITY QUANTIFIED, NO PAIN PRESENT: ICD-10-PCS | Mod: CPTII,S$GLB,, | Performed by: NURSE PRACTITIONER

## 2022-05-10 PROCEDURE — 3078F PR MOST RECENT DIASTOLIC BLOOD PRESSURE < 80 MM HG: ICD-10-PCS | Mod: CPTII,S$GLB,, | Performed by: NURSE PRACTITIONER

## 2022-05-10 PROCEDURE — 99999 PR PBB SHADOW E&M-EST. PATIENT-LVL V: ICD-10-PCS | Mod: PBBFAC,,, | Performed by: NURSE PRACTITIONER

## 2022-05-10 PROCEDURE — 1160F RVW MEDS BY RX/DR IN RCRD: CPT | Mod: CPTII,S$GLB,, | Performed by: NURSE PRACTITIONER

## 2022-05-10 PROCEDURE — 3288F FALL RISK ASSESSMENT DOCD: CPT | Mod: CPTII,S$GLB,, | Performed by: NURSE PRACTITIONER

## 2022-05-10 PROCEDURE — 3051F HG A1C>EQUAL 7.0%<8.0%: CPT | Mod: CPTII,S$GLB,, | Performed by: NURSE PRACTITIONER

## 2022-05-10 PROCEDURE — 99499 UNLISTED E&M SERVICE: CPT | Mod: ,,, | Performed by: NURSE PRACTITIONER

## 2022-05-10 PROCEDURE — 3008F PR BODY MASS INDEX (BMI) DOCUMENTED: ICD-10-PCS | Mod: CPTII,S$GLB,, | Performed by: NURSE PRACTITIONER

## 2022-05-10 PROCEDURE — 3051F PR MOST RECENT HEMOGLOBIN A1C LEVEL 7.0 - < 8.0%: ICD-10-PCS | Mod: CPTII,S$GLB,, | Performed by: NURSE PRACTITIONER

## 2022-05-10 PROCEDURE — 3061F PR NEG MICROALBUMINURIA RESULT DOCUMENTED/REVIEW: ICD-10-PCS | Mod: CPTII,S$GLB,, | Performed by: NURSE PRACTITIONER

## 2022-05-10 PROCEDURE — 1170F PR FUNCTIONAL STATUS ASSESSED: ICD-10-PCS | Mod: CPTII,S$GLB,, | Performed by: NURSE PRACTITIONER

## 2022-05-10 PROCEDURE — 4010F PR ACE/ARB THEARPY RXD/TAKEN: ICD-10-PCS | Mod: CPTII,S$GLB,, | Performed by: NURSE PRACTITIONER

## 2022-05-10 PROCEDURE — 3078F DIAST BP <80 MM HG: CPT | Mod: CPTII,S$GLB,, | Performed by: NURSE PRACTITIONER

## 2022-05-10 PROCEDURE — 3072F LOW RISK FOR RETINOPATHY: CPT | Mod: CPTII,S$GLB,, | Performed by: NURSE PRACTITIONER

## 2022-05-10 PROCEDURE — G0439 PR MEDICARE ANNUAL WELLNESS SUBSEQUENT VISIT: ICD-10-PCS | Mod: S$GLB,,, | Performed by: NURSE PRACTITIONER

## 2022-05-10 PROCEDURE — 1159F MED LIST DOCD IN RCRD: CPT | Mod: CPTII,S$GLB,, | Performed by: NURSE PRACTITIONER

## 2022-05-10 PROCEDURE — 3066F PR DOCUMENTATION OF TREATMENT FOR NEPHROPATHY: ICD-10-PCS | Mod: CPTII,S$GLB,, | Performed by: NURSE PRACTITIONER

## 2022-05-10 PROCEDURE — 1101F PT FALLS ASSESS-DOCD LE1/YR: CPT | Mod: CPTII,S$GLB,, | Performed by: NURSE PRACTITIONER

## 2022-05-10 PROCEDURE — 99999 PR PBB SHADOW E&M-EST. PATIENT-LVL V: CPT | Mod: PBBFAC,,, | Performed by: NURSE PRACTITIONER

## 2022-05-10 PROCEDURE — 3074F PR MOST RECENT SYSTOLIC BLOOD PRESSURE < 130 MM HG: ICD-10-PCS | Mod: CPTII,S$GLB,, | Performed by: NURSE PRACTITIONER

## 2022-05-10 PROCEDURE — 3066F NEPHROPATHY DOC TX: CPT | Mod: CPTII,S$GLB,, | Performed by: NURSE PRACTITIONER

## 2022-05-10 PROCEDURE — 1101F PR PT FALLS ASSESS DOC 0-1 FALLS W/OUT INJ PAST YR: ICD-10-PCS | Mod: CPTII,S$GLB,, | Performed by: NURSE PRACTITIONER

## 2022-05-10 NOTE — PATIENT INSTRUCTIONS
Counseling and Referral of Other Preventative  (Italic type indicates deductible and co-insurance are waived)    Patient Name: Kristyn Garza  Today's Date: 5/10/2022    Health Maintenance       Date Due Completion Date    Shingles Vaccine (2 of 3) 04/20/2011 2/23/2011    TETANUS VACCINE 02/23/2021 2/23/2011    COVID-19 Vaccine (4 - Booster for Moderna series) 04/15/2022 12/15/2021    Mammogram 05/26/2022 5/26/2021    Override on 3/13/2013: Done    Hemoglobin A1c 10/19/2022 4/19/2022    Eye Exam 11/30/2022 11/30/2021    Override on 7/20/2020: Done    Override on 4/30/2019: Done    Override on 4/24/2018: Done    Override on 5/17/2017: Done    Override on 5/16/2016: Done    Override on 5/13/2015: Done    Lipid Panel 03/24/2023 3/24/2022    Diabetes Urine Screening 04/19/2023 4/19/2022    Foot Exam 04/19/2023 4/19/2022    Override on 9/23/2015: Done    Override on 7/16/2015: Done    Override on 3/23/2015: Done    Override on 12/3/2014: Done    Override on 4/29/2014: Done    Colorectal Cancer Screening 03/18/2024 3/18/2014    Override on 6/1/2006: Done    DEXA Scan 05/26/2026 5/26/2021    Override on 3/10/2011: Done (normal repeat in 5 years)        No orders of the defined types were placed in this encounter.    The following information is provided to all patients.  This information is to help you find resources for any of the problems found today that may be affecting your health:                Living healthy guide: www.[facility].[place].gov      Understanding Diabetes: www.diabetes.org      Eating healthy: www.cdc.gov/healthyweight      CDC home safety checklist: www.cdc.gov/steadi/patient.html      Agency on Aging: www.goea.[place].[facility]      Alcoholics anonymous (AA): www.aa.org      Physical Activity: www.barney.nih.gov/th6ussb      Tobacco use: www.quitwithusla.org

## 2022-05-10 NOTE — PROGRESS NOTES
"  Kristyn Garza presented for a  Medicare AWV and comprehensive Health Risk Assessment today. The following components were reviewed and updated:    · Medical history  · Family History  · Social history  · Allergies and Current Medications  · Health Risk Assessment  · Health Maintenance  · Care Team         ** See Completed Assessments for Annual Wellness Visit within the encounter summary.**         The following assessments were completed:  · Living Situation  · CAGE  · Depression Screening  · Timed Get Up and Go  · Whisper Test  · Cognitive Function Screening  · Nutrition Screening  · ADL Screening  · PAQ Screening        Vitals:    05/10/22 0910   BP: (!) 102/50   Pulse: 68   Resp: 20   Temp: 97 °F (36.1 °C)   SpO2: 98%   Weight: 83.6 kg (184 lb 4.9 oz)   Height: 5' 4" (1.626 m)     Body mass index is 31.64 kg/m².  Physical Exam  Vitals and nursing note reviewed.   Constitutional:       Appearance: Normal appearance. She is well-developed.   HENT:      Head: Normocephalic and atraumatic.   Eyes:      Pupils: Pupils are equal, round, and reactive to light.   Neck:      Vascular: No carotid bruit.   Cardiovascular:      Rate and Rhythm: Normal rate and regular rhythm.      Pulses: Normal pulses.      Heart sounds: Normal heart sounds. No murmur heard.    No gallop.   Pulmonary:      Effort: Pulmonary effort is normal.      Breath sounds: Normal breath sounds.   Abdominal:      General: Bowel sounds are normal. There is no distension.      Palpations: Abdomen is soft.      Tenderness: There is no abdominal tenderness.   Musculoskeletal:         General: No tenderness. Normal range of motion.   Skin:     General: Skin is warm and dry.   Neurological:      Mental Status: She is alert.      Motor: No abnormal muscle tone.      Gait: Gait normal.   Psychiatric:         Speech: Speech normal.         Behavior: Behavior normal.         Thought Content: Thought content normal.         Judgment: Judgment normal. "         Current Outpatient Medications:     amLODIPine (NORVASC) 10 MG tablet, Take 1 tablet (10 mg total) by mouth once daily., Disp: 30 tablet, Rfl: 11    aspirin 81 MG Chew, Take 81 mg by mouth once daily., Disp: , Rfl:     benazepriL (LOTENSIN) 40 MG tablet, Take 1 tablet (40 mg total) by mouth once daily., Disp: 90 tablet, Rfl: 3    calcium-vitamin D3 500 mg(1,250mg) -200 unit per tablet, Take 1 tablet by mouth once daily. , Disp: , Rfl:     CRANBERRY EXTRACT ORAL, Take 1 tablet by mouth once daily., Disp: , Rfl:     evolocumab (REPATHA SURECLICK) 140 mg/mL PnIj, Inject 1 mL (140 mg total) into the skin every 14 (fourteen) days., Disp: 2 mL, Rfl: 5    loratadine (CLARITIN) 10 mg tablet, TAKE 1 TABLET BY MOUTH EVERY DAY, Disp: 90 tablet, Rfl: 1    meclizine (ANTIVERT) 25 mg tablet, Take 1 tablet (25 mg total) by mouth 3 (three) times daily as needed for Dizziness or Nausea., Disp: 60 tablet, Rfl: 1    metoprolol succinate (TOPROL-XL) 25 MG 24 hr tablet, Take 1 tablet (25 mg total) by mouth once daily., Disp: 30 tablet, Rfl: 11    MULTIVITAMIN W-MINERALS/LUTEIN (CENTRUM SILVER ORAL), Take 1 tablet by mouth once daily., Disp: , Rfl:     spironolactone (ALDACTONE) 50 MG tablet, Take 1 tablet (50 mg total) by mouth once daily., Disp: 90 tablet, Rfl: 2    triamcinolone (NASACORT) 55 mcg nasal inhaler, USE 2 SPRAYS BY NASAL ROUTE ONCE DAILY, Disp: 50.7 g, Rfl: 1    ACCU-CHEK SMARTVIEW TEST STRIP Strp, 1 STRIP BY MISC.(NON-DRUG COMBO ROUTE) ROUTE ONCE DAILY., Disp: 100 strip, Rfl: 1    ALPRAZolam (XANAX) 0.5 MG tablet, Take 1 tablet (0.5 mg total) by mouth daily as needed for Anxiety., Disp: 30 tablet, Rfl: 0    blood glucose control, normal Soln, 1 Bottle by Misc.(Non-Drug; Combo Route) route once daily. For Accu Check Amber  use twice daily prn dx: E11.9, Disp: 1 each, Rfl: 0    blood-glucose meter (ACCU-CHEK AMBER) Misc, 1 kit by Misc.(Non-Drug; Combo Route) route once. For Accu Check Amber  use twice  daily prn dx: E11.9 for 1 dose, Disp: 1 each, Rfl: 0    lancets Misc, 1 lancet by Misc.(Non-Drug; Combo Route) route 3 (three) times daily. For Acc-Chek Irais   DX :E11.9, Disp: 100 each, Rfl: 0          Diagnoses and health risks identified today and associated recommendations/orders:    1. Encounter for preventive health examination  Due for covid booster and shilgels at pharm     2. Type 2 diabetes mellitus with stage 3a chronic kidney disease, without long-term current use of insulin  Hemoglobin A1C 4.0 - 5.6 % 7.1 High   6.4 High  CM  6.2 High  CM  6.2 High  CM  6.5 High  CM  6.5 High  CM       Chronic and Ongoing pt on no meds- reinforce diet and exerisce. folllow up with PCP as directed     3. Secondary hyperparathyroidism of renal origin  Chronic and Ongoing. Continue current treatment plan as previously prescribed with your nephrologist    4. Stage 3a chronic kidney disease  eGFR if African American >60 mL/min/1.73 m^2 57.6 Abnormal   58 Abnormal   >60.0  52.2 Abnormal   >60.0    Chronic and Stable. Continue current treatment plan as previously prescribed with your  nephrologist    5. Combined hyperlipidemia associated with type 2 diabetes mellitus  Component      Latest Ref Rng & Units 3/24/2022   Cholesterol      120 - 199 mg/dL 139   Triglycerides      30 - 150 mg/dL 67   HDL      40 - 75 mg/dL 49   LDL Cholesterol External      63.0 - 159.0 mg/dL 76.6   HDL/Cholesterol Ratio      20.0 - 50.0 % 35.3   Total Cholesterol/HDL Ratio      2.0 - 5.0 2.8   Non-HDL Cholesterol      mg/dL 90   Chronic and Stable on  evolocumab (REPATHA SURECLICK) 140 mg/mL PnIj, Inject 1 mL (140 mg total) into the skin every 14 (fourteen) days . Continue current treatment plan as previously prescribed with your PC/card md    6. Hypertension associated with diabetes  Chronic and Stable on Norvasc, Ltension, Metoprolol and Spiralactone. Continue current treatment plan as previously prescribed with your PCP, card and nephrologist  providers  Also followed by HTN digital medicine     7. Diastolic dysfunction  Chronic and Stable on Norvasc, Lotension, Metoprolol and Spiralactone. Continue current treatment plan as previously prescribed with your PCP, card and nephrologist providers    8. Multiple thyroid nodules  Chronic and Stable. Scheduled for  Repeat u/s 5/ 27//2022    9. Obesity (BMI 30.0-34.9)  Chronic- BMI  32 kg - reinforce diet and exericse      I offered to discuss advanced care planning, including how to pick a person who would make decisions for you if you were unable to make them for yourself, called a health care power of , and what kind of decisions you might make such as use of life sustaining treatments such as ventilators and tube feeding when faced with a life limiting illness recorded on a living will that they will need to know. (How you want to be cared for as you near the end of your natural life)     X Pt states she will; work on it     Provided Kristyn with a 5-10 year written screening schedule and personal prevention plan. Recommendations were developed using the USPSTF age appropriate recommendations. Education, counseling, and referrals were provided as needed. After Visit Summary printed and given to patient which includes a list of additional screenings\tests needed.     1year annual wellness    Liz Neal NP

## 2022-05-10 NOTE — PROGRESS NOTES
Kristyn Garza presented for a  Medicare AWV and comprehensive Health Risk Assessment today. The following components were reviewed and updated:    · Medical history  · Family History  · Social history  · Allergies and Current Medications  · Health Risk Assessment  · Health Maintenance  · Care Team         ** See Completed Assessments for Annual Wellness Visit within the encounter summary.**         The following assessments were completed:  · Living Situation  · CAGE  · Depression Screening  · Timed Get Up and Go  · Whisper Test  · Cognitive Function Screening  · Nutrition Screening  · ADL Screening  · PAQ Screening        There were no vitals filed for this visit.  There is no height or weight on file to calculate BMI.  Physical Exam          Diagnoses and health risks identified today and associated recommendations/orders:    There are no diagnoses linked to this encounter.    Provided Kristyn with a 5-10 year written screening schedule and personal prevention plan. Recommendations were developed using the USPSTF age appropriate recommendations. Education, counseling, and referrals were provided as needed. After Visit Summary printed and given to patient which includes a list of additional screenings\tests needed.    No follow-ups on file.    Liz Neal NP

## 2022-05-10 NOTE — Clinical Note
Your patient was seen today for a HRA visit. I have included a copy of my visit note, please review the note and feel free to contact me with any questions.  Thank you for allowing me to participate in the care of your patients.  Liz Neal NP

## 2022-05-14 ENCOUNTER — PATIENT MESSAGE (OUTPATIENT)
Dept: OTHER | Facility: OTHER | Age: 72
End: 2022-05-14
Payer: MEDICARE

## 2022-05-23 DIAGNOSIS — E78.2 COMBINED HYPERLIPIDEMIA ASSOCIATED WITH TYPE 2 DIABETES MELLITUS: ICD-10-CM

## 2022-05-23 DIAGNOSIS — Z78.9 STATIN INTOLERANCE: ICD-10-CM

## 2022-05-23 DIAGNOSIS — E11.69 COMBINED HYPERLIPIDEMIA ASSOCIATED WITH TYPE 2 DIABETES MELLITUS: ICD-10-CM

## 2022-05-23 RX ORDER — EVOLOCUMAB 140 MG/ML
140 INJECTION, SOLUTION SUBCUTANEOUS
Qty: 2 ML | Refills: 5 | OUTPATIENT
Start: 2022-05-23 | End: 2022-05-24 | Stop reason: SDUPTHER

## 2022-05-23 NOTE — TELEPHONE ENCOUNTER
LV:05/10/2022    LAV:04/19/2022    Upcoming Appt:10/19/2022    RxRequested :evolocumab (REPATHA SURECLICK) 140 mg/mL PnIj       Last Refill:11/03/2021

## 2022-05-26 ENCOUNTER — SPECIALTY PHARMACY (OUTPATIENT)
Dept: PHARMACY | Facility: CLINIC | Age: 72
End: 2022-05-26
Payer: MEDICARE

## 2022-05-26 DIAGNOSIS — E11.69 COMBINED HYPERLIPIDEMIA ASSOCIATED WITH TYPE 2 DIABETES MELLITUS: ICD-10-CM

## 2022-05-26 DIAGNOSIS — Z78.9 STATIN INTOLERANCE: ICD-10-CM

## 2022-05-26 DIAGNOSIS — E78.2 COMBINED HYPERLIPIDEMIA ASSOCIATED WITH TYPE 2 DIABETES MELLITUS: ICD-10-CM

## 2022-05-26 RX ORDER — EVOLOCUMAB 140 MG/ML
140 INJECTION, SOLUTION SUBCUTANEOUS
Qty: 2 ML | Refills: 5 | Status: SHIPPED | OUTPATIENT
Start: 2022-05-26 | End: 2022-11-28 | Stop reason: SDUPTHER

## 2022-05-26 RX ORDER — EVOLOCUMAB 140 MG/ML
140 INJECTION, SOLUTION SUBCUTANEOUS
Qty: 2 ML | Refills: 5 | Status: CANCELLED | OUTPATIENT
Start: 2022-05-26

## 2022-05-26 NOTE — TELEPHONE ENCOUNTER
Outgoing call with pt regarding Repatha refill. Next injection is 6/1. Refill request sent to provider. Informed pt we willl follow up once approved.

## 2022-05-26 NOTE — TELEPHONE ENCOUNTER
LV:    LAV:04/19/2022    Upcoming Appt:11/01/2021    RxRequested :evolocumab (REPATHA SURECLICK) 140 mg/mL PnIj    Last Refill:5/25/2022      Per pharmacy     To be filled at: [facility]

## 2022-05-27 ENCOUNTER — HOSPITAL ENCOUNTER (OUTPATIENT)
Dept: RADIOLOGY | Facility: HOSPITAL | Age: 72
Discharge: HOME | End: 2022-05-27
Attending: FAMILY MEDICINE
Payer: MEDICARE

## 2022-05-27 VITALS — BODY MASS INDEX: 31.41 KG/M2 | WEIGHT: 184 LBS | HEIGHT: 64 IN

## 2022-05-27 DIAGNOSIS — E04.2 MULTIPLE THYROID NODULES: ICD-10-CM

## 2022-05-27 DIAGNOSIS — Z12.31 OTHER SCREENING MAMMOGRAM: ICD-10-CM

## 2022-05-27 PROCEDURE — 76536 US EXAM OF HEAD AND NECK: CPT | Mod: 26,,, | Performed by: RADIOLOGY

## 2022-05-27 PROCEDURE — 77067 MAMMO DIGITAL SCREENING BILAT WITH TOMO: ICD-10-PCS | Mod: 26,,, | Performed by: RADIOLOGY

## 2022-05-27 PROCEDURE — 77067 SCR MAMMO BI INCL CAD: CPT | Mod: 26,,, | Performed by: RADIOLOGY

## 2022-05-27 PROCEDURE — 76536 US SOFT TISSUE HEAD NECK THYROID: ICD-10-PCS | Mod: 26,,, | Performed by: RADIOLOGY

## 2022-05-27 PROCEDURE — 77063 MAMMO DIGITAL SCREENING BILAT WITH TOMO: ICD-10-PCS | Mod: 26,,, | Performed by: RADIOLOGY

## 2022-05-27 PROCEDURE — 76536 US EXAM OF HEAD AND NECK: CPT | Mod: TC

## 2022-05-27 PROCEDURE — 77063 BREAST TOMOSYNTHESIS BI: CPT | Mod: TC

## 2022-05-27 PROCEDURE — 77063 BREAST TOMOSYNTHESIS BI: CPT | Mod: 26,,, | Performed by: RADIOLOGY

## 2022-06-01 NOTE — TELEPHONE ENCOUNTER
Specialty Pharmacy - Refill Coordination    Specialty Medication Orders Linked to Encounter    Flowsheet Row Most Recent Value   Medication #1 evolocumab (REPATHA SURECLICK) 140 mg/mL PnIj (Order#[id number], Rx#)          Refill Questions - Documented Responses    Flowsheet Row Most Recent Value   Patient Availability and HIPAA Verification    Does patient want to proceed with activity? Yes   HIPAA/medical authority confirmed? Yes   Relationship to patient of person spoken to? Self   Refill Screening Questions    Would patient like to speak to a pharmacist? No   When does the patient need to receive the medication? 06/01/22   Refill Delivery Questions    How will the patient receive the medication? Delivery Jessica   When does the patient need to receive the medication? 06/01/22   Shipping Address Home   Address in [facility] confirmed and updated if neccessary? Yes   Expected Copay ($) 0   Is the patient able to afford the medication copay? Yes   Payment Method zero copay   Days supply of Refill 28   Supplies needed? No supplies needed   Refill activity completed? Yes   Refill activity plan Refill scheduled   Shipment/Pickup Date: 06/02/22          Current Outpatient Medications   Medication Sig    ACCU-CHEK SMARTVIEW TEST STRIP Strp 1 STRIP BY [facility]NON-DRUG COMBO ROUTE) ROUTE ONCE DAILY.    ALPRAZolam (XANAX) 0.5 MG tablet Take 1 tablet (0.5 mg total) by mouth daily as needed for Anxiety.    amLODIPine (NORVASC) 10 MG tablet Take 1 tablet (10 mg total) by mouth once daily.    aspirin 81 MG Chew Take 81 mg by mouth once daily.    benazepriL (LOTENSIN) 40 MG tablet Take 1 tablet (40 mg total) by mouth once daily.    blood glucose control, normal Soln 1 Bottle by Misc.(Non-Drug; Combo Route) route once daily. For Accu Check Amber  use twice daily prn dx: E11.9    blood-glucose meter (ACCU-CHEK AMBER) Misc 1 kit by Misc.(Non-Drug; Combo Route) route once. For Accu Check Amber  use twice daily prn dx: E11.9 for  1 dose    calcium-vitamin D3 500 mg(1,250mg) -200 unit per tablet Take 1 tablet by mouth once daily.     CRANBERRY EXTRACT ORAL Take 1 tablet by mouth once daily.    evolocumab (REPATHA SURECLICK) 140 mg/mL PnIj Inject 1 mL (140 mg total) into the skin every 14 (fourteen) days.    evolocumab (REPATHA SURECLICK) 140 mg/mL PnIj Inject 1 mL (140 mg total) into the skin every 14 (fourteen) days.    lancets Misc 1 lancet by Misc.(Non-Drug; Combo Route) route 3 (three) times daily. For Acc-Chek Irais   DX :E11.9    loratadine (CLARITIN) 10 mg tablet TAKE 1 TABLET BY MOUTH EVERY DAY    meclizine (ANTIVERT) 25 mg tablet Take 1 tablet (25 mg total) by mouth 3 (three) times daily as needed for Dizziness or Nausea.    metoprolol succinate (TOPROL-XL) 25 MG 24 hr tablet Take 1 tablet (25 mg total) by mouth once daily.    MULTIVITAMIN W-MINERALS/LUTEIN (CENTRUM SILVER ORAL) Take 1 tablet by mouth once daily.    spironolactone (ALDACTONE) 50 MG tablet Take 1 tablet (50 mg total) by mouth once daily.    triamcinolone (NASACORT) 55 mcg nasal inhaler USE 2 SPRAYS BY NASAL ROUTE ONCE DAILY   Last reviewed on 5/26/2022 11:16 AM by Latricia Vázquez LPN    Review of patient's allergies indicates:   Allergen Reactions    Statins-hmg-coa reductase inhibitors      Muscle Cramps    Last reviewed on  5/26/2022 11:16 AM by Latricia Vázquez      Tasks added this encounter   6/22/2022 - Refill Call (Auto Added)   Tasks due within next 3 months   No tasks due.     Dione Padua Esguerra [facility]  [address] [facility] [address]  Phone: [phone]  Fax: [phone]

## 2022-06-12 ENCOUNTER — PATIENT MESSAGE (OUTPATIENT)
Dept: OTHER | Facility: OTHER | Age: 72
End: 2022-06-12
Payer: MEDICARE

## 2022-06-24 ENCOUNTER — SPECIALTY PHARMACY (OUTPATIENT)
Dept: PHARMACY | Facility: CLINIC | Age: 72
End: 2022-06-24
Payer: MEDICARE

## 2022-06-24 NOTE — TELEPHONE ENCOUNTER
Specialty Pharmacy - Refill Coordination    Specialty Medication Orders Linked to Encounter    Flowsheet Row Most Recent Value   Medication #1 evolocumab (REPATHA SURECLICK) 140 mg/mL PnIj (Order#[id number], Rx#[id number])          Refill Questions - Documented Responses    Flowsheet Row Most Recent Value   Patient Availability and HIPAA Verification    Does patient want to proceed with activity? Yes   HIPAA/medical authority confirmed? Yes   Relationship to patient of person spoken to? Self   Refill Screening Questions    Would patient like to speak to a pharmacist? No   When does the patient need to receive the medication? 06/29/22   Refill Delivery Questions    How will the patient receive the medication? Delivery Jessica   When does the patient need to receive the medication? 06/29/22   Shipping Address Home   Address in [facility] confirmed and updated if neccessary? Yes   Expected Copay ($) 0   Is the patient able to afford the medication copay? Yes   Payment Method zero copay   Days supply of Refill 28   Supplies needed? No supplies needed   Refill activity completed? Yes   Refill activity plan Refill scheduled   Shipment/Pickup Date: 06/28/22          Current Outpatient Medications   Medication Sig    ACCU-CHEK SMARTVIEW TEST STRIP Strp 1 STRIP BY [facility]NON-DRUG COMBO ROUTE) ROUTE ONCE DAILY.    ALPRAZolam (XANAX) 0.5 MG tablet Take 1 tablet (0.5 mg total) by mouth daily as needed for Anxiety.    amLODIPine (NORVASC) 10 MG tablet Take 1 tablet (10 mg total) by mouth once daily.    aspirin 81 MG Chew Take 81 mg by mouth once daily.    benazepriL (LOTENSIN) 40 MG tablet Take 1 tablet (40 mg total) by mouth once daily.    blood glucose control, normal Soln 1 Bottle by Misc.(Non-Drug; Combo Route) route once daily. For Accu Check Amber  use twice daily prn dx: E11.9    blood-glucose meter (ACCU-CHEK AMBER) Misc 1 kit by Misc.(Non-Drug; Combo Route) route once. For Accu Check Amber  use twice daily prn  dx: E11.9 for 1 dose    calcium-vitamin D3 500 mg(1,250mg) -200 unit per tablet Take 1 tablet by mouth once daily.     CRANBERRY EXTRACT ORAL Take 1 tablet by mouth once daily.    evolocumab (REPATHA SURECLICK) 140 mg/mL PnIj Inject 1 mL (140 mg total) into the skin every 14 (fourteen) days.    evolocumab (REPATHA SURECLICK) 140 mg/mL PnIj Inject 1 mL (140 mg total) into the skin every 14 (fourteen) days.    lancets Misc 1 lancet by Misc.(Non-Drug; Combo Route) route 3 (three) times daily. For Acc-Chek Irais   DX :E11.9    loratadine (CLARITIN) 10 mg tablet TAKE 1 TABLET BY MOUTH EVERY DAY    meclizine (ANTIVERT) 25 mg tablet Take 1 tablet (25 mg total) by mouth 3 (three) times daily as needed for Dizziness or Nausea.    metoprolol succinate (TOPROL-XL) 25 MG 24 hr tablet Take 1 tablet (25 mg total) by mouth once daily.    MULTIVITAMIN W-MINERALS/LUTEIN (CENTRUM SILVER ORAL) Take 1 tablet by mouth once daily.    spironolactone (ALDACTONE) 50 MG tablet Take 1 tablet (50 mg total) by mouth once daily.    triamcinolone (NASACORT) 55 mcg nasal inhaler USE 2 SPRAYS BY NASAL ROUTE ONCE DAILY   Last reviewed on 5/26/2022 11:16 AM by Latricia Vázquez LPN    Review of patient's allergies indicates:   Allergen Reactions    Statins-hmg-coa reductase inhibitors      Muscle Cramps    Last reviewed on  5/26/2022 11:16 AM by Latricia Vázquez      Tasks added this encounter   7/20/2022 - Refill Call (Auto Added)   Tasks due within next 3 months   No tasks due.     Halie Alegre, PharmD  [facility] - [facility]  [address] [place] [id number]  Phone: [phone]  Fax: [phone]

## 2022-07-20 ENCOUNTER — SPECIALTY PHARMACY (OUTPATIENT)
Dept: PHARMACY | Facility: CLINIC | Age: 72
End: 2022-07-20
Payer: MEDICARE

## 2022-07-20 NOTE — TELEPHONE ENCOUNTER
Spoke w pt regarding Repatha refill. Pt stated she injects the 1st and 15th of every month. Pt is due 8/1. Asked pt if we could call her 7/25 and she agreed.

## 2022-07-27 NOTE — TELEPHONE ENCOUNTER
Specialty Pharmacy - Refill Coordination    Specialty Medication Orders Linked to Encounter    Flowsheet Row Most Recent Value   Medication #1 evolocumab (REPATHA SURECLICK) 140 mg/mL PnIj (Order#[id number], Rx#[id number])          Refill Questions - Documented Responses    Flowsheet Row Most Recent Value   Patient Availability and HIPAA Verification    Does patient want to proceed with activity? Yes   HIPAA/medical authority confirmed? Yes   Relationship to patient of person spoken to? Self   Refill Screening Questions    Would patient like to speak to a pharmacist? No   When does the patient need to receive the medication? 08/01/22   Refill Delivery Questions    How will the patient receive the medication? Delivery Jessica   When does the patient need to receive the medication? 08/01/22   Shipping Address Home   Address in [facility] confirmed and updated if neccessary? Yes   Expected Copay ($) 0   Is the patient able to afford the medication copay? Yes   Payment Method zero copay   Days supply of Refill 28   Supplies needed? Alcohol Swabs   Refill activity completed? Yes   Refill activity plan Refill scheduled   Shipment/Pickup Date: 07/27/22          Current Outpatient Medications   Medication Sig    ACCU-CHEK SMARTVIEW TEST STRIP Strp 1 STRIP BY [facility]NON-DRUG COMBO ROUTE) ROUTE ONCE DAILY.    ALPRAZolam (XANAX) 0.5 MG tablet Take 1 tablet (0.5 mg total) by mouth daily as needed for Anxiety.    amLODIPine (NORVASC) 10 MG tablet Take 1 tablet (10 mg total) by mouth once daily.    aspirin 81 MG Chew Take 81 mg by mouth once daily.    benazepriL (LOTENSIN) 40 MG tablet Take 1 tablet (40 mg total) by mouth once daily.    blood glucose control, normal Soln 1 Bottle by Misc.(Non-Drug; Combo Route) route once daily. For Accu Check Amber  use twice daily prn dx: E11.9    blood-glucose meter (ACCU-CHEK AMBER) Misc 1 kit by Misc.(Non-Drug; Combo Route) route once. For Accu Check Amber  use twice daily prn dx:  E11.9 for 1 dose    calcium-vitamin D3 500 mg(1,250mg) -200 unit per tablet Take 1 tablet by mouth once daily.     CRANBERRY EXTRACT ORAL Take 1 tablet by mouth once daily.    evolocumab (REPATHA SURECLICK) 140 mg/mL PnIj Inject 1 mL (140 mg total) into the skin every 14 (fourteen) days.    evolocumab (REPATHA SURECLICK) 140 mg/mL PnIj Inject 1 mL (140 mg total) into the skin every 14 (fourteen) days.    lancets Misc 1 lancet by Misc.(Non-Drug; Combo Route) route 3 (three) times daily. For Acc-Chek Irais   DX :E11.9    loratadine (CLARITIN) 10 mg tablet Take 1 tablet (10 mg total) by mouth once daily.    meclizine (ANTIVERT) 25 mg tablet Take 1 tablet (25 mg total) by mouth 3 (three) times daily as needed for Dizziness or Nausea.    metoprolol succinate (TOPROL-XL) 25 MG 24 hr tablet Take 1 tablet (25 mg total) by mouth once daily.    MULTIVITAMIN W-MINERALS/LUTEIN (CENTRUM SILVER ORAL) Take 1 tablet by mouth once daily.    spironolactone (ALDACTONE) 50 MG tablet Take 1 tablet (50 mg total) by mouth once daily.    triamcinolone (NASACORT) 55 mcg nasal inhaler USE 2 SPRAYS BY NASAL ROUTE ONCE DAILY   Last reviewed on 5/26/2022 11:16 AM by Latricia Vázquez LPN    Review of patient's allergies indicates:   Allergen Reactions    Statins-hmg-coa reductase inhibitors      Muscle Cramps    Last reviewed on  5/26/2022 11:16 AM by Latricia Vázquez      Tasks added this encounter   8/22/2022 - Refill Call (Auto Added)   Tasks due within next 3 months   No tasks due.     Boris Jaramillo, PharmD  [facility]  [address] [place] [address]  Phone: [phone]  Fax: [phone]

## 2022-08-25 ENCOUNTER — SPECIALTY PHARMACY (OUTPATIENT)
Dept: PHARMACY | Facility: CLINIC | Age: 72
End: 2022-08-25
Payer: MEDICARE

## 2022-08-25 NOTE — TELEPHONE ENCOUNTER
Specialty Pharmacy - Refill Coordination    Specialty Medication Orders Linked to Encounter    Flowsheet Row Most Recent Value   Medication #1 evolocumab (REPATHA SURECLICK) 140 mg/mL PnIj (Order#[id number], Rx#[id number])          Refill Questions - Documented Responses    Flowsheet Row Most Recent Value   Patient Availability and HIPAA Verification    Does patient want to proceed with activity? Yes   HIPAA/medical authority confirmed? Yes   Relationship to patient of person spoken to? Self   Refill Screening Questions    Would patient like to speak to a pharmacist? No   When does the patient need to receive the medication? 09/01/22   Refill Delivery Questions    How will the patient receive the medication? Delivery Jessica   When does the patient need to receive the medication? 09/01/22   Shipping Address Home   Address in [facility] confirmed and updated if neccessary? Yes   Expected Copay ($) 0   Is the patient able to afford the medication copay? Yes   Payment Method zero copay   Days supply of Refill 28   Supplies needed? No supplies needed   Refill activity completed? Yes   Refill activity plan Refill scheduled   Shipment/Pickup Date: 08/29/22          Current Outpatient Medications   Medication Sig    ACCU-CHEK SMARTVIEW TEST STRIP Strp 1 STRIP BY [facility]NON-DRUG COMBO ROUTE) ROUTE ONCE DAILY.    ALPRAZolam (XANAX) 0.5 MG tablet Take 1 tablet (0.5 mg total) by mouth daily as needed for Anxiety.    amLODIPine (NORVASC) 10 MG tablet Take 1 tablet (10 mg total) by mouth once daily.    aspirin 81 MG Chew Take 81 mg by mouth once daily.    benazepriL (LOTENSIN) 40 MG tablet Take 1 tablet (40 mg total) by mouth once daily.    blood glucose control, normal Soln 1 Bottle by Misc.(Non-Drug; Combo Route) route once daily. For Accu Check Amber  use twice daily prn dx: E11.9    blood-glucose meter (ACCU-CHEK AMBER) Misc 1 kit by Misc.(Non-Drug; Combo Route) route once. For Accu Check Amber  use twice daily prn  dx: E11.9 for 1 dose    calcium-vitamin D3 500 mg(1,250mg) -200 unit per tablet Take 1 tablet by mouth once daily.     CRANBERRY EXTRACT ORAL Take 1 tablet by mouth once daily.    evolocumab (REPATHA SURECLICK) 140 mg/mL PnIj Inject 1 mL (140 mg total) into the skin every 14 (fourteen) days.    evolocumab (REPATHA SURECLICK) 140 mg/mL PnIj Inject 1 mL (140 mg total) into the skin every 14 (fourteen) days.    lancets Misc 1 lancet by Misc.(Non-Drug; Combo Route) route 3 (three) times daily. For Acc-Chek Irais   DX :E11.9    loratadine (CLARITIN) 10 mg tablet Take 1 tablet (10 mg total) by mouth once daily.    meclizine (ANTIVERT) 25 mg tablet Take 1 tablet (25 mg total) by mouth 3 (three) times daily as needed for Dizziness or Nausea.    metoprolol succinate (TOPROL-XL) 25 MG 24 hr tablet Take 1 tablet (25 mg total) by mouth once daily.    MULTIVITAMIN W-MINERALS/LUTEIN (CENTRUM SILVER ORAL) Take 1 tablet by mouth once daily.    spironolactone (ALDACTONE) 50 MG tablet Take 1 tablet (50 mg total) by mouth once daily.    triamcinolone (NASACORT) 55 mcg nasal inhaler USE 2 SPRAYS BY NASAL ROUTE ONCE DAILY   Last reviewed on 5/26/2022 11:16 AM by Latricia Vázquez LPN    Review of patient's allergies indicates:   Allergen Reactions    Statins-hmg-coa reductase inhibitors      Muscle Cramps    Last reviewed on  5/26/2022 11:16 AM by Latricia Vázquez      Tasks added this encounter   9/22/2022 - Refill Call (Auto Added)   Tasks due within next 3 months   No tasks due.     [facility]  [address] [place] [id number]  Phone: [phone]  Fax: [phone]

## 2022-09-07 ENCOUNTER — PATIENT MESSAGE (OUTPATIENT)
Dept: OTHER | Facility: OTHER | Age: 72
End: 2022-09-07
Payer: MEDICARE

## 2022-09-12 ENCOUNTER — PATIENT MESSAGE (OUTPATIENT)
Dept: ENDOCRINOLOGY | Facility: CLINIC | Age: 72
End: 2022-09-12

## 2022-09-12 ENCOUNTER — OFFICE VISIT (OUTPATIENT)
Dept: ENDOCRINOLOGY | Facility: CLINIC | Age: 72
End: 2022-09-12
Payer: MEDICARE

## 2022-09-12 ENCOUNTER — TELEPHONE (OUTPATIENT)
Dept: ENDOCRINOLOGY | Facility: CLINIC | Age: 72
End: 2022-09-12
Payer: MEDICARE

## 2022-09-12 DIAGNOSIS — E04.2 MULTIPLE THYROID NODULES: ICD-10-CM

## 2022-09-12 DIAGNOSIS — I15.2 HYPERTENSION ASSOCIATED WITH DIABETES: ICD-10-CM

## 2022-09-12 DIAGNOSIS — E66.9 OBESITY (BMI 30.0-34.9): ICD-10-CM

## 2022-09-12 DIAGNOSIS — E11.59 HYPERTENSION ASSOCIATED WITH DIABETES: ICD-10-CM

## 2022-09-12 DIAGNOSIS — E11.22 TYPE 2 DIABETES MELLITUS WITH STAGE 3A CHRONIC KIDNEY DISEASE, WITHOUT LONG-TERM CURRENT USE OF INSULIN: ICD-10-CM

## 2022-09-12 DIAGNOSIS — N18.31 TYPE 2 DIABETES MELLITUS WITH STAGE 3A CHRONIC KIDNEY DISEASE, WITHOUT LONG-TERM CURRENT USE OF INSULIN: ICD-10-CM

## 2022-09-12 DIAGNOSIS — N18.31 STAGE 3A CHRONIC KIDNEY DISEASE: ICD-10-CM

## 2022-09-12 DIAGNOSIS — E27.8 ADRENAL NODULE: Primary | ICD-10-CM

## 2022-09-12 DIAGNOSIS — I15.9 SECONDARY HYPERTENSION: ICD-10-CM

## 2022-09-12 PROBLEM — E27.9 ADRENAL NODULE: Status: ACTIVE | Noted: 2022-09-12

## 2022-09-12 PROCEDURE — 3051F PR MOST RECENT HEMOGLOBIN A1C LEVEL 7.0 - < 8.0%: ICD-10-PCS | Mod: CPTII,95,, | Performed by: INTERNAL MEDICINE

## 2022-09-12 PROCEDURE — 3066F NEPHROPATHY DOC TX: CPT | Mod: CPTII,95,, | Performed by: INTERNAL MEDICINE

## 2022-09-12 PROCEDURE — 99204 PR OFFICE/OUTPT VISIT, NEW, LEVL IV, 45-59 MIN: ICD-10-PCS | Mod: 95,,, | Performed by: INTERNAL MEDICINE

## 2022-09-12 PROCEDURE — 3066F PR DOCUMENTATION OF TREATMENT FOR NEPHROPATHY: ICD-10-PCS | Mod: CPTII,95,, | Performed by: INTERNAL MEDICINE

## 2022-09-12 PROCEDURE — 99499 RISK ADDL DX/OHS AUDIT: ICD-10-PCS | Mod: 95,,, | Performed by: STUDENT IN AN ORGANIZED HEALTH CARE EDUCATION/TRAINING PROGRAM

## 2022-09-12 PROCEDURE — 3061F PR NEG MICROALBUMINURIA RESULT DOCUMENTED/REVIEW: ICD-10-PCS | Mod: CPTII,95,, | Performed by: INTERNAL MEDICINE

## 2022-09-12 PROCEDURE — 3051F HG A1C>EQUAL 7.0%<8.0%: CPT | Mod: CPTII,95,, | Performed by: INTERNAL MEDICINE

## 2022-09-12 PROCEDURE — 1160F PR REVIEW ALL MEDS BY PRESCRIBER/CLIN PHARMACIST DOCUMENTED: ICD-10-PCS | Mod: CPTII,95,, | Performed by: INTERNAL MEDICINE

## 2022-09-12 PROCEDURE — 99499 UNLISTED E&M SERVICE: CPT | Mod: 95,,, | Performed by: STUDENT IN AN ORGANIZED HEALTH CARE EDUCATION/TRAINING PROGRAM

## 2022-09-12 PROCEDURE — 1159F MED LIST DOCD IN RCRD: CPT | Mod: CPTII,95,, | Performed by: INTERNAL MEDICINE

## 2022-09-12 PROCEDURE — 4010F PR ACE/ARB THEARPY RXD/TAKEN: ICD-10-PCS | Mod: CPTII,95,, | Performed by: INTERNAL MEDICINE

## 2022-09-12 PROCEDURE — 3061F NEG MICROALBUMINURIA REV: CPT | Mod: CPTII,95,, | Performed by: INTERNAL MEDICINE

## 2022-09-12 PROCEDURE — 1160F RVW MEDS BY RX/DR IN RCRD: CPT | Mod: CPTII,95,, | Performed by: INTERNAL MEDICINE

## 2022-09-12 PROCEDURE — 99204 OFFICE O/P NEW MOD 45 MIN: CPT | Mod: 95,,, | Performed by: INTERNAL MEDICINE

## 2022-09-12 PROCEDURE — 3072F PR LOW RISK FOR RETINOPATHY: ICD-10-PCS | Mod: CPTII,95,, | Performed by: INTERNAL MEDICINE

## 2022-09-12 PROCEDURE — 4010F ACE/ARB THERAPY RXD/TAKEN: CPT | Mod: CPTII,95,, | Performed by: INTERNAL MEDICINE

## 2022-09-12 PROCEDURE — 1159F PR MEDICATION LIST DOCUMENTED IN MEDICAL RECORD: ICD-10-PCS | Mod: CPTII,95,, | Performed by: INTERNAL MEDICINE

## 2022-09-12 PROCEDURE — 3072F LOW RISK FOR RETINOPATHY: CPT | Mod: CPTII,95,, | Performed by: INTERNAL MEDICINE

## 2022-09-12 RX ORDER — DEXAMETHASONE 1 MG/1
1 TABLET ORAL ONCE
Qty: 1 TABLET | Refills: 0 | Status: SHIPPED | OUTPATIENT
Start: 2022-09-12 | End: 2022-09-12

## 2022-09-12 RX ORDER — DEXAMETHASONE 1 MG/1
1 TABLET ORAL ONCE
Qty: 1 TABLET | Refills: 0 | Status: CANCELLED | OUTPATIENT
Start: 2022-09-12 | End: 2022-09-12

## 2022-09-12 NOTE — ASSESSMENT & PLAN NOTE
Thyroid nodules are being followed by pt's PCP  Stable small nodules on more recent US  Defer continued monitoring/management to PCP

## 2022-09-12 NOTE — ASSESSMENT & PLAN NOTE
Previously uncontrolled HTN with sporadic hypokalemia in the past, although BP is now controlled on 3 meds and last K was normal  Labs more c/w renin-mediated aldosterone excess rather than primary hyperaldosteronism  However, labs were done after holding ACEi and aldactone for 2-3 days respectively. Typically would hold ACEi x2wks and Aldactone x4wks for accurate labs    Do not suspect endocrine etiology. Even if it were primary hyperaldosteronism she would require invasive testing with AVS prior to surgery for localization due to b/l adenomas. Additionally, she is at goal on medical therapy with Aldactone, which would be the preferred management for this elderly pt with multiple medical co-morbidities.    Recommend pt f/u with nephrology and PCP and continue current BP meds.

## 2022-09-12 NOTE — PATIENT INSTRUCTIONS
Ms Garza,     Thank you for completing a virtual visit with me!      Here is what we discussed today:  - Your blood pressure looks good on your current medications and we would not make any changes.  - We would like to follow up the small nodules seen on your adrenal glands with another CT scan to ensure they are not getting larger.  - We recommend a dexamethasone suppression test, this will evaluate if the small adrenal nodules are producing too much steroid hormones. We do not suspect this to be abnormal, but will plan to do this test once per year for three years. The instructions are below.  - We will plan to see you back in clinic in 1 year. You should follow up with your nephrologist (kidney doctor) and your primary care doctor in the meantime. We will send our note to your doctors so they know what we discussed today.      Dexamethasone Suppression Test:     - Take 1 mg tablet of dexamethasone by mouth at exactly 11:00 p.m. the night before your 8:00 a.m. blood test is scheduled  - Have blood drawn exactly at 8:00 a.m. as the timing of the medication and blood draw are very important     - I have sent a prescription for 1 mg of dexamethasone (one dose) to your pharmacy  - A normal response to taking the dexamethasone is a low cortisol level the next day. Do not be alarmed if your cortisol level is marked as abnormally low because that is a normal response.     One of our medical assistants will be in touch with you to schedule the 8am labs and your CT scan.         Please let me know if you have any other questions.        Lucia Tolentino MD  [facility], 6th Floor  [address] [place], [place], [address]     Office: [phone]  Fax: [phone]

## 2022-09-12 NOTE — ASSESSMENT & PLAN NOTE
Small b/l adrenal nodules on CT 1/2022  24h urine cortisol was normal  Will do low dose DST now and annually for 3 yrs (assuming it is normal) to r/o excess cortisol production, but low clinical suspicion for this.    Also, will repeat imaging now with CT adrenal protocol to ensure no significant growth in nodules.

## 2022-09-12 NOTE — Clinical Note
Low dose dex suppression test - needs 8am labs scheduled (can do all labs ordered at that time) CT abd/pelvis for adrenal nodule f/u 1yr f/u

## 2022-09-12 NOTE — ASSESSMENT & PLAN NOTE
CKD3 with mildly reducecd GFR and Cr 1.1 on last labs in 3/2022  Will get CMP again with 8am labs to ensure renal function is checked again prior to contrasted CT

## 2022-09-12 NOTE — ASSESSMENT & PLAN NOTE
Defer T2DM management by pt's PCP  Last a1c appropriate for age.    Lab Results   Component Value Date    HGBA1C 7.1 (H) 04/19/2022

## 2022-09-12 NOTE — PROGRESS NOTES
"The patient location is: [place]  The chief complaint leading to consultation is: secondary hypertension with hypokalemia    Visit type: audiovisual    Each patient to whom he or she provides medical services by telemedicine is:  (1) informed of the relationship between the physician and patient and the respective role of any other health care provider with respect to management of the patient; and (2) notified that he or she may decline to receive medical services by telemedicine and may withdraw from such care at any time.  Subjective:      Patient ID: Kristyn Garza is a 71 y.o. female.    Chief Complaint:  Hypertension      History of Present Illness  Medical diagnoses include HTN, T2DM, multinodular thyroid, HLD, CKD3, and HFpEF    Pt was referred to endocrine by nephrologist due to hypertension and hypokalemia with concern for hypoaldosteronism. Pt's PCP manages her T2DM and multinodular thyroid.    She reports being started on spironolactone "many years ago", sometime before Trista. She states it was initially started due to low potassium. Was later started on ACEi. More recently, 3/2022 she was started on low dose metoprolol succinate by her nephrologist.  BP previously uncontrolled, but pt reports clinic BP were always higher than at home. Since beginning ambulatory BP monitoring with the digital HTN program, BP has been well controlled.    Current HTN regimen:  - Amlodipine 10 mg qd  - Benazepril 40 mg qd  - Aldactone 50 mg qd    Hypokalemia has been intermittent, and urine K elevated in the past suggestive of renal etiology. Most recent potassium wnl.    Pt had 24h urine collection done in 12/2021 for aldosterone and cortisol, however, Aldactone held for only 3d prior to collection, and benazepril held for 2 days prior to collection.    24h urine aldosterone elevated   24h urine cortisol normal    Plasma aldosterone 95  Plasma renin activity 7.4  Serum ziggy/renin activity ratio 12.8  however not " collected at 8am. It appears collection was at 1:24 pm on 1/18/2022.    Plasma metanephrines and 24h urine metanephrines all normal.  No clinical sx concerning for pheo.    CT A/P from 1/2022 revealed small b/l adrenal adenomas.    ROS:   As above    Objective:     There were no vitals taken for this visit.  BP Readings from Last 3 Encounters:   05/10/22 (!) 102/50   04/19/22 112/60   04/14/22 132/72     Wt Readings from Last 1 Encounters:   05/27/22 0957 83.5 kg (184 lb)     There is no height or weight on file to calculate BMI.      Physical Exam  Constitutional:       Appearance: Normal appearance.   Neurological:      Mental Status: She is alert and oriented to person, place, and time.   Psychiatric:         Mood and Affect: Mood normal.         Behavior: Behavior normal.     Lab Review:   Lab Results   Component Value Date    HGBA1C 7.1 (H) 04/19/2022     Lab Results   Component Value Date    CHOL 139 03/24/2022    HDL 49 03/24/2022    LDLCALC 76.6 03/24/2022    TRIG 67 03/24/2022    CHOLHDL 35.3 03/24/2022     Lab Results   Component Value Date     03/24/2022    K 4.2 03/24/2022     03/24/2022    CO2 31 (H) 03/24/2022     (H) 03/24/2022    BUN 13 03/24/2022    CREATININE 1.1 03/24/2022    CALCIUM 9.8 03/24/2022    PROT 8.0 01/18/2022    ALBUMIN 3.8 03/24/2022    BILITOT 0.3 01/18/2022    ALKPHOS 118 01/18/2022    AST 20 01/18/2022    ALT 19 01/18/2022    ANIONGAP 8 03/24/2022    ESTGFRAFRICA 57.6 (A) 03/24/2022    EGFRNONAA 49.9 (A) 03/24/2022    TSH 0.516 04/19/2022     Vit D, 25-Hydroxy   Date Value Ref Range Status   06/15/2016 45 30 - 96 ng/mL Final     Comment:     Vitamin D deficiency.........<10 ng/mL                              Vitamin D insufficiency......10-29 ng/mL       Vitamin D sufficiency........> or equal to 30 ng/mL  Vitamin D toxicity............>100 ng/mL       Component      Latest Ref Rng & Units 1/18/2022 12/22/2021   Hours Collected      hr  24   Urine Total Volume       mL  1525   Aldosterone, 24H Ur      1.2 - 28.1 ug/d  34.3 (H)   Creatinine, Urine - per volume      mg/dL  50   Creatinine, urine - per 24 hours      500 - 1400 mg/d  762   Cortisol, 24H Ur      3.5 - 45 mcg/24 h  14   Cortisol Clt Tm      h  24   Urine Volume Cortisol      mL  1525   ALDOSTERONE      ng/dL 95.0    Renin      ng/mL/hr 7.4    Aldosterone/Renin Activity Calculation      <=25.0 ratio 12.8    Metanephrine, Free      < OR = 57 pg/mL <25    Metanephrine, Total, Plasma      < OR = 205 pg/mL 138    Normetanephrine, Free      < OR = 148 pg/mL 138      Assessment and Plan     Secondary hypertension  Previously uncontrolled HTN with sporadic hypokalemia in the past, although BP is now controlled on 3 meds and last K was normal  Labs more c/w renin-mediated aldosterone excess rather than primary hyperaldosteronism  However, labs were done after holding ACEi and aldactone for 2-3 days respectively. Typically would hold ACEi x2wks and Aldactone x4wks for accurate labs    Do not suspect endocrine etiology. Even if it were primary hyperaldosteronism she would require invasive testing with AVS prior to surgery for localization due to b/l adenomas. Additionally, she is at goal on medical therapy with Aldactone, which would be the preferred management for this elderly pt with multiple medical co-morbidities.    Recommend pt f/u with nephrology and PCP and continue current BP meds.      Adrenal nodule  Small b/l adrenal nodules on CT 1/2022  24h urine cortisol was normal  Will do low dose DST now and annually for 3 yrs (assuming it is normal) to r/o excess cortisol production, but low clinical suspicion for this.    Also, will repeat imaging now with CT adrenal protocol to ensure no significant growth in nodules.    CKD (chronic kidney disease) stage 3, GFR 30-59 ml/min  CKD3 with mildly reducecd GFR and Cr 1.1 on last labs in 3/2022  Will get CMP again with 8am labs to ensure renal function is checked again prior to  contrasted CT    Type 2 diabetes mellitus with kidney complication, without long-term current use of insulin  Defer T2DM management by pt's PCP  Last a1c appropriate for age.    Lab Results   Component Value Date    HGBA1C 7.1 (H) 04/19/2022         Multiple thyroid nodules  Thyroid nodules are being followed by pt's PCP  Stable small nodules on more recent US  Defer continued monitoring/management to PCP      RTC in 1yr for f/u and LDDST at that time.    Lucia Tolentino MD  [facility], 6th Floor  [address] [place], [address]    Office: [phone]  Fax: [phone]      I, Candis Ellis MD,  have personally taken the history and examined the patient and agree with the resident's note as stated above.    Suspect renin mediated aldosterone excess as the renin was quite high.  That being said, even if she does have primary hyperaldosteronism medical management is reasonable as long as the renin is detectable which hers was elevated.    Consider SGLT2 as next line For diabetes and CKD    Evaluate for adrenal nodule stability and subclinical Cushing's

## 2022-09-12 NOTE — TELEPHONE ENCOUNTER
Spoke with patient calling to schedule labs. Patient states she has to roberto up medication before she can schedule. Patient will call back and schedule lab and CT

## 2022-09-15 ENCOUNTER — LAB VISIT (OUTPATIENT)
Dept: LAB | Facility: HOSPITAL | Age: 72
End: 2022-09-15
Attending: FAMILY MEDICINE
Payer: MEDICARE

## 2022-09-15 DIAGNOSIS — E11.59 HYPERTENSION ASSOCIATED WITH DIABETES: ICD-10-CM

## 2022-09-15 DIAGNOSIS — I15.2 HYPERTENSION ASSOCIATED WITH DIABETES: ICD-10-CM

## 2022-09-15 DIAGNOSIS — N18.31 STAGE 3A CHRONIC KIDNEY DISEASE: ICD-10-CM

## 2022-09-15 DIAGNOSIS — I15.9 SECONDARY HYPERTENSION: ICD-10-CM

## 2022-09-15 DIAGNOSIS — E27.8 ADRENAL NODULE: ICD-10-CM

## 2022-09-15 LAB
ALBUMIN SERPL BCP-MCNC: 4.1 G/DL (ref 3.5–5.2)
ANION GAP SERPL CALC-SCNC: 11 MMOL/L (ref 8–16)
BUN SERPL-MCNC: 15 MG/DL (ref 8–23)
CALCIUM SERPL-MCNC: 9.6 MG/DL (ref 8.7–10.5)
CHLORIDE SERPL-SCNC: 106 MMOL/L (ref 95–110)
CO2 SERPL-SCNC: 24 MMOL/L (ref 23–29)
CORTIS SERPL-MCNC: <1 UG/DL (ref 4.3–22.4)
CREAT SERPL-MCNC: 1.1 MG/DL (ref 0.5–1.4)
EST. GFR  (NO RACE VARIABLE): 53.1 ML/MIN/1.73 M^2
GLUCOSE SERPL-MCNC: 134 MG/DL (ref 70–110)
PHOSPHATE SERPL-MCNC: 4 MG/DL (ref 2.7–4.5)
POTASSIUM SERPL-SCNC: 5.2 MMOL/L (ref 3.5–5.1)
SODIUM SERPL-SCNC: 141 MMOL/L (ref 136–145)

## 2022-09-15 PROCEDURE — 82533 TOTAL CORTISOL: CPT | Performed by: STUDENT IN AN ORGANIZED HEALTH CARE EDUCATION/TRAINING PROGRAM

## 2022-09-15 PROCEDURE — 80069 RENAL FUNCTION PANEL: CPT | Performed by: STUDENT IN AN ORGANIZED HEALTH CARE EDUCATION/TRAINING PROGRAM

## 2022-09-15 PROCEDURE — 36415 COLL VENOUS BLD VENIPUNCTURE: CPT | Mod: PO | Performed by: STUDENT IN AN ORGANIZED HEALTH CARE EDUCATION/TRAINING PROGRAM

## 2022-09-15 PROCEDURE — 82542 COL CHROMOTOGRAPHY QUAL/QUAN: CPT | Performed by: STUDENT IN AN ORGANIZED HEALTH CARE EDUCATION/TRAINING PROGRAM

## 2022-09-21 LAB — DEXAMETHASONE SERPL-MCNC: 640 NG/DL

## 2022-09-22 ENCOUNTER — SPECIALTY PHARMACY (OUTPATIENT)
Dept: PHARMACY | Facility: CLINIC | Age: 72
End: 2022-09-22
Payer: MEDICARE

## 2022-09-22 NOTE — TELEPHONE ENCOUNTER
Specialty Pharmacy - Refill Coordination    Specialty Medication Orders Linked to Encounter      Flowsheet Row Most Recent Value   Medication #1 evolocumab (REPATHA SURECLICK) 140 mg/mL PnIj (Order#[id number], Rx#[id number])            Refill Questions - Documented Responses      Flowsheet Row Most Recent Value   Patient Availability and HIPAA Verification    Does patient want to proceed with activity? Yes   HIPAA/medical authority confirmed? Yes   Relationship to patient of person spoken to? Self   Refill Screening Questions    Would patient like to speak to a pharmacist? No   When does the patient need to receive the medication? 10/01/22   Refill Delivery Questions    How will the patient receive the medication? Delivery Jessica   When does the patient need to receive the medication? 10/01/22   Shipping Address Home   Address in [facility] confirmed and updated if neccessary? Yes   Expected Copay ($) 0   Is the patient able to afford the medication copay? Yes   Payment Method zero copay   Days supply of Refill 28   Refill activity completed? Yes   Refill activity plan Refill scheduled   Shipment/Pickup Date: 09/28/22            Current Outpatient Medications   Medication Sig    ACCU-CHEK SMARTVIEW TEST STRIP Strp 1 STRIP BY [facility]NON-DRUG COMBO ROUTE) ROUTE ONCE DAILY.    ALPRAZolam (XANAX) 0.5 MG tablet Take 1 tablet (0.5 mg total) by mouth daily as needed for Anxiety.    amLODIPine (NORVASC) 10 MG tablet TAKE 1 TABLET BY MOUTH EVERY DAY    aspirin 81 MG Chew Take 81 mg by mouth once daily.    benazepriL (LOTENSIN) 40 MG tablet TAKE 1 TABLET BY MOUTH EVERY DAY    blood glucose control, normal Soln 1 Bottle by Misc.(Non-Drug; Combo Route) route once daily. For Accu Check Amber  use twice daily prn dx: E11.9    blood-glucose meter (ACCU-CHEK AMBER) Misc 1 kit by Misc.(Non-Drug; Combo Route) route once. For Accu Check Amber  use twice daily prn dx: E11.9 for 1 dose    calcium-vitamin D3 500 mg(1,250mg) -200 unit  per tablet Take 1 tablet by mouth once daily.     CRANBERRY EXTRACT ORAL Take 1 tablet by mouth once daily.    evolocumab (REPATHA SURECLICK) 140 mg/mL PnIj Inject 1 mL (140 mg total) into the skin every 14 (fourteen) days.    evolocumab (REPATHA SURECLICK) 140 mg/mL PnIj Inject 1 mL (140 mg total) into the skin every 14 (fourteen) days.    lancets Misc 1 lancet by Misc.(Non-Drug; Combo Route) route 3 (three) times daily. For Acc-Chek Irais   DX :E11.9    loratadine (CLARITIN) 10 mg tablet Take 1 tablet (10 mg total) by mouth once daily.    meclizine (ANTIVERT) 25 mg tablet Take 1 tablet (25 mg total) by mouth 3 (three) times daily as needed for Dizziness or Nausea.    metoprolol succinate (TOPROL-XL) 25 MG 24 hr tablet Take 1 tablet (25 mg total) by mouth once daily.    MULTIVITAMIN W-MINERALS/LUTEIN (CENTRUM SILVER ORAL) Take 1 tablet by mouth once daily.    spironolactone (ALDACTONE) 50 MG tablet Take 1 tablet (50 mg total) by mouth once daily.    triamcinolone (NASACORT) 55 mcg nasal inhaler USE 2 SPRAYS BY NASAL ROUTE ONCE DAILY   Last reviewed on 9/12/2022  2:15 PM by Lucia Tolentino MD    Review of patient's allergies indicates:   Allergen Reactions    Statins-hmg-coa reductase inhibitors      Muscle Cramps    Last reviewed on  9/12/2022 2:15 PM by Lucia Tolentino      Tasks added this encounter   10/22/2022 - Refill Call (Auto Added)   Tasks due within next 3 months   No tasks due.     Amandeep Holliday  [facility]  [address] [place] [id number]  Phone: [phone]  Fax: [phone]

## 2022-09-27 ENCOUNTER — HOSPITAL ENCOUNTER (OUTPATIENT)
Dept: RADIOLOGY | Facility: HOSPITAL | Age: 72
Discharge: HOME | End: 2022-09-27
Attending: STUDENT IN AN ORGANIZED HEALTH CARE EDUCATION/TRAINING PROGRAM
Payer: MEDICARE

## 2022-09-27 DIAGNOSIS — E27.8 ADRENAL NODULE: ICD-10-CM

## 2022-09-27 PROCEDURE — 74178 CT ABDOMEN PELVIS W WO CONTRAST: ICD-10-PCS | Mod: 26,,, | Performed by: RADIOLOGY

## 2022-09-27 PROCEDURE — 25500020 PHARM REV CODE 255: Performed by: STUDENT IN AN ORGANIZED HEALTH CARE EDUCATION/TRAINING PROGRAM

## 2022-09-27 PROCEDURE — 74178 CT ABD&PLV WO CNTR FLWD CNTR: CPT | Mod: TC

## 2022-09-27 PROCEDURE — 74178 CT ABD&PLV WO CNTR FLWD CNTR: CPT | Mod: 26,,, | Performed by: RADIOLOGY

## 2022-09-27 RX ADMIN — IOHEXOL 100 ML: 350 INJECTION, SOLUTION INTRAVENOUS at 07:09

## 2022-09-30 NOTE — PROGRESS NOTES
Notified pt of CT results with no change in small b/l adrenal nodules. Recommended she f/u with her PCP regarding pulmonary nodules.

## 2022-10-05 ENCOUNTER — OFFICE VISIT (OUTPATIENT)
Dept: PRIMARY CARE CLINIC | Facility: CLINIC | Age: 72
End: 2022-10-05
Payer: MEDICARE

## 2022-10-05 VITALS
HEART RATE: 70 BPM | HEIGHT: 64 IN | DIASTOLIC BLOOD PRESSURE: 75 MMHG | BODY MASS INDEX: 31.62 KG/M2 | SYSTOLIC BLOOD PRESSURE: 137 MMHG | WEIGHT: 185.19 LBS | TEMPERATURE: 98 F

## 2022-10-05 DIAGNOSIS — E27.8 ADRENAL NODULE: ICD-10-CM

## 2022-10-05 DIAGNOSIS — E04.2 MULTIPLE THYROID NODULES: ICD-10-CM

## 2022-10-05 DIAGNOSIS — I15.2 HYPERTENSION ASSOCIATED WITH DIABETES: ICD-10-CM

## 2022-10-05 DIAGNOSIS — N18.31 STAGE 3A CHRONIC KIDNEY DISEASE: ICD-10-CM

## 2022-10-05 DIAGNOSIS — E11.59 HYPERTENSION ASSOCIATED WITH DIABETES: ICD-10-CM

## 2022-10-05 DIAGNOSIS — R91.8 PULMONARY NODULES: Primary | ICD-10-CM

## 2022-10-05 DIAGNOSIS — E11.69 COMBINED HYPERLIPIDEMIA ASSOCIATED WITH TYPE 2 DIABETES MELLITUS: ICD-10-CM

## 2022-10-05 DIAGNOSIS — E78.2 COMBINED HYPERLIPIDEMIA ASSOCIATED WITH TYPE 2 DIABETES MELLITUS: ICD-10-CM

## 2022-10-05 DIAGNOSIS — E66.9 OBESITY (BMI 30.0-34.9): ICD-10-CM

## 2022-10-05 PROCEDURE — 3072F LOW RISK FOR RETINOPATHY: CPT | Mod: CPTII,S$GLB,, | Performed by: INTERNAL MEDICINE

## 2022-10-05 PROCEDURE — 3061F PR NEG MICROALBUMINURIA RESULT DOCUMENTED/REVIEW: ICD-10-PCS | Mod: CPTII,S$GLB,, | Performed by: INTERNAL MEDICINE

## 2022-10-05 PROCEDURE — 1159F MED LIST DOCD IN RCRD: CPT | Mod: CPTII,S$GLB,, | Performed by: INTERNAL MEDICINE

## 2022-10-05 PROCEDURE — 4010F ACE/ARB THERAPY RXD/TAKEN: CPT | Mod: CPTII,S$GLB,, | Performed by: INTERNAL MEDICINE

## 2022-10-05 PROCEDURE — 1126F PR PAIN SEVERITY QUANTIFIED, NO PAIN PRESENT: ICD-10-PCS | Mod: CPTII,S$GLB,, | Performed by: INTERNAL MEDICINE

## 2022-10-05 PROCEDURE — 1101F PT FALLS ASSESS-DOCD LE1/YR: CPT | Mod: CPTII,S$GLB,, | Performed by: INTERNAL MEDICINE

## 2022-10-05 PROCEDURE — 99499 RISK ADDL DX/OHS AUDIT: ICD-10-PCS | Mod: S$GLB,,, | Performed by: INTERNAL MEDICINE

## 2022-10-05 PROCEDURE — 1160F PR REVIEW ALL MEDS BY PRESCRIBER/CLIN PHARMACIST DOCUMENTED: ICD-10-PCS | Mod: CPTII,S$GLB,, | Performed by: INTERNAL MEDICINE

## 2022-10-05 PROCEDURE — 3078F PR MOST RECENT DIASTOLIC BLOOD PRESSURE < 80 MM HG: ICD-10-PCS | Mod: CPTII,S$GLB,, | Performed by: INTERNAL MEDICINE

## 2022-10-05 PROCEDURE — 1159F PR MEDICATION LIST DOCUMENTED IN MEDICAL RECORD: ICD-10-PCS | Mod: CPTII,S$GLB,, | Performed by: INTERNAL MEDICINE

## 2022-10-05 PROCEDURE — 3008F BODY MASS INDEX DOCD: CPT | Mod: CPTII,S$GLB,, | Performed by: INTERNAL MEDICINE

## 2022-10-05 PROCEDURE — 3075F PR MOST RECENT SYSTOLIC BLOOD PRESS GE 130-139MM HG: ICD-10-PCS | Mod: CPTII,S$GLB,, | Performed by: INTERNAL MEDICINE

## 2022-10-05 PROCEDURE — 3061F NEG MICROALBUMINURIA REV: CPT | Mod: CPTII,S$GLB,, | Performed by: INTERNAL MEDICINE

## 2022-10-05 PROCEDURE — 1160F RVW MEDS BY RX/DR IN RCRD: CPT | Mod: CPTII,S$GLB,, | Performed by: INTERNAL MEDICINE

## 2022-10-05 PROCEDURE — 3075F SYST BP GE 130 - 139MM HG: CPT | Mod: CPTII,S$GLB,, | Performed by: INTERNAL MEDICINE

## 2022-10-05 PROCEDURE — 3072F PR LOW RISK FOR RETINOPATHY: ICD-10-PCS | Mod: CPTII,S$GLB,, | Performed by: INTERNAL MEDICINE

## 2022-10-05 PROCEDURE — 99999 PR PBB SHADOW E&M-EST. PATIENT-LVL V: ICD-10-PCS | Mod: PBBFAC,,, | Performed by: INTERNAL MEDICINE

## 2022-10-05 PROCEDURE — 3066F PR DOCUMENTATION OF TREATMENT FOR NEPHROPATHY: ICD-10-PCS | Mod: CPTII,S$GLB,, | Performed by: INTERNAL MEDICINE

## 2022-10-05 PROCEDURE — 3288F FALL RISK ASSESSMENT DOCD: CPT | Mod: CPTII,S$GLB,, | Performed by: INTERNAL MEDICINE

## 2022-10-05 PROCEDURE — 3051F PR MOST RECENT HEMOGLOBIN A1C LEVEL 7.0 - < 8.0%: ICD-10-PCS | Mod: CPTII,S$GLB,, | Performed by: INTERNAL MEDICINE

## 2022-10-05 PROCEDURE — 3066F NEPHROPATHY DOC TX: CPT | Mod: CPTII,S$GLB,, | Performed by: INTERNAL MEDICINE

## 2022-10-05 PROCEDURE — 99999 PR PBB SHADOW E&M-EST. PATIENT-LVL V: CPT | Mod: PBBFAC,,, | Performed by: INTERNAL MEDICINE

## 2022-10-05 PROCEDURE — 4010F PR ACE/ARB THEARPY RXD/TAKEN: ICD-10-PCS | Mod: CPTII,S$GLB,, | Performed by: INTERNAL MEDICINE

## 2022-10-05 PROCEDURE — 99499 UNLISTED E&M SERVICE: CPT | Mod: S$GLB,,, | Performed by: INTERNAL MEDICINE

## 2022-10-05 PROCEDURE — 3078F DIAST BP <80 MM HG: CPT | Mod: CPTII,S$GLB,, | Performed by: INTERNAL MEDICINE

## 2022-10-05 PROCEDURE — 1126F AMNT PAIN NOTED NONE PRSNT: CPT | Mod: CPTII,S$GLB,, | Performed by: INTERNAL MEDICINE

## 2022-10-05 PROCEDURE — 3051F HG A1C>EQUAL 7.0%<8.0%: CPT | Mod: CPTII,S$GLB,, | Performed by: INTERNAL MEDICINE

## 2022-10-05 PROCEDURE — 3008F PR BODY MASS INDEX (BMI) DOCUMENTED: ICD-10-PCS | Mod: CPTII,S$GLB,, | Performed by: INTERNAL MEDICINE

## 2022-10-05 PROCEDURE — 1101F PR PT FALLS ASSESS DOC 0-1 FALLS W/OUT INJ PAST YR: ICD-10-PCS | Mod: CPTII,S$GLB,, | Performed by: INTERNAL MEDICINE

## 2022-10-05 PROCEDURE — 99215 OFFICE O/P EST HI 40 MIN: CPT | Mod: S$GLB,,, | Performed by: INTERNAL MEDICINE

## 2022-10-05 PROCEDURE — 3288F PR FALLS RISK ASSESSMENT DOCUMENTED: ICD-10-PCS | Mod: CPTII,S$GLB,, | Performed by: INTERNAL MEDICINE

## 2022-10-05 PROCEDURE — 99215 PR OFFICE/OUTPT VISIT, EST, LEVL V, 40-54 MIN: ICD-10-PCS | Mod: S$GLB,,, | Performed by: INTERNAL MEDICINE

## 2022-10-05 NOTE — PROGRESS NOTES
Kristyn Garza  10/05/2022  [id number]    Moon Lion MD  Patient Care Team:  Moon Lion MD as PCP - General (Internal Medicine)  Delia Liu LPN as Care Coordinator (Internal Medicine)  Soo Bar as Digital Medicine Health   Dale Matute MD as Consulting Physician (Cardiology)  Hammad Hardy OD as Consulting Physician (Optometry)  Olivia Irwin MD as Consulting Physician (Otolaryngology)  Gretel De Leon, PharmD as Hypertension Digital Medicine Clinician  [employer] as Hypertension Digital Medicine Contract  Brenden Landaverde MD as Consulting Physician (Nephrology)  Zoë Guzman MD as Hyperlipidemia Digital Medicine Responsible Provider (Family Medicine)  Zoë Guzman MD as Diabetes Digital Medicine Responsible Provider  Joanne MarsD as Hyperlipidemia Digital Medicine Clinician  Gretel De Leon PharmD as Diabetes Digital Medicine Clinician  [employer] as Diabetes Digital Medicine Contract  Zoë Guzman MD as Hypertension Digital Medicine Responsible Provider (Family Medicine)  Cuca Beatty NP as Nurse Practitioner (Family Medicine)    Visit Type: establish care    Chief Complaint:  Chief Complaint   Patient presents with    Establish Care     No concerns with patient.     History of Present Illness: Ms. Kristyn Hernandez is a 71 year old female [facility] today [facility] Medical issues include HTN, HLD (w statin intolerance), type 2 DM, CKD, diastolic CHF w LVH, thyroid nodules, adrenal nodule, obesity. Most recent PCP encounter, in April, with Dr. Guzman. She's enrolled w digital medicine program for diabetes and hypertension.    Pulm nodules noted on recent CT abd/pelvis.    Former smoker - 1/2 pack a day for about 25 years, quit about 13 years ago.    Recent appointments:   9/12/22 Endo Dr. Candis Solis adrenal nodule (spoke by phone only)  5/10/22 GABRIELA Neal NP  4/19/22 PCP   Thomas  4/14/22 Card Dr. Matute  3/13/22 Nephrology Dr. Dc periodic hypokalemia    Upcoming appointments:  Future Appointments       Date Provider Specialty Appt Notes    10/19/2022 Zoë Guzman MD Family Medicine Follow up in about 6 months (around 10/19/2022) for diabetes and hypertension follow up.    11/9/2022 Moon Lion MD Primary Care 4 week f/u    12/2/2022 Soto Khan MD Pulmonology np-nodule           The following were reviewed: Active problem list, medication list, allergies, family history, social history, and Health Maintenance.     History:  Past Medical History:   Diagnosis Date    Carpal tunnel syndrome     CKD (chronic kidney disease) stage 3, GFR 30-59 ml/min     Followed by Nephrology    Colon cancer screening 3/18/2014    CTS (carpal tunnel syndrome) 6/18/2013    Diabetes mellitus, type 2     Eczema     Elevated CPK     History of hypokalemia 6/18/2013    History of hypokalemia 6/18/2013    Hypokalemia     Multinodular thyroid     Followed by ENT    Obesity     Other and unspecified hyperlipidemia     Pneumonia     in her 20s; hospitalized    Postmenopausal     No history of abnormal pap smear    Statin intolerance     caused mylagia, elevated CPK    Tobacco dependence     resolved    Unspecified essential hypertension      Past Surgical History:   Procedure Laterality Date    COLONOSCOPY      CYST REMOVAL  2015    On the head    FINE NEEDLE ASPIRATION  3/2011    thyroid nodules x3 (negative per patient)    HYSTERECTOMY      PARTIAL HYSTERECTOMY  1991    Due to fibroids     Family History   Problem Relation Age of Onset    Hypertension Mother     Diabetes Mother     Dementia Mother         Alzheimer's dementia    Hypertension Father     Heart disease Sister         MI/CAD    Cataracts Sister     Dementia Sister     No Known Problems Son     No Known Problems Son     Cataracts Brother     Cataracts Brother     Dementia Sister     Cancer Neg Hx     Stroke Neg Hx     Melanoma Neg  Hx     Psoriasis Neg Hx     Lupus Neg Hx     Eczema Neg Hx     COPD Neg Hx     Kidney disease Neg Hx      Social History     Socioeconomic History    Marital status:     Number of children: 2    Highest education level: Some college, no degree   Occupational History    Occupation: Retired   Tobacco Use    Smoking status: Former     Packs/day: 0.50     Years: 25.00     Pack years: 12.50     Types: Cigarettes     Quit date: 2013     Years since quittin.3    Smokeless tobacco: Never   Substance and Sexual Activity    Alcohol use: Yes     Alcohol/week: 0.0 standard drinks     Comment: Rarely drinks wine    Drug use: No    Sexual activity: Not Currently     Partners: Male     Birth control/protection: Condom   Social History Narrative    She wears seatbelt. . 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      Social Determinants of Health     Financial Resource Strain: Medium Risk    Difficulty of Paying Living Expenses: Somewhat hard   Food Insecurity: Food Insecurity Present    Worried About Running Out of Food in the Last Year: Never true    Ran Out of Food in the Last Year: Sometimes true   Transportation Needs: No Transportation Needs    Lack of Transportation (Medical): No    Lack of Transportation (Non-Medical): No   Physical Activity: Insufficiently Active    Days of Exercise per Week: 3 days    Minutes of Exercise per Session: 30 min   Stress: No Stress Concern Present    Feeling of Stress : Only a little   Social Connections: Unknown    Frequency of Communication with Friends and Family: More than three times a week    Frequency of Social Gatherings with Friends and Family: More than three times a week    Active Member of Clubs or Organizations: Yes    Attends Club or Organization Meetings: More than 4 times per year    Marital Status:    Housing Stability: Low Risk     Unable to Pay for Housing in the Last Year: No    Number of Places Lived in the Last Year: 1    Unstable Housing  in the Last Year: No     Patient Active Problem List   Diagnosis    Multiple thyroid nodules    Cupping of optic disc    Nuclear sclerosis    Combined hyperlipidemia associated with type 2 diabetes mellitus    Hypertension associated with diabetes    CKD (chronic kidney disease) stage 3, GFR 30-59 ml/min    Obesity (BMI 30.0-34.9)    Postmenopausal    Statin intolerance    Abnormal ECG    LVH (left ventricular hypertrophy)    Family history of cardiovascular disorder    PAC (premature atrial contraction)    Type 2 diabetes mellitus with kidney complication, without long-term current use of insulin    Endolymphatic hydrops of left ear    Gastroesophageal reflux disease    ZHOU (dyspnea on exertion)    Secondary hyperparathyroidism of renal origin    Diastolic dysfunction    Secondary hypertension    Adrenal nodule    Pulmonary nodules     Review of patient's allergies indicates:   Allergen Reactions    Statins-hmg-coa reductase inhibitors      Muscle Cramps       Medications:  Current Outpatient Medications on File Prior to Visit   Medication Sig Dispense Refill    amLODIPine (NORVASC) 10 MG tablet TAKE 1 TABLET BY MOUTH EVERY DAY 90 tablet 3    aspirin 81 MG Chew Take 81 mg by mouth once daily.      benazepriL (LOTENSIN) 40 MG tablet TAKE 1 TABLET BY MOUTH EVERY DAY 90 tablet 3    calcium-vitamin D3 500 mg(1,250mg) -200 unit per tablet Take 1 tablet by mouth once daily.       CRANBERRY EXTRACT ORAL Take 1 tablet by mouth once daily.      evolocumab (REPATHA SURECLICK) 140 mg/mL PnIj Inject 1 mL (140 mg total) into the skin every 14 (fourteen) days. 2 mL 5    evolocumab (REPATHA SURECLICK) 140 mg/mL PnIj Inject 1 mL (140 mg total) into the skin every 14 (fourteen) days. 2 mL 5    lancets Misc 1 lancet by Misc.(Non-Drug; Combo Route) route 3 (three) times daily. For Acc-Chek Irais   DX :E11.9 100 each 0    loratadine (CLARITIN) 10 mg tablet Take 1 tablet (10 mg total) by mouth once daily. 90 tablet 1    meclizine  (ANTIVERT) 25 mg tablet Take 1 tablet (25 mg total) by mouth 3 (three) times daily as needed for Dizziness or Nausea. 60 tablet 1    metoprolol succinate (TOPROL-XL) 25 MG 24 hr tablet Take 1 tablet (25 mg total) by mouth once daily. 30 tablet 11    MULTIVITAMIN W-MINERALS/LUTEIN (CENTRUM SILVER ORAL) Take 1 tablet by mouth once daily.      spironolactone (ALDACTONE) 50 MG tablet Take 1 tablet (50 mg total) by mouth once daily. 90 tablet 2    triamcinolone (NASACORT) 55 mcg nasal inhaler USE 2 SPRAYS BY NASAL ROUTE ONCE DAILY 50.7 g 1    ALPRAZolam (XANAX) 0.5 MG tablet Take 1 tablet (0.5 mg total) by mouth daily as needed for Anxiety. 30 tablet 0    blood glucose control, normal Soln 1 Bottle by Misc.(Non-Drug; Combo Route) route once daily. For Accu Check Amber  use twice daily prn dx: E11.9 1 each 0    blood-glucose meter (ACCU-CHEK AMBER) Misc 1 kit by Misc.(Non-Drug; Combo Route) route once. For Accu Check Amber  use twice daily prn dx: E11.9 for 1 dose 1 each 0     No current facility-administered medications on file prior to visit.       Medications have been reviewed and reconciled with patient at visit today.    Review of Systems   Constitutional:  Negative for activity change, appetite change and unexpected weight change.   HENT:  Positive for hearing loss. Negative for ear pain, sore throat, trouble swallowing and voice change.         Sinus problems at times   Eyes:  Negative for photophobia and pain.        Glasses   Respiratory:  Negative for cough, shortness of breath and wheezing.    Cardiovascular:  Negative for chest pain and claudication.   Gastrointestinal:  Negative for abdominal pain, change in bowel habit, nausea, vomiting and change in bowel habit.   Genitourinary:  Negative for bladder incontinence, dysuria, flank pain and hematuria.   Musculoskeletal:  Negative for gait problem.   Integumentary:  Negative for rash and wound.   Allergic/Immunologic: Positive for environmental allergies.    Neurological:  Negative for tremors and light-headedness.        Carpal tunnel  Has had issues w dizziness but not at present   Psychiatric/Behavioral:  Negative for dysphoric mood and sleep disturbance. The patient is nervous/anxious.       Exam:  Vitals:    10/05/22 1050   BP: 137/75   Pulse: 70   Temp: 98.1 °F (36.7 °C)     Weight: 84 kg (185 lb 3.2 oz)   Body mass index is 31.79 kg/m².    BP Readings from Last 3 Encounters:   10/05/22 137/75   05/10/22 (!) 102/50   04/19/22 112/60        Physical Exam  Constitutional:       Appearance: Normal appearance. She is obese.   HENT:      Head: Normocephalic and atraumatic.      Right Ear: Tympanic membrane, ear canal and external ear normal.      Left Ear: Tympanic membrane, ear canal and external ear normal.      Nose: Nose normal. No congestion or rhinorrhea.      Mouth/Throat:      Mouth: Mucous membranes are moist.      Pharynx: Oropharynx is clear. No oropharyngeal exudate or posterior oropharyngeal erythema.   Eyes:      General: No scleral icterus.        Right eye: No discharge.         Left eye: No discharge.      Extraocular Movements: Extraocular movements intact.      Conjunctiva/sclera: Conjunctivae normal.      Comments: glasses   Neck:      Vascular: No carotid bruit.   Cardiovascular:      Rate and Rhythm: Normal rate and regular rhythm.      Heart sounds: Normal heart sounds.   Pulmonary:      Effort: Pulmonary effort is normal. No respiratory distress.      Breath sounds: Normal breath sounds. No wheezing, rhonchi or rales.   Abdominal:      General: Bowel sounds are normal. There is no distension.      Palpations: Abdomen is soft.      Tenderness: There is no abdominal tenderness. There is no guarding.   Musculoskeletal:      Comments: Ambulates independently  Moves easily from chair onto and off of exam table   Lymphadenopathy:      Cervical: No cervical adenopathy.   Skin:     General: Skin is warm and dry.      Findings: No rash.   Neurological:       General: No focal deficit present.      Mental Status: She is alert and oriented to person, place, and time. Mental status is at baseline.      Sensory: No sensory deficit.      Coordination: Coordination normal.      Gait: Gait normal.   Psychiatric:         Mood and Affect: Mood normal.         Behavior: Behavior normal.         Thought Content: Thought content normal.         Judgment: Judgment normal.        Laboratory Reviewed: (Yes)  Lab Results   Component Value Date    WBC 11.28 01/18/2022    HGB 13.0 01/18/2022    HCT 40.3 01/18/2022     01/18/2022    MCV 96 01/18/2022    CHOL 139 03/24/2022    TRIG 67 03/24/2022    HDL 49 03/24/2022    LDLCALC 76.6 03/24/2022    ALT 19 01/18/2022    AST 20 01/18/2022     09/15/2022    K 5.2 (H) 09/15/2022     09/15/2022    CREATININE 1.1 09/15/2022    BUN 15 09/15/2022    CO2 24 09/15/2022    TSH 0.516 04/19/2022    HGBA1C 7.1 (H) 04/19/2022     9/15/22 eGFR 53.1 cortisol < 1.00 dexamethasone 640  4/19/22 urine microalb/crt wnl  1/18/22 metanephrines wnl ziggy/renin ratio wnl    Assessment:   71 y.o. female with multiple co-morbid illnesses here for continued follow up of medical problems.      The primary encounter diagnosis was Pulmonary nodules. Diagnoses of Combined hyperlipidemia associated with type 2 diabetes mellitus, Hypertension associated with diabetes, Stage 3a chronic kidney disease, Multiple thyroid nodules, Obesity (BMI 30.0-34.9), and Adrenal nodule were also pertinent to this visit.      Plan:     Problem List Items Addressed This Visit          Pulmonary    Pulmonary nodules - Primary     Noted on imaging - former smoker - referral to Pulm for further evaluation and recommendations (if sig delay for appt consider imaging in advance)         Relevant Orders    Ambulatory referral/consult to Pulmonology       Cardiac/Vascular    Combined hyperlipidemia associated with type 2 diabetes mellitus (Chronic)     No current diabetes  medication? - last HgbA1c 7.% in April - repeat HgbA1c - tolerating Repatha - recheck lipid panel - encourage cont healthy food and beverage choices, daily movement         Hypertension associated with diabetes (Chronic)     BP ok - cont current Rx - no current Rx of diabetes? Enrolled in digital medicine program            Renal/    CKD (chronic kidney disease) stage 3, GFR 30-59 ml/min     Cont monitor - cont current Rx            Endocrine    Multiple thyroid nodules     Stable - cont monitor - last TSH low normal - recheck w free T4 and T3         Obesity (BMI 30.0-34.9)     Has been losing weight gradually w healthier food and beverage choices and movement         Adrenal nodule     Cont monitor per endo recs            Health Maintenance         Date Due Completion Date    Shingles Vaccine (2 of 3) 04/20/2011 2/23/2011    TETANUS VACCINE 02/23/2021 2/23/2011    COVID-19 Vaccine (4 - Booster for Moderna series) 02/09/2022 12/15/2021    Influenza Vaccine (1) 09/01/2022 10/31/2021    Override on 9/15/2018: Done (At pharmacy per patient.)    Override on 9/26/2017: Done (At [facility] per patient)    Override on 11/1/2016: Done (at [facility] patient)    Hemoglobin A1c 10/19/2022 4/19/2022    Eye Exam 11/30/2022 11/30/2021    Override on 7/20/2020: Done    Override on 4/30/2019: Done    Override on 4/24/2018: Done    Override on 5/17/2017: Done    Override on 5/16/2016: Done    Override on 5/13/2015: Done    Lipid Panel 03/24/2023 3/24/2022    Diabetes Urine Screening 04/19/2023 4/19/2022    Foot Exam 04/19/2023 4/19/2022    Override on 9/23/2015: Done    Override on 7/16/2015: Done    Override on 3/23/2015: Done    Override on 12/3/2014: Done    Override on 4/29/2014: Done    Mammogram 05/27/2023 5/27/2022    Override on 3/13/2013: Done    Colorectal Cancer Screening 03/18/2024 3/18/2014    Override on 6/1/2006: Done    DEXA Scan 05/26/2026 5/26/2021    Override on 3/10/2011: Done (normal repeat in 5 years)          Had  flu vaccine at [facility] week and a half ago    -Patient's lab results were reviewed and discussed with patient  -Treatment options and alternatives were discussed with the patient. Patient expressed understanding. Patient was given the opportunity to ask questions and be an active participant in their medical care. Patient had no further questions or concerns at this time.   -Patient is an overall moderate risk for health complications from their medical conditions.     Follow up: Follow up in about 4 weeks (around 11/2/2022) for Follow Up.    Care Plan/Goals: Reviewed Yes   Goals         80 <= Glucose <= 180 (pt-stated)       Blood Pressure < 130/80 (pt-stated)       Exercise at least 150 minutes per week.       HEMOGLOBIN A1C < 7.0       Take at least one BP reading per week at various times of the day       Weight (lb) < 90.7 kg (200 lb) (pt-stated)       Goal 5% weight loss - 9-10 lbs - 175 seth    Barriers? Sweet-tooth    Overcoming? Try substitute fruit? Daily walking    Time frame 3 mos                After visit summary printed and given to patient upon discharge.  Patient goals and care plan are included in After visit summary.    TOTAL TIME evaluating and managing this patient for this encounter was greater than 60 minutes. This time was spent personally by me on the following activities: review of patient's past medical history, assessing age-appropriate health maintenance needs, review of any interval history, review and interpretation of lab results, review and interpretation of imaging test results, review and interpretation of cardiology test results, reviewing consulting specialist notes, obtaining history from the patient and family, examination of the patient, medication reconciliation, managing and/or ordering prescription medications, ordering imaging tests, ordering referral to subspecialty provider(s), educating patient and answering their questions about diagnosis, treatment plan, and goals of  treatment, discussing planned follow-up and final documentation of the visit. This time was exclusive of any separately billable procedures for this patient and exclusive of time spent treating any other patients.

## 2022-10-05 NOTE — PATIENT INSTRUCTIONS
If transitioning to [facility] please cancel upcoming appt w Dr. Guzman    Keep moving!

## 2022-10-09 ENCOUNTER — PATIENT MESSAGE (OUTPATIENT)
Dept: ADMINISTRATIVE | Facility: OTHER | Age: 72
End: 2022-10-09
Payer: MEDICARE

## 2022-10-10 PROBLEM — R91.8 PULMONARY NODULES: Status: ACTIVE | Noted: 2022-10-10

## 2022-10-10 PROBLEM — R91.8 PULMONARY NODULES: Status: ACTIVE | Noted: 2022-10-05

## 2022-10-10 NOTE — ASSESSMENT & PLAN NOTE
Noted on imaging - former smoker - referral to Pulm for further evaluation and recommendations (if sig delay for appt consider imaging in advance)

## 2022-10-10 NOTE — ASSESSMENT & PLAN NOTE
No current diabetes medication? - last HgbA1c 7.% in April - repeat HgbA1c - tolerating Repatha - recheck lipid panel - encourage cont healthy food and beverage choices, daily movement

## 2022-10-13 ENCOUNTER — PATIENT MESSAGE (OUTPATIENT)
Dept: ADMINISTRATIVE | Facility: OTHER | Age: 72
End: 2022-10-13
Payer: MEDICARE

## 2022-10-19 ENCOUNTER — LAB VISIT (OUTPATIENT)
Dept: LAB | Facility: HOSPITAL | Age: 72
End: 2022-10-19
Attending: FAMILY MEDICINE
Payer: MEDICARE

## 2022-10-19 ENCOUNTER — OFFICE VISIT (OUTPATIENT)
Dept: FAMILY MEDICINE | Facility: CLINIC | Age: 72
End: 2022-10-19
Payer: MEDICARE

## 2022-10-19 VITALS
TEMPERATURE: 97 F | DIASTOLIC BLOOD PRESSURE: 68 MMHG | HEART RATE: 69 BPM | HEIGHT: 64 IN | BODY MASS INDEX: 31.66 KG/M2 | WEIGHT: 185.44 LBS | SYSTOLIC BLOOD PRESSURE: 130 MMHG | OXYGEN SATURATION: 99 %

## 2022-10-19 DIAGNOSIS — I15.2 HYPERTENSION ASSOCIATED WITH DIABETES: Primary | ICD-10-CM

## 2022-10-19 DIAGNOSIS — E27.8 ADRENAL NODULE: ICD-10-CM

## 2022-10-19 DIAGNOSIS — N25.81 SECONDARY HYPERPARATHYROIDISM OF RENAL ORIGIN: ICD-10-CM

## 2022-10-19 DIAGNOSIS — N18.31 STAGE 3A CHRONIC KIDNEY DISEASE: ICD-10-CM

## 2022-10-19 DIAGNOSIS — E66.9 OBESITY (BMI 30.0-34.9): ICD-10-CM

## 2022-10-19 DIAGNOSIS — N18.31 TYPE 2 DIABETES MELLITUS WITH STAGE 3A CHRONIC KIDNEY DISEASE, WITHOUT LONG-TERM CURRENT USE OF INSULIN: ICD-10-CM

## 2022-10-19 DIAGNOSIS — E11.59 HYPERTENSION ASSOCIATED WITH DIABETES: Primary | ICD-10-CM

## 2022-10-19 DIAGNOSIS — E04.2 MULTIPLE THYROID NODULES: ICD-10-CM

## 2022-10-19 DIAGNOSIS — E11.22 TYPE 2 DIABETES MELLITUS WITH STAGE 3A CHRONIC KIDNEY DISEASE, WITHOUT LONG-TERM CURRENT USE OF INSULIN: ICD-10-CM

## 2022-10-19 DIAGNOSIS — E78.2 COMBINED HYPERLIPIDEMIA ASSOCIATED WITH TYPE 2 DIABETES MELLITUS: ICD-10-CM

## 2022-10-19 DIAGNOSIS — E11.69 COMBINED HYPERLIPIDEMIA ASSOCIATED WITH TYPE 2 DIABETES MELLITUS: ICD-10-CM

## 2022-10-19 DIAGNOSIS — Z78.0 POSTMENOPAUSAL: ICD-10-CM

## 2022-10-19 LAB
ESTIMATED AVG GLUCOSE: 137 MG/DL (ref 68–131)
HBA1C MFR BLD: 6.4 % (ref 4–5.6)

## 2022-10-19 PROCEDURE — 3061F NEG MICROALBUMINURIA REV: CPT | Mod: CPTII,S$GLB,, | Performed by: FAMILY MEDICINE

## 2022-10-19 PROCEDURE — 99214 OFFICE O/P EST MOD 30 MIN: CPT | Mod: S$GLB,,, | Performed by: FAMILY MEDICINE

## 2022-10-19 PROCEDURE — 3288F FALL RISK ASSESSMENT DOCD: CPT | Mod: CPTII,S$GLB,, | Performed by: FAMILY MEDICINE

## 2022-10-19 PROCEDURE — 4010F ACE/ARB THERAPY RXD/TAKEN: CPT | Mod: CPTII,S$GLB,, | Performed by: FAMILY MEDICINE

## 2022-10-19 PROCEDURE — 36415 COLL VENOUS BLD VENIPUNCTURE: CPT | Mod: PO | Performed by: FAMILY MEDICINE

## 2022-10-19 PROCEDURE — 3051F HG A1C>EQUAL 7.0%<8.0%: CPT | Mod: CPTII,S$GLB,, | Performed by: FAMILY MEDICINE

## 2022-10-19 PROCEDURE — 3066F NEPHROPATHY DOC TX: CPT | Mod: CPTII,S$GLB,, | Performed by: FAMILY MEDICINE

## 2022-10-19 PROCEDURE — 3075F PR MOST RECENT SYSTOLIC BLOOD PRESS GE 130-139MM HG: ICD-10-PCS | Mod: CPTII,S$GLB,, | Performed by: FAMILY MEDICINE

## 2022-10-19 PROCEDURE — 3075F SYST BP GE 130 - 139MM HG: CPT | Mod: CPTII,S$GLB,, | Performed by: FAMILY MEDICINE

## 2022-10-19 PROCEDURE — 1159F PR MEDICATION LIST DOCUMENTED IN MEDICAL RECORD: ICD-10-PCS | Mod: CPTII,S$GLB,, | Performed by: FAMILY MEDICINE

## 2022-10-19 PROCEDURE — 1101F PR PT FALLS ASSESS DOC 0-1 FALLS W/OUT INJ PAST YR: ICD-10-PCS | Mod: CPTII,S$GLB,, | Performed by: FAMILY MEDICINE

## 2022-10-19 PROCEDURE — 3072F LOW RISK FOR RETINOPATHY: CPT | Mod: CPTII,S$GLB,, | Performed by: FAMILY MEDICINE

## 2022-10-19 PROCEDURE — 1101F PT FALLS ASSESS-DOCD LE1/YR: CPT | Mod: CPTII,S$GLB,, | Performed by: FAMILY MEDICINE

## 2022-10-19 PROCEDURE — 3288F PR FALLS RISK ASSESSMENT DOCUMENTED: ICD-10-PCS | Mod: CPTII,S$GLB,, | Performed by: FAMILY MEDICINE

## 2022-10-19 PROCEDURE — 4010F PR ACE/ARB THEARPY RXD/TAKEN: ICD-10-PCS | Mod: CPTII,S$GLB,, | Performed by: FAMILY MEDICINE

## 2022-10-19 PROCEDURE — 3078F PR MOST RECENT DIASTOLIC BLOOD PRESSURE < 80 MM HG: ICD-10-PCS | Mod: CPTII,S$GLB,, | Performed by: FAMILY MEDICINE

## 2022-10-19 PROCEDURE — 3051F PR MOST RECENT HEMOGLOBIN A1C LEVEL 7.0 - < 8.0%: ICD-10-PCS | Mod: CPTII,S$GLB,, | Performed by: FAMILY MEDICINE

## 2022-10-19 PROCEDURE — 83036 HEMOGLOBIN GLYCOSYLATED A1C: CPT | Performed by: FAMILY MEDICINE

## 2022-10-19 PROCEDURE — 99999 PR PBB SHADOW E&M-EST. PATIENT-LVL V: ICD-10-PCS | Mod: PBBFAC,,, | Performed by: FAMILY MEDICINE

## 2022-10-19 PROCEDURE — 99999 PR PBB SHADOW E&M-EST. PATIENT-LVL V: CPT | Mod: PBBFAC,,, | Performed by: FAMILY MEDICINE

## 2022-10-19 PROCEDURE — 3078F DIAST BP <80 MM HG: CPT | Mod: CPTII,S$GLB,, | Performed by: FAMILY MEDICINE

## 2022-10-19 PROCEDURE — 1159F MED LIST DOCD IN RCRD: CPT | Mod: CPTII,S$GLB,, | Performed by: FAMILY MEDICINE

## 2022-10-19 PROCEDURE — 3061F PR NEG MICROALBUMINURIA RESULT DOCUMENTED/REVIEW: ICD-10-PCS | Mod: CPTII,S$GLB,, | Performed by: FAMILY MEDICINE

## 2022-10-19 PROCEDURE — 3072F PR LOW RISK FOR RETINOPATHY: ICD-10-PCS | Mod: CPTII,S$GLB,, | Performed by: FAMILY MEDICINE

## 2022-10-19 PROCEDURE — 1126F PR PAIN SEVERITY QUANTIFIED, NO PAIN PRESENT: ICD-10-PCS | Mod: CPTII,S$GLB,, | Performed by: FAMILY MEDICINE

## 2022-10-19 PROCEDURE — 1126F AMNT PAIN NOTED NONE PRSNT: CPT | Mod: CPTII,S$GLB,, | Performed by: FAMILY MEDICINE

## 2022-10-19 PROCEDURE — 99214 PR OFFICE/OUTPT VISIT, EST, LEVL IV, 30-39 MIN: ICD-10-PCS | Mod: S$GLB,,, | Performed by: FAMILY MEDICINE

## 2022-10-19 PROCEDURE — 3066F PR DOCUMENTATION OF TREATMENT FOR NEPHROPATHY: ICD-10-PCS | Mod: CPTII,S$GLB,, | Performed by: FAMILY MEDICINE

## 2022-10-19 NOTE — PROGRESS NOTES
Kristyn Garza    Chief Complaint   Patient presents with    Follow-up    Hypertension    Diabetes       History of Present Illness:   Ms. Garza comes in today for hypertension and diabetes follow-up.  She is fasting and has taken all of her medications.  She states she walks 5000 steps daily and monitors her diet.  She follows with hypertension digital medicine program and diabetes digital medicine program and states she has good levels.  She reports blood pressure 116/65 on last night.  She reports fasting glucose of 106 on yesterday and postprandial glucose of 153 on Sunday.    She has no acute problems today. she denies having fever, chills, fatigue, appetite changes; shortness of breath, cough, wheezing; chest pain, palpitations, leg swelling; abdominal pain, nausea, vomiting, diarrhea, constipation; unusual urinary symptoms; polydipsia, polyphagia, polyuria, hot or cold intolerance; back pain; headache, numbness, acute visual changes; anxiety, depression, homicidal or suicidal thoughts.      She saw Dr. Moon Lion to establish care with [facility] on October 5, 2022 and has 4-week follow-up scheduled for November 15, 2022.    She saw endocrinologist Dr. Nina Ellis on September 12, 2022 for surveillance adrenal nodule, hypertension associated with diabetes, multiple thyroid nodules, type 2 diabetes mellitus with stage 3a chronic kidney disease, without long-term current use of insulin, obesity (BMI 30.0-34.9), stage 3a chronic kidney disease and secondary hypertension. She saw Dr. Matute, cardiologist, on April 14, 2022 for surveillance of hypertension, hyperlipidemia, statin intolerance, LVH and PAC with 6-month follow up advised. She saw Nephrology Dr. Landaverde on March 31, 2022 for surveillance of stage 3 chronic kidney disease with 6-month follow up advised.  She is scheduled to see Dr. Khan, pulmonologist, on December 2, 2022 for pulmonary nodules.        Labs:                     WBC                       11.28               01/18/2022                 HGB                      13.0                01/18/2022                 HCT                      40.3                01/18/2022                 PLT                      284                 01/18/2022                 CHOL                     139                 03/24/2022                 TRIG                     67                  03/24/2022                 HDL                      49                  03/24/2022                 ALT                      19                  01/18/2022                 AST                      20                  01/18/2022                 NA                       141                 09/15/2022                 K                        5.2 (H)             09/15/2022                 CL                       106                 09/15/2022                 CREATININE               1.1                 09/15/2022                 BUN                      15                  09/15/2022                 CO2                      24                  09/15/2022                 TSH                      0.516               04/19/2022                 HGBA1C                   7.1 (H)             04/19/2022             LDLCALC                  76.6                03/24/2022                Current Outpatient Medications   Medication Sig    amLODIPine (NORVASC) 10 MG tablet TAKE 1 TABLET BY MOUTH EVERY DAY    aspirin 81 MG Chew Take 81 mg by mouth once daily.    benazepriL (LOTENSIN) 40 MG tablet TAKE 1 TABLET BY MOUTH EVERY DAY    calcium-vitamin D3 500 mg(1,250mg) -200 unit per tablet Take 1 tablet by mouth once daily.     CRANBERRY EXTRACT ORAL Take 1 tablet by mouth once daily.    evolocumab (REPATHA SURECLICK) 140 mg/mL PnIj Inject 1 mL (140 mg total) into the skin every 14 (fourteen) days.    evolocumab (REPATHA SURECLICK) 140 mg/mL PnIj Inject 1 mL (140 mg total) into the skin every 14 (fourteen) days.    lancets Misc 1 lancet by  Misc.(Non-Drug; Combo Route) route 3 (three) times daily. For Acc-Chek Amber   DX :E11.9    loratadine (CLARITIN) 10 mg tablet Take 1 tablet (10 mg total) by mouth once daily.    meclizine (ANTIVERT) 25 mg tablet Take 1 tablet (25 mg total) by mouth 3 (three) times daily as needed for Dizziness or Nausea.    metoprolol succinate (TOPROL-XL) 25 MG 24 hr tablet Take 1 tablet (25 mg total) by mouth once daily.    MULTIVITAMIN W-MINERALS/LUTEIN (CENTRUM SILVER ORAL) Take 1 tablet by mouth once daily.    spironolactone (ALDACTONE) 50 MG tablet Take 1 tablet (50 mg total) by mouth once daily.    triamcinolone (NASACORT) 55 mcg nasal inhaler USE 2 SPRAYS BY NASAL ROUTE ONCE DAILY    ALPRAZolam (XANAX) 0.5 MG tablet Take 1 tablet (0.5 mg total) by mouth daily as needed for Anxiety.    blood glucose control, normal Soln 1 Bottle by Misc.(Non-Drug; Combo Route) route once daily. For Accu Check Amber  use twice daily prn dx: E11.9    blood-glucose meter (ACCU-CHEK AMBER) Misc 1 kit by Misc.(Non-Drug; Combo Route) route once. For Accu Check Amber  use twice daily prn dx: E11.9 for 1 dose         Review of Systems   Constitutional:  Negative for activity change, appetite change, chills, fatigue, fever and unexpected weight change.        Weight 83.1 kg (183 lb 3.2 oz) at April 19, 2022 visit.   Respiratory:  Negative for cough, shortness of breath and wheezing.    Cardiovascular:  Negative for chest pain, palpitations and leg swelling.        See history of present illness.   Gastrointestinal:  Negative for abdominal pain, constipation, diarrhea, nausea and vomiting.   Endocrine: Negative for cold intolerance, heat intolerance, polydipsia, polyphagia and polyuria.        See history of present illness.   Genitourinary:  Negative for difficulty urinating.        See history of present illness.   Musculoskeletal:  Negative for back pain.   Neurological:  Negative for headaches.   Psychiatric/Behavioral:  Negative for dysphoric mood,  sleep disturbance and suicidal ideas. The patient is not nervous/anxious.         Negative for homicidal ideas. See history of present illness.     Objective:  Physical Exam  Vitals reviewed.   Constitutional:       General: She is not in acute distress.     Appearance: Normal appearance. She is well-developed. She is obese. She is not ill-appearing or diaphoretic.      Comments: Pleasant.   Eyes:      Comments: She wears glasses.   Neck:      Thyroid: Thyromegaly present.      Comments: Slight, chronic.  Cardiovascular:      Rate and Rhythm: Normal rate and regular rhythm.      Pulses:           Dorsalis pedis pulses are 3+ on the right side and 3+ on the left side.        Posterior tibial pulses are 3+ on the right side and 3+ on the left side.      Heart sounds: No murmur heard.  Pulmonary:      Effort: Pulmonary effort is normal. No respiratory distress.      Breath sounds: Normal breath sounds. No wheezing.   Abdominal:      General: Bowel sounds are normal. There is no distension.      Palpations: Abdomen is soft. There is no mass.      Tenderness: There is no abdominal tenderness. There is no guarding or rebound.   Musculoskeletal:         General: No swelling or tenderness. Normal range of motion.      Cervical back: Normal range of motion and neck supple. No tenderness.      Comments: She is ambulatory without problems. Feet look okay without ulcerations or skin breaks noted.    Feet:      Right foot:      Protective Sensation: 5 sites tested.  5 sites sensed.      Skin integrity: No ulcer or skin breakdown.      Left foot:      Protective Sensation: 5 sites tested.  5 sites sensed.      Skin integrity: No ulcer or skin breakdown.   Lymphadenopathy:      Cervical: No cervical adenopathy.   Neurological:      General: No focal deficit present.      Mental Status: She is alert and oriented to person, place, and time.   Psychiatric:         Mood and Affect: Mood normal.         Behavior: Behavior normal.          Thought Content: Thought content normal.         Judgment: Judgment normal.       ASSESSMENT:  1. Hypertension associated with diabetes    2. Type 2 diabetes mellitus with stage 3a chronic kidney disease, without long-term current use of insulin    3. Combined hyperlipidemia associated with type 2 diabetes mellitus    4. Stage 3a chronic kidney disease    5. Secondary hyperparathyroidism of renal origin    6. Adrenal nodule    7. Multiple thyroid nodules    8. Obesity (BMI 30.0-34.9)    9. Postmenopausal        PLAN:  Kristyn was seen today for follow-up, hypertension and diabetes.    Diagnoses and all orders for this visit:    Hypertension associated with diabetes    Type 2 diabetes mellitus with stage 3a chronic kidney disease, without long-term current use of insulin  -     Hemoglobin A1C; Future    Combined hyperlipidemia associated with type 2 diabetes mellitus    Stage 3a chronic kidney disease    Secondary hyperparathyroidism of renal origin    Adrenal nodule    Multiple thyroid nodules    Obesity (BMI 30.0-34.9)    Postmenopausal    Patient advised to call for results.  Continue current medications, follow low sodium, low cholesterol, low carb diet, daily walks.  Keep follow up with specialists.  Follow up if symptoms worsen or fail to improve. However, call for physical appointment with me for April 2023 if she decides to continue her care with me.

## 2022-10-20 ENCOUNTER — TELEPHONE (OUTPATIENT)
Dept: PRIMARY CARE CLINIC | Facility: CLINIC | Age: 72
End: 2022-10-20
Payer: MEDICARE

## 2022-10-20 NOTE — TELEPHONE ENCOUNTER
----- Message from Moon Lion MD sent at 10/19/2022  6:20 PM CDT -----  Regarding: PCP?  Good afternoon - could you - (or Usha?) please call Ms. Garza to see if she plans to cont f/u w us here at [facility] or prefers to stay w Dr. Guzman? She came here on the 5th to establish care but was seen by Dr. Guzman today ... whichever she prefers is fine but it's one or the other ... please let me know so I can remove myself as PCP if she's staying w Dr. Guzman.    Thank you!

## 2022-10-24 ENCOUNTER — SPECIALTY PHARMACY (OUTPATIENT)
Dept: PHARMACY | Facility: CLINIC | Age: 72
End: 2022-10-24
Payer: MEDICARE

## 2022-10-24 NOTE — TELEPHONE ENCOUNTER
Specialty Pharmacy - Refill Coordination    Specialty Medication Orders Linked to Encounter      Flowsheet Row Most Recent Value   Medication #1 evolocumab (REPATHA SURECLICK) 140 mg/mL PnIj (Order#[id number], Rx#[id number])            Refill Questions - Documented Responses      Flowsheet Row Most Recent Value   Patient Availability and HIPAA Verification    Does patient want to proceed with activity? Yes   HIPAA/medical authority confirmed? Yes   Relationship to patient of person spoken to? Self            Current Outpatient Medications   Medication Sig    ALPRAZolam (XANAX) 0.5 MG tablet Take 1 tablet (0.5 mg total) by mouth daily as needed for Anxiety.    amLODIPine (NORVASC) 10 MG tablet TAKE 1 TABLET BY MOUTH EVERY DAY    aspirin 81 MG Chew Take 81 mg by mouth once daily.    benazepriL (LOTENSIN) 40 MG tablet TAKE 1 TABLET BY MOUTH EVERY DAY    blood glucose control, normal Soln 1 Bottle by Misc.(Non-Drug; Combo Route) route once daily. For Accu Check Amber  use twice daily prn dx: E11.9    blood-glucose meter (ACCU-CHEK AMBER) Misc 1 kit by Misc.(Non-Drug; Combo Route) route once. For Accu Check Amber  use twice daily prn dx: E11.9 for 1 dose    calcium-vitamin D3 500 mg(1,250mg) -200 unit per tablet Take 1 tablet by mouth once daily.     CRANBERRY EXTRACT ORAL Take 1 tablet by mouth once daily.    evolocumab (REPATHA SURECLICK) 140 mg/mL PnIj Inject 1 mL (140 mg total) into the skin every 14 (fourteen) days.    evolocumab (REPATHA SURECLICK) 140 mg/mL PnIj Inject 1 mL (140 mg total) into the skin every 14 (fourteen) days.    lancets Misc 1 lancet by Misc.(Non-Drug; Combo Route) route 3 (three) times daily. For Acc-Chek Amber   DX :E11.9    loratadine (CLARITIN) 10 mg tablet Take 1 tablet (10 mg total) by mouth once daily.    meclizine (ANTIVERT) 25 mg tablet Take 1 tablet (25 mg total) by mouth 3 (three) times daily as needed for Dizziness or Nausea.    metoprolol succinate (TOPROL-XL) 25 MG 24 hr tablet Take 1  tablet (25 mg total) by mouth once daily.    MULTIVITAMIN W-MINERALS/LUTEIN (CENTRUM SILVER ORAL) Take 1 tablet by mouth once daily.    spironolactone (ALDACTONE) 50 MG tablet Take 1 tablet (50 mg total) by mouth once daily.    triamcinolone (NASACORT) 55 mcg nasal inhaler USE 2 SPRAYS BY NASAL ROUTE ONCE DAILY   Last reviewed on 10/19/2022  9:13 AM by Yun Mcgowan MA    Review of patient's allergies indicates:   Allergen Reactions    Statins-hmg-coa reductase inhibitors      Muscle Cramps    Last reviewed on  10/19/2022 9:13 AM by Yun Mcgowan      Tasks added this encounter   11/23/2022 - Refill Call (Auto Added)   Tasks due within next 3 months   No tasks due.     [facility]  [address] [place] [id number]  Phone: [phone]  Fax: [phone]

## 2022-10-24 NOTE — TELEPHONE ENCOUNTER
Specialty Pharmacy - Refill Coordination    Specialty Medication Orders Linked to Encounter      Flowsheet Row Most Recent Value   Medication #1 evolocumab (REPATHA SURECLICK) 140 mg/mL PnIj (Order#[id number], Rx#[id number])            Refill Questions - Documented Responses      Flowsheet Row Most Recent Value   Patient Availability and HIPAA Verification    Does patient want to proceed with activity? Yes   HIPAA/medical authority confirmed? Yes   Relationship to patient of person spoken to? Self   Refill Screening Questions    Would patient like to speak to a pharmacist? No   When does the patient need to receive the medication? 11/01/22   Refill Delivery Questions    How will the patient receive the medication? MEDRx   When does the patient need to receive the medication? 11/01/22   Shipping Address Home   Address in [facility] confirmed and updated if neccessary? Yes   Expected Copay ($) 0   Is the patient able to afford the medication copay? Yes   Payment Method zero copay   Days supply of Refill 28   Refill activity completed? Yes   Refill activity plan Refill scheduled   Shipment/Pickup Date: 10/27/22            Current Outpatient Medications   Medication Sig    ALPRAZolam (XANAX) 0.5 MG tablet Take 1 tablet (0.5 mg total) by mouth daily as needed for Anxiety.    amLODIPine (NORVASC) 10 MG tablet TAKE 1 TABLET BY MOUTH EVERY DAY    aspirin 81 MG Chew Take 81 mg by mouth once daily.    benazepriL (LOTENSIN) 40 MG tablet TAKE 1 TABLET BY MOUTH EVERY DAY    blood glucose control, normal Soln 1 Bottle by Misc.(Non-Drug; Combo Route) route once daily. For Accu Check Amber  use twice daily prn dx: E11.9    blood-glucose meter (ACCU-CHEK AMBER) Misc 1 kit by Misc.(Non-Drug; Combo Route) route once. For Accu Check Amber  use twice daily prn dx: E11.9 for 1 dose    calcium-vitamin D3 500 mg(1,250mg) -200 unit per tablet Take 1 tablet by mouth once daily.     CRANBERRY EXTRACT ORAL Take 1 tablet by mouth once  daily.    evolocumab (REPATHA SURECLICK) 140 mg/mL PnIj Inject 1 mL (140 mg total) into the skin every 14 (fourteen) days.    evolocumab (REPATHA SURECLICK) 140 mg/mL PnIj Inject 1 mL (140 mg total) into the skin every 14 (fourteen) days.    lancets Misc 1 lancet by Misc.(Non-Drug; Combo Route) route 3 (three) times daily. For Acc-Chek Irais   DX :E11.9    loratadine (CLARITIN) 10 mg tablet Take 1 tablet (10 mg total) by mouth once daily.    meclizine (ANTIVERT) 25 mg tablet Take 1 tablet (25 mg total) by mouth 3 (three) times daily as needed for Dizziness or Nausea.    metoprolol succinate (TOPROL-XL) 25 MG 24 hr tablet Take 1 tablet (25 mg total) by mouth once daily.    MULTIVITAMIN W-MINERALS/LUTEIN (CENTRUM SILVER ORAL) Take 1 tablet by mouth once daily.    spironolactone (ALDACTONE) 50 MG tablet Take 1 tablet (50 mg total) by mouth once daily.    triamcinolone (NASACORT) 55 mcg nasal inhaler USE 2 SPRAYS BY NASAL ROUTE ONCE DAILY   Last reviewed on 10/19/2022  9:13 AM by Yun Mcgowan MA    Review of patient's allergies indicates:   Allergen Reactions    Statins-hmg-coa reductase inhibitors      Muscle Cramps    Last reviewed on  10/19/2022 9:13 AM by Yun Mcgowan      Tasks added this encounter   11/23/2022 - Refill Call (Auto Added)   Tasks due within next 3 months   No tasks due.     [facility]  [address] [place] [id number]  Phone: [phone]  Fax: [phone]

## 2022-10-27 ENCOUNTER — TELEPHONE (OUTPATIENT)
Dept: PRIMARY CARE CLINIC | Facility: CLINIC | Age: 72
End: 2022-10-27
Payer: MEDICARE

## 2022-10-27 DIAGNOSIS — R94.6 THYROID FUNCTION TEST ABNORMAL: ICD-10-CM

## 2022-10-27 DIAGNOSIS — N18.31 STAGE 3A CHRONIC KIDNEY DISEASE: ICD-10-CM

## 2022-10-27 DIAGNOSIS — E78.2 COMBINED HYPERLIPIDEMIA ASSOCIATED WITH TYPE 2 DIABETES MELLITUS: Primary | ICD-10-CM

## 2022-10-27 DIAGNOSIS — E11.69 COMBINED HYPERLIPIDEMIA ASSOCIATED WITH TYPE 2 DIABETES MELLITUS: Primary | ICD-10-CM

## 2022-10-27 NOTE — TELEPHONE ENCOUNTER
----- Message from Moon Lion MD sent at 10/27/2022 11:02 AM CDT -----  Regarding: lab recall  Prior PCP Dr. Guzman only checked HgbA1c at recent visit 10/19 - Ms. Garza has f/u appt w me Tues 11/15 - can do fasting labwork that morning if she prefers but if she can come the morning of Mon 11/14 or one day in the week prior we'll have the results back to discuss when we meet on the 15th ...

## 2022-11-09 ENCOUNTER — TELEPHONE (OUTPATIENT)
Dept: PRIMARY CARE CLINIC | Facility: CLINIC | Age: 72
End: 2022-11-09
Payer: MEDICARE

## 2022-11-09 NOTE — TELEPHONE ENCOUNTER
----- Message from Cheyenne Mcdonald sent at 11/9/2022  8:16 AM CST -----  Contact: Pt [phone]  [id number]     Patient is calling for Medical Advice regarding: Losing Voice Sore Throat Green Mucus     How long has patient had these symptoms: Since Sunday     Pharmacy name and phone#:  [id number] [place] - [address]  [address] [place] [address]  Phone: [phone] Fax: [phone]    Would like response via Orbitert:  Call Back     Comments: Pt would like to be seen today

## 2022-11-09 NOTE — TELEPHONE ENCOUNTER
Patient advised that Cuca recommended plain Guaifenesin syrup. Told Patient that since she's not running a fever and feels like she doesn't have any chest congestion or wheezing she should continue with OTC meds. Patient told if she gets worse or starts running a fever she should contact office.

## 2022-11-09 NOTE — TELEPHONE ENCOUNTER
Dr. Lion asked me to call Patient back in October and she said she was going with Dr. Lion as her PCP.

## 2022-11-09 NOTE — TELEPHONE ENCOUNTER
"Patient states that Sunday she got home from [employer] and started with sore throat, Sunday night got hoarse, started taking Coricidin, states this am started coughing up "green", states it feels like it is all in there throat. Temp 97.1, states doesn't fell like she's wheezing. Please advise.   "

## 2022-11-10 ENCOUNTER — OFFICE VISIT (OUTPATIENT)
Dept: PRIMARY CARE CLINIC | Facility: CLINIC | Age: 72
End: 2022-11-10
Payer: MEDICARE

## 2022-11-10 VITALS
HEIGHT: 64 IN | WEIGHT: 183.63 LBS | TEMPERATURE: 98 F | OXYGEN SATURATION: 97 % | HEART RATE: 63 BPM | BODY MASS INDEX: 31.35 KG/M2 | SYSTOLIC BLOOD PRESSURE: 124 MMHG | DIASTOLIC BLOOD PRESSURE: 72 MMHG

## 2022-11-10 DIAGNOSIS — J06.9 UPPER RESPIRATORY TRACT INFECTION, UNSPECIFIED TYPE: Primary | ICD-10-CM

## 2022-11-10 DIAGNOSIS — E11.69 COMBINED HYPERLIPIDEMIA ASSOCIATED WITH TYPE 2 DIABETES MELLITUS: ICD-10-CM

## 2022-11-10 DIAGNOSIS — R94.6 THYROID FUNCTION TEST ABNORMAL: ICD-10-CM

## 2022-11-10 DIAGNOSIS — N18.31 STAGE 3A CHRONIC KIDNEY DISEASE: ICD-10-CM

## 2022-11-10 DIAGNOSIS — E78.2 COMBINED HYPERLIPIDEMIA ASSOCIATED WITH TYPE 2 DIABETES MELLITUS: ICD-10-CM

## 2022-11-10 LAB
BASOPHILS # BLD AUTO: 0.05 K/UL (ref 0–0.2)
BASOPHILS NFR BLD: 0.4 % (ref 0–1.9)
CTP QC/QA: YES
CTP QC/QA: YES
DIFFERENTIAL METHOD: NORMAL
EOSINOPHIL # BLD AUTO: 0.2 K/UL (ref 0–0.5)
EOSINOPHIL NFR BLD: 2.1 % (ref 0–8)
ERYTHROCYTE [DISTWIDTH] IN BLOOD BY AUTOMATED COUNT: 13.3 % (ref 11.5–14.5)
HCT VFR BLD AUTO: 40.1 % (ref 37–48.5)
HGB BLD-MCNC: 12.9 G/DL (ref 12–16)
IMM GRANULOCYTES # BLD AUTO: 0.04 K/UL (ref 0–0.04)
IMM GRANULOCYTES NFR BLD AUTO: 0.3 % (ref 0–0.5)
LYMPHOCYTES # BLD AUTO: 3.7 K/UL (ref 1–4.8)
LYMPHOCYTES NFR BLD: 31.7 % (ref 18–48)
MCH RBC QN AUTO: 30.7 PG (ref 27–31)
MCHC RBC AUTO-ENTMCNC: 32.2 G/DL (ref 32–36)
MCV RBC AUTO: 96 FL (ref 82–98)
MONOCYTES # BLD AUTO: 0.9 K/UL (ref 0.3–1)
MONOCYTES NFR BLD: 7.9 % (ref 4–15)
NEUTROPHILS # BLD AUTO: 6.7 K/UL (ref 1.8–7.7)
NEUTROPHILS NFR BLD: 57.6 % (ref 38–73)
NRBC BLD-RTO: 0 /100 WBC
PLATELET # BLD AUTO: 277 K/UL (ref 150–450)
PMV BLD AUTO: 10.8 FL (ref 9.2–12.9)
POC MOLECULAR INFLUENZA A AGN: NEGATIVE
POC MOLECULAR INFLUENZA B AGN: NEGATIVE
RBC # BLD AUTO: 4.2 M/UL (ref 4–5.4)
SARS-COV-2 AG RESP QL IA.RAPID: NEGATIVE
WBC # BLD AUTO: 11.66 K/UL (ref 3.9–12.7)

## 2022-11-10 PROCEDURE — 1126F AMNT PAIN NOTED NONE PRSNT: CPT | Mod: CPTII,S$GLB,, | Performed by: NURSE PRACTITIONER

## 2022-11-10 PROCEDURE — 1160F PR REVIEW ALL MEDS BY PRESCRIBER/CLIN PHARMACIST DOCUMENTED: ICD-10-PCS | Mod: CPTII,S$GLB,, | Performed by: NURSE PRACTITIONER

## 2022-11-10 PROCEDURE — 99212 OFFICE O/P EST SF 10 MIN: CPT | Mod: S$GLB,,, | Performed by: NURSE PRACTITIONER

## 2022-11-10 PROCEDURE — 83735 ASSAY OF MAGNESIUM: CPT | Performed by: INTERNAL MEDICINE

## 2022-11-10 PROCEDURE — 99999 PR PBB SHADOW E&M-EST. PATIENT-LVL IV: ICD-10-PCS | Mod: PBBFAC,,, | Performed by: NURSE PRACTITIONER

## 2022-11-10 PROCEDURE — 80061 LIPID PANEL: CPT | Performed by: INTERNAL MEDICINE

## 2022-11-10 PROCEDURE — 3074F SYST BP LT 130 MM HG: CPT | Mod: CPTII,S$GLB,, | Performed by: NURSE PRACTITIONER

## 2022-11-10 PROCEDURE — 3072F LOW RISK FOR RETINOPATHY: CPT | Mod: CPTII,S$GLB,, | Performed by: NURSE PRACTITIONER

## 2022-11-10 PROCEDURE — 84443 ASSAY THYROID STIM HORMONE: CPT | Performed by: INTERNAL MEDICINE

## 2022-11-10 PROCEDURE — 87811 SARS-COV-2 COVID19 W/OPTIC: CPT | Mod: QW,S$GLB,, | Performed by: NURSE PRACTITIONER

## 2022-11-10 PROCEDURE — 1160F RVW MEDS BY RX/DR IN RCRD: CPT | Mod: CPTII,S$GLB,, | Performed by: NURSE PRACTITIONER

## 2022-11-10 PROCEDURE — 3044F HG A1C LEVEL LT 7.0%: CPT | Mod: CPTII,S$GLB,, | Performed by: NURSE PRACTITIONER

## 2022-11-10 PROCEDURE — 3044F PR MOST RECENT HEMOGLOBIN A1C LEVEL <7.0%: ICD-10-PCS | Mod: CPTII,S$GLB,, | Performed by: NURSE PRACTITIONER

## 2022-11-10 PROCEDURE — 1101F PT FALLS ASSESS-DOCD LE1/YR: CPT | Mod: CPTII,S$GLB,, | Performed by: NURSE PRACTITIONER

## 2022-11-10 PROCEDURE — 3061F NEG MICROALBUMINURIA REV: CPT | Mod: CPTII,S$GLB,, | Performed by: NURSE PRACTITIONER

## 2022-11-10 PROCEDURE — 1159F MED LIST DOCD IN RCRD: CPT | Mod: CPTII,S$GLB,, | Performed by: NURSE PRACTITIONER

## 2022-11-10 PROCEDURE — 4010F ACE/ARB THERAPY RXD/TAKEN: CPT | Mod: CPTII,S$GLB,, | Performed by: NURSE PRACTITIONER

## 2022-11-10 PROCEDURE — 3072F PR LOW RISK FOR RETINOPATHY: ICD-10-PCS | Mod: CPTII,S$GLB,, | Performed by: NURSE PRACTITIONER

## 2022-11-10 PROCEDURE — 84480 ASSAY TRIIODOTHYRONINE (T3): CPT | Performed by: INTERNAL MEDICINE

## 2022-11-10 PROCEDURE — 3066F PR DOCUMENTATION OF TREATMENT FOR NEPHROPATHY: ICD-10-PCS | Mod: CPTII,S$GLB,, | Performed by: NURSE PRACTITIONER

## 2022-11-10 PROCEDURE — 3008F BODY MASS INDEX DOCD: CPT | Mod: CPTII,S$GLB,, | Performed by: NURSE PRACTITIONER

## 2022-11-10 PROCEDURE — 99212 PR OFFICE/OUTPT VISIT, EST, LEVL II, 10-19 MIN: ICD-10-PCS | Mod: S$GLB,,, | Performed by: NURSE PRACTITIONER

## 2022-11-10 PROCEDURE — 1159F PR MEDICATION LIST DOCUMENTED IN MEDICAL RECORD: ICD-10-PCS | Mod: CPTII,S$GLB,, | Performed by: NURSE PRACTITIONER

## 2022-11-10 PROCEDURE — 3061F PR NEG MICROALBUMINURIA RESULT DOCUMENTED/REVIEW: ICD-10-PCS | Mod: CPTII,S$GLB,, | Performed by: NURSE PRACTITIONER

## 2022-11-10 PROCEDURE — 99999 PR PBB SHADOW E&M-EST. PATIENT-LVL IV: CPT | Mod: PBBFAC,,, | Performed by: NURSE PRACTITIONER

## 2022-11-10 PROCEDURE — 3288F FALL RISK ASSESSMENT DOCD: CPT | Mod: CPTII,S$GLB,, | Performed by: NURSE PRACTITIONER

## 2022-11-10 PROCEDURE — 3066F NEPHROPATHY DOC TX: CPT | Mod: CPTII,S$GLB,, | Performed by: NURSE PRACTITIONER

## 2022-11-10 PROCEDURE — 3074F PR MOST RECENT SYSTOLIC BLOOD PRESSURE < 130 MM HG: ICD-10-PCS | Mod: CPTII,S$GLB,, | Performed by: NURSE PRACTITIONER

## 2022-11-10 PROCEDURE — 3078F PR MOST RECENT DIASTOLIC BLOOD PRESSURE < 80 MM HG: ICD-10-PCS | Mod: CPTII,S$GLB,, | Performed by: NURSE PRACTITIONER

## 2022-11-10 PROCEDURE — 85025 COMPLETE CBC W/AUTO DIFF WBC: CPT | Performed by: INTERNAL MEDICINE

## 2022-11-10 PROCEDURE — 84439 ASSAY OF FREE THYROXINE: CPT | Performed by: INTERNAL MEDICINE

## 2022-11-10 PROCEDURE — 87811 SARS CORONAVIRUS 2 ANTIGEN POCT, MANUAL READ: ICD-10-PCS | Mod: QW,S$GLB,, | Performed by: NURSE PRACTITIONER

## 2022-11-10 PROCEDURE — 1101F PR PT FALLS ASSESS DOC 0-1 FALLS W/OUT INJ PAST YR: ICD-10-PCS | Mod: CPTII,S$GLB,, | Performed by: NURSE PRACTITIONER

## 2022-11-10 PROCEDURE — 3288F PR FALLS RISK ASSESSMENT DOCUMENTED: ICD-10-PCS | Mod: CPTII,S$GLB,, | Performed by: NURSE PRACTITIONER

## 2022-11-10 PROCEDURE — 1126F PR PAIN SEVERITY QUANTIFIED, NO PAIN PRESENT: ICD-10-PCS | Mod: CPTII,S$GLB,, | Performed by: NURSE PRACTITIONER

## 2022-11-10 PROCEDURE — 36415 COLL VENOUS BLD VENIPUNCTURE: CPT | Performed by: INTERNAL MEDICINE

## 2022-11-10 PROCEDURE — 80069 RENAL FUNCTION PANEL: CPT | Performed by: INTERNAL MEDICINE

## 2022-11-10 PROCEDURE — 87502 POCT INFLUENZA A/B MOLECULAR: ICD-10-PCS | Mod: QW,S$GLB,, | Performed by: NURSE PRACTITIONER

## 2022-11-10 PROCEDURE — 4010F PR ACE/ARB THEARPY RXD/TAKEN: ICD-10-PCS | Mod: CPTII,S$GLB,, | Performed by: NURSE PRACTITIONER

## 2022-11-10 PROCEDURE — 3008F PR BODY MASS INDEX (BMI) DOCUMENTED: ICD-10-PCS | Mod: CPTII,S$GLB,, | Performed by: NURSE PRACTITIONER

## 2022-11-10 PROCEDURE — 3078F DIAST BP <80 MM HG: CPT | Mod: CPTII,S$GLB,, | Performed by: NURSE PRACTITIONER

## 2022-11-10 PROCEDURE — 87502 INFLUENZA DNA AMP PROBE: CPT | Mod: QW,S$GLB,, | Performed by: NURSE PRACTITIONER

## 2022-11-10 NOTE — PROGRESS NOTES
Kristyn Garza  11/10/2022  [id number]    Moon Lion MD  Patient Care Team:  Moon Lion MD as PCP - General (Internal Medicine)  Delia Liu LPN as Care Coordinator (Internal Medicine)  Soo Bar as Digital Medicine Health   Dale Matute MD as Consulting Physician (Cardiology)  Hammad Hardy OD as Consulting Physician (Optometry)  Olivia Irwin MD as Consulting Physician (Otolaryngology)  Gretel De Leon, PharmD as Hypertension Digital Medicine Clinician  [employer] as Hypertension Digital Medicine Contract  Brenden Landaverde MD as Consulting Physician (Nephrology)  Zoë Guzman MD as Hyperlipidemia Digital Medicine Responsible Provider (Family Medicine)  Zoë Guzman MD as Diabetes Digital Medicine Responsible Provider  Gretel De Leon PharmD as Hyperlipidemia Digital Medicine Clinician  Joanne MarsD as Diabetes Digital Medicine Clinician  [employer] as Diabetes Digital Medicine Contract  Zoë Guzman MD as Hypertension Digital Medicine Responsible Provider (Family Medicine)  Cuca Beatty NP as Nurse Practitioner (Family Medicine)      [facility] Note      Chief Complaint:  Chief Complaint   Patient presents with    Sore Throat    Coughing up green mucus    Losing voice       History of Present Illness:  HPI    Seen by Dr Lion a month ago then Dr Guzman 10/19/22. She reported to staff she plans to continue follow up with Dr Lion. Seen today for an acute visit. Hoarse, losing voice, productive cough with green sputum. Onset of sx Sunday, green sputum yesterday. Taking Tussin, this is helping. Sx primarily sinus/nasal congestion, post nasal drip.     Decreased appetite. Takes loratadine intermittently.     Grandson is ill with nasal congestion.           Review of Systems   Constitutional:  Negative for chills, fever and malaise/fatigue.   HENT:  Positive for congestion, sinus pain and sore throat.     Respiratory:  Negative for cough and shortness of breath.    Cardiovascular:  Negative for chest pain and leg swelling.   Gastrointestinal:  Negative for abdominal pain, constipation, diarrhea, heartburn, nausea and vomiting.   Genitourinary:  Negative for dysuria.   Musculoskeletal:  Negative for myalgias.   Neurological:  Negative for dizziness and headaches.   Psychiatric/Behavioral:  Negative for depression.        The following were reviewed: Active problem list, medication list, allergies, family history, social history, and Health Maintenance.     History:  Past Medical History:   Diagnosis Date    Carpal tunnel syndrome     CKD (chronic kidney disease) stage 3, GFR 30-59 ml/min     Followed by Nephrology    Colon cancer screening 3/18/2014    CTS (carpal tunnel syndrome) 6/18/2013    Diabetes mellitus, type 2     Eczema     Elevated CPK     History of hypokalemia 6/18/2013    History of hypokalemia 6/18/2013    Hypokalemia     Multinodular thyroid     Followed by ENT    Obesity     Other and unspecified hyperlipidemia     Pneumonia     in her 20s; hospitalized    Postmenopausal     No history of abnormal pap smear    Statin intolerance     caused mylagia, elevated CPK    Tobacco dependence     resolved    Unspecified essential hypertension      Past Surgical History:   Procedure Laterality Date    COLONOSCOPY      CYST REMOVAL  2015    On the head    FINE NEEDLE ASPIRATION  3/2011    thyroid nodules x3 (negative per patient)    HYSTERECTOMY      PARTIAL HYSTERECTOMY  1991    Due to fibroids     Family History   Problem Relation Age of Onset    Hypertension Mother     Diabetes Mother     Dementia Mother         Alzheimer's dementia    Hypertension Father     Heart disease Sister         MI/CAD    Cataracts Sister     Dementia Sister     No Known Problems Son     No Known Problems Son     Cataracts Brother     Cataracts Brother     Dementia Sister     Cancer Neg Hx     Stroke Neg Hx     Melanoma Neg Hx      Psoriasis Neg Hx     Lupus Neg Hx     Eczema Neg Hx     COPD Neg Hx     Kidney disease Neg Hx      Social History     Socioeconomic History    Marital status:     Number of children: 2    Highest education level: Some college, no degree   Occupational History    Occupation: Retired   Tobacco Use    Smoking status: Former     Packs/day: 0.50     Years: 25.00     Pack years: 12.50     Types: Cigarettes     Quit date: 2013     Years since quittin.4    Smokeless tobacco: Never   Substance and Sexual Activity    Alcohol use: Yes     Alcohol/week: 0.0 standard drinks     Comment: Rarely drinks wine    Drug use: No    Sexual activity: Not Currently     Partners: Male     Birth control/protection: Condom   Social History Narrative    She wears seatbelt. . 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      Social Determinants of Health     Financial Resource Strain: Low Risk     Difficulty of Paying Living Expenses: Not very hard   Food Insecurity: Food Insecurity Present    Worried About Running Out of Food in the Last Year: Sometimes true    Ran Out of Food in the Last Year: Sometimes true   Transportation Needs: No Transportation Needs    Lack of Transportation (Medical): No    Lack of Transportation (Non-Medical): No   Physical Activity: Insufficiently Active    Days of Exercise per Week: 2 days    Minutes of Exercise per Session: 30 min   Stress: No Stress Concern Present    Feeling of Stress : Only a little   Social Connections: Unknown    Frequency of Communication with Friends and Family: More than three times a week    Frequency of Social Gatherings with Friends and Family: Three times a week    Active Member of Clubs or Organizations: Yes    Attends Club or Organization Meetings: More than 4 times per year    Marital Status:    Housing Stability: Low Risk     Unable to Pay for Housing in the Last Year: No    Number of Places Lived in the Last Year: 1    Unstable Housing in the Last  Year: No     Patient Active Problem List   Diagnosis    Multiple thyroid nodules    Cupping of optic disc    Nuclear sclerosis    Combined hyperlipidemia associated with type 2 diabetes mellitus    Hypertension associated with diabetes    CKD (chronic kidney disease) stage 3, GFR 30-59 ml/min    Obesity (BMI 30.0-34.9)    Postmenopausal    Statin intolerance    Abnormal ECG    LVH (left ventricular hypertrophy)    Family history of cardiovascular disorder    PAC (premature atrial contraction)    Type 2 diabetes mellitus with kidney complication, without long-term current use of insulin    Endolymphatic hydrops of left ear    Gastroesophageal reflux disease    ZHOU (dyspnea on exertion)    Secondary hyperparathyroidism of renal origin    Diastolic dysfunction    Secondary hypertension    Adrenal nodule    Pulmonary nodules     Review of patient's allergies indicates:   Allergen Reactions    Statins-hmg-coa reductase inhibitors      Muscle Cramps       Medications:  Current Outpatient Medications on File Prior to Visit   Medication Sig Dispense Refill    ALPRAZolam (XANAX) 0.5 MG tablet Take 1 tablet (0.5 mg total) by mouth daily as needed for Anxiety. 30 tablet 0    amLODIPine (NORVASC) 10 MG tablet TAKE 1 TABLET BY MOUTH EVERY DAY 90 tablet 3    aspirin 81 MG Chew Take 81 mg by mouth once daily.      benazepriL (LOTENSIN) 40 MG tablet TAKE 1 TABLET BY MOUTH EVERY DAY 90 tablet 3    calcium-vitamin D3 500 mg(1,250mg) -200 unit per tablet Take 1 tablet by mouth once daily.       CRANBERRY EXTRACT ORAL Take 1 tablet by mouth once daily.      evolocumab (REPATHA SURECLICK) 140 mg/mL PnIj Inject 1 mL (140 mg total) into the skin every 14 (fourteen) days. 2 mL 5    evolocumab (REPATHA SURECLICK) 140 mg/mL PnIj Inject 1 mL (140 mg total) into the skin every 14 (fourteen) days. 2 mL 5    lancets Misc 1 lancet by Misc.(Non-Drug; Combo Route) route 3 (three) times daily. For Acc-Chek Irais   DX :E11.9 100 each 0    loratadine  (CLARITIN) 10 mg tablet Take 1 tablet (10 mg total) by mouth once daily. 90 tablet 1    meclizine (ANTIVERT) 25 mg tablet Take 1 tablet (25 mg total) by mouth 3 (three) times daily as needed for Dizziness or Nausea. 60 tablet 1    metoprolol succinate (TOPROL-XL) 25 MG 24 hr tablet Take 1 tablet (25 mg total) by mouth once daily. 30 tablet 11    MULTIVITAMIN W-MINERALS/LUTEIN (CENTRUM SILVER ORAL) Take 1 tablet by mouth once daily.      spironolactone (ALDACTONE) 50 MG tablet Take 1 tablet (50 mg total) by mouth once daily. 90 tablet 2    triamcinolone (NASACORT) 55 mcg nasal inhaler USE 2 SPRAYS BY NASAL ROUTE ONCE DAILY 50.7 g 1    blood glucose control, normal Soln 1 Bottle by Misc.(Non-Drug; Combo Route) route once daily. For Accu Check Amber  use twice daily prn dx: E11.9 1 each 0    blood-glucose meter (ACCU-CHEK AMBER) Misc 1 kit by Misc.(Non-Drug; Combo Route) route once. For Accu Check Amber  use twice daily prn dx: E11.9 for 1 dose 1 each 0     No current facility-administered medications on file prior to visit.       Medications have been reviewed and reconciled with patient at visit today.    Barriers to medications present (no )    Fall since last office visit (no )      Exam:  Vitals:    11/10/22 0816   BP: 124/72   Pulse: 63   Temp: 98 °F (36.7 °C)     Weight: 83.3 kg (183 lb 9.6 oz)   Body mass index is 31.51 kg/m².      BP Readings from Last 3 Encounters:   11/10/22 124/72   10/19/22 130/68   10/05/22 137/75     Wt Readings from Last 3 Encounters:   11/10/22 0816 83.3 kg (183 lb 9.6 oz)   10/19/22 0910 84.1 kg (185 lb 6.5 oz)   10/05/22 1050 84 kg (185 lb 3.2 oz)            Physical Exam  Constitutional:       General: She is not in acute distress.  HENT:      Head: Normocephalic.      Right Ear: Tympanic membrane normal.      Left Ear: Tympanic membrane normal.      Nose: Nose normal.      Mouth/Throat:      Mouth: Mucous membranes are moist.      Pharynx: No oropharyngeal exudate or posterior  oropharyngeal erythema.   Eyes:      General: No scleral icterus.  Cardiovascular:      Rate and Rhythm: Normal rate and regular rhythm.      Heart sounds: Murmur (systolic) heard.   Pulmonary:      Effort: Pulmonary effort is normal.      Breath sounds: Normal breath sounds.   Abdominal:      General: There is no distension.      Palpations: Abdomen is soft.      Tenderness: There is no abdominal tenderness.   Musculoskeletal:         General: No swelling.      Cervical back: Neck supple.   Lymphadenopathy:      Cervical: No cervical adenopathy.   Skin:     General: Skin is warm and dry.   Neurological:      Mental Status: She is alert. Mental status is at baseline.   Psychiatric:         Mood and Affect: Mood normal.         Behavior: Behavior normal.         Thought Content: Thought content normal.       Laboratory Reviewed: (Yes)  Lab Results   Component Value Date    WBC 11.28 01/18/2022    HGB 13.0 01/18/2022    HCT 40.3 01/18/2022     01/18/2022    CHOL 139 03/24/2022    TRIG 67 03/24/2022    HDL 49 03/24/2022    ALT 19 01/18/2022    AST 20 01/18/2022     09/15/2022    K 5.2 (H) 09/15/2022     09/15/2022    CREATININE 1.1 09/15/2022    BUN 15 09/15/2022    CO2 24 09/15/2022    TSH 0.516 04/19/2022    HGBA1C 6.4 (H) 10/19/2022           Health Maintenance  Health Maintenance Topics with due status: Not Due       Topic Last Completion Date    Colorectal Cancer Screening 03/18/2014    DEXA Scan 05/26/2021    Lipid Panel 03/24/2022    Diabetes Urine Screening 04/19/2022    Mammogram 05/27/2022    Foot Exam 10/19/2022    Hemoglobin A1c 10/19/2022     Health Maintenance Due   Topic Date Due    Shingles Vaccine (2 of 3) 04/20/2011    TETANUS VACCINE  02/23/2021    COVID-19 Vaccine (4 - Booster for Moderna series) 02/09/2022    Eye Exam  11/30/2022       Assessment:  Problem List Items Addressed This Visit          Cardiac/Vascular    Combined hyperlipidemia associated with type 2 diabetes mellitus  (Chronic)       Renal/    CKD (chronic kidney disease) stage 3, GFR 30-59 ml/min     Other Visit Diagnoses       Upper respiratory tract infection, unspecified type    -  Primary    Flu/COVID negative. Tussin PRN, supportive care.     Thyroid function test abnormal                  Plan:  Upper respiratory tract infection, unspecified type  Comments:  Flu/COVID negative. Tussin PRN, supportive care.   Orders:  -     POCT Influenza A/B Molecular  -     SARS Coronavirus 2 Antigen, POCT Manual Read    Thyroid function test abnormal  -     T3  -     T4, Free  -     TSH    Combined hyperlipidemia associated with type 2 diabetes mellitus  -     Lipid Panel    Stage 3a chronic kidney disease  -     Magnesium  -     RENAL FUNCTION PANEL  -     CBC Auto Differential    -Patient's lab results were reviewed and discussed with patient  -Treatment options and alternatives were discussed with the patient. Patient expressed understanding. Patient was given the opportunity to ask questions and be an active participant in their medical care. Patient had no further questions or concerns at this time.   -Documentation of patient's health and condition was obtained from family member who was present during visit.  -Patient is an overall moderate risk for health complications from their medical conditions.       Follow up: No follow-ups on file.      After visit summary printed and given to patient upon discharge.  Patient goals and care plan are included in After visit summary.    Total medical decision making time was 18 min.  The following issues were discussed: The primary encounter diagnosis was Upper respiratory tract infection, unspecified type. Diagnoses of Thyroid function test abnormal, Combined hyperlipidemia associated with type 2 diabetes mellitus, and Stage 3a chronic kidney disease were also pertinent to this visit.    Health maintenance needs, recent test results and goals of care discussed with pt and questions  answered.

## 2022-11-11 ENCOUNTER — TELEPHONE (OUTPATIENT)
Dept: PRIMARY CARE CLINIC | Facility: CLINIC | Age: 72
End: 2022-11-11
Payer: MEDICARE

## 2022-11-11 LAB
ALBUMIN SERPL BCP-MCNC: 4 G/DL (ref 3.5–5.2)
ANION GAP SERPL CALC-SCNC: 9 MMOL/L (ref 8–16)
BUN SERPL-MCNC: 16 MG/DL (ref 8–23)
CALCIUM SERPL-MCNC: 9.7 MG/DL (ref 8.7–10.5)
CHLORIDE SERPL-SCNC: 105 MMOL/L (ref 95–110)
CHOLEST SERPL-MCNC: 155 MG/DL (ref 120–199)
CHOLEST/HDLC SERPL: 3.5 {RATIO} (ref 2–5)
CO2 SERPL-SCNC: 27 MMOL/L (ref 23–29)
CREAT SERPL-MCNC: 1.1 MG/DL (ref 0.5–1.4)
EST. GFR  (NO RACE VARIABLE): 53.1 ML/MIN/1.73 M^2
GLUCOSE SERPL-MCNC: 105 MG/DL (ref 70–110)
HDLC SERPL-MCNC: 44 MG/DL (ref 40–75)
HDLC SERPL: 28.4 % (ref 20–50)
LDLC SERPL CALC-MCNC: 96.6 MG/DL (ref 63–159)
MAGNESIUM SERPL-MCNC: 2.1 MG/DL (ref 1.6–2.6)
NONHDLC SERPL-MCNC: 111 MG/DL
PHOSPHATE SERPL-MCNC: 4.1 MG/DL (ref 2.7–4.5)
POTASSIUM SERPL-SCNC: 4.4 MMOL/L (ref 3.5–5.1)
SODIUM SERPL-SCNC: 141 MMOL/L (ref 136–145)
T3 SERPL-MCNC: 107 NG/DL (ref 60–180)
T4 FREE SERPL-MCNC: 0.83 NG/DL (ref 0.71–1.51)
TRIGL SERPL-MCNC: 72 MG/DL (ref 30–150)
TSH SERPL DL<=0.005 MIU/L-ACNC: 0.52 UIU/ML (ref 0.4–4)

## 2022-11-11 NOTE — TELEPHONE ENCOUNTER
----- Message from Cuca Beatty NP sent at 11/11/2022  8:06 AM CST -----  Please let patient know that results are at baseline.   Continue plan of care as discussed.

## 2022-11-15 ENCOUNTER — OFFICE VISIT (OUTPATIENT)
Dept: PRIMARY CARE CLINIC | Facility: CLINIC | Age: 72
End: 2022-11-15
Payer: MEDICARE

## 2022-11-15 VITALS
HEIGHT: 64 IN | SYSTOLIC BLOOD PRESSURE: 122 MMHG | TEMPERATURE: 98 F | DIASTOLIC BLOOD PRESSURE: 68 MMHG | WEIGHT: 185.13 LBS | HEART RATE: 64 BPM | BODY MASS INDEX: 31.6 KG/M2 | OXYGEN SATURATION: 97 %

## 2022-11-15 DIAGNOSIS — R91.8 PULMONARY NODULES: Primary | ICD-10-CM

## 2022-11-15 DIAGNOSIS — E04.2 MULTIPLE THYROID NODULES: Chronic | ICD-10-CM

## 2022-11-15 DIAGNOSIS — E78.2 COMBINED HYPERLIPIDEMIA ASSOCIATED WITH TYPE 2 DIABETES MELLITUS: Chronic | ICD-10-CM

## 2022-11-15 DIAGNOSIS — E11.59 HYPERTENSION ASSOCIATED WITH DIABETES: Chronic | ICD-10-CM

## 2022-11-15 DIAGNOSIS — N18.31 STAGE 3A CHRONIC KIDNEY DISEASE: Chronic | ICD-10-CM

## 2022-11-15 DIAGNOSIS — I15.2 HYPERTENSION ASSOCIATED WITH DIABETES: Chronic | ICD-10-CM

## 2022-11-15 DIAGNOSIS — E66.9 OBESITY (BMI 30.0-34.9): Chronic | ICD-10-CM

## 2022-11-15 DIAGNOSIS — E11.69 COMBINED HYPERLIPIDEMIA ASSOCIATED WITH TYPE 2 DIABETES MELLITUS: Chronic | ICD-10-CM

## 2022-11-15 PROCEDURE — 3044F PR MOST RECENT HEMOGLOBIN A1C LEVEL <7.0%: ICD-10-PCS | Mod: CPTII,S$GLB,, | Performed by: INTERNAL MEDICINE

## 2022-11-15 PROCEDURE — 3288F PR FALLS RISK ASSESSMENT DOCUMENTED: ICD-10-PCS | Mod: CPTII,S$GLB,, | Performed by: INTERNAL MEDICINE

## 2022-11-15 PROCEDURE — 3288F FALL RISK ASSESSMENT DOCD: CPT | Mod: CPTII,S$GLB,, | Performed by: INTERNAL MEDICINE

## 2022-11-15 PROCEDURE — 3008F PR BODY MASS INDEX (BMI) DOCUMENTED: ICD-10-PCS | Mod: CPTII,S$GLB,, | Performed by: INTERNAL MEDICINE

## 2022-11-15 PROCEDURE — 4010F PR ACE/ARB THEARPY RXD/TAKEN: ICD-10-PCS | Mod: CPTII,S$GLB,, | Performed by: INTERNAL MEDICINE

## 2022-11-15 PROCEDURE — 1101F PR PT FALLS ASSESS DOC 0-1 FALLS W/OUT INJ PAST YR: ICD-10-PCS | Mod: CPTII,S$GLB,, | Performed by: INTERNAL MEDICINE

## 2022-11-15 PROCEDURE — 3066F PR DOCUMENTATION OF TREATMENT FOR NEPHROPATHY: ICD-10-PCS | Mod: CPTII,S$GLB,, | Performed by: INTERNAL MEDICINE

## 2022-11-15 PROCEDURE — 1126F PR PAIN SEVERITY QUANTIFIED, NO PAIN PRESENT: ICD-10-PCS | Mod: CPTII,S$GLB,, | Performed by: INTERNAL MEDICINE

## 2022-11-15 PROCEDURE — 99499 UNLISTED E&M SERVICE: CPT | Mod: S$GLB,,, | Performed by: INTERNAL MEDICINE

## 2022-11-15 PROCEDURE — 4010F ACE/ARB THERAPY RXD/TAKEN: CPT | Mod: CPTII,S$GLB,, | Performed by: INTERNAL MEDICINE

## 2022-11-15 PROCEDURE — 99999 PR PBB SHADOW E&M-EST. PATIENT-LVL IV: ICD-10-PCS | Mod: PBBFAC,,, | Performed by: INTERNAL MEDICINE

## 2022-11-15 PROCEDURE — 1126F AMNT PAIN NOTED NONE PRSNT: CPT | Mod: CPTII,S$GLB,, | Performed by: INTERNAL MEDICINE

## 2022-11-15 PROCEDURE — 99214 PR OFFICE/OUTPT VISIT, EST, LEVL IV, 30-39 MIN: ICD-10-PCS | Mod: S$GLB,,, | Performed by: INTERNAL MEDICINE

## 2022-11-15 PROCEDURE — 3074F SYST BP LT 130 MM HG: CPT | Mod: CPTII,S$GLB,, | Performed by: INTERNAL MEDICINE

## 2022-11-15 PROCEDURE — 99214 OFFICE O/P EST MOD 30 MIN: CPT | Mod: S$GLB,,, | Performed by: INTERNAL MEDICINE

## 2022-11-15 PROCEDURE — 1101F PT FALLS ASSESS-DOCD LE1/YR: CPT | Mod: CPTII,S$GLB,, | Performed by: INTERNAL MEDICINE

## 2022-11-15 PROCEDURE — 3072F LOW RISK FOR RETINOPATHY: CPT | Mod: CPTII,S$GLB,, | Performed by: INTERNAL MEDICINE

## 2022-11-15 PROCEDURE — 3078F PR MOST RECENT DIASTOLIC BLOOD PRESSURE < 80 MM HG: ICD-10-PCS | Mod: CPTII,S$GLB,, | Performed by: INTERNAL MEDICINE

## 2022-11-15 PROCEDURE — 99999 PR PBB SHADOW E&M-EST. PATIENT-LVL IV: CPT | Mod: PBBFAC,,, | Performed by: INTERNAL MEDICINE

## 2022-11-15 PROCEDURE — 3074F PR MOST RECENT SYSTOLIC BLOOD PRESSURE < 130 MM HG: ICD-10-PCS | Mod: CPTII,S$GLB,, | Performed by: INTERNAL MEDICINE

## 2022-11-15 PROCEDURE — 3066F NEPHROPATHY DOC TX: CPT | Mod: CPTII,S$GLB,, | Performed by: INTERNAL MEDICINE

## 2022-11-15 PROCEDURE — 99499 RISK ADDL DX/OHS AUDIT: ICD-10-PCS | Mod: S$GLB,,, | Performed by: INTERNAL MEDICINE

## 2022-11-15 PROCEDURE — 3044F HG A1C LEVEL LT 7.0%: CPT | Mod: CPTII,S$GLB,, | Performed by: INTERNAL MEDICINE

## 2022-11-15 PROCEDURE — 3061F NEG MICROALBUMINURIA REV: CPT | Mod: CPTII,S$GLB,, | Performed by: INTERNAL MEDICINE

## 2022-11-15 PROCEDURE — 3072F PR LOW RISK FOR RETINOPATHY: ICD-10-PCS | Mod: CPTII,S$GLB,, | Performed by: INTERNAL MEDICINE

## 2022-11-15 PROCEDURE — 3078F DIAST BP <80 MM HG: CPT | Mod: CPTII,S$GLB,, | Performed by: INTERNAL MEDICINE

## 2022-11-15 PROCEDURE — 3061F PR NEG MICROALBUMINURIA RESULT DOCUMENTED/REVIEW: ICD-10-PCS | Mod: CPTII,S$GLB,, | Performed by: INTERNAL MEDICINE

## 2022-11-15 PROCEDURE — 3008F BODY MASS INDEX DOCD: CPT | Mod: CPTII,S$GLB,, | Performed by: INTERNAL MEDICINE

## 2022-11-15 RX ORDER — METFORMIN HYDROCHLORIDE 500 MG/1
TABLET, EXTENDED RELEASE ORAL
Qty: 90 TABLET | Refills: 3 | Status: SHIPPED | OUTPATIENT
Start: 2022-11-15 | End: 2023-01-17 | Stop reason: SDUPTHER

## 2022-11-15 NOTE — PROGRESS NOTES
Kristyn Garza  11/15/2022  [id number]    Moon Lion MD  Patient Care Team:  Moon Lion MD as PCP - General (Internal Medicine)  Delia Liu LPN as Care Coordinator (Internal Medicine)  Soo Bar as Digital Medicine Health   Dale Matute MD as Consulting Physician (Cardiology)  Hammad Hardy OD as Consulting Physician (Optometry)  lOivia Irwin MD as Consulting Physician (Otolaryngology)  Joanne MarsD as Hypertension Digital Medicine Clinician  [employer] as Hypertension Digital Medicine Contract  Brenden Landaverde MD as Consulting Physician (Nephrology)  Gretel De Leon PharmD as Hyperlipidemia Digital Medicine Clinician  Joanne MarsD as Diabetes Digital Medicine Clinician  [employer] as Diabetes Digital Medicine Contract  Cuca Beatty NP as Nurse Practitioner (Family Medicine)  Moon Lion MD as Hyperlipidemia Digital Medicine Responsible Provider (Internal Medicine)  Moon Lion MD as Diabetes Digital Medicine Responsible Provider (Internal Medicine)  Moon Lion MD as Hypertension Digital Medicine Responsible Provider (Internal Medicine)    Visit Type: four week f/u    Chief Complaint:  Chief Complaint   Patient presents with    4 week f/u      History of Present Illness: Ms. Kristyn Hernandez is a 71 year old female here today for f/u on URI symptoms. Recently established care with [facility]. Medical issues include HTN, HLD (w statin intolerance), type 2 DM, CKD, diastolic CHF w LVH, thyroid nodules, adrenal nodule, obesity. Most recent PCP encounter, in April, with Dr. Guzman. She's enrolled w digital medicine program for diabetes and hypertension.     Pulm nodules noted on recent CT abd/pelvis.     Former smoker - 1/2 pack a day for about 25 years, quit about 13 years ago.    Recent appointments:   11/10/22 [facility] Cuca Beatty URI  10/19/22 PCP Dr. Guzman  10/05/22 [facility] Dr. Lion establish  care  9/12/22 Endo Dr. Chirinos A Panuti adrenal nodule (spoke by phone only)  5/10/22 GABRIELA Neal NP  4/19/22 PCP Dr. Guzman  4/14/22 Card Dr. Matute  3/13/22 Nephrology Dr. Dc periodic hypokalemia    Upcoming appointments:    Future Appointments       Date Provider Specialty Appt Notes    12/2/2022 Soto Khan MD Pulmonology np-nodule          The following were reviewed: Active problem list, medication list, allergies, family history, social history, and Health Maintenance.     History:  Past Medical History:   Diagnosis Date    Carpal tunnel syndrome     CKD (chronic kidney disease) stage 3, GFR 30-59 ml/min     Followed by Nephrology    Colon cancer screening 3/18/2014    CTS (carpal tunnel syndrome) 6/18/2013    Diabetes mellitus, type 2     Eczema     Elevated CPK     History of hypokalemia 6/18/2013    History of hypokalemia 6/18/2013    Hypokalemia     Multinodular thyroid     Followed by ENT    Obesity     Other and unspecified hyperlipidemia     Pneumonia     in her 20s; hospitalized    Postmenopausal     No history of abnormal pap smear    Statin intolerance     caused mylagia, elevated CPK    Tobacco dependence     resolved    Unspecified essential hypertension      Patient Active Problem List   Diagnosis    Multiple thyroid nodules    Cupping of optic disc    Nuclear sclerosis    Combined hyperlipidemia associated with type 2 diabetes mellitus    Hypertension associated with diabetes    CKD (chronic kidney disease) stage 3, GFR 30-59 ml/min    Obesity (BMI 30.0-34.9)    Postmenopausal    Statin intolerance    Abnormal ECG    LVH (left ventricular hypertrophy)    Family history of cardiovascular disorder    PAC (premature atrial contraction)    Type 2 diabetes mellitus with kidney complication, without long-term current use of insulin    Endolymphatic hydrops of left ear    Gastroesophageal reflux disease    ZHOU (dyspnea on exertion)    Secondary hyperparathyroidism of renal origin    Diastolic  dysfunction    Secondary hypertension    Adrenal nodule    Pulmonary nodules     Review of patient's allergies indicates:   Allergen Reactions    Statins-hmg-coa reductase inhibitors      Muscle Cramps       Medications:  Current Outpatient Medications on File Prior to Visit   Medication Sig Dispense Refill    ALPRAZolam (XANAX) 0.5 MG tablet Take 1 tablet (0.5 mg total) by mouth daily as needed for Anxiety. 30 tablet 0    amLODIPine (NORVASC) 10 MG tablet TAKE 1 TABLET BY MOUTH EVERY DAY 90 tablet 3    aspirin 81 MG Chew Take 81 mg by mouth once daily.      benazepriL (LOTENSIN) 40 MG tablet TAKE 1 TABLET BY MOUTH EVERY DAY 90 tablet 3    calcium-vitamin D3 500 mg(1,250mg) -200 unit per tablet Take 1 tablet by mouth once daily.       CRANBERRY EXTRACT ORAL Take 1 tablet by mouth once daily.      loratadine (CLARITIN) 10 mg tablet Take 1 tablet (10 mg total) by mouth once daily. 90 tablet 1    meclizine (ANTIVERT) 25 mg tablet Take 1 tablet (25 mg total) by mouth 3 (three) times daily as needed for Dizziness or Nausea. 60 tablet 1    metoprolol succinate (TOPROL-XL) 25 MG 24 hr tablet Take 1 tablet (25 mg total) by mouth once daily. 30 tablet 11    MULTIVITAMIN W-MINERALS/LUTEIN (CENTRUM SILVER ORAL) Take 1 tablet by mouth once daily.      spironolactone (ALDACTONE) 50 MG tablet Take 1 tablet (50 mg total) by mouth once daily. 90 tablet 2    triamcinolone (NASACORT) 55 mcg nasal inhaler USE 2 SPRAYS BY NASAL ROUTE ONCE DAILY 50.7 g 1    blood glucose control, normal Soln 1 Bottle by Misc.(Non-Drug; Combo Route) route once daily. For Accu Check Amber  use twice daily prn dx: E11.9 1 each 0    blood-glucose meter (ACCU-CHEK AMBER) Misc 1 kit by Misc.(Non-Drug; Combo Route) route once. For Accu Check Amber  use twice daily prn dx: E11.9 for 1 dose 1 each 0    lancets Misc 1 lancet by Misc.(Non-Drug; Combo Route) route 3 (three) times daily. For Acc-Chek Amber   DX :E11.9 100 each 0     No current facility-administered  medications on file prior to visit.       Medications have been reviewed and reconciled with patient at visit today.    Barriers to medications present (no )    Adverse reactions to current medications (no)    Over the counter medications reviewed (Yes) and if needed added to active Medication list.    Review of Systems   Constitutional:  Negative for activity change, appetite change and fever.   HENT:  Positive for hearing loss. Negative for sinus pressure/congestion.    Respiratory:  Negative for cough, shortness of breath and wheezing.    Cardiovascular:  Negative for chest pain and palpitations.   Gastrointestinal:  Negative for abdominal pain, nausea and vomiting.   Allergic/Immunologic: Positive for environmental allergies.      Exam:  Vitals:    11/15/22 0809   BP: 122/68   Pulse: 64   Temp: 98.1 °F (36.7 °C)     Weight: 84 kg (185 lb 1.6 oz)   Body mass index is 31.77 kg/m².    Physical Exam  Vitals reviewed.   Constitutional:       General: She is not in acute distress.     Appearance: Normal appearance. She is obese. She is not ill-appearing.   HENT:      Head: Normocephalic and atraumatic.      Right Ear: There is no impacted cerumen.      Left Ear: There is no impacted cerumen.      Nose: Nose normal.      Mouth/Throat:      Mouth: Mucous membranes are moist.      Pharynx: Oropharynx is clear.   Eyes:      General:         Right eye: No discharge.         Left eye: No discharge.      Extraocular Movements: Extraocular movements intact.      Conjunctiva/sclera: Conjunctivae normal.      Comments: glasses   Cardiovascular:      Rate and Rhythm: Regular rhythm.      Heart sounds: Normal heart sounds.   Pulmonary:      Effort: Pulmonary effort is normal.      Breath sounds: Normal breath sounds.   Abdominal:      General: Bowel sounds are normal.      Palpations: Abdomen is soft.      Tenderness: There is no abdominal tenderness.   Neurological:      Mental Status: She is alert and oriented to person, place,  and time. Mental status is at baseline.   Psychiatric:         Mood and Affect: Mood normal.         Behavior: Behavior normal.         Thought Content: Thought content normal.        Laboratory Reviewed: (Yes)  Lab Results   Component Value Date    WBC 11.66 11/10/2022    HGB 12.9 11/10/2022    HCT 40.1 11/10/2022     11/10/2022    MCV 96 11/10/2022    CHOL 155 11/10/2022    TRIG 72 11/10/2022    HDL 44 11/10/2022    LDLCALC 96.6 11/10/2022    ALT 19 01/18/2022    AST 20 01/18/2022     11/10/2022    K 4.4 11/10/2022     11/10/2022    CREATININE 1.1 11/10/2022    BUN 16 11/10/2022    CO2 27 11/10/2022    MG 2.1 11/10/2022    TSH 0.520 11/10/2022    FREET4 0.83 11/10/2022    HGBA1C 6.4 (H) 10/19/2022     11/10/22 eGFR 53.1 T3 107 wnl  9/15/22 eGFR 53.1 cortisol < 1.00 dexamethasone 640  4/19/22 urine microalb/crt wnl  1/18/22 metanephrines wnl ziggy/renin ratio wnl    Assessment:   72 y.o. female with multiple co-morbid illnesses here for continued follow up of medical problems.      The primary encounter diagnosis was Pulmonary nodules. Diagnoses of Combined hyperlipidemia associated with type 2 diabetes mellitus, Hypertension associated with diabetes, Multiple thyroid nodules, Obesity (BMI 30.0-34.9), and Stage 3a chronic kidney disease were also pertinent to this visit.      Plan:     Problem List Items Addressed This Visit          Pulmonary    Pulmonary nodules - Primary     Scheduled for f/u w Pulm - f/u on repeat imaging            Cardiac/Vascular    Combined hyperlipidemia associated with type 2 diabetes mellitus (Chronic)     Lipid panel at goal w Repatha (intolerant of statin) - in agreement w trial metformin - cont work on diet/movement         Relevant Medications    metFORMIN (GLUCOPHAGE-XR) 500 MG ER 24hr tablet    Hypertension associated with diabetes (Chronic)     BP/HR at goal w current Rx - in agreement w trial metformin - cont work on diet/movement           Relevant Medications     metFORMIN (GLUCOPHAGE-XR) 500 MG ER 24hr tablet       Renal/    CKD (chronic kidney disease) stage 3, GFR 30-59 ml/min (Chronic)     Stable - cont monitor   (consider check PTHi- vit D? - both  last checked 6 yrs ago)              Endocrine    Multiple thyroid nodules (Chronic)     TSH/free T4 both low normal - followed by griselda Solis?         Obesity (BMI 30.0-34.9) (Chronic)     In agreement w trial metformin - cont work on diet/movement              Health Maintenance         Date Due Completion Date    Shingles Vaccine (2 of 3) 04/20/2011 2/23/2011    TETANUS VACCINE 02/23/2021 2/23/2011    COVID-19 Vaccine (4 - Booster for Moderna series) 02/09/2022 12/15/2021    Eye Exam 11/30/2022 11/30/2021    Override on 7/20/2020: Done    Override on 4/30/2019: Done    Override on 4/24/2018: Done    Override on 5/17/2017: Done    Override on 5/16/2016: Done    Override on 5/13/2015: Done    Diabetes Urine Screening 04/19/2023 4/19/2022    Hemoglobin A1c 04/19/2023 10/19/2022    Mammogram 05/27/2023 5/27/2022    Override on 3/13/2013: Done    Foot Exam 10/19/2023 10/19/2022    Override on 9/23/2015: Done    Override on 7/16/2015: Done    Override on 3/23/2015: Done    Override on 12/3/2014: Done    Override on 4/29/2014: Done    Lipid Panel 11/10/2023 11/10/2022    Colorectal Cancer Screening 03/18/2024 3/18/2014    Override on 6/1/2006: Done    DEXA Scan 05/26/2026 5/26/2021    Override on 3/10/2011: Done (normal repeat in 5 years)            -Patient's lab results were reviewed and discussed with patient  -Treatment options and alternatives were discussed with the patient. Patient expressed understanding. Patient was given the opportunity to ask questions and be an active participant in their medical care. Patient had no further questions or concerns at this time.   -Documentation of patient's health and condition was obtained from family member who was present during visit.  -Patient is an overall moderate  risk for health complications from their medical conditions.     Follow up: Follow up in about 2 months (around 1/15/2023) for Follow Up w me or Cuca (f/u on goal of wt los and walking).    Care Plan/Goals: Reviewed Yes   Goals         80 <= Glucose <= 180 (pt-stated)       11/15/22  On track        Blood Pressure < 130/80 (pt-stated)       11/15/22  On track        Exercise at least 150 minutes per week. (pt-stated)       Update 11/15/22  Daily walking - current step average about 5,000 steps - new goal 7,000 steps. Walk to park, around park and back 3x/week.    Time frame 3 mos - mid Jan 2023.          Take at least one BP reading per week at various times of the day       11/15/22  On track        Weight (lb) < 90.7 kg (200 lb) (pt-stated)       Update 11/15/22    On track - weight < 200 lbs - new goal 10 lbs wt loss over the next 2 mos.    Goal 5% weight loss - 9-10 lbs - 175 seth    Barriers? Sweet-tooth    Overcoming? Try substitute fruit? Daily walking - current step average about 5,000 steps - new goal 7,000 steps.    Time frame 3 mos - mid Jan 2023.                  After visit summary printed and given to patient upon discharge.  Patient goals and care plan are included in After visit summary.    TOTAL TIME evaluating and managing this patient for this encounter was greater than 40 minutes. This time was spent personally by me on some of the following activities: review of patient's past medical history, assessing age-appropriate health maintenance needs, review of any interval history, review and interpretation of lab results, review and interpretation of imaging test results, review and interpretation of cardiology test results, reviewing consulting specialist notes, obtaining history from the patient and family, examination of the patient, medication reconciliation, managing and/or ordering prescription medications, ordering imaging tests, ordering referral to subspecialty provider(s), educating  patient and answering their questions about diagnosis, treatment plan, and goals of treatment, discussing planned follow-up and final documentation of the visit. This time was exclusive of any separately billable procedures for this patient and exclusive of time spent treating any other patients.

## 2022-11-15 NOTE — PATIENT INSTRUCTIONS
Try taking metformin with lunch - lower carb meal    Limit sweets and simple carbs - substitute fresh or frozen fruit - consider try Net 0 or other low carb bread products    Walking more is great too!    CoVid Bivalent available at [facility]    Recommend shingles and Td vaccine as well

## 2022-11-23 DIAGNOSIS — E78.2 COMBINED HYPERLIPIDEMIA ASSOCIATED WITH TYPE 2 DIABETES MELLITUS: ICD-10-CM

## 2022-11-23 DIAGNOSIS — Z78.9 STATIN INTOLERANCE: ICD-10-CM

## 2022-11-23 DIAGNOSIS — E11.69 COMBINED HYPERLIPIDEMIA ASSOCIATED WITH TYPE 2 DIABETES MELLITUS: ICD-10-CM

## 2022-11-23 RX ORDER — EVOLOCUMAB 140 MG/ML
140 INJECTION, SOLUTION SUBCUTANEOUS
Qty: 2 ML | Refills: 5 | Status: CANCELLED | OUTPATIENT
Start: 2022-11-23

## 2022-11-28 ENCOUNTER — SPECIALTY PHARMACY (OUTPATIENT)
Dept: PHARMACY | Facility: CLINIC | Age: 72
End: 2022-11-28
Payer: MEDICARE

## 2022-11-28 DIAGNOSIS — E78.2 COMBINED HYPERLIPIDEMIA ASSOCIATED WITH TYPE 2 DIABETES MELLITUS: ICD-10-CM

## 2022-11-28 DIAGNOSIS — E11.69 COMBINED HYPERLIPIDEMIA ASSOCIATED WITH TYPE 2 DIABETES MELLITUS: ICD-10-CM

## 2022-11-28 DIAGNOSIS — Z78.9 STATIN INTOLERANCE: ICD-10-CM

## 2022-11-28 RX ORDER — EVOLOCUMAB 140 MG/ML
140 INJECTION, SOLUTION SUBCUTANEOUS
Qty: 2 ML | Refills: 5 | Status: SHIPPED | OUTPATIENT
Start: 2022-11-28 | End: 2023-05-22 | Stop reason: SDUPTHER

## 2022-11-28 NOTE — TELEPHONE ENCOUNTER
Incoming call regarding Repatha refill. Pt stated that she is due to inject on 12/3/22. Informed pt refill request has been sent to provider for approval and will follow up with pt 11/30/22. PT verbalized understanding.

## 2022-12-02 ENCOUNTER — OFFICE VISIT (OUTPATIENT)
Dept: PULMONOLOGY | Facility: CLINIC | Age: 72
End: 2022-12-02
Payer: MEDICARE

## 2022-12-02 VITALS
HEART RATE: 69 BPM | HEIGHT: 64 IN | RESPIRATION RATE: 18 BRPM | SYSTOLIC BLOOD PRESSURE: 122 MMHG | WEIGHT: 183 LBS | BODY MASS INDEX: 31.24 KG/M2 | OXYGEN SATURATION: 99 % | DIASTOLIC BLOOD PRESSURE: 70 MMHG

## 2022-12-02 DIAGNOSIS — R91.1 SOLITARY PULMONARY NODULE: Primary | ICD-10-CM

## 2022-12-02 DIAGNOSIS — E66.9 OBESITY (BMI 30.0-34.9): ICD-10-CM

## 2022-12-02 DIAGNOSIS — R91.8 PULMONARY NODULES: ICD-10-CM

## 2022-12-02 PROCEDURE — 4010F PR ACE/ARB THEARPY RXD/TAKEN: ICD-10-PCS | Mod: CPTII,S$GLB,, | Performed by: INTERNAL MEDICINE

## 2022-12-02 PROCEDURE — 3061F PR NEG MICROALBUMINURIA RESULT DOCUMENTED/REVIEW: ICD-10-PCS | Mod: CPTII,S$GLB,, | Performed by: INTERNAL MEDICINE

## 2022-12-02 PROCEDURE — 1159F PR MEDICATION LIST DOCUMENTED IN MEDICAL RECORD: ICD-10-PCS | Mod: CPTII,S$GLB,, | Performed by: INTERNAL MEDICINE

## 2022-12-02 PROCEDURE — 3008F PR BODY MASS INDEX (BMI) DOCUMENTED: ICD-10-PCS | Mod: CPTII,S$GLB,, | Performed by: INTERNAL MEDICINE

## 2022-12-02 PROCEDURE — 3061F NEG MICROALBUMINURIA REV: CPT | Mod: CPTII,S$GLB,, | Performed by: INTERNAL MEDICINE

## 2022-12-02 PROCEDURE — 99999 PR PBB SHADOW E&M-EST. PATIENT-LVL V: CPT | Mod: PBBFAC,,, | Performed by: INTERNAL MEDICINE

## 2022-12-02 PROCEDURE — 3288F PR FALLS RISK ASSESSMENT DOCUMENTED: ICD-10-PCS | Mod: CPTII,S$GLB,, | Performed by: INTERNAL MEDICINE

## 2022-12-02 PROCEDURE — 3078F PR MOST RECENT DIASTOLIC BLOOD PRESSURE < 80 MM HG: ICD-10-PCS | Mod: CPTII,S$GLB,, | Performed by: INTERNAL MEDICINE

## 2022-12-02 PROCEDURE — 3066F NEPHROPATHY DOC TX: CPT | Mod: CPTII,S$GLB,, | Performed by: INTERNAL MEDICINE

## 2022-12-02 PROCEDURE — 3044F PR MOST RECENT HEMOGLOBIN A1C LEVEL <7.0%: ICD-10-PCS | Mod: CPTII,S$GLB,, | Performed by: INTERNAL MEDICINE

## 2022-12-02 PROCEDURE — 3078F DIAST BP <80 MM HG: CPT | Mod: CPTII,S$GLB,, | Performed by: INTERNAL MEDICINE

## 2022-12-02 PROCEDURE — 3008F BODY MASS INDEX DOCD: CPT | Mod: CPTII,S$GLB,, | Performed by: INTERNAL MEDICINE

## 2022-12-02 PROCEDURE — 3072F PR LOW RISK FOR RETINOPATHY: ICD-10-PCS | Mod: CPTII,S$GLB,, | Performed by: INTERNAL MEDICINE

## 2022-12-02 PROCEDURE — 3288F FALL RISK ASSESSMENT DOCD: CPT | Mod: CPTII,S$GLB,, | Performed by: INTERNAL MEDICINE

## 2022-12-02 PROCEDURE — 3044F HG A1C LEVEL LT 7.0%: CPT | Mod: CPTII,S$GLB,, | Performed by: INTERNAL MEDICINE

## 2022-12-02 PROCEDURE — 3074F SYST BP LT 130 MM HG: CPT | Mod: CPTII,S$GLB,, | Performed by: INTERNAL MEDICINE

## 2022-12-02 PROCEDURE — 99203 OFFICE O/P NEW LOW 30 MIN: CPT | Mod: S$GLB,,, | Performed by: INTERNAL MEDICINE

## 2022-12-02 PROCEDURE — 1101F PR PT FALLS ASSESS DOC 0-1 FALLS W/OUT INJ PAST YR: ICD-10-PCS | Mod: CPTII,S$GLB,, | Performed by: INTERNAL MEDICINE

## 2022-12-02 PROCEDURE — 99203 PR OFFICE/OUTPT VISIT, NEW, LEVL III, 30-44 MIN: ICD-10-PCS | Mod: S$GLB,,, | Performed by: INTERNAL MEDICINE

## 2022-12-02 PROCEDURE — 1101F PT FALLS ASSESS-DOCD LE1/YR: CPT | Mod: CPTII,S$GLB,, | Performed by: INTERNAL MEDICINE

## 2022-12-02 PROCEDURE — 3074F PR MOST RECENT SYSTOLIC BLOOD PRESSURE < 130 MM HG: ICD-10-PCS | Mod: CPTII,S$GLB,, | Performed by: INTERNAL MEDICINE

## 2022-12-02 PROCEDURE — 4010F ACE/ARB THERAPY RXD/TAKEN: CPT | Mod: CPTII,S$GLB,, | Performed by: INTERNAL MEDICINE

## 2022-12-02 PROCEDURE — 99999 PR PBB SHADOW E&M-EST. PATIENT-LVL V: ICD-10-PCS | Mod: PBBFAC,,, | Performed by: INTERNAL MEDICINE

## 2022-12-02 PROCEDURE — 3072F LOW RISK FOR RETINOPATHY: CPT | Mod: CPTII,S$GLB,, | Performed by: INTERNAL MEDICINE

## 2022-12-02 PROCEDURE — 3066F PR DOCUMENTATION OF TREATMENT FOR NEPHROPATHY: ICD-10-PCS | Mod: CPTII,S$GLB,, | Performed by: INTERNAL MEDICINE

## 2022-12-02 PROCEDURE — 1159F MED LIST DOCD IN RCRD: CPT | Mod: CPTII,S$GLB,, | Performed by: INTERNAL MEDICINE

## 2022-12-02 RX ORDER — INFLUENZA A VIRUS A/VICTORIA/2570/2019 IVR-215 (H1N1) ANTIGEN (FORMALDEHYDE INACTIVATED), INFLUENZA A VIRUS A/DARWIN/9/2021 SAN-010 (H3N2) ANTIGEN (FORMALDEHYDE INACTIVATED), INFLUENZA B VIRUS B/PHUKET/3073/2013 ANTIGEN (FORMALDEHYDE INACTIVATED), AND INFLUENZA B VIRUS B/MICHIGAN/01/2021 ANTIGEN (FORMALDEHYDE INACTIVATED) 60; 60; 60; 60 UG/.7ML; UG/.7ML; UG/.7ML; UG/.7ML
INJECTION, SUSPENSION INTRAMUSCULAR
COMMUNITY
Start: 2022-09-13 | End: 2023-04-26 | Stop reason: ALTCHOICE

## 2022-12-02 NOTE — TELEPHONE ENCOUNTER
Specialty Pharmacy - Refill Coordination    Specialty Medication Orders Linked to Encounter      Flowsheet Row Most Recent Value   Medication #1 evolocumab (REPATHA SURECLICK) 140 mg/mL PnIj (Order#[id number], Rx#[id number])            Refill Questions - Documented Responses      Flowsheet Row Most Recent Value   Patient Availability and HIPAA Verification    Does patient want to proceed with activity? Yes   HIPAA/medical authority confirmed? Yes   Relationship to patient of person spoken to? Self   Refill Screening Questions    Would patient like to speak to a pharmacist? No   When does the patient need to receive the medication? 12/01/22   Refill Delivery Questions    How will the patient receive the medication? [facility]   When does the patient need to receive the medication? 12/01/22   Shipping Address Home   Address in [facility] confirmed and updated if neccessary? Yes   Expected Copay ($) 0   Is the patient able to afford the medication copay? Yes   Payment Method zero copay   Days supply of Refill 28   Supplies needed? No supplies needed   Refill activity completed? Yes   Refill activity plan Refill scheduled   Shipment/Pickup Date: 12/02/22            Current Outpatient Medications   Medication Sig    ALPRAZolam (XANAX) 0.5 MG tablet Take 1 tablet (0.5 mg total) by mouth daily as needed for Anxiety.    amLODIPine (NORVASC) 10 MG tablet TAKE 1 TABLET BY MOUTH EVERY DAY    aspirin 81 MG Chew Take 81 mg by mouth once daily.    benazepriL (LOTENSIN) 40 MG tablet TAKE 1 TABLET BY MOUTH EVERY DAY    blood glucose control, normal Soln 1 Bottle by Misc.(Non-Drug; Combo Route) route once daily. For Accu Check Amber  use twice daily prn dx: E11.9    blood-glucose meter (ACCU-CHEK AMBER) Misc 1 kit by Misc.(Non-Drug; Combo Route) route once. For Accu Check Amber  use twice daily prn dx: E11.9 for 1 dose    calcium-vitamin D3 500 mg(1,250mg) -200 unit per tablet Take 1 tablet by mouth once daily.     CRANBERRY EXTRACT  ORAL Take 1 tablet by mouth once daily.    evolocumab (REPATHA SURECLICK) 140 mg/mL PnIj Inject 1 mL (140 mg total) into the skin every 14 (fourteen) days.    lancets Misc 1 lancet by Misc.(Non-Drug; Combo Route) route 3 (three) times daily. For Acc-Chek Irais   DX :E11.9    loratadine (CLARITIN) 10 mg tablet Take 1 tablet (10 mg total) by mouth once daily.    meclizine (ANTIVERT) 25 mg tablet Take 1 tablet (25 mg total) by mouth 3 (three) times daily as needed for Dizziness or Nausea.    metFORMIN (GLUCOPHAGE-XR) 500 MG ER 24hr tablet With low carb meal once daily    metoprolol succinate (TOPROL-XL) 25 MG 24 hr tablet Take 1 tablet (25 mg total) by mouth once daily.    MULTIVITAMIN W-MINERALS/LUTEIN (CENTRUM SILVER ORAL) Take 1 tablet by mouth once daily.    spironolactone (ALDACTONE) 50 MG tablet Take 1 tablet (50 mg total) by mouth once daily.    triamcinolone (NASACORT) 55 mcg nasal inhaler USE 2 SPRAYS BY NASAL ROUTE ONCE DAILY   Last reviewed on 11/15/2022  8:10 AM by Lesa Cameron MA    Review of patient's allergies indicates:   Allergen Reactions    Statins-hmg-coa reductase inhibitors      Muscle Cramps    Last reviewed on  11/15/2022 8:09 AM by Lesa Cameron      Tasks added this encounter   No tasks added.   Tasks due within next 3 months   11/23/2022 - Refill Call (Auto Added)     Ac Ortiz, PharmD  [facility]  [address] [place] [id number]  Phone: [phone]  Fax: [phone]

## 2022-12-02 NOTE — PROGRESS NOTES
Subjective:     Patient ID: Kristyn Garza is a 71 y.o. female.    Chief Complaint:      HPI  Initial office visit for this 70 y/o   Lung Nodule  She presents for evaluation and treatment of a lung nodule. The patient had an abnormal imaging study but reports experiencing no current or past pulmonary symptoms. The patient denies other associated symptoms. She has a history of 43 pack years. The patient has no known exposure to tuberculosis. The patient does not have a history of cancer.    Abnormal CT of ab1. Small bilateral adrenal adenomas as above  2. Otherwise no acute findings demonstrated in the abdomen or pelvis.  3. Small bilateral pulmonary nodule seen in the lung bases measuring up to 6 mm in the right lower lobe.  For multiple solid nodules with any 6 mm or greater, Fleischner Society guidelines recommend follow up with non-contrast chest CT at 3-6 months and 18-24 months after discovery.  Past Medical History:   Diagnosis Date    Carpal tunnel syndrome     CKD (chronic kidney disease) stage 3, GFR 30-59 ml/min     Followed by Nephrology    Colon cancer screening 3/18/2014    CTS (carpal tunnel syndrome) 6/18/2013    Diabetes mellitus, type 2     Eczema     Elevated CPK     History of hypokalemia 6/18/2013    History of hypokalemia 6/18/2013    Hypokalemia     Multinodular thyroid     Followed by ENT    Obesity     Other and unspecified hyperlipidemia     Pneumonia     in her 20s; hospitalized    Postmenopausal     No history of abnormal pap smear    Statin intolerance     caused mylagia, elevated CPK    Tobacco dependence     resolved    Unspecified essential hypertension      Past Surgical History:   Procedure Laterality Date    COLONOSCOPY      CYST REMOVAL  2015    On the head    FINE NEEDLE ASPIRATION  3/2011    thyroid nodules x3 (negative per patient)    HYSTERECTOMY      PARTIAL HYSTERECTOMY  1991    Due to fibroids     Review of patient's allergies indicates:   Allergen Reactions     Statins-hmg-coa reductase inhibitors      Muscle Cramps     Current Outpatient Medications on File Prior to Visit   Medication Sig Dispense Refill    amLODIPine (NORVASC) 10 MG tablet TAKE 1 TABLET BY MOUTH EVERY DAY 90 tablet 3    aspirin 81 MG Chew Take 81 mg by mouth once daily.      benazepriL (LOTENSIN) 40 MG tablet TAKE 1 TABLET BY MOUTH EVERY DAY 90 tablet 3    calcium-vitamin D3 500 mg(1,250mg) -200 unit per tablet Take 1 tablet by mouth once daily.       CRANBERRY EXTRACT ORAL Take 1 tablet by mouth once daily.      evolocumab (REPATHA SURECLICK) 140 mg/mL PnIj Inject 1 mL (140 mg total) into the skin every 14 (fourteen) days. 2 mL 5    FLUZONE HIGHDOSE QUAD 22-23  mcg/0.7 mL Syrg       lancets Misc 1 lancet by Misc.(Non-Drug; Combo Route) route 3 (three) times daily. For Acc-Chek Amber   DX :E11.9 100 each 0    loratadine (CLARITIN) 10 mg tablet Take 1 tablet (10 mg total) by mouth once daily. 90 tablet 1    meclizine (ANTIVERT) 25 mg tablet Take 1 tablet (25 mg total) by mouth 3 (three) times daily as needed for Dizziness or Nausea. 60 tablet 1    metFORMIN (GLUCOPHAGE-XR) 500 MG ER 24hr tablet With low carb meal once daily 90 tablet 3    metoprolol succinate (TOPROL-XL) 25 MG 24 hr tablet Take 1 tablet (25 mg total) by mouth once daily. 30 tablet 11    MULTIVITAMIN W-MINERALS/LUTEIN (CENTRUM SILVER ORAL) Take 1 tablet by mouth once daily.      spironolactone (ALDACTONE) 50 MG tablet Take 1 tablet (50 mg total) by mouth once daily. 90 tablet 2    triamcinolone (NASACORT) 55 mcg nasal inhaler USE 2 SPRAYS BY NASAL ROUTE ONCE DAILY 50.7 g 1    ALPRAZolam (XANAX) 0.5 MG tablet Take 1 tablet (0.5 mg total) by mouth daily as needed for Anxiety. 30 tablet 0    blood glucose control, normal Soln 1 Bottle by Misc.(Non-Drug; Combo Route) route once daily. For Accu Check Amber  use twice daily prn dx: E11.9 1 each 0    blood-glucose meter (ACCU-CHEK AMBER) Misc 1 kit by Misc.(Non-Drug; Combo Route) route once.  For Accu Check Irais  use twice daily prn dx: E11.9 for 1 dose 1 each 0     No current facility-administered medications on file prior to visit.     Social History     Socioeconomic History    Marital status:     Number of children: 2    Highest education level: Some college, no degree   Occupational History    Occupation: Retired   Tobacco Use    Smoking status: Former     Packs/day: 1.00     Years: 43.00     Pack years: 43.00     Types: Cigarettes     Start date: 1970     Quit date: 2013     Years since quittin.5    Smokeless tobacco: Never   Substance and Sexual Activity    Alcohol use: Yes     Alcohol/week: 0.0 standard drinks     Comment: Rarely drinks wine    Drug use: No    Sexual activity: Not Currently     Partners: Male     Birth control/protection: Condom   Social History Narrative    She wears seatbelt. . 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      Social Determinants of Health     Financial Resource Strain: Low Risk     Difficulty of Paying Living Expenses: Not very hard   Food Insecurity: Food Insecurity Present    Worried About Running Out of Food in the Last Year: Sometimes true    Ran Out of Food in the Last Year: Sometimes true   Transportation Needs: No Transportation Needs    Lack of Transportation (Medical): No    Lack of Transportation (Non-Medical): No   Physical Activity: Insufficiently Active    Days of Exercise per Week: 2 days    Minutes of Exercise per Session: 30 min   Stress: No Stress Concern Present    Feeling of Stress : Only a little   Social Connections: Unknown    Frequency of Communication with Friends and Family: More than three times a week    Frequency of Social Gatherings with Friends and Family: Three times a week    Active Member of Clubs or Organizations: Yes    Attends Club or Organization Meetings: More than 4 times per year    Marital Status:    Housing Stability: Low Risk     Unable to Pay for Housing in the Last Year: No     "Number of Places Lived in the Last Year: 1    Unstable Housing in the Last Year: No     Family History   Problem Relation Age of Onset    Hypertension Mother     Diabetes Mother     Dementia Mother         Alzheimer's dementia    Hypertension Father     Heart disease Sister         MI/CAD    Cataracts Sister     Dementia Sister     No Known Problems Son     No Known Problems Son     Cataracts Brother     Cataracts Brother     Dementia Sister     Cancer Neg Hx     Stroke Neg Hx     Melanoma Neg Hx     Psoriasis Neg Hx     Lupus Neg Hx     Eczema Neg Hx     COPD Neg Hx     Kidney disease Neg Hx        Review of Systems   Constitutional:  Negative for fever and fatigue.   HENT:  Negative for postnasal drip and rhinorrhea.    Eyes:  Negative for redness and itching.   Respiratory:  Negative for cough, shortness of breath, wheezing, dyspnea on extertion and Paroxysmal Nocturnal Dyspnea.    Cardiovascular:  Negative for chest pain.   Genitourinary:  Negative for difficulty urinating and hematuria.   Endocrine:  Negative for polyphagia, cold intolerance and heat intolerance.    Musculoskeletal:  Negative for arthralgias.   Skin:  Negative for rash.   Gastrointestinal:  Negative for nausea, vomiting, abdominal pain and abdominal distention.   Neurological:  Negative for dizziness and headaches.   Hematological:  Negative for adenopathy. Does not bruise/bleed easily and no excessive bruising.   Psychiatric/Behavioral:  The patient is not nervous/anxious.      Objective:      /70   Pulse 69   Resp 18   Ht 5' 4" (1.626 m)   Wt 83 kg (182 lb 15.7 oz)   SpO2 99%   BMI 31.41 kg/m²   Physical Exam  Vitals and nursing note reviewed.   Constitutional:       Appearance: Normal appearance. She is well-developed.   HENT:      Head: Normocephalic and atraumatic.      Nose: Nose normal.   Eyes:      Conjunctiva/sclera: Conjunctivae normal.      Pupils: Pupils are equal, round, and reactive to light.   Neck:      Thyroid: No " thyromegaly.      Vascular: No JVD.      Trachea: No tracheal deviation.   Cardiovascular:      Rate and Rhythm: Normal rate and regular rhythm.      Heart sounds: Normal heart sounds.   Pulmonary:      Effort: Pulmonary effort is normal. No respiratory distress.      Breath sounds: Normal breath sounds. No wheezing or rales.   Chest:      Chest wall: No tenderness.   Abdominal:      General: Bowel sounds are normal.      Palpations: Abdomen is soft.   Musculoskeletal:         General: Normal range of motion.      Cervical back: Neck supple.   Lymphadenopathy:      Cervical: No cervical adenopathy.   Skin:     General: Skin is warm and dry.   Neurological:      Mental Status: She is alert and oriented to person, place, and time.     Personal Diagnostic Review  REVIEW of CT     Pulmonary Studies Review 12/2/2022   SpO2 99   Height 64   Weight 2927.71   BMI (Calculated) 31.4   Predicted Distance 256.47   Predicted Distance Meters (Calculated) 398.08       CT Abdomen Pelvis W Wo Contrast  Narrative: EXAMINATION:  CT ABDOMEN PELVIS W WO CONTRAST    CLINICAL HISTORY:  f/u Adrenal adenomas b/l;Other specified disorders of adrenal gland    TECHNIQUE:  Low dose axial images, sagittal and coronal reformations were obtained from the lung bases to the pubic symphysis before and following the IV administration of 100 mL of Omnipaque 350.  No oral contrast was given.  Multiphase technique was utilized per adrenal washout protocol.    COMPARISON:  03/02/2022    FINDINGS:  Visualized Chest: There are multiple small nodules present in the bilateral lung bases, the largest of which measures approximately 6 mm along the surface of the diaphragm in the right lower lobe as seen on series 2, image 20.  Findings appear unchanged.    Abdomen:    Liver: Within normal limits.    Gallbladder and biliary: Within normal limits.    Spleen: Within normal limits.    Pancreas: Within normal limits.    Adrenals: There is a 16 mm nodule involving  the medial limb of the left adrenal nodule which demonstrates absolute washout of 73% and relative washout of 56%, in keeping with a benign adrenal adenoma.  There is a 13 mm adrenal nodule involving the lateral limb of the right adrenal gland which demonstrates precontrast Hounsfield attenuation of 6.0, in keeping with a benign lipid rich adenoma.  Bilateral adrenal nodules are unchanged in size from prior.    Kidneys: Within normal limits.    Bowel: Within normal limits.  No evidence of obstruction.  The appendix appears normal.    Peritoneum: No ascites or pneumoperitoneum.    Abdominal adenopathy: None.    Vasculature: Within normal limits.    Pelvis:    Urinary bladder: Unremarkable.    Uterus and adnexa: Prior hysterectomy.  No adnexal masses visualized.    Pelvic adenopathy: None.    Bones: No acute findings.    Miscellaneous: None.  Impression: 1. Small bilateral adrenal adenomas as above  2. Otherwise no acute findings demonstrated in the abdomen or pelvis.  3. Small bilateral pulmonary nodule seen in the lung bases measuring up to 6 mm in the right lower lobe.  For multiple solid nodules with any 6 mm or greater, Fleischner Society guidelines recommend follow up with non-contrast chest CT at 3-6 months and 18-24 months after discovery.    Electronically signed by: Alberto Hardy MD  Date:    09/27/2022  Time:    08:11      Office Spirometry Results:     No flowsheet data found.  Pulmonary Studies Review 12/2/2022   SpO2 99   Height 64   Weight 2927.71   BMI (Calculated) 31.4   Predicted Distance 256.47   Predicted Distance Meters (Calculated) 398.08         Assessment:            Solitary pulmonary nodule  -     CT Chest Without Contrast; Future; Expected date: 12/02/2022    Pulmonary nodules  -     Ambulatory referral/consult to Pulmonology  -     CT Chest Without Contrast; Future; Expected date: 12/02/2022    Obesity (BMI 30.0-34.9)        Outpatient Encounter Medications as of 12/2/2022   Medication  Sig Dispense Refill    amLODIPine (NORVASC) 10 MG tablet TAKE 1 TABLET BY MOUTH EVERY DAY 90 tablet 3    aspirin 81 MG Chew Take 81 mg by mouth once daily.      benazepriL (LOTENSIN) 40 MG tablet TAKE 1 TABLET BY MOUTH EVERY DAY 90 tablet 3    calcium-vitamin D3 500 mg(1,250mg) -200 unit per tablet Take 1 tablet by mouth once daily.       CRANBERRY EXTRACT ORAL Take 1 tablet by mouth once daily.      evolocumab (REPATHA SURECLICK) 140 mg/mL PnIj Inject 1 mL (140 mg total) into the skin every 14 (fourteen) days. 2 mL 5    FLUZONE HIGHDOSE QUAD 22-23  mcg/0.7 mL Syrg       lancets Misc 1 lancet by Misc.(Non-Drug; Combo Route) route 3 (three) times daily. For Acc-Chek Amber   DX :E11.9 100 each 0    loratadine (CLARITIN) 10 mg tablet Take 1 tablet (10 mg total) by mouth once daily. 90 tablet 1    meclizine (ANTIVERT) 25 mg tablet Take 1 tablet (25 mg total) by mouth 3 (three) times daily as needed for Dizziness or Nausea. 60 tablet 1    metFORMIN (GLUCOPHAGE-XR) 500 MG ER 24hr tablet With low carb meal once daily 90 tablet 3    metoprolol succinate (TOPROL-XL) 25 MG 24 hr tablet Take 1 tablet (25 mg total) by mouth once daily. 30 tablet 11    MULTIVITAMIN W-MINERALS/LUTEIN (CENTRUM SILVER ORAL) Take 1 tablet by mouth once daily.      spironolactone (ALDACTONE) 50 MG tablet Take 1 tablet (50 mg total) by mouth once daily. 90 tablet 2    triamcinolone (NASACORT) 55 mcg nasal inhaler USE 2 SPRAYS BY NASAL ROUTE ONCE DAILY 50.7 g 1    ALPRAZolam (XANAX) 0.5 MG tablet Take 1 tablet (0.5 mg total) by mouth daily as needed for Anxiety. 30 tablet 0    blood glucose control, normal Soln 1 Bottle by Misc.(Non-Drug; Combo Route) route once daily. For Accu Check Amber  use twice daily prn dx: E11.9 1 each 0    blood-glucose meter (ACCU-CHEK AMBER) Misc 1 kit by Misc.(Non-Drug; Combo Route) route once. For Accu Check Amber  use twice daily prn dx: E11.9 for 1 dose 1 each 0     No facility-administered encounter medications on  file as of 12/2/2022.     Plan:       Requested Prescriptions      No prescriptions requested or ordered in this encounter     Problem List Items Addressed This Visit       Obesity (BMI 30.0-34.9)    Pulmonary nodules    Relevant Orders    CT Chest Without Contrast     Other Visit Diagnoses       Solitary pulmonary nodule    -  Primary    Relevant Orders    CT Chest Without Contrast               Follow up in about 6 months (around 6/2/2023) for CT/PET - ASAP, Review progress.    MEDICAL DECISION MAKING: Moderate to high complexity.  Overall, the multiple problems listed are of moderate to high severity that may impact quality of life and activities of daily living. Side effects of medications, treatment plan as well as options and alternatives reviewed and discussed with patient. There was counseling of patient concerning these issues.    Total time spent in counseling and coordination of care - 35  minutes of total time spent on the encounter, which includes face to face time and non-face to face time preparing to see the patient (eg, review of tests), Obtaining and/or reviewing separately obtained history, Documenting clinical information in the electronic or other health record, Independently interpreting results (not separately reported) and communicating results to the patient/family/caregiver, or Care coordination (not separately reported).    Time was used in discussion of prognosis, risks, benefits of treatment, instructions and compliance with regimen . Discussion with other physicians and/or health care providers - home health or for use of durable medical equipment (oxygen, nebulizers, CPAP, BiPAP) occurred.

## 2022-12-09 ENCOUNTER — HOSPITAL ENCOUNTER (OUTPATIENT)
Dept: RADIOLOGY | Facility: HOSPITAL | Age: 72
Discharge: HOME | End: 2022-12-09
Attending: INTERNAL MEDICINE
Payer: MEDICARE

## 2022-12-09 DIAGNOSIS — R91.8 PULMONARY NODULES: ICD-10-CM

## 2022-12-09 DIAGNOSIS — R91.1 SOLITARY PULMONARY NODULE: ICD-10-CM

## 2022-12-09 PROCEDURE — 71250 CT THORAX DX C-: CPT | Mod: TC

## 2022-12-09 PROCEDURE — 71250 CT THORAX DX C-: CPT | Mod: 26,,, | Performed by: RADIOLOGY

## 2022-12-09 PROCEDURE — 71250 CT CHEST WITHOUT CONTRAST: ICD-10-PCS | Mod: 26,,, | Performed by: RADIOLOGY

## 2022-12-20 ENCOUNTER — SPECIALTY PHARMACY (OUTPATIENT)
Dept: PHARMACY | Facility: CLINIC | Age: 72
End: 2022-12-20
Payer: MEDICARE

## 2022-12-20 NOTE — TELEPHONE ENCOUNTER
Incoming call from pt regarding repatha refill. Pt is due to inject on 12/29. Claim rejects as refill too soon, 12/23. Informed pt we will call her on 12/23 to schedule delivery.

## 2022-12-25 ENCOUNTER — PATIENT MESSAGE (OUTPATIENT)
Dept: OTHER | Facility: OTHER | Age: 72
End: 2022-12-25
Payer: MEDICARE

## 2022-12-26 PROBLEM — R91.8 PULMONARY NODULES: Chronic | Status: ACTIVE | Noted: 2022-10-05

## 2022-12-26 NOTE — ASSESSMENT & PLAN NOTE
Lipid panel at goal w Repatha (intolerant of statin) - in agreement w trial metformin - cont work on diet/movement

## 2022-12-29 NOTE — TELEPHONE ENCOUNTER
Specialty Pharmacy - Refill Coordination    Specialty Medication Orders Linked to Encounter      Flowsheet Row Most Recent Value   Medication #1 evolocumab (REPATHA SURECLICK) 140 mg/mL PnIj (Order#[id number], Rx#[id number])            Refill Questions - Documented Responses      Flowsheet Row Most Recent Value   Patient Availability and HIPAA Verification    Does patient want to proceed with activity? Yes   HIPAA/medical authority confirmed? Yes   Relationship to patient of person spoken to? Self   Refill Screening Questions    Would patient like to speak to a pharmacist? No   When does the patient need to receive the medication? 01/01/23   Refill Delivery Questions    How will the patient receive the medication? [facility]   When does the patient need to receive the medication? 01/01/23   Shipping Address Home   Address in [facility] confirmed and updated if neccessary? Yes   Expected Copay ($) 0   Is the patient able to afford the medication copay? Yes   Payment Method zero copay   Days supply of Refill 28   Supplies needed? No supplies needed   Refill activity completed? Yes   Refill activity plan Refill scheduled   Shipment/Pickup Date: 12/29/22            Current Outpatient Medications   Medication Sig    ALPRAZolam (XANAX) 0.5 MG tablet Take 1 tablet (0.5 mg total) by mouth daily as needed for Anxiety.    amLODIPine (NORVASC) 10 MG tablet TAKE 1 TABLET BY MOUTH EVERY DAY    aspirin 81 MG Chew Take 81 mg by mouth once daily.    benazepriL (LOTENSIN) 40 MG tablet TAKE 1 TABLET BY MOUTH EVERY DAY    blood glucose control, normal Soln 1 Bottle by Misc.(Non-Drug; Combo Route) route once daily. For Accu Check Amber  use twice daily prn dx: E11.9    blood-glucose meter (ACCU-CHEK AMBER) Misc 1 kit by Misc.(Non-Drug; Combo Route) route once. For Accu Check Amber  use twice daily prn dx: E11.9 for 1 dose    calcium-vitamin D3 500 mg(1,250mg) -200 unit per tablet Take 1 tablet by mouth once daily.     CRANBERRY EXTRACT  ORAL Take 1 tablet by mouth once daily.    evolocumab (REPATHA SURECLICK) 140 mg/mL PnIj Inject 1 mL (140 mg total) into the skin every 14 (fourteen) days.    FLUZONE HIGHDOSE QUAD 22-23  mcg/0.7 mL Syrg     lancets Misc 1 lancet by Misc.(Non-Drug; Combo Route) route 3 (three) times daily. For Acc-Chek Irais   DX :E11.9    loratadine (CLARITIN) 10 mg tablet Take 1 tablet (10 mg total) by mouth once daily.    meclizine (ANTIVERT) 25 mg tablet Take 1 tablet (25 mg total) by mouth 3 (three) times daily as needed for Dizziness or Nausea.    metFORMIN (GLUCOPHAGE-XR) 500 MG ER 24hr tablet With low carb meal once daily    metoprolol succinate (TOPROL-XL) 25 MG 24 hr tablet Take 1 tablet (25 mg total) by mouth once daily.    MULTIVITAMIN W-MINERALS/LUTEIN (CENTRUM SILVER ORAL) Take 1 tablet by mouth once daily.    spironolactone (ALDACTONE) 50 MG tablet Take 1 tablet (50 mg total) by mouth once daily.    triamcinolone (NASACORT) 55 mcg nasal inhaler USE 2 SPRAYS BY NASAL ROUTE ONCE DAILY   Last reviewed on 12/2/2022  2:02 PM by Michelle Fisher MA    Review of patient's allergies indicates:   Allergen Reactions    Statins-hmg-coa reductase inhibitors      Muscle Cramps    Last reviewed on  12/2/2022 2:01 PM by Michelle Fisher      Tasks added this encounter   1/22/2023 - Refill Call (Auto Added)   Tasks due within next 3 months   No tasks due.     Viky Oviedo, PharmD  [facility]  [address] [facility] [address]  Phone: [phone]  Fax: [phone]

## 2023-01-16 ENCOUNTER — PATIENT MESSAGE (OUTPATIENT)
Dept: ADMINISTRATIVE | Facility: OTHER | Age: 73
End: 2023-01-16

## 2023-01-17 ENCOUNTER — OFFICE VISIT (OUTPATIENT)
Dept: PRIMARY CARE CLINIC | Facility: CLINIC | Age: 73
End: 2023-01-17
Payer: MEDICARE

## 2023-01-17 VITALS
HEART RATE: 65 BPM | SYSTOLIC BLOOD PRESSURE: 134 MMHG | HEIGHT: 64 IN | BODY MASS INDEX: 31.33 KG/M2 | OXYGEN SATURATION: 98 % | DIASTOLIC BLOOD PRESSURE: 62 MMHG | TEMPERATURE: 98 F | WEIGHT: 183.5 LBS

## 2023-01-17 DIAGNOSIS — I70.0 AORTIC ATHEROSCLEROSIS: Primary | ICD-10-CM

## 2023-01-17 DIAGNOSIS — I15.2 HYPERTENSION ASSOCIATED WITH DIABETES: ICD-10-CM

## 2023-01-17 DIAGNOSIS — R91.8 PULMONARY NODULES: ICD-10-CM

## 2023-01-17 DIAGNOSIS — E11.59 HYPERTENSION ASSOCIATED WITH DIABETES: ICD-10-CM

## 2023-01-17 DIAGNOSIS — N18.31 STAGE 3A CHRONIC KIDNEY DISEASE: ICD-10-CM

## 2023-01-17 DIAGNOSIS — E27.8 ADRENAL NODULE: ICD-10-CM

## 2023-01-17 PROCEDURE — 3008F BODY MASS INDEX DOCD: CPT | Mod: HCNC,CPTII,S$GLB, | Performed by: INTERNAL MEDICINE

## 2023-01-17 PROCEDURE — 3078F PR MOST RECENT DIASTOLIC BLOOD PRESSURE < 80 MM HG: ICD-10-PCS | Mod: HCNC,CPTII,S$GLB, | Performed by: INTERNAL MEDICINE

## 2023-01-17 PROCEDURE — 3008F PR BODY MASS INDEX (BMI) DOCUMENTED: ICD-10-PCS | Mod: HCNC,CPTII,S$GLB, | Performed by: INTERNAL MEDICINE

## 2023-01-17 PROCEDURE — 3288F FALL RISK ASSESSMENT DOCD: CPT | Mod: HCNC,CPTII,S$GLB, | Performed by: INTERNAL MEDICINE

## 2023-01-17 PROCEDURE — 99497 PR ADVNCD CARE PLAN 30 MIN: ICD-10-PCS | Mod: HCNC,S$GLB,, | Performed by: INTERNAL MEDICINE

## 2023-01-17 PROCEDURE — 1101F PT FALLS ASSESS-DOCD LE1/YR: CPT | Mod: HCNC,CPTII,S$GLB, | Performed by: INTERNAL MEDICINE

## 2023-01-17 PROCEDURE — 99497 ADVNCD CARE PLAN 30 MIN: CPT | Mod: HCNC,S$GLB,, | Performed by: INTERNAL MEDICINE

## 2023-01-17 PROCEDURE — 99215 PR OFFICE/OUTPT VISIT, EST, LEVL V, 40-54 MIN: ICD-10-PCS | Mod: HCNC,S$GLB,, | Performed by: INTERNAL MEDICINE

## 2023-01-17 PROCEDURE — 1160F PR REVIEW ALL MEDS BY PRESCRIBER/CLIN PHARMACIST DOCUMENTED: ICD-10-PCS | Mod: HCNC,CPTII,S$GLB, | Performed by: INTERNAL MEDICINE

## 2023-01-17 PROCEDURE — 3075F SYST BP GE 130 - 139MM HG: CPT | Mod: HCNC,CPTII,S$GLB, | Performed by: INTERNAL MEDICINE

## 2023-01-17 PROCEDURE — 99999 PR PBB SHADOW E&M-EST. PATIENT-LVL V: CPT | Mod: PBBFAC,HCNC,, | Performed by: INTERNAL MEDICINE

## 2023-01-17 PROCEDURE — 1160F RVW MEDS BY RX/DR IN RCRD: CPT | Mod: HCNC,CPTII,S$GLB, | Performed by: INTERNAL MEDICINE

## 2023-01-17 PROCEDURE — 1126F PR PAIN SEVERITY QUANTIFIED, NO PAIN PRESENT: ICD-10-PCS | Mod: HCNC,CPTII,S$GLB, | Performed by: INTERNAL MEDICINE

## 2023-01-17 PROCEDURE — 99999 PR PBB SHADOW E&M-EST. PATIENT-LVL V: ICD-10-PCS | Mod: PBBFAC,HCNC,, | Performed by: INTERNAL MEDICINE

## 2023-01-17 PROCEDURE — 1158F PR ADVANCE CARE PLANNING DISCUSS DOCUMENTED IN MEDICAL RECORD: ICD-10-PCS | Mod: HCNC,CPTII,S$GLB, | Performed by: INTERNAL MEDICINE

## 2023-01-17 PROCEDURE — 1101F PR PT FALLS ASSESS DOC 0-1 FALLS W/OUT INJ PAST YR: ICD-10-PCS | Mod: HCNC,CPTII,S$GLB, | Performed by: INTERNAL MEDICINE

## 2023-01-17 PROCEDURE — 1126F AMNT PAIN NOTED NONE PRSNT: CPT | Mod: HCNC,CPTII,S$GLB, | Performed by: INTERNAL MEDICINE

## 2023-01-17 PROCEDURE — 1159F PR MEDICATION LIST DOCUMENTED IN MEDICAL RECORD: ICD-10-PCS | Mod: HCNC,CPTII,S$GLB, | Performed by: INTERNAL MEDICINE

## 2023-01-17 PROCEDURE — 1159F MED LIST DOCD IN RCRD: CPT | Mod: HCNC,CPTII,S$GLB, | Performed by: INTERNAL MEDICINE

## 2023-01-17 PROCEDURE — 1158F ADVNC CARE PLAN TLK DOCD: CPT | Mod: HCNC,CPTII,S$GLB, | Performed by: INTERNAL MEDICINE

## 2023-01-17 PROCEDURE — 99215 OFFICE O/P EST HI 40 MIN: CPT | Mod: HCNC,S$GLB,, | Performed by: INTERNAL MEDICINE

## 2023-01-17 PROCEDURE — 3075F PR MOST RECENT SYSTOLIC BLOOD PRESS GE 130-139MM HG: ICD-10-PCS | Mod: HCNC,CPTII,S$GLB, | Performed by: INTERNAL MEDICINE

## 2023-01-17 PROCEDURE — 3288F PR FALLS RISK ASSESSMENT DOCUMENTED: ICD-10-PCS | Mod: HCNC,CPTII,S$GLB, | Performed by: INTERNAL MEDICINE

## 2023-01-17 PROCEDURE — 3078F DIAST BP <80 MM HG: CPT | Mod: HCNC,CPTII,S$GLB, | Performed by: INTERNAL MEDICINE

## 2023-01-17 RX ORDER — METFORMIN HYDROCHLORIDE 500 MG/1
TABLET, EXTENDED RELEASE ORAL
Qty: 180 TABLET | Refills: 3 | Status: SHIPPED | OUTPATIENT
Start: 2023-01-17 | End: 2023-07-13

## 2023-01-17 NOTE — PROGRESS NOTES
Kristyn Garza  01/17/2023  [id number]    Moon Lion MD  Patient Care Team:  Moon Lion MD as PCP - General (Internal Medicine)  Delia Liu LPN as Care Coordinator (Internal Medicine)  Soo Bar as Digital Medicine Health   Dale Matute MD as Consulting Physician (Cardiology)  Hammad Hardy OD as Consulting Physician (Optometry)  Olivia Irwin MD as Consulting Physician (Otolaryngology)  Joanne MarsD as Hypertension Digital Medicine Clinician  [employer] as Hypertension Digital Medicine Contract  Brenden Landaverde MD as Consulting Physician (Nephrology)  Gretel De Leon PharmD as Hyperlipidemia Digital Medicine Clinician  Gretel De Leon PharmD as Diabetes Digital Medicine Clinician  [employer] as Diabetes Digital Medicine Contract  Cuca Beatty NP as Nurse Practitioner (Family Medicine)  Moon Lion MD as Hyperlipidemia Digital Medicine Responsible Provider (Internal Medicine)  Moon Lion MD as Diabetes Digital Medicine Responsible Provider (Internal Medicine)  Moon Lion MD as Hypertension Digital Medicine Responsible Provider (Internal Medicine)    Visit Type: Follow-up    Chief Complaint:  Chief Complaint   Patient presents with    2mth f/u     History of Present Illness:  Ms. Kristyn Garza is a 72 year old female here today for scheduled f/u. Medical issues include HTN, HLD (w statin intolerance), type 2 DM, CKD, diastolic CHF w LVH, thyroid nodules, adrenal nodule, obesity. She's enrolled w digital medicine program for diabetes and hypertension.     Pulm nodules noted on recent CT abd/pelvis still noted on more recent CT chest.  Former smoker - 1/2 pack a day for about 25 years, quit about 13 years ago.    Tolerating metformin 500mg XR once daily  Home BP's mostly 110's/60's   Fasting BSs 92 - 177 (one 213 in Dec)    Walking 7-8,00 steps most days - new goal 10,000 steps      Recent appointments:    12/02/22 Pulm Dr. Khan Pulm nodules f/u 6 mos CT/PET  11/10/22 O65+ Cuca Beatty URI  10/19/22 PCP Dr. Guzman  10/05/22 O65+ Dr. Lion establish care  9/12/22 Endo Dr. Chirinos A Panuti adrenal nodule (spoke by phone only)  5/10/22 AWV ISABELA Neal  4/19/22 PCP Dr. Guzman  4/14/22 Card Dr. Matute  3/13/22 Nephrology Dr. Dc periodic hypokalemia    Advance Care Planning   Date: 01/17/2023  Power of   I initiated the process of advance care planning today and explained the importance of this process to the patient.  I introduced the concept of advance directives to the patient, as well. Reviewed the importance of designating a Health Care Power of  (HCPOA). She was also instructed to communicate with this person about their wishes for future healthcare, should she become sick and lose decision-making capacity. The patient has appointed a HCPOA. After our discussion, the patient has decided to complete a HCPOA and has appointed her son, Alli Garza health care agent number [phone] and her son health care agent: Dale Garza  & health care agent number: [phone]. I spent a total time of 16 minutes discussing this issue with the patient.      PHQ-4 Score: 0     Upcoming appointments:  Future Appointments       Date Provider Specialty Appt Notes    2/10/2023 Cuca Beatty NP Primary Care awv    4/26/2023 Moon Lion MD Primary Care 3 month f/u    6/2/2023 Soto Khan MD Pulmonology 6 mth f/u//laura           The following were reviewed: Active problem list, medication list, allergies, family history, social history, and Health Maintenance.     History:  Past Medical History:   Diagnosis Date    Carpal tunnel syndrome     CKD (chronic kidney disease) stage 3, GFR 30-59 ml/min     Followed by Nephrology    Colon cancer screening 3/18/2014    CTS (carpal tunnel syndrome) 6/18/2013    Diabetes mellitus, type 2     Eczema     Elevated CPK     History of hypokalemia 6/18/2013     History of hypokalemia 6/18/2013    Hypokalemia     Multinodular thyroid     Followed by ENT    Obesity     Other and unspecified hyperlipidemia     Pneumonia     in her 20s; hospitalized    Postmenopausal     No history of abnormal pap smear    Statin intolerance     caused mylagia, elevated CPK    Tobacco dependence     resolved    Unspecified essential hypertension      Patient Active Problem List   Diagnosis    Multiple thyroid nodules    Cupping of optic disc    Nuclear sclerosis    Combined hyperlipidemia associated with type 2 diabetes mellitus    Hypertension associated with diabetes    CKD (chronic kidney disease) stage 3, GFR 30-59 ml/min    Obesity (BMI 30.0-34.9)    Postmenopausal    Statin intolerance    Abnormal ECG    LVH (left ventricular hypertrophy)    Family history of cardiovascular disorder    PAC (premature atrial contraction)    Type 2 diabetes mellitus with kidney complication, without long-term current use of insulin    Endolymphatic hydrops of left ear    Gastroesophageal reflux disease    ZHOU (dyspnea on exertion)    Secondary hyperparathyroidism of renal origin    Diastolic dysfunction    Secondary hypertension    Adrenal nodule    Pulmonary nodules    Aortic atherosclerosis     Review of patient's allergies indicates:   Allergen Reactions    Statins-hmg-coa reductase inhibitors      Muscle Cramps       Medications:  Current Outpatient Medications on File Prior to Visit   Medication Sig Dispense Refill    ALPRAZolam (XANAX) 0.5 MG tablet Take 1 tablet (0.5 mg total) by mouth daily as needed for Anxiety. 30 tablet 0    amLODIPine (NORVASC) 10 MG tablet TAKE 1 TABLET BY MOUTH EVERY DAY 90 tablet 3    aspirin 81 MG Chew Take 81 mg by mouth once daily.      benazepriL (LOTENSIN) 40 MG tablet TAKE 1 TABLET BY MOUTH EVERY DAY 90 tablet 3    calcium-vitamin D3 500 mg(1,250mg) -200 unit per tablet Take 1 tablet by mouth once daily.       CRANBERRY EXTRACT ORAL Take 1 tablet by mouth once daily.       evolocumab (REPATHA SURECLICK) 140 mg/mL PnIj Inject 1 mL (140 mg total) into the skin every 14 (fourteen) days. 2 mL 5    lancets Misc 1 lancet by Misc.(Non-Drug; Combo Route) route 3 (three) times daily. For Acc-Chek Amber   DX :E11.9 100 each 0    loratadine (CLARITIN) 10 mg tablet Take 1 tablet (10 mg total) by mouth once daily. 90 tablet 1    meclizine (ANTIVERT) 25 mg tablet Take 1 tablet (25 mg total) by mouth 3 (three) times daily as needed for Dizziness or Nausea. 60 tablet 1    metoprolol succinate (TOPROL-XL) 25 MG 24 hr tablet Take 1 tablet (25 mg total) by mouth once daily. 30 tablet 11    MULTIVITAMIN W-MINERALS/LUTEIN (CENTRUM SILVER ORAL) Take 1 tablet by mouth once daily.      spironolactone (ALDACTONE) 50 MG tablet Take 1 tablet (50 mg total) by mouth once daily. 90 tablet 2    triamcinolone (NASACORT) 55 mcg nasal inhaler USE 2 SPRAYS BY NASAL ROUTE ONCE DAILY 50.7 g 1    blood glucose control, normal Soln 1 Bottle by Misc.(Non-Drug; Combo Route) route once daily. For Accu Check Amber  use twice daily prn dx: E11.9 1 each 0    blood-glucose meter (ACCU-CHEK AMBER) Misc 1 kit by Misc.(Non-Drug; Combo Route) route once. For Accu Check Amber  use twice daily prn dx: E11.9 for 1 dose 1 each 0    FLUZONE HIGHDOSE QUAD 22-23  mcg/0.7 mL Syrg        No current facility-administered medications on file prior to visit.     Medications have been reviewed and reconciled with patient at visit today.    Review of Systems   Constitutional:  Negative for activity change, appetite change, fatigue and unexpected weight change.   HENT:  Positive for hearing loss.         Sinus problems at times   Eyes:  Negative for photophobia and pain.        Glassses   Respiratory:  Negative for cough, shortness of breath and stridor.    Gastrointestinal:  Negative for abdominal pain, change in bowel habit, nausea and change in bowel habit.   Genitourinary:  Negative for bladder incontinence and dysuria.   Neurological:   Negative for dizziness and light-headedness.   Psychiatric/Behavioral:  Negative for dysphoric mood and sleep disturbance.       Exam: Initial VS: /65 P 65 sat 95% Temp 98.1  Vitals:    01/17/23 0902   BP: 134/62   Pulse:    Temp:      Weight: 83.2 kg (183 lb 8 oz)   Body mass index is 31.5 kg/m².    BP Readings from Last 3 Encounters:   01/17/23 134/62   12/02/22 122/70   11/15/22 122/68      Wt Readings from Last 3 Encounters:   01/17/23 0809 83.2 kg (183 lb 8 oz)   12/02/22 1359 83 kg (182 lb 15.7 oz)   11/15/22 0809 84 kg (185 lb 1.6 oz)      Physical Exam  Vitals reviewed.   Constitutional:       General: She is not in acute distress.     Appearance: Normal appearance. She is obese. She is not ill-appearing.   HENT:      Head: Normocephalic and atraumatic.      Ears:      Comments: B hearing aids  Small amount dry cerumen R ear canal     Mouth/Throat:      Mouth: Mucous membranes are moist.      Pharynx: Oropharynx is clear.      Comments: Plate on top   Missing many bottom teeth  Eyes:      Extraocular Movements: Extraocular movements intact.      Conjunctiva/sclera: Conjunctivae normal.      Comments: glasses   Neck:      Vascular: No carotid bruit.   Cardiovascular:      Rate and Rhythm: Normal rate and regular rhythm.   Pulmonary:      Effort: Pulmonary effort is normal.      Breath sounds: Normal breath sounds.   Abdominal:      General: Bowel sounds are normal.      Palpations: Abdomen is soft.      Tenderness: There is no abdominal tenderness. There is no guarding.   Musculoskeletal:      Comments: Ambulates independently w/o difficulty   Lymphadenopathy:      Cervical: No cervical adenopathy.   Skin:     General: Skin is warm and dry.   Neurological:      General: No focal deficit present.      Mental Status: She is alert and oriented to person, place, and time. Mental status is at baseline.   Psychiatric:         Mood and Affect: Mood normal.         Behavior: Behavior normal.         Thought  Content: Thought content normal.         Judgment: Judgment normal.      Laboratory Reviewed  Lab Results   Component Value Date    WBC 11.66 11/10/2022    HGB 12.9 11/10/2022    HCT 40.1 11/10/2022     11/10/2022    MCV 96 11/10/2022    CHOL 155 11/10/2022    TRIG 72 11/10/2022    HDL 44 11/10/2022    LDLCALC 96.6 11/10/2022    ALT 19 01/18/2022    AST 20 01/18/2022     11/10/2022    K 4.4 11/10/2022     11/10/2022    CREATININE 1.1 11/10/2022    BUN 16 11/10/2022    CO2 27 11/10/2022    MG 2.1 11/10/2022    TSH 0.520 11/10/2022    FREET4 0.83 11/10/2022    HGBA1C 6.4 (H) 10/19/2022     11/10/22 eGFR 53.1 T3 107 wnl  9/15/22 eGFR 53.1 cortisol < 1.00 dexamethasone 640  4/19/22 urine microalb/crt wnl  1/18/22 metanephrines wnl ziggy/renin ratio wnl    12/09/22 CT Chest w/o contrast: 1. Multiple solid noncalcified pulmonary nodules throughout the lungs bilaterally with the largest measuring an average of 7 mm in the apex of the left upper lobe.  For multiple solid nodules with any 6 mm or greater, Fleischner Society guidelines recommend follow up with non-contrast chest CT at 3-6 months and 18-24 months after discovery.  (Heart and Great vessels: Scattered atherosclerotic plaque noted involving the thoracic aorta and aortic branch vessels, including the coronary arteries.  No pericardial effusion. Upper Abdomen: Previously described small bilateral adrenal nodules appear unchanged in size.)    5/27/22 US Thyroid No detrimental change in appearance of multinodular goiter.    5/27/22 Mammogram normal    Assessment:   72 y.o. female with multiple co-morbid illnesses here for continued follow up of medical problems.      The primary encounter diagnosis was Aortic atherosclerosis. Diagnoses of Hypertension associated with diabetes, Adrenal nodule, Stage 3a chronic kidney disease, and Pulmonary nodules were also pertinent to this visit.      Plan:   1. Aortic atherosclerosis  Overview:  Incidental finding  on recent imaging:  CT Chest w/o Contrast Heart and Great vessels: Scattered atherosclerotic plaque noted involving the thoracic aorta and aortic branch vessels, including the coronary arteries.  No pericardial effusion.      Assessment & Plan:  Lipid levels at goal - cont Repatha, asa, BP management, daily movement      2. Hypertension associated with diabetes  Assessment & Plan:  Enrolled w digital medicine - home BP's at goal - cont current Rx - Tolerating metformin XR 500mg QD - in agreement w increase to BID  - cont work toward goal of 10,000 steps daily      3. Adrenal nodule  Overview:  CT Abd/Pelvis 3/02/22: Bilateral small adrenal gland nodules which are technically indeterminate.    CT Chest w/o contras 12/09/22t: Upper Abdomen: Previously described small bilateral adrenal nodules appear unchanged in size.      Assessment & Plan:  Stable - cont monitor      4. Stage 3a chronic kidney disease  Assessment & Plan:  Stable - cont benazepril (consider switch to longer-acting ARB? Consider ARB-amlodipine combo tab?) cont monitor   (consider check PTHi- vit D? - both  last checked 6 yrs ago)      5. Pulmonary nodules  Overview:  Incidental finding on imaging:  CT abd/pelvis 3/02/22:Small right lower lobe lung nodule.  Correlation with nonemergent chest CT could be considered for further evaluation.  F/u imaging:  CT Chest w/o contrast 12/09/22:  Multiple solid noncalcified pulmonary nodules throughout the lungs bilaterally with the largest measuring an average of 7 mm in the apex of the left upper lobe.  For multiple solid nodules with any 6 mm or greater, Fleischner Society guidelines recommend follow up with non-contrast chest CT at 3-6 months and 18-24 months after discovery.    Assessment & Plan:  H/o smoking - reps status stable - cont imaging surveillance and f/u w Pulm      Other orders  -     metFORMIN (GLUCOPHAGE-XR) 500 MG ER 24hr tablet; With protein (low or no-carb) twice daily  Dispense: 180 tablet;  Refill: 3         Health Maintenance         Date Due Completion Date    Shingles Vaccine (2 of 3) 04/20/2011 2/23/2011    TETANUS VACCINE 02/23/2021 2/23/2011    COVID-19 Vaccine (4 - Booster for Moderna series) 02/09/2022 12/15/2021    Eye Exam 11/30/2022 11/30/2021    Override on 7/20/2020: Done    Override on 4/30/2019: Done    Override on 4/24/2018: Done    Override on 5/17/2017: Done    Override on 5/16/2016: Done    Override on 5/13/2015: Done    Diabetes Urine Screening 04/19/2023 4/19/2022    Hemoglobin A1c 04/19/2023 10/19/2022    Mammogram 05/27/2023 5/27/2022    Override on 3/13/2013: Done    Foot Exam 10/19/2023 10/19/2022    Override on 9/23/2015: Done    Override on 7/16/2015: Done    Override on 3/23/2015: Done    Override on 12/3/2014: Done    Override on 4/29/2014: Done    Lipid Panel 11/10/2023 11/10/2022    LDCT Lung Screen 12/09/2023 12/9/2022    Colorectal Cancer Screening 03/18/2024 3/18/2014    Override on 6/1/2006: Done    DEXA Scan 05/26/2026 5/26/2021    Override on 3/10/2011: Done (normal repeat in 5 years)            -Patient's lab results were reviewed and discussed with patient  -Treatment options and alternatives were discussed with the patient. Patient expressed understanding. Patient was given the opportunity to ask questions and be an active participant in their medical care. Patient had no further questions or concerns at this time.     -Patient is an overall moderate risk for health complications from their medical conditions.     Follow up: Follow up in about 3 months (around 4/17/2023) for Follow Up.    Care Plan/Goals: Reviewed Yes   Goals         80 <= Glucose <= 180 (pt-stated)       11/15/22  On track        Blood Pressure < 130/80 (pt-stated)       11/15/22  On track        Take at least one BP reading per week at various times of the day       11/15/22  On track        Weight (lb) < 90.7 kg (200 lb) (pt-stated)       Update 11/15/22    On track - weight < 200 lbs - new  goal 10 lbs wt loss over the next 2 mos.    Goal 5% weight loss - 9-10 lbs - 175 seth    Barriers? Sweet-tooth    Overcoming? Try substitute fruit? Daily walking - current step average about 5,000 steps - new goal 7,000 steps.    Time frame 3 mos - mid Jan 2023.                  After visit summary printed and given to patient upon discharge.  Patient goals and care plan are included in After visit summary.    TOTAL TIME evaluating and managing this patient for this encounter was greater than 60 minutes. This time was spent personally by me on some of the following activities: review of patient's past medical history, assessing age-appropriate health maintenance needs, review of any interval history, review and interpretation of lab results, review and interpretation of imaging test results, review and interpretation of cardiology test results, reviewing consulting specialist notes, obtaining history from the patient and family, examination of the patient, medication reconciliation, managing and/or ordering prescription medications, ordering imaging tests, ordering referral to subspecialty provider(s), educating patient and answering their questions about diagnosis, treatment plan, and goals of treatment, discussing planned follow-up and final documentation of the visit. 16 minutes of this time was spent addressing ACP and documenting HCPOA.This time was exclusive of any separately billable procedures for this patient and exclusive of time spent treating any other patients.

## 2023-01-24 ENCOUNTER — PATIENT OUTREACH (OUTPATIENT)
Dept: ADMINISTRATIVE | Facility: HOSPITAL | Age: 73
End: 2023-01-24

## 2023-01-26 ENCOUNTER — SPECIALTY PHARMACY (OUTPATIENT)
Dept: PHARMACY | Facility: CLINIC | Age: 73
End: 2023-01-26

## 2023-01-26 NOTE — TELEPHONE ENCOUNTER
Specialty Pharmacy - Refill Coordination    Specialty Medication Orders Linked to Encounter      Flowsheet Row Most Recent Value   Medication #1 evolocumab (REPATHA SURECLICK) 140 mg/mL PnIj (Order#[id number], Rx#[id number])            Refill Questions - Documented Responses      Flowsheet Row Most Recent Value   Patient Availability and HIPAA Verification    Does patient want to proceed with activity? Yes   HIPAA/medical authority confirmed? Yes   Relationship to patient of person spoken to? Self   Refill Screening Questions    Would patient like to speak to a pharmacist? No   When does the patient need to receive the medication? 02/01/23   Refill Delivery Questions    How will the patient receive the medication? [facility]   When does the patient need to receive the medication? 02/01/23   Shipping Address Home   Address in [facility] confirmed and updated if neccessary? Yes   Expected Copay ($) 0   Is the patient able to afford the medication copay? Yes   Payment Method zero copay   Days supply of Refill 28   Supplies needed? No supplies needed   Refill activity completed? Yes   Refill activity plan Refill scheduled   Shipment/Pickup Date: 01/30/23            Current Outpatient Medications   Medication Sig    ALPRAZolam (XANAX) 0.5 MG tablet Take 1 tablet (0.5 mg total) by mouth daily as needed for Anxiety.    amLODIPine (NORVASC) 10 MG tablet TAKE 1 TABLET BY MOUTH EVERY DAY    aspirin 81 MG Chew Take 81 mg by mouth once daily.    benazepriL (LOTENSIN) 40 MG tablet TAKE 1 TABLET BY MOUTH EVERY DAY    blood glucose control, normal Soln 1 Bottle by Misc.(Non-Drug; Combo Route) route once daily. For Accu Check Amber  use twice daily prn dx: E11.9    blood-glucose meter (ACCU-CHEK AMBER) Misc 1 kit by Misc.(Non-Drug; Combo Route) route once. For Accu Check Amber  use twice daily prn dx: E11.9 for 1 dose    calcium-vitamin D3 500 mg(1,250mg) -200 unit per tablet Take 1 tablet by mouth once daily.     CRANBERRY EXTRACT  ORAL Take 1 tablet by mouth once daily.    evolocumab (REPATHA SURECLICK) 140 mg/mL PnIj Inject 1 mL (140 mg total) into the skin every 14 (fourteen) days.    FLUZONE HIGHDOSE QUAD 22-23  mcg/0.7 mL Syrg     lancets Misc 1 lancet by Misc.(Non-Drug; Combo Route) route 3 (three) times daily. For Acc-Chek Irais   DX :E11.9    loratadine (CLARITIN) 10 mg tablet Take 1 tablet (10 mg total) by mouth once daily.    meclizine (ANTIVERT) 25 mg tablet Take 1 tablet (25 mg total) by mouth 3 (three) times daily as needed for Dizziness or Nausea.    metFORMIN (GLUCOPHAGE-XR) 500 MG ER 24hr tablet With protein (low or no-carb) twice daily    metoprolol succinate (TOPROL-XL) 25 MG 24 hr tablet Take 1 tablet (25 mg total) by mouth once daily.    MULTIVITAMIN W-MINERALS/LUTEIN (CENTRUM SILVER ORAL) Take 1 tablet by mouth once daily.    spironolactone (ALDACTONE) 50 MG tablet Take 1 tablet (50 mg total) by mouth once daily.    triamcinolone (NASACORT) 55 mcg nasal inhaler USE 2 SPRAYS BY NASAL ROUTE ONCE DAILY   Last reviewed on 1/17/2023  8:52 AM by Moon Lion MD    Review of patient's allergies indicates:   Allergen Reactions    Statins-hmg-coa reductase inhibitors      Muscle Cramps    Last reviewed on  1/17/2023 8:10 AM by Lesa Cameron      Tasks added this encounter   2/22/2023 - Refill Call (Auto Added)   Tasks due within next 3 months   No tasks due.     María Elena Sparks  [facility]  [address] [place] [id number]  Phone: [phone]  Fax: [phone]

## 2023-01-30 ENCOUNTER — OFFICE VISIT (OUTPATIENT)
Dept: OPHTHALMOLOGY | Facility: CLINIC | Age: 73
End: 2023-01-30
Payer: MEDICARE

## 2023-01-30 DIAGNOSIS — H25.13 NUCLEAR SCLEROSIS, BILATERAL: ICD-10-CM

## 2023-01-30 DIAGNOSIS — H40.013 OPEN ANGLE WITH BORDERLINE FINDINGS OF BOTH EYES: ICD-10-CM

## 2023-01-30 DIAGNOSIS — H52.13 MYOPIA WITH ASTIGMATISM AND PRESBYOPIA, BILATERAL: ICD-10-CM

## 2023-01-30 DIAGNOSIS — H25.013 CORTICAL AGE-RELATED CATARACT OF BOTH EYES: ICD-10-CM

## 2023-01-30 DIAGNOSIS — H52.4 MYOPIA WITH ASTIGMATISM AND PRESBYOPIA, BILATERAL: ICD-10-CM

## 2023-01-30 DIAGNOSIS — H52.203 MYOPIA WITH ASTIGMATISM AND PRESBYOPIA, BILATERAL: ICD-10-CM

## 2023-01-30 DIAGNOSIS — E11.36 DIABETIC CATARACT OF BOTH EYES: ICD-10-CM

## 2023-01-30 DIAGNOSIS — E11.9 TYPE 2 DIABETES MELLITUS WITHOUT RETINOPATHY: Primary | ICD-10-CM

## 2023-01-30 PROCEDURE — 1160F PR REVIEW ALL MEDS BY PRESCRIBER/CLIN PHARMACIST DOCUMENTED: ICD-10-PCS | Mod: HCNC,CPTII,S$GLB, | Performed by: OPTOMETRIST

## 2023-01-30 PROCEDURE — 1159F MED LIST DOCD IN RCRD: CPT | Mod: HCNC,CPTII,S$GLB, | Performed by: OPTOMETRIST

## 2023-01-30 PROCEDURE — 92133 CPTRZD OPH DX IMG PST SGM ON: CPT | Mod: HCNC,S$GLB,, | Performed by: OPTOMETRIST

## 2023-01-30 PROCEDURE — 1159F PR MEDICATION LIST DOCUMENTED IN MEDICAL RECORD: ICD-10-PCS | Mod: HCNC,CPTII,S$GLB, | Performed by: OPTOMETRIST

## 2023-01-30 PROCEDURE — 99999 PR PBB SHADOW E&M-EST. PATIENT-LVL III: CPT | Mod: PBBFAC,HCNC,, | Performed by: OPTOMETRIST

## 2023-01-30 PROCEDURE — 92014 PR EYE EXAM, EST PATIENT,COMPREHESV: ICD-10-PCS | Mod: HCNC,S$GLB,, | Performed by: OPTOMETRIST

## 2023-01-30 PROCEDURE — 92015 PR REFRACTION: ICD-10-PCS | Mod: HCNC,S$GLB,, | Performed by: OPTOMETRIST

## 2023-01-30 PROCEDURE — 1160F RVW MEDS BY RX/DR IN RCRD: CPT | Mod: HCNC,CPTII,S$GLB, | Performed by: OPTOMETRIST

## 2023-01-30 PROCEDURE — 92133 POSTERIOR SEGMENT OCT OPTIC NERVE(OCULAR COHERENCE TOMOGRAPHY) - OU - BOTH EYES: ICD-10-PCS | Mod: HCNC,S$GLB,, | Performed by: OPTOMETRIST

## 2023-01-30 PROCEDURE — 92015 DETERMINE REFRACTIVE STATE: CPT | Mod: HCNC,S$GLB,, | Performed by: OPTOMETRIST

## 2023-01-30 PROCEDURE — 99999 PR PBB SHADOW E&M-EST. PATIENT-LVL III: ICD-10-PCS | Mod: PBBFAC,HCNC,, | Performed by: OPTOMETRIST

## 2023-01-30 PROCEDURE — 92014 COMPRE OPH EXAM EST PT 1/>: CPT | Mod: HCNC,S$GLB,, | Performed by: OPTOMETRIST

## 2023-01-30 NOTE — PROGRESS NOTES
HPI     Annual Exam            Comments: Patient here for annual eye exam.patient states vision is   stable no changes. Patient denies pain and discomfort.          Diabetic Eye Exam            Comments: Hemoglobin A1C       Date                     Value               Ref Range             Status                10/19/2022               6.4 (H)             4.0 - 5.6 %           Final              Comment:    ADA Screening Guidelines:  5.7-6.4%  Consistent with   prediabetes  >or=6.5%  Consistent with diabetes    High levels of fetal   hemoglobin interfere with the HbA1C  assay. Heterozygous hemoglobin   variants (HbS, HgC, etc)do  not significantly interfere with this assay.     However, presence of multiple varian         Last edited by Monserrat Mora on 1/30/2023  9:50 AM.            Assessment /Plan     For exam results, see Encounter Report.    Type 2 diabetes mellitus without retinopathy  There was no diabetic retinopathy present in either eye today.   Recommended that pt continue care with PCP and/or specialists regarding diabetes.  Follow-up dilated eye exam recommended in 12 months, sooner with any vision changes or new concerns.    Open angle with borderline findings of both eyes  -     Posterior Segment OCT Optic Nerve- Both eyes  IOP stable OU  gOCT stable OU compared to previous scans  No evidence of disease at this time   Monitor 12 months     Nuclear sclerosis, bilateral  Cortical age-related cataract of both eyes  Diabetic cataract of both eyes  Visually significant cataract.  Patient is at the option stage.   Cataract surgery will improve vision, but necessity is dependent on patient's symptoms.   Pt declines surgical consult at this time.  Will continue to monitor over the next 12 months. Pt to call or RTC with any significant change in vision prior to next visit.    Myopia with astigmatism and presbyopia, bilateral  Eyeglass Final Rx       Eyeglass Final Rx         Sphere Cylinder Axis Add     Right -3.25 +0.75 135 +2.50    Left -3.00 +0.50 050 +2.50      Expiration Date: 1/30/2024                  RTC 1 yr for dilated eye exam and gOCT or PRN if any problems.   Discussed above and answered questions.

## 2023-02-05 PROBLEM — E27.8 ADRENAL NODULE: Chronic | Status: ACTIVE | Noted: 2022-09-12

## 2023-02-05 PROBLEM — I70.0 AORTIC ATHEROSCLEROSIS: Chronic | Status: ACTIVE | Noted: 2023-01-17

## 2023-02-05 PROBLEM — E27.9 ADRENAL NODULE: Chronic | Status: ACTIVE | Noted: 2022-09-12

## 2023-02-05 NOTE — ASSESSMENT & PLAN NOTE
Stable - cont benazepril (consider switch to longer-acting ARB? Consider ARB-amlodipine combo tab?) cont monitor   (consider check PTHi- vit D? - both  last checked 6 yrs ago)

## 2023-02-05 NOTE — ASSESSMENT & PLAN NOTE
Enrolled w digital medicine - home BP's at goal - cont current Rx - Tolerating metformin XR 500mg QD - in agreement w increase to BID  - cont work toward goal of 10,000 steps daily

## 2023-02-09 DIAGNOSIS — Z00.00 ENCOUNTER FOR MEDICARE ANNUAL WELLNESS EXAM: ICD-10-CM

## 2023-02-10 ENCOUNTER — LAB VISIT (OUTPATIENT)
Dept: LAB | Facility: HOSPITAL | Age: 73
End: 2023-02-10
Attending: NURSE PRACTITIONER
Payer: MEDICARE

## 2023-02-10 ENCOUNTER — OFFICE VISIT (OUTPATIENT)
Dept: PRIMARY CARE CLINIC | Facility: CLINIC | Age: 73
End: 2023-02-10
Payer: MEDICARE

## 2023-02-10 VITALS
HEIGHT: 64 IN | DIASTOLIC BLOOD PRESSURE: 64 MMHG | BODY MASS INDEX: 31.41 KG/M2 | HEART RATE: 68 BPM | WEIGHT: 184 LBS | TEMPERATURE: 98 F | OXYGEN SATURATION: 98 % | SYSTOLIC BLOOD PRESSURE: 133 MMHG

## 2023-02-10 DIAGNOSIS — Z78.9 STATIN INTOLERANCE: ICD-10-CM

## 2023-02-10 DIAGNOSIS — I51.89 DIASTOLIC DYSFUNCTION: ICD-10-CM

## 2023-02-10 DIAGNOSIS — E11.40 CONTROLLED TYPE 2 DIABETES MELLITUS WITH DIABETIC NEUROPATHY, WITHOUT LONG-TERM CURRENT USE OF INSULIN: ICD-10-CM

## 2023-02-10 DIAGNOSIS — R20.2 PARESTHESIA OF SKIN: ICD-10-CM

## 2023-02-10 DIAGNOSIS — E11.59 HYPERTENSION ASSOCIATED WITH DIABETES: Chronic | ICD-10-CM

## 2023-02-10 DIAGNOSIS — I15.2 HYPERTENSION ASSOCIATED WITH DIABETES: Chronic | ICD-10-CM

## 2023-02-10 DIAGNOSIS — Z12.31 ENCOUNTER FOR SCREENING MAMMOGRAM FOR MALIGNANT NEOPLASM OF BREAST: ICD-10-CM

## 2023-02-10 DIAGNOSIS — N25.81 SECONDARY HYPERPARATHYROIDISM OF RENAL ORIGIN: ICD-10-CM

## 2023-02-10 DIAGNOSIS — N18.31 STAGE 3A CHRONIC KIDNEY DISEASE: Chronic | ICD-10-CM

## 2023-02-10 DIAGNOSIS — E27.8 ADRENAL NODULE: ICD-10-CM

## 2023-02-10 DIAGNOSIS — N18.31 TYPE 2 DIABETES MELLITUS WITH STAGE 3A CHRONIC KIDNEY DISEASE, WITHOUT LONG-TERM CURRENT USE OF INSULIN: ICD-10-CM

## 2023-02-10 DIAGNOSIS — Z00.00 ENCOUNTER FOR PREVENTIVE HEALTH EXAMINATION: ICD-10-CM

## 2023-02-10 DIAGNOSIS — E04.2 MULTIPLE THYROID NODULES: Chronic | ICD-10-CM

## 2023-02-10 DIAGNOSIS — G62.9 POLYNEUROPATHY: ICD-10-CM

## 2023-02-10 DIAGNOSIS — E66.9 OBESITY (BMI 30.0-34.9): Chronic | ICD-10-CM

## 2023-02-10 DIAGNOSIS — G62.9 POLYNEUROPATHY: Primary | ICD-10-CM

## 2023-02-10 DIAGNOSIS — E11.69 COMBINED HYPERLIPIDEMIA ASSOCIATED WITH TYPE 2 DIABETES MELLITUS: Chronic | ICD-10-CM

## 2023-02-10 DIAGNOSIS — E11.22 TYPE 2 DIABETES MELLITUS WITH STAGE 3A CHRONIC KIDNEY DISEASE, WITHOUT LONG-TERM CURRENT USE OF INSULIN: ICD-10-CM

## 2023-02-10 DIAGNOSIS — E78.2 COMBINED HYPERLIPIDEMIA ASSOCIATED WITH TYPE 2 DIABETES MELLITUS: Chronic | ICD-10-CM

## 2023-02-10 DIAGNOSIS — R91.8 PULMONARY NODULES: Chronic | ICD-10-CM

## 2023-02-10 LAB
25(OH)D3+25(OH)D2 SERPL-MCNC: 50 NG/ML (ref 30–96)
ALBUMIN/CREAT UR: 11.4 UG/MG (ref 0–30)
CREAT UR-MCNC: 228 MG/DL (ref 15–325)
ESTIMATED AVG GLUCOSE: 134 MG/DL (ref 68–131)
HBA1C MFR BLD: 6.3 % (ref 4–5.6)
HCYS SERPL-SCNC: 6.8 UMOL/L (ref 4–15.5)
MICROALBUMIN UR DL<=1MG/L-MCNC: 26 UG/ML
PHOSPHATE SERPL-MCNC: 3.5 MG/DL (ref 2.7–4.5)
PTH-INTACT SERPL-MCNC: 61 PG/ML (ref 9–77)
VIT B12 SERPL-MCNC: 785 PG/ML (ref 210–950)

## 2023-02-10 PROCEDURE — 3288F PR FALLS RISK ASSESSMENT DOCUMENTED: ICD-10-PCS | Mod: HCNC,CPTII,S$GLB, | Performed by: NURSE PRACTITIONER

## 2023-02-10 PROCEDURE — 83970 ASSAY OF PARATHORMONE: CPT | Mod: HCNC | Performed by: NURSE PRACTITIONER

## 2023-02-10 PROCEDURE — 84100 ASSAY OF PHOSPHORUS: CPT | Mod: HCNC | Performed by: NURSE PRACTITIONER

## 2023-02-10 PROCEDURE — 83921 ORGANIC ACID SINGLE QUANT: CPT | Mod: HCNC | Performed by: NURSE PRACTITIONER

## 2023-02-10 PROCEDURE — 83036 HEMOGLOBIN GLYCOSYLATED A1C: CPT | Mod: HCNC | Performed by: NURSE PRACTITIONER

## 2023-02-10 PROCEDURE — 1101F PR PT FALLS ASSESS DOC 0-1 FALLS W/OUT INJ PAST YR: ICD-10-PCS | Mod: HCNC,CPTII,S$GLB, | Performed by: NURSE PRACTITIONER

## 2023-02-10 PROCEDURE — 99999 PR PBB SHADOW E&M-EST. PATIENT-LVL IV: CPT | Mod: PBBFAC,HCNC,, | Performed by: NURSE PRACTITIONER

## 2023-02-10 PROCEDURE — 3078F PR MOST RECENT DIASTOLIC BLOOD PRESSURE < 80 MM HG: ICD-10-PCS | Mod: HCNC,CPTII,S$GLB, | Performed by: NURSE PRACTITIONER

## 2023-02-10 PROCEDURE — 99999 PR PBB SHADOW E&M-EST. PATIENT-LVL IV: ICD-10-PCS | Mod: PBBFAC,HCNC,, | Performed by: NURSE PRACTITIONER

## 2023-02-10 PROCEDURE — 82570 ASSAY OF URINE CREATININE: CPT | Mod: HCNC | Performed by: NURSE PRACTITIONER

## 2023-02-10 PROCEDURE — G0439 PR MEDICARE ANNUAL WELLNESS SUBSEQUENT VISIT: ICD-10-PCS | Mod: HCNC,S$GLB,, | Performed by: NURSE PRACTITIONER

## 2023-02-10 PROCEDURE — 82607 VITAMIN B-12: CPT | Mod: HCNC | Performed by: NURSE PRACTITIONER

## 2023-02-10 PROCEDURE — 1170F FXNL STATUS ASSESSED: CPT | Mod: HCNC,CPTII,S$GLB, | Performed by: NURSE PRACTITIONER

## 2023-02-10 PROCEDURE — 3075F PR MOST RECENT SYSTOLIC BLOOD PRESS GE 130-139MM HG: ICD-10-PCS | Mod: HCNC,CPTII,S$GLB, | Performed by: NURSE PRACTITIONER

## 2023-02-10 PROCEDURE — 3075F SYST BP GE 130 - 139MM HG: CPT | Mod: HCNC,CPTII,S$GLB, | Performed by: NURSE PRACTITIONER

## 2023-02-10 PROCEDURE — 3078F DIAST BP <80 MM HG: CPT | Mod: HCNC,CPTII,S$GLB, | Performed by: NURSE PRACTITIONER

## 2023-02-10 PROCEDURE — 3008F BODY MASS INDEX DOCD: CPT | Mod: HCNC,CPTII,S$GLB, | Performed by: NURSE PRACTITIONER

## 2023-02-10 PROCEDURE — 1101F PT FALLS ASSESS-DOCD LE1/YR: CPT | Mod: HCNC,CPTII,S$GLB, | Performed by: NURSE PRACTITIONER

## 2023-02-10 PROCEDURE — 1170F PR FUNCTIONAL STATUS ASSESSED: ICD-10-PCS | Mod: HCNC,CPTII,S$GLB, | Performed by: NURSE PRACTITIONER

## 2023-02-10 PROCEDURE — 36415 COLL VENOUS BLD VENIPUNCTURE: CPT | Mod: HCNC,PO | Performed by: NURSE PRACTITIONER

## 2023-02-10 PROCEDURE — 3008F PR BODY MASS INDEX (BMI) DOCUMENTED: ICD-10-PCS | Mod: HCNC,CPTII,S$GLB, | Performed by: NURSE PRACTITIONER

## 2023-02-10 PROCEDURE — 82306 VITAMIN D 25 HYDROXY: CPT | Mod: HCNC | Performed by: NURSE PRACTITIONER

## 2023-02-10 PROCEDURE — 1159F PR MEDICATION LIST DOCUMENTED IN MEDICAL RECORD: ICD-10-PCS | Mod: HCNC,CPTII,S$GLB, | Performed by: NURSE PRACTITIONER

## 2023-02-10 PROCEDURE — 83090 ASSAY OF HOMOCYSTEINE: CPT | Mod: HCNC | Performed by: NURSE PRACTITIONER

## 2023-02-10 PROCEDURE — G0439 PPPS, SUBSEQ VISIT: HCPCS | Mod: HCNC,S$GLB,, | Performed by: NURSE PRACTITIONER

## 2023-02-10 PROCEDURE — 1159F MED LIST DOCD IN RCRD: CPT | Mod: HCNC,CPTII,S$GLB, | Performed by: NURSE PRACTITIONER

## 2023-02-10 PROCEDURE — 3288F FALL RISK ASSESSMENT DOCD: CPT | Mod: HCNC,CPTII,S$GLB, | Performed by: NURSE PRACTITIONER

## 2023-02-10 NOTE — PATIENT INSTRUCTIONS
Counseling and Referral of Other Preventative  (Italic type indicates deductible and co-insurance are waived)    Patient Name: Kristyn Garza  Today's Date: 2/10/2023    Health Maintenance         Date Due Completion Date    Shingles Vaccine (2 of 3) 04/20/2011 2/23/2011    TETANUS VACCINE 02/23/2021 2/23/2011    COVID-19 Vaccine (4 - Booster for Moderna series) 02/09/2022 12/15/2021    Diabetes Urine Screening 04/19/2023 4/19/2022    Hemoglobin A1c 04/19/2023 10/19/2022    Mammogram 05/27/2023 5/27/2022    Override on 3/13/2013: Done    Foot Exam 10/19/2023 10/19/2022    Override on 9/23/2015: Done    Override on 7/16/2015: Done    Override on 3/23/2015: Done    Override on 12/3/2014: Done    Override on 4/29/2014: Done    Lipid Panel 11/10/2023 11/10/2022    LDCT Lung Screen 12/09/2023 12/9/2022    Eye Exam 01/30/2024 1/30/2023    Override on 7/20/2020: Done    Override on 4/30/2019: Done    Override on 4/24/2018: Done    Override on 5/17/2017: Done    Override on 5/16/2016: Done    Override on 5/13/2015: Done    Colorectal Cancer Screening 03/18/2024 3/18/2014    Override on 6/1/2006: Done    DEXA Scan 05/26/2026 5/26/2021    Override on 3/10/2011: Done (normal repeat in 5 years)          Orders Placed This Encounter   Procedures    VITAMIN B12    METHYLMALONIC ACID, SERUM    Homocysteine, Serum     The following information is provided to all patients.  This information is to help you find resources for any of the problems found today that may be affecting your health:                Living healthy guide: www.[facility].[place].gov      Understanding Diabetes: www.diabetes.org      Eating healthy: www.cdc.gov/healthyweight      CDC home safety checklist: www.cdc.gov/steadi/patient.html      Agency on Aging: www.goea.[place].gov      Alcoholics anonymous (AA): www.aa.org      Physical Activity: www.barney.nih.gov/et3oiyj      Tobacco use: www.quitwithusla.org           Discuss Shingrix and COVID vaccine with [facility]. I believe they can administer.

## 2023-02-10 NOTE — ASSESSMENT & PLAN NOTE
Lab Results   Component Value Date    TSH 0.520 11/10/2022     U/S unchanged 5/22. Continue surveillance.

## 2023-02-10 NOTE — ASSESSMENT & PLAN NOTE
Lab Results   Component Value Date    HGBA1C 6.4 (H) 10/19/2022     BP Readings from Last 3 Encounters:   02/10/23 133/64   01/17/23 134/62   12/02/22 122/70     Both are controlled. Continue POC

## 2023-02-10 NOTE — ASSESSMENT & PLAN NOTE
Lab Results   Component Value Date    HGBA1C 6.4 (H) 10/19/2022     Lab Results   Component Value Date    LDLCALC 96.6 11/10/2022   Controlled. Continue POC.

## 2023-02-10 NOTE — PROGRESS NOTES
"  Kristyn Garza presented for a follow-up Medicare AWV today. The following components were reviewed and updated:    Medical history  Family History  Social history  Allergies and Current Medications  Health Risk Assessment  Health Maintenance  Care Team    **See Completed Assessments for Annual Wellness visit with in the encounter summary    The following assessments were completed:  Depression Screening  Cognitive function Screening  Timed Get Up Test  Whisper Test  Nutrition Screening  PAQ      Vitals:    02/10/23 0819   BP: 133/64   Pulse: 68   Temp: 97.8 °F (36.6 °C)   TempSrc: Oral   SpO2: 98%   Weight: 83.5 kg (184 lb)   Height: 5' 4" (1.626 m)     Body mass index is 31.58 kg/m².     B12    Review of Systems   Constitutional:  Negative for activity change, appetite change, fatigue and unexpected weight change.   HENT:  Negative for nasal congestion, dental problem, rhinorrhea and sinus pressure/congestion.    Respiratory:  Negative for chest tightness, shortness of breath and wheezing.    Cardiovascular:  Negative for chest pain, palpitations and leg swelling.   Gastrointestinal:  Positive for change in bowel habit, constipation (decreased frequency since starting metformin.) and change in bowel habit. Negative for anal bleeding, blood in stool and nausea.   Genitourinary:  Negative for bladder incontinence, difficulty urinating, dysuria, frequency and urgency.   Musculoskeletal:  Negative for myalgias.   Neurological:  Negative for dizziness, light-headedness and headaches.    Physical Exam     Advance Care Planning     Date: 02/10/2023  . Two adult sons, Dale and Alli Garza. She would want them to share medical decision making on her behalf if she were unable to do so. She does not have any additional advanced directive and is considering what her ultimate wishes would be.            Health Maintenance         Date Due Completion Date    Shingles Vaccine (2 of 3) 04/20/2011 2/23/2011    TETANUS " VACCINE 02/23/2021 2/23/2011    COVID-19 Vaccine (4 - Booster for Moderna series) 02/09/2022 12/15/2021    Diabetes Urine Screening 04/19/2023 4/19/2022    Hemoglobin A1c 04/19/2023 10/19/2022    Mammogram 05/27/2023 5/27/2022    Override on 3/13/2013: Done    Foot Exam 10/19/2023 10/19/2022    Override on 9/23/2015: Done    Override on 7/16/2015: Done    Override on 3/23/2015: Done    Override on 12/3/2014: Done    Override on 4/29/2014: Done    Lipid Panel 11/10/2023 11/10/2022    LDCT Lung Screen 12/09/2023 12/9/2022    Eye Exam 01/30/2024 1/30/2023    Override on 7/20/2020: Done    Override on 4/30/2019: Done    Override on 4/24/2018: Done    Override on 5/17/2017: Done    Override on 5/16/2016: Done    Override on 5/13/2015: Done    Colorectal Cancer Screening 03/18/2024 3/18/2014    Override on 6/1/2006: Done    DEXA Scan 05/26/2026 5/26/2021    Override on 3/10/2011: Done (normal repeat in 5 years)          Interested in COVID and Shingrex vaccines - instructions provided.         Diagnoses and health risks identified today and associated recommendations/orders:  Combined hyperlipidemia associated with type 2 diabetes mellitus  Lab Results   Component Value Date    HGBA1C 6.4 (H) 10/19/2022     Lab Results   Component Value Date    LDLCALC 96.6 11/10/2022   Controlled. Continue POC.        Pulmonary nodules  Repeat CT chest in approx 6 months.     Hypertension associated with diabetes  Lab Results   Component Value Date    HGBA1C 6.4 (H) 10/19/2022     BP Readings from Last 3 Encounters:   02/10/23 133/64   01/17/23 134/62   12/02/22 122/70     Both are controlled. Continue POC    Controlled type 2 diabetes mellitus with diabetic neuropathy  Check B12, MMA, homocysteine.   DMII is controlled.       Statin intolerance  Lab Results   Component Value Date    LDLCALC 96.6 11/10/2022   LDL at goal. Monitor.       Adrenal nodule  Continue surveillence    Obesity (BMI 30.0-34.9)  Encourage diet,  activity.    Multiple thyroid nodules  Lab Results   Component Value Date    TSH 0.520 11/10/2022     U/S unchanged 5/22. Continue surveillance.    CKD (chronic kidney disease) stage 3, GFR 30-59 ml/min  Continue HTN and DMII mgmt. Monitor.    Diastolic dysfunction  No sx today.  Continue HTN mgmt.       Problem List Items Addressed This Visit          Pulmonary    Pulmonary nodules (Chronic)     Repeat CT chest in approx 6 months.             Cardiac/Vascular    Hypertension associated with diabetes (Chronic)     Lab Results   Component Value Date    HGBA1C 6.4 (H) 10/19/2022     BP Readings from Last 3 Encounters:   02/10/23 133/64   01/17/23 134/62   12/02/22 122/70   Both are controlled. Continue POC         Combined hyperlipidemia associated with type 2 diabetes mellitus (Chronic)     Lab Results   Component Value Date    HGBA1C 6.4 (H) 10/19/2022     Lab Results   Component Value Date    LDLCALC 96.6 11/10/2022   Controlled. Continue POC.             Diastolic dysfunction     No sx today.  Continue HTN mgmt.            Renal/    CKD (chronic kidney disease) stage 3, GFR 30-59 ml/min (Chronic)     Continue HTN and DMII mgmt. Monitor.            Endocrine    Obesity (BMI 30.0-34.9) (Chronic)     Encourage diet, activity.         Multiple thyroid nodules (Chronic)     Lab Results   Component Value Date    TSH 0.520 11/10/2022   U/S unchanged 5/22. Continue surveillance.         Adrenal nodule (Chronic)     Continue surveillence         Relevant Orders    PTH, intact    Vitamin D    PHOSPHORUS    Controlled type 2 diabetes mellitus with diabetic neuropathy     Check B12, MMA, homocysteine.   DMII is controlled.            Type 2 diabetes mellitus with kidney complication, without long-term current use of insulin    Relevant Orders    Microalbumin/Creatinine Ratio, Urine    Hemoglobin A1C    Secondary hyperparathyroidism of renal origin    Relevant Orders    PTH, intact    Vitamin D    PHOSPHORUS       Other     Statin intolerance     Lab Results   Component Value Date    LDLCALC 96.6 11/10/2022   LDL at goal. Monitor.             Other Visit Diagnoses       Polyneuropathy    -  Primary    Relevant Orders    VITAMIN B12    METHYLMALONIC ACID, SERUM    Homocysteine, Serum    Paresthesia of skin        Relevant Orders    VITAMIN B12    Encounter for preventive health examination        Encounter for screening mammogram for malignant neoplasm of breast        Relevant Orders    Mammo Digital Screening Bilat w/ [facility]            Provided Kristyn with a 5-10 year written screening schedule and personal prevention plan. Recommendations were developed using the USPSTF age appropriate recommendations. Education, counseling, and referrals were provided as needed.  After Visit Summary printed and given to patient which includes a list of additional screenings\tests needed.    Follow up if symptoms worsen or fail to improve.      Cuca Beatty, ISABELA Beatty, OLLIEP-C  I offered to discuss advanced care planning, including how to pick a person who would make decisions for you if you were unable to make them for yourself, called a health care power of , and what kind of decisions you might make such as use of life sustaining treatments such as ventilators and tube feeding when faced with a life limiting illness recorded on a living will that they will need to know. (How you want to be cared for as you near the end of your natural life)     X Patient is interested in learning more about how to make advanced directives.  I provided them paperwork and offered to discuss this with them.

## 2023-02-10 NOTE — PROGRESS NOTES
Kristyn Garza presented for {[facility]B MEDICARE AWV INITIAL/FOLLOWUP:84661} Medicare AWV today. The following components were reviewed and updated:    Medical history  Family History  Social history  Allergies and Current Medications  Health Risk Assessment  Health Maintenance  Care Team    **See Completed Assessments for Annual Wellness visit with in the encounter summary    The following assessments were completed:  Depression Screening  Cognitive function Screening  Timed Get Up Test  Whisper Test  Nutrition Screening  PAQ      There were no vitals filed for this visit.  There is no height or weight on file to calculate BMI.         Review of Systems   Physical Exam       Health Maintenance         Date Due Completion Date    Shingles Vaccine (2 of 3) 04/20/2011 2/23/2011    TETANUS VACCINE 02/23/2021 2/23/2011    COVID-19 Vaccine (4 - Booster for Moderna series) 02/09/2022 12/15/2021    Diabetes Urine Screening 04/19/2023 4/19/2022    Hemoglobin A1c 04/19/2023 10/19/2022    Mammogram 05/27/2023 5/27/2022    Override on 3/13/2013: Done    Foot Exam 10/19/2023 10/19/2022    Override on 9/23/2015: Done    Override on 7/16/2015: Done    Override on 3/23/2015: Done    Override on 12/3/2014: Done    Override on 4/29/2014: Done    Lipid Panel 11/10/2023 11/10/2022    LDCT Lung Screen 12/09/2023 12/9/2022    Eye Exam 01/30/2024 1/30/2023    Override on 7/20/2020: Done    Override on 4/30/2019: Done    Override on 4/24/2018: Done    Override on 5/17/2017: Done    Override on 5/16/2016: Done    Override on 5/13/2015: Done    Colorectal Cancer Screening 03/18/2024 3/18/2014    Override on 6/1/2006: Done    DEXA Scan 05/26/2026 5/26/2021    Override on 3/10/2011: Done (normal repeat in 5 years)            Diagnoses and health risks identified today and associated recommendations/orders:  No problem-specific Assessment & Plan notes found for this encounter.       Problem List Items Addressed This Visit    None      Provided  Kristyn with a 5-10 year written screening schedule and personal prevention plan. Recommendations were developed using the USPSTF age appropriate recommendations. Education, counseling, and referrals were provided as needed.  After Visit Summary printed and given to patient which includes a list of additional screenings\tests needed.    No follow-ups on file.      Rita Beatty, OLLIEP-C

## 2023-02-14 LAB — METHYLMALONATE SERPL-SCNC: 0.1 UMOL/L

## 2023-02-22 ENCOUNTER — SPECIALTY PHARMACY (OUTPATIENT)
Dept: PHARMACY | Facility: CLINIC | Age: 73
End: 2023-02-22

## 2023-02-22 NOTE — TELEPHONE ENCOUNTER
Specialty Pharmacy - Refill Coordination    Specialty Medication Orders Linked to Encounter      Flowsheet Row Most Recent Value   Medication #1 evolocumab (REPATHA SURECLICK) 140 mg/mL PnIj (Order#[id number], Rx#[id number])            Refill Questions - Documented Responses      Flowsheet Row Most Recent Value   Patient Availability and HIPAA Verification    Does patient want to proceed with activity? Yes   HIPAA/medical authority confirmed? Yes   Relationship to patient of person spoken to? Self   Refill Screening Questions    Would patient like to speak to a pharmacist? No   When does the patient need to receive the medication? 03/01/23   Refill Delivery Questions    How will the patient receive the medication? MEDRx   When does the patient need to receive the medication? 03/01/23   Shipping Address Home   Address in [facility] confirmed and updated if neccessary? Yes   Expected Copay ($) 0   Is the patient able to afford the medication copay? Yes   Payment Method zero copay   Days supply of Refill 28   Supplies needed? No supplies needed   Refill activity completed? Yes   Refill activity plan Refill scheduled   Shipment/Pickup Date: 02/23/23            Current Outpatient Medications   Medication Sig    ALPRAZolam (XANAX) 0.5 MG tablet Take 1 tablet (0.5 mg total) by mouth daily as needed for Anxiety.    amLODIPine (NORVASC) 10 MG tablet TAKE 1 TABLET BY MOUTH EVERY DAY    aspirin 81 MG Chew Take 81 mg by mouth once daily.    benazepriL (LOTENSIN) 40 MG tablet TAKE 1 TABLET BY MOUTH EVERY DAY    blood glucose control, normal Soln 1 Bottle by Misc.(Non-Drug; Combo Route) route once daily. For Accu Check Amber  use twice daily prn dx: E11.9    blood-glucose meter (ACCU-CHEK AMBER) Misc 1 kit by Misc.(Non-Drug; Combo Route) route once. For Accu Check Amber  use twice daily prn dx: E11.9 for 1 dose    calcium-vitamin D3 500 mg(1,250mg) -200 unit per tablet Take 1 tablet by mouth once daily.     CRANBERRY EXTRACT  ORAL Take 1 tablet by mouth once daily.    evolocumab (REPATHA SURECLICK) 140 mg/mL PnIj Inject 1 mL (140 mg total) into the skin every 14 (fourteen) days.    FLUZONE HIGHDOSE QUAD 22-23  mcg/0.7 mL Syrg     lancets Misc 1 lancet by Misc.(Non-Drug; Combo Route) route 3 (three) times daily. For Acc-Chek Irais   DX :E11.9    loratadine (CLARITIN) 10 mg tablet Take 1 tablet (10 mg total) by mouth once daily.    meclizine (ANTIVERT) 25 mg tablet Take 1 tablet (25 mg total) by mouth 3 (three) times daily as needed for Dizziness or Nausea.    metFORMIN (GLUCOPHAGE-XR) 500 MG ER 24hr tablet With protein (low or no-carb) twice daily    metoprolol succinate (TOPROL-XL) 25 MG 24 hr tablet Take 1 tablet (25 mg total) by mouth once daily.    MULTIVITAMIN W-MINERALS/LUTEIN (CENTRUM SILVER ORAL) Take 1 tablet by mouth once daily.    spironolactone (ALDACTONE) 50 MG tablet Take 1 tablet (50 mg total) by mouth once daily.    triamcinolone (NASACORT) 55 mcg nasal inhaler USE 2 SPRAYS BY NASAL ROUTE ONCE DAILY   Last reviewed on 2/10/2023  8:20 AM by Lesa Cameron MA    Review of patient's allergies indicates:   Allergen Reactions    Statins-hmg-coa reductase inhibitors      Muscle Cramps    Last reviewed on  2/10/2023 8:19 AM by Lesa Cameron      Tasks added this encounter   3/22/2023 - Refill Call (Auto Added)   Tasks due within next 3 months   No tasks due.     Marcia Ontiveros, Patient Care Assistant  [facility]  [address] [place] [id number]  Phone: [phone]  Fax: [phone]

## 2023-03-22 ENCOUNTER — SPECIALTY PHARMACY (OUTPATIENT)
Dept: PHARMACY | Facility: CLINIC | Age: 73
End: 2023-03-22
Payer: MEDICARE

## 2023-03-22 NOTE — TELEPHONE ENCOUNTER
3/22 WWB  Refill attempt, next injection on 3/31. Will route to [facility] to updated tata for repatha.

## 2023-03-22 NOTE — TELEPHONE ENCOUNTER
Misael theodore, ROB loaded to [facility].    Specialty Pharmacy - Refill Coordination    Specialty Medication Orders Linked to Encounter      Flowsheet Row Most Recent Value   Medication #1 evolocumab (REPATHA SURECLICK) 140 mg/mL PnIj (Order#[id number], Rx#[id number])            Refill Questions - Documented Responses      Flowsheet Row Most Recent Value   Patient Availability and HIPAA Verification    Does patient want to proceed with activity? Yes   HIPAA/medical authority confirmed? Yes   Relationship to patient of person spoken to? Self   Refill Screening Questions    Would patient like to speak to a pharmacist? No   When does the patient need to receive the medication? 04/01/23   Refill Delivery Questions    How will the patient receive the medication? MEDRx   When does the patient need to receive the medication? 04/01/23   Shipping Address Home   Address in [facility] confirmed and updated if neccessary? Yes   Expected Copay ($) 0   Is the patient able to afford the medication copay? Yes   Payment Method zero copay   Days supply of Refill 28   Supplies needed? No supplies needed   Refill activity completed? Yes   Refill activity plan Refill scheduled   Shipment/Pickup Date: 03/23/23            Current Outpatient Medications   Medication Sig    ALPRAZolam (XANAX) 0.5 MG tablet Take 1 tablet (0.5 mg total) by mouth daily as needed for Anxiety.    amLODIPine (NORVASC) 10 MG tablet TAKE 1 TABLET BY MOUTH EVERY DAY    aspirin 81 MG Chew Take 81 mg by mouth once daily.    benazepriL (LOTENSIN) 40 MG tablet Take 1 tablet (40 mg total) by mouth once daily.    blood glucose control, normal Soln 1 Bottle by Misc.(Non-Drug; Combo Route) route once daily. For Accu Check Amber  use twice daily prn dx: E11.9    blood-glucose meter (ACCU-CHEK AMBER) Misc 1 kit by Misc.(Non-Drug; Combo Route) route once. For Accu Check Amber  use twice daily prn dx: E11.9 for 1 dose    calcium-vitamin D3 500 mg(1,250mg) -200 unit per tablet Take  1 tablet by mouth once daily.     CRANBERRY EXTRACT ORAL Take 1 tablet by mouth once daily.    evolocumab (REPATHA SURECLICK) 140 mg/mL PnIj Inject 1 mL (140 mg total) into the skin every 14 (fourteen) days.    FLUZONE HIGHDOSE QUAD 22-23  mcg/0.7 mL Syrg     lancets Misc 1 lancet by Misc.(Non-Drug; Combo Route) route 3 (three) times daily. For Acc-Chek Irais   DX :E11.9    loratadine (CLARITIN) 10 mg tablet TAKE 1 TABLET BY MOUTH EVERY DAY    meclizine (ANTIVERT) 25 mg tablet Take 1 tablet (25 mg total) by mouth 3 (three) times daily as needed for Dizziness or Nausea.    metFORMIN (GLUCOPHAGE-XR) 500 MG ER 24hr tablet With protein (low or no-carb) twice daily    metoprolol succinate (TOPROL-XL) 25 MG 24 hr tablet TAKE 1 TABLET BY MOUTH EVERY DAY    MULTIVITAMIN W-MINERALS/LUTEIN (CENTRUM SILVER ORAL) Take 1 tablet by mouth once daily.    spironolactone (ALDACTONE) 50 MG tablet Take 1 tablet (50 mg total) by mouth once daily.    triamcinolone (NASACORT) 55 mcg nasal inhaler USE 2 SPRAYS BY NASAL ROUTE ONCE DAILY   Last reviewed on 2/10/2023  8:20 AM by Lesa Cameron MA    Review of patient's allergies indicates:   Allergen Reactions    Statins-hmg-coa reductase inhibitors      Muscle Cramps    Last reviewed on  2/10/2023 8:19 AM by Lesa Cameron      Tasks added this encounter   4/22/2023 - Refill Call (Auto Added)   Tasks due within next 3 months   No tasks due.     Carrie Rendon, PharmD  [facility]  [address] [place] [id number]  Phone: [phone]  Fax: [phone]

## 2023-04-11 ENCOUNTER — PATIENT MESSAGE (OUTPATIENT)
Dept: ADMINISTRATIVE | Facility: OTHER | Age: 73
End: 2023-04-11
Payer: MEDICARE

## 2023-04-25 ENCOUNTER — LAB VISIT (OUTPATIENT)
Dept: LAB | Facility: HOSPITAL | Age: 73
End: 2023-04-25
Attending: INTERNAL MEDICINE
Payer: MEDICARE

## 2023-04-25 DIAGNOSIS — I10 PRIMARY HYPERTENSION: ICD-10-CM

## 2023-04-25 LAB
ALBUMIN SERPL BCP-MCNC: 3.7 G/DL (ref 3.5–5.2)
ANION GAP SERPL CALC-SCNC: 6 MMOL/L (ref 8–16)
BUN SERPL-MCNC: 13 MG/DL (ref 8–23)
CALCIUM SERPL-MCNC: 9.4 MG/DL (ref 8.7–10.5)
CHLORIDE SERPL-SCNC: 104 MMOL/L (ref 95–110)
CO2 SERPL-SCNC: 33 MMOL/L (ref 23–29)
CREAT SERPL-MCNC: 0.9 MG/DL (ref 0.5–1.4)
EST. GFR  (NO RACE VARIABLE): >60 ML/MIN/1.73 M^2
GLUCOSE SERPL-MCNC: 108 MG/DL (ref 70–110)
PHOSPHATE SERPL-MCNC: 3.1 MG/DL (ref 2.7–4.5)
POTASSIUM SERPL-SCNC: 3.5 MMOL/L (ref 3.5–5.1)
SODIUM SERPL-SCNC: 143 MMOL/L (ref 136–145)

## 2023-04-25 PROCEDURE — 36415 COLL VENOUS BLD VENIPUNCTURE: CPT | Performed by: INTERNAL MEDICINE

## 2023-04-25 PROCEDURE — 80069 RENAL FUNCTION PANEL: CPT | Performed by: INTERNAL MEDICINE

## 2023-04-26 ENCOUNTER — OFFICE VISIT (OUTPATIENT)
Dept: PRIMARY CARE CLINIC | Facility: CLINIC | Age: 73
End: 2023-04-26
Payer: MEDICARE

## 2023-04-26 ENCOUNTER — SPECIALTY PHARMACY (OUTPATIENT)
Dept: PHARMACY | Facility: CLINIC | Age: 73
End: 2023-04-26
Payer: MEDICARE

## 2023-04-26 VITALS
OXYGEN SATURATION: 98 % | HEART RATE: 64 BPM | WEIGHT: 179.88 LBS | BODY MASS INDEX: 30.71 KG/M2 | DIASTOLIC BLOOD PRESSURE: 71 MMHG | SYSTOLIC BLOOD PRESSURE: 132 MMHG | HEIGHT: 64 IN | TEMPERATURE: 98 F

## 2023-04-26 DIAGNOSIS — D64.9 ANEMIA, UNSPECIFIED TYPE: ICD-10-CM

## 2023-04-26 DIAGNOSIS — Z79.4 TYPE 2 DIABETES MELLITUS WITHOUT COMPLICATION, WITH LONG-TERM CURRENT USE OF INSULIN: ICD-10-CM

## 2023-04-26 DIAGNOSIS — E05.90 HYPERTHYROIDISM: ICD-10-CM

## 2023-04-26 DIAGNOSIS — E78.2 COMBINED HYPERLIPIDEMIA ASSOCIATED WITH TYPE 2 DIABETES MELLITUS: Primary | Chronic | ICD-10-CM

## 2023-04-26 DIAGNOSIS — E11.9 TYPE 2 DIABETES MELLITUS WITHOUT COMPLICATION, WITH LONG-TERM CURRENT USE OF INSULIN: ICD-10-CM

## 2023-04-26 DIAGNOSIS — E11.69 COMBINED HYPERLIPIDEMIA ASSOCIATED WITH TYPE 2 DIABETES MELLITUS: Primary | Chronic | ICD-10-CM

## 2023-04-26 PROCEDURE — 3066F NEPHROPATHY DOC TX: CPT | Mod: CPTII,S$GLB,, | Performed by: INTERNAL MEDICINE

## 2023-04-26 PROCEDURE — 3288F PR FALLS RISK ASSESSMENT DOCUMENTED: ICD-10-PCS | Mod: CPTII,S$GLB,, | Performed by: INTERNAL MEDICINE

## 2023-04-26 PROCEDURE — 3061F PR NEG MICROALBUMINURIA RESULT DOCUMENTED/REVIEW: ICD-10-PCS | Mod: CPTII,S$GLB,, | Performed by: INTERNAL MEDICINE

## 2023-04-26 PROCEDURE — 1126F AMNT PAIN NOTED NONE PRSNT: CPT | Mod: CPTII,S$GLB,, | Performed by: INTERNAL MEDICINE

## 2023-04-26 PROCEDURE — 3075F SYST BP GE 130 - 139MM HG: CPT | Mod: CPTII,S$GLB,, | Performed by: INTERNAL MEDICINE

## 2023-04-26 PROCEDURE — 99215 PR OFFICE/OUTPT VISIT, EST, LEVL V, 40-54 MIN: ICD-10-PCS | Mod: S$GLB,,, | Performed by: INTERNAL MEDICINE

## 2023-04-26 PROCEDURE — 3078F PR MOST RECENT DIASTOLIC BLOOD PRESSURE < 80 MM HG: ICD-10-PCS | Mod: CPTII,S$GLB,, | Performed by: INTERNAL MEDICINE

## 2023-04-26 PROCEDURE — 3044F PR MOST RECENT HEMOGLOBIN A1C LEVEL <7.0%: ICD-10-PCS | Mod: CPTII,S$GLB,, | Performed by: INTERNAL MEDICINE

## 2023-04-26 PROCEDURE — 3061F NEG MICROALBUMINURIA REV: CPT | Mod: CPTII,S$GLB,, | Performed by: INTERNAL MEDICINE

## 2023-04-26 PROCEDURE — 99215 OFFICE O/P EST HI 40 MIN: CPT | Mod: S$GLB,,, | Performed by: INTERNAL MEDICINE

## 2023-04-26 PROCEDURE — 3044F HG A1C LEVEL LT 7.0%: CPT | Mod: CPTII,S$GLB,, | Performed by: INTERNAL MEDICINE

## 2023-04-26 PROCEDURE — 3075F PR MOST RECENT SYSTOLIC BLOOD PRESS GE 130-139MM HG: ICD-10-PCS | Mod: CPTII,S$GLB,, | Performed by: INTERNAL MEDICINE

## 2023-04-26 PROCEDURE — 99999 PR PBB SHADOW E&M-EST. PATIENT-LVL IV: ICD-10-PCS | Mod: PBBFAC,,, | Performed by: INTERNAL MEDICINE

## 2023-04-26 PROCEDURE — 3008F BODY MASS INDEX DOCD: CPT | Mod: CPTII,S$GLB,, | Performed by: INTERNAL MEDICINE

## 2023-04-26 PROCEDURE — 1101F PR PT FALLS ASSESS DOC 0-1 FALLS W/OUT INJ PAST YR: ICD-10-PCS | Mod: CPTII,S$GLB,, | Performed by: INTERNAL MEDICINE

## 2023-04-26 PROCEDURE — 1126F PR PAIN SEVERITY QUANTIFIED, NO PAIN PRESENT: ICD-10-PCS | Mod: CPTII,S$GLB,, | Performed by: INTERNAL MEDICINE

## 2023-04-26 PROCEDURE — 3078F DIAST BP <80 MM HG: CPT | Mod: CPTII,S$GLB,, | Performed by: INTERNAL MEDICINE

## 2023-04-26 PROCEDURE — 99999 PR PBB SHADOW E&M-EST. PATIENT-LVL IV: CPT | Mod: PBBFAC,,, | Performed by: INTERNAL MEDICINE

## 2023-04-26 PROCEDURE — 4010F PR ACE/ARB THEARPY RXD/TAKEN: ICD-10-PCS | Mod: CPTII,S$GLB,, | Performed by: INTERNAL MEDICINE

## 2023-04-26 PROCEDURE — 3008F PR BODY MASS INDEX (BMI) DOCUMENTED: ICD-10-PCS | Mod: CPTII,S$GLB,, | Performed by: INTERNAL MEDICINE

## 2023-04-26 PROCEDURE — 4010F ACE/ARB THERAPY RXD/TAKEN: CPT | Mod: CPTII,S$GLB,, | Performed by: INTERNAL MEDICINE

## 2023-04-26 PROCEDURE — 1101F PT FALLS ASSESS-DOCD LE1/YR: CPT | Mod: CPTII,S$GLB,, | Performed by: INTERNAL MEDICINE

## 2023-04-26 PROCEDURE — 3288F FALL RISK ASSESSMENT DOCD: CPT | Mod: CPTII,S$GLB,, | Performed by: INTERNAL MEDICINE

## 2023-04-26 PROCEDURE — 3066F PR DOCUMENTATION OF TREATMENT FOR NEPHROPATHY: ICD-10-PCS | Mod: CPTII,S$GLB,, | Performed by: INTERNAL MEDICINE

## 2023-04-26 RX ORDER — LANCETS
1 EACH MISCELLANEOUS DAILY
Qty: 100 EACH | Refills: 3 | Status: SHIPPED | OUTPATIENT
Start: 2023-04-26 | End: 2024-04-25

## 2023-04-26 NOTE — PATIENT INSTRUCTIONS
Debrox to R ear 5 drops 2x daily for 5 days - can recheck R ear in clinic when come for labwork (or call if want to come in sooner)    Let us know if interested in referral to PT or [facility]    Recommend CoVid BiValent, tetanus booster and shingles vaccines - can schedule at Pharmacy of choice

## 2023-04-26 NOTE — TELEPHONE ENCOUNTER
Specialty Pharmacy - Refill Coordination    Specialty Medication Orders Linked to Encounter      Flowsheet Row Most Recent Value   Medication #1 evolocumab (REPATHA SURECLICK) 140 mg/mL PnIj (Order#[id number], Rx#[id number])            Refill Questions - Documented Responses      Flowsheet Row Most Recent Value   Refill Screening Questions    Would patient like to speak to a pharmacist? No   When does the patient need to receive the medication? 05/02/23   Refill Delivery Questions    How will the patient receive the medication? MEDRx   When does the patient need to receive the medication? 05/02/23   Shipping Address Home   Address in [facility] confirmed and updated if neccessary? Yes   Expected Copay ($) 0   Is the patient able to afford the medication copay? Yes   Payment Method zero copay   Days supply of Refill 28   Supplies needed? No supplies needed   Refill activity completed? Yes   Refill activity plan Refill scheduled   Shipment/Pickup Date: 04/28/23            Current Outpatient Medications   Medication Sig    ALPRAZolam (XANAX) 0.5 MG tablet Take 1 tablet (0.5 mg total) by mouth daily as needed for Anxiety.    amLODIPine (NORVASC) 10 MG tablet TAKE 1 TABLET BY MOUTH EVERY DAY    aspirin 81 MG Chew Take 81 mg by mouth once daily.    benazepriL (LOTENSIN) 40 MG tablet Take 1 tablet (40 mg total) by mouth once daily.    blood glucose control, normal Soln 1 Bottle by Misc.(Non-Drug; Combo Route) route once daily. For Accu Check Amber  use twice daily prn dx: E11.9    blood sugar diagnostic Strp To check BG one times daily, to use with insurance preferred meter    blood-glucose meter (ACCU-CHEK AMBER) Misc 1 kit by Misc.(Non-Drug; Combo Route) route once. For Accu Check Amber  use twice daily prn dx: E11.9 for 1 dose    calcium-vitamin D3 500 mg(1,250mg) -200 unit per tablet Take 1 tablet by mouth once daily.     CRANBERRY EXTRACT ORAL Take 1 tablet by mouth once daily.    evolocumab (REPATHA SURECLICK)  140 mg/mL PnIj Inject 1 mL (140 mg total) into the skin every 14 (fourteen) days.    lancets Misc 1 lancet by Misc.(Non-Drug; Combo Route) route once daily. For Acc-Chek Irais   DX :E11.9    loratadine (CLARITIN) 10 mg tablet TAKE 1 TABLET BY MOUTH EVERY DAY    meclizine (ANTIVERT) 25 mg tablet Take 1 tablet (25 mg total) by mouth 3 (three) times daily as needed for Dizziness or Nausea. (Patient not taking: Reported on 4/26/2023)    metFORMIN (GLUCOPHAGE-XR) 500 MG ER 24hr tablet With protein (low or no-carb) twice daily    metoprolol succinate (TOPROL-XL) 25 MG 24 hr tablet TAKE 1 TABLET BY MOUTH EVERY DAY    MULTIVITAMIN W-MINERALS/LUTEIN (CENTRUM SILVER ORAL) Take 1 tablet by mouth once daily.    spironolactone (ALDACTONE) 50 MG tablet Take 1 tablet (50 mg total) by mouth once daily.    triamcinolone (NASACORT) 55 mcg nasal inhaler USE 2 SPRAYS BY NASAL ROUTE ONCE DAILY   Last reviewed on 4/26/2023 11:24 AM by Moon Lion MD    Review of patient's allergies indicates:   Allergen Reactions    Statins-hmg-coa reductase inhibitors      Muscle Cramps    Last reviewed on  4/26/2023 10:52 AM by Mae Gunn      Tasks added this encounter   No tasks added.   Tasks due within next 3 months   4/22/2023 - Refill Coordination Outreach (1 time occurrence)     [facility]  [address] [place] [id number]  Phone: [phone]  Fax: [phone]
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
## 2023-05-02 ENCOUNTER — OFFICE VISIT (OUTPATIENT)
Dept: NEPHROLOGY | Facility: CLINIC | Age: 73
End: 2023-05-02
Payer: MEDICARE

## 2023-05-02 VITALS
DIASTOLIC BLOOD PRESSURE: 84 MMHG | HEIGHT: 64 IN | WEIGHT: 180.75 LBS | HEART RATE: 71 BPM | BODY MASS INDEX: 30.86 KG/M2 | RESPIRATION RATE: 18 BRPM | SYSTOLIC BLOOD PRESSURE: 150 MMHG

## 2023-05-02 DIAGNOSIS — I10 PRIMARY HYPERTENSION: Primary | ICD-10-CM

## 2023-05-02 PROCEDURE — 99215 OFFICE O/P EST HI 40 MIN: CPT | Mod: S$GLB,,, | Performed by: INTERNAL MEDICINE

## 2023-05-02 PROCEDURE — 1159F PR MEDICATION LIST DOCUMENTED IN MEDICAL RECORD: ICD-10-PCS | Mod: CPTII,S$GLB,, | Performed by: INTERNAL MEDICINE

## 2023-05-02 PROCEDURE — 4010F PR ACE/ARB THEARPY RXD/TAKEN: ICD-10-PCS | Mod: CPTII,S$GLB,, | Performed by: INTERNAL MEDICINE

## 2023-05-02 PROCEDURE — 3008F PR BODY MASS INDEX (BMI) DOCUMENTED: ICD-10-PCS | Mod: CPTII,S$GLB,, | Performed by: INTERNAL MEDICINE

## 2023-05-02 PROCEDURE — 99999 PR PBB SHADOW E&M-EST. PATIENT-LVL III: ICD-10-PCS | Mod: PBBFAC,,, | Performed by: INTERNAL MEDICINE

## 2023-05-02 PROCEDURE — 3008F BODY MASS INDEX DOCD: CPT | Mod: CPTII,S$GLB,, | Performed by: INTERNAL MEDICINE

## 2023-05-02 PROCEDURE — 3066F PR DOCUMENTATION OF TREATMENT FOR NEPHROPATHY: ICD-10-PCS | Mod: CPTII,S$GLB,, | Performed by: INTERNAL MEDICINE

## 2023-05-02 PROCEDURE — 3044F HG A1C LEVEL LT 7.0%: CPT | Mod: CPTII,S$GLB,, | Performed by: INTERNAL MEDICINE

## 2023-05-02 PROCEDURE — 99999 PR PBB SHADOW E&M-EST. PATIENT-LVL III: CPT | Mod: PBBFAC,,, | Performed by: INTERNAL MEDICINE

## 2023-05-02 PROCEDURE — 1126F AMNT PAIN NOTED NONE PRSNT: CPT | Mod: CPTII,S$GLB,, | Performed by: INTERNAL MEDICINE

## 2023-05-02 PROCEDURE — 99215 PR OFFICE/OUTPT VISIT, EST, LEVL V, 40-54 MIN: ICD-10-PCS | Mod: S$GLB,,, | Performed by: INTERNAL MEDICINE

## 2023-05-02 PROCEDURE — 3288F PR FALLS RISK ASSESSMENT DOCUMENTED: ICD-10-PCS | Mod: CPTII,S$GLB,, | Performed by: INTERNAL MEDICINE

## 2023-05-02 PROCEDURE — 1126F PR PAIN SEVERITY QUANTIFIED, NO PAIN PRESENT: ICD-10-PCS | Mod: CPTII,S$GLB,, | Performed by: INTERNAL MEDICINE

## 2023-05-02 PROCEDURE — 4010F ACE/ARB THERAPY RXD/TAKEN: CPT | Mod: CPTII,S$GLB,, | Performed by: INTERNAL MEDICINE

## 2023-05-02 PROCEDURE — 3066F NEPHROPATHY DOC TX: CPT | Mod: CPTII,S$GLB,, | Performed by: INTERNAL MEDICINE

## 2023-05-02 PROCEDURE — 1101F PR PT FALLS ASSESS DOC 0-1 FALLS W/OUT INJ PAST YR: ICD-10-PCS | Mod: CPTII,S$GLB,, | Performed by: INTERNAL MEDICINE

## 2023-05-02 PROCEDURE — 1159F MED LIST DOCD IN RCRD: CPT | Mod: CPTII,S$GLB,, | Performed by: INTERNAL MEDICINE

## 2023-05-02 PROCEDURE — 3079F PR MOST RECENT DIASTOLIC BLOOD PRESSURE 80-89 MM HG: ICD-10-PCS | Mod: CPTII,S$GLB,, | Performed by: INTERNAL MEDICINE

## 2023-05-02 PROCEDURE — 1101F PT FALLS ASSESS-DOCD LE1/YR: CPT | Mod: CPTII,S$GLB,, | Performed by: INTERNAL MEDICINE

## 2023-05-02 PROCEDURE — 3077F SYST BP >= 140 MM HG: CPT | Mod: CPTII,S$GLB,, | Performed by: INTERNAL MEDICINE

## 2023-05-02 PROCEDURE — 3079F DIAST BP 80-89 MM HG: CPT | Mod: CPTII,S$GLB,, | Performed by: INTERNAL MEDICINE

## 2023-05-02 PROCEDURE — 3061F NEG MICROALBUMINURIA REV: CPT | Mod: CPTII,S$GLB,, | Performed by: INTERNAL MEDICINE

## 2023-05-02 PROCEDURE — 3044F PR MOST RECENT HEMOGLOBIN A1C LEVEL <7.0%: ICD-10-PCS | Mod: CPTII,S$GLB,, | Performed by: INTERNAL MEDICINE

## 2023-05-02 PROCEDURE — 3061F PR NEG MICROALBUMINURIA RESULT DOCUMENTED/REVIEW: ICD-10-PCS | Mod: CPTII,S$GLB,, | Performed by: INTERNAL MEDICINE

## 2023-05-02 PROCEDURE — 3077F PR MOST RECENT SYSTOLIC BLOOD PRESSURE >= 140 MM HG: ICD-10-PCS | Mod: CPTII,S$GLB,, | Performed by: INTERNAL MEDICINE

## 2023-05-02 PROCEDURE — 3288F FALL RISK ASSESSMENT DOCD: CPT | Mod: CPTII,S$GLB,, | Performed by: INTERNAL MEDICINE

## 2023-05-02 NOTE — PROGRESS NOTES
Renal clinic f/u note:  Date of clinic visit: 5/2/23  Reason for f/u and chief c/o: primary vs secondary HTN and hypokalemia     HPI: Pt is a 73 y/o female with HTN and DM who presents for f/u. Based on seeing hypokalemia, a secondary (endocrine) form of HTN was suspected. Hypokalemia has been sporadic and periodic (not persistent); however, pt has been taking spironolactone and an ACE-I. TSH has been normal. Pt has no thyroid issues. W/u for endocrine causes of HTN was done. A 24 hour urine showed somewhat elevated aldosterone. CT abd showed bilateral adrenal lesions. On her last visit with us in March 2022, primary hyperaldosteronism was suspected, but the case was not clear. Therefore, pt was sent to Endocrine for their opinion.    Pt presents for f/u today. Endocrine note and w/u was reviewed. Noted CT abd was repeated, dexamethasone suppression test did not suggest pituitary Cushing's. Endocrine commented that renin-mediated aldosterone excess rather than primary hyperaldosteronism  was more likely present.    Pt has no new c/o's, no new events, no discomfort, no SOB. Pt compliant with the BP meds.         PAST MEDICAL HISTORY: HTN, DM-2, carpal tunnel syndrome, Eczema, Elevated CPK, Multinodular thyroid, Obesity, hyperlipidemia, Pneumonia, Postmenopausal, Statin intolerance, Tobacco dependence     PAST SURGICAL HISTORY:  She  has a past surgical history that includes Partial hysterectomy (1991); Colonoscopy; Fine needle aspiration (3/2011); Cyst Removal (2015); and Hysterectomy.     SOCIAL HISTORY:  She  reports that she quit smoking about 7 years ago. Her smoking use included cigarettes. She has a 12.50 pack-year smoking history. She has never used smokeless tobacco. She reports that she drinks alcohol. She reports that she does not use drugs.     FAMILY MEDICAL HISTORY:  Her family history includes Cataracts in her brother, brother, and sister; Dementia in her mother, sister, and sister; Diabetes in her  mother; Heart disease in her sister; Hypertension in her father and mother; No Known Problems in her son and son.               Review of patient's allergies indicates:   Allergen Reactions    Statins-hmg-coa reductase inhibitors         Muscle Cramps       Meds reviewed     Current Outpatient Medications:     ALPRAZolam (XANAX) 0.5 MG tablet, Take 1 tablet (0.5 mg total) by mouth daily as needed for Anxiety., Disp: 30 tablet, Rfl: 0    amLODIPine (NORVASC) 10 MG tablet, TAKE 1 TABLET BY MOUTH EVERY DAY, Disp: 90 tablet, Rfl: 3    aspirin 81 MG Chew, Take 81 mg by mouth once daily., Disp: , Rfl:     benazepriL (LOTENSIN) 40 MG tablet, Take 1 tablet (40 mg total) by mouth once daily., Disp: 90 tablet, Rfl: 3    blood glucose control, normal Soln, 1 Bottle by Misc.(Non-Drug; Combo Route) route once daily. For Accu Check Amber  use twice daily prn dx: E11.9, Disp: 1 each, Rfl: 0    blood sugar diagnostic Strp, To check BG one times daily, to use with insurance preferred meter, Disp: 100 strip, Rfl: 0    blood-glucose meter (ACCU-CHEK AMBER) Misc, 1 kit by Misc.(Non-Drug; Combo Route) route once. For Accu Check Amber  use twice daily prn dx: E11.9 for 1 dose, Disp: 1 each, Rfl: 0    calcium-vitamin D3 500 mg(1,250mg) -200 unit per tablet, Take 1 tablet by mouth once daily. , Disp: , Rfl:     CRANBERRY EXTRACT ORAL, Take 1 tablet by mouth once daily., Disp: , Rfl:     evolocumab (REPATHA SURECLICK) 140 mg/mL PnIj, Inject 1 mL (140 mg total) into the skin every 14 (fourteen) days., Disp: 2 mL, Rfl: 5    lancets Misc, 1 lancet by Misc.(Non-Drug; Combo Route) route once daily. For Acc-Chek Amber   DX :E11.9, Disp: 100 each, Rfl: 3    loratadine (CLARITIN) 10 mg tablet, TAKE 1 TABLET BY MOUTH EVERY DAY, Disp: 90 tablet, Rfl: 1    meclizine (ANTIVERT) 25 mg tablet, Take 1 tablet (25 mg total) by mouth 3 (three) times daily as needed for Dizziness or Nausea., Disp: 60 tablet, Rfl: 1    metFORMIN (GLUCOPHAGE-XR) 500 MG ER 24hr  tablet, With protein (low or no-carb) twice daily, Disp: 180 tablet, Rfl: 3    metoprolol succinate (TOPROL-XL) 25 MG 24 hr tablet, TAKE 1 TABLET BY MOUTH EVERY DAY, Disp: 90 tablet, Rfl: 3    MULTIVITAMIN W-MINERALS/LUTEIN (CENTRUM SILVER ORAL), Take 1 tablet by mouth once daily., Disp: , Rfl:     spironolactone (ALDACTONE) 50 MG tablet, Take 1 tablet (50 mg total) by mouth once daily., Disp: 90 tablet, Rfl: 2    triamcinolone (NASACORT) 55 mcg nasal inhaler, USE 2 SPRAYS BY NASAL ROUTE ONCE DAILY, Disp: 50.7 g, Rfl: 1        REVIEW OF SYSTEMS:  Patient has no fever, fatigue, visual changes, chest pain, edema, cough, dyspnea, nausea, vomiting, constipation, diarrhea, arthralgias, pruritis, dizziness, weakness, depression, confusion.        PHYSICAL EXAM: Blood pressure 150/84, BP last week was 132/71 pulse 77, weight 82.0 Kg, from 83.3 Kg  in NAD, ambulatory  Speech and thought process: normal  RRR s1 and s2 audible  CTA B no rales no wheezed  Abd soft, NT  Ext's no edema        Labs reviewed  BMP  Lab Results   Component Value Date     04/25/2023    K 3.5 04/25/2023     04/25/2023    CO2 33 (H) 04/25/2023    BUN 13 04/25/2023    CREATININE 0.9 04/25/2023    CALCIUM 9.4 04/25/2023    ANIONGAP 6 (L) 04/25/2023    EGFRNORACEVR >60.0 04/25/2023     Lab Results   Component Value Date    WBC 11.66 11/10/2022    HGB 12.9 11/10/2022    HCT 40.1 11/10/2022    MCV 96 11/10/2022     11/10/2022                24 hour urine reviewed, K 42, aldosterone 34, cortisol normal  Serum metanephrine 25  Early am ziggy/renin 95/7.4     Urine prot/cr zero     CT abd: reviewed  Bilateral small adrenal gland nodules which are technically indeterminate.  Further evaluation/MRI could be performed for characterization.     Repeat CT abd from Sept 2022 reviewed  There is a 16 mm nodule involving the medial limb of the left adrenal nodule which demonstrates absolute washout of 73% and relative washout of 56%, in keeping with a  "benign adrenal adenoma.  There is a 13 mm adrenal nodule involving the lateral limb of the right adrenal gland which demonstrates precontrast Hounsfield attenuation of 6.0, in keeping with a benign lipid rich adenoma.  Bilateral adrenal nodules are unchanged in size from prior.         IMPRESSION AND RECOMMENDATIONS:  73 y/o female with periodic hypokalemia and h/o of HTN presents for f/u:     1. Renal: stable renal function. Cr normal.      HTN: has been mostly controlled  Pt still on minimal number of meds (4), and not strong ones  Hypokalemia remains inconsistent, K currently normal/ TSH has been normal  Metabolic alkalosis  By dexamethasone suppression test, pituitary Cushing;s not suspected  Has bilateral adrenal nodules, but by repeat CT abdomen, the lesions appear "benign"   By endocrine evaluation, pt may have renin-mediated aldosterone excess rather than primary hyperaldosteronism  However, pt is , and these individuals tend to have a low renin profile    Prior w/u will be reviewed:  Elevated 24 hour urine K: c/w renal route being the source for hypokalemia  Elevated 24 hour urine for aldosterone  But early am aldosterone/renin ratio < 20  Urine cortisol was normal  Has small adrenal nodules bilaterally, not unilaterally     Most striking observation is that BP is relatively well controlled with minimal meds  This is not what one would expect from hyperaldosteronism     DDX: primary HTN. The adrenal nodules are incidentalomas  VS: mild hyperaldosteronism with hyperplasia  VS: early stage adrenal gland adenoma     Recommend:  Continue current meds        2. HTN: BP controlled  Meds reviewed     3. DM-2: noted, reviewed.  No proteinuria noted.  Lack of proteinuria likely suggests that diabetic nephropathy is not present  Noted normal 24 hour urine for cortisol      Plans and recommendations:  As reviewed above  Opportunity for questions provided.  Total time spent 40 minutes including time " needed to review the records, the   patient evaluation, documentation, face-to-face discussion with the patient,   more than 50% of the time was spent on coordination of care and counseling.    Level V visit.  RTC 12 months     Brenden Landaverde MD

## 2023-05-06 DIAGNOSIS — H69.93 DYSFUNCTION OF BOTH EUSTACHIAN TUBES: ICD-10-CM

## 2023-05-08 RX ORDER — TRIAMCINOLONE ACETONIDE 55 UG/1
SPRAY, METERED NASAL
Qty: 17 ML | Refills: 3 | Status: SHIPPED | OUTPATIENT
Start: 2023-05-08

## 2023-05-12 ENCOUNTER — CLINICAL SUPPORT (OUTPATIENT)
Dept: PRIMARY CARE CLINIC | Facility: CLINIC | Age: 73
End: 2023-05-12
Payer: MEDICARE

## 2023-05-12 DIAGNOSIS — E05.90 HYPERTHYROIDISM: ICD-10-CM

## 2023-05-12 DIAGNOSIS — D64.9 ANEMIA, UNSPECIFIED TYPE: ICD-10-CM

## 2023-05-12 DIAGNOSIS — Z79.4 TYPE 2 DIABETES MELLITUS WITHOUT COMPLICATION, WITH LONG-TERM CURRENT USE OF INSULIN: ICD-10-CM

## 2023-05-12 DIAGNOSIS — E11.69 COMBINED HYPERLIPIDEMIA ASSOCIATED WITH TYPE 2 DIABETES MELLITUS: Chronic | ICD-10-CM

## 2023-05-12 DIAGNOSIS — E78.2 COMBINED HYPERLIPIDEMIA ASSOCIATED WITH TYPE 2 DIABETES MELLITUS: Chronic | ICD-10-CM

## 2023-05-12 DIAGNOSIS — E11.9 TYPE 2 DIABETES MELLITUS WITHOUT COMPLICATION, WITH LONG-TERM CURRENT USE OF INSULIN: ICD-10-CM

## 2023-05-12 PROCEDURE — 83036 HEMOGLOBIN GLYCOSYLATED A1C: CPT | Performed by: INTERNAL MEDICINE

## 2023-05-12 PROCEDURE — 84439 ASSAY OF FREE THYROXINE: CPT | Performed by: INTERNAL MEDICINE

## 2023-05-12 PROCEDURE — 84443 ASSAY THYROID STIM HORMONE: CPT | Performed by: INTERNAL MEDICINE

## 2023-05-12 PROCEDURE — 80061 LIPID PANEL: CPT | Performed by: INTERNAL MEDICINE

## 2023-05-12 PROCEDURE — 85025 COMPLETE CBC W/AUTO DIFF WBC: CPT | Performed by: INTERNAL MEDICINE

## 2023-05-12 PROCEDURE — 36415 COLL VENOUS BLD VENIPUNCTURE: CPT | Performed by: INTERNAL MEDICINE

## 2023-05-12 PROCEDURE — 84480 ASSAY TRIIODOTHYRONINE (T3): CPT | Performed by: INTERNAL MEDICINE

## 2023-05-13 ENCOUNTER — PATIENT MESSAGE (OUTPATIENT)
Dept: PRIMARY CARE CLINIC | Facility: CLINIC | Age: 73
End: 2023-05-13
Payer: MEDICARE

## 2023-05-13 LAB
BASOPHILS # BLD AUTO: 0.06 K/UL (ref 0–0.2)
BASOPHILS NFR BLD: 0.6 % (ref 0–1.9)
CHOLEST SERPL-MCNC: 131 MG/DL (ref 120–199)
CHOLEST/HDLC SERPL: 3 {RATIO} (ref 2–5)
DIFFERENTIAL METHOD: ABNORMAL
EOSINOPHIL # BLD AUTO: 0.2 K/UL (ref 0–0.5)
EOSINOPHIL NFR BLD: 2.4 % (ref 0–8)
ERYTHROCYTE [DISTWIDTH] IN BLOOD BY AUTOMATED COUNT: 13.6 % (ref 11.5–14.5)
ESTIMATED AVG GLUCOSE: 131 MG/DL (ref 68–131)
HBA1C MFR BLD: 6.2 % (ref 4–5.6)
HCT VFR BLD AUTO: 38.5 % (ref 37–48.5)
HDLC SERPL-MCNC: 43 MG/DL (ref 40–75)
HDLC SERPL: 32.8 % (ref 20–50)
HGB BLD-MCNC: 12 G/DL (ref 12–16)
IMM GRANULOCYTES # BLD AUTO: 0.02 K/UL (ref 0–0.04)
IMM GRANULOCYTES NFR BLD AUTO: 0.2 % (ref 0–0.5)
LDLC SERPL CALC-MCNC: 74.8 MG/DL (ref 63–159)
LYMPHOCYTES # BLD AUTO: 3.3 K/UL (ref 1–4.8)
LYMPHOCYTES NFR BLD: 35.5 % (ref 18–48)
MCH RBC QN AUTO: 30.3 PG (ref 27–31)
MCHC RBC AUTO-ENTMCNC: 31.2 G/DL (ref 32–36)
MCV RBC AUTO: 97 FL (ref 82–98)
MONOCYTES # BLD AUTO: 0.6 K/UL (ref 0.3–1)
MONOCYTES NFR BLD: 6.6 % (ref 4–15)
NEUTROPHILS # BLD AUTO: 5.1 K/UL (ref 1.8–7.7)
NEUTROPHILS NFR BLD: 54.7 % (ref 38–73)
NONHDLC SERPL-MCNC: 88 MG/DL
NRBC BLD-RTO: 0 /100 WBC
PLATELET # BLD AUTO: 280 K/UL (ref 150–450)
PMV BLD AUTO: 10.8 FL (ref 9.2–12.9)
RBC # BLD AUTO: 3.96 M/UL (ref 4–5.4)
T3 SERPL-MCNC: 94 NG/DL (ref 60–180)
T4 FREE SERPL-MCNC: 0.88 NG/DL (ref 0.71–1.51)
TRIGL SERPL-MCNC: 66 MG/DL (ref 30–150)
TSH SERPL DL<=0.005 MIU/L-ACNC: 0.73 UIU/ML (ref 0.4–4)
WBC # BLD AUTO: 9.34 K/UL (ref 3.9–12.7)

## 2023-05-13 NOTE — PROGRESS NOTES
Pt has [facility] - if message below not viewed please call w information below:     Good morning Ms. Garza - Labwork looks good!  Continue current plan of care    Please message or call with any questions or concerns!  Thank you!  Moon Lion MD, MPH  [facility]

## 2023-05-22 ENCOUNTER — SPECIALTY PHARMACY (OUTPATIENT)
Dept: PHARMACY | Facility: CLINIC | Age: 73
End: 2023-05-22
Payer: MEDICARE

## 2023-05-22 DIAGNOSIS — E78.2 COMBINED HYPERLIPIDEMIA ASSOCIATED WITH TYPE 2 DIABETES MELLITUS: ICD-10-CM

## 2023-05-22 DIAGNOSIS — Z78.9 STATIN INTOLERANCE: ICD-10-CM

## 2023-05-22 DIAGNOSIS — E11.69 COMBINED HYPERLIPIDEMIA ASSOCIATED WITH TYPE 2 DIABETES MELLITUS: ICD-10-CM

## 2023-05-22 NOTE — TELEPHONE ENCOUNTER
Spoke to patient regarding Repatha refill. I told her we will send a refill request to her provider and call her back when the refill is approved. Patient voiced understanding

## 2023-05-24 RX ORDER — EVOLOCUMAB 140 MG/ML
140 INJECTION, SOLUTION SUBCUTANEOUS
Qty: 2 ML | Refills: 5 | Status: ACTIVE | OUTPATIENT
Start: 2023-05-24 | End: 2023-11-21 | Stop reason: SDUPTHER

## 2023-05-24 NOTE — TELEPHONE ENCOUNTER
5/24 WWB  Refill scheduled, will notify insufficient inventory at  pharmacy at this time.Specialty Pharmacy - Refill Coordination    Specialty Medication Orders Linked to Encounter      Flowsheet Row Most Recent Value   Medication #1 evolocumab (REPATHA SURECLICK) 140 mg/mL PnIj (Order#[id number], Rx#[id number])            Refill Questions - Documented Responses      Flowsheet Row Most Recent Value   Patient Availability and HIPAA Verification    Does patient want to proceed with activity? Yes   HIPAA/medical authority confirmed? Yes   Relationship to patient of person spoken to? Self   Refill Screening Questions    Would patient like to speak to a pharmacist? No   When does the patient need to receive the medication? 05/31/23   Refill Delivery Questions    When does the patient need to receive the medication? 05/31/23   Shipping Address Home   Address in [facility] confirmed and updated if neccessary? Yes   Expected Copay ($) 0   Is the patient able to afford the medication copay? Yes   Payment Method zero copay   Days supply of Refill 28   Refill activity completed? Yes   Refill activity plan Refill scheduled   Shipment/Pickup Date: 05/29/23            Current Outpatient Medications   Medication Sig    ALPRAZolam (XANAX) 0.5 MG tablet Take 1 tablet (0.5 mg total) by mouth daily as needed for Anxiety.    amLODIPine (NORVASC) 10 MG tablet TAKE 1 TABLET BY MOUTH EVERY DAY    aspirin 81 MG Chew Take 81 mg by mouth once daily.    benazepriL (LOTENSIN) 40 MG tablet Take 1 tablet (40 mg total) by mouth once daily.    blood glucose control, normal Soln 1 Bottle by Misc.(Non-Drug; Combo Route) route once daily. For Accu Check Amber  use twice daily prn dx: E11.9    blood sugar diagnostic Strp To check BG one times daily, to use with insurance preferred meter    blood-glucose meter (ACCU-CHEK AMBER) Misc 1 kit by Misc.(Non-Drug; Combo Route) route once. For Accu Check Amber  use twice daily prn dx: E11.9 for 1 dose     calcium-vitamin D3 500 mg(1,250mg) -200 unit per tablet Take 1 tablet by mouth once daily.     CRANBERRY EXTRACT ORAL Take 1 tablet by mouth once daily.    evolocumab (REPATHA SURECLICK) 140 mg/mL PnIj Inject 1 mL (140 mg total) into the skin every 14 (fourteen) days.    lancets Misc 1 lancet by Misc.(Non-Drug; Combo Route) route once daily. For Acc-Chek Irais   DX :E11.9    loratadine (CLARITIN) 10 mg tablet TAKE 1 TABLET BY MOUTH EVERY DAY    meclizine (ANTIVERT) 25 mg tablet Take 1 tablet (25 mg total) by mouth 3 (three) times daily as needed for Dizziness or Nausea.    metFORMIN (GLUCOPHAGE-XR) 500 MG ER 24hr tablet With protein (low or no-carb) twice daily    metoprolol succinate (TOPROL-XL) 25 MG 24 hr tablet TAKE 1 TABLET BY MOUTH EVERY DAY    MULTIVITAMIN W-MINERALS/LUTEIN (CENTRUM SILVER ORAL) Take 1 tablet by mouth once daily.    spironolactone (ALDACTONE) 50 MG tablet Take 1 tablet (50 mg total) by mouth once daily.    triamcinolone (NASACORT) 55 mcg nasal inhaler USE 2 SPRAYS BY NASAL ROUTE ONCE DAILY   Last reviewed on 5/2/2023  2:29 PM by Echo Harris LPN    Review of patient's allergies indicates:   Allergen Reactions    Statins-hmg-coa reductase inhibitors      Muscle Cramps    Last reviewed on  5/2/2023 2:27 PM by Echo Harris      Tasks added this encounter   No tasks added.   Tasks due within next 3 months   No tasks due.     Светлана Nix  [facility]  [address] [place] [id number]  Phone: [phone]  Fax: [phone]

## 2023-05-29 ENCOUNTER — HOSPITAL ENCOUNTER (OUTPATIENT)
Dept: RADIOLOGY | Facility: HOSPITAL | Age: 73
Discharge: HOME | End: 2023-05-29
Attending: NURSE PRACTITIONER
Payer: MEDICARE

## 2023-05-29 DIAGNOSIS — Z12.31 ENCOUNTER FOR SCREENING MAMMOGRAM FOR MALIGNANT NEOPLASM OF BREAST: ICD-10-CM

## 2023-05-29 PROCEDURE — 77063 MAMMO DIGITAL SCREENING BILAT WITH TOMO: ICD-10-PCS | Mod: 26,,, | Performed by: RADIOLOGY

## 2023-05-29 PROCEDURE — 77067 SCR MAMMO BI INCL CAD: CPT | Mod: TC

## 2023-05-29 PROCEDURE — 77067 MAMMO DIGITAL SCREENING BILAT WITH TOMO: ICD-10-PCS | Mod: 26,,, | Performed by: RADIOLOGY

## 2023-05-29 PROCEDURE — 77067 SCR MAMMO BI INCL CAD: CPT | Mod: 26,,, | Performed by: RADIOLOGY

## 2023-05-29 PROCEDURE — 77063 BREAST TOMOSYNTHESIS BI: CPT | Mod: 26,,, | Performed by: RADIOLOGY

## 2023-06-02 ENCOUNTER — OFFICE VISIT (OUTPATIENT)
Dept: PULMONOLOGY | Facility: CLINIC | Age: 73
End: 2023-06-02
Payer: MEDICARE

## 2023-06-02 VITALS
DIASTOLIC BLOOD PRESSURE: 82 MMHG | BODY MASS INDEX: 31.02 KG/M2 | RESPIRATION RATE: 20 BRPM | HEIGHT: 64 IN | OXYGEN SATURATION: 93 % | SYSTOLIC BLOOD PRESSURE: 136 MMHG | WEIGHT: 181.69 LBS | HEART RATE: 80 BPM

## 2023-06-02 DIAGNOSIS — E66.9 OBESITY (BMI 30.0-34.9): ICD-10-CM

## 2023-06-02 DIAGNOSIS — R91.8 PULMONARY NODULES: ICD-10-CM

## 2023-06-02 DIAGNOSIS — R91.1 SOLITARY PULMONARY NODULE: Primary | ICD-10-CM

## 2023-06-02 PROCEDURE — 99999 PR PBB SHADOW E&M-EST. PATIENT-LVL V: ICD-10-PCS | Mod: PBBFAC,,, | Performed by: INTERNAL MEDICINE

## 2023-06-02 PROCEDURE — 3044F HG A1C LEVEL LT 7.0%: CPT | Mod: CPTII,S$GLB,, | Performed by: INTERNAL MEDICINE

## 2023-06-02 PROCEDURE — 3008F PR BODY MASS INDEX (BMI) DOCUMENTED: ICD-10-PCS | Mod: CPTII,S$GLB,, | Performed by: INTERNAL MEDICINE

## 2023-06-02 PROCEDURE — 3075F PR MOST RECENT SYSTOLIC BLOOD PRESS GE 130-139MM HG: ICD-10-PCS | Mod: CPTII,S$GLB,, | Performed by: INTERNAL MEDICINE

## 2023-06-02 PROCEDURE — 99213 PR OFFICE/OUTPT VISIT, EST, LEVL III, 20-29 MIN: ICD-10-PCS | Mod: S$GLB,,, | Performed by: INTERNAL MEDICINE

## 2023-06-02 PROCEDURE — 99999 PR PBB SHADOW E&M-EST. PATIENT-LVL V: CPT | Mod: PBBFAC,,, | Performed by: INTERNAL MEDICINE

## 2023-06-02 PROCEDURE — 3079F DIAST BP 80-89 MM HG: CPT | Mod: CPTII,S$GLB,, | Performed by: INTERNAL MEDICINE

## 2023-06-02 PROCEDURE — 3061F PR NEG MICROALBUMINURIA RESULT DOCUMENTED/REVIEW: ICD-10-PCS | Mod: CPTII,S$GLB,, | Performed by: INTERNAL MEDICINE

## 2023-06-02 PROCEDURE — 3066F PR DOCUMENTATION OF TREATMENT FOR NEPHROPATHY: ICD-10-PCS | Mod: CPTII,S$GLB,, | Performed by: INTERNAL MEDICINE

## 2023-06-02 PROCEDURE — 1101F PR PT FALLS ASSESS DOC 0-1 FALLS W/OUT INJ PAST YR: ICD-10-PCS | Mod: CPTII,S$GLB,, | Performed by: INTERNAL MEDICINE

## 2023-06-02 PROCEDURE — 1159F PR MEDICATION LIST DOCUMENTED IN MEDICAL RECORD: ICD-10-PCS | Mod: CPTII,S$GLB,, | Performed by: INTERNAL MEDICINE

## 2023-06-02 PROCEDURE — 3288F PR FALLS RISK ASSESSMENT DOCUMENTED: ICD-10-PCS | Mod: CPTII,S$GLB,, | Performed by: INTERNAL MEDICINE

## 2023-06-02 PROCEDURE — 4010F ACE/ARB THERAPY RXD/TAKEN: CPT | Mod: CPTII,S$GLB,, | Performed by: INTERNAL MEDICINE

## 2023-06-02 PROCEDURE — 1160F RVW MEDS BY RX/DR IN RCRD: CPT | Mod: CPTII,S$GLB,, | Performed by: INTERNAL MEDICINE

## 2023-06-02 PROCEDURE — 3061F NEG MICROALBUMINURIA REV: CPT | Mod: CPTII,S$GLB,, | Performed by: INTERNAL MEDICINE

## 2023-06-02 PROCEDURE — 3044F PR MOST RECENT HEMOGLOBIN A1C LEVEL <7.0%: ICD-10-PCS | Mod: CPTII,S$GLB,, | Performed by: INTERNAL MEDICINE

## 2023-06-02 PROCEDURE — 3008F BODY MASS INDEX DOCD: CPT | Mod: CPTII,S$GLB,, | Performed by: INTERNAL MEDICINE

## 2023-06-02 PROCEDURE — 1101F PT FALLS ASSESS-DOCD LE1/YR: CPT | Mod: CPTII,S$GLB,, | Performed by: INTERNAL MEDICINE

## 2023-06-02 PROCEDURE — 1160F PR REVIEW ALL MEDS BY PRESCRIBER/CLIN PHARMACIST DOCUMENTED: ICD-10-PCS | Mod: CPTII,S$GLB,, | Performed by: INTERNAL MEDICINE

## 2023-06-02 PROCEDURE — 3079F PR MOST RECENT DIASTOLIC BLOOD PRESSURE 80-89 MM HG: ICD-10-PCS | Mod: CPTII,S$GLB,, | Performed by: INTERNAL MEDICINE

## 2023-06-02 PROCEDURE — 1159F MED LIST DOCD IN RCRD: CPT | Mod: CPTII,S$GLB,, | Performed by: INTERNAL MEDICINE

## 2023-06-02 PROCEDURE — 3066F NEPHROPATHY DOC TX: CPT | Mod: CPTII,S$GLB,, | Performed by: INTERNAL MEDICINE

## 2023-06-02 PROCEDURE — 4010F PR ACE/ARB THEARPY RXD/TAKEN: ICD-10-PCS | Mod: CPTII,S$GLB,, | Performed by: INTERNAL MEDICINE

## 2023-06-02 PROCEDURE — 99213 OFFICE O/P EST LOW 20 MIN: CPT | Mod: S$GLB,,, | Performed by: INTERNAL MEDICINE

## 2023-06-02 PROCEDURE — 3288F FALL RISK ASSESSMENT DOCD: CPT | Mod: CPTII,S$GLB,, | Performed by: INTERNAL MEDICINE

## 2023-06-02 PROCEDURE — 3075F SYST BP GE 130 - 139MM HG: CPT | Mod: CPTII,S$GLB,, | Performed by: INTERNAL MEDICINE

## 2023-06-02 NOTE — PROGRESS NOTES
Subjective:     Patient ID: Kristyn Garza is a 72 y.o. female.    Chief Complaint:      HPI  Follow up office visit for this 73 y/o with asymptomatic lung nodule      Lung Nodule  She presents for evaluation and treatment of a lung nodule. The patient had an abnormal imaging study but reports experiencing no current or past pulmonary symptoms. The patient denies other associated symptoms. She has a history of 43 pack years. The patient has no known exposure to tuberculosis. The patient does not have a history of cancer.          Past Medical History:   Diagnosis Date    Carpal tunnel syndrome     CKD (chronic kidney disease) stage 3, GFR 30-59 ml/min     Followed by Nephrology    Colon cancer screening 3/18/2014    CTS (carpal tunnel syndrome) 6/18/2013    Diabetes mellitus, type 2     Eczema     Elevated CPK     History of hypokalemia 6/18/2013    History of hypokalemia 6/18/2013    Hypokalemia     Multinodular thyroid     Followed by ENT    Obesity     Other and unspecified hyperlipidemia     Pneumonia     in her 20s; hospitalized    Postmenopausal     No history of abnormal pap smear    Statin intolerance     caused mylagia, elevated CPK    Tobacco dependence     resolved    Unspecified essential hypertension      Past Surgical History:   Procedure Laterality Date    COLONOSCOPY      CYST REMOVAL  2015    On the head    FINE NEEDLE ASPIRATION  3/2011    thyroid nodules x3 (negative per patient)    HYSTERECTOMY      PARTIAL HYSTERECTOMY  1991    Due to fibroids     Review of patient's allergies indicates:   Allergen Reactions    Statins-hmg-coa reductase inhibitors      Muscle Cramps     Current Outpatient Medications on File Prior to Visit   Medication Sig Dispense Refill    amLODIPine (NORVASC) 10 MG tablet TAKE 1 TABLET BY MOUTH EVERY DAY 90 tablet 3    aspirin 81 MG Chew Take 81 mg by mouth once daily.      benazepriL (LOTENSIN) 40 MG tablet Take 1 tablet (40 mg total) by mouth once daily. 90 tablet 3     blood glucose control, normal Soln 1 Bottle by Misc.(Non-Drug; Combo Route) route once daily. For Accu Check Amber  use twice daily prn dx: E11.9 1 each 0    blood sugar diagnostic Strp To check BG one times daily, to use with insurance preferred meter 100 strip 0    blood-glucose meter (ACCU-CHEK AMBER) Misc 1 kit by Misc.(Non-Drug; Combo Route) route once. For Accu Check Amber  use twice daily prn dx: E11.9 for 1 dose 1 each 0    calcium-vitamin D3 500 mg(1,250mg) -200 unit per tablet Take 1 tablet by mouth once daily.       CRANBERRY EXTRACT ORAL Take 1 tablet by mouth once daily.      evolocumab (REPATHA SURECLICK) 140 mg/mL PnIj Inject 1 mL (140 mg total) into the skin every 14 (fourteen) days. 2 mL 5    lancets Misc 1 lancet by Misc.(Non-Drug; Combo Route) route once daily. For Acc-Chek Amber   DX :E11.9 100 each 3    loratadine (CLARITIN) 10 mg tablet TAKE 1 TABLET BY MOUTH EVERY DAY 90 tablet 1    meclizine (ANTIVERT) 25 mg tablet Take 1 tablet (25 mg total) by mouth 3 (three) times daily as needed for Dizziness or Nausea. 60 tablet 1    metFORMIN (GLUCOPHAGE-XR) 500 MG ER 24hr tablet With protein (low or no-carb) twice daily 180 tablet 3    metoprolol succinate (TOPROL-XL) 25 MG 24 hr tablet TAKE 1 TABLET BY MOUTH EVERY DAY 90 tablet 3    MULTIVITAMIN W-MINERALS/LUTEIN (CENTRUM SILVER ORAL) Take 1 tablet by mouth once daily.      spironolactone (ALDACTONE) 50 MG tablet Take 1 tablet (50 mg total) by mouth once daily. 90 tablet 2    triamcinolone (NASACORT) 55 mcg nasal inhaler USE 2 SPRAYS BY NASAL ROUTE ONCE DAILY 17 mL 3    [DISCONTINUED] ALPRAZolam (XANAX) 0.5 MG tablet Take 1 tablet (0.5 mg total) by mouth daily as needed for Anxiety. 30 tablet 0     No current facility-administered medications on file prior to visit.     Social History     Socioeconomic History    Marital status:     Number of children: 2    Highest education level: Some college, no degree   Occupational History    Occupation:  Retired   Tobacco Use    Smoking status: Former     Packs/day: 1.00     Years: 43.00     Pack years: 43.00     Types: Cigarettes     Start date: 1/1/1970     Quit date: 5/20/2013     Years since quitting: 10.0    Smokeless tobacco: Never   Substance and Sexual Activity    Alcohol use: Yes     Alcohol/week: 0.0 standard drinks     Comment: Rarely drinks wine    Drug use: No    Sexual activity: Not Currently     Partners: Male     Birth control/protection: Condom   Social History Narrative    She wears seatbelt. . 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      Social Determinants of Health     Financial Resource Strain: Low Risk     Difficulty of Paying Living Expenses: Not very hard   Food Insecurity: Food Insecurity Present    Worried About Running Out of Food in the Last Year: Sometimes true    Ran Out of Food in the Last Year: Sometimes true   Transportation Needs: No Transportation Needs    Lack of Transportation (Medical): No    Lack of Transportation (Non-Medical): No   Physical Activity: Insufficiently Active    Days of Exercise per Week: 2 days    Minutes of Exercise per Session: 30 min   Stress: No Stress Concern Present    Feeling of Stress : Only a little   Social Connections: Unknown    Frequency of Communication with Friends and Family: More than three times a week    Frequency of Social Gatherings with Friends and Family: Three times a week    Active Member of Clubs or Organizations: Yes    Attends Club or Organization Meetings: More than 4 times per year    Marital Status:    Housing Stability: Low Risk     Unable to Pay for Housing in the Last Year: No    Number of Places Lived in the Last Year: 1    Unstable Housing in the Last Year: No     Family History   Problem Relation Age of Onset    Hypertension Mother     Diabetes Mother     Dementia Mother         Alzheimer's dementia    Hypertension Father     Heart disease Sister         MI/CAD    Cataracts Sister     Dementia Sister      "No Known Problems Son     No Known Problems Son     Cataracts Brother     Cataracts Brother     Dementia Sister     Cancer Neg Hx     Stroke Neg Hx     Melanoma Neg Hx     Psoriasis Neg Hx     Lupus Neg Hx     Eczema Neg Hx     COPD Neg Hx     Kidney disease Neg Hx        Review of Systems   Constitutional:  Negative for fever and fatigue.   HENT:  Negative for postnasal drip and rhinorrhea.    Eyes:  Negative for redness and itching.   Respiratory:  Negative for cough, shortness of breath, wheezing, dyspnea on extertion and Paroxysmal Nocturnal Dyspnea.    Cardiovascular:  Negative for chest pain.   Genitourinary:  Negative for difficulty urinating and hematuria.   Endocrine:  Negative for polyphagia, cold intolerance and heat intolerance.    Musculoskeletal:  Negative for arthralgias.   Skin:  Negative for rash.   Gastrointestinal:  Negative for nausea, vomiting, abdominal pain and abdominal distention.   Neurological:  Negative for dizziness and headaches.   Hematological:  Negative for adenopathy. Does not bruise/bleed easily and no excessive bruising.   Psychiatric/Behavioral:  The patient is not nervous/anxious.      Objective:      /82   Pulse 80   Resp 20   Ht 5' 4" (1.626 m)   Wt 82.4 kg (181 lb 10.5 oz)   SpO2 (!) 93%   BMI 31.18 kg/m²   Physical Exam  Vitals and nursing note reviewed.   Constitutional:       Appearance: Normal appearance. She is well-developed.   HENT:      Head: Normocephalic and atraumatic.      Nose: Nose normal.   Eyes:      Conjunctiva/sclera: Conjunctivae normal.      Pupils: Pupils are equal, round, and reactive to light.   Neck:      Thyroid: No thyromegaly.      Vascular: No JVD.      Trachea: No tracheal deviation.   Cardiovascular:      Rate and Rhythm: Normal rate and regular rhythm.      Heart sounds: Normal heart sounds.   Pulmonary:      Effort: Pulmonary effort is normal. No respiratory distress.      Breath sounds: Normal breath sounds. No wheezing or rales. "   Chest:      Chest wall: No tenderness.   Abdominal:      General: Bowel sounds are normal.      Palpations: Abdomen is soft.   Musculoskeletal:         General: Normal range of motion.      Cervical back: Neck supple.   Lymphadenopathy:      Cervical: No cervical adenopathy.   Skin:     General: Skin is warm and dry.   Neurological:      Mental Status: She is alert and oriented to person, place, and time.     Personal Diagnostic Review  REVIEW of CT   Abnormal CT of abdomen in the past 9/27/2022  1. Small bilateral adrenal adenomas as above  2. Otherwise no acute findings demonstrated in the abdomen or pelvis.  3. Small bilateral pulmonary nodule seen in the lung bases measuring up to 6 mm in the right lower lobe.  For multiple solid nodules with any 6 mm or greater, Fleischner Society guidelines recommend follow up with non-contrast chest CT at 3-6 months and 18-24 months after discovery.    Details    Reading Physician Reading Date Result Priority   Alberto Hardy MD  [phone] 12/9/2022 Routine     Narrative & Impression  EXAMINATION:  CT CHEST WITHOUT CONTRAST     CLINICAL HISTORY:  Abnormal xray - lung nodule, < 1 cm, low risk; Other nonspecific abnormal finding of lung field     TECHNIQUE:  Low dose axial images, sagittal and coronal reformations were obtained from the thoracic inlet to the lung bases. Contrast was not administered.     COMPARISON:  CT abdomen and pelvis dated 09/27/2022     FINDINGS:  Heart and Great vessels: Scattered atherosclerotic plaque noted involving the thoracic aorta and aortic branch vessels, including the coronary arteries.  No pericardial effusion.     Thoracic Adenopathy: None demonstrated     Lungs: There multiple solid noncalcified pulmonary nodules in the lungs bilaterally, many of which occur along the pleural surfaces.  The largest nodules on the right measure an average of 6 mm in the right middle lobe (series 4, image 227 and 5 mm in the right lower lobe along  the surface of the diaphragm (series 4, image 358.  The largest nodule in the left lung measures an average of 7 mm in the periphery of the left upper lobe apex as seen on series 4, image 80.  No parenchymal consolidations or pleural effusions demonstrated.     Upper Abdomen: Previously described small bilateral adrenal nodules appear unchanged in size.     Bones: No acute or suspicious osseous findings.     Miscellaneous: None     Impression:     1. Multiple solid noncalcified pulmonary nodules throughout the lungs bilaterally with the largest measuring an average of 7 mm in the apex of the left upper lobe.  For multiple solid nodules with any 6 mm or greater, Fleischner Society guidelines recommend follow up with non-contrast chest CT at 3-6 months and 18-24 months after discovery.        Electronically signed by: Alberto Hardy MD  Date:                                            12/09/2022  Time:                                           10:20          Pulmonary Studies Review 6/2/2023   SpO2 93   Height 64   Weight 2906.54   BMI (Calculated) 31.2   Predicted Distance 251.89   Predicted Distance Meters (Calculated) 393.67       Mammo Digital Screening Bilat w/ Curtis  Narrative: Result:  Mammo Digital Screening Bilat w/ Curtis    History:  Patient is 72 y.o. and is seen for a screening mammogram.    Films Compared:  Compared to: 05/27/2022 Mammo Digital Screening Bilat w/ Curtis, 05/26/2021   Mammo Digital Screening Bilat w/ Curtis, 05/25/2020 Mammo Digital Screening   Bilat w/ Curtis, and 05/24/2019 Mammo Digital Screening Bilat w/ Curtis     Findings:   This procedure was performed using tomosynthesis.   Computer-aided detection was utilized in the interpretation of this   examination.    The breasts are heterogeneously dense, which may obscure small masses.   There is no evidence of suspicious masses, microcalcifications or   architectural distortion.  Impression:    No mammographic evidence of malignancy.    BI-RADS  Category 1: Negative    Recommendation:  Routine screening mammogram in 1 year is recommended.    Your estimated lifetime risk of breast cancer (to age 85) based on   Tyrer-Cuzick risk assessment model is 2.9 %.  According to the American   Cancer Society, patients with a lifetime breast cancer risk of 20% or   higher might benefit from supplemental screening tests. ??       Office Spirometry Results:     No flowsheet data found.  Pulmonary Studies Review 6/2/2023   SpO2 93   Height 64   Weight 2906.54   BMI (Calculated) 31.2   Predicted Distance 251.89   Predicted Distance Meters (Calculated) 393.67         Assessment:            Solitary pulmonary nodule  -     CT Chest Without Contrast; Future; Expected date: 06/02/2023    Pulmonary nodules  -     CT Chest Without Contrast; Future; Expected date: 06/02/2023    Obesity (BMI 30.0-34.9)          Outpatient Encounter Medications as of 6/2/2023   Medication Sig Dispense Refill    amLODIPine (NORVASC) 10 MG tablet TAKE 1 TABLET BY MOUTH EVERY DAY 90 tablet 3    aspirin 81 MG Chew Take 81 mg by mouth once daily.      benazepriL (LOTENSIN) 40 MG tablet Take 1 tablet (40 mg total) by mouth once daily. 90 tablet 3    blood glucose control, normal Soln 1 Bottle by Misc.(Non-Drug; Combo Route) route once daily. For Accu Check Amber  use twice daily prn dx: E11.9 1 each 0    blood sugar diagnostic Strp To check BG one times daily, to use with insurance preferred meter 100 strip 0    blood-glucose meter (ACCU-CHEK AMBER) Misc 1 kit by Misc.(Non-Drug; Combo Route) route once. For Accu Check Amber  use twice daily prn dx: E11.9 for 1 dose 1 each 0    calcium-vitamin D3 500 mg(1,250mg) -200 unit per tablet Take 1 tablet by mouth once daily.       CRANBERRY EXTRACT ORAL Take 1 tablet by mouth once daily.      evolocumab (REPATHA SURECLICK) 140 mg/mL PnIj Inject 1 mL (140 mg total) into the skin every 14 (fourteen) days. 2 mL 5    lancets Misc 1 lancet by Misc.(Non-Drug; Combo Route)  route once daily. For Acc-Chek Irais   DX :E11.9 100 each 3    loratadine (CLARITIN) 10 mg tablet TAKE 1 TABLET BY MOUTH EVERY DAY 90 tablet 1    meclizine (ANTIVERT) 25 mg tablet Take 1 tablet (25 mg total) by mouth 3 (three) times daily as needed for Dizziness or Nausea. 60 tablet 1    metFORMIN (GLUCOPHAGE-XR) 500 MG ER 24hr tablet With protein (low or no-carb) twice daily 180 tablet 3    metoprolol succinate (TOPROL-XL) 25 MG 24 hr tablet TAKE 1 TABLET BY MOUTH EVERY DAY 90 tablet 3    MULTIVITAMIN W-MINERALS/LUTEIN (CENTRUM SILVER ORAL) Take 1 tablet by mouth once daily.      spironolactone (ALDACTONE) 50 MG tablet Take 1 tablet (50 mg total) by mouth once daily. 90 tablet 2    triamcinolone (NASACORT) 55 mcg nasal inhaler USE 2 SPRAYS BY NASAL ROUTE ONCE DAILY 17 mL 3    [DISCONTINUED] ALPRAZolam (XANAX) 0.5 MG tablet Take 1 tablet (0.5 mg total) by mouth daily as needed for Anxiety. 30 tablet 0    [DISCONTINUED] evolocumab (REPATHA SURECLICK) 140 mg/mL PnIj Inject 1 mL (140 mg total) into the skin every 14 (fourteen) days. 2 mL 5    [DISCONTINUED] triamcinolone (NASACORT) 55 mcg nasal inhaler USE 2 SPRAYS BY NASAL ROUTE ONCE DAILY 50.7 g 1     No facility-administered encounter medications on file as of 6/2/2023.     Plan:       Requested Prescriptions      No prescriptions requested or ordered in this encounter     Problem List Items Addressed This Visit       Obesity (BMI 30.0-34.9) (Chronic)    Pulmonary nodules (Chronic)    Overview     Incidental finding on imaging:  CT abd/pelvis 3/02/22:Small right lower lobe lung nodule.  Correlation with nonemergent chest CT could be considered for further evaluation.  F/u imaging:  CT Chest w/o contrast 12/09/22:  Multiple solid noncalcified pulmonary nodules throughout the lungs bilaterally with the largest measuring an average of 7 mm in the apex of the left upper lobe.  For multiple solid nodules with any 6 mm or greater, Fleischner Society guidelines recommend  follow up with non-contrast chest CT at 3-6 months and 18-24 months after discovery.         Relevant Orders    CT Chest Without Contrast     Other Visit Diagnoses       Solitary pulmonary nodule    -  Primary    Relevant Orders    CT Chest Without Contrast             Follow up in about 1 year (around 6/2/2024) for CT - soon then .    MEDICAL DECISION MAKING: Moderate to high complexity.  Overall, the multiple problems listed are of moderate to high severity that may impact quality of life and activities of daily living. Side effects of medications, treatment plan as well as options and alternatives reviewed and discussed with patient. There was counseling of patient concerning these issues.    Total time spent in counseling and coordination of care - 35  minutes of total time spent on the encounter, which includes face to face time and non-face to face time preparing to see the patient (eg, review of tests), Obtaining and/or reviewing separately obtained history, Documenting clinical information in the electronic or other health record, Independently interpreting results (not separately reported) and communicating results to the patient/family/caregiver, or Care coordination (not separately reported).    Time was used in discussion of prognosis, risks, benefits of treatment, instructions and compliance with regimen . Discussion with other physicians and/or health care providers - home health or for use of durable medical equipment (oxygen, nebulizers, CPAP, BiPAP) occurred.

## 2023-06-14 RX ORDER — BLOOD SUGAR DIAGNOSTIC
STRIP MISCELLANEOUS
Qty: 50 STRIP | Refills: 1 | Status: SHIPPED | OUTPATIENT
Start: 2023-06-14 | End: 2024-01-22 | Stop reason: SDUPTHER

## 2023-06-19 ENCOUNTER — OFFICE VISIT (OUTPATIENT)
Dept: PRIMARY CARE CLINIC | Facility: CLINIC | Age: 73
End: 2023-06-19
Payer: MEDICARE

## 2023-06-19 ENCOUNTER — SPECIALTY PHARMACY (OUTPATIENT)
Dept: PHARMACY | Facility: CLINIC | Age: 73
End: 2023-06-19
Payer: MEDICARE

## 2023-06-19 ENCOUNTER — CLINICAL SUPPORT (OUTPATIENT)
Dept: REHABILITATION | Facility: HOSPITAL | Age: 73
End: 2023-06-19
Payer: MEDICARE

## 2023-06-19 VITALS
DIASTOLIC BLOOD PRESSURE: 77 MMHG | OXYGEN SATURATION: 97 % | HEART RATE: 71 BPM | WEIGHT: 179.19 LBS | SYSTOLIC BLOOD PRESSURE: 149 MMHG | BODY MASS INDEX: 30.76 KG/M2

## 2023-06-19 DIAGNOSIS — E11.59 HYPERTENSION ASSOCIATED WITH DIABETES: ICD-10-CM

## 2023-06-19 DIAGNOSIS — R42 VERTIGO: ICD-10-CM

## 2023-06-19 DIAGNOSIS — E87.6 HYPOKALEMIA: ICD-10-CM

## 2023-06-19 DIAGNOSIS — I15.9 SECONDARY HYPERTENSION: Primary | ICD-10-CM

## 2023-06-19 DIAGNOSIS — H81.02 ENDOLYMPHATIC HYDROPS OF LEFT EAR: ICD-10-CM

## 2023-06-19 DIAGNOSIS — I15.2 HYPERTENSION ASSOCIATED WITH DIABETES: ICD-10-CM

## 2023-06-19 DIAGNOSIS — R42 DIZZINESS: ICD-10-CM

## 2023-06-19 LAB
ALBUMIN SERPL BCP-MCNC: 4.1 G/DL (ref 3.5–5.2)
ALP SERPL-CCNC: 102 U/L (ref 55–135)
ALT SERPL W/O P-5'-P-CCNC: 21 U/L (ref 10–44)
ANION GAP SERPL CALC-SCNC: 10 MMOL/L (ref 8–16)
AST SERPL-CCNC: 26 U/L (ref 10–40)
BILIRUB SERPL-MCNC: 0.2 MG/DL (ref 0.1–1)
BUN SERPL-MCNC: 15 MG/DL (ref 8–23)
CALCIUM SERPL-MCNC: 9.8 MG/DL (ref 8.7–10.5)
CHLORIDE SERPL-SCNC: 107 MMOL/L (ref 95–110)
CO2 SERPL-SCNC: 26 MMOL/L (ref 23–29)
CREAT SERPL-MCNC: 1 MG/DL (ref 0.5–1.4)
EST. GFR  (NO RACE VARIABLE): 59.1 ML/MIN/1.73 M^2
GLUCOSE SERPL-MCNC: 108 MG/DL (ref 70–110)
MAGNESIUM SERPL-MCNC: 2.4 MG/DL (ref 1.6–2.6)
POTASSIUM SERPL-SCNC: 4.1 MMOL/L (ref 3.5–5.1)
PROT SERPL-MCNC: 8.4 G/DL (ref 6–8.4)
SODIUM SERPL-SCNC: 143 MMOL/L (ref 136–145)

## 2023-06-19 PROCEDURE — 1101F PR PT FALLS ASSESS DOC 0-1 FALLS W/OUT INJ PAST YR: ICD-10-PCS | Mod: CPTII,S$GLB,, | Performed by: INTERNAL MEDICINE

## 2023-06-19 PROCEDURE — 3078F DIAST BP <80 MM HG: CPT | Mod: CPTII,S$GLB,, | Performed by: INTERNAL MEDICINE

## 2023-06-19 PROCEDURE — 99215 PR OFFICE/OUTPT VISIT, EST, LEVL V, 40-54 MIN: ICD-10-PCS | Mod: S$GLB,,, | Performed by: INTERNAL MEDICINE

## 2023-06-19 PROCEDURE — 99999 PR PBB SHADOW E&M-EST. PATIENT-LVL V: CPT | Mod: PBBFAC,,, | Performed by: INTERNAL MEDICINE

## 2023-06-19 PROCEDURE — 3077F PR MOST RECENT SYSTOLIC BLOOD PRESSURE >= 140 MM HG: ICD-10-PCS | Mod: CPTII,S$GLB,, | Performed by: INTERNAL MEDICINE

## 2023-06-19 PROCEDURE — 97162 PT EVAL MOD COMPLEX 30 MIN: CPT | Mod: PN

## 2023-06-19 PROCEDURE — 99215 OFFICE O/P EST HI 40 MIN: CPT | Mod: S$GLB,,, | Performed by: INTERNAL MEDICINE

## 2023-06-19 PROCEDURE — 3044F PR MOST RECENT HEMOGLOBIN A1C LEVEL <7.0%: ICD-10-PCS | Mod: CPTII,S$GLB,, | Performed by: INTERNAL MEDICINE

## 2023-06-19 PROCEDURE — 1159F MED LIST DOCD IN RCRD: CPT | Mod: CPTII,S$GLB,, | Performed by: INTERNAL MEDICINE

## 2023-06-19 PROCEDURE — 3044F HG A1C LEVEL LT 7.0%: CPT | Mod: CPTII,S$GLB,, | Performed by: INTERNAL MEDICINE

## 2023-06-19 PROCEDURE — 3061F PR NEG MICROALBUMINURIA RESULT DOCUMENTED/REVIEW: ICD-10-PCS | Mod: CPTII,S$GLB,, | Performed by: INTERNAL MEDICINE

## 2023-06-19 PROCEDURE — 3061F NEG MICROALBUMINURIA REV: CPT | Mod: CPTII,S$GLB,, | Performed by: INTERNAL MEDICINE

## 2023-06-19 PROCEDURE — 1126F AMNT PAIN NOTED NONE PRSNT: CPT | Mod: CPTII,S$GLB,, | Performed by: INTERNAL MEDICINE

## 2023-06-19 PROCEDURE — 4010F PR ACE/ARB THEARPY RXD/TAKEN: ICD-10-PCS | Mod: CPTII,S$GLB,, | Performed by: INTERNAL MEDICINE

## 2023-06-19 PROCEDURE — 1159F PR MEDICATION LIST DOCUMENTED IN MEDICAL RECORD: ICD-10-PCS | Mod: CPTII,S$GLB,, | Performed by: INTERNAL MEDICINE

## 2023-06-19 PROCEDURE — 1101F PT FALLS ASSESS-DOCD LE1/YR: CPT | Mod: CPTII,S$GLB,, | Performed by: INTERNAL MEDICINE

## 2023-06-19 PROCEDURE — 3008F BODY MASS INDEX DOCD: CPT | Mod: CPTII,S$GLB,, | Performed by: INTERNAL MEDICINE

## 2023-06-19 PROCEDURE — 4010F ACE/ARB THERAPY RXD/TAKEN: CPT | Mod: CPTII,S$GLB,, | Performed by: INTERNAL MEDICINE

## 2023-06-19 PROCEDURE — 80053 COMPREHEN METABOLIC PANEL: CPT | Performed by: INTERNAL MEDICINE

## 2023-06-19 PROCEDURE — 99999 PR PBB SHADOW E&M-EST. PATIENT-LVL V: ICD-10-PCS | Mod: PBBFAC,,, | Performed by: INTERNAL MEDICINE

## 2023-06-19 PROCEDURE — 83735 ASSAY OF MAGNESIUM: CPT | Performed by: INTERNAL MEDICINE

## 2023-06-19 PROCEDURE — 3078F PR MOST RECENT DIASTOLIC BLOOD PRESSURE < 80 MM HG: ICD-10-PCS | Mod: CPTII,S$GLB,, | Performed by: INTERNAL MEDICINE

## 2023-06-19 PROCEDURE — 3066F NEPHROPATHY DOC TX: CPT | Mod: CPTII,S$GLB,, | Performed by: INTERNAL MEDICINE

## 2023-06-19 PROCEDURE — 3008F PR BODY MASS INDEX (BMI) DOCUMENTED: ICD-10-PCS | Mod: CPTII,S$GLB,, | Performed by: INTERNAL MEDICINE

## 2023-06-19 PROCEDURE — 3066F PR DOCUMENTATION OF TREATMENT FOR NEPHROPATHY: ICD-10-PCS | Mod: CPTII,S$GLB,, | Performed by: INTERNAL MEDICINE

## 2023-06-19 PROCEDURE — 3288F FALL RISK ASSESSMENT DOCD: CPT | Mod: CPTII,S$GLB,, | Performed by: INTERNAL MEDICINE

## 2023-06-19 PROCEDURE — 1126F PR PAIN SEVERITY QUANTIFIED, NO PAIN PRESENT: ICD-10-PCS | Mod: CPTII,S$GLB,, | Performed by: INTERNAL MEDICINE

## 2023-06-19 PROCEDURE — 3288F PR FALLS RISK ASSESSMENT DOCUMENTED: ICD-10-PCS | Mod: CPTII,S$GLB,, | Performed by: INTERNAL MEDICINE

## 2023-06-19 PROCEDURE — 3077F SYST BP >= 140 MM HG: CPT | Mod: CPTII,S$GLB,, | Performed by: INTERNAL MEDICINE

## 2023-06-19 RX ORDER — MECLIZINE HYDROCHLORIDE 25 MG/1
25 TABLET ORAL 3 TIMES DAILY PRN
Qty: 60 TABLET | Refills: 1 | Status: SHIPPED | OUTPATIENT
Start: 2023-06-19 | End: 2023-07-13

## 2023-06-19 NOTE — PATIENT INSTRUCTIONS
Debrox to R ear 5 drops 2x daily x 4 days - can call for clinic f/u after to recheck ear if you wish  Recommend to apply both ears once weekly to help prevent wax build up    Recommend use intra-nasal steriod 2x day for next 2-4 weeks

## 2023-06-19 NOTE — PROGRESS NOTES
"Kristyn Garza  06/19/2023  [id number]    Moon Lion MD  Patient Care Team:  Moon Lion MD as PCP - General (Internal Medicine)  Delia Liu LPN as Care Coordinator (Internal Medicine)  Soo Bar as Digital Medicine Health   Dale Matute MD as Consulting Physician (Cardiology)  Hammad Hardy OD as Consulting Physician (Optometry)  Olivia Irwin MD as Consulting Physician (Otolaryngology)  Joanne MarsD as Hypertension Digital Medicine Clinician  Brenden Landaverde MD as Consulting Physician (Nephrology)  Joanne MarsD as Hyperlipidemia Digital Medicine Clinician  Joanne MarsD as Diabetes Digital Medicine Clinician  Cuca Beatty NP as Nurse Practitioner (Family Medicine)  Moon Lion MD as Hyperlipidemia Digital Medicine Responsible Provider (Internal Medicine)  Moon Lion MD as Diabetes Digital Medicine Responsible Provider (Internal Medicine)  Moon Lion MD as Hypertension Digital Medicine Responsible Provider (Internal Medicine)  Viky Oviedo, PharmD as Pharmacist (Pharmacist)  [employer] as Diabetes Digital Medicine Contract  [employer] as Hypertension Digital Medicine Contract    Visit Type:     Chief Complaint:  Chief Complaint   Patient presents with    Dizziness     Dizzy when bending over or getting out of bed   Issues with left ear   dizziness, lightheadedness    History of Present Illness: Ms. Kristyn Garza is a 72 year old female here w c/o dizziness, lightheadedness.     Has had issues w intermittent vertigo - this started Thursday in [employer]. Not as bad or persistent as has been w previous episodes - waxing and waning - no nausea. Exacerbated by turning head or bending down - changing head position. Sinus allergies have been more bothersome recently. L ear "popping". Using intranasal steriod once daily plus claritin.     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 She's enrolled w [employer] program for diabetes, hyperlipidemia and hypertension.    PHQ-4 Score: 0     From Nephro 23  A 24 hour urine showed somewhat elevated aldosterone. CT abd showed bilateral adrenal lesions. On her last visit with us in 2022, primary hyperaldosteronism was suspected, but the case was not clear. Therefore, pt was sent to Endocrine for their opinion.     Pt presents for f/u today. Endocrine note and w/u was reviewed. Noted CT abd was repeated, dexamethasone suppression test did not suggest pituitary Cushing's. Endocrine commented that renin-mediated aldosterone excess rather than primary hyperaldosteronism  was more likely present.    DDX: primary HTN. The adrenal nodules are incidentalomas  VS: mild hyperaldosteronism with hyperplasia  VS: early stage adrenal gland adenoma    From last O65+ visit 23  Pulm nodules noted on recent CT abd/pelvis still noted on more recent CT chest.  Former smoker - 1/2 pack a day for about 25 years, quit about 13 years ago.  Feeling sad because neighbor's son  unexpectedly then neighbor Amira Coy  -  this past Sat.   Children, grandchildren friends visit - granddaughter moved across the street w 4 year old grandson  Home SBP good 120/65 last night - occasional lightheaded  Ran out of strips so hasn't been checking BS's about a week   Before that fasting am BS  occasional 120  Says got constipation from metformin so went back to taking just once daily  Still walking - 10,000 steps some days   C/o pain back of L leg  C/o dry mouth at night and when waking in am    Recent appointments:   23 Pulm Khan pulm nodules f/u 1 yr w CT   23 Nephro Kamyar adrenal nodules f/u 1 yr  23 O65+ Lion  2/10/23 O65+ Jaci EAWV    Upcoming appointments:  Future  Appointments       Date Provider Specialty Appt Notes    6/19/2023 Leeanne Kumar, [facility] Vertigo [R42] Being seen at 1:30    6/21/2023  Radiology     7/26/2023 Cuca Beatty NP Primary Care 3 month f/u    12/8/2023 Soto Khan MD Pulmonology 6m ct f/u           The following were reviewed: Active problem list, medication list, allergies, family history, social history, and Health Maintenance.     Medications have been reviewed and reconciled with patient at visit today.    Review of Systems   See HPI above    Exam: Initial VS /60 P 66 sat 98%  Not orthostatic   Lying   /51 pulse 60  Stand 1 min           138/68     72  Stand 3 min   Vitals:    06/19/23 1303   BP: (!) 149/77   Pulse: 71     Weight: 81.3 kg (179 lb 3.2 oz)   Body mass index is 30.76 kg/m².    BP Readings from Last 3 Encounters:   06/19/23 (!) 149/77   06/02/23 136/82   05/02/23 (!) 150/84      Wt Readings from Last 3 Encounters:   06/19/23 1253 81.3 kg (179 lb 3.2 oz)   06/02/23 1103 82.4 kg (181 lb 10.5 oz)   05/02/23 1427 82 kg (180 lb 12.4 oz)      Physical Exam  Vitals reviewed.   Constitutional:       General: She is not in acute distress.     Appearance: Normal appearance. She is obese. She is not ill-appearing.   HENT:      Head: Normocephalic and atraumatic.      Right Ear: External ear normal.      Left Ear: Tympanic membrane, ear canal and external ear normal.      Ears:      Comments: Partial cerumen impaction on R   Following manual manipulation able to view R TM - opaque/cloudy     Nose: Nose normal. No congestion or rhinorrhea.      Mouth/Throat:      Mouth: Mucous membranes are moist.      Pharynx: Oropharynx is clear.   Eyes:      Extraocular Movements: Extraocular movements intact.      Conjunctiva/sclera: Conjunctivae normal.      Comments: glasses   Neck:      Vascular: No carotid bruit.   Cardiovascular:      Rate and Rhythm: Normal rate and regular rhythm.   Pulmonary:      Effort: Pulmonary effort  is normal.      Breath sounds: Normal breath sounds.   Lymphadenopathy:      Cervical: No cervical adenopathy.   Skin:     General: Skin is warm and dry.   Neurological:      General: No focal deficit present.      Mental Status: She is alert and oriented to person, place, and time.      Comments: Note keeping head still    Psychiatric:         Mood and Affect: Mood normal.         Behavior: Behavior normal.         Thought Content: Thought content normal.         Judgment: Judgment normal.      Laboratory Reviewed  Lab Results   Component Value Date    WBC 9.34 05/12/2023    HGB 12.0 05/12/2023    HCT 38.5 05/12/2023     05/12/2023    MCV 97 05/12/2023    CHOL 131 05/12/2023    TRIG 66 05/12/2023    HDL 43 05/12/2023    LDLCALC 74.8 05/12/2023    ALT 19 01/18/2022    AST 20 01/18/2022     04/25/2023    K 3.5 04/25/2023     04/25/2023    CREATININE 0.9 04/25/2023    BUN 13 04/25/2023    CO2 33 (H) 04/25/2023    MG 2.1 11/10/2022    TSH 0.728 05/12/2023    FREET4 0.88 05/12/2023    HGBA1C 6.2 (H) 05/12/2023     Lab Results   Component Value Date    PTH 61.0 02/10/2023    CALCIUM 9.4 04/25/2023    PHOS 3.1 04/25/2023      Lab Results   Component Value Date    MPBOXYWZ20 785 02/10/2023   No results found for: FOLATE No results found for: UIBC, IRON, IRON, TRANS, TRANSFERRIN, TIBC, TIBC, LABIRON, FESATURATED   Lab Results   Component Value Date    EGFRNORACEVR >60.0 04/25/2023    ALBUMIN 3.7 04/25/2023      24 hour urine reviewed, K 42, aldosterone 34, cortisol normal  Serum metanephrine 25  Early am ziggy/renin 95/7.4     Urine prot/cr zero     CT abd: reviewed  Bilateral small adrenal gland nodules which are technically indeterminate.  Further evaluation/MRI could be performed for characterization.     Repeat CT abd from Sept 2022 reviewed  There is a 16 mm nodule involving the medial limb of the left adrenal nodule which demonstrates absolute washout of 73% and relative washout of 56%, in keeping  with a benign adrenal adenoma.  There is a 13 mm adrenal nodule involving the lateral limb of the right adrenal gland which demonstrates precontrast Hounsfield attenuation of 6.0, in keeping with a benign lipid rich adenoma.  Bilateral adrenal nodules are unchanged in size from prior.    Abnormal CT of abdomen in the past 9/27/2022  1. Small bilateral adrenal adenomas as above  2. Otherwise no acute findings demonstrated in the abdomen or pelvis.  3. Small bilateral pulmonary nodule seen in the lung bases measuring up to 6 mm in the right lower lobe.  For multiple solid nodules with any 6 mm or greater, Fleischner Society guidelines recommend follow up with non-contrast chest CT at 3-6 months and 18-24 months after discovery.    Assessment:   72 y.o. female with multiple co-morbid illnesses here for continued follow up of medical problems.      The primary encounter diagnosis was Secondary hypertension. Diagnoses of Vertigo, Hypokalemia, and Endolymphatic hydrops of left ear were also pertinent to this visit.      Plan:   1. Secondary hypertension  -     Comprehensive Metabolic Panel; Future; Expected date: 06/19/2023  -     Magnesium; Future; Expected date: 06/19/2023    2. Vertigo  -     Comprehensive Metabolic Panel; Future; Expected date: 06/19/2023  -     Magnesium; Future; Expected date: 06/19/2023  -     Ambulatory referral/consult to Physical/Occupational Therapy; Future; Expected date: 06/19/2023    3. Hypokalemia  -     Comprehensive Metabolic Panel; Future; Expected date: 06/19/2023  -     Magnesium; Future; Expected date: 06/19/2023    4. Endolymphatic hydrops of left ear  -     meclizine (ANTIVERT) 25 mg tablet; Take 1 tablet (25 mg total) by mouth 3 (three) times daily as needed for Dizziness or Nausea.  Dispense: 60 tablet; Refill: 1         Health Maintenance         Date Due Completion Date    Shingles Vaccine (2 of 3) 04/20/2011 2/23/2011    TETANUS VACCINE 02/23/2021 2/23/2011    COVID-19 Vaccine  (4 - Moderna series) 02/09/2022 12/15/2021    Foot Exam 10/19/2023 10/19/2022    Override on 9/23/2015: Done    Override on 7/16/2015: Done    Override on 3/23/2015: Done    Override on 12/3/2014: Done    Override on 4/29/2014: Done    Hemoglobin A1c 11/12/2023 5/12/2023    LDCT Lung Screen 12/09/2023 12/9/2022    Eye Exam 01/30/2024 1/30/2023    Override on 7/20/2020: Done    Override on 4/30/2019: Done    Override on 4/24/2018: Done    Override on 5/17/2017: Done    Override on 5/16/2016: Done    Override on 5/13/2015: Done    Diabetes Urine Screening 02/10/2024 2/10/2023    Colorectal Cancer Screening 03/18/2024 3/18/2014    Override on 6/1/2006: Done    Lipid Panel 05/12/2024 5/12/2023    Mammogram 05/29/2024 5/29/2023    Override on 3/13/2013: Done    DEXA Scan 05/26/2026 5/26/2021    Override on 3/10/2011: Done (normal repeat in 5 years)          -Patient's lab results were reviewed and discussed with patient  -Treatment options and alternatives were discussed with the patient. Patient expressed understanding. Patient was given the opportunity to ask questions and be an active participant in their medical care. Patient had no further questions or concerns at this time.   -Patient is an overall moderate to HIGH risk for health complications from their medical conditions.     Follow up: No follow-ups on file.    Care Plan/Goals: Reviewed No   Goals         80 <= Glucose <= 180 (pt-stated)       11/15/22  On track        Blood Pressure < 130/80 (pt-stated)       11/15/22  On track        Take at least one BP reading per week at various times of the day       11/15/22  On track        Weight (lb) < 90.7 kg (200 lb) (pt-stated)       Update 11/15/22    On track - weight < 200 lbs - new goal 10 lbs wt loss over the next 2 mos.    Goal 5% weight loss - 9-10 lbs - 175 seth    Barriers? Sweet-tooth    Overcoming? Try substitute fruit? Daily walking - current step average about 5,000 steps - new goal 7,000 steps.    Time  frame 3 mos - mid Jan 2023.                After visit summary printed and given to patient upon discharge.  Patient goals and care plan are included in After visit summary.    TOTAL TIME evaluating and managing this patient for this encounter was greater than 50 minutes. This time was spent personally by me on some of the following activities: review of patient's past medical history, assessing age-appropriate health maintenance needs, review of any interval history, review and interpretation of lab results, review and interpretation of imaging test results, review and interpretation of cardiology test results, reviewing consulting specialist notes, obtaining history from the patient and family, examination of the patient, medication reconciliation, managing and/or ordering prescription medications, ordering imaging tests, ordering referral to subspecialty provider(s), educating patient and answering their questions about diagnosis, treatment plan, and goals of treatment, discussing planned follow-up and final documentation of the visit. This time was exclusive of any separately billable procedures for this patient and exclusive of time spent treating any other patients.

## 2023-06-19 NOTE — TELEPHONE ENCOUNTER
Outgoing call regarding Repatha refill. Pt stated that she is due to inject on 7/1/23, pt stated we can call her back on 6/23/23 to follow up for the refill.

## 2023-06-19 NOTE — PLAN OF CARE
[facility]   Physical Therapy Initial Evaluation        Date: 6/19/2023   Name: Kristyn Garza  Clinic Number: [id number]    Therapy Diagnosis:   Encounter Diagnosis   Name Primary?    Vertigo      Physician: Moon Lion MD    Physician Orders: PT Eval and Treat   Medical Diagnosis from Referral: R42 (ICD-10-CM) - Vertigo  Evaluation Date: 6/19/2023  Authorization Period Expiration: 6/18/24  Plan of Care Expiration: 8/13/23  Progress Note Due: 7/19/23  Visit # / Visits authorized: 1/ 1   FOTO: 0/3 not available due to walk-over eval at 65+ [place]    Precautions: Standard     Time In: 1335  Time Out: 1415  Total Appointment Time (timed & untimed codes): 40 minutes      SUBJECTIVE     Date of onset: She reports having vertigo initially over 5 years ago.    History of current condition - Kristyn reports: She reports not having it over the past 1.5 to 2 years.  She thought she was having a sinus infection and last Thursday she got up and sat on the edge of the bed and the room was spinning.  She had a funny feeling - like maybe being off balanced. She started to feel that she was off with her mobility, listing to the side and running into walls .  Her dizziness is worse when tipping head down and returning to standing.  No symptoms with sit to supine or rolling.    Current Activity Level: She reports being more cautious.      Falls: none    Imaging: no significant recent imaging    Prior Therapy: none, vertigo was treated by ENT in the past  Social History:  lives alone 1 story home.   Occupation: retired  Prior Level of Function: independent   Current Level of Function: independent with increased time.     Pain:  none    Patients goals: to improve dizziness symptom     Medical History:   Past Medical History:   Diagnosis Date    Carpal tunnel syndrome     CKD (chronic kidney disease) stage 3, GFR 30-59 ml/min     Followed by Nephrology    Colon cancer screening 3/18/2014    CTS  (carpal tunnel syndrome) 6/18/2013    Diabetes mellitus, type 2     Eczema     Elevated CPK     History of hypokalemia 6/18/2013    History of hypokalemia 6/18/2013    Hypokalemia     Multinodular thyroid     Followed by ENT    Obesity     Other and unspecified hyperlipidemia     Pneumonia     in her 20s; hospitalized    Postmenopausal     No history of abnormal pap smear    Statin intolerance     caused mylagia, elevated CPK    Tobacco dependence     resolved    Unspecified essential hypertension        Surgical History:   Kristyn Garza  has a past surgical history that includes Partial hysterectomy (1991); Colonoscopy; Fine needle aspiration (3/2011); Cyst Removal (2015); and Hysterectomy.    Medications:   Kristyn has a current medication list which includes the following prescription(s): accu-chek smartview test strip, amlodipine, aspirin, benazepril, blood glucose control, normal, blood-glucose meter, calcium-vitamin d3, cranberry fruit extract, repatha sureclick, lancets, loratadine, meclizine, metformin, metoprolol succinate, folic acid/multivit-min/lutein, spironolactone, and triamcinolone.    Allergies:   Review of patient's allergies indicates:   Allergen Reactions    Statins-hmg-coa reductase inhibitors      Muscle Cramps          OBJECTIVE   Vitals: assessed in standing to rule out hypotension   O2 sat = 98%, HR = 60, standing BP at Left upper extremity = 138/70    Posture:  Pt presents with postural abnormalities which include:    [] Forward Head   [] Increased Lumbar Lordosis   [] Rounded Shoulder   [] Genu Recurvatum   [] Increased Thoracic Kyphosis [] Genu Valgus   [] Trunk Deviated    [] Pes Planus   [] Scapular Winging    [x] Other: Within Normal Limits       Strength:    L/E MMT Right  (spine) Left Pain/Dysfunction with Movement   Hip Flexion  4/5 4/5    Hip Extension  3+/5 3+/5    Knee Extension 4+/5 4+/5    Knee Flexion 4/5 4/5    Ankle DF 5/5 5/5    Ankle PF 5/5 5/5     Bilateral upper  "extremity strength Within Normal Limits     Sensation:  [x] Intact to Light Touch   [] Impaired:    Gait Analysis: The patient ambulated with the following assistive device: none; the pt presents with the following gait abnormalities:  unsteady at times due to dizziness    Vision:  Type Response Comments   Tracking "H" Pattern N    Horizontal Saccade N    Vertical Saccade N    VOR Horizontal N    VOR Vertical N    *N = normal    Vertigo Assessment:  Hallpike Chet to Right with no response with motion only with movement to Left moving back to sitting with minimal dizziness lasting 5-10 seconds.    Hallpike Chet to Left with moderate dizziness lasting 20 seconds so Epley maneuver performed then dizziness returning with movement to Left (from Right side lying to sitting) with moderate to Maximum intensity lasting for 20 seconds.      Intake Outcome Measure for FOTO  Survey - not available     Therapist reviewed FOTO scores for Kristyn Garza on 6/19/2023.   FOTO documents entered into [employer] - see Media section.    Intake Score: %         TREATMENT     Total Treatment time (time-based codes) separate from Evaluation: 10 minutes      Kristyn received the treatments listed below:      therapeutic activities to improve functional performance for 0  minutes, including:    Intervention 6/19/2023  Parameters   Patient educated on purpose of Epley Maneuver and attempt was made to address symptoms  Patient educated on avoiding positions that would increase symptoms such as tipping head down and to sleep on elevated surface tonight.   Discussed follow up with therapist at [facility] to see if symptoms could be resolved with more vestibular therapy.  Discussed [facility] as another option if symptoms are not resolved with plan of care.  x - Patient verbalized good understanding of education                              PATIENT EDUCATION AND HOME EXERCISES     Education provided:   - Patient  was instructed in home " exercise program  to address the deficits listed above and to address overall condition and quality of life. Patient  was encouraged to participate in cardiovascular exercise and wellness exercise in fitness center with assistance of health  at [address].   - Patient was educated on all the above exercise prior/during/after for proper posture, positioning, and execution for safe performance with home exercise program.    Written Home Exercises Provided:  See patient education above . Exercises were reviewed and Kristyn was able to demonstrate them prior to the end of the session.  Kristyn demonstrated good  understanding of the education provided. See EMR under Patient Instructions for exercises provided during therapy sessions.    ASSESSMENT     Kristyn is a 72 y.o. female referred to outpatient Physical Therapy with a medical diagnosis of R42 (ICD-10-CM) - Vertigo. The patient presents with signs and symptoms consistent with diagnosis along with impairments which include  dizziness with balance deficits .    Patient  will require follow up at another location for more frequent physical therapy or services not offered at this location    Patient prognosis is Fair to Good based on chronicity of condition.   Patient will benefit from skilled outpatient Physical Therapy to address the deficits stated above and in the chart below, provide patient /family education, and to maximize patientt's level of independence.     Plan of care discussed with patient: Yes  Patient's spiritual, cultural and educational needs considered and patient is agreeable to the plan of care and goals as stated below:     Anticipated Barriers for therapy: chronicity of condition and lack of understanding of condition    Medical Necessity is demonstrated by the following   History  Co-morbidities and personal factors that may impact the plan of care [] LOW: no personal factors / co-morbidities  [] MODERATE: 1-2 personal factors /  co-morbidities  [x] HIGH: 3+ personal factors / co-morbidities    Moderate / High Support Documentation:   Past Medical History:   Diagnosis Date    Carpal tunnel syndrome     CKD (chronic kidney disease) stage 3, GFR 30-59 ml/min     Followed by Nephrology    Colon cancer screening 3/18/2014    CTS (carpal tunnel syndrome) 6/18/2013    Diabetes mellitus, type 2     Eczema     Elevated CPK     History of hypokalemia 6/18/2013    History of hypokalemia 6/18/2013    Hypokalemia     Multinodular thyroid     Followed by ENT    Obesity     Other and unspecified hyperlipidemia     Pneumonia     in her 20s; hospitalized    Postmenopausal     No history of abnormal pap smear    Statin intolerance     caused mylagia, elevated CPK    Tobacco dependence     resolved    Unspecified essential hypertension          Examination  Body Structures and Functions, activity limitations and participation restrictions that may impact the plan of care [] LOW: addressing 1-2 elements  [x] MODERATE: 2+ elements  [] HIGH: 3+ elements (please support below)    Moderate / High Support Documentation: see evaluation/objective measurements above.     Clinical Presentation [] LOW: stable  [x] MODERATE: Evolving  [] HIGH: Unstable     Decision Making/ Complexity Score: moderate         Goals:    Long Term Goals: 8 weeks   Pt will be independent with self management of his/her condition with home exercise program, posture, positioning and improved body mechanics.  Patient will report minimal to no symptoms of dizziness with functional tasks.   3.   Pt will safely transition to and participate in wellness/fitness program.     PLAN   Plan of care Certification: 6/19/2023 to 8/13/23.    Outpatient Physical Therapy 1-2 times/  8 week(s) to include the following interventions: Electrical Stimulation Attended, Gait Training, Manual Therapy, Moist Heat/ Ice, Neuromuscular Re-ed, Orthotic Management and Training, Patient Education, Self Care, Therapeutic  Activities, Therapeutic Exercise, and Dry Needling to establish safe and effective exercise program that patient can perform independently. Health  will contact patient either by phone or in person weekly to monitor exercise program, answer questions regarding exercises and encourage follow up in fitness center. Health  will communicate any concerns with treating therapist and will recommend follow up with physical therapist as needed.     Leeanne Kumar, PT

## 2023-06-20 NOTE — PROGRESS NOTES
Pt has [facility] - if message below not viewed please call w information below:     Dear MsRoxie Greg   Your renal function is stable and electrolytes - including magnesium - are good!    Please message or call with any questions or concerns!  Thank you!  Moon Lion MD, MPH  [facility]

## 2023-06-21 ENCOUNTER — HOSPITAL ENCOUNTER (OUTPATIENT)
Dept: RADIOLOGY | Facility: HOSPITAL | Age: 73
Discharge: HOME | End: 2023-06-21
Attending: INTERNAL MEDICINE
Payer: MEDICARE

## 2023-06-21 DIAGNOSIS — R91.8 PULMONARY NODULES: ICD-10-CM

## 2023-06-21 DIAGNOSIS — R91.1 SOLITARY PULMONARY NODULE: ICD-10-CM

## 2023-06-21 PROCEDURE — 71250 CT THORAX DX C-: CPT | Mod: 26,,, | Performed by: RADIOLOGY

## 2023-06-21 PROCEDURE — 71250 CT CHEST WITHOUT CONTRAST: ICD-10-PCS | Mod: 26,,, | Performed by: RADIOLOGY

## 2023-06-21 PROCEDURE — 71250 CT THORAX DX C-: CPT | Mod: TC

## 2023-06-21 RX ORDER — SPIRONOLACTONE 50 MG/1
TABLET, FILM COATED ORAL
Qty: 90 TABLET | Refills: 2 | Status: SHIPPED | OUTPATIENT
Start: 2023-06-21 | End: 2023-07-13

## 2023-06-23 NOTE — TELEPHONE ENCOUNTER
Specialty Pharmacy - Refill Coordination    Specialty Medication Orders Linked to Encounter      Flowsheet Row Most Recent Value   Medication #1 evolocumab (REPATHA SURECLICK) 140 mg/mL PnIj (Order#[id number], Rx#[id number])            Refill Questions - Documented Responses      Flowsheet Row Most Recent Value   Patient Availability and HIPAA Verification    Does patient want to proceed with activity? Yes   HIPAA/medical authority confirmed? Yes   Relationship to patient of person spoken to? Self   Refill Screening Questions    Would patient like to speak to a pharmacist? No   When does the patient need to receive the medication? 06/28/23   Refill Delivery Questions    How will the patient receive the medication? MEDRx   When does the patient need to receive the medication? 06/28/23            Current Outpatient Medications   Medication Sig    ACCU-CHEK SMARTVIEW TEST STRIP Strp TO CHECK BLOOD GLUCOSE ONE TIMES DAILY    amLODIPine (NORVASC) 10 MG tablet TAKE 1 TABLET BY MOUTH EVERY DAY    aspirin 81 MG Chew Take 81 mg by mouth once daily.    benazepriL (LOTENSIN) 40 MG tablet Take 1 tablet (40 mg total) by mouth once daily.    blood glucose control, normal Soln 1 Bottle by Misc.(Non-Drug; Combo Route) route once daily. For Accu Check Amber  use twice daily prn dx: E11.9    blood-glucose meter (ACCU-CHEK AMBER) Misc 1 kit by Misc.(Non-Drug; Combo Route) route once. For Accu Check Amber  use twice daily prn dx: E11.9 for 1 dose    calcium-vitamin D3 500 mg(1,250mg) -200 unit per tablet Take 1 tablet by mouth once daily.     CRANBERRY EXTRACT ORAL Take 1 tablet by mouth once daily.    evolocumab (REPATHA SURECLICK) 140 mg/mL PnIj Inject 1 mL (140 mg total) into the skin every 14 (fourteen) days.    lancets Misc 1 lancet by Misc.(Non-Drug; Combo Route) route once daily. For Acc-Chek Amber   DX :E11.9    loratadine (CLARITIN) 10 mg tablet TAKE 1 TABLET BY MOUTH EVERY DAY    meclizine (ANTIVERT) 25 mg tablet Take 1 tablet  (25 mg total) by mouth 3 (three) times daily as needed for Dizziness or Nausea.    metFORMIN (GLUCOPHAGE-XR) 500 MG ER 24hr tablet With protein (low or no-carb) twice daily    metoprolol succinate (TOPROL-XL) 25 MG 24 hr tablet TAKE 1 TABLET BY MOUTH EVERY DAY    MULTIVITAMIN W-MINERALS/LUTEIN (CENTRUM SILVER ORAL) Take 1 tablet by mouth once daily.    spironolactone (ALDACTONE) 50 MG tablet TAKE 1 TABLET BY MOUTH EVERY DAY    triamcinolone (NASACORT) 55 mcg nasal inhaler USE 2 SPRAYS BY NASAL ROUTE ONCE DAILY   Last reviewed on 6/19/2023 12:55 PM by Ruth Donnelly, RN    Review of patient's allergies indicates:   Allergen Reactions    Statins-hmg-coa reductase inhibitors      Muscle Cramps    Last reviewed on  6/19/2023 12:53 PM by Ruth Donnelly      Tasks added this encounter   No tasks added.   Tasks due within next 3 months   No tasks due.     [facility]  [address] [place] [id number]  Phone: [phone]  Fax: [phone]

## 2023-07-10 ENCOUNTER — PATIENT MESSAGE (OUTPATIENT)
Dept: ADMINISTRATIVE | Facility: OTHER | Age: 73
End: 2023-07-10
Payer: MEDICARE

## 2023-07-10 ENCOUNTER — CLINICAL SUPPORT (OUTPATIENT)
Dept: REHABILITATION | Facility: HOSPITAL | Age: 73
End: 2023-07-10
Payer: MEDICARE

## 2023-07-10 DIAGNOSIS — R42 DIZZINESS: Primary | ICD-10-CM

## 2023-07-10 PROCEDURE — 97110 THERAPEUTIC EXERCISES: CPT

## 2023-07-10 NOTE — PROGRESS NOTES
[facility]   Physical Therapy Treatment Note        Name: Kristyn Garza  Clinic Number: [id number]    Therapy Diagnosis:   Encounter Diagnosis   Name Primary?    Dizziness Yes     Physician: Moon Lion MD    Visit Date: 7/10/2023    Physician Orders: PT Eval and Treat   Medical Diagnosis from Referral: R42 (ICD-10-CM) - Vertigo  Evaluation Date: 6/19/2023  Authorization Period Expiration: 6/18/24  Plan of Care Expiration: 8/13/23  Progress Note Due: 7/19/23  Visit # / Visits authorized: 1/ 16 (+ 1 eval)  FOTO: 0/3 not available due to walk-over eval at 65+ [place]     Precautions: Standard   PTA Visit #: 0/5     Time In: 0830  Time Out: 0915  Total Billable Time: 45 minutes (Billing reflects 1 on 1 treatment time spent with patient)    Subjective     Patient reports: that dizziness is significantly decreased this week.    He/She was compliant with home exercise program.  Response to previous treatment: decreased dizziness  Functional change: decreased dizziness    Pain: 0/10     Location: N/A  Did report of dizziness previously but reports that dizziness would be rated as a 1/10 at this time.    Objective      Objective Measures updated at progress report or POC update only unless otherwise noted.       Treatment     Kristyn received the treatments listed below:     THERAPEUTIC EXERCISES to develop strength, endurance, ROM, flexibility, posture, and core stabilization for (45) minutes including:    Intervention Performed Today    Treadmill walking x 1.5 mph x 8 minutes   Tandem stance with eye activities X  X  X  x Horizontal smooth pursuit - 2x20 in each position  Vertical smooth pursuit - 2x20 in each position  VOR horizontal - 2x20 in each position  VOR vertical - 2x20 in each position   Sit to stand x 2x15   Walking with head movement (every 3-5 steps) X  x Vertical head movement - 4 passes in the clinic  Horizontal head movement - 4 passes in the clinic                          Plan for Next Visit:         Patient Education and Home Exercises       Home Exercises Provided and Patient Education Provided     Education provided: (5) minutes  Educated patient on performance of exercises to be performed at home/gym to work on habituation    Written Home Exercises Provided: yes.  Exercises were reviewed and Kristyn was able to demonstrate them prior to the end of the session.  Kristyn demonstrated good  understanding of the education provided. See EMR under Patient Instructions for exercises provided during therapy sessions.    Assessment     Patient reports that dizziness is decreased.  She is looking toward participation in a [employer] exercise program.  She also demonstrates good performance with all exercises.  The patient reports that her symptoms are significantly decreased.  Therefore, worked on activities for habituation in standing and walking.  Issued HEP.  Patient appears independent to perform activities at home.    Kristyn is progressing well towards her goals.   Patient prognosis is Good.     Patient will continue to benefit from skilled outpatient physical therapy to address the deficits listed in the problem list box on initial evaluation, provide pt/family education and to maximize patient's level of independence in the home and community environment.     Patient's spiritual, cultural and educational needs considered and pt agreeable to plan of care and goals.     Anticipated Barriers for therapy: chronicity of condition and lack of understanding of condition    Goals:     Long Term Goals: 8 weeks   Pt will be independent with self management of his/her condition with home exercise program, posture, positioning and improved body mechanics.  Patient will report minimal to no symptoms of dizziness with functional tasks.   3.   Pt will safely transition to and participate in wellness/fitness program.     Plan     Continue Plan of Care (POC) and progress per patient tolerance.  See treatment section for details on planned progressions next session.      Asher Castillo, PT

## 2023-07-11 DIAGNOSIS — E11.59 HYPERTENSION ASSOCIATED WITH DIABETES: ICD-10-CM

## 2023-07-11 DIAGNOSIS — I15.2 HYPERTENSION ASSOCIATED WITH DIABETES: ICD-10-CM

## 2023-07-11 DIAGNOSIS — H81.02 ENDOLYMPHATIC HYDROPS OF LEFT EAR: ICD-10-CM

## 2023-07-12 RX ORDER — METOPROLOL SUCCINATE 25 MG/1
25 TABLET, EXTENDED RELEASE ORAL DAILY
Qty: 90 TABLET | Refills: 3 | Status: SHIPPED | OUTPATIENT
Start: 2023-07-12

## 2023-07-12 RX ORDER — AMLODIPINE BESYLATE 10 MG/1
10 TABLET ORAL DAILY
Qty: 90 TABLET | Refills: 3 | Status: SHIPPED | OUTPATIENT
Start: 2023-07-12

## 2023-07-12 RX ORDER — BENAZEPRIL HYDROCHLORIDE 40 MG/1
40 TABLET ORAL DAILY
Qty: 90 TABLET | Refills: 3 | Status: SHIPPED | OUTPATIENT
Start: 2023-07-12 | End: 2023-12-03 | Stop reason: SDUPTHER

## 2023-07-13 RX ORDER — SPIRONOLACTONE 50 MG/1
50 TABLET, FILM COATED ORAL DAILY
Qty: 90 TABLET | Refills: 1 | Status: SHIPPED | OUTPATIENT
Start: 2023-07-13 | End: 2024-02-09

## 2023-07-13 RX ORDER — METFORMIN HYDROCHLORIDE 500 MG/1
500 TABLET, EXTENDED RELEASE ORAL 2 TIMES DAILY WITH MEALS
Qty: 180 TABLET | Refills: 1 | Status: SHIPPED | OUTPATIENT
Start: 2023-07-13 | End: 2023-11-27 | Stop reason: SINTOL

## 2023-07-13 RX ORDER — MECLIZINE HYDROCHLORIDE 25 MG/1
25 TABLET ORAL 3 TIMES DAILY PRN
Qty: 60 TABLET | Refills: 5 | Status: SHIPPED | OUTPATIENT
Start: 2023-07-13 | End: 2023-11-27 | Stop reason: ALTCHOICE

## 2023-07-19 ENCOUNTER — SPECIALTY PHARMACY (OUTPATIENT)
Dept: PHARMACY | Facility: CLINIC | Age: 73
End: 2023-07-19
Payer: MEDICARE

## 2023-07-19 NOTE — TELEPHONE ENCOUNTER
Incoming call from patient, patient compeltes injections on the 1st and 15th of each month, informed patient [facility] will follow back up closer to next injection day. Pending refill call to 7/25/23.

## 2023-07-25 NOTE — TELEPHONE ENCOUNTER
Specialty Pharmacy - Refill Coordination    Specialty Medication Orders Linked to Encounter      Flowsheet Row Most Recent Value   Medication #1 evolocumab (REPATHA SURECLICK) 140 mg/mL PnIj (Order#[id number], Rx#[id number])            Refill Questions - Documented Responses      Flowsheet Row Most Recent Value   Patient Availability and HIPAA Verification    Does patient want to proceed with activity? Yes   HIPAA/medical authority confirmed? Yes   Relationship to patient of person spoken to? Self   Refill Screening Questions    Would patient like to speak to a pharmacist? No   When does the patient need to receive the medication? 08/01/23   Refill Delivery Questions    How will the patient receive the medication? MEDRx   When does the patient need to receive the medication? 08/01/23   Shipping Address Home   Address in [facility] confirmed and updated if neccessary? Yes   Expected Copay ($) 0   Is the patient able to afford the medication copay? Yes   Payment Method zero copay   Days supply of Refill 28   Refill activity completed? Yes   Refill activity plan Refill scheduled   Shipment/Pickup Date: 07/27/23            Current Outpatient Medications   Medication Sig    ACCU-CHEK SMARTVIEW TEST STRIP Strp TO CHECK BLOOD GLUCOSE ONE TIMES DAILY    amLODIPine (NORVASC) 10 MG tablet Take 1 tablet (10 mg total) by mouth once daily.    aspirin 81 MG Chew Take 81 mg by mouth once daily.    benazepriL (LOTENSIN) 40 MG tablet Take 1 tablet (40 mg total) by mouth once daily.    blood glucose control, normal Soln 1 Bottle by Misc.(Non-Drug; Combo Route) route once daily. For Accu Check Amber  use twice daily prn dx: E11.9    blood-glucose meter (ACCU-CHEK AMBER) Misc 1 kit by Misc.(Non-Drug; Combo Route) route once. For Accu Check Amber  use twice daily prn dx: E11.9 for 1 dose    calcium-vitamin D3 500 mg(1,250mg) -200 unit per tablet Take 1 tablet by mouth once daily.     CRANBERRY EXTRACT ORAL Take 1 tablet by mouth  once daily.    evolocumab (REPATHA SURECLICK) 140 mg/mL PnIj Inject 1 mL (140 mg total) into the skin every 14 (fourteen) days.    lancets Misc 1 lancet by Misc.(Non-Drug; Combo Route) route once daily. For Acc-Chek Irais   DX :E11.9    loratadine (CLARITIN) 10 mg tablet TAKE 1 TABLET BY MOUTH EVERY DAY    meclizine (ANTIVERT) 25 mg tablet Take 1 tablet (25 mg total) by mouth 3 (three) times daily as needed for Dizziness.    metFORMIN (GLUCOPHAGE-XR) 500 MG ER 24hr tablet Take 1 tablet (500 mg total) by mouth 2 (two) times daily with meals. Take w meal higher in protein, low carb    metoprolol succinate (TOPROL-XL) 25 MG 24 hr tablet Take 1 tablet (25 mg total) by mouth once daily.    MULTIVITAMIN W-MINERALS/LUTEIN (CENTRUM SILVER ORAL) Take 1 tablet by mouth once daily.    spironolactone (ALDACTONE) 50 MG tablet Take 1 tablet (50 mg total) by mouth once daily.    triamcinolone (NASACORT) 55 mcg nasal inhaler USE 2 SPRAYS BY NASAL ROUTE ONCE DAILY   Last reviewed on 6/19/2023 12:55 PM by Ruth Donnelly RN    Review of patient's allergies indicates:   Allergen Reactions    Statins-hmg-coa reductase inhibitors      Muscle Cramps    Last reviewed on  6/19/2023 12:53 PM by Ruth Donnelly      Tasks added this encounter   No tasks added.   Tasks due within next 3 months   No tasks due.     [facility]  [address] [place] [id number]  Phone: [phone]  Fax: [phone]

## 2023-07-26 ENCOUNTER — OFFICE VISIT (OUTPATIENT)
Dept: PRIMARY CARE CLINIC | Facility: CLINIC | Age: 73
End: 2023-07-26
Payer: MEDICARE

## 2023-07-26 VITALS
HEART RATE: 62 BPM | DIASTOLIC BLOOD PRESSURE: 70 MMHG | TEMPERATURE: 98 F | WEIGHT: 181.88 LBS | SYSTOLIC BLOOD PRESSURE: 132 MMHG | BODY MASS INDEX: 31.05 KG/M2 | OXYGEN SATURATION: 97 % | HEIGHT: 64 IN

## 2023-07-26 DIAGNOSIS — N25.81 SECONDARY HYPERPARATHYROIDISM OF RENAL ORIGIN: ICD-10-CM

## 2023-07-26 DIAGNOSIS — I15.2 HYPERTENSION ASSOCIATED WITH DIABETES: Chronic | ICD-10-CM

## 2023-07-26 DIAGNOSIS — E11.69 COMBINED HYPERLIPIDEMIA ASSOCIATED WITH TYPE 2 DIABETES MELLITUS: Chronic | ICD-10-CM

## 2023-07-26 DIAGNOSIS — E04.2 MULTIPLE THYROID NODULES: Primary | Chronic | ICD-10-CM

## 2023-07-26 DIAGNOSIS — E78.2 COMBINED HYPERLIPIDEMIA ASSOCIATED WITH TYPE 2 DIABETES MELLITUS: Chronic | ICD-10-CM

## 2023-07-26 DIAGNOSIS — I51.89 DIASTOLIC DYSFUNCTION: ICD-10-CM

## 2023-07-26 DIAGNOSIS — E11.59 HYPERTENSION ASSOCIATED WITH DIABETES: Chronic | ICD-10-CM

## 2023-07-26 DIAGNOSIS — N18.31 STAGE 3A CHRONIC KIDNEY DISEASE: Chronic | ICD-10-CM

## 2023-07-26 PROCEDURE — 3061F NEG MICROALBUMINURIA REV: CPT | Mod: CPTII,S$GLB,, | Performed by: NURSE PRACTITIONER

## 2023-07-26 PROCEDURE — 4010F ACE/ARB THERAPY RXD/TAKEN: CPT | Mod: CPTII,S$GLB,, | Performed by: NURSE PRACTITIONER

## 2023-07-26 PROCEDURE — 99999 PR PBB SHADOW E&M-EST. PATIENT-LVL V: ICD-10-PCS | Mod: PBBFAC,,, | Performed by: NURSE PRACTITIONER

## 2023-07-26 PROCEDURE — 1159F PR MEDICATION LIST DOCUMENTED IN MEDICAL RECORD: ICD-10-PCS | Mod: CPTII,S$GLB,, | Performed by: NURSE PRACTITIONER

## 2023-07-26 PROCEDURE — 3288F FALL RISK ASSESSMENT DOCD: CPT | Mod: CPTII,S$GLB,, | Performed by: NURSE PRACTITIONER

## 2023-07-26 PROCEDURE — 99999 PR PBB SHADOW E&M-EST. PATIENT-LVL V: CPT | Mod: PBBFAC,,, | Performed by: NURSE PRACTITIONER

## 2023-07-26 PROCEDURE — 3078F PR MOST RECENT DIASTOLIC BLOOD PRESSURE < 80 MM HG: ICD-10-PCS | Mod: CPTII,S$GLB,, | Performed by: NURSE PRACTITIONER

## 2023-07-26 PROCEDURE — 99214 PR OFFICE/OUTPT VISIT, EST, LEVL IV, 30-39 MIN: ICD-10-PCS | Mod: S$GLB,,, | Performed by: NURSE PRACTITIONER

## 2023-07-26 PROCEDURE — 3078F DIAST BP <80 MM HG: CPT | Mod: CPTII,S$GLB,, | Performed by: NURSE PRACTITIONER

## 2023-07-26 PROCEDURE — 3008F PR BODY MASS INDEX (BMI) DOCUMENTED: ICD-10-PCS | Mod: CPTII,S$GLB,, | Performed by: NURSE PRACTITIONER

## 2023-07-26 PROCEDURE — 1160F RVW MEDS BY RX/DR IN RCRD: CPT | Mod: CPTII,S$GLB,, | Performed by: NURSE PRACTITIONER

## 2023-07-26 PROCEDURE — 3288F PR FALLS RISK ASSESSMENT DOCUMENTED: ICD-10-PCS | Mod: CPTII,S$GLB,, | Performed by: NURSE PRACTITIONER

## 2023-07-26 PROCEDURE — 3075F SYST BP GE 130 - 139MM HG: CPT | Mod: CPTII,S$GLB,, | Performed by: NURSE PRACTITIONER

## 2023-07-26 PROCEDURE — 1101F PR PT FALLS ASSESS DOC 0-1 FALLS W/OUT INJ PAST YR: ICD-10-PCS | Mod: CPTII,S$GLB,, | Performed by: NURSE PRACTITIONER

## 2023-07-26 PROCEDURE — 3061F PR NEG MICROALBUMINURIA RESULT DOCUMENTED/REVIEW: ICD-10-PCS | Mod: CPTII,S$GLB,, | Performed by: NURSE PRACTITIONER

## 2023-07-26 PROCEDURE — 3044F HG A1C LEVEL LT 7.0%: CPT | Mod: CPTII,S$GLB,, | Performed by: NURSE PRACTITIONER

## 2023-07-26 PROCEDURE — 1159F MED LIST DOCD IN RCRD: CPT | Mod: CPTII,S$GLB,, | Performed by: NURSE PRACTITIONER

## 2023-07-26 PROCEDURE — 3066F NEPHROPATHY DOC TX: CPT | Mod: CPTII,S$GLB,, | Performed by: NURSE PRACTITIONER

## 2023-07-26 PROCEDURE — 4010F PR ACE/ARB THEARPY RXD/TAKEN: ICD-10-PCS | Mod: CPTII,S$GLB,, | Performed by: NURSE PRACTITIONER

## 2023-07-26 PROCEDURE — 99214 OFFICE O/P EST MOD 30 MIN: CPT | Mod: S$GLB,,, | Performed by: NURSE PRACTITIONER

## 2023-07-26 PROCEDURE — 1126F PR PAIN SEVERITY QUANTIFIED, NO PAIN PRESENT: ICD-10-PCS | Mod: CPTII,S$GLB,, | Performed by: NURSE PRACTITIONER

## 2023-07-26 PROCEDURE — 3008F BODY MASS INDEX DOCD: CPT | Mod: CPTII,S$GLB,, | Performed by: NURSE PRACTITIONER

## 2023-07-26 PROCEDURE — 3075F PR MOST RECENT SYSTOLIC BLOOD PRESS GE 130-139MM HG: ICD-10-PCS | Mod: CPTII,S$GLB,, | Performed by: NURSE PRACTITIONER

## 2023-07-26 PROCEDURE — 3044F PR MOST RECENT HEMOGLOBIN A1C LEVEL <7.0%: ICD-10-PCS | Mod: CPTII,S$GLB,, | Performed by: NURSE PRACTITIONER

## 2023-07-26 PROCEDURE — 1126F AMNT PAIN NOTED NONE PRSNT: CPT | Mod: CPTII,S$GLB,, | Performed by: NURSE PRACTITIONER

## 2023-07-26 PROCEDURE — 1101F PT FALLS ASSESS-DOCD LE1/YR: CPT | Mod: CPTII,S$GLB,, | Performed by: NURSE PRACTITIONER

## 2023-07-26 PROCEDURE — 1160F PR REVIEW ALL MEDS BY PRESCRIBER/CLIN PHARMACIST DOCUMENTED: ICD-10-PCS | Mod: CPTII,S$GLB,, | Performed by: NURSE PRACTITIONER

## 2023-07-26 PROCEDURE — 3066F PR DOCUMENTATION OF TREATMENT FOR NEPHROPATHY: ICD-10-PCS | Mod: CPTII,S$GLB,, | Performed by: NURSE PRACTITIONER

## 2023-07-26 NOTE — PROGRESS NOTES
Kristyn Garza  07/26/2023  [id number]    Moon Lion MD  Patient Care Team:  Moon Lion MD as PCP - General (Internal Medicine)  Delia Liu LPN as Care Coordinator (Internal Medicine)  Soo Bar as Digital Medicine Health   Dale Matute MD as Consulting Physician (Cardiology)  Hammad Hardy OD as Consulting Physician (Optometry)  Olivia Irwin MD as Consulting Physician (Otolaryngology)  Joanne MarsD as Hypertension Digital Medicine Clinician  Brenden Landaverde MD as Consulting Physician (Nephrology)  Gretel De Leon PharmD as Hyperlipidemia Digital Medicine Clinician  Joanne MarsD as Diabetes Digital Medicine Clinician  Cuca Beatty NP as Nurse Practitioner (Family Medicine)  Moon Lion MD as Hyperlipidemia Digital Medicine Responsible Provider (Internal Medicine)  Moon Lion MD as Diabetes Digital Medicine Responsible Provider (Internal Medicine)  Moon Lion MD as Hypertension Digital Medicine Responsible Provider (Internal Medicine)  Viky Oviedo, PharmD as Pharmacist (Pharmacist)  [employer] as Diabetes Digital Medicine Contract  [employer] as Hypertension Digital Medicine Contract          [facility] Note      Chief Complaint:  Chief Complaint   Patient presents with    3 mth f/u        History of Present Illness:  HPI    Here for 1 month f/u HTN, vertigo, HF, DMII.    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    Last 5 Blood Pressure Readings  View Complete Flowsheet  Recent Readings 7/25/2023 7/24/2023 7/23/2023 7/23/2023 7/21/2023   SBP (mmHg) 118 119 129 130 110   DBP (mmHg) 62 64 61 63 62   Pulse 66 59 59 64 67     Date Blood Sugar   7/25/2023 123   7/24/2023 134   7/22/2023 126   7/21/2023 133    7/20/2023 134   7/19/2023 137   7/17/2023 103   7/15/2023 94   7/14/2023 125   7/13/2023 124     Prescribed PT for vertigo. Did not have vertigo then or since.   Occasional dyspepsia relieved by food avoidance.   Metformin is constipating. Manages with diet.        Review of Systems   Constitutional:  Negative for activity change and appetite change.   Respiratory:  Negative for cough and shortness of breath.    Cardiovascular:  Negative for chest pain and leg swelling.   Gastrointestinal:  Positive for constipation. Negative for abdominal pain, nausea and vomiting.   Genitourinary:  Negative for dysuria.   Musculoskeletal:  Negative for gait problem.   Neurological:  Negative for dizziness.       The following were reviewed: Active problem list, medication list, allergies, family history, social history, and Health Maintenance.       Medications:  Current Outpatient Medications on File Prior to Visit   Medication Sig Dispense Refill    ACCU-CHEK SMARTVIEW TEST STRIP Strp TO CHECK BLOOD GLUCOSE ONE TIMES DAILY 50 strip 1    amLODIPine (NORVASC) 10 MG tablet Take 1 tablet (10 mg total) by mouth once daily. 90 tablet 3    aspirin 81 MG Chew Take 81 mg by mouth once daily.      benazepriL (LOTENSIN) 40 MG tablet Take 1 tablet (40 mg total) by mouth once daily. 90 tablet 3    blood glucose control, normal Soln 1 Bottle by Misc.(Non-Drug; Combo Route) route once daily. For Accu Check Amber  use twice daily prn dx: E11.9 1 each 0    blood-glucose meter (ACCU-CHEK AMBER) Misc 1 kit by Misc.(Non-Drug; Combo Route) route once. For Accu Check Amber  use twice daily prn dx: E11.9 for 1 dose 1 each 0    calcium-vitamin D3 500 mg(1,250mg) -200 unit per tablet Take 1 tablet by mouth once daily.       CRANBERRY EXTRACT ORAL Take 1 tablet by mouth once daily.      evolocumab (REPATHA SURECLICK) 140 mg/mL PnIj Inject 1 mL (140 mg total) into the skin every 14 (fourteen) days. 2 mL 5    lancets Misc 1 lancet by Misc.(Non-Drug; Combo  Route) route once daily. For Acc-Chek Irais   DX :E11.9 100 each 3    loratadine (CLARITIN) 10 mg tablet TAKE 1 TABLET BY MOUTH EVERY DAY 90 tablet 1    meclizine (ANTIVERT) 25 mg tablet Take 1 tablet (25 mg total) by mouth 3 (three) times daily as needed for Dizziness. 60 tablet 5    metFORMIN (GLUCOPHAGE-XR) 500 MG ER 24hr tablet Take 1 tablet (500 mg total) by mouth 2 (two) times daily with meals. Take w meal higher in protein, low carb 180 tablet 1    metoprolol succinate (TOPROL-XL) 25 MG 24 hr tablet Take 1 tablet (25 mg total) by mouth once daily. 90 tablet 3    MULTIVITAMIN W-MINERALS/LUTEIN (CENTRUM SILVER ORAL) Take 1 tablet by mouth once daily.      spironolactone (ALDACTONE) 50 MG tablet Take 1 tablet (50 mg total) by mouth once daily. 90 tablet 1    triamcinolone (NASACORT) 55 mcg nasal inhaler USE 2 SPRAYS BY NASAL ROUTE ONCE DAILY 17 mL 3     No current facility-administered medications on file prior to visit.       Medications have been reviewed and reconciled with patient at visit today.    Barriers to medications present (no )    Fall since last office visit (no )      Exam:  Vitals:    07/26/23 1033   BP: 132/70   Pulse:    Temp:      Weight: 82.5 kg (181 lb 14.4 oz)   Body mass index is 31.22 kg/m².      BP Readings from Last 3 Encounters:   07/26/23 132/70   06/19/23 (!) 149/77   06/02/23 136/82     Wt Readings from Last 3 Encounters:   07/26/23 0956 82.5 kg (181 lb 14.4 oz)   06/19/23 1253 81.3 kg (179 lb 3.2 oz)   06/02/23 1103 82.4 kg (181 lb 10.5 oz)            Physical Exam  Constitutional:       General: She is not in acute distress.     Appearance: She is not ill-appearing.   HENT:      Mouth/Throat:      Mouth: Mucous membranes are moist.      Pharynx: No oropharyngeal exudate or posterior oropharyngeal erythema.   Eyes:      General: No scleral icterus.  Cardiovascular:      Rate and Rhythm: Normal rate and regular rhythm.      Heart sounds: No murmur heard.  Pulmonary:      Effort:  Pulmonary effort is normal.      Breath sounds: Normal breath sounds.   Abdominal:      General: Bowel sounds are normal. There is no distension.      Palpations: Abdomen is soft.      Tenderness: There is no abdominal tenderness.   Musculoskeletal:         General: No swelling.      Cervical back: Neck supple.   Skin:     General: Skin is warm and dry.   Neurological:      Mental Status: She is alert. Mental status is at baseline.   Psychiatric:         Mood and Affect: Mood normal.         Behavior: Behavior normal.         Thought Content: Thought content normal.       Laboratory Reviewed: (Yes)  Lab Results   Component Value Date    WBC 9.34 05/12/2023    HGB 12.0 05/12/2023    HCT 38.5 05/12/2023     05/12/2023    CHOL 131 05/12/2023    TRIG 66 05/12/2023    HDL 43 05/12/2023    ALT 21 06/19/2023    AST 26 06/19/2023     06/19/2023    K 4.1 06/19/2023     06/19/2023    CREATININE 1.0 06/19/2023    BUN 15 06/19/2023    CO2 26 06/19/2023    TSH 0.728 05/12/2023    HGBA1C 6.2 (H) 05/12/2023           Health Maintenance  Health Maintenance Topics with due status: Not Due       Topic Last Completion Date    Colorectal Cancer Screening 03/18/2014    DEXA Scan 05/26/2021    Influenza Vaccine 10/05/2022    Foot Exam 10/19/2022    Eye Exam 01/30/2023    Diabetes Urine Screening 02/10/2023    Lipid Panel 05/12/2023    Hemoglobin A1c 05/12/2023    Mammogram 05/29/2023    LDCT Lung Screen 06/21/2023     Health Maintenance Due   Topic Date Due    Shingles Vaccine (2 of 3) 04/20/2011    TETANUS VACCINE  02/23/2021    COVID-19 Vaccine (4 - Moderna series) 02/09/2022       Assessment:  Problem List Items Addressed This Visit          Cardiac/Vascular    Hypertension associated with diabetes (Chronic)     Both are well-controlled.   Continues amlodipine, benazepril, metformin, spironolactone, metoprolol.         Combined hyperlipidemia associated with type 2 diabetes mellitus (Chronic)     Lab Results    Component Value Date    LDLCALC 74.8 05/12/2023   Continues Repatha.           Diastolic dysfunction     No acute sx.   Continue POC.            Renal/    CKD (chronic kidney disease) stage 3, GFR 30-59 ml/min (Chronic)     Continue DMII and HTN mgmt.            Endocrine    Multiple thyroid nodules - Primary (Chronic)    Relevant Orders    US Soft Tissue Head Neck Thyroid    Secondary hyperparathyroidism of renal origin     Lab Results   Component Value Date    PTH 61.0 02/10/2023    CALCIUM 9.8 06/19/2023    PHOS 3.1 04/25/2023   Monitor.                Plan:  Multiple thyroid nodules  -     US Soft Tissue Head Neck Thyroid; Future; Expected date: 07/26/2023    Hypertension associated with diabetes    Diastolic dysfunction    Combined hyperlipidemia associated with type 2 diabetes mellitus    Stage 3a chronic kidney disease    Secondary hyperparathyroidism of renal origin      -Patient's lab results were reviewed and discussed with patient  -Treatment options and alternatives were discussed with the patient. Patient expressed understanding. Patient was given the opportunity to ask questions and be an active participant in their medical care. Patient had no further questions or concerns at this time.   -Documentation of patient's health and condition was obtained from family member who was present during visit.  -Patient is an overall moderate risk for health complications from their medical conditions.       Follow up: Follow up in about 4 months (around 11/26/2023).      After visit summary printed and given to patient upon discharge.  Patient goals and care plan are included in After visit summary.    Total medical decision making time was 38 min.  The following issues were discussed: The primary encounter diagnosis was Multiple thyroid nodules. Diagnoses of Hypertension associated with diabetes, Diastolic dysfunction, Combined hyperlipidemia associated with type 2 diabetes mellitus, Stage 3a chronic kidney  disease, and Secondary hyperparathyroidism of renal origin were also pertinent to this visit.    Health maintenance needs, recent test results and goals of care discussed with pt and questions answered.

## 2023-07-26 NOTE — ASSESSMENT & PLAN NOTE
Lab Results   Component Value Date    PTH 61.0 02/10/2023    CALCIUM 9.8 06/19/2023    PHOS 3.1 04/25/2023   Monitor.

## 2023-07-26 NOTE — ASSESSMENT & PLAN NOTE
Both are well-controlled.   Continues amlodipine, benazepril, metformin, spironolactone, metoprolol.

## 2023-08-08 ENCOUNTER — HOSPITAL ENCOUNTER (OUTPATIENT)
Dept: RADIOLOGY | Facility: HOSPITAL | Age: 73
Discharge: HOME | End: 2023-08-08
Attending: NURSE PRACTITIONER
Payer: MEDICARE

## 2023-08-08 DIAGNOSIS — E04.2 MULTIPLE THYROID NODULES: ICD-10-CM

## 2023-08-08 PROCEDURE — 76536 US SOFT TISSUE HEAD NECK THYROID: ICD-10-PCS | Mod: 26,,, | Performed by: RADIOLOGY

## 2023-08-08 PROCEDURE — 76536 US EXAM OF HEAD AND NECK: CPT | Mod: TC

## 2023-08-08 PROCEDURE — 76536 US EXAM OF HEAD AND NECK: CPT | Mod: 26,,, | Performed by: RADIOLOGY

## 2023-08-17 ENCOUNTER — SPECIALTY PHARMACY (OUTPATIENT)
Dept: PHARMACY | Facility: CLINIC | Age: 73
End: 2023-08-17
Payer: MEDICARE

## 2023-08-23 ENCOUNTER — PATIENT MESSAGE (OUTPATIENT)
Dept: ADMINISTRATIVE | Facility: OTHER | Age: 73
End: 2023-08-23
Payer: MEDICARE

## 2023-08-25 NOTE — TELEPHONE ENCOUNTER
Specialty Pharmacy - Refill Coordination    Specialty Medication Orders Linked to Encounter      Flowsheet Row Most Recent Value   Medication #1 evolocumab (REPATHA SURECLICK) 140 mg/mL PnIj (Order#[id number], Rx#[id number])            Refill Questions - Documented Responses      Flowsheet Row Most Recent Value   Patient Availability and HIPAA Verification    Does patient want to proceed with activity? Yes   HIPAA/medical authority confirmed? Yes   Relationship to patient of person spoken to? Self   Refill Screening Questions    Would patient like to speak to a pharmacist? No   When does the patient need to receive the medication? 09/01/23   Refill Delivery Questions    How will the patient receive the medication? MEDRx   When does the patient need to receive the medication? 09/01/23   Shipping Address Home   Address in [facility] confirmed and updated if neccessary? Yes   Expected Copay ($) 0   Is the patient able to afford the medication copay? Yes   Payment Method zero copay   Days supply of Refill 28   Supplies needed? No supplies needed   Refill activity completed? Yes   Refill activity plan Refill scheduled   Shipment/Pickup Date: 08/29/23            Current Outpatient Medications   Medication Sig    ACCU-CHEK SMARTVIEW TEST STRIP Strp TO CHECK BLOOD GLUCOSE ONE TIMES DAILY    amLODIPine (NORVASC) 10 MG tablet Take 1 tablet (10 mg total) by mouth once daily.    aspirin 81 MG Chew Take 81 mg by mouth once daily.    benazepriL (LOTENSIN) 40 MG tablet Take 1 tablet (40 mg total) by mouth once daily.    blood glucose control, normal Soln 1 Bottle by Misc.(Non-Drug; Combo Route) route once daily. For Accu Check Amber  use twice daily prn dx: E11.9    blood-glucose meter (ACCU-CHEK AMBER) Misc 1 kit by Misc.(Non-Drug; Combo Route) route once. For Accu Check Amber  use twice daily prn dx: E11.9 for 1 dose    calcium-vitamin D3 500 mg(1,250mg) -200 unit per tablet Take 1 tablet by mouth once daily.     CRANBERRY  EXTRACT ORAL Take 1 tablet by mouth once daily.    evolocumab (REPATHA SURECLICK) 140 mg/mL PnIj Inject 1 mL (140 mg total) into the skin every 14 (fourteen) days.    lancets Misc 1 lancet by Misc.(Non-Drug; Combo Route) route once daily. For Acc-Chek Irais   DX :E11.9    loratadine (CLARITIN) 10 mg tablet TAKE 1 TABLET BY MOUTH EVERY DAY    meclizine (ANTIVERT) 25 mg tablet Take 1 tablet (25 mg total) by mouth 3 (three) times daily as needed for Dizziness.    metFORMIN (GLUCOPHAGE-XR) 500 MG ER 24hr tablet Take 1 tablet (500 mg total) by mouth 2 (two) times daily with meals. Take w meal higher in protein, low carb    metoprolol succinate (TOPROL-XL) 25 MG 24 hr tablet Take 1 tablet (25 mg total) by mouth once daily.    MULTIVITAMIN W-MINERALS/LUTEIN (CENTRUM SILVER ORAL) Take 1 tablet by mouth once daily.    spironolactone (ALDACTONE) 50 MG tablet Take 1 tablet (50 mg total) by mouth once daily.    triamcinolone (NASACORT) 55 mcg nasal inhaler USE 2 SPRAYS BY NASAL ROUTE ONCE DAILY   Last reviewed on 7/26/2023 10:21 AM by Cuca Beatty, NP    Review of patient's allergies indicates:   Allergen Reactions    Statins-hmg-coa reductase inhibitors      Muscle Cramps    Last reviewed on  7/26/2023 9:57 AM by Lesa Cameron      Tasks added this encounter   No tasks added.   Tasks due within next 3 months   8/25/2023 - Refill Coordination Outreach (1 time occurrence)     Janette Omer [facility]  [address] [address]  Phone: [phone]  Fax: [phone]

## 2023-08-30 ENCOUNTER — OFFICE VISIT (OUTPATIENT)
Dept: PRIMARY CARE CLINIC | Facility: CLINIC | Age: 73
End: 2023-08-30
Payer: MEDICARE

## 2023-08-30 VITALS
WEIGHT: 180.13 LBS | TEMPERATURE: 99 F | DIASTOLIC BLOOD PRESSURE: 64 MMHG | OXYGEN SATURATION: 98 % | HEART RATE: 77 BPM | SYSTOLIC BLOOD PRESSURE: 136 MMHG | HEIGHT: 64 IN | BODY MASS INDEX: 30.75 KG/M2

## 2023-08-30 DIAGNOSIS — U07.1 COVID-19: ICD-10-CM

## 2023-08-30 DIAGNOSIS — R50.9 FEVER, UNSPECIFIED FEVER CAUSE: Primary | ICD-10-CM

## 2023-08-30 LAB
CTP QC/QA: YES
SARS-COV-2 AG RESP QL IA.RAPID: POSITIVE

## 2023-08-30 PROCEDURE — 87811 SARS CORONAVIRUS 2 ANTIGEN POCT, MANUAL READ: ICD-10-PCS | Mod: QW,S$GLB,, | Performed by: NURSE PRACTITIONER

## 2023-08-30 PROCEDURE — 3008F PR BODY MASS INDEX (BMI) DOCUMENTED: ICD-10-PCS | Mod: CPTII,S$GLB,, | Performed by: NURSE PRACTITIONER

## 2023-08-30 PROCEDURE — 99999 PR PBB SHADOW E&M-EST. PATIENT-LVL IV: CPT | Mod: PBBFAC,,, | Performed by: NURSE PRACTITIONER

## 2023-08-30 PROCEDURE — 4010F PR ACE/ARB THEARPY RXD/TAKEN: ICD-10-PCS | Mod: CPTII,S$GLB,, | Performed by: NURSE PRACTITIONER

## 2023-08-30 PROCEDURE — 99213 PR OFFICE/OUTPT VISIT, EST, LEVL III, 20-29 MIN: ICD-10-PCS | Mod: S$GLB,,, | Performed by: NURSE PRACTITIONER

## 2023-08-30 PROCEDURE — 1159F PR MEDICATION LIST DOCUMENTED IN MEDICAL RECORD: ICD-10-PCS | Mod: CPTII,S$GLB,, | Performed by: NURSE PRACTITIONER

## 2023-08-30 PROCEDURE — 3044F PR MOST RECENT HEMOGLOBIN A1C LEVEL <7.0%: ICD-10-PCS | Mod: CPTII,S$GLB,, | Performed by: NURSE PRACTITIONER

## 2023-08-30 PROCEDURE — 1159F MED LIST DOCD IN RCRD: CPT | Mod: CPTII,S$GLB,, | Performed by: NURSE PRACTITIONER

## 2023-08-30 PROCEDURE — 3044F HG A1C LEVEL LT 7.0%: CPT | Mod: CPTII,S$GLB,, | Performed by: NURSE PRACTITIONER

## 2023-08-30 PROCEDURE — 3061F PR NEG MICROALBUMINURIA RESULT DOCUMENTED/REVIEW: ICD-10-PCS | Mod: CPTII,S$GLB,, | Performed by: NURSE PRACTITIONER

## 2023-08-30 PROCEDURE — 99213 OFFICE O/P EST LOW 20 MIN: CPT | Mod: S$GLB,,, | Performed by: NURSE PRACTITIONER

## 2023-08-30 PROCEDURE — 3061F NEG MICROALBUMINURIA REV: CPT | Mod: CPTII,S$GLB,, | Performed by: NURSE PRACTITIONER

## 2023-08-30 PROCEDURE — 3078F DIAST BP <80 MM HG: CPT | Mod: CPTII,S$GLB,, | Performed by: NURSE PRACTITIONER

## 2023-08-30 PROCEDURE — 3075F SYST BP GE 130 - 139MM HG: CPT | Mod: CPTII,S$GLB,, | Performed by: NURSE PRACTITIONER

## 2023-08-30 PROCEDURE — 99999 PR PBB SHADOW E&M-EST. PATIENT-LVL IV: ICD-10-PCS | Mod: PBBFAC,,, | Performed by: NURSE PRACTITIONER

## 2023-08-30 PROCEDURE — 3066F NEPHROPATHY DOC TX: CPT | Mod: CPTII,S$GLB,, | Performed by: NURSE PRACTITIONER

## 2023-08-30 PROCEDURE — 3078F PR MOST RECENT DIASTOLIC BLOOD PRESSURE < 80 MM HG: ICD-10-PCS | Mod: CPTII,S$GLB,, | Performed by: NURSE PRACTITIONER

## 2023-08-30 PROCEDURE — 3008F BODY MASS INDEX DOCD: CPT | Mod: CPTII,S$GLB,, | Performed by: NURSE PRACTITIONER

## 2023-08-30 PROCEDURE — 87811 SARS-COV-2 COVID19 W/OPTIC: CPT | Mod: QW,S$GLB,, | Performed by: NURSE PRACTITIONER

## 2023-08-30 PROCEDURE — 3075F PR MOST RECENT SYSTOLIC BLOOD PRESS GE 130-139MM HG: ICD-10-PCS | Mod: CPTII,S$GLB,, | Performed by: NURSE PRACTITIONER

## 2023-08-30 PROCEDURE — 3066F PR DOCUMENTATION OF TREATMENT FOR NEPHROPATHY: ICD-10-PCS | Mod: CPTII,S$GLB,, | Performed by: NURSE PRACTITIONER

## 2023-08-30 PROCEDURE — 4010F ACE/ARB THERAPY RXD/TAKEN: CPT | Mod: CPTII,S$GLB,, | Performed by: NURSE PRACTITIONER

## 2023-08-30 RX ORDER — ACETAMINOPHEN 325 MG/1
650 TABLET ORAL
Status: COMPLETED | OUTPATIENT
Start: 2023-08-30 | End: 2023-08-30

## 2023-08-30 RX ADMIN — ACETAMINOPHEN 650 MG: 325 TABLET ORAL at 09:08

## 2023-08-30 NOTE — ASSESSMENT & PLAN NOTE
Rest  Drink plenty of clear fluids  Nasal saline spray to clear nasal drainage and help with nasal congestion  Zyrtec or Claritin to help dry mucus and post nasal drip  Mucinex or Mucinex DM for cough and chest congestion  Tylenol for fever, headache and body aches  Warm salt water gargles for throat comfort  Chloraseptic spray or lozenges for throat comfort  Quarantine x 5 days and wear mask    Paxlovid discussed and not prescribed.   RTC with no improvement or worsening

## 2023-08-30 NOTE — PATIENT INSTRUCTIONS
Rest  Drink plenty of clear fluids  Nasal saline spray to clear nasal drainage and help with nasal congestion  Zyrtec or Claritin to help dry mucus and post nasal drip  Mucinex or Mucinex DM for cough and chest congestion  Tylenol for fever, headache and body aches  Warm salt water gargles for throat comfort  Chloraseptic spray or lozenges for throat comfort  Quarantine for 5 days  After 5 days if cough is still present - continue to wear mask until resolved\    RTC with no improvement or worsening

## 2023-08-30 NOTE — PROGRESS NOTES
Kristyn Garza  08/31/2023  [id number]    Moon Lion MD  Patient Care Team:  Moon Lion MD as PCP - General (Internal Medicine)  Delia Liu LPN as Care Coordinator (Internal Medicine)  Soo Bar as Digital Medicine Health   Dale Matute MD as Consulting Physician (Cardiology)  Hammad Hardy OD as Consulting Physician (Optometry)  Olivia Irwin MD as Consulting Physician (Otolaryngology)  Gretel De Leon, PharmD as Hypertension Digital Medicine Clinician  Brenden Landaverde MD as Consulting Physician (Nephrology)  Gretel De Leon PharmD as Hyperlipidemia Digital Medicine Clinician  Gretel De Leon PharmD as Diabetes Digital Medicine Clinician  Cuca Beatty NP as Nurse Practitioner (Family Medicine)  Moon Lion MD as Hyperlipidemia Digital Medicine Responsible Provider (Internal Medicine)  Moon Lion MD as Diabetes Digital Medicine Responsible Provider (Internal Medicine)  Moon Lion MD as Hypertension Digital Medicine Responsible Provider (Internal Medicine)  Viky Oviedo Chi, PharmD as Pharmacist (Pharmacist)  Medicare, [employer] Managed as Diabetes Digital Medicine Contract  Medicare, [employer] Managed as Hypertension Digital Medicine Contract      [facility] Note      Chief Complaint:  Chief Complaint   Patient presents with    Sinus Problem     Pt is complaining of headache, cough, chest congestion, and body aches, since yesterday. Pt also has pain in the back of left knee.        History of Present Illness:  Pt is 71 yo female with PMHx of DM II, CKD, diastolic dysfunction, HTN - pt of Dr. Lion  Onset of URI symptoms 1 day ago- Temp yesterday 100.0  Mild headache, frequent dry cough, right chest soreness, generalized body aches, mild throat irritation  Aching sensation to left posterior knee  Tylenol & Biofreeze with no improvement  Contacts with COVID - not sure  1st time having COVID - has been vaccinated        The following were reviewed: Active problem list, medication list, allergies, family history, social history, and Health Maintenance.     History:  Past Medical History:   Diagnosis Date    Carpal tunnel syndrome     CKD (chronic kidney disease) stage 3, GFR 30-59 ml/min     Followed by Nephrology    Colon cancer screening 3/18/2014    CTS (carpal tunnel syndrome) 6/18/2013    Diabetes mellitus, type 2     Eczema     Elevated CPK     History of hypokalemia 6/18/2013    History of hypokalemia 6/18/2013    Hypokalemia     Multinodular thyroid     Followed by ENT    Obesity     Other and unspecified hyperlipidemia     Pneumonia     in her 20s; hospitalized    Postmenopausal     No history of abnormal pap smear    Statin intolerance     caused mylagia, elevated CPK    Tobacco dependence     resolved    Unspecified essential hypertension      Past Surgical History:   Procedure Laterality Date    COLONOSCOPY      CYST REMOVAL  2015    On the head    FINE NEEDLE ASPIRATION  3/2011    thyroid nodules x3 (negative per patient)    HYSTERECTOMY      PARTIAL HYSTERECTOMY  1991    Due to fibroids     Family History   Problem Relation Age of Onset    Hypertension Mother     Diabetes Mother     Dementia Mother         Alzheimer's dementia    Hypertension Father     Heart disease Sister         MI/CAD    Cataracts Sister     Dementia Sister     No Known Problems Son     No Known Problems Son     Cataracts Brother     Cataracts Brother     Dementia Sister     Cancer Neg Hx     Stroke Neg Hx     Melanoma Neg Hx     Psoriasis Neg Hx     Lupus Neg Hx     Eczema Neg Hx     COPD Neg Hx     Kidney disease Neg Hx      Patient Active Problem List   Diagnosis    Multiple thyroid nodules    Cupping of optic disc    Nuclear sclerosis    Combined hyperlipidemia associated with type 2 diabetes mellitus    Hypertension associated with diabetes    CKD (chronic kidney disease) stage 3, GFR 30-59 ml/min    Obesity (BMI 30.0-34.9)     Postmenopausal    Statin intolerance    Abnormal ECG    LVH (left ventricular hypertrophy)    Family history of cardiovascular disorder    PAC (premature atrial contraction)    Type 2 diabetes mellitus with kidney complication, without long-term current use of insulin    Endolymphatic hydrops of left ear    Gastroesophageal reflux disease    ZHOU (dyspnea on exertion)    Secondary hyperparathyroidism of renal origin    Diastolic dysfunction    Secondary hypertension    Adrenal nodule    Pulmonary nodules    Aortic atherosclerosis    Controlled type 2 diabetes mellitus with diabetic neuropathy    Dizziness    COVID-19    Fever    Blood chemistry abnormality     Review of patient's allergies indicates:   Allergen Reactions    Statins-hmg-coa reductase inhibitors      Muscle Cramps       Medications:  Current Outpatient Medications on File Prior to Visit   Medication Sig Dispense Refill    ACCU-CHEK SMARTVIEW TEST STRIP Strp TO CHECK BLOOD GLUCOSE ONE TIMES DAILY 50 strip 1    amLODIPine (NORVASC) 10 MG tablet Take 1 tablet (10 mg total) by mouth once daily. 90 tablet 3    aspirin 81 MG Chew Take 81 mg by mouth once daily.      benazepriL (LOTENSIN) 40 MG tablet Take 1 tablet (40 mg total) by mouth once daily. 90 tablet 3    calcium-vitamin D3 500 mg(1,250mg) -200 unit per tablet Take 1 tablet by mouth once daily.       CRANBERRY EXTRACT ORAL Take 1 tablet by mouth once daily.      evolocumab (REPATHA SURECLICK) 140 mg/mL PnIj Inject 1 mL (140 mg total) into the skin every 14 (fourteen) days. 2 mL 5    lancets Misc 1 lancet by Misc.(Non-Drug; Combo Route) route once daily. For Acc-Chek Irais   DX :E11.9 100 each 3    loratadine (CLARITIN) 10 mg tablet TAKE 1 TABLET BY MOUTH EVERY DAY 90 tablet 1    meclizine (ANTIVERT) 25 mg tablet Take 1 tablet (25 mg total) by mouth 3 (three) times daily as needed for Dizziness. 60 tablet 5    metFORMIN (GLUCOPHAGE-XR) 500 MG ER 24hr tablet Take 1 tablet (500 mg total) by mouth 2 (two)  times daily with meals. Take w meal higher in protein, low carb 180 tablet 1    metoprolol succinate (TOPROL-XL) 25 MG 24 hr tablet Take 1 tablet (25 mg total) by mouth once daily. 90 tablet 3    MULTIVITAMIN W-MINERALS/LUTEIN (CENTRUM SILVER ORAL) Take 1 tablet by mouth once daily.      spironolactone (ALDACTONE) 50 MG tablet Take 1 tablet (50 mg total) by mouth once daily. 90 tablet 1    triamcinolone (NASACORT) 55 mcg nasal inhaler USE 2 SPRAYS BY NASAL ROUTE ONCE DAILY 17 mL 3    blood glucose control, normal Soln 1 Bottle by Misc.(Non-Drug; Combo Route) route once daily. For Accu Check Amber  use twice daily prn dx: E11.9 1 each 0    blood-glucose meter (ACCU-CHEK AMBER) Misc 1 kit by Misc.(Non-Drug; Combo Route) route once. For Accu Check Amber  use twice daily prn dx: E11.9 for 1 dose 1 each 0     No current facility-administered medications on file prior to visit.       Medications have been reviewed and reconciled with patient at visit today.      Exam:  Vitals:    08/30/23 0907   BP: 136/64   Pulse: 77   Temp: 99.4 °F (37.4 °C)     Weight: 81.7 kg (180 lb 1.6 oz)   Body mass index is 30.91 kg/m².      BP Readings from Last 3 Encounters:   08/30/23 136/64   07/26/23 132/70   06/19/23 (!) 149/77     Wt Readings from Last 3 Encounters:   08/30/23 0907 81.7 kg (180 lb 1.6 oz)   07/26/23 0956 82.5 kg (181 lb 14.4 oz)   06/19/23 1253 81.3 kg (179 lb 3.2 oz)        REVIEW OF SYSTEMS  Review of Systems   Constitutional:  Positive for chills and fever. Negative for malaise/fatigue.   HENT:  Positive for sore throat. Negative for congestion and ear pain.    Eyes:  Negative for redness.   Respiratory:  Positive for cough. Negative for sputum production and shortness of breath.    Cardiovascular:  Positive for chest pain.   Gastrointestinal:  Negative for abdominal pain, diarrhea and vomiting.   Genitourinary: Negative.    Musculoskeletal:  Positive for joint pain and myalgias.   Neurological:  Positive for headaches.  Negative for dizziness and weakness.       Physical Exam  Vitals reviewed.   Constitutional:       General: She is not in acute distress.     Appearance: Normal appearance.   HENT:      Head: Normocephalic and atraumatic.      Nose: Nose normal.      Mouth/Throat:      Mouth: Mucous membranes are moist.      Pharynx: Posterior oropharyngeal erythema present.   Eyes:      General: No scleral icterus.     Conjunctiva/sclera: Conjunctivae normal.   Cardiovascular:      Rate and Rhythm: Normal rate and regular rhythm.      Heart sounds: No murmur heard.  Pulmonary:      Effort: Pulmonary effort is normal. No respiratory distress.      Breath sounds: Normal breath sounds.   Abdominal:      Palpations: Abdomen is soft. There is no mass.      Tenderness: There is no abdominal tenderness.   Musculoskeletal:         General: No swelling or deformity. Normal range of motion.      Cervical back: Normal range of motion and neck supple.      Right lower leg: No edema.      Left lower leg: No edema.   Lymphadenopathy:      Cervical: No cervical adenopathy.   Skin:     General: Skin is warm and dry.   Neurological:      Mental Status: She is alert and oriented to person, place, and time. Mental status is at baseline.   Psychiatric:         Mood and Affect: Mood normal.         Thought Content: Thought content normal.         Laboratory Reviewed:     Lab Results   Component Value Date    WBC 9.34 05/12/2023    HGB 12.0 05/12/2023    HCT 38.5 05/12/2023     05/12/2023    CHOL 131 05/12/2023    TRIG 66 05/12/2023    HDL 43 05/12/2023    ALT 21 06/19/2023    AST 26 06/19/2023     06/19/2023    K 4.1 06/19/2023     06/19/2023    CREATININE 1.0 06/19/2023    BUN 15 06/19/2023    CO2 26 06/19/2023    TSH 0.728 05/12/2023    HGBA1C 6.2 (H) 05/12/2023       Screening or Prevention Patient's value Goal Complete/Controlled?   HgA1C Testing and Control   Lab Results   Component Value Date    HGBA1C 6.2 (H) 05/12/2023       Annually/Less than 8% Yes   Lipid profile : 05/12/2023 Annually Yes   LDL control Lab Results   Component Value Date    LDLCALC 74.8 05/12/2023    Annually/Less than 100 mg/dl  Yes   Nephropathy screening Lab Results   Component Value Date    LABMICR 26.0 02/10/2023     Lab Results   Component Value Date    PROTEINUA Negative 01/18/2022    Annually Yes   Blood pressure BP Readings from Last 1 Encounters:   08/30/23 136/64    Less than 140/90 Yes   Dilated retinal exam : 01/30/2023 Annually Yes   Foot exam   : 10/19/2022 Annually Yes       Health Maintenance  Health Maintenance Topics with due status: Not Due       Topic Last Completion Date    Colorectal Cancer Screening 03/18/2014    DEXA Scan 05/26/2021    Influenza Vaccine 10/05/2022    Eye Exam 01/30/2023    Diabetes Urine Screening 02/10/2023    Lipid Panel 05/12/2023    Hemoglobin A1c 05/12/2023    Mammogram 05/29/2023    LDCT Lung Screen 06/21/2023     Health Maintenance Due   Topic Date Due    Shingles Vaccine (2 of 3) 04/20/2011    TETANUS VACCINE  02/23/2021    COVID-19 Vaccine (4 - Moderna series) 02/09/2022    Foot Exam  10/19/2023       Assessment and Plan:  1. Fever, unspecified fever cause  -     acetaminophen tablet 650 mg  -     SARS Coronavirus 2 Antigen, POCT Manual Read    2. COVID-19  Assessment & Plan:  Rest  Drink plenty of clear fluids  Nasal saline spray to clear nasal drainage and help with nasal congestion  Zyrtec or Claritin to help dry mucus and post nasal drip  Mucinex or Mucinex DM for cough and chest congestion  Tylenol for fever, headache and body aches  Warm salt water gargles for throat comfort  Chloraseptic spray or lozenges for throat comfort  Quarantine x 5 days and wear mask    Paxlovid discussed and not prescribed.   RTC with no improvement or worsening    Orders:  -     SARS Coronavirus 2 Antigen, POCT Manual Read                 -Patient's lab results were reviewed and discussed with patient  -Treatment options and  alternatives were discussed with the patient. Patient expressed understanding. Patient was given the opportunity to ask questions and be an active participant in their medical care. Patient had no further questions or concerns at this time.         Future Appointments   Date Time Provider Department Center   11/27/2023  9:00 AM Moon Lion MD [facility]   12/8/2023 10:00 AM Soto Khan MD [facility]          After visit summary printed and given to patient upon discharge.  Patient goals and care plan are included in After visit summary.    Total medical decision making time was 28 min.    The following issues were discussed: The primary encounter diagnosis was Fever, unspecified fever cause. A diagnosis of COVID-19 was also pertinent to this visit.    Health maintenance needs, recent test results and goals of care discussed with pt and questions answered.           JENNIFER Moise, NP-C  [facility] [address] [place] [address]

## 2023-08-31 PROBLEM — R79.9 BLOOD CHEMISTRY ABNORMALITY: Status: ACTIVE | Noted: 2023-08-31

## 2023-09-13 ENCOUNTER — TELEPHONE (OUTPATIENT)
Dept: PRIMARY CARE CLINIC | Facility: CLINIC | Age: 73
End: 2023-09-13
Payer: MEDICARE

## 2023-09-13 NOTE — TELEPHONE ENCOUNTER
----- Message from Moon Lion MD sent at 9/7/2023 10:14 AM CDT -----  Regarding: CoVid+ 8/30/23  Please call to see how she is doing

## 2023-09-13 NOTE — TELEPHONE ENCOUNTER
Spoke with the pt states that she is feeling good. States she still has mild cough. Advised to call the clinic with any concerns.

## 2023-09-19 ENCOUNTER — PATIENT MESSAGE (OUTPATIENT)
Dept: ADMINISTRATIVE | Facility: OTHER | Age: 73
End: 2023-09-19
Payer: MEDICARE

## 2023-10-10 DIAGNOSIS — J30.2 SEASONAL ALLERGIC RHINITIS, UNSPECIFIED TRIGGER: ICD-10-CM

## 2023-10-10 RX ORDER — LORATADINE 10 MG/1
TABLET ORAL
Qty: 90 TABLET | Refills: 1 | Status: SHIPPED | OUTPATIENT
Start: 2023-10-10 | End: 2024-03-12

## 2023-10-26 NOTE — ASSESSMENT & PLAN NOTE
Stable - cont monitor - last TSH low normal - recheck w free T4 and T3   Intermediate Repair Preamble Text (Leave Blank If You Do Not Want): Undermining was performed with blunt dissection.

## 2023-11-21 DIAGNOSIS — E78.2 COMBINED HYPERLIPIDEMIA ASSOCIATED WITH TYPE 2 DIABETES MELLITUS: ICD-10-CM

## 2023-11-21 DIAGNOSIS — E11.69 COMBINED HYPERLIPIDEMIA ASSOCIATED WITH TYPE 2 DIABETES MELLITUS: ICD-10-CM

## 2023-11-21 DIAGNOSIS — Z78.9 STATIN INTOLERANCE: ICD-10-CM

## 2023-11-22 RX ORDER — EVOLOCUMAB 140 MG/ML
140 INJECTION, SOLUTION SUBCUTANEOUS
Qty: 2 ML | Refills: 5 | Status: ACTIVE | OUTPATIENT
Start: 2023-11-22

## 2023-11-27 ENCOUNTER — PATIENT OUTREACH (OUTPATIENT)
Dept: ADMINISTRATIVE | Facility: OTHER | Age: 73
End: 2023-11-27
Payer: MEDICARE

## 2023-11-27 ENCOUNTER — TELEPHONE (OUTPATIENT)
Dept: PULMONOLOGY | Facility: CLINIC | Age: 73
End: 2023-11-27
Payer: MEDICARE

## 2023-11-27 ENCOUNTER — OFFICE VISIT (OUTPATIENT)
Dept: PRIMARY CARE CLINIC | Facility: CLINIC | Age: 73
End: 2023-11-27
Payer: MEDICARE

## 2023-11-27 VITALS
SYSTOLIC BLOOD PRESSURE: 138 MMHG | DIASTOLIC BLOOD PRESSURE: 70 MMHG | HEIGHT: 64 IN | TEMPERATURE: 98 F | HEART RATE: 68 BPM | BODY MASS INDEX: 31.21 KG/M2 | OXYGEN SATURATION: 98 % | WEIGHT: 182.81 LBS

## 2023-11-27 DIAGNOSIS — E11.22 TYPE 2 DIABETES MELLITUS WITH STAGE 3A CHRONIC KIDNEY DISEASE, WITHOUT LONG-TERM CURRENT USE OF INSULIN: Primary | ICD-10-CM

## 2023-11-27 DIAGNOSIS — N18.31 TYPE 2 DIABETES MELLITUS WITH STAGE 3A CHRONIC KIDNEY DISEASE, WITHOUT LONG-TERM CURRENT USE OF INSULIN: Primary | ICD-10-CM

## 2023-11-27 DIAGNOSIS — I73.9 CLAUDICATION OF LEFT LOWER EXTREMITY: ICD-10-CM

## 2023-11-27 LAB
ESTIMATED AVG GLUCOSE: 143 MG/DL (ref 68–131)
HBA1C MFR BLD: 6.6 % (ref 4–5.6)

## 2023-11-27 PROCEDURE — 3066F NEPHROPATHY DOC TX: CPT | Mod: CPTII,S$GLB,, | Performed by: INTERNAL MEDICINE

## 2023-11-27 PROCEDURE — 3061F PR NEG MICROALBUMINURIA RESULT DOCUMENTED/REVIEW: ICD-10-PCS | Mod: CPTII,S$GLB,, | Performed by: INTERNAL MEDICINE

## 2023-11-27 PROCEDURE — 99417 PROLNG OP E/M EACH 15 MIN: CPT | Mod: S$GLB,,, | Performed by: INTERNAL MEDICINE

## 2023-11-27 PROCEDURE — 1101F PR PT FALLS ASSESS DOC 0-1 FALLS W/OUT INJ PAST YR: ICD-10-PCS | Mod: CPTII,S$GLB,, | Performed by: INTERNAL MEDICINE

## 2023-11-27 PROCEDURE — 3061F NEG MICROALBUMINURIA REV: CPT | Mod: CPTII,S$GLB,, | Performed by: INTERNAL MEDICINE

## 2023-11-27 PROCEDURE — 4010F PR ACE/ARB THEARPY RXD/TAKEN: ICD-10-PCS | Mod: CPTII,S$GLB,, | Performed by: INTERNAL MEDICINE

## 2023-11-27 PROCEDURE — 3288F FALL RISK ASSESSMENT DOCD: CPT | Mod: CPTII,S$GLB,, | Performed by: INTERNAL MEDICINE

## 2023-11-27 PROCEDURE — 3066F PR DOCUMENTATION OF TREATMENT FOR NEPHROPATHY: ICD-10-PCS | Mod: CPTII,S$GLB,, | Performed by: INTERNAL MEDICINE

## 2023-11-27 PROCEDURE — 99999 PR PBB SHADOW E&M-EST. PATIENT-LVL IV: CPT | Mod: PBBFAC,,, | Performed by: INTERNAL MEDICINE

## 2023-11-27 PROCEDURE — 3288F PR FALLS RISK ASSESSMENT DOCUMENTED: ICD-10-PCS | Mod: CPTII,S$GLB,, | Performed by: INTERNAL MEDICINE

## 2023-11-27 PROCEDURE — 99417 PR PROLONGED SVC, OUTPT, W/WO DIRECT PT CONTACT,  EA ADDTL 15 MIN: ICD-10-PCS | Mod: S$GLB,,, | Performed by: INTERNAL MEDICINE

## 2023-11-27 PROCEDURE — 99215 OFFICE O/P EST HI 40 MIN: CPT | Mod: S$GLB,,, | Performed by: INTERNAL MEDICINE

## 2023-11-27 PROCEDURE — 1159F MED LIST DOCD IN RCRD: CPT | Mod: CPTII,S$GLB,, | Performed by: INTERNAL MEDICINE

## 2023-11-27 PROCEDURE — 3044F HG A1C LEVEL LT 7.0%: CPT | Mod: CPTII,S$GLB,, | Performed by: INTERNAL MEDICINE

## 2023-11-27 PROCEDURE — 3008F PR BODY MASS INDEX (BMI) DOCUMENTED: ICD-10-PCS | Mod: CPTII,S$GLB,, | Performed by: INTERNAL MEDICINE

## 2023-11-27 PROCEDURE — 3075F PR MOST RECENT SYSTOLIC BLOOD PRESS GE 130-139MM HG: ICD-10-PCS | Mod: CPTII,S$GLB,, | Performed by: INTERNAL MEDICINE

## 2023-11-27 PROCEDURE — 3078F PR MOST RECENT DIASTOLIC BLOOD PRESSURE < 80 MM HG: ICD-10-PCS | Mod: CPTII,S$GLB,, | Performed by: INTERNAL MEDICINE

## 2023-11-27 PROCEDURE — 99215 PR OFFICE/OUTPT VISIT, EST, LEVL V, 40-54 MIN: ICD-10-PCS | Mod: S$GLB,,, | Performed by: INTERNAL MEDICINE

## 2023-11-27 PROCEDURE — 3008F BODY MASS INDEX DOCD: CPT | Mod: CPTII,S$GLB,, | Performed by: INTERNAL MEDICINE

## 2023-11-27 PROCEDURE — 1101F PT FALLS ASSESS-DOCD LE1/YR: CPT | Mod: CPTII,S$GLB,, | Performed by: INTERNAL MEDICINE

## 2023-11-27 PROCEDURE — 1159F PR MEDICATION LIST DOCUMENTED IN MEDICAL RECORD: ICD-10-PCS | Mod: CPTII,S$GLB,, | Performed by: INTERNAL MEDICINE

## 2023-11-27 PROCEDURE — 4010F ACE/ARB THERAPY RXD/TAKEN: CPT | Mod: CPTII,S$GLB,, | Performed by: INTERNAL MEDICINE

## 2023-11-27 PROCEDURE — 99999 PR PBB SHADOW E&M-EST. PATIENT-LVL IV: ICD-10-PCS | Mod: PBBFAC,,, | Performed by: INTERNAL MEDICINE

## 2023-11-27 PROCEDURE — 3044F PR MOST RECENT HEMOGLOBIN A1C LEVEL <7.0%: ICD-10-PCS | Mod: CPTII,S$GLB,, | Performed by: INTERNAL MEDICINE

## 2023-11-27 PROCEDURE — 3075F SYST BP GE 130 - 139MM HG: CPT | Mod: CPTII,S$GLB,, | Performed by: INTERNAL MEDICINE

## 2023-11-27 PROCEDURE — 83036 HEMOGLOBIN GLYCOSYLATED A1C: CPT | Performed by: INTERNAL MEDICINE

## 2023-11-27 PROCEDURE — 3078F DIAST BP <80 MM HG: CPT | Mod: CPTII,S$GLB,, | Performed by: INTERNAL MEDICINE

## 2023-11-27 NOTE — PATIENT INSTRUCTIONS
If you are feeling unwell, we'd [facility] the [facility] to [facility]! Please give us a call. Same day appointments are our top priority to keep you well and out of the emergency rooms and hospitals. Call [phone] for our direct line. After hours advice is always available. Please call [phone] after hours to speak to the on-call team.      Recommend Shingles and Covid and RSV vaccines that can be scheduled at your pharmacy of choice.    Let us know if want to continue Jardiance at same dose in which case can send next refill to [facility]     Let us know if would like to meet w [facility]     Recommend try holding loratidine and using the nasacort daily and intra-nasal saline water flushes at least twice daily instead    Consider try mouth tape when sleeping to help prevent dry mouth    Recommend debrox 5 drops each ear once weekly to help prevent wax build up

## 2023-11-27 NOTE — PROGRESS NOTES
CHW - Initial Contact    This Community Health Worker completed the Social Determinant of Health questionnaire with patient during clinic visit today.    Pt identified barriers of most importance are none at this time.   Referrals to community agencies completed with patient consent outside of [facility] include: No outside referrals at this time.  Referrals were put through [facility] - no  Support and Services: None  Other information discussed the patient needs help with: No other information was discussed that this patient needs help with.   Follow up required: No  No future outreach task assigned  SDOH was done on this patient she do not need any help at this time.

## 2023-11-27 NOTE — PROGRESS NOTES
Kristyn Garza  11/27/2023  [id number]    Moon Lion MD  Patient Care Team:  Moon Lion MD as PCP - General (Internal Medicine)  Delia Liu LPN as Care Coordinator (Internal Medicine)  Soo Bar as Digital Medicine Health   Dale Matute MD as Consulting Physician (Cardiology)  Hammad Hardy OD as Consulting Physician (Optometry)  Olivia Irwin MD as Consulting Physician (Otolaryngology)  Gretel De Leon, PharmD as Hypertension Digital Medicine Clinician  Brenden Landaverde MD as Consulting Physician (Nephrology)  Gretel De Leon PharmD as Hyperlipidemia Digital Medicine Clinician  Gretel De Leon PharmD as Diabetes Digital Medicine Clinician  Cuca Beatty NP as Nurse Practitioner (Family Medicine)  Moon Lion MD as Hyperlipidemia Digital Medicine Responsible Provider (Internal Medicine)  Moon Lion MD as Diabetes Digital Medicine Responsible Provider (Internal Medicine)  Moon Lion MD as Hypertension Digital Medicine Responsible Provider (Internal Medicine)  Viky Oviedo Chi, PharmD as Pharmacist (Pharmacist)  Medicare, [employer] Managed as Diabetes Digital Medicine Contract  Medicare, [employer] Managed as Hypertension Digital Medicine Contract    Visit Type: Follow-up    Chief Complaint:  Chief Complaint   Patient presents with    Follow-up     Four month follow up. Pt states that Metformin is causing her constipation.      History of Present Illness: Ms. Kristyn Garza is a 72 year old female here for scheduled f/u.       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 She's enrolled w digital medicine program for diabetes, hyperlipidemia and hypertension.     No recent issues w vertigo - did attend one session vestibular therapy and has been better since! Has  "BPPV home exercises if needs them  Cut back on salt - no added salt when cooking  C/o constipation w metformin  C/o sinus allergy symptoms bothersome at times  C/o dry mouth on waking in am  No other sig concerns or complaints  On questioning, she does report cramping in L calf sometimes when walking    PHQ-4 Score: 0     From last visit w me 6/19/23  Has had issues w intermittent vertigo - this started Thursday in [employer]. Not as bad or persistent as has been w previous episodes - waxing and waning - no nausea. Exacerbated by turning head or bending down - changing head position. Sinus allergies have been more bothersome recently. L ear "popping". Using intranasal steriod once daily plus claritin.   Endo DDX: primary HTN. The adrenal nodules are incidentalomas  VS: mild hyperaldosteronism with hyperplasia  VS: early stage adrenal gland adenoma  PHQ-4 Score: 0     Recent appointments:   8/30/23 [facility] Cook CoVid+  7/26/23 [facility] Jaci   6/19/23 [facility] Lion    2/10/23 [facility] Jaci EAWV    Upcoming appointments:  Future Appointments       Date Provider Specialty Appt Notes    12/7/2023  Cardiology Claudication of left lower extremity [I73.9]    12/26/2023 Soto Khan MD Pulmonology 6m ct f/u    2/27/2024 Cuca Beatty S, NP Primary Care awv/f/u           The following were reviewed: Active problem list, medication list, allergies, family history, social history, and Health Maintenance.     Medications have been reviewed and reconciled with patient at visit today.    Review of Systems   See HPI above    Exam: sat 98%  Vitals:    11/27/23 0919   BP: 138/70   Pulse: 68   Temp: 98 °F (36.7 °C)     Weight: 82.9 kg (182 lb 12.8 oz)   Body mass index is 31.38 kg/m².    BP Readings from Last 3 Encounters:   11/27/23 138/70   08/30/23 136/64   07/26/23 132/70      Wt Readings from Last 3 Encounters:   11/27/23 0919 82.9 kg (182 lb 12.8 oz)   08/30/23 0907 81.7 kg (180 lb 1.6 oz)   07/26/23 0956 82.5 kg (181 lb 14.4 " oz)        Physical Exam  Vitals reviewed.   Constitutional:       General: She is not in acute distress.     Appearance: Normal appearance. She is not ill-appearing.   HENT:      Head: Normocephalic and atraumatic.      Right Ear: External ear normal.      Left Ear: External ear normal.      Ears:      Comments: Small amount of cerumen B ear canals     Nose: Nose normal.      Mouth/Throat:      Mouth: Mucous membranes are moist.      Pharynx: Oropharynx is clear.   Eyes:      Extraocular Movements: Extraocular movements intact.      Conjunctiva/sclera: Conjunctivae normal.      Comments: glasses   Neck:      Vascular: No carotid bruit.   Cardiovascular:      Rate and Rhythm: Normal rate and regular rhythm.      Heart sounds: No murmur heard.  Pulmonary:      Effort: Pulmonary effort is normal. No respiratory distress.      Breath sounds: No wheezing, rhonchi or rales.   Abdominal:      General: Bowel sounds are normal.      Palpations: Abdomen is soft.      Tenderness: There is no abdominal tenderness. There is no guarding.   Musculoskeletal:      Right lower leg: No edema.      Left lower leg: No edema.   Lymphadenopathy:      Cervical: No cervical adenopathy.   Skin:     General: Skin is warm and dry.      Findings: No rash.   Neurological:      General: No focal deficit present.      Mental Status: She is alert and oriented to person, place, and time. Mental status is at baseline.      Motor: No weakness.      Gait: Gait normal.   Psychiatric:         Mood and Affect: Mood normal.         Behavior: Behavior normal.         Thought Content: Thought content normal.        Diabetic Foot Exam  Protective Sensation (w/ 10 gram monofilament):  Right: Intact  Left: Intact    Visual Inspection:  Normal -  Bilateral    Pedal Pulses:   Right: Present  Left: Diminished    Laboratory Reviewed  Lab Results   Component Value Date    WBC 9.34 05/12/2023    HGB 12.0 05/12/2023    HCT 38.5 05/12/2023     05/12/2023    MCV  "97 05/12/2023    CHOL 131 05/12/2023    TRIG 66 05/12/2023    HDL 43 05/12/2023    LDLCALC 74.8 05/12/2023    ALT 21 06/19/2023    AST 26 06/19/2023     06/19/2023    K 4.1 06/19/2023     06/19/2023    CREATININE 1.0 06/19/2023    BUN 15 06/19/2023    CO2 26 06/19/2023    MG 2.4 06/19/2023    TSH 0.728 05/12/2023    FREET4 0.88 05/12/2023    HGBA1C 6.6 (H) 11/27/2023     Lab Results   Component Value Date    PTH 61.0 02/10/2023    CALCIUM 9.8 06/19/2023    PHOS 3.1 04/25/2023      Lab Results   Component Value Date    OTCTRQXX63 785 02/10/2023   No results found for: "FOLATE" No results found for: "UIBC", "IRON", "TRANS", "TRANSFERRIN", "TIBC", "LABIRON", "FESATURATED"   Lab Results   Component Value Date    EGFRNORACEVR 59.1 (A) 06/19/2023    ALBUMIN 4.1 06/19/2023     Lab Results   Component Value Date    RECNPPNK34HO 50 02/10/2023      US Soft Tissue Neck 8/08/23 No significant change in appearance of multinodular goiter as above     CT Chest 6/21/23 Stable bilateral pulmonary nodules. Consider follow-up in 6-12 months.     Assessment:   72 y.o. female with multiple co-morbid illnesses here for continued follow up of medical problems.      The primary encounter diagnosis was Type 2 diabetes mellitus with stage 3a chronic kidney disease, without long-term current use of insulin. A diagnosis of Claudication of left lower extremity was also pertinent to this visit.      Plan:   1. Type 2 diabetes mellitus with stage 3a chronic kidney disease, without long-term current use of insulin  Assessment & Plan:  Resume jardiance per pt preference    Orders:  -     Hemoglobin A1C; Future; Expected date: 11/27/2023    2. Claudication of left lower extremity  -     Ankle Brachial Indices (AMITA); Future    Other orders  -     empagliflozin (JARDIANCE) 10 mg tablet; Take 1 tablet (10 mg total) by mouth once daily.  Dispense: 30 tablet; Refill: 2         Health Maintenance         Date Due Completion Date    RSV Vaccine " (Age 60+ and Pregnant patients) (1 - 1-dose 60+ series) Never done ---    Shingles Vaccine (2 of 3) 04/20/2011 2/23/2011    TETANUS VACCINE 02/23/2021 2/23/2011    COVID-19 Vaccine (4 - 2023-24 season) 09/01/2023 12/15/2021    Eye Exam 01/30/2024 1/30/2023    Override on 7/20/2020: Done    Override on 4/30/2019: Done    Override on 4/24/2018: Done    Override on 5/17/2017: Done    Override on 5/16/2016: Done    Override on 5/13/2015: Done    Diabetes Urine Screening 02/10/2024 2/10/2023    Colorectal Cancer Screening 03/18/2024 3/18/2014    Override on 6/1/2006: Done    Lipid Panel 05/12/2024 5/12/2023    Hemoglobin A1c 05/27/2024 11/27/2023    Mammogram 05/29/2024 5/29/2023    Override on 3/13/2013: Done    LDCT Lung Screen 06/21/2024 6/21/2023    Foot Exam 11/27/2024 11/27/2023    Override on 9/23/2015: Done    Override on 7/16/2015: Done    Override on 3/23/2015: Done    Override on 12/3/2014: Done    Override on 4/29/2014: Done    DEXA Scan 05/26/2026 5/26/2021    Override on 3/10/2011: Done (normal repeat in 5 years)            -Patient's lab results were reviewed and discussed with patient  -Treatment options and alternatives were discussed with the patient. Patient expressed understanding. Patient was given the opportunity to ask questions and be an active participant in their medical care. Patient had no further questions or concerns at this time.   -Patient is an overall moderate risk for health complications from their medical conditions.     Follow up: Follow up in about 3 months (around 2/27/2024) for Follow Up w Cuca for EAWV and 3 mos f/u.    Care Plan/Goals: Reviewed No   Goals         80 <= Glucose <= 180 (pt-stated)       11/15/22  On track        Blood Pressure < 130/80 (pt-stated)       11/15/22  On track        Take at least one BP reading per week at various times of the day       11/15/22  On track        Weight (lb) < 90.7 kg (200 lb) (pt-stated)       Update 11/15/22    On track - weight <  200 lbs - new goal 10 lbs wt loss over the next 2 mos.    Goal 5% weight loss - 9-10 lbs - 175 seth    Barriers? [employer]-tooth    Overcoming? Try substitute fruit? Daily walking - current step average about 5,000 steps - new goal 7,000 steps.    Time frame 3 mos - mid Jan 2023.                After visit summary printed and given to patient upon discharge.  Patient goals and care plan are included in After visit summary.    TOTAL TIME evaluating and managing this patient for this encounter was greater than 80 minutes. This time was spent personally by me on some of the following activities: review of patient's past medical history, assessing age-appropriate health maintenance needs, review of any interval history, review and interpretation of lab results, review and interpretation of imaging test results, review and interpretation of cardiology test results, reviewing consulting specialist notes, obtaining history from the patient and family, examination of the patient, medication reconciliation, managing and/or ordering prescription medications, ordering imaging tests, ordering referral to subspecialty provider(s), educating patient and answering their questions about diagnosis, treatment plan, and goals of treatment, discussing planned follow-up and final documentation of the visit. This time was exclusive of any separately billable procedures for this patient and exclusive of time spent treating any other patients.

## 2023-11-28 NOTE — PROGRESS NOTES
Pt has [facility] - if message below not viewed please call w information below:     Your Hemoglobin A1c looks good - up a little from before but still less than 7. Cont current plan of care.    Please message or call with any questions or concerns!  Thank you!  Moon Lion MD, MPH  [facility]

## 2023-11-29 ENCOUNTER — TELEPHONE (OUTPATIENT)
Dept: PRIMARY CARE CLINIC | Facility: CLINIC | Age: 73
End: 2023-11-29
Payer: MEDICARE

## 2023-12-01 ENCOUNTER — TELEPHONE (OUTPATIENT)
Dept: PRIMARY CARE CLINIC | Facility: CLINIC | Age: 73
End: 2023-12-01
Payer: MEDICARE

## 2023-12-03 ENCOUNTER — PATIENT MESSAGE (OUTPATIENT)
Dept: OTHER | Facility: OTHER | Age: 73
End: 2023-12-03
Payer: MEDICARE

## 2023-12-03 PROBLEM — R79.9 BLOOD CHEMISTRY ABNORMALITY: Status: RESOLVED | Noted: 2023-08-31 | Resolved: 2023-12-03

## 2023-12-03 PROBLEM — R42 DIZZINESS: Status: RESOLVED | Noted: 2023-06-19 | Resolved: 2023-12-03

## 2023-12-03 PROBLEM — R50.9 FEVER: Status: RESOLVED | Noted: 2023-08-30 | Resolved: 2023-12-03

## 2023-12-03 PROBLEM — E11.29 TYPE 2 DIABETES MELLITUS WITH KIDNEY COMPLICATION, WITHOUT LONG-TERM CURRENT USE OF INSULIN: Chronic | Status: ACTIVE | Noted: 2019-09-04

## 2023-12-04 RX ORDER — BENAZEPRIL HYDROCHLORIDE 40 MG/1
40 TABLET ORAL DAILY
Qty: 90 TABLET | Refills: 3 | Status: SHIPPED | OUTPATIENT
Start: 2023-12-04 | End: 2024-01-22 | Stop reason: SDUPTHER

## 2023-12-07 ENCOUNTER — HOSPITAL ENCOUNTER (OUTPATIENT)
Dept: CARDIOLOGY | Facility: HOSPITAL | Age: 73
Discharge: HOME | End: 2023-12-07
Attending: INTERNAL MEDICINE
Payer: MEDICARE

## 2023-12-07 VITALS
HEIGHT: 64 IN | BODY MASS INDEX: 31.07 KG/M2 | WEIGHT: 182 LBS | DIASTOLIC BLOOD PRESSURE: 73 MMHG | SYSTOLIC BLOOD PRESSURE: 151 MMHG

## 2023-12-07 DIAGNOSIS — I73.9 CLAUDICATION OF LEFT LOWER EXTREMITY: ICD-10-CM

## 2023-12-07 PROCEDURE — 93924 LWR XTR VASC STDY BILAT: CPT | Mod: 26,,, | Performed by: INTERNAL MEDICINE

## 2023-12-07 PROCEDURE — 93922 UPR/L XTREMITY ART 2 LEVELS: CPT

## 2023-12-07 PROCEDURE — 93924 LWR XTR VASC STDY BILAT: CPT

## 2023-12-07 PROCEDURE — 93924 ANKLE BRACHIAL INDICES (ABI): ICD-10-PCS | Mod: 26,,, | Performed by: INTERNAL MEDICINE

## 2023-12-08 LAB
ABI CLAUDICATION TIME: 1 MIN
ABI POST MINUTES1: 2 MIN
ABI POST MINUTES2: 5 MIN
IMMEDIATE ARM BP: 203 MMHG
IMMEDIATE LEFT ABI: 0.86
IMMEDIATE LEFT TIBIAL: 174 MMHG
IMMEDIATE RIGHT ABI: 0.9
IMMEDIATE RIGHT TIBIAL: 183 MMHG
LEFT ABI: 1.08
LEFT ARM BP: 147 MMHG
LEFT DORSALIS PEDIS: 163 MMHG
LEFT POSTERIOR TIBIAL: 160 MMHG
LEFT TBI: 0.75
LEFT TOE PRESSURE: 114 MMHG
POST1 ARM BP: 183 MMHG
POST1 LEFT ABI: 0.91
POST1 LEFT TIBIAL: 166 MMHG
POST1 RIGHT ABI: 0.94
POST1 RIGHT TIBIAL: 172 MMHG
POST2 ARM BP: 147 MMHG
POST2 LEFT ABI: 1.03
POST2 LEFT TIBIAL: 151 MMHG
POST2 RIGHT ABI: 1.07
POST2 RIGHT TIBIAL: 157 MMHG
RIGHT ABI: 1.09
RIGHT ARM BP: 151 MMHG
RIGHT DORSALIS PEDIS: 164 MMHG
RIGHT POSTERIOR TIBIAL: 159 MMHG
RIGHT TBI: 0.93
RIGHT TOE PRESSURE: 141 MMHG
TREADMILL GRADE: 10 %
TREADMILL SPEED: 2 MPH
TREADMILL TIME: 5 MIN

## 2023-12-10 NOTE — PROGRESS NOTES
Pt has [facility] - if message below not viewed please call w information below:   Slight decrease in blood flow in your legs with exercise. Please continue efforts to stay mobile and active. If legs start to hurt rest briefly then resume activity as tolerated!     Please message or call with any questions or concerns!  Thank you!  Moon Lion MD, MPH  [facility]

## 2023-12-20 ENCOUNTER — PATIENT MESSAGE (OUTPATIENT)
Dept: ADMINISTRATIVE | Facility: OTHER | Age: 73
End: 2023-12-20
Payer: MEDICARE

## 2023-12-26 ENCOUNTER — OFFICE VISIT (OUTPATIENT)
Dept: PULMONOLOGY | Facility: CLINIC | Age: 73
End: 2023-12-26
Payer: MEDICARE

## 2023-12-26 VITALS
HEIGHT: 64 IN | WEIGHT: 183 LBS | HEART RATE: 66 BPM | RESPIRATION RATE: 18 BRPM | OXYGEN SATURATION: 97 % | BODY MASS INDEX: 31.24 KG/M2 | DIASTOLIC BLOOD PRESSURE: 60 MMHG | SYSTOLIC BLOOD PRESSURE: 148 MMHG

## 2023-12-26 DIAGNOSIS — F17.211 NICOTINE DEPENDENCE, CIGARETTES, IN REMISSION: Primary | ICD-10-CM

## 2023-12-26 DIAGNOSIS — E66.9 OBESITY (BMI 30.0-34.9): Chronic | ICD-10-CM

## 2023-12-26 DIAGNOSIS — R09.02 EXERCISE HYPOXEMIA: ICD-10-CM

## 2023-12-26 DIAGNOSIS — R91.1 SOLITARY PULMONARY NODULE: ICD-10-CM

## 2023-12-26 DIAGNOSIS — R06.09 DOE (DYSPNEA ON EXERTION): ICD-10-CM

## 2023-12-26 DIAGNOSIS — R91.8 PULMONARY NODULES: ICD-10-CM

## 2023-12-26 PROCEDURE — 3078F PR MOST RECENT DIASTOLIC BLOOD PRESSURE < 80 MM HG: ICD-10-PCS | Mod: CPTII,S$GLB,, | Performed by: INTERNAL MEDICINE

## 2023-12-26 PROCEDURE — 1101F PT FALLS ASSESS-DOCD LE1/YR: CPT | Mod: CPTII,S$GLB,, | Performed by: INTERNAL MEDICINE

## 2023-12-26 PROCEDURE — 4010F ACE/ARB THERAPY RXD/TAKEN: CPT | Mod: CPTII,S$GLB,, | Performed by: INTERNAL MEDICINE

## 2023-12-26 PROCEDURE — 3044F HG A1C LEVEL LT 7.0%: CPT | Mod: CPTII,S$GLB,, | Performed by: INTERNAL MEDICINE

## 2023-12-26 PROCEDURE — 3061F NEG MICROALBUMINURIA REV: CPT | Mod: CPTII,S$GLB,, | Performed by: INTERNAL MEDICINE

## 2023-12-26 PROCEDURE — 3288F FALL RISK ASSESSMENT DOCD: CPT | Mod: CPTII,S$GLB,, | Performed by: INTERNAL MEDICINE

## 2023-12-26 PROCEDURE — 99999 PR PBB SHADOW E&M-EST. PATIENT-LVL V: CPT | Mod: PBBFAC,,, | Performed by: INTERNAL MEDICINE

## 2023-12-26 PROCEDURE — 3066F NEPHROPATHY DOC TX: CPT | Mod: CPTII,S$GLB,, | Performed by: INTERNAL MEDICINE

## 2023-12-26 PROCEDURE — 99214 PR OFFICE/OUTPT VISIT, EST, LEVL IV, 30-39 MIN: ICD-10-PCS | Mod: S$GLB,,, | Performed by: INTERNAL MEDICINE

## 2023-12-26 PROCEDURE — 3044F PR MOST RECENT HEMOGLOBIN A1C LEVEL <7.0%: ICD-10-PCS | Mod: CPTII,S$GLB,, | Performed by: INTERNAL MEDICINE

## 2023-12-26 PROCEDURE — 1159F PR MEDICATION LIST DOCUMENTED IN MEDICAL RECORD: ICD-10-PCS | Mod: CPTII,S$GLB,, | Performed by: INTERNAL MEDICINE

## 2023-12-26 PROCEDURE — 3078F DIAST BP <80 MM HG: CPT | Mod: CPTII,S$GLB,, | Performed by: INTERNAL MEDICINE

## 2023-12-26 PROCEDURE — 1159F MED LIST DOCD IN RCRD: CPT | Mod: CPTII,S$GLB,, | Performed by: INTERNAL MEDICINE

## 2023-12-26 PROCEDURE — 3077F SYST BP >= 140 MM HG: CPT | Mod: CPTII,S$GLB,, | Performed by: INTERNAL MEDICINE

## 2023-12-26 PROCEDURE — 1101F PR PT FALLS ASSESS DOC 0-1 FALLS W/OUT INJ PAST YR: ICD-10-PCS | Mod: CPTII,S$GLB,, | Performed by: INTERNAL MEDICINE

## 2023-12-26 PROCEDURE — 3077F PR MOST RECENT SYSTOLIC BLOOD PRESSURE >= 140 MM HG: ICD-10-PCS | Mod: CPTII,S$GLB,, | Performed by: INTERNAL MEDICINE

## 2023-12-26 PROCEDURE — 3288F PR FALLS RISK ASSESSMENT DOCUMENTED: ICD-10-PCS | Mod: CPTII,S$GLB,, | Performed by: INTERNAL MEDICINE

## 2023-12-26 PROCEDURE — 99999 PR PBB SHADOW E&M-EST. PATIENT-LVL V: ICD-10-PCS | Mod: PBBFAC,,, | Performed by: INTERNAL MEDICINE

## 2023-12-26 PROCEDURE — 4010F PR ACE/ARB THEARPY RXD/TAKEN: ICD-10-PCS | Mod: CPTII,S$GLB,, | Performed by: INTERNAL MEDICINE

## 2023-12-26 PROCEDURE — 3066F PR DOCUMENTATION OF TREATMENT FOR NEPHROPATHY: ICD-10-PCS | Mod: CPTII,S$GLB,, | Performed by: INTERNAL MEDICINE

## 2023-12-26 PROCEDURE — 3061F PR NEG MICROALBUMINURIA RESULT DOCUMENTED/REVIEW: ICD-10-PCS | Mod: CPTII,S$GLB,, | Performed by: INTERNAL MEDICINE

## 2023-12-26 PROCEDURE — 99214 OFFICE O/P EST MOD 30 MIN: CPT | Mod: S$GLB,,, | Performed by: INTERNAL MEDICINE

## 2023-12-26 NOTE — PROGRESS NOTES
Subjective:     Patient ID: Kristyn Garza is a 73 y.o. female.    Chief Complaint:      HPI  Follow up office visit for this 71 y/o with asymptomatic lung nodule  Former smoker quit 13 years ago      Lung Nodule  She presents for evaluation and treatment of a lung nodule. The patient had an abnormal imaging study but reports experiencing no current or past pulmonary symptoms. The patient denies other associated symptoms. She has a history of 43 pack years. The patient has no known exposure to tuberculosis. The patient does not have a history of cancer.          Past Medical History:   Diagnosis Date    Carpal tunnel syndrome     CKD (chronic kidney disease) stage 3, GFR 30-59 ml/min     Followed by Nephrology    Colon cancer screening 03/18/2014    CTS (carpal tunnel syndrome) 06/18/2013    Diabetes mellitus, type 2     Dizziness 06/19/2023    Eczema     Elevated CPK     History of hypokalemia 06/18/2013    History of hypokalemia 06/18/2013    Hypokalemia     Multinodular thyroid     Followed by ENT    Obesity     Other and unspecified hyperlipidemia     Pneumonia     in her 20s; hospitalized    Postmenopausal     No history of abnormal pap smear    Statin intolerance     caused mylagia, elevated CPK    Tobacco dependence     resolved    Unspecified essential hypertension      Past Surgical History:   Procedure Laterality Date    COLONOSCOPY      CYST REMOVAL  2015    On the head    FINE NEEDLE ASPIRATION  3/2011    thyroid nodules x3 (negative per patient)    HYSTERECTOMY      PARTIAL HYSTERECTOMY  1991    Due to fibroids     Review of patient's allergies indicates:   Allergen Reactions    Statins-hmg-coa reductase inhibitors      Muscle Cramps     Current Outpatient Medications on File Prior to Visit   Medication Sig Dispense Refill    ACCU-CHEK SMARTVIEW TEST STRIP Strp TO CHECK BLOOD GLUCOSE ONE TIMES DAILY 50 strip 1    amLODIPine (NORVASC) 10 MG tablet Take 1 tablet (10 mg total) by mouth once daily. 90  tablet 3    aspirin 81 MG Chew Take 81 mg by mouth once daily.      benazepriL (LOTENSIN) 40 MG tablet Take 1 tablet (40 mg total) by mouth once daily. 90 tablet 3    calcium-vitamin D3 500 mg(1,250mg) -200 unit per tablet Take 1 tablet by mouth once daily.       CRANBERRY EXTRACT ORAL Take 1 tablet by mouth once daily.      evolocumab (REPATHA SURECLICK) 140 mg/mL PnIj Inject 1 mL (140 mg total) into the skin every 14 (fourteen) days. 2 mL 5    lancets Misc 1 lancet by Misc.(Non-Drug; Combo Route) route once daily. For Acc-Chek Amber   DX :E11.9 100 each 3    loratadine (CLARITIN) 10 mg tablet TAKE 1 TABLET BY MOUTH EVERY DAY 90 tablet 1    metoprolol succinate (TOPROL-XL) 25 MG 24 hr tablet Take 1 tablet (25 mg total) by mouth once daily. 90 tablet 3    MULTIVITAMIN W-MINERALS/LUTEIN (CENTRUM SILVER ORAL) Take 1 tablet by mouth once daily.      spironolactone (ALDACTONE) 50 MG tablet Take 1 tablet (50 mg total) by mouth once daily. 90 tablet 1    triamcinolone (NASACORT) 55 mcg nasal inhaler USE 2 SPRAYS BY NASAL ROUTE ONCE DAILY 17 mL 3    blood glucose control, normal Soln 1 Bottle by Misc.(Non-Drug; Combo Route) route once daily. For Accu Check Amber  use twice daily prn dx: E11.9 1 each 0    blood-glucose meter (ACCU-CHEK AMBER) Misc 1 kit by Misc.(Non-Drug; Combo Route) route once. For Accu Check Amber  use twice daily prn dx: E11.9 for 1 dose 1 each 0    empagliflozin (JARDIANCE) 10 mg tablet Take 1 tablet (10 mg total) by mouth once daily. (Patient not taking: Reported on 12/26/2023) 30 tablet 2     No current facility-administered medications on file prior to visit.     Social History     Socioeconomic History    Marital status:     Number of children: 2    Highest education level: Some college, no degree   Occupational History    Occupation: Retired   Tobacco Use    Smoking status: Former     Current packs/day: 0.00     Average packs/day: 1 pack/day for 43.4 years (43.4 ttl pk-yrs)     Types:  Cigarettes     Start date: 1/1/1970     Quit date: 5/20/2013     Years since quitting: 10.6    Smokeless tobacco: Never   Substance and Sexual Activity    Alcohol use: Yes     Alcohol/week: 0.0 standard drinks of alcohol     Comment: Rarely drinks wine    Drug use: No    Sexual activity: Not Currently     Partners: Male     Birth control/protection: Condom   Social History Narrative    She wears seatbelt. . 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      Social Determinants of Health     Financial Resource Strain: Low Risk  (11/27/2023)    Overall Financial Resource Strain (CARDIA)     Difficulty of Paying Living Expenses: Not hard at all   Food Insecurity: No Food Insecurity (11/27/2023)    Hunger Vital Sign     Worried About Running Out of Food in the Last Year: Never true     Ran Out of Food in the Last Year: Never true   Transportation Needs: No Transportation Needs (11/27/2023)    PRAPARE - Transportation     Lack of Transportation (Medical): No     Lack of Transportation (Non-Medical): No   Physical Activity: Inactive (11/27/2023)    Exercise Vital Sign     Days of Exercise per Week: 0 days     Minutes of Exercise per Session: 0 min   Stress: No Stress Concern Present (11/27/2023)    [employer] [place] [employer] - Occupational Stress Questionnaire     Feeling of Stress : Only a little   Social Connections: Moderately Isolated (11/27/2023)    Social Connection and Isolation Panel [NHANES]     Frequency of Communication with Friends and Family: More than three times a week     Frequency of Social Gatherings with Friends and Family: More than three times a week     Attends Muslim Services: More than 4 times per year     Active Member of Clubs or Organizations: No     Attends Club or Organization Meetings: Never     Marital Status:    Housing Stability: Low Risk  (11/27/2023)    Housing Stability Vital Sign     Unable to Pay for Housing in the Last Year: No     Number of Places  "Lived in the Last Year: 1     Unstable Housing in the Last Year: No     Family History   Problem Relation Age of Onset    Hypertension Mother     Diabetes Mother     Dementia Mother         Alzheimer's dementia    Hypertension Father     Heart disease Sister         MI/CAD    Cataracts Sister     Dementia Sister     No Known Problems Son     No Known Problems Son     Cataracts Brother     Cataracts Brother     Dementia Sister     Cancer Neg Hx     Stroke Neg Hx     Melanoma Neg Hx     Psoriasis Neg Hx     Lupus Neg Hx     Eczema Neg Hx     COPD Neg Hx     Kidney disease Neg Hx        Review of Systems   Constitutional:  Negative for fever and fatigue.   HENT:  Negative for postnasal drip and rhinorrhea.    Eyes:  Negative for redness and itching.   Respiratory:  Negative for cough, shortness of breath, wheezing, dyspnea on extertion and Paroxysmal Nocturnal Dyspnea.    Cardiovascular:  Negative for chest pain.   Genitourinary:  Negative for difficulty urinating and hematuria.   Endocrine:  Negative for polyphagia, cold intolerance and heat intolerance.    Musculoskeletal:  Positive for arthralgias.   Skin:  Negative for rash.   Gastrointestinal:  Negative for nausea, vomiting, abdominal pain and abdominal distention.   Neurological:  Negative for dizziness and headaches.   Hematological:  Negative for adenopathy. Does not bruise/bleed easily and no excessive bruising.   Psychiatric/Behavioral:  The patient is not nervous/anxious.        Objective:      BP (!) 148/60   Pulse 66   Resp 18   Ht 5' 4" (1.626 m)   Wt 83 kg (182 lb 15.7 oz)   SpO2 97%   BMI 31.41 kg/m²   Physical Exam  Vitals and nursing note reviewed.   Constitutional:       Appearance: Normal appearance. She is well-developed. She is obese.   HENT:      Head: Normocephalic and atraumatic.      Nose: Nose normal.   Eyes:      Conjunctiva/sclera: Conjunctivae normal.      Pupils: Pupils are equal, round, and reactive to light.   Neck:      Thyroid: " No thyromegaly.      Vascular: No JVD.      Trachea: No tracheal deviation.   Cardiovascular:      Rate and Rhythm: Normal rate and regular rhythm.      Heart sounds: Normal heart sounds.   Pulmonary:      Effort: Pulmonary effort is normal. No respiratory distress.      Breath sounds: Normal breath sounds. No wheezing or rales.   Chest:      Chest wall: No tenderness.   Abdominal:      General: Bowel sounds are normal.      Palpations: Abdomen is soft.   Musculoskeletal:         General: Normal range of motion.      Cervical back: Neck supple.   Lymphadenopathy:      Cervical: No cervical adenopathy.   Skin:     General: Skin is warm and dry.   Neurological:      Mental Status: She is alert and oriented to person, place, and time.       Personal Diagnostic Review  REVIEW of CT   Abnormal CT of abdomen in the past 9/27/2022  1. Small bilateral adrenal adenomas as above  2. Otherwise no acute findings demonstrated in the abdomen or pelvis.  3. Small bilateral pulmonary nodule seen in the lung bases measuring up to 6 mm in the right lower lobe.  For multiple solid nodules with any 6 mm or greater, Fleischner Society guidelines recommend follow up with non-contrast chest CT at 3-6 months and 18-24 months after discovery.    Details    Reading Physician Reading Date Result Priority   Alberto Hardy MD  [phone] 12/9/2022 Routine     Narrative & Impression  EXAMINATION:  CT CHEST WITHOUT CONTRAST     CLINICAL HISTORY:  Abnormal xray - lung nodule, < 1 cm, low risk; Other nonspecific abnormal finding of lung field     TECHNIQUE:  Low dose axial images, sagittal and coronal reformations were obtained from the thoracic inlet to the lung bases. Contrast was not administered.     COMPARISON:  CT abdomen and pelvis dated 09/27/2022     FINDINGS:  Heart and Great vessels: Scattered atherosclerotic plaque noted involving the thoracic aorta and aortic branch vessels, including the coronary arteries.  No pericardial  "effusion.     Thoracic Adenopathy: None demonstrated     Lungs: There multiple solid noncalcified pulmonary nodules in the lungs bilaterally, many of which occur along the pleural surfaces.  The largest nodules on the right measure an average of 6 mm in the right middle lobe (series 4, image 227 and 5 mm in the right lower lobe along the surface of the diaphragm (series 4, image 358.  The largest nodule in the left lung measures an average of 7 mm in the periphery of the left upper lobe apex as seen on series 4, image 80.  No parenchymal consolidations or pleural effusions demonstrated.     Upper Abdomen: Previously described small bilateral adrenal nodules appear unchanged in size.     Bones: No acute or suspicious osseous findings.     Miscellaneous: None     Impression:     1. Multiple solid noncalcified pulmonary nodules throughout the lungs bilaterally with the largest measuring an average of 7 mm in the apex of the left upper lobe.  For multiple solid nodules with any 6 mm or greater, Fleischner Society guidelines recommend follow up with non-contrast chest CT at 3-6 months and 18-24 months after discovery.        Electronically signed by: Alberto Hardy MD  Date:                                            12/09/2022  Time:                                           10:20 12/26/2023    10:13 AM   Pulmonary Studies Review   SpO2 97 %   Height 5' 4" (1.626 m)   Weight 83 kg (182 lb 15.7 oz)   BMI (Calculated) 31.4   Predicted Distance 244.81   Predicted Distance Meters (Calculated) 386.52 meters       Ankle Brachial Indices (AMITA)    Normal AMITA bilateral lower extremities at rest with biphasic flow   waveforms    Mildly decreased AMITA bilateral lower extremities immediately post   exercise.      Office Spirometry Results:         12/26/2023    10:13 AM 12/7/2023     8:58 AM 11/27/2023     9:19 AM 8/30/2023     9:07 AM 7/26/2023     9:56 AM 6/19/2023     1:03 PM 6/19/2023    12:53 PM   Pulmonary " "Function Tests   SpO2 97 %  98 % 98 % 97 % 97 % 98 %   Height 5' 4" (1.626 m) 5' 4" (1.626 m) 5' 4" (1.626 m) 5' 4" (1.626 m) 5' 4" (1.626 m)     Weight 83 kg (182 lb 15.7 oz) 82.6 kg (182 lb) 82.9 kg (182 lb 12.8 oz) 81.7 kg (180 lb 1.6 oz) 82.5 kg (181 lb 14.4 oz)  81.3 kg (179 lb 3.2 oz)   BMI (Calculated) 31.4 31.2 31.4 30.9 31.2           12/26/2023    10:13 AM   Pulmonary Studies Review   SpO2 97 %   Height 5' 4" (1.626 m)   Weight 83 kg (182 lb 15.7 oz)   BMI (Calculated) 31.4   Predicted Distance 244.81   Predicted Distance Meters (Calculated) 386.52 meters         Assessment:            Nicotine dependence, cigarettes, in remission  -     CT Chest Lung Screening Low Dose; Future; Expected date: 12/26/2023    Pulmonary nodules  -     CT Chest Lung Screening Low Dose; Future; Expected date: 12/26/2023    Solitary pulmonary nodule  -     CT Chest Lung Screening Low Dose; Future; Expected date: 12/26/2023    Exercise hypoxemia  -     Six Minute Walk Test to qualify for Home Oxygen; Future  -     Spirometry with/without bronchodilator; Future; Expected date: 06/27/2024    ZHOU (dyspnea on exertion)  -     Six Minute Walk Test to qualify for Home Oxygen; Future  -     Spirometry with/without bronchodilator; Future; Expected date: 06/27/2024    Obesity (BMI 30.0-34.9)          Outpatient Encounter Medications as of 12/26/2023   Medication Sig Dispense Refill    ACCU-CHEK SMARTVIEW TEST STRIP Strp TO CHECK BLOOD GLUCOSE ONE TIMES DAILY 50 strip 1    amLODIPine (NORVASC) 10 MG tablet Take 1 tablet (10 mg total) by mouth once daily. 90 tablet 3    aspirin 81 MG Chew Take 81 mg by mouth once daily.      benazepriL (LOTENSIN) 40 MG tablet Take 1 tablet (40 mg total) by mouth once daily. 90 tablet 3    calcium-vitamin D3 500 mg(1,250mg) -200 unit per tablet Take 1 tablet by mouth once daily.       CRANBERRY EXTRACT ORAL Take 1 tablet by mouth once daily.      evolocumab (REPATHA SURECLICK) 140 mg/mL PnDemetrius Inject 1 mL (140 " mg total) into the skin every 14 (fourteen) days. 2 mL 5    lancets Misc 1 lancet by Misc.(Non-Drug; Combo Route) route once daily. For Acc-Chek Amber   DX :E11.9 100 each 3    loratadine (CLARITIN) 10 mg tablet TAKE 1 TABLET BY MOUTH EVERY DAY 90 tablet 1    metoprolol succinate (TOPROL-XL) 25 MG 24 hr tablet Take 1 tablet (25 mg total) by mouth once daily. 90 tablet 3    MULTIVITAMIN W-MINERALS/LUTEIN (CENTRUM SILVER ORAL) Take 1 tablet by mouth once daily.      spironolactone (ALDACTONE) 50 MG tablet Take 1 tablet (50 mg total) by mouth once daily. 90 tablet 1    triamcinolone (NASACORT) 55 mcg nasal inhaler USE 2 SPRAYS BY NASAL ROUTE ONCE DAILY 17 mL 3    blood glucose control, normal Soln 1 Bottle by Misc.(Non-Drug; Combo Route) route once daily. For Accu Check Amber  use twice daily prn dx: E11.9 1 each 0    blood-glucose meter (ACCU-CHEK AMBER) Misc 1 kit by Misc.(Non-Drug; Combo Route) route once. For Accu Check Amber  use twice daily prn dx: E11.9 for 1 dose 1 each 0    empagliflozin (JARDIANCE) 10 mg tablet Take 1 tablet (10 mg total) by mouth once daily. (Patient not taking: Reported on 12/26/2023) 30 tablet 2    [DISCONTINUED] benazepriL (LOTENSIN) 40 MG tablet Take 1 tablet (40 mg total) by mouth once daily. 90 tablet 3     No facility-administered encounter medications on file as of 12/26/2023.     Plan:       Requested Prescriptions      No prescriptions requested or ordered in this encounter     Problem List Items Addressed This Visit       Obesity (BMI 30.0-34.9) (Chronic)    Pulmonary nodules (Chronic)    Overview     Incidental finding on imaging:  CT abd/pelvis 3/02/22:Small right lower lobe lung nodule.  Correlation with nonemergent chest CT could be considered for further evaluation.  F/u imaging:  CT Chest w/o contrast 12/09/22:  Multiple solid noncalcified pulmonary nodules throughout the lungs bilaterally with the largest measuring an average of 7 mm in the apex of the left upper lobe.  For  multiple solid nodules with any 6 mm or greater, Fleischner Society guidelines recommend follow up with non-contrast chest CT at 3-6 months and 18-24 months after discovery.         Relevant Orders    CT Chest Lung Screening Low Dose    ZHOU (dyspnea on exertion)    Relevant Orders    Six Minute Walk Test to qualify for Home Oxygen    Spirometry with/without bronchodilator     Other Visit Diagnoses       Nicotine dependence, cigarettes, in remission    -  Primary    Relevant Orders    CT Chest Lung Screening Low Dose    Solitary pulmonary nodule        Relevant Orders    CT Chest Lung Screening Low Dose    Exercise hypoxemia        Relevant Orders    Six Minute Walk Test to qualify for Home Oxygen    Spirometry with/without bronchodilator             Follow up in about 6 months (around 6/26/2024) for CT - on return visit, 6 min walk - on return, ambrosio - on return.    MEDICAL DECISION MAKING: Moderate to high complexity.  Overall, the multiple problems listed are of moderate to high severity that may impact quality of life and activities of daily living. Side effects of medications, treatment plan as well as options and alternatives reviewed and discussed with patient. There was counseling of patient concerning these issues.    Total time spent in counseling and coordination of care - 35  minutes of total time spent on the encounter, which includes face to face time and non-face to face time preparing to see the patient (eg, review of tests), Obtaining and/or reviewing separately obtained history, Documenting clinical information in the electronic or other health record, Independently interpreting results (not separately reported) and communicating results to the patient/family/caregiver, or Care coordination (not separately reported).    Time was used in discussion of prognosis, risks, benefits of treatment, instructions and compliance with regimen . Discussion with other physicians and/or health care providers -  home health or for use of durable medical equipment (oxygen, nebulizers, CPAP, BiPAP) occurred.

## 2024-01-01 ENCOUNTER — PATIENT MESSAGE (OUTPATIENT)
Dept: ADMINISTRATIVE | Facility: OTHER | Age: 74
End: 2024-01-01
Payer: MEDICARE

## 2024-01-22 RX ORDER — BENAZEPRIL HYDROCHLORIDE 40 MG/1
40 TABLET ORAL DAILY
Qty: 90 TABLET | Refills: 3 | Status: SHIPPED | OUTPATIENT
Start: 2024-01-22

## 2024-02-09 DIAGNOSIS — E11.59 HYPERTENSION ASSOCIATED WITH DIABETES: ICD-10-CM

## 2024-02-09 DIAGNOSIS — I15.2 HYPERTENSION ASSOCIATED WITH DIABETES: ICD-10-CM

## 2024-02-09 RX ORDER — SPIRONOLACTONE 50 MG/1
50 TABLET, FILM COATED ORAL
Qty: 90 TABLET | Refills: 3 | Status: SHIPPED | OUTPATIENT
Start: 2024-02-09

## 2024-02-16 ENCOUNTER — TELEPHONE (OUTPATIENT)
Dept: PHARMACY | Facility: CLINIC | Age: 74
End: 2024-02-16
Payer: MEDICARE

## 2024-02-16 RX ORDER — METFORMIN HYDROCHLORIDE 500 MG/1
TABLET ORAL
Qty: 48 TABLET | Refills: 0 | OUTPATIENT
Start: 2024-02-16

## 2024-02-16 NOTE — LETTER
February 16, 2024    Kristyn Garza  [address] [place] [address]              Dear Ms. Kristyn Garza     It was a pleasure speaking with you. To follow up on our conversation on 2/16/2024, the Pharmacy Patient Assistance Program needs more information from you before we can submit your Ozempic application to the [employer] Program. Please return the following documents to the Pharmacy Patient Assistance office ASA.  Fax requested documentation to [phone] or email [email or web address]. Documentation can also be mailed to address listed below       Proof of household Income( such as social security statement, 1099 form, pension statement or 3 consecutive pay stubs, Copy of all Insurance cards( front and back), and Signed and dated HIPAA /Patient Information Forms              Whats Next:     Once I receive your documentation and authorization from your Provider, your application will be submitted to the [employer] Assistance Program for review. Please be advised it will take 2 to 4 weeks for your application to be processed so you may have to purchase a month's supply of medication from your pharmacy to hold you over during the waiting period. You will be notified of approval or denial by The Program(mail) or myself.      If you have any questions or concerns, please give me a call         Sincerely   Ac Palencia   Pharmacy Patient Assistance  [id number]  [place] [address]  Phone: [phone]  Fax: [phone]  Email: [email or web address]

## 2024-02-16 NOTE — TELEPHONE ENCOUNTER
I have spoken with Kristyn Garza and informed her of the [employer] application process for Ozempic and what's required to apply.  Kristyn Garza will provide the following documents: Proof of household Income( such as social security statement, 1099 form, pension statement or 3 consecutive pay stubs, Copy of all Insurance cards( front and back), and Signed and dated HIPAA /Patient Information Forms        I will follow up with the patient in 5 business days.

## 2024-02-16 NOTE — LETTER
March 5, 2024    Kristyn Garza  [address] [place] [address]             [facility] - Pharmacy Assistance  [address]  [facility] [address]  Fax: [phone] Dear Ms. Garza    My Name is Ac Palencia. I am a Pharmacy Technician reaching out on behalf of [facility] Patient Assistance Team. We last spoke on 03/04/24 about assistance with your medication. A letter was sent to your [facility] Portal requesting documentation required to begin the Pharmacy Assistance Process. Unfortunately, we have not heard back from you. Please reach out to my phone number below if you are still in need of assistance with your medications. We look forward to hearing from you soon!     Thank you for choosing [facility] for your healthcare needs.      Ac Palencia  Pharmacy Patient Assistance

## 2024-02-19 ENCOUNTER — PATIENT MESSAGE (OUTPATIENT)
Dept: ADMINISTRATIVE | Facility: HOSPITAL | Age: 74
End: 2024-02-19
Payer: MEDICARE

## 2024-02-20 ENCOUNTER — PATIENT MESSAGE (OUTPATIENT)
Dept: ADMINISTRATIVE | Facility: OTHER | Age: 74
End: 2024-02-20
Payer: MEDICARE

## 2024-02-26 NOTE — TELEPHONE ENCOUNTER
A 2nd attempt has been made to retrieve requested documents from Kristyn Garza  via MY CHART. The final contact attempt will be made in 5 business days

## 2024-02-27 ENCOUNTER — PATIENT MESSAGE (OUTPATIENT)
Dept: ADMINISTRATIVE | Facility: OTHER | Age: 74
End: 2024-02-27
Payer: MEDICARE

## 2024-02-27 ENCOUNTER — OFFICE VISIT (OUTPATIENT)
Dept: PRIMARY CARE CLINIC | Facility: CLINIC | Age: 74
End: 2024-02-27
Payer: MEDICARE

## 2024-02-27 VITALS
DIASTOLIC BLOOD PRESSURE: 68 MMHG | SYSTOLIC BLOOD PRESSURE: 138 MMHG | HEIGHT: 64 IN | WEIGHT: 184.63 LBS | OXYGEN SATURATION: 96 % | BODY MASS INDEX: 31.52 KG/M2 | HEART RATE: 67 BPM | TEMPERATURE: 98 F

## 2024-02-27 DIAGNOSIS — I15.2 HYPERTENSION ASSOCIATED WITH DIABETES: Chronic | ICD-10-CM

## 2024-02-27 DIAGNOSIS — I70.0 AORTIC ATHEROSCLEROSIS: Chronic | ICD-10-CM

## 2024-02-27 DIAGNOSIS — E78.2 COMBINED HYPERLIPIDEMIA ASSOCIATED WITH TYPE 2 DIABETES MELLITUS: Chronic | ICD-10-CM

## 2024-02-27 DIAGNOSIS — E11.59 HYPERTENSION ASSOCIATED WITH DIABETES: Chronic | ICD-10-CM

## 2024-02-27 DIAGNOSIS — E04.2 MULTIPLE THYROID NODULES: Chronic | ICD-10-CM

## 2024-02-27 DIAGNOSIS — E27.8 ADRENAL NODULE: Chronic | ICD-10-CM

## 2024-02-27 DIAGNOSIS — E66.9 OBESITY (BMI 30.0-34.9): Chronic | ICD-10-CM

## 2024-02-27 DIAGNOSIS — I51.89 DIASTOLIC DYSFUNCTION: ICD-10-CM

## 2024-02-27 DIAGNOSIS — H91.93 BILATERAL HEARING LOSS, UNSPECIFIED HEARING LOSS TYPE: ICD-10-CM

## 2024-02-27 DIAGNOSIS — R91.8 PULMONARY NODULES: Primary | Chronic | ICD-10-CM

## 2024-02-27 DIAGNOSIS — E11.69 COMBINED HYPERLIPIDEMIA ASSOCIATED WITH TYPE 2 DIABETES MELLITUS: Chronic | ICD-10-CM

## 2024-02-27 DIAGNOSIS — J30.9 ALLERGIC RHINITIS, UNSPECIFIED SEASONALITY, UNSPECIFIED TRIGGER: ICD-10-CM

## 2024-02-27 DIAGNOSIS — Z00.00 ENCOUNTER FOR PREVENTIVE HEALTH EXAMINATION: ICD-10-CM

## 2024-02-27 PROCEDURE — 1159F MED LIST DOCD IN RCRD: CPT | Mod: CPTII,S$GLB,, | Performed by: NURSE PRACTITIONER

## 2024-02-27 PROCEDURE — 3288F FALL RISK ASSESSMENT DOCD: CPT | Mod: CPTII,S$GLB,, | Performed by: NURSE PRACTITIONER

## 2024-02-27 PROCEDURE — 1170F FXNL STATUS ASSESSED: CPT | Mod: CPTII,S$GLB,, | Performed by: NURSE PRACTITIONER

## 2024-02-27 PROCEDURE — 1101F PT FALLS ASSESS-DOCD LE1/YR: CPT | Mod: CPTII,S$GLB,, | Performed by: NURSE PRACTITIONER

## 2024-02-27 PROCEDURE — G0439 PPPS, SUBSEQ VISIT: HCPCS | Mod: S$GLB,,, | Performed by: NURSE PRACTITIONER

## 2024-02-27 PROCEDURE — 3075F SYST BP GE 130 - 139MM HG: CPT | Mod: CPTII,S$GLB,, | Performed by: NURSE PRACTITIONER

## 2024-02-27 PROCEDURE — 99999 PR PBB SHADOW E&M-EST. PATIENT-LVL V: CPT | Mod: PBBFAC,,, | Performed by: NURSE PRACTITIONER

## 2024-02-27 PROCEDURE — 3078F DIAST BP <80 MM HG: CPT | Mod: CPTII,S$GLB,, | Performed by: NURSE PRACTITIONER

## 2024-02-27 RX ORDER — AZELASTINE 1 MG/ML
1 SPRAY, METERED NASAL 2 TIMES DAILY PRN
Qty: 30 ML | Refills: 11 | Status: SHIPPED | OUTPATIENT
Start: 2024-02-27 | End: 2025-02-26

## 2024-02-27 RX ORDER — DEXAMETHASONE 1 MG/1
1 TABLET ORAL ONCE
Qty: 1 TABLET | Refills: 0 | Status: SHIPPED | OUTPATIENT
Start: 2024-02-27 | End: 2024-02-27

## 2024-02-27 NOTE — ASSESSMENT & PLAN NOTE
BP Readings from Last 3 Encounters:   02/27/24 138/68   12/26/23 (!) 148/60   12/07/23 (!) 151/73   DMII controlled.   Continues spironolactone, benazepril, metoprolol.  Jardiance stopped due to cost.   Intolerant of metformin.

## 2024-02-27 NOTE — PROGRESS NOTES
"Kristyn Garza presented for a follow-up Medicare AWV today. The following components were reviewed and updated:    Medical history  Family History  Social history  Allergies and Current Medications  Health Risk Assessment  Health Maintenance  Care Team    **See Completed Assessments for Annual Wellness visit with in the encounter summary    The following assessments were completed:  Depression Screening  Cognitive function Screening  Timed Get Up Test  Whisper Test        Vitals:    02/27/24 0856   BP: 138/68   BP Location: Left arm   Patient Position: Sitting   Pulse: 67   Temp: 98 °F (36.7 °C)   TempSrc: Oral   SpO2: 96%   Weight: 83.7 kg (184 lb 10.2 oz)   Height: 5' 4" (1.626 m)     Body mass index is 31.69 kg/m².   ]          Here for AWV. LOV with PCP 11/27/23, prescribed Jardiance. Claudication sx, resting AMITA normal, minimal change with exercise.     Allergy symptoms.     Difficulty wearing hearing aids. Blocked with wax frequently.     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 She's enrolled w [employer] program for diabetes, hyperlipidemia and hypertension.       Diagnoses and health risks identified today and associated recommendations/orders:  Problem List Items Addressed This Visit          Pulmonary    Pulmonary nodules - Primary (Chronic)     Followed by pulmonology, continues surveillance.               Cardiac/Vascular    Hypertension associated with diabetes (Chronic)     BP Readings from Last 3 Encounters:   02/27/24 138/68   12/26/23 (!) 148/60   12/07/23 (!) 151/73   DMII controlled.   Continues spironolactone, benazepril, metoprolol.  Jardiance stopped due to cost.   Intolerant of metformin.            Relevant Orders    CBC Auto Differential    Comprehensive Metabolic Panel    Hemoglobin A1C    Combined " hyperlipidemia associated with type 2 diabetes mellitus (Chronic)     Lab Results   Component Value Date    HGBA1C 6.6 (H) 11/27/2023     Lab Results   Component Value Date    LDLCALC 74.8 05/12/2023   Continues Repatha, DMII mgmt.   Jardiance stopped due to cost of medication.              Aortic atherosclerosis (Chronic)     Continues Repatha, HTN and DMII mgmt.             Endocrine    Multiple thyroid nodules (Chronic)     Nodules have been stable.   Lab Results   Component Value Date    TSH 0.728 05/12/2023   Repeat TSH with next lab.           Relevant Orders    TSH    T4, FREE    Adrenal nodule (Chronic)     Continues spironolactone.   Due to repeat CT abd/pelvis 9/2024 for surveillance with low dose DST.            Relevant Medications    dexAMETHasone (DECADRON) 1 MG Tab    Other Relevant Orders    CORTISOL, 8AM    Obesity (BMI 30.0-34.9) (Chronic)    Relevant Orders    Lipid Panel    Hemoglobin A1C    TSH    T4, FREE     Other Visit Diagnoses       Allergic rhinitis, unspecified seasonality, unspecified trigger        Relevant Medications    azelastine (ASTELIN) 137 mcg (0.1 %) nasal spray    Bilateral hearing loss, unspecified hearing loss type        Relevant Orders    Ambulatory referral/consult to Audiology    Ambulatory referral/consult to ENT    Encounter for preventive health examination                      Provided Kristyn with a 5-10 year written screening schedule and personal prevention plan. Recommendations were developed using the USPSTF age appropriate recommendations. Education, counseling, and referrals were provided as needed.  After Visit Summary printed and given to patient which includes a list of additional screenings\tests needed.    No follow-ups on file.      MOISES Moncada offered to discuss advanced care planning, including how to pick a person who would make decisions for you if you were unable to make them for yourself, called a health care power of , and what kind  of decisions you might make such as use of life sustaining treatments such as ventilators and tube feeding when faced with a life limiting illness recorded on a living will that they will need to know. (How you want to be cared for as you near the end of your natural life)     X Patient is interested in learning more about how to make advanced directives.  I provided them paperwork and offered to discuss this with them.

## 2024-02-27 NOTE — PATIENT INSTRUCTIONS
If you are feeling unwell, we'd like to be the first ones to know here at [facility]! Please give us a call. Same day appointments are our top priority to keep you well and out of the emergency rooms and hospitals. Call [phone] for our direct line. After hours advice is always available. Please call [phone] after hours to speak to the on-call team.      Use nasal antihistamine (Nasocort) everyday during allergy season to prevent allergy symptoms.  Use nasal antihistamine (azelastine) twice daily as needed for allergy symptom unrelieved by Nasocort.         Dexamethasone Suppression Test:     - Take 1 mg tablet of dexamethasone by mouth at exactly 11:00 p.m. the night before your 8:00 a.m. blood test is scheduled  - Have blood drawn exactly at 8:00 a.m. as the timing of the medication and blood draw are very important     - I have sent a prescription for 1 mg of dexamethasone (one dose) to your pharmacy  - A normal response to taking the dexamethasone is a low cortisol level the next day. Do not be alarmed if your cortisol level is marked as abnormally low because that is a normal response.                 Counseling and Referral of Other Preventative  (Italic type indicates deductible and co-insurance are waived)    Patient Name: Kristyn Garza  Today's Date: 2/27/2024    Health Maintenance       Date Due Completion Date    RSV Vaccine (Age 60+ and Pregnant patients) (1 - 1-dose 60+ series) Never done ---    Shingles Vaccine (2 of 3) 04/20/2011 2/23/2011    TETANUS VACCINE 02/23/2021 2/23/2011    COVID-19 Vaccine (4 - 2023-24 season) 09/01/2023 12/15/2021    Eye Exam 01/30/2024 1/30/2023    Override on 7/20/2020: Done    Override on 4/30/2019: Done    Override on 4/24/2018: Done    Override on 5/17/2017: Done    Override on 5/16/2016: Done    Override on 5/13/2015: Done    Diabetes Urine Screening 02/10/2024 2/10/2023    Colorectal Cancer Screening 03/18/2024 3/18/2014    Override on 6/1/2006: Done     Lipid Panel 05/12/2024 5/12/2023    Hemoglobin A1c 05/27/2024 11/27/2023    Mammogram 05/29/2024 5/29/2023    Override on 3/13/2013: Done    LDCT Lung Screen 06/21/2024 6/21/2023    Foot Exam 11/27/2024 11/27/2023    Override on 9/23/2015: Done    Override on 7/16/2015: Done    Override on 3/23/2015: Done    Override on 12/3/2014: Done    Override on 4/29/2014: Done    DEXA Scan 05/26/2026 5/26/2021    Override on 3/10/2011: Done (normal repeat in 5 years)        Orders Placed This Encounter   Procedures    CORTISOL, 8AM    Lipid Panel    CBC Auto Differential    Comprehensive Metabolic Panel    Hemoglobin A1C    TSH    T4, FREE    Ambulatory referral/consult to Audiology    Ambulatory referral/consult to ENT     The following information is provided to all patients.  This information is to help you find resources for any of the problems found today that may be affecting your health:                  Living healthy guide: www.[facility].[place].[facility]      Understanding Diabetes: www.diabetes.org      Eating healthy: www.cdc.gov/healthyweight      CDC home safety checklist: www.cdc.gov/steadi/patient.html      Agency on Aging: www.goea.[place].[facility]      Alcoholics anonymous (AA): www.aa.org      Physical Activity: www.barney.nih.gov/ax8ufbj      Tobacco use: www.quitwithusla.org

## 2024-02-27 NOTE — ASSESSMENT & PLAN NOTE
Lab Results   Component Value Date    HGBA1C 6.6 (H) 11/27/2023     Lab Results   Component Value Date    LDLCALC 74.8 05/12/2023   Continues Repatha, DMII mgmt.   Jardiance stopped due to cost of medication.

## 2024-02-27 NOTE — ASSESSMENT & PLAN NOTE
Nodules have been stable.   Lab Results   Component Value Date    TSH 0.728 05/12/2023   Repeat TSH with next lab.

## 2024-02-27 NOTE — ASSESSMENT & PLAN NOTE
Continues spironolactone.   Due to repeat CT abd/pelvis 9/2024 for surveillance with low dose DST.

## 2024-03-01 ENCOUNTER — OFFICE VISIT (OUTPATIENT)
Dept: OPHTHALMOLOGY | Facility: CLINIC | Age: 74
End: 2024-03-01
Payer: MEDICARE

## 2024-03-01 ENCOUNTER — PATIENT MESSAGE (OUTPATIENT)
Dept: OPHTHALMOLOGY | Facility: CLINIC | Age: 74
End: 2024-03-01

## 2024-03-01 ENCOUNTER — LAB VISIT (OUTPATIENT)
Dept: LAB | Facility: HOSPITAL | Age: 74
End: 2024-03-01
Attending: NURSE PRACTITIONER
Payer: MEDICARE

## 2024-03-01 DIAGNOSIS — H52.13 MYOPIA WITH ASTIGMATISM AND PRESBYOPIA, BILATERAL: ICD-10-CM

## 2024-03-01 DIAGNOSIS — E11.9 TYPE 2 DIABETES MELLITUS WITHOUT RETINOPATHY: Primary | ICD-10-CM

## 2024-03-01 DIAGNOSIS — H52.203 MYOPIA WITH ASTIGMATISM AND PRESBYOPIA, BILATERAL: ICD-10-CM

## 2024-03-01 DIAGNOSIS — H25.13 NUCLEAR SCLEROSIS, BILATERAL: ICD-10-CM

## 2024-03-01 DIAGNOSIS — H25.013 CORTICAL AGE-RELATED CATARACT OF BOTH EYES: ICD-10-CM

## 2024-03-01 DIAGNOSIS — H52.4 MYOPIA WITH ASTIGMATISM AND PRESBYOPIA, BILATERAL: ICD-10-CM

## 2024-03-01 DIAGNOSIS — H40.013 OPEN ANGLE WITH BORDERLINE FINDINGS OF BOTH EYES: ICD-10-CM

## 2024-03-01 DIAGNOSIS — E11.59 HYPERTENSION ASSOCIATED WITH DIABETES: Chronic | ICD-10-CM

## 2024-03-01 DIAGNOSIS — E66.9 OBESITY (BMI 30.0-34.9): Chronic | ICD-10-CM

## 2024-03-01 DIAGNOSIS — E04.2 MULTIPLE THYROID NODULES: Chronic | ICD-10-CM

## 2024-03-01 DIAGNOSIS — I15.2 HYPERTENSION ASSOCIATED WITH DIABETES: Chronic | ICD-10-CM

## 2024-03-01 DIAGNOSIS — E27.8 ADRENAL NODULE: Chronic | ICD-10-CM

## 2024-03-01 DIAGNOSIS — E11.36 DIABETIC CATARACT OF BOTH EYES: ICD-10-CM

## 2024-03-01 LAB
ALBUMIN SERPL BCP-MCNC: 3.9 G/DL (ref 3.5–5.2)
ALP SERPL-CCNC: 120 U/L (ref 55–135)
ALT SERPL W/O P-5'-P-CCNC: 36 U/L (ref 10–44)
ANION GAP SERPL CALC-SCNC: 15 MMOL/L (ref 8–16)
AST SERPL-CCNC: 32 U/L (ref 10–40)
BASOPHILS # BLD AUTO: 0.03 K/UL (ref 0–0.2)
BASOPHILS NFR BLD: 0.3 % (ref 0–1.9)
BILIRUB SERPL-MCNC: 0.5 MG/DL (ref 0.1–1)
BUN SERPL-MCNC: 13 MG/DL (ref 8–23)
CALCIUM SERPL-MCNC: 9.4 MG/DL (ref 8.7–10.5)
CHLORIDE SERPL-SCNC: 102 MMOL/L (ref 95–110)
CHOLEST SERPL-MCNC: 160 MG/DL (ref 120–199)
CHOLEST/HDLC SERPL: 3 {RATIO} (ref 2–5)
CO2 SERPL-SCNC: 26 MMOL/L (ref 23–29)
CORTIS SERPL-MCNC: <1 UG/DL (ref 4.3–22.4)
CREAT SERPL-MCNC: 1.1 MG/DL (ref 0.5–1.4)
DIFFERENTIAL METHOD BLD: ABNORMAL
EOSINOPHIL # BLD AUTO: 0 K/UL (ref 0–0.5)
EOSINOPHIL NFR BLD: 0.1 % (ref 0–8)
ERYTHROCYTE [DISTWIDTH] IN BLOOD BY AUTOMATED COUNT: 14 % (ref 11.5–14.5)
EST. GFR  (NO RACE VARIABLE): 52.4 ML/MIN/1.73 M^2
ESTIMATED AVG GLUCOSE: 169 MG/DL (ref 68–131)
GLUCOSE SERPL-MCNC: 183 MG/DL (ref 70–110)
HBA1C MFR BLD: 7.5 % (ref 4–5.6)
HCT VFR BLD AUTO: 41.8 % (ref 37–48.5)
HDLC SERPL-MCNC: 54 MG/DL (ref 40–75)
HDLC SERPL: 33.8 % (ref 20–50)
HGB BLD-MCNC: 13.9 G/DL (ref 12–16)
IMM GRANULOCYTES # BLD AUTO: 0.04 K/UL (ref 0–0.04)
IMM GRANULOCYTES NFR BLD AUTO: 0.4 % (ref 0–0.5)
LDLC SERPL CALC-MCNC: 95.8 MG/DL (ref 63–159)
LYMPHOCYTES # BLD AUTO: 2.6 K/UL (ref 1–4.8)
LYMPHOCYTES NFR BLD: 26.2 % (ref 18–48)
MCH RBC QN AUTO: 31.1 PG (ref 27–31)
MCHC RBC AUTO-ENTMCNC: 33.3 G/DL (ref 32–36)
MCV RBC AUTO: 94 FL (ref 82–98)
MONOCYTES # BLD AUTO: 0.4 K/UL (ref 0.3–1)
MONOCYTES NFR BLD: 3.9 % (ref 4–15)
NEUTROPHILS # BLD AUTO: 6.9 K/UL (ref 1.8–7.7)
NEUTROPHILS NFR BLD: 69.1 % (ref 38–73)
NONHDLC SERPL-MCNC: 106 MG/DL
NRBC BLD-RTO: 0 /100 WBC
PLATELET # BLD AUTO: 303 K/UL (ref 150–450)
PMV BLD AUTO: 10.7 FL (ref 9.2–12.9)
POTASSIUM SERPL-SCNC: 2.9 MMOL/L (ref 3.5–5.1)
PROT SERPL-MCNC: 7.9 G/DL (ref 6–8.4)
RBC # BLD AUTO: 4.47 M/UL (ref 4–5.4)
SODIUM SERPL-SCNC: 143 MMOL/L (ref 136–145)
T4 FREE SERPL-MCNC: 0.96 NG/DL (ref 0.71–1.51)
TRIGL SERPL-MCNC: 51 MG/DL (ref 30–150)
TSH SERPL DL<=0.005 MIU/L-ACNC: 0.51 UIU/ML (ref 0.4–4)
WBC # BLD AUTO: 9.96 K/UL (ref 3.9–12.7)

## 2024-03-01 PROCEDURE — 36415 COLL VENOUS BLD VENIPUNCTURE: CPT | Performed by: NURSE PRACTITIONER

## 2024-03-01 PROCEDURE — 92015 DETERMINE REFRACTIVE STATE: CPT | Mod: S$GLB,,, | Performed by: OPTOMETRIST

## 2024-03-01 PROCEDURE — 1160F RVW MEDS BY RX/DR IN RCRD: CPT | Mod: CPTII,S$GLB,, | Performed by: OPTOMETRIST

## 2024-03-01 PROCEDURE — 80053 COMPREHEN METABOLIC PANEL: CPT | Performed by: NURSE PRACTITIONER

## 2024-03-01 PROCEDURE — 80061 LIPID PANEL: CPT | Performed by: NURSE PRACTITIONER

## 2024-03-01 PROCEDURE — 82533 TOTAL CORTISOL: CPT | Performed by: NURSE PRACTITIONER

## 2024-03-01 PROCEDURE — 83036 HEMOGLOBIN GLYCOSYLATED A1C: CPT | Performed by: NURSE PRACTITIONER

## 2024-03-01 PROCEDURE — 92014 COMPRE OPH EXAM EST PT 1/>: CPT | Mod: S$GLB,,, | Performed by: OPTOMETRIST

## 2024-03-01 PROCEDURE — 84439 ASSAY OF FREE THYROXINE: CPT | Performed by: NURSE PRACTITIONER

## 2024-03-01 PROCEDURE — 99999 PR PBB SHADOW E&M-EST. PATIENT-LVL III: CPT | Mod: PBBFAC,,, | Performed by: OPTOMETRIST

## 2024-03-01 PROCEDURE — 84443 ASSAY THYROID STIM HORMONE: CPT | Performed by: NURSE PRACTITIONER

## 2024-03-01 PROCEDURE — 1159F MED LIST DOCD IN RCRD: CPT | Mod: CPTII,S$GLB,, | Performed by: OPTOMETRIST

## 2024-03-01 PROCEDURE — 85025 COMPLETE CBC W/AUTO DIFF WBC: CPT | Performed by: NURSE PRACTITIONER

## 2024-03-01 PROCEDURE — 2023F DILAT RTA XM W/O RTNOPTHY: CPT | Mod: CPTII,S$GLB,, | Performed by: OPTOMETRIST

## 2024-03-01 PROCEDURE — 92133 CPTRZD OPH DX IMG PST SGM ON: CPT | Mod: S$GLB,,, | Performed by: OPTOMETRIST

## 2024-03-01 NOTE — PROGRESS NOTES
HPI     Diabetic Eye Exam            Comments: RTC 1 yr for dilated eye exam and gOCT  Blood sugar 145  Lab Results       Component                Value               Date                       HGBA1C                   6.6 (H)             11/27/2023            Vision changes since last eye exam?: no just a glare at night    Any eye pain today: no    Other ocular symptoms: no    Interested in contact lens fitting today? no                           Last edited by Kathryn Morocho on 3/1/2024  8:27 AM.            Assessment /Plan     For exam results, see Encounter Report.    Type 2 diabetes mellitus without retinopathy  There was no diabetic retinopathy present in either eye today.   Recommended that pt continue care with PCP and/or specialists regarding diabetes.  Follow-up dilated eye exam recommended in 12 months, sooner with any vision changes or new concerns.    Open angle with borderline findings of both eyes  -     Posterior Segment OCT Optic Nerve- Both eyes  Normal IOP today OD, OS  Stable gOCT today OU compared to previous scans  No evidence of glaucoma at this time but based on risk factors recommend to continue monitoring.   Monitor 12 months    Nuclear sclerosis, bilateral  Cortical age-related cataract of both eyes  Diabetic cataract of both eyes  Visually significant cataract.  Patient is at the option stage.   Cataract surgery will improve vision, but necessity is dependent on patient's symptoms.   Pt declines surgical consult at this time.  Will continue to monitor over the next 12 months. Pt to call or RTC with any significant change in vision prior to next visit.    Myopia with astigmatism and presbyopia, bilateral  Eyeglass Final Rx       Eyeglass Final Rx         Sphere Cylinder Axis Add    Right -2.25 +0.50 010 +2.50    Left -2.75 +0.50 035 +2.50      Expiration Date: 3/1/2025                      RTC 1 yr for dilated eye exam and gOCT or PRN if any problems.   Discussed above and answered  questions.

## 2024-03-04 DIAGNOSIS — E87.6 HYPOKALEMIA: Primary | ICD-10-CM

## 2024-03-04 RX ORDER — POTASSIUM CHLORIDE 20 MEQ/1
20 TABLET, EXTENDED RELEASE ORAL DAILY
Qty: 30 TABLET | Refills: 5 | Status: SHIPPED | OUTPATIENT
Start: 2024-03-04 | End: 2024-08-31

## 2024-03-05 NOTE — TELEPHONE ENCOUNTER
Kristyn Garza was scheduled on 02/23/24 to provide the following documentation to initiate the application process.           Proof of household Income( such as social security statement, 1099 form, pension statement or 3 consecutive pay stubs, Copy of all Insurance cards( front and back), and Signed and dated HIPAA /Patient Information Forms          As of today, the documents have not been received.  Please instruct the patient to email requested documentation to [email or web address]  or reach out to [place] [phone] with any follow-up questions.

## 2024-03-07 NOTE — TELEPHONE ENCOUNTER
A referral was sent 02/13/24 for Ozempic through [facility] but medication does not appear on patient's chart. Was there an interest in prescribing medication?

## 2024-03-11 ENCOUNTER — TELEPHONE (OUTPATIENT)
Dept: PRIMARY CARE CLINIC | Facility: CLINIC | Age: 74
End: 2024-03-11
Payer: MEDICARE

## 2024-03-12 ENCOUNTER — CLINICAL SUPPORT (OUTPATIENT)
Dept: AUDIOLOGY | Facility: CLINIC | Age: 74
End: 2024-03-12
Payer: MEDICARE

## 2024-03-12 ENCOUNTER — TELEPHONE (OUTPATIENT)
Dept: PRIMARY CARE CLINIC | Facility: CLINIC | Age: 74
End: 2024-03-12
Payer: MEDICARE

## 2024-03-12 ENCOUNTER — LAB VISIT (OUTPATIENT)
Dept: LAB | Facility: HOSPITAL | Age: 74
End: 2024-03-12
Attending: NURSE PRACTITIONER
Payer: MEDICARE

## 2024-03-12 ENCOUNTER — OFFICE VISIT (OUTPATIENT)
Dept: OTOLARYNGOLOGY | Facility: CLINIC | Age: 74
End: 2024-03-12
Payer: MEDICARE

## 2024-03-12 DIAGNOSIS — J30.2 SEASONAL ALLERGIC RHINITIS, UNSPECIFIED TRIGGER: Primary | ICD-10-CM

## 2024-03-12 DIAGNOSIS — E87.6 HYPOKALEMIA: ICD-10-CM

## 2024-03-12 DIAGNOSIS — H91.93 BILATERAL HEARING LOSS, UNSPECIFIED HEARING LOSS TYPE: ICD-10-CM

## 2024-03-12 DIAGNOSIS — H90.42 SENSORINEURAL HEARING LOSS (SNHL) OF LEFT EAR WITH UNRESTRICTED HEARING OF RIGHT EAR: ICD-10-CM

## 2024-03-12 LAB
ALBUMIN SERPL BCP-MCNC: 3.9 G/DL (ref 3.5–5.2)
ANION GAP SERPL CALC-SCNC: 6 MMOL/L (ref 8–16)
BUN SERPL-MCNC: 15 MG/DL (ref 8–23)
CALCIUM SERPL-MCNC: 9.7 MG/DL (ref 8.7–10.5)
CHLORIDE SERPL-SCNC: 107 MMOL/L (ref 95–110)
CO2 SERPL-SCNC: 27 MMOL/L (ref 23–29)
CREAT SERPL-MCNC: 1.1 MG/DL (ref 0.5–1.4)
EST. GFR  (NO RACE VARIABLE): 52.4 ML/MIN/1.73 M^2
GLUCOSE SERPL-MCNC: 124 MG/DL (ref 70–110)
PHOSPHATE SERPL-MCNC: 3.2 MG/DL (ref 2.7–4.5)
POTASSIUM SERPL-SCNC: 4.3 MMOL/L (ref 3.5–5.1)
SODIUM SERPL-SCNC: 140 MMOL/L (ref 136–145)

## 2024-03-12 PROCEDURE — 3288F FALL RISK ASSESSMENT DOCD: CPT | Mod: CPTII,S$GLB,, | Performed by: PHYSICIAN ASSISTANT

## 2024-03-12 PROCEDURE — 3051F HG A1C>EQUAL 7.0%<8.0%: CPT | Mod: CPTII,S$GLB,, | Performed by: PHYSICIAN ASSISTANT

## 2024-03-12 PROCEDURE — 99213 OFFICE O/P EST LOW 20 MIN: CPT | Mod: S$GLB,,, | Performed by: PHYSICIAN ASSISTANT

## 2024-03-12 PROCEDURE — 92567 TYMPANOMETRY: CPT | Mod: S$GLB,,, | Performed by: AUDIOLOGIST

## 2024-03-12 PROCEDURE — 80069 RENAL FUNCTION PANEL: CPT | Performed by: NURSE PRACTITIONER

## 2024-03-12 PROCEDURE — 99999 PR PBB SHADOW E&M-EST. PATIENT-LVL II: CPT | Mod: PBBFAC,,, | Performed by: AUDIOLOGIST

## 2024-03-12 PROCEDURE — 1159F MED LIST DOCD IN RCRD: CPT | Mod: CPTII,S$GLB,, | Performed by: PHYSICIAN ASSISTANT

## 2024-03-12 PROCEDURE — 36415 COLL VENOUS BLD VENIPUNCTURE: CPT | Performed by: NURSE PRACTITIONER

## 2024-03-12 PROCEDURE — 92557 COMPREHENSIVE HEARING TEST: CPT | Mod: S$GLB,,, | Performed by: AUDIOLOGIST

## 2024-03-12 PROCEDURE — 99999 PR PBB SHADOW E&M-EST. PATIENT-LVL III: CPT | Mod: PBBFAC,,, | Performed by: PHYSICIAN ASSISTANT

## 2024-03-12 PROCEDURE — 1101F PT FALLS ASSESS-DOCD LE1/YR: CPT | Mod: CPTII,S$GLB,, | Performed by: PHYSICIAN ASSISTANT

## 2024-03-12 RX ORDER — CETIRIZINE HYDROCHLORIDE 10 MG/1
10 TABLET ORAL DAILY
Qty: 30 TABLET | Refills: 12 | Status: SHIPPED | OUTPATIENT
Start: 2024-03-12 | End: 2024-03-25 | Stop reason: SDUPTHER

## 2024-03-12 NOTE — PROGRESS NOTES
Subjective:   Patient ID: Kristyn Garza is a 73 y.o. female.    Chief Complaint: Hearing Loss and Dizziness (Complain of lightheaded and dizzy. Sinus pressure under eyes and at temple. )    Kristyn Garza was seen 03/12/2024 for hearing loss and seasonal allergies.  Patient complains of history of left hydrops with low frequency hearing loss. Her last audiogram was in 2020 and she is unsure if there has been any changes. She is having trouble with her allergies with sinus pressure. She states her pcp recently took her off antihistamine due to her FH of memory loss.  She denies any tinnitus. No current complaint of vertigo, but does report recent lightheadedness following a eye appointment. She purchased binaural hearing aids elsewhere in 2020.            Review of patient's allergies indicates:   Allergen Reactions    Statins-hmg-coa reductase inhibitors      Muscle Cramps           Review of Systems   HENT:  Positive for hearing loss and sinus pressure.    Eyes:  Positive for itching.   Cardiovascular: Negative.    Gastrointestinal: Negative.    Endocrine: Negative.    Genitourinary: Negative.    Skin: Negative.    Allergic/Immunologic: Negative.    Neurological: Negative.    Hematological: Negative.    Psychiatric/Behavioral: Negative.           Objective:   There were no vitals taken for this visit.    Physical Exam  Constitutional:       General: She is not in acute distress.     Appearance: She is well-developed.   HENT:      Head: Normocephalic and atraumatic.      Right Ear: Tympanic membrane, ear canal and external ear normal.      Left Ear: Tympanic membrane, ear canal and external ear normal.      Nose: Nose normal. No nasal deformity, septal deviation, mucosal edema or rhinorrhea.      Right Sinus: No maxillary sinus tenderness or frontal sinus tenderness.      Left Sinus: No maxillary sinus tenderness or frontal sinus tenderness.      Mouth/Throat:      Mouth: Mucous membranes are not pale and not  dry.      Dentition: No dental caries.      Pharynx: Uvula midline. No oropharyngeal exudate or posterior oropharyngeal erythema.   Eyes:      General: Lids are normal. No scleral icterus.     Extraocular Movements:      Right eye: Normal extraocular motion and no nystagmus.      Left eye: Normal extraocular motion and no nystagmus.      Conjunctiva/sclera: Conjunctivae normal.      Right eye: Right conjunctiva is not injected. No chemosis.     Left eye: Left conjunctiva is not injected. No chemosis.     Pupils: Pupils are equal, round, and reactive to light.   Neck:      Thyroid: No thyroid mass or thyromegaly.      Trachea: Trachea and phonation normal. No tracheal tenderness or tracheal deviation.   Pulmonary:      Effort: Pulmonary effort is normal. No respiratory distress.      Breath sounds: No stridor.   Abdominal:      General: There is no distension.   Lymphadenopathy:      Head:      Right side of head: No submental, submandibular, preauricular, posterior auricular or occipital adenopathy.      Left side of head: No submental, submandibular, preauricular, posterior auricular or occipital adenopathy.      Cervical: No cervical adenopathy.   Skin:     General: Skin is warm and dry.      Findings: No erythema or rash.   Neurological:      Mental Status: She is alert and oriented to person, place, and time.      Cranial Nerves: No cranial nerve deficit.   Psychiatric:         Behavior: Behavior normal.          Imaging :              Assessment:     1. Seasonal allergic rhinitis, unspecified trigger    2. Bilateral hearing loss, unspecified hearing loss type        Plan:     Seasonal allergic rhinitis, unspecified trigger    Bilateral hearing loss, unspecified hearing loss type  -     Ambulatory referral/consult to ENT    Other orders  -     cetirizine (ZYRTEC) 10 MG tablet; Take 1 tablet (10 mg total) by mouth once daily.  Dispense: 30 tablet; Refill: 12        I have restarted her on zyrtec- this medication  is usually very well tolerated. We also talked about increasing her nasal steroid to twice a day during an acute flare of allergies.   Her audiogram is stable compared to previous. She may f/u with ENT as needed.

## 2024-03-12 NOTE — PROGRESS NOTES
Kristyn Garza was seen 03/12/2024 for an audiological evaluation. Patient complains of history of left hydrops with low frequency hearing loss. Her last audiogram was in 2020 and she is unsure if there has been any changes. She is having trouble with her allergies with sinus pressure. She denies any tinnitus. No current complaint of vertigo, but does report recent lightheadedness following a eye appointment. She purchased binaural hearing aids elsewhere in 2020.     Results reveal a mild sensorineural hearing loss 8000 Hz for the right ear, and  moderately severe to mild reverse slope sensorineural hearing loss 250-3000 Hz for the left ear.   Speech Reception Thresholds were  20 dBHL for the right ear and 40 dBHL for the left ear.   Word recognition scores were excellent for the right ear and excellent for the left ear.  Tympanograms were Type A, normal for the right ear and Type A, normal for the left ear.    Patient was counseled on the above findings. No change in hearing sensitivity since last evaluation in 2020.    Recommendations:  Follow-up with ENT, as scheduled.   Repeat audiological evaluation in one to two years to monitor hearing, or sooner if needed.  Continued use of left hearing aid.  Wear hearing protection devices when around loud noise.

## 2024-03-22 ENCOUNTER — PATIENT MESSAGE (OUTPATIENT)
Dept: ADMINISTRATIVE | Facility: OTHER | Age: 74
End: 2024-03-22
Payer: MEDICARE

## 2024-03-26 ENCOUNTER — TELEPHONE (OUTPATIENT)
Dept: PRIMARY CARE CLINIC | Facility: CLINIC | Age: 74
End: 2024-03-26
Payer: MEDICARE

## 2024-03-26 RX ORDER — CETIRIZINE HYDROCHLORIDE 10 MG/1
10 TABLET ORAL DAILY
Qty: 30 TABLET | Refills: 12 | Status: SHIPPED | OUTPATIENT
Start: 2024-03-26 | End: 2025-03-26

## 2024-03-27 ENCOUNTER — CLINICAL SUPPORT (OUTPATIENT)
Dept: PRIMARY CARE CLINIC | Facility: CLINIC | Age: 74
End: 2024-03-27
Payer: MEDICARE

## 2024-03-27 DIAGNOSIS — E11.22 TYPE 2 DIABETES MELLITUS WITH STAGE 3A CHRONIC KIDNEY DISEASE, WITHOUT LONG-TERM CURRENT USE OF INSULIN: Primary | ICD-10-CM

## 2024-03-27 DIAGNOSIS — N18.31 TYPE 2 DIABETES MELLITUS WITH STAGE 3A CHRONIC KIDNEY DISEASE, WITHOUT LONG-TERM CURRENT USE OF INSULIN: Primary | ICD-10-CM

## 2024-03-27 NOTE — PROGRESS NOTES
Pt here to  Ozempic and get instructions on medication.  Ozempic instructions reviewed with pt and she also watched a video on administration.  Pt demonstrated first dose and verbalized understanding of instructions.  Handout given to pt to take home explaining dosage and side effects of medication. Advised pt to call with any concerns.

## 2024-04-16 ENCOUNTER — OFFICE VISIT (OUTPATIENT)
Dept: PRIMARY CARE CLINIC | Facility: CLINIC | Age: 74
End: 2024-04-16
Payer: MEDICARE

## 2024-04-16 VITALS
TEMPERATURE: 99 F | BODY MASS INDEX: 31.1 KG/M2 | HEART RATE: 85 BPM | OXYGEN SATURATION: 96 % | HEIGHT: 64 IN | WEIGHT: 182.19 LBS | SYSTOLIC BLOOD PRESSURE: 118 MMHG | DIASTOLIC BLOOD PRESSURE: 68 MMHG

## 2024-04-16 DIAGNOSIS — R05.9 COUGH, UNSPECIFIED TYPE: ICD-10-CM

## 2024-04-16 DIAGNOSIS — E11.40 CONTROLLED TYPE 2 DIABETES MELLITUS WITH DIABETIC NEUROPATHY, WITHOUT LONG-TERM CURRENT USE OF INSULIN: ICD-10-CM

## 2024-04-16 DIAGNOSIS — J40 BRONCHITIS: ICD-10-CM

## 2024-04-16 DIAGNOSIS — N18.31 TYPE 2 DIABETES MELLITUS WITH STAGE 3A CHRONIC KIDNEY DISEASE, WITHOUT LONG-TERM CURRENT USE OF INSULIN: Chronic | ICD-10-CM

## 2024-04-16 DIAGNOSIS — J06.9 VIRAL UPPER RESPIRATORY TRACT INFECTION: ICD-10-CM

## 2024-04-16 DIAGNOSIS — I51.89 DIASTOLIC DYSFUNCTION: Primary | ICD-10-CM

## 2024-04-16 DIAGNOSIS — N18.31 STAGE 3A CHRONIC KIDNEY DISEASE: Chronic | ICD-10-CM

## 2024-04-16 DIAGNOSIS — E11.22 TYPE 2 DIABETES MELLITUS WITH STAGE 3A CHRONIC KIDNEY DISEASE, WITHOUT LONG-TERM CURRENT USE OF INSULIN: Chronic | ICD-10-CM

## 2024-04-16 LAB
CTP QC/QA: YES
CTP QC/QA: YES
POC MOLECULAR INFLUENZA A AGN: NEGATIVE
POC MOLECULAR INFLUENZA B AGN: NEGATIVE
SARS-COV-2 AG RESP QL IA.RAPID: NEGATIVE

## 2024-04-16 PROCEDURE — 1126F AMNT PAIN NOTED NONE PRSNT: CPT | Mod: CPTII,S$GLB,, | Performed by: NURSE PRACTITIONER

## 2024-04-16 PROCEDURE — 87502 INFLUENZA DNA AMP PROBE: CPT | Mod: QW,S$GLB,, | Performed by: NURSE PRACTITIONER

## 2024-04-16 PROCEDURE — 3078F DIAST BP <80 MM HG: CPT | Mod: CPTII,S$GLB,, | Performed by: NURSE PRACTITIONER

## 2024-04-16 PROCEDURE — 3288F FALL RISK ASSESSMENT DOCD: CPT | Mod: CPTII,S$GLB,, | Performed by: NURSE PRACTITIONER

## 2024-04-16 PROCEDURE — 3051F HG A1C>EQUAL 7.0%<8.0%: CPT | Mod: CPTII,S$GLB,, | Performed by: NURSE PRACTITIONER

## 2024-04-16 PROCEDURE — 1159F MED LIST DOCD IN RCRD: CPT | Mod: CPTII,S$GLB,, | Performed by: NURSE PRACTITIONER

## 2024-04-16 PROCEDURE — 87811 SARS-COV-2 COVID19 W/OPTIC: CPT | Mod: QW,S$GLB,, | Performed by: NURSE PRACTITIONER

## 2024-04-16 PROCEDURE — 99214 OFFICE O/P EST MOD 30 MIN: CPT | Mod: S$GLB,,, | Performed by: NURSE PRACTITIONER

## 2024-04-16 PROCEDURE — 3074F SYST BP LT 130 MM HG: CPT | Mod: CPTII,S$GLB,, | Performed by: NURSE PRACTITIONER

## 2024-04-16 PROCEDURE — 99999 PR PBB SHADOW E&M-EST. PATIENT-LVL V: CPT | Mod: PBBFAC,,, | Performed by: NURSE PRACTITIONER

## 2024-04-16 PROCEDURE — 1101F PT FALLS ASSESS-DOCD LE1/YR: CPT | Mod: CPTII,S$GLB,, | Performed by: NURSE PRACTITIONER

## 2024-04-16 PROCEDURE — 1160F RVW MEDS BY RX/DR IN RCRD: CPT | Mod: CPTII,S$GLB,, | Performed by: NURSE PRACTITIONER

## 2024-04-16 RX ORDER — ALBUTEROL SULFATE 90 UG/1
2 AEROSOL, METERED RESPIRATORY (INHALATION) EVERY 6 HOURS PRN
Qty: 18 G | Refills: 1 | Status: SHIPPED | OUTPATIENT
Start: 2024-04-16

## 2024-04-16 NOTE — ASSESSMENT & PLAN NOTE
Wt Readings from Last 3 Encounters:   04/16/24 1345 82.6 kg (182 lb 3.4 oz)   02/27/24 0856 83.7 kg (184 lb 10.2 oz)   12/26/23 1013 83 kg (182 lb 15.7 oz)   No acute sx. Continues benazepril, amlodipine, metoprolol, spironolactone.

## 2024-04-16 NOTE — PATIENT INSTRUCTIONS
If you are feeling unwell, we'd like to be the first ones to know here at [facility]! Please give us a call. Same day appointments are our top priority to keep you well and out of the emergency rooms and hospitals. Call [phone] for our direct line. After hours advice is always available. Please call [phone] after hours to speak to the on-call team.      Try nasal saline for post-nasal drip.     Mucinex DM as needed for cough.     Use albuterol twice daily and as needed for wheeze/cough.     Delsym - long acting dextromethorphan. Take as directed.

## 2024-04-16 NOTE — ASSESSMENT & PLAN NOTE
Lab Results   Component Value Date    HGBA1C 7.5 (H) 03/01/2024   A1C at goal. Tolerating semaglutide with mild nausea.   Statin intolerant. Using Repatha without difficulty.

## 2024-04-16 NOTE — PROGRESS NOTES
Kristyn Garza  04/16/2024  [id number]    Moon Lion MD  Patient Care Team:  Moon Lion MD as PCP - General (Internal Medicine)  Delia Liu LPN as Care Coordinator (Internal Medicine)  Soo Bar as Digital Medicine Health   Dale Matute MD as Consulting Physician (Cardiology)  Hammad Hardy OD as Consulting Physician (Optometry)  Olivia Irwin MD as Consulting Physician (Otolaryngology)  Gretel De Leon, PharmD as Hypertension Digital Medicine Clinician  Brenden Landaverde MD as Consulting Physician (Nephrology)  Gretel De Leon PharmD as Hyperlipidemia Digital Medicine Clinician  Gretel De Leon PharmD as Diabetes Digital Medicine Clinician  Cuca Beatty NP as Nurse Practitioner (Family Medicine)  Moon Lion MD as Hyperlipidemia Digital Medicine Responsible Provider (Internal Medicine)  Moon Lion MD as Diabetes Digital Medicine Responsible Provider (Internal Medicine)  Moon Lion MD as Hypertension Digital Medicine Responsible Provider (Internal Medicine)  Medicare, [employer] Managed as Diabetes Digital Medicine Contract  Medicare, [employer] Managed as Hypertension Digital Medicine Contract  Chacho Bergman PharmD as Pharmacist      [facility] Note      Chief Complaint:  Chief Complaint   Patient presents with    Pain    Cough    Sore Throat       History of Present Illness:  HPI    Here for an acute visit. Cough, post-nasal drip, sore throat. Onset 4-5 days ago. Gagging at times. Cough is severe and worse at night. No fever or chills. Taking Mucinex. Home CBGs a little higher than her usual but still lower 100s.     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 She's enrolled w digital medicine program for diabetes, hyperlipidemia and  hypertension.     LOV with me for AWV 2/27/24.     Fourth dose semaglutide due tomorrow. Some nausea the day after injection. Passes quickly. Some increased bowel transit but stools soft and formed.         Review of Systems   Constitutional:  Positive for chills. Negative for activity change, appetite change and fever.   Respiratory:  Positive for cough. Negative for shortness of breath and wheezing.    Cardiovascular:  Negative for chest pain, palpitations and leg swelling.   Gastrointestinal:  Negative for abdominal pain, constipation, diarrhea, nausea and vomiting.   Genitourinary:  Negative for difficulty urinating and dysuria.   Musculoskeletal:  Negative for arthralgias and myalgias.   Neurological:  Negative for dizziness and headaches.         The following were reviewed: Active problem list, medication list, allergies, family history, social history, and Health Maintenance.       Medications:  Current Outpatient Medications on File Prior to Visit   Medication Sig Dispense Refill    amLODIPine (NORVASC) 10 MG tablet Take 1 tablet (10 mg total) by mouth once daily. 90 tablet 3    aspirin 81 MG Chew Take 81 mg by mouth once daily.      azelastine (ASTELIN) 137 mcg (0.1 %) nasal spray 1 spray (137 mcg total) by Nasal route 2 (two) times daily as needed for Rhinitis. 30 mL 11    benazepriL (LOTENSIN) 40 MG tablet Take 1 tablet (40 mg total) by mouth once daily. 90 tablet 3    blood sugar diagnostic (ACCU-CHEK SMARTVIEW TEST STRIP) Strp TO CHECK BLOOD GLUCOSE ONE TIMES DAILY 50 strip 1    calcium-vitamin D3 500 mg(1,250mg) -200 unit per tablet Take 1 tablet by mouth once daily.       cetirizine (ZYRTEC) 10 MG tablet Take 1 tablet (10 mg total) by mouth once daily. 30 tablet 12    CRANBERRY EXTRACT ORAL Take 1 tablet by mouth once daily.      evolocumab (REPATHA SURECLICK) 140 mg/mL PnIj Inject 1 mL (140 mg total) into the skin every 14 (fourteen) days. 2 mL 5    lancets Misc 1 lancet by Misc.(Non-Drug; Combo  "Route) route once daily. For Acc-Chek Amber   DX :E11.9 100 each 3    metoprolol succinate (TOPROL-XL) 25 MG 24 hr tablet Take 1 tablet (25 mg total) by mouth once daily. 90 tablet 3    MULTIVITAMIN W-MINERALS/LUTEIN (CENTRUM SILVER ORAL) Take 1 tablet by mouth once daily.      pen needle, diabetic (BD ULTRA-FINE MICRO PEN NEEDLE) 32 gauge x 1/4" Ndle 1 Needle by Misc.(Non-Drug; Combo Route) route once a week. 30 each 1    potassium chloride SA (K-DUR,KLOR-CON) 20 MEQ tablet Take 1 tablet (20 mEq total) by mouth once daily. 30 tablet 5    semaglutide (OZEMPIC) 0.25 mg or 0.5 mg (2 mg/3 mL) pen injector Inject 0.25 mg into the skin every 7 days. Inject 0.25 mg subcutaneous once weekly x 4 weeks, then increase to 0.5 mg subcutaneous x 4 weeks, and then increase to 1 mg/week thereafter.      spironolactone (ALDACTONE) 50 MG tablet TAKE 1 TABLET EVERY DAY 90 tablet 3    triamcinolone (NASACORT) 55 mcg nasal inhaler USE 2 SPRAYS BY NASAL ROUTE ONCE DAILY 17 mL 3    blood glucose control, normal Soln 1 Bottle by Misc.(Non-Drug; Combo Route) route once daily. For Accu Check Amber  use twice daily prn dx: E11.9 1 each 0    blood-glucose meter (ACCU-CHEK AMBER) Misc 1 kit by Misc.(Non-Drug; Combo Route) route once. For Accu Check Amber  use twice daily prn dx: E11.9 for 1 dose 1 each 0     No current facility-administered medications on file prior to visit.       Medications have been reviewed and reconciled with patient at visit today.    Barriers to medications present (no )    Fall since last office visit (no )      Exam:  Vitals:    04/16/24 1345   BP: 118/68   Pulse: 85   Temp: 99.2 °F (37.3 °C)     Weight: 82.6 kg (182 lb 3.4 oz)   Body mass index is 31.28 kg/m².      BP Readings from Last 3 Encounters:   04/16/24 118/68   02/27/24 138/68   12/26/23 (!) 148/60     Wt Readings from Last 3 Encounters:   04/16/24 1345 82.6 kg (182 lb 3.4 oz)   02/27/24 0856 83.7 kg (184 lb 10.2 oz)   12/26/23 1013 83 kg (182 lb 15.7 oz)    "         Physical Exam  Constitutional:       General: She is not in acute distress.     Appearance: She is not ill-appearing.   Cardiovascular:      Rate and Rhythm: Normal rate and regular rhythm.   Neurological:      Mental Status: She is alert.         Laboratory Reviewed: (Yes)  Lab Results   Component Value Date    WBC 9.96 03/01/2024    HGB 13.9 03/01/2024    HCT 41.8 03/01/2024     03/01/2024    CHOL 160 03/01/2024    TRIG 51 03/01/2024    HDL 54 03/01/2024    ALT 36 03/01/2024    AST 32 03/01/2024     03/12/2024    K 4.3 03/12/2024     03/12/2024    CREATININE 1.1 03/12/2024    BUN 15 03/12/2024    CO2 27 03/12/2024    TSH 0.509 03/01/2024    HGBA1C 7.5 (H) 03/01/2024           Health Maintenance  Health Maintenance Topics with due status: Not Due       Topic Last Completion Date    DEXA Scan 05/26/2021    LDCT Lung Screen 06/21/2023    Foot Exam 11/27/2023    Lipid Panel 03/01/2024    Hemoglobin A1c 03/01/2024    Eye Exam 03/01/2024     Health Maintenance Due   Topic Date Due    RSV Vaccine (Age 60+ and Pregnant patients) (1 - 1-dose 60+ series) Never done    Shingles Vaccine (2 of 3) 04/20/2011    TETANUS VACCINE  02/23/2021    COVID-19 Vaccine (4 - 2023-24 season) 09/01/2023    Diabetes Urine Screening  02/10/2024    Colorectal Cancer Screening  03/18/2024    Mammogram  05/29/2024         Assessment:  Problem List Items Addressed This Visit          ENT    Viral upper respiratory tract infection       Cardiac/Vascular    Diastolic dysfunction - Primary     Wt Readings from Last 3 Encounters:   04/16/24 1345 82.6 kg (182 lb 3.4 oz)   02/27/24 0856 83.7 kg (184 lb 10.2 oz)   12/26/23 1013 83 kg (182 lb 15.7 oz)   No acute sx. Continues benazepril, amlodipine, metoprolol, spironolactone.               Renal/    CKD (chronic kidney disease) stage 3, GFR 30-59 ml/min (Chronic)     Lab Results   Component Value Date    CREATININE 1.1 03/12/2024    BUN 15 03/12/2024     03/12/2024    K  4.3 03/12/2024     03/12/2024    CO2 27 03/12/2024   Continue HTN and DMII mgmt            Endocrine    Type 2 diabetes mellitus with kidney complication, without long-term current use of insulin (Chronic)     Lab Results   Component Value Date    HGBA1C 7.5 (H) 03/01/2024   At goal. Continues semaglutide.            Controlled type 2 diabetes mellitus with diabetic neuropathy     Lab Results   Component Value Date    HGBA1C 7.5 (H) 03/01/2024   A1C at goal. Tolerating semaglutide with mild nausea.   Statin intolerant. Using Repatha without difficulty.             Other Visit Diagnoses       Cough, unspecified type        Relevant Orders    POCT Influenza A/B Molecular    SARS Coronavirus 2 Antigen, POCT Manual Read    Bronchitis        Relevant Medications    albuterol (VENTOLIN HFA) 90 mcg/actuation inhaler              Plan:  Diastolic dysfunction    Stage 3a chronic kidney disease    Type 2 diabetes mellitus with stage 3a chronic kidney disease, without long-term current use of insulin    Cough, unspecified type  -     POCT Influenza A/B Molecular  -     SARS Coronavirus 2 Antigen, POCT Manual Read    Controlled type 2 diabetes mellitus with diabetic neuropathy, without long-term current use of insulin    Viral upper respiratory tract infection    Bronchitis  -     albuterol (VENTOLIN HFA) 90 mcg/actuation inhaler; Inhale 2 puffs into the lungs every 6 (six) hours as needed for Wheezing. Rescue  Dispense: 18 g; Refill: 1      -Patient's lab results were reviewed and discussed with patient  -Treatment options and alternatives were discussed with the patient. Patient expressed understanding. Patient was given the opportunity to ask questions and be an active participant in their medical care. Patient had no further questions or concerns at this time.   -Documentation of patient's health and condition was obtained from family member who was present during visit.  -Patient is an overall moderate risk for  health complications from their medical conditions.       Follow up: Follow up for Keep previously scheduled appointment..      After visit summary printed and given to patient upon discharge.  Patient goals and care plan are included in After visit summary.    Total medical decision making time was 38 min.  The following issues were discussed: The primary encounter diagnosis was Diastolic dysfunction. Diagnoses of Stage 3a chronic kidney disease, Type 2 diabetes mellitus with stage 3a chronic kidney disease, without long-term current use of insulin, Cough, unspecified type, Controlled type 2 diabetes mellitus with diabetic neuropathy, without long-term current use of insulin, Viral upper respiratory tract infection, and Bronchitis were also pertinent to this visit.    Health maintenance needs, recent test results and goals of care discussed with pt and questions answered.

## 2024-04-16 NOTE — ASSESSMENT & PLAN NOTE
Lab Results   Component Value Date    HGBA1C 7.5 (H) 03/01/2024   At goal. Continues semaglutide.

## 2024-04-16 NOTE — ASSESSMENT & PLAN NOTE
Lab Results   Component Value Date    CREATININE 1.1 03/12/2024    BUN 15 03/12/2024     03/12/2024    K 4.3 03/12/2024     03/12/2024    CO2 27 03/12/2024   Continue HTN and DMII mgmt

## 2024-04-17 ENCOUNTER — PATIENT MESSAGE (OUTPATIENT)
Dept: ADMINISTRATIVE | Facility: OTHER | Age: 74
End: 2024-04-17
Payer: MEDICARE

## 2024-04-23 ENCOUNTER — PATIENT MESSAGE (OUTPATIENT)
Dept: OTHER | Facility: OTHER | Age: 74
End: 2024-04-23
Payer: MEDICARE

## 2024-05-16 RX ORDER — POTASSIUM CHLORIDE 1500 MG/1
20 TABLET, EXTENDED RELEASE ORAL DAILY
Qty: 90 TABLET | Refills: 1 | Status: SHIPPED | OUTPATIENT
Start: 2024-05-16

## 2024-05-21 ENCOUNTER — TELEPHONE (OUTPATIENT)
Dept: PRIMARY CARE CLINIC | Facility: CLINIC | Age: 74
End: 2024-05-21
Payer: MEDICARE

## 2024-05-21 DIAGNOSIS — Z78.9 STATIN INTOLERANCE: ICD-10-CM

## 2024-05-21 DIAGNOSIS — E78.2 COMBINED HYPERLIPIDEMIA ASSOCIATED WITH TYPE 2 DIABETES MELLITUS: ICD-10-CM

## 2024-05-21 DIAGNOSIS — E11.69 COMBINED HYPERLIPIDEMIA ASSOCIATED WITH TYPE 2 DIABETES MELLITUS: ICD-10-CM

## 2024-05-23 RX ORDER — EVOLOCUMAB 140 MG/ML
140 INJECTION, SOLUTION SUBCUTANEOUS
Qty: 2 ML | Refills: 5 | Status: ACTIVE | OUTPATIENT
Start: 2024-05-23

## 2024-05-27 ENCOUNTER — OFFICE VISIT (OUTPATIENT)
Dept: PRIMARY CARE CLINIC | Facility: CLINIC | Age: 74
End: 2024-05-27
Payer: MEDICARE

## 2024-05-27 VITALS
TEMPERATURE: 98 F | DIASTOLIC BLOOD PRESSURE: 70 MMHG | BODY MASS INDEX: 30.6 KG/M2 | OXYGEN SATURATION: 96 % | SYSTOLIC BLOOD PRESSURE: 124 MMHG | WEIGHT: 179.25 LBS | HEART RATE: 74 BPM | HEIGHT: 64 IN

## 2024-05-27 DIAGNOSIS — F41.9 ANXIETY: Primary | ICD-10-CM

## 2024-05-27 DIAGNOSIS — E27.8 ADRENAL NODULE: Chronic | ICD-10-CM

## 2024-05-27 DIAGNOSIS — E26.01 ALDOSTERONE EXCESS (CONN SYNDROME): ICD-10-CM

## 2024-05-27 DIAGNOSIS — I15.9 SECONDARY HYPERTENSION: ICD-10-CM

## 2024-05-27 PROBLEM — F32.0 MAJOR DEPRESSIVE DISORDER, SINGLE EPISODE, MILD: Status: RESOLVED | Noted: 2024-05-27 | Resolved: 2024-05-27

## 2024-05-27 PROBLEM — J06.9 VIRAL UPPER RESPIRATORY TRACT INFECTION: Status: RESOLVED | Noted: 2024-04-16 | Resolved: 2024-05-27

## 2024-05-27 PROBLEM — F32.0 MAJOR DEPRESSIVE DISORDER, SINGLE EPISODE, MILD: Status: ACTIVE | Noted: 2024-05-27

## 2024-05-27 PROCEDURE — 1160F RVW MEDS BY RX/DR IN RCRD: CPT | Mod: CPTII,S$GLB,, | Performed by: INTERNAL MEDICINE

## 2024-05-27 PROCEDURE — 1101F PT FALLS ASSESS-DOCD LE1/YR: CPT | Mod: CPTII,S$GLB,, | Performed by: INTERNAL MEDICINE

## 2024-05-27 PROCEDURE — 3288F FALL RISK ASSESSMENT DOCD: CPT | Mod: CPTII,S$GLB,, | Performed by: INTERNAL MEDICINE

## 2024-05-27 PROCEDURE — 3078F DIAST BP <80 MM HG: CPT | Mod: CPTII,S$GLB,, | Performed by: INTERNAL MEDICINE

## 2024-05-27 PROCEDURE — 3074F SYST BP LT 130 MM HG: CPT | Mod: CPTII,S$GLB,, | Performed by: INTERNAL MEDICINE

## 2024-05-27 PROCEDURE — 99999 PR PBB SHADOW E&M-EST. PATIENT-LVL IV: CPT | Mod: PBBFAC,,, | Performed by: INTERNAL MEDICINE

## 2024-05-27 PROCEDURE — 3051F HG A1C>EQUAL 7.0%<8.0%: CPT | Mod: CPTII,S$GLB,, | Performed by: INTERNAL MEDICINE

## 2024-05-27 PROCEDURE — 1159F MED LIST DOCD IN RCRD: CPT | Mod: CPTII,S$GLB,, | Performed by: INTERNAL MEDICINE

## 2024-05-27 PROCEDURE — 99214 OFFICE O/P EST MOD 30 MIN: CPT | Mod: S$GLB,,, | Performed by: INTERNAL MEDICINE

## 2024-05-27 RX ORDER — BUSPIRONE HYDROCHLORIDE 5 MG/1
5 TABLET ORAL 2 TIMES DAILY
Qty: 60 TABLET | Refills: 11 | Status: SHIPPED | OUTPATIENT
Start: 2024-05-27 | End: 2024-06-26

## 2024-05-27 NOTE — PATIENT INSTRUCTIONS
If you are feeling unwell, we'd like to be the first ones to know here at [facility]! Please give us a call. Same day appointments are our top priority to keep you well and out of the emergency rooms and hospitals. Call [phone] for our direct line. After hours advice is always available. Please call [phone] after hours to speak to the on-call team.      Trial buspirone 5 mg twice daily - if tolerating may increase to 5 mg three times daily or 7.5 mg twice daily - if too sedating consider just take at bedtime     Will let you know when I hear back from Endocrine about how long to hold meds and when and where to have labs done

## 2024-05-27 NOTE — PROGRESS NOTES
Kristyn Garza  05/27/2024  [id number]    Moon Lion MD  Patient Care Team:  Moon Lion MD as PCP - General (Internal Medicine)  Delia Liu LPN as Care Coordinator (Internal Medicine)  Soo Bar as Digital Medicine Health   Dale Matute MD as Consulting Physician (Cardiology)  Hammad Hardy OD as Consulting Physician (Optometry)  Olivia Irwin MD as Consulting Physician (Otolaryngology)  Gretel De Leon, PharmD as Hypertension Digital Medicine Clinician  Brenden Landaverde MD as Consulting Physician (Nephrology)  Gretel De Leon PharmD as Hyperlipidemia Digital Medicine Clinician  Gretel De Leon PharmD as Diabetes Digital Medicine Clinician  Cuca Beatty NP as Nurse Practitioner (Family Medicine)  Moon Lion MD as Hyperlipidemia Digital Medicine Responsible Provider (Internal Medicine)  Moon Lion MD as Diabetes Digital Medicine Responsible Provider (Internal Medicine)  Moon Lion MD as Hypertension Digital Medicine Responsible Provider (Internal Medicine)  Medicare, [employer] Managed as Diabetes Digital Medicine Contract  Medicare, [employer] Managed as Hypertension Digital Medicine Contract  Chacho Bergman PharmD as Pharmacist    Visit Type: Follow-up    Chief Complaint:  Chief Complaint   Patient presents with    Follow-up     Three month follow up. Pt is having anxiety. Nail fungus     History of Present Illness: Ms. Kristyn Garza is a 72 year old female here w c/o early am waking w anxiety feeling scared and stomach feeling shakey 3 days last week.    No new medication or obvious new stressors that she is aware of.  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 She's enrolled w [email or web address] program for diabetes, hyperlipidemia and hypertension.    PHQ-4 Score: 3     From Nephro 5/02/23  A 24 hour urine showed somewhat elevated aldosterone. CT abd showed bilateral adrenal lesions. On her last visit with us in March 2022, primary hyperaldosteronism was suspected, but the case was not clear. Therefore, pt was sent to Endocrine for their opinion.     Pt presents for f/u today. Endocrine note and w/u was reviewed. Noted CT abd was repeated, dexamethasone suppression test did not suggest pituitary Cushing's. Endocrine commented that renin-mediated aldosterone excess rather than primary hyperaldosteronism  was more likely present.    DDX: primary HTN. The adrenal nodules are incidentalomas  VS: mild hyperaldosteronism with hyperplasia  VS: early stage adrenal gland adenoma    From last [facility] visit 4/16/24  Cough, post-nasal drip, sore throat. Onset 4-5 days ago. Gagging at times. Cough is severe and worse at night. No fever or chills. Taking Mucinex. Home CBGs a little higher than her usual but still lower 100s.     From LOV w me 11/27/23  No recent issues w vertigo - did attend one session vestibular therapy and has been better since! Has BPPV home exercises if needs them  Cut back on salt - no added salt when cooking  C/o constipation w metformin  C/o sinus allergy symptoms bothersome at times  C/o dry mouth on waking in am  No other sig concerns or complaints  On questioning, she does report cramping in L calf sometimes when walking   PHQ-4 Score: 0     Upcoming appointments:  Future Appointments       Date Provider Specialty Appt Notes    6/26/2024 Moon Lion MD Primary Care  Arrive at: Telehealth One month f/u    6/27/2024  Radiology Follow up in about 6 months (around 6/26/2024) for CT - on return visit, 6 min walk - on return.    6/27/2024  Pulmonology Follow up in about 6 months (around  6/26/2024) for CT - on return visit, 6 min walk - on return.    6/27/2024 Soto Khan MD Pulmonology Follow up in about 6 months (around 6/26/2024) for CT - on return visit, 6 min walk - on return.           The following were reviewed: Active problem list, medication list, allergies, family history, social history, and Health Maintenance.     Medications have been reviewed and reconciled with patient at visit today.    Review of Systems   See HPI above    Exam: sat 96%  Vitals:    05/27/24 0958   BP: 124/70   Pulse: 74   Temp: 98.1 °F (36.7 °C)     Weight: 81.3 kg (179 lb 3.7 oz)   Body mass index is 30.77 kg/m².    BP Readings from Last 3 Encounters:   05/27/24 124/70   04/16/24 118/68   02/27/24 138/68      Wt Readings from Last 3 Encounters:   05/27/24 0958 81.3 kg (179 lb 3.7 oz)   04/16/24 1345 82.6 kg (182 lb 3.4 oz)   02/27/24 0856 83.7 kg (184 lb 10.2 oz)      Physical Exam  Vitals reviewed.   Constitutional:       General: She is not in acute distress.     Appearance: Normal appearance. She is obese.   HENT:      Head: Normocephalic and atraumatic.      Right Ear: External ear normal.      Left Ear: External ear normal.      Nose: Nose normal.      Mouth/Throat:      Mouth: Mucous membranes are moist.      Pharynx: Oropharynx is clear.   Eyes:      Extraocular Movements: Extraocular movements intact.      Conjunctiva/sclera: Conjunctivae normal.   Cardiovascular:      Rate and Rhythm: Normal rate.   Pulmonary:      Effort: Pulmonary effort is normal. No respiratory distress.   Skin:     General: Skin is warm and dry.   Neurological:      Mental Status: She is alert and oriented to person, place, and time. Mental status is at baseline.      Gait: Gait normal.   Psychiatric:         Mood and Affect: Mood normal.         Behavior: Behavior normal.         Thought Content: Thought content normal.        Laboratory Reviewed  Lab Results   Component Value Date    WBC 9.96 03/01/2024    HGB 13.9 03/01/2024  "   HCT 41.8 03/01/2024     03/01/2024    MCV 94 03/01/2024    CHOL 160 03/01/2024    TRIG 51 03/01/2024    HDL 54 03/01/2024    LDLCALC 95.8 03/01/2024    ALT 36 03/01/2024    AST 32 03/01/2024     03/12/2024    K 4.3 03/12/2024     03/12/2024    CREATININE 1.1 03/12/2024    BUN 15 03/12/2024    CO2 27 03/12/2024    MG 2.4 06/19/2023    TSH 0.509 03/01/2024    FREET4 0.96 03/01/2024    HGBA1C 7.5 (H) 03/01/2024     Lab Results   Component Value Date    PTH 61.0 02/10/2023    CALCIUM 9.7 03/12/2024    PHOS 3.2 03/12/2024      Lab Results   Component Value Date    NARNPZNZ17 785 02/10/2023   No results found for: "FOLATE" No results found for: "UIBC", "IRON", "TRANS", "TRANSFERRIN", "TIBC", "LABIRON", "FESATURATED"   Lab Results   Component Value Date    EGFRNORACEVR 52.4 (A) 03/12/2024    ALBUMIN 3.9 03/12/2024     Lab Results   Component Value Date    CUCBVKZI99ME 50 02/10/2023      3/01/24 cortisol 8am < 1.00    US Soft Tissue Neck 8/08/23 No significant change in appearance of multinodular goiter as above      CT Chest 6/21/23 Stable bilateral pulmonary nodules. Consider follow-up in 6-12 months.     Assessment:   73 y.o. female with multiple co-morbid illnesses here for continued follow up of medical problems.      The primary encounter diagnosis was Anxiety. Diagnoses of Secondary hypertension, Adrenal nodule, and Aldosterone excess (Conn syndrome) were also pertinent to this visit.      Plan:   1. Anxiety  Assessment & Plan:  Maybe related to aldosterone excess?   Trial low dose buspirone   E-consult endocrine - repeat aldosterone-renin ratio?      2. Secondary hypertension  Assessment & Plan:  Previous Endocrine consult 2022 recommended to cont medical management for suspected renin-mediated aldosterone excess - ie cont spironolactone - and deferring extensive w/u to r/o primary hyperaldosteronism - if pt becomes more symptomatic consider further f/u w endocrine       3. Adrenal " nodule  Overview:  CT Abd/Pelvis 3/02/22: Bilateral small adrenal gland nodules which are technically indeterminate.    CT Chest w/o contras 12/09/22t: Upper Abdomen: Previously described small bilateral adrenal nodules appear unchanged in size.      Assessment & Plan:  Continue spironolactone.   Stable on prior imaging: Due to repeat CT abd/pelvis 9/2024 for surveillance with low dose DST.       4. Aldosterone excess (Conn syndrome)  Assessment & Plan:  Previous Endocrine consult 2022 recommended to cont medical management for suspected renin-mediated aldosterone excess - ie cont spironolactone - and deferring extensive w/u to r/o primary hyperaldosteronism - if pt becomes more symptomatic consider further f/u w endocrine       Other orders  -     busPIRone (BUSPAR) 5 MG Tab; Take 1 tablet (5 mg total) by mouth 2 (two) times daily.  Dispense: 60 tablet; Refill: 11       Health Maintenance         Date Due Completion Date    RSV Vaccine (Age 60+ and Pregnant patients) (1 - 1-dose 60+ series) Never done ---    Shingles Vaccine (2 of 3) 04/20/2011 2/23/2011    TETANUS VACCINE 02/23/2021 2/23/2011    COVID-19 Vaccine (4 - 2023-24 season) 09/01/2023 12/15/2021    Diabetes Urine Screening 02/10/2024 2/10/2023    Colorectal Cancer Screening 03/18/2024 3/18/2014    Override on 6/1/2006: Done    Mammogram 05/29/2024 5/29/2023    Override on 3/13/2013: Done    LDCT Lung Screen 06/21/2024 6/21/2023    Hemoglobin A1c 09/01/2024 3/1/2024    Foot Exam 11/27/2024 11/27/2023    Override on 9/23/2015: Done    Override on 7/16/2015: Done    Override on 3/23/2015: Done    Override on 12/3/2014: Done    Override on 4/29/2014: Done    Lipid Panel 03/01/2025 3/1/2024    Eye Exam 03/01/2025 3/1/2024    Override on 7/20/2020: Done    Override on 4/30/2019: Done    Override on 4/24/2018: Done    Override on 5/17/2017: Done    Override on 5/16/2016: Done    Override on 5/13/2015: Done    DEXA Scan 05/26/2026 5/26/2021    Override on  3/10/2011: Done (normal repeat in 5 years)            -Patient's lab results were reviewed and discussed with patient  -Treatment options and alternatives were discussed with the patient. Patient expressed understanding. Patient was given the opportunity to ask questions and be an active participant in their medical care. Patient had no further questions or concerns at this time.   -Patient is an overall moderate to HIGH risk for health complications from their medical conditions.     Follow up: Follow up in about 1 month (around 6/27/2024) for Follow Up w me (virtual ok if nothing avail in clinic).    Care Plan/Goals: Reviewed No   Goals         80 <= Glucose <= 180 (pt-stated)       11/15/22  On track        Blood Pressure < 130/80 (pt-stated)       11/15/22  On track        Take at least one BP reading per week at various times of the day       11/15/22  On track        Weight (lb) < 90.7 kg (200 lb) (pt-stated)       Update 11/15/22    On track - weight < 200 lbs - new goal 10 lbs wt loss over the next 2 mos.    Goal 5% weight loss - 9-10 lbs - 175 esth    Barriers? Sweet-tooth    Overcoming? Try substitute fruit? Daily walking - current step average about 5,000 steps - new goal 7,000 steps.    Time frame 3 mos - mid Jan 2023.                After visit summary printed and given to patient upon discharge.  Patient goals and care plan are included in After visit summary.    TOTAL TIME evaluating and managing this patient for this encounter was greater than 30 minutes. This time was spent personally by me on some of the following activities: review of patient's past medical history, assessing age-appropriate health maintenance needs, review of any interval history, review and interpretation of lab results, review and interpretation of imaging test results, review and interpretation of cardiology test results, reviewing consulting specialist notes, obtaining history from the patient and family, examination of the  patient, medication reconciliation, managing and/or ordering prescription medications, ordering imaging tests, ordering referral to subspecialty provider(s), educating patient and answering their questions about diagnosis, treatment plan, and goals of treatment, discussing planned follow-up and final documentation of the visit. This time was exclusive of any separately billable procedures for this patient and exclusive of time spent treating any other patients.

## 2024-06-03 ENCOUNTER — PATIENT MESSAGE (OUTPATIENT)
Dept: OTHER | Facility: OTHER | Age: 74
End: 2024-06-03
Payer: MEDICARE

## 2024-06-23 PROBLEM — E26.01 ALDOSTERONE EXCESS (CONN SYNDROME): Chronic | Status: ACTIVE | Noted: 2024-05-27

## 2024-06-23 NOTE — ASSESSMENT & PLAN NOTE
Continue spironolactone.   Stable on prior imaging: Due to repeat CT abd/pelvis 9/2024 for surveillance with low dose DST.

## 2024-06-23 NOTE — ASSESSMENT & PLAN NOTE
Maybe related to aldosterone excess?   Trial low dose buspirone   E-consult endocrine - repeat aldosterone-renin ratio?

## 2024-06-23 NOTE — ASSESSMENT & PLAN NOTE
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

## 2024-06-26 ENCOUNTER — TELEPHONE (OUTPATIENT)
Dept: PRIMARY CARE CLINIC | Facility: CLINIC | Age: 74
End: 2024-06-26
Payer: MEDICARE

## 2024-06-26 ENCOUNTER — OFFICE VISIT (OUTPATIENT)
Dept: PRIMARY CARE CLINIC | Facility: CLINIC | Age: 74
End: 2024-06-26
Payer: MEDICARE

## 2024-06-26 DIAGNOSIS — E11.59 HYPERTENSION ASSOCIATED WITH DIABETES: Chronic | ICD-10-CM

## 2024-06-26 DIAGNOSIS — I15.2 HYPERTENSION ASSOCIATED WITH DIABETES: Chronic | ICD-10-CM

## 2024-06-26 DIAGNOSIS — F41.9 ANXIETY: ICD-10-CM

## 2024-06-26 DIAGNOSIS — E11.22 TYPE 2 DIABETES MELLITUS WITH STAGE 3A CHRONIC KIDNEY DISEASE, WITHOUT LONG-TERM CURRENT USE OF INSULIN: Chronic | ICD-10-CM

## 2024-06-26 DIAGNOSIS — E78.2 COMBINED HYPERLIPIDEMIA ASSOCIATED WITH TYPE 2 DIABETES MELLITUS: Chronic | ICD-10-CM

## 2024-06-26 DIAGNOSIS — Z12.31 ENCOUNTER FOR SCREENING MAMMOGRAM FOR MALIGNANT NEOPLASM OF BREAST: Primary | ICD-10-CM

## 2024-06-26 DIAGNOSIS — E11.69 COMBINED HYPERLIPIDEMIA ASSOCIATED WITH TYPE 2 DIABETES MELLITUS: Chronic | ICD-10-CM

## 2024-06-26 DIAGNOSIS — N18.31 TYPE 2 DIABETES MELLITUS WITH STAGE 3A CHRONIC KIDNEY DISEASE, WITHOUT LONG-TERM CURRENT USE OF INSULIN: Chronic | ICD-10-CM

## 2024-06-26 PROBLEM — U07.1 COVID-19: Status: RESOLVED | Noted: 2023-08-30 | Resolved: 2024-06-26

## 2024-06-26 PROCEDURE — 3044F HG A1C LEVEL LT 7.0%: CPT | Mod: CPTII,95,, | Performed by: INTERNAL MEDICINE

## 2024-06-26 PROCEDURE — 1159F MED LIST DOCD IN RCRD: CPT | Mod: CPTII,95,, | Performed by: INTERNAL MEDICINE

## 2024-06-26 PROCEDURE — 99214 OFFICE O/P EST MOD 30 MIN: CPT | Mod: 95,,, | Performed by: INTERNAL MEDICINE

## 2024-06-26 PROCEDURE — 1160F RVW MEDS BY RX/DR IN RCRD: CPT | Mod: CPTII,95,, | Performed by: INTERNAL MEDICINE

## 2024-06-26 RX ORDER — SERTRALINE HYDROCHLORIDE 25 MG/1
TABLET, FILM COATED ORAL
Qty: 49 TABLET | Refills: 0 | Status: SHIPPED | OUTPATIENT
Start: 2024-06-26 | End: 2024-07-24

## 2024-06-26 NOTE — PATIENT INSTRUCTIONS
If you are feeling unwell, we'd like to be the first ones to know here at [facility]! Please give us a call. Same day appointments are our top priority to keep you well and out of the emergency rooms and hospitals. Call [phone] for our direct line. After hours advice is always available. Please call [phone] after hours to speak to the on-call team.      Recommend Tetanus, Shingles, and Covid and RSV vaccines   These can be scheduled at your pharmacy of choice  Shingles vaccine can also be scheduled here at [facility]

## 2024-06-26 NOTE — PROGRESS NOTES
Kristyn Garza  06/26/2024  [id number]    Moon Lion MD  Patient Care Team:  Moon Lion MD as PCP - General (Internal Medicine)  Delia Liu LPN as Care Coordinator (Internal Medicine)  Soo Bar as Digital Medicine Health   Dale Matute MD as Consulting Physician (Cardiology)  Hammad Hardy OD as Consulting Physician (Optometry)  Olivia Irwin MD as Consulting Physician (Otolaryngology)  Gretel De Leon, PharmD as Hypertension Digital Medicine Clinician  Brenden Landaverde MD as Consulting Physician (Nephrology)  Gretel De Leon PharmD as Hyperlipidemia Digital Medicine Clinician  Gretel De Leon PharmD as Diabetes Digital Medicine Clinician  Moon Lion MD as Hyperlipidemia Digital Medicine Responsible Provider (Internal Medicine)  Moon Lion MD as Diabetes Digital Medicine Responsible Provider (Internal Medicine)  Moon Lion MD as Hypertension Digital Medicine Responsible Provider (Internal Medicine)  Medicare, [employer] Managed as Diabetes Digital Medicine Contract  Medicare, [employer] Managed as Hypertension Digital Medicine Contract  Chacho Bergman PharmD as Pharmacist    The patient location is:  Patient Home   The chief complaint leading to consultation is: f/u    Visit Type: Virtual visit with synchronous audio and video  Each patient to whom he or she provides medical services by telemedicine is:  (1) informed of the relationship between the physician and patient and the respective role of any other health care provider with respect to management of the patient; and (2) notified that he or she may decline to receive medical services by telemedicine and may withdraw from such care at any time.    Chief Complaint:  No chief complaint on file.    History of Present Illness: Ms. Kristyn Garza is a 72 year old female being seen virtually to f/u on anxiety and buspirone.  Ms. 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 She's enrolled w [employer] program for diabetes, hyperlipidemia and hypertension.    Fasting glucose 80-90's  's    PHQ-4 Score: 3     From LOV w me 5/27/24  No new medication or obvious new stressors that she is aware of.  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  Notes fingernail changes w new vertical linear grooving   PHQ-4 Score: 3     Upcoming appointments:   Date Provider Specialty Appt Notes    6/27/2024  Radiology Follow up in about 6 months (around 6/26/2024) for CT - on return visit, 6 min walk - on return.    6/27/2024  Pulmonology Follow up in about 6 months (around 6/26/2024) for CT - on return visit, 6 min walk - on return.    6/27/2024 Soto Khan MD Pulmonology Follow up in about 6 months (around 6/26/2024) for CT - on return visit, 6 min walk - on return.    3/3/2025 Hammad Hardy, OD Ophthalmology annual     The following were reviewed: Active problem list, medication list, allergies, family history, social history, and Health Maintenance.     History:  Past Medical History:   Diagnosis Date    Anxiety 05/27/2024    Carpal tunnel syndrome     CKD (chronic kidney disease) stage 3, GFR 30-59 ml/min     Followed by Nephrology    Colon cancer screening 03/18/2014    COVID-19 08/30/2023    CTS (carpal tunnel syndrome) 06/18/2013    Diabetes mellitus, type 2     Dizziness 06/19/2023    Eczema     Elevated CPK     History of hypokalemia 06/18/2013    History of hypokalemia 06/18/2013    Hypokalemia     Major depressive disorder, single episode, mild 05/27/2024    Multinodular thyroid     Followed by ENT    Obesity     Other and unspecified  hyperlipidemia     Pneumonia     in her 20s; hospitalized    Postmenopausal     No history of abnormal pap smear    Statin intolerance     caused mylagia, elevated CPK    Tobacco dependence     resolved    Unspecified essential hypertension      Patient Active Problem List   Diagnosis    Multiple thyroid nodules    Cupping of optic disc    Nuclear sclerosis    Combined hyperlipidemia associated with type 2 diabetes mellitus    Hypertension associated with diabetes    CKD (chronic kidney disease) stage 3, GFR 30-59 ml/min    Obesity (BMI 30.0-34.9)    Postmenopausal    Statin intolerance    Abnormal ECG    LVH (left ventricular hypertrophy)    Family history of cardiovascular disorder    PAC (premature atrial contraction)    Type 2 diabetes mellitus with kidney complication, without long-term current use of insulin    Endolymphatic hydrops of left ear    Gastroesophageal reflux disease    ZHOU (dyspnea on exertion)    Secondary hyperparathyroidism of renal origin    Diastolic dysfunction    Secondary hypertension    Adrenal nodule    Pulmonary nodules    Aortic atherosclerosis    Controlled type 2 diabetes mellitus with diabetic neuropathy    Anxiety    Aldosterone excess (Conn syndrome)    Nicotine dependence, cigarettes, in remission     Review of patient's allergies indicates:   Allergen Reactions    Buspar [buspirone] Other (See Comments)     Lip swelling    Statins-hmg-coa reductase inhibitors      Muscle Cramps       Medications:  Current Outpatient Medications on File Prior to Visit   Medication Sig Dispense Refill    amLODIPine (NORVASC) 10 MG tablet Take 1 tablet (10 mg total) by mouth once daily. 90 tablet 3    aspirin 81 MG Chew Take 81 mg by mouth once daily.      azelastine (ASTELIN) 137 mcg (0.1 %) nasal spray 1 spray (137 mcg total) by Nasal route 2 (two) times daily as needed for Rhinitis. 30 mL 11    benazepriL (LOTENSIN) 40 MG tablet Take 1 tablet (40 mg total) by mouth once daily. 90 tablet 3     "blood glucose control, normal Soln 1 Bottle by Misc.(Non-Drug; Combo Route) route once daily. For Accu Check Amber  use twice daily prn dx: E11.9 1 each 0    blood sugar diagnostic (ACCU-CHEK SMARTVIEW TEST STRIP) Strp TO CHECK BLOOD GLUCOSE ONE TIMES DAILY 50 strip 1    blood-glucose meter (ACCU-CHEK AMBER) Misc 1 kit by Misc.(Non-Drug; Combo Route) route once. For Accu Check Amber  use twice daily prn dx: E11.9 for 1 dose 1 each 0    calcium-vitamin D3 500 mg(1,250mg) -200 unit per tablet Take 1 tablet by mouth once daily.       cetirizine (ZYRTEC) 10 MG tablet Take 1 tablet (10 mg total) by mouth once daily. 30 tablet 12    CRANBERRY EXTRACT ORAL Take 1 tablet by mouth once daily.      evolocumab (REPATHA SURECLICK) 140 mg/mL PnIj Inject 1 mL (140 mg total) into the skin every 14 (fourteen) days. 2 mL 5    metoprolol succinate (TOPROL-XL) 25 MG 24 hr tablet Take 1 tablet (25 mg total) by mouth once daily. 90 tablet 3    MULTIVITAMIN W-MINERALS/LUTEIN (CENTRUM SILVER ORAL) Take 1 tablet by mouth once daily.      pen needle, diabetic (BD ULTRA-FINE MICRO PEN NEEDLE) 32 gauge x 1/4" Ndle 1 Needle by Misc.(Non-Drug; Combo Route) route once a week. 30 each 1    potassium chloride (K-TAB) 20 mEq Take 1 tablet (20 mEq total) by mouth once daily. 90 tablet 1    potassium chloride SA (K-DUR,KLOR-CON) 20 MEQ tablet Take 1 tablet (20 mEq total) by mouth once daily. 30 tablet 5    semaglutide (OZEMPIC) 0.25 mg or 0.5 mg (2 mg/3 mL) pen injector Inject 0.25 mg into the skin every 7 days. Inject 0.25 mg subcutaneous once weekly x 4 weeks, then increase to 0.5 mg subcutaneous x 4 weeks, and then increase to 1 mg/week thereafter.      spironolactone (ALDACTONE) 50 MG tablet TAKE 1 TABLET EVERY DAY 90 tablet 3    triamcinolone (NASACORT) 55 mcg nasal inhaler USE 2 SPRAYS BY NASAL ROUTE ONCE DAILY 17 mL 3     No current facility-administered medications on file prior to visit.     Medications have been reviewed and reconciled with " patient at visit today.    Review of Systems   See HPI above    Exam:  BP Readings from Last 3 Encounters:   06/27/24 122/71   05/27/24 124/70   04/16/24 118/68      Wt Readings from Last 3 Encounters:   06/27/24 0944 81.1 kg (178 lb 12.7 oz)   06/27/24 0938 81.1 kg (178 lb 12.7 oz)   05/27/24 0958 81.3 kg (179 lb 3.7 oz)      Physical Exam  Vitals reviewed.   Constitutional:       General: She is not in acute distress.     Appearance: Normal appearance. She is obese.   HENT:      Head: Normocephalic and atraumatic.      Right Ear: External ear normal.      Left Ear: External ear normal.      Nose: Nose normal.      Mouth/Throat:      Mouth: Mucous membranes are moist.      Pharynx: Oropharynx is clear.   Eyes:      Extraocular Movements: Extraocular movements intact.      Conjunctiva/sclera: Conjunctivae normal.      Comments: glasses   Cardiovascular:      Rate and Rhythm: Normal rate.   Pulmonary:      Effort: Pulmonary effort is normal. No respiratory distress.   Skin:     General: Skin is warm and dry.   Neurological:      Mental Status: She is alert. Mental status is at baseline.   Psychiatric:         Mood and Affect: Mood normal.         Behavior: Behavior normal.         Thought Content: Thought content normal.        Laboratory Reviewed  Lab Results   Component Value Date    WBC 9.96 03/01/2024    HGB 13.9 03/01/2024    HCT 41.8 03/01/2024     03/01/2024    MCV 94 03/01/2024    CHOL 160 03/01/2024    TRIG 51 03/01/2024    HDL 54 03/01/2024    LDLCALC 95.8 03/01/2024    ALT 36 03/01/2024    AST 32 03/01/2024     06/27/2024    K 4.3 06/27/2024     06/27/2024    CREATININE 1.2 06/27/2024    BUN 14 06/27/2024    CO2 24 06/27/2024    MG 2.4 06/19/2023    TSH 0.509 03/01/2024    FREET4 0.96 03/01/2024    HGBA1C 5.8 (H) 06/27/2024     Lab Results   Component Value Date    PTH 61.0 02/10/2023    CALCIUM 9.8 06/27/2024    PHOS 3.5 06/27/2024      Lab Results   Component Value Date    QHUAAGMZ95  "785 02/10/2023   No results found for: "FOLATE" No results found for: "UIBC", "IRON", "TRANS", "TRANSFERRIN", "TIBC", "LABIRON", "FESATURATED"   Lab Results   Component Value Date    EGFRNORACEVR 47.2 (A) 06/27/2024    ALBUMIN 3.9 06/27/2024     Lab Results   Component Value Date    [id number] 35 06/27/2024      LDCT Chest 6/27/24   Lungs/pleura:There are pulmonary nodules requiring further evaluation.  Stable bilateral pulmonary nodules.  No new pulmonary nodule.  No significant emphysema.  Heart: No significant cardiomegaly or pericardial effusion. Coronary artery atherosclerotic calcifications are present.  Lymph nodes: No pathologic adenopathy.  Vascular: Nonaneurysmal aorta.  Osseous: No acute fracture or suspicious appearing osseous lesion.  Miscellaneous: No acute abnormality in the visualized abdomen.  Impression: Lung-RADS Catagory:  2 - Benign Appearance or Behavior - continue annual screening with LDCT in 12 months. Clinically significant non lung cancer finding:  None.    Assessment:   73 y.o. female with multiple co-morbid illnesses here for continued follow up of medical problems.      The primary encounter diagnosis was Encounter for screening mammogram for malignant neoplasm of breast. Diagnoses of Type 2 diabetes mellitus with stage 3a chronic kidney disease, without long-term current use of insulin, Hypertension associated with diabetes, Combined hyperlipidemia associated with type 2 diabetes mellitus, and Anxiety were also pertinent to this visit.      Plan:   1. Encounter for screening mammogram for malignant neoplasm of breast  -     Mammo Digital Screening Bilat; Future; Expected date: 06/27/2024    2. Type 2 diabetes mellitus with stage 3a chronic kidney disease, without long-term current use of insulin  -     RENAL FUNCTION PANEL; Future; Expected date: 06/27/2024  -     Vitamin D; Future; Expected date: 06/27/2024  -     Hemoglobin A1C; Future; Expected date: 06/27/2024  -     " "Microalbumin/creatinine urine ratio; Future; Expected date: 06/27/2024    3. Hypertension associated with diabetes  Assessment & Plan:  Reports home BP's good w current Rx - cont spironolactone, metoprolol, benazepril, amlodipine (consider combo tab?) - encourage movement, limit sodium intake      4. Combined hyperlipidemia associated with type 2 diabetes mellitus  Assessment & Plan:  A1c very good in "prediabetic" range - cont encourage movement, healthy food and beverage choices - taking semaglutide? - statin intolerant - cont repatha      5. Anxiety  Assessment & Plan:  Lip swelling w busipirone - in agreement w trial sertaline - consider referral [facility] LCSW if persists - maybe endocrine related?       Other orders  -     sertraline (ZOLOFT) 25 MG tablet; Take 1 tablet (25 mg total) by mouth every evening for 7 days, THEN 2 tablets (50 mg total) every evening for 21 days.  Dispense: 49 tablet; Refill: 0         Health Maintenance         Date Due Completion Date    RSV Vaccine (Age 60+ and Pregnant patients) (1 - 1-dose 60+ series) Never done ---    Shingles Vaccine (2 of 3) 04/20/2011 2/23/2011    TETANUS VACCINE 02/23/2021 2/23/2011    COVID-19 Vaccine (4 - 2023-24 season) 09/01/2023 12/15/2021    Colorectal Cancer Screening 03/18/2024 3/18/2014    Override on 6/1/2006: Done    Mammogram 05/29/2024 5/29/2023    Override on 3/13/2013: Done    Influenza Vaccine (1) 09/01/2024 10/17/2023    Override on 9/15/2018: Done (At pharmacy per patient.)    Override on 9/26/2017: Done (At [facility])    Override on 11/1/2016: Done (at [facility] per patient)    Foot Exam 11/27/2024 11/27/2023    Override on 9/23/2015: Done    Override on 7/16/2015: Done    Override on 3/23/2015: Done    Override on 12/3/2014: Done    Override on 4/29/2014: Done    Hemoglobin A1c 12/27/2024 6/27/2024    Lipid Panel 03/01/2025 3/1/2024    Eye Exam 03/01/2025 3/1/2024    Override on 7/20/2020: Done    Override on 4/30/2019: Done    Override on " 4/24/2018: Done    Override on 5/17/2017: Done    Override on 5/16/2016: Done    Override on 5/13/2015: Done    LDCT Lung Screen 06/27/2025 6/27/2024    Diabetes Urine Screening 06/27/2025 6/27/2024    DEXA Scan 05/26/2026 5/26/2021    Override on 3/10/2011: Done (normal repeat in 5 years)            -Patient's lab results were reviewed and discussed with patient  -Treatment options and alternatives were discussed with the patient. Patient expressed understanding. Patient was given the opportunity to ask questions and be an active participant in their medical care. Patient had no further questions or concerns at this time.   -Patient is an overall moderate risk for health complications from their medical conditions.     Follow up: Follow up in about 3 months (around 9/26/2024) for  Follow Up w me.    Care Plan/Goals: Reviewed No   Goals         80 <= Glucose <= 180 (pt-stated)       11/15/22  On track        Blood Pressure < 130/80 (pt-stated)       11/15/22  On track        Take at least one BP reading per week at various times of the day       11/15/22  On track        Weight (lb) < 90.7 kg (200 lb) (pt-stated)       Update 11/15/22    On track - weight < 200 lbs - new goal 10 lbs wt loss over the next 2 mos.    Goal 5% weight loss - 9-10 lbs - 175 seth    Barriers? Sweet-tooth    Overcoming? Try substitute fruit? Daily walking - current step average about 5,000 steps - new goal 7,000 steps.    Time frame 3 mos - mid Jan 2023.                Patient goals and care plan are included in After visit summary.    TOTAL TIME evaluating and managing this patient for this encounter was 36 minutes. This time was spent personally by me on some of the following activities: review of patient's past medical history, assessing age-appropriate health maintenance needs, review of any interval history, review and interpretation of lab results, review and interpretation of imaging test results, review and interpretation of  cardiology test results, reviewing consulting specialist notes, obtaining history from the patient and family, examination of the patient, medication reconciliation, managing and/or ordering prescription medications, ordering imaging tests, ordering referral to subspecialty provider(s), educating patient and answering their questions about diagnosis, treatment plan, and goals of treatment, discussing planned follow-up and final documentation of the visit. This time was exclusive of any separately billable procedures for this patient and exclusive of time spent treating any other patients.

## 2024-06-26 NOTE — TELEPHONE ENCOUNTER
Called pt to schedule nurse visit for shingles shot. I got no answer but left a voicemail.     SJ

## 2024-06-27 ENCOUNTER — OFFICE VISIT (OUTPATIENT)
Dept: PULMONOLOGY | Facility: CLINIC | Age: 74
End: 2024-06-27
Attending: INTERNAL MEDICINE
Payer: MEDICARE

## 2024-06-27 ENCOUNTER — HOSPITAL ENCOUNTER (OUTPATIENT)
Dept: RADIOLOGY | Facility: HOSPITAL | Age: 74
Discharge: HOME | End: 2024-06-27
Attending: INTERNAL MEDICINE
Payer: MEDICARE

## 2024-06-27 VITALS
WEIGHT: 178.81 LBS | HEART RATE: 80 BPM | HEIGHT: 64 IN | DIASTOLIC BLOOD PRESSURE: 71 MMHG | BODY MASS INDEX: 30.53 KG/M2 | HEIGHT: 64 IN | OXYGEN SATURATION: 98 % | WEIGHT: 178.81 LBS | BODY MASS INDEX: 30.53 KG/M2 | SYSTOLIC BLOOD PRESSURE: 122 MMHG | RESPIRATION RATE: 17 BRPM

## 2024-06-27 DIAGNOSIS — R09.02 EXERCISE HYPOXEMIA: ICD-10-CM

## 2024-06-27 DIAGNOSIS — R91.1 SOLITARY PULMONARY NODULE: ICD-10-CM

## 2024-06-27 DIAGNOSIS — F17.211 NICOTINE DEPENDENCE, CIGARETTES, IN REMISSION: ICD-10-CM

## 2024-06-27 DIAGNOSIS — R06.09 DOE (DYSPNEA ON EXERTION): ICD-10-CM

## 2024-06-27 DIAGNOSIS — R91.8 PULMONARY NODULES: ICD-10-CM

## 2024-06-27 DIAGNOSIS — R91.8 PULMONARY NODULES: Primary | Chronic | ICD-10-CM

## 2024-06-27 DIAGNOSIS — J40 BRONCHITIS: ICD-10-CM

## 2024-06-27 PROCEDURE — 71271 CT THORAX LUNG CANCER SCR C-: CPT | Mod: 26,,, | Performed by: RADIOLOGY

## 2024-06-27 PROCEDURE — 99999 PR PBB SHADOW E&M-EST. PATIENT-LVL V: CPT | Mod: PBBFAC,,, | Performed by: INTERNAL MEDICINE

## 2024-06-27 PROCEDURE — 71271 CT THORAX LUNG CANCER SCR C-: CPT | Mod: TC

## 2024-06-27 PROCEDURE — 99999 PR PBB SHADOW E&M-EST. PATIENT-LVL I: CPT | Mod: PBBFAC,,,

## 2024-06-27 RX ORDER — ALBUTEROL SULFATE 90 UG/1
2 AEROSOL, METERED RESPIRATORY (INHALATION) EVERY 4 HOURS PRN
Qty: 18 G | Refills: 11 | Status: SHIPPED | OUTPATIENT
Start: 2024-06-27

## 2024-06-27 NOTE — PROCEDURES
"[facility]  Six Minute Walk     SUMMARY     Ordering Provider: Dr Khan   Interpreting Provider: Dr Khan  Performing nurse/tech/RT: NL CRT  Diagnosis: Exertional Dyspnea  Height: 5' 4" (162.6 cm)  Weight: 81.1 kg (178 lb 12.7 oz)  BMI (Calculated): 30.7   Patient Race:             Phase Oxygen Assessment Supplemental O2 Heart   Rate Blood Pressure Luz Dyspnea Scale Rating   Resting 98 % Room Air 80 bpm 146/74 4   Exercise        Minute        1 93 % Room Air 81 bpm     2 92 % Room Air 83 bpm     3 94 % Room Air 92 bpm     4 98 % Room Air 98 bpm     5 96 % Room Air 120 bpm     6  97 % Room Air 126 bpm 138/71 5-6   Recovery        Minute        1 98 % Room Air 95 bpm     2 98 % Room Air 88 bpm     3 98 % Room Air 85 bpm     4 98 % Room Air 80 bpm 122/71 2     Six Minute Walk Summary  6MWT Status: completed without stopping  Patient Reported: No complaints     Interpretation:  Did the patient stop or pause?: No     Total Time Walked (Calculated): 360 seconds  Final Partial Lap Distance (feet): 0 feet  Total Distance Meters (Calculated): 365.76 meters  Predicted Distance Meters (Calculated): 390.87 meters  Percentage of Predicted (Calculated): 93.58  Peak VO2 (Calculated): 14.95  Mets: 4.27  Has The Patient Had a Previous Six Minute Walk Test?: No       Previous 6MWT Results  Has The Patient Had a Previous Six Minute Walk Test?: No  Interpretation:  Total distance walked in six minutes is normal indicating normal functional capacity.   Patient did not meet criteria for oxygen prescription. Clinical correlation suggested.    [] Mild exercise-induced hypoxemia described as an arterial oxygen saturation of 93-95% (or 3-4% less than at rest),  [] Moderate exercise-induced hypoxemia as 89-93%  [] Severe exercise induced hypoxemia as < 89% O2 saturation.  Medicare Criteria for oxygen prescription comments:   When arterial oxygen saturation is at or below 88% during exercise on room " air (severe exercise induced hypoxemia) then the patient falls under Medicare Group 1 criteria for supplemental oxygen prescription.  Details about Medicare Group Criteria coverage can be found at http://www.cms.hhs.gov/manuals/downloads/     Soto Khan MD

## 2024-06-27 NOTE — PROGRESS NOTES
Subjective:     Patient ID: Kristyn Garza is a 73 y.o. female.    Chief Complaint:  c/o anxiety     HPI  73 y.o. former smoker with  with asymptomatic lung nodule. Occasional ZHOU   Former smoker quit 13 years ago    Lung Nodule  She presents for evaluation and treatment of a lung nodule. The patient had an abnormal imaging study but reports experiencing no current or past pulmonary symptoms. The patient denies other associated symptoms. She has a history of 43 pack years. The patient has no known exposure to tuberculosis. The patient does not have a history of cancer.          Past Medical History:   Diagnosis Date    Anxiety 05/27/2024    Carpal tunnel syndrome     CKD (chronic kidney disease) stage 3, GFR 30-59 ml/min     Followed by Nephrology    Colon cancer screening 03/18/2014    COVID-19 08/30/2023    CTS (carpal tunnel syndrome) 06/18/2013    Diabetes mellitus, type 2     Dizziness 06/19/2023    Eczema     Elevated CPK     History of hypokalemia 06/18/2013    History of hypokalemia 06/18/2013    Hypokalemia     Major depressive disorder, single episode, mild 05/27/2024    Multinodular thyroid     Followed by ENT    Obesity     Other and unspecified hyperlipidemia     Pneumonia     in her 20s; hospitalized    Postmenopausal     No history of abnormal pap smear    Statin intolerance     caused mylagia, elevated CPK    Tobacco dependence     resolved    Unspecified essential hypertension      Past Surgical History:   Procedure Laterality Date    COLONOSCOPY      CYST REMOVAL  2015    On the head    FINE NEEDLE ASPIRATION  3/2011    thyroid nodules x3 (negative per patient)    HYSTERECTOMY      PARTIAL HYSTERECTOMY  1991    Due to fibroids     Review of patient's allergies indicates:   Allergen Reactions    Buspar [buspirone] Other (See Comments)     Lip swelling    Statins-hmg-coa reductase inhibitors      Muscle Cramps     Current Outpatient Medications on File Prior to Visit   Medication Sig Dispense Refill  "   amLODIPine (NORVASC) 10 MG tablet Take 1 tablet (10 mg total) by mouth once daily. 90 tablet 3    aspirin 81 MG Chew Take 81 mg by mouth once daily.      azelastine (ASTELIN) 137 mcg (0.1 %) nasal spray 1 spray (137 mcg total) by Nasal route 2 (two) times daily as needed for Rhinitis. 30 mL 11    benazepriL (LOTENSIN) 40 MG tablet Take 1 tablet (40 mg total) by mouth once daily. 90 tablet 3    blood glucose control, normal Soln 1 Bottle by Misc.(Non-Drug; Combo Route) route once daily. For Accu Check Amber  use twice daily prn dx: E11.9 1 each 0    blood sugar diagnostic (ACCU-CHEK SMARTVIEW TEST STRIP) Strp TO CHECK BLOOD GLUCOSE ONE TIMES DAILY 50 strip 1    blood-glucose meter (ACCU-CHEK AMBER) Misc 1 kit by Misc.(Non-Drug; Combo Route) route once. For Accu Check Amber  use twice daily prn dx: E11.9 for 1 dose 1 each 0    calcium-vitamin D3 500 mg(1,250mg) -200 unit per tablet Take 1 tablet by mouth once daily.       cetirizine (ZYRTEC) 10 MG tablet Take 1 tablet (10 mg total) by mouth once daily. 30 tablet 12    CRANBERRY EXTRACT ORAL Take 1 tablet by mouth once daily.      evolocumab (REPATHA SURECLICK) 140 mg/mL PnIj Inject 1 mL (140 mg total) into the skin every 14 (fourteen) days. 2 mL 5    metoprolol succinate (TOPROL-XL) 25 MG 24 hr tablet Take 1 tablet (25 mg total) by mouth once daily. 90 tablet 3    MULTIVITAMIN W-MINERALS/LUTEIN (CENTRUM SILVER ORAL) Take 1 tablet by mouth once daily.      pen needle, diabetic (BD ULTRA-FINE MICRO PEN NEEDLE) 32 gauge x 1/4" Ndle 1 Needle by Misc.(Non-Drug; Combo Route) route once a week. 30 each 1    potassium chloride (K-TAB) 20 mEq Take 1 tablet (20 mEq total) by mouth once daily. 90 tablet 1    potassium chloride SA (K-DUR,KLOR-CON) 20 MEQ tablet Take 1 tablet (20 mEq total) by mouth once daily. 30 tablet 5    semaglutide (OZEMPIC) 0.25 mg or 0.5 mg (2 mg/3 mL) pen injector Inject 0.25 mg into the skin every 7 days. Inject 0.25 mg subcutaneous once weekly x 4 " weeks, then increase to 0.5 mg subcutaneous x 4 weeks, and then increase to 1 mg/week thereafter.      sertraline (ZOLOFT) 25 MG tablet Take 1 tablet (25 mg total) by mouth every evening for 7 days, THEN 2 tablets (50 mg total) every evening for 21 days. 49 tablet 0    spironolactone (ALDACTONE) 50 MG tablet TAKE 1 TABLET EVERY DAY 90 tablet 3    triamcinolone (NASACORT) 55 mcg nasal inhaler USE 2 SPRAYS BY NASAL ROUTE ONCE DAILY 17 mL 3    [DISCONTINUED] albuterol (VENTOLIN HFA) 90 mcg/actuation inhaler Inhale 2 puffs into the lungs every 6 (six) hours as needed for Wheezing. Rescue 18 g 1     No current facility-administered medications on file prior to visit.     Social History     Socioeconomic History    Marital status:     Number of children: 2    Highest education level: Some college, no degree   Occupational History    Occupation: Retired   Tobacco Use    Smoking status: Former     Current packs/day: 0.00     Average packs/day: 1 pack/day for 43.4 years (43.4 ttl pk-yrs)     Types: Cigarettes     Start date: 1970     Quit date: 2013     Years since quittin.1    Smokeless tobacco: Never   Substance and Sexual Activity    Alcohol use: Yes     Alcohol/week: 0.0 standard drinks of alcohol     Comment: Rarely drinks wine    Drug use: No    Sexual activity: Not Currently     Partners: Male     Birth control/protection: Condom   Social History Narrative    She wears seatbelt. . 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      Social Determinants of Health     Financial Resource Strain: Low Risk  (2024)    Overall Financial Resource Strain (CARDIA)     Difficulty of Paying Living Expenses: Not very hard   Food Insecurity: No Food Insecurity (2023)    Hunger Vital Sign     Worried About Running Out of Food in the Last Year: Never true     Ran Out of Food in the Last Year: Never true   Transportation Needs: No Transportation Needs (2024)    TRANSPORTATION NEEDS      Transportation : No   Physical Activity: Sufficiently Active (5/24/2024)    Exercise Vital Sign     Days of Exercise per Week: 4 days     Minutes of Exercise per Session: 60 min   Stress: No Stress Concern Present (5/24/2024)    [employer] - Occupational Stress Questionnaire     Feeling of Stress : Only a little   Housing Stability: Low Risk  (5/24/2024)    Housing Stability Vital Sign     Unable to Pay for Housing in the Last Year: No     Homeless in the Last Year: No     Family History   Problem Relation Name Age of Onset    Hypertension Mother      Diabetes Mother      Dementia Mother          Alzheimer's dementia    Hypertension Father      Heart disease Sister          MI/CAD    Cataracts Sister      Dementia Sister      No Known Problems Son      No Known Problems Son      Cataracts Brother      Cataracts Brother      Dementia Sister      Cancer Neg Hx      Stroke Neg Hx      Melanoma Neg Hx      Psoriasis Neg Hx      Lupus Neg Hx      Eczema Neg Hx      COPD Neg Hx      Kidney disease Neg Hx         Review of Systems   Constitutional:  Negative for fever and fatigue.   HENT:  Negative for postnasal drip and rhinorrhea.    Eyes:  Negative for redness and itching.   Respiratory:  Negative for cough, shortness of breath, wheezing, dyspnea on extertion and Paroxysmal Nocturnal Dyspnea.    Cardiovascular:  Negative for chest pain.   Genitourinary:  Negative for difficulty urinating and hematuria.   Endocrine:  Negative for polyphagia, cold intolerance and heat intolerance.    Musculoskeletal:  Positive for arthralgias.   Skin:  Negative for rash.   Gastrointestinal:  Negative for nausea, vomiting, abdominal pain and abdominal distention.   Neurological:  Negative for dizziness and headaches.   Hematological:  Negative for adenopathy. Does not bruise/bleed easily and no excessive bruising.   Psychiatric/Behavioral:  The patient is not nervous/anxious.        Objective:      /71   " Pulse 80   Resp 17   Ht 5' 4" (1.626 m)   Wt 81.1 kg (178 lb 12.7 oz)   SpO2 98%   BMI 30.69 kg/m²   Physical Exam  Vitals and nursing note reviewed.   Constitutional:       Appearance: Normal appearance. She is well-developed. She is obese.   HENT:      Head: Normocephalic and atraumatic.      Nose: Nose normal.   Eyes:      Conjunctiva/sclera: Conjunctivae normal.      Pupils: Pupils are equal, round, and reactive to light.   Neck:      Thyroid: No thyromegaly.      Vascular: No JVD.      Trachea: No tracheal deviation.   Cardiovascular:      Rate and Rhythm: Normal rate and regular rhythm.      Heart sounds: Normal heart sounds.   Pulmonary:      Effort: Pulmonary effort is normal. No respiratory distress.      Breath sounds: Normal breath sounds. No wheezing or rales.   Chest:      Chest wall: No tenderness.   Abdominal:      General: Bowel sounds are normal.      Palpations: Abdomen is soft.   Musculoskeletal:         General: Normal range of motion.      Cervical back: Neck supple.   Lymphadenopathy:      Cervical: No cervical adenopathy.   Skin:     General: Skin is warm and dry.   Neurological:      Mental Status: She is alert and oriented to person, place, and time.       Personal Diagnostic Review  REVIEW of CT   Abnormal CT of abdomen in the past 9/27/2022  1. Small bilateral adrenal adenomas as above  2. Otherwise no acute findings demonstrated in the abdomen or pelvis.  3. Small bilateral pulmonary nodule seen in the lung bases measuring up to 6 mm in the right lower lobe.  For multiple solid nodules with any 6 mm or greater, Fleischner Society guidelines recommend follow up with non-contrast chest CT at 3-6 months and 18-24 months after discovery.    Details    Reading Physician Reading Date Result Priority   Alberto Hardy MD  [phone] 12/9/2022 Routine     Narrative & Impression  EXAMINATION:  CT CHEST WITHOUT CONTRAST     CLINICAL HISTORY:  Abnormal xray - lung nodule, < 1 cm, low " "risk; Other nonspecific abnormal finding of lung field     TECHNIQUE:  Low dose axial images, sagittal and coronal reformations were obtained from the thoracic inlet to the lung bases. Contrast was not administered.     COMPARISON:  CT abdomen and pelvis dated 09/27/2022     FINDINGS:  Heart and Great vessels: Scattered atherosclerotic plaque noted involving the thoracic aorta and aortic branch vessels, including the coronary arteries.  No pericardial effusion.     Thoracic Adenopathy: None demonstrated     Lungs: There multiple solid noncalcified pulmonary nodules in the lungs bilaterally, many of which occur along the pleural surfaces.  The largest nodules on the right measure an average of 6 mm in the right middle lobe (series 4, image 227 and 5 mm in the right lower lobe along the surface of the diaphragm (series 4, image 358.  The largest nodule in the left lung measures an average of 7 mm in the periphery of the left upper lobe apex as seen on series 4, image 80.  No parenchymal consolidations or pleural effusions demonstrated.     Upper Abdomen: Previously described small bilateral adrenal nodules appear unchanged in size.     Bones: No acute or suspicious osseous findings.     Miscellaneous: None     Impression:     1. Multiple solid noncalcified pulmonary nodules throughout the lungs bilaterally with the largest measuring an average of 7 mm in the apex of the left upper lobe.  For multiple solid nodules with any 6 mm or greater, Fleischner Society guidelines recommend follow up with non-contrast chest CT at 3-6 months and 18-24 months after discovery.        Electronically signed by: Alberto Hardy MD  Date:                                            12/09/2022  Time:                                           10:20              6/27/2024     9:44 AM   Pulmonary Studies Review   SpO2 98 %   Height 5' 4" (1.626 m)   Weight 81.1 kg (178 lb 12.7 oz)   BMI (Calculated) 30.7   Predicted Distance 249.18 " "  Predicted Distance Meters (Calculated) 390.87 meters       Posterior Segment OCT Optic Nerve- Both eyes  Right Eye  Quality was good. Scan locations included Retinal Nerve Fiber Layer   (RNFL).     Left Eye  Quality was good. Scan locations included Retinal Nerve Fiber Layer   (RNFL).     Findings  Right Eye  Borderline. TS, NS. No. Progression has been stable.     Left Eye  Borderline. NS. No. Progression has been stable.     Notes  GCL : 30/30      Office Spirometry Results:         6/27/2024     9:44 AM 6/27/2024     9:38 AM 5/27/2024     9:58 AM 4/16/2024     1:45 PM 2/27/2024     8:56 AM 12/26/2023    10:13 AM 12/7/2023     8:58 AM   Pulmonary Function Tests   SpO2 98 %  96 % 96 % 96 % 97 %    Ordering Provider  Dr Khan        Performing nurse/tech/RT  NL CRT        Diagnosis  Exertional Dyspnea        Height 5' 4" (1.626 m) 5' 4" (1.626 m) 5' 4" (1.626 m) 5' 4" (1.626 m) 5' 4" (1.626 m) 5' 4" (1.626 m) 5' 4" (1.626 m)   Weight 81.1 kg (178 lb 12.7 oz) 81.1 kg (178 lb 12.7 oz) 81.3 kg (179 lb 3.7 oz) 82.6 kg (182 lb 3.4 oz) 83.7 kg (184 lb 10.2 oz) 83 kg (182 lb 15.7 oz) 82.6 kg (182 lb)   BMI (Calculated) 30.7 30.7 30.8 31.3 31.7 31.4 31.2   Patient Race          6MWT Status  completed without stopping        Patient Reported  No complaints        Was O2 used?  No        6MW Distance walked (feet)  1200 feet        Distance walked (meters)  365.76 meters        Did patient stop?  No        Type of assistive device(s) used?  no assistive devices        Oxygen Saturation  98 %        Supplemental Oxygen  Room Air        Heart Rate  80 bpm        Blood Pressure  146/74        Luz Dyspnea Rating   somewhat heavy        Oxygen Saturation  97 %        Supplemental Oxygen  Room Air        Heart Rate  126 bpm        Blood Pressure  138/71        Luz Dyspnea Rating   heavy        Recovery Time (seconds)  240 seconds        Oxygen Saturation  98 %        Supplemental Oxygen  Room Air        Heart " "Rate  80 bpm        Blood Pressure  122/71        Luz Dyspnea Rating   light        Is procedure ready for interpretation?  Yes        Oxygen Qualification?  No              6/27/2024     9:44 AM   Pulmonary Studies Review   SpO2 98 %   Height 5' 4" (1.626 m)   Weight 81.1 kg (178 lb 12.7 oz)   BMI (Calculated) 30.7   Predicted Distance 249.18   Predicted Distance Meters (Calculated) 390.87 meters         Assessment:            Pulmonary nodules    Exercise hypoxemia  -     Spirometry with/without bronchodilator  -     albuterol (VENTOLIN HFA) 90 mcg/actuation inhaler; Inhale 2 puffs into the lungs every 4 (four) hours as needed for Wheezing or Shortness of Breath (before exercise). Rescue  Dispense: 18 g; Refill: 11  -     Spirometry with/without bronchodilator; Future; Expected date: 12/28/2024    ZHOU (dyspnea on exertion)  -     Spirometry with/without bronchodilator  -     albuterol (VENTOLIN HFA) 90 mcg/actuation inhaler; Inhale 2 puffs into the lungs every 4 (four) hours as needed for Wheezing or Shortness of Breath (before exercise). Rescue  Dispense: 18 g; Refill: 11  -     Spirometry with/without bronchodilator; Future; Expected date: 12/28/2024    Bronchitis  -     albuterol (VENTOLIN HFA) 90 mcg/actuation inhaler; Inhale 2 puffs into the lungs every 4 (four) hours as needed for Wheezing or Shortness of Breath (before exercise). Rescue  Dispense: 18 g; Refill: 11    Nicotine dependence, cigarettes, in remission  -     CT Chest Lung Screening Low Dose; Future; Expected date: 06/27/2024          Outpatient Encounter Medications as of 6/27/2024   Medication Sig Dispense Refill    albuterol (VENTOLIN HFA) 90 mcg/actuation inhaler Inhale 2 puffs into the lungs every 4 (four) hours as needed for Wheezing or Shortness of Breath (before exercise). Rescue 18 g 11    amLODIPine (NORVASC) 10 MG tablet Take 1 tablet (10 mg total) by mouth once daily. 90 tablet 3    aspirin 81 MG Chew Take 81 mg by mouth once daily.   " "   azelastine (ASTELIN) 137 mcg (0.1 %) nasal spray 1 spray (137 mcg total) by Nasal route 2 (two) times daily as needed for Rhinitis. 30 mL 11    benazepriL (LOTENSIN) 40 MG tablet Take 1 tablet (40 mg total) by mouth once daily. 90 tablet 3    blood glucose control, normal Soln 1 Bottle by Misc.(Non-Drug; Combo Route) route once daily. For Accu Check Amber  use twice daily prn dx: E11.9 1 each 0    blood sugar diagnostic (ACCU-CHEK SMARTVIEW TEST STRIP) Strp TO CHECK BLOOD GLUCOSE ONE TIMES DAILY 50 strip 1    blood-glucose meter (ACCU-CHEK AMBER) Misc 1 kit by Misc.(Non-Drug; Combo Route) route once. For Accu Check Amber  use twice daily prn dx: E11.9 for 1 dose 1 each 0    calcium-vitamin D3 500 mg(1,250mg) -200 unit per tablet Take 1 tablet by mouth once daily.       cetirizine (ZYRTEC) 10 MG tablet Take 1 tablet (10 mg total) by mouth once daily. 30 tablet 12    CRANBERRY EXTRACT ORAL Take 1 tablet by mouth once daily.      evolocumab (REPATHA SURECLICK) 140 mg/mL PnIj Inject 1 mL (140 mg total) into the skin every 14 (fourteen) days. 2 mL 5    metoprolol succinate (TOPROL-XL) 25 MG 24 hr tablet Take 1 tablet (25 mg total) by mouth once daily. 90 tablet 3    MULTIVITAMIN W-MINERALS/LUTEIN (CENTRUM SILVER ORAL) Take 1 tablet by mouth once daily.      pen needle, diabetic (BD ULTRA-FINE MICRO PEN NEEDLE) 32 gauge x 1/4" Ndle 1 Needle by Misc.(Non-Drug; Combo Route) route once a week. 30 each 1    potassium chloride (K-TAB) 20 mEq Take 1 tablet (20 mEq total) by mouth once daily. 90 tablet 1    potassium chloride SA (K-DUR,KLOR-CON) 20 MEQ tablet Take 1 tablet (20 mEq total) by mouth once daily. 30 tablet 5    semaglutide (OZEMPIC) 0.25 mg or 0.5 mg (2 mg/3 mL) pen injector Inject 0.25 mg into the skin every 7 days. Inject 0.25 mg subcutaneous once weekly x 4 weeks, then increase to 0.5 mg subcutaneous x 4 weeks, and then increase to 1 mg/week thereafter.      sertraline (ZOLOFT) 25 MG tablet Take 1 tablet (25 mg " total) by mouth every evening for 7 days, THEN 2 tablets (50 mg total) every evening for 21 days. 49 tablet 0    spironolactone (ALDACTONE) 50 MG tablet TAKE 1 TABLET EVERY DAY 90 tablet 3    triamcinolone (NASACORT) 55 mcg nasal inhaler USE 2 SPRAYS BY NASAL ROUTE ONCE DAILY 17 mL 3    [DISCONTINUED] albuterol (VENTOLIN HFA) 90 mcg/actuation inhaler Inhale 2 puffs into the lungs every 6 (six) hours as needed for Wheezing. Rescue 18 g 1    [DISCONTINUED] busPIRone (BUSPAR) 5 MG Tab Take 1 tablet (5 mg total) by mouth 2 (two) times daily. 60 tablet 11     No facility-administered encounter medications on file as of 6/27/2024.     Plan:       Requested Prescriptions     Signed Prescriptions Disp Refills    albuterol (VENTOLIN HFA) 90 mcg/actuation inhaler 18 g 11     Sig: Inhale 2 puffs into the lungs every 4 (four) hours as needed for Wheezing or Shortness of Breath (before exercise). Rescue     Problem List Items Addressed This Visit       Pulmonary nodules - Primary (Chronic)    Overview     Incidental finding on imaging:  CT abd/pelvis 3/02/22:Small right lower lobe lung nodule.  Correlation with nonemergent chest CT could be considered for further evaluation.  F/u imaging:  CT Chest w/o contrast 12/09/22:  Multiple solid noncalcified pulmonary nodules throughout the lungs bilaterally with the largest measuring an average of 7 mm in the apex of the left upper lobe.  For multiple solid nodules with any 6 mm or greater, Fleischner Society guidelines recommend follow up with non-contrast chest CT at 3-6 months and 18-24 months after discovery.         ZHOU (dyspnea on exertion)    Relevant Medications    albuterol (VENTOLIN HFA) 90 mcg/actuation inhaler    Other Relevant Orders    Spirometry with/without bronchodilator    Nicotine dependence, cigarettes, in remission    Relevant Orders    CT Chest Lung Screening Low Dose     Other Visit Diagnoses       Exercise hypoxemia        Relevant Medications    albuterol  (VENTOLIN HFA) 90 mcg/actuation inhaler    Other Relevant Orders    Spirometry with/without bronchodilator    Bronchitis        Relevant Medications    albuterol (VENTOLIN HFA) 90 mcg/actuation inhaler             Follow up in about 1 year (around 6/27/2025) for ambrosio - on return, CT - on return visit.    MEDICAL DECISION MAKING: Moderate to high complexity.  Overall, the multiple problems listed are of moderate to high severity that may impact quality of life and activities of daily living. Side effects of medications, treatment plan as well as options and alternatives reviewed and discussed with patient. There was counseling of patient concerning these issues.    Total time spent in counseling and coordination of care - 35  minutes of total time spent on the encounter, which includes face to face time and non-face to face time preparing to see the patient (eg, review of tests), Obtaining and/or reviewing separately obtained history, Documenting clinical information in the electronic or other health record, Independently interpreting results (not separately reported) and communicating results to the patient/family/caregiver, or Care coordination (not separately reported).    Time was used in discussion of prognosis, risks, benefits of treatment, instructions and compliance with regimen . Discussion with other physicians and/or health care providers - home health or for use of durable medical equipment (oxygen, nebulizers, CPAP, BiPAP) occurred.

## 2024-07-01 ENCOUNTER — PATIENT MESSAGE (OUTPATIENT)
Dept: ADMINISTRATIVE | Facility: OTHER | Age: 74
End: 2024-07-01
Payer: MEDICARE

## 2024-07-02 ENCOUNTER — TELEPHONE (OUTPATIENT)
Dept: PRIMARY CARE CLINIC | Facility: CLINIC | Age: 74
End: 2024-07-02
Payer: MEDICARE

## 2024-07-02 NOTE — TELEPHONE ENCOUNTER
Labs results we given to pt and also follow up instructions; pt had no further questions           ----- Message from Moon Lion MD sent at 6/29/2024  3:34 PM CDT -----  Pt has [facility] - if message below not viewed please call w information below:   Minimal protein in urine - normal    Please message or call with any questions or concerns!  Thank you!  Moon Lion MD, MPH  [facility]

## 2024-07-02 NOTE — TELEPHONE ENCOUNTER
Staff contacted pt to go over lab results and also gave follow up instructions; pt has no further question or concerns            ----- Message from Moon Lion MD sent at 6/29/2024  5:04 PM CDT -----  Pt has [facility] - if message below not viewed please call w information below:   Good day Ms. Garza    Your HgbA1c looks good! Potassium and calcium levels are good. Vitamin D level lower end of normal - please cont ca-vit D - you may want to take 2 tabs daily - daily natural outdoor light exposure is another good way to replenish vit D levels.   Your renal function is down a bit - have you been taking over-the-counter NSAIDs like ibuprofen or naproxen? If so, recommend cut back on these - acetaminophen is safer - can take up acetaminophen 650 mg up to 3x daily.    Please message or call with any questions or concerns!  Thank you!  Moon Lion MD, MPH  [facility]

## 2024-07-10 NOTE — ASSESSMENT & PLAN NOTE
"A1c very good in "prediabetic" range - cont encourage movement, healthy food and beverage choices - taking semaglutide? - statin intolerant - cont repatha  "

## 2024-07-10 NOTE — ASSESSMENT & PLAN NOTE
Lip swelling w busipirone - in agreement w trial sertaline - consider referral [facility] if persists - maybe endocrine related?

## 2024-07-10 NOTE — ASSESSMENT & PLAN NOTE
Reports home BP's good w current Rx - cont spironolactone, metoprolol, benazepril, amlodipine (consider combo tab?) - encourage movement, limit sodium intake

## 2024-07-17 ENCOUNTER — TELEPHONE (OUTPATIENT)
Dept: PRIMARY CARE CLINIC | Facility: CLINIC | Age: 74
End: 2024-07-17
Payer: MEDICARE

## 2024-07-17 NOTE — TELEPHONE ENCOUNTER
Received shipment of OZEMPIC 1 X 3 ML prefilled pen Strength 4 mg/3 ml; quantity 4 from [employer] Labeled and placed in med refrigerator. Pt notified via [facility] and telephone message left to call clinic with questions.

## 2024-07-24 ENCOUNTER — TELEPHONE (OUTPATIENT)
Dept: PRIMARY CARE CLINIC | Facility: CLINIC | Age: 74
End: 2024-07-24
Payer: MEDICARE

## 2024-07-26 ENCOUNTER — HOSPITAL ENCOUNTER (OUTPATIENT)
Dept: RADIOLOGY | Facility: HOSPITAL | Age: 74
Discharge: HOME | End: 2024-07-26
Attending: INTERNAL MEDICINE
Payer: MEDICARE

## 2024-07-26 VITALS — BODY MASS INDEX: 30.53 KG/M2 | HEIGHT: 64 IN | WEIGHT: 178.81 LBS

## 2024-07-26 DIAGNOSIS — Z12.31 ENCOUNTER FOR SCREENING MAMMOGRAM FOR MALIGNANT NEOPLASM OF BREAST: ICD-10-CM

## 2024-07-26 PROCEDURE — 77067 SCR MAMMO BI INCL CAD: CPT | Mod: TC

## 2024-07-26 PROCEDURE — 77067 SCR MAMMO BI INCL CAD: CPT | Mod: 26,,, | Performed by: RADIOLOGY

## 2024-07-29 DIAGNOSIS — N18.30 STAGE 3 CHRONIC KIDNEY DISEASE, UNSPECIFIED WHETHER STAGE 3A OR 3B CKD: Primary | Chronic | ICD-10-CM

## 2024-07-29 RX ORDER — AMLODIPINE BESYLATE 10 MG/1
10 TABLET ORAL
Qty: 90 TABLET | Refills: 3 | Status: SHIPPED | OUTPATIENT
Start: 2024-07-29

## 2024-07-29 RX ORDER — METOPROLOL SUCCINATE 25 MG/1
25 TABLET, EXTENDED RELEASE ORAL
Qty: 180 TABLET | Refills: 3 | Status: SHIPPED | OUTPATIENT
Start: 2024-07-29 | End: 2024-10-27

## 2024-07-30 NOTE — PROGRESS NOTES
Pt has [facility] - if message below not viewed please call w information below:   Mammogram is normal - no sign of cancer.    Please message or call with any questions or concerns!  Thank you!  Moon Lion MD, MPH  [facility]

## 2024-07-31 ENCOUNTER — TELEPHONE (OUTPATIENT)
Dept: PRIMARY CARE CLINIC | Facility: CLINIC | Age: 74
End: 2024-07-31
Payer: MEDICARE

## 2024-08-01 ENCOUNTER — TELEPHONE (OUTPATIENT)
Dept: PRIMARY CARE CLINIC | Facility: CLINIC | Age: 74
End: 2024-08-01
Payer: MEDICARE

## 2024-08-01 RX ORDER — SERTRALINE HYDROCHLORIDE 50 MG/1
50 TABLET, FILM COATED ORAL NIGHTLY
Qty: 30 TABLET | Refills: 5 | Status: CANCELLED | OUTPATIENT
Start: 2024-08-01 | End: 2025-01-28

## 2024-08-01 NOTE — TELEPHONE ENCOUNTER
Spoke with pt and got information on medications.    PADMINI        ----- Message from Moon Lion MD sent at 8/1/2024  9:06 AM CDT -----  Regarding: ozempic & sertraline  I can reorder but meanwhile, what dose did [facility] send?  Has [facility] been contacted to see what we should do w the one they sent?    There are 2 [facility]'s listed as her preferred pharmacies - is either one ok to use? If not, please remove incorrect one.    What about the sertraline?? I never got an answer on if she had increased to 50 mg QHS and how she is doing w the medication (if she's taking it)  If she is taking, I recommend increasing to the 50 mg dose - does she want this sent to [facility] or [facility]?  ----- Message -----  From: Ruthann Brady MA  Sent: 7/31/2024   8:41 AM CDT  To: Moon Lion MD    Good morning, pt came in on Friday to get 1 mg  Ozempic but [facility] sent the wrong dose for pt. Pt only has one dose left that she is taking today.     PADMINI  ----- Message -----  From: Moon Lion MD  Sent: 7/30/2024   6:51 PM CDT  To: Ayad Mustafa Staff    [facility] - if message below not viewed please call w information below:   Mammogram is normal - no sign of cancer.    Please message or call with any questions or concerns!  Thank you!  Moon Lion MD, MPH  [facility]

## 2024-08-02 ENCOUNTER — TELEPHONE (OUTPATIENT)
Dept: PRIMARY CARE CLINIC | Facility: CLINIC | Age: 74
End: 2024-08-02
Payer: MEDICARE

## 2024-08-02 RX ORDER — SEMAGLUTIDE 1.34 MG/ML
1 INJECTION, SOLUTION SUBCUTANEOUS
Qty: 3 ML | Refills: 2 | Status: SHIPPED | OUTPATIENT
Start: 2024-08-02 | End: 2024-10-31

## 2024-08-02 NOTE — TELEPHONE ENCOUNTER
[facility] Messages   7/17/2024 11:57 AM Medication - Please call [phone]    7/17/2024 11:11 AM Medication - Please call [phone]    Last Read in [facility] 7/17/2024 11:56 AM by Kristyn Ruiz Ms. Garza,     I hope you are well. I wanted to let you know that your medication arrived in the office today and can be picked up at your earliest convenience.      Please give us at call at [facility], [phone] if you have questions.      Cristina Reynolds, RN Case Manager

## 2024-08-02 NOTE — TELEPHONE ENCOUNTER
Call to patient at 1408 for medication clarification (Ozempic, Zoloft). Pt states she is taking Ozempic 1 mg and will come to clinic today to  medication which was mailed to the clinic. Pt is prescribed Zoloft 25 mg and states that she is only taking 0.5 tablet (12.5 mg) and finds the dose to be effective, states she has extreme drowsiness through to the next day if she takes one 25 mg tablet. Denies depression and anxiety at this time. Pt will come to clinic within the hour to  Ozempic.     Pt here at 1500 to  Ozempic.       ----- Message from Moon Lion MD sent at 8/1/2024  9:06 AM CDT -----  Regarding: ozempic & sertraline  I can reorder but meanwhile, what dose did [facility] send?  Has [facility] been contacted to see what we should do w the one they sent?    There are 2 [facility]'s listed as her preferred pharmacies - is either one ok to use? If not, please remove incorrect one.    What about the sertraline?? I never got an answer on if she had increased to 50 mg QHS and how she is doing w the medication (if she's taking it)  If she is taking, I recommend increasing to the 50 mg dose - does she want this sent to [facility] or [facility]?  ----- Message -----  From: Ruthann Brady MA  Sent: 7/31/2024   8:41 AM CDT  To: Moon Lion MD    Good morning, pt came in on Friday to get 1 mg  Ozempic but [facility] sent the wrong dose for pt. Pt only has one dose left that she is taking today.     SJ  ----- Message -----  From: Moon Lion MD  Sent: 7/30/2024   6:51 PM CDT  To: Ayad Mustafa Staff    [facility] has [facility] - if message below not viewed please call w information below:   Mammogram is normal - no sign of cancer.    Please message or call with any questions or concerns!  Thank you!  Moon Lion MD, MPH  [facility]

## 2024-08-06 NOTE — TELEPHONE ENCOUNTER
Ambulatory Visit  Name: Gloria Mcdaniel      : 1967      MRN: [id number]  Encounter Provider: Aria Hagen PA-C  Encounter Date: 2024   Encounter department: [facility]    Assessment & Plan   1. Left facial numbness  Assessment & Plan:  Intermittent.  Referral placed to neurology  Orders:  -     Ambulatory Referral to Neurology; Future  2. Tremor  Assessment & Plan:  Referral placed to neurology  Orders:  -     Ambulatory Referral to Neurology; Future  3. Tobacco abuse  Assessment & Plan:  Educated on smoking cessation  Orders:  -     nicotine (NICODERM CQ) 14 mg/24hr TD 24 hr patch; Place 1 patch on the skin over 24 hours every 24 hours  4. Pulmonary emphysema, unspecified emphysema type (HCC)  Assessment & Plan:  Will start him on Anoro.  Continue albuterol as needed.  Smoking cessation counseling provided  Orders:  -     umeclidinium-vilanterol 62.5-25 mcg/actuation inhaler; Inhale 1 puff daily  5. CAD in native artery  Assessment & Plan:  Continue to follow with cardiology.  Continue Zetia and rosuvastatin  6. Rheumatoid arthritis with positive rheumatoid factor, involving unspecified site (HCC)  Assessment & Plan:  Continue to follow with rheumatology    7. PVD (peripheral vascular disease) ([facility])  8. Raynaud's disease without gangrene  Assessment & Plan:  Continue to follow with rheumatology    9. Gastro-esophageal reflux disease without esophagitis  Assessment & Plan:  Stable.  Continue Tums as needed  10. Hypoglycemia  Assessment & Plan:  Continue to follow with endocrinology.  Patient currently wearing continuous glucose monitor.  Recommended more frequent, high protein meals  11. Microscopic hematuria  Assessment & Plan:  Will get urinalysis to evaluate.  Per patient, this has been a finding in her urinalysis for years, but never had workup.  This is concerning since patient is a smoker.  I explained that if urinalysis does show microscopic hematuria, we will refer her to  Refill Routing Note   Medication(s) are not appropriate for processing by [facility] for the following reason(s):      Medication outside of protocol  Non-participating provider    [facility] action(s):  Route None identified            Appointments  past 12m or future 3m with PCP    Date Provider   Last Visit   4/26/2023 Moon Lion MD   Next Visit   Visit date not found Moon Lion MD   ED visits in past 90 days: 0        Note composed:4:14 AM 05/08/2023          urology.  Orders:  -     UA w Reflex to Microscopic w Reflex to Culture; Future  12. Osteoarthritis of spine with radiculopathy, cervical region  Assessment & Plan:  Continue to follow with pain management.  Recommended that patient follow through with the physical therapy.  13. Lumbar radiculopathy  Assessment & Plan:  Continue to follow with pain management.  Recommended the patient follow through with physical therapy as directed by pain management.  14. Chronic bilateral low back pain with left-sided sciatica  Assessment & Plan:  Continue to follow with pain management.  Recommended the patient follow through with physical therapy as directed by pain management.  15. Lumbar degenerative disc disease  Assessment & Plan:  Continue to follow with pain management.  Recommended the patient follow through with physical therapy as directed by pain management.  16. Age-related osteoporosis without current pathological fracture  17. Neuroma of foot  Assessment & Plan:  Continue to follow with podiatry  18. Undifferentiated connective tissue disease (HCC)  Assessment & Plan:  Continue to follow with rheumatology    19. Other hyperlipidemia  Assessment & Plan:  Stable.  Continues Zetia and rosuvastatin  20. Chronic left hip pain  -     XR hip/pelv 2-3 vws left if performed; Future; Expected date: 08/05/2024       History of Present Illness     Tamiko is a 57-year-old female who is here today to transition care from Dr. Miller.  She has a few concerns.  First, she is complaining of a tremor.  She admits that this has been an ongoing issue.  She is asking for referral to neurology.  She admits that this happens mostly when she is at rest.  It affects her hands.  She also has intermittent episodes of facial numbness.  She is currently following with endocrinology for low blood sugar.  She has not had any episodes since wearing her continuous glucose monitor.  She does have an appointment scheduled with endocrinology to  "follow-up.  She suffers from chronic back and neck pain.  She did establish with pain management.  She was referred to physical therapy.  She has a history of rheumatoid arthritis, connective tissue disorder, and Raynaud's.  She follows regularly with rheumatology.  She is concerned because she has a history of microscopic hematuria.  She has never had a workup for this.  She has a history of a neuroma of her foot.  She continues to follow with podiatry.  Lastly, she is trying to quit smoking.  She admits that her breathing is labored at times.  She does use albuterol as needed.  She is not on any maintenance inhalers.  She was diagnosed with emphysema, but does not follow with pulmonology.        Review of Systems   Constitutional:  Negative for chills, diaphoresis, fatigue and fever.   HENT:  Negative for congestion, ear pain, postnasal drip, rhinorrhea, sneezing, sore throat and trouble swallowing.    Eyes:  Negative for pain and visual disturbance.   Respiratory:  Positive for shortness of breath. Negative for apnea, cough and wheezing.    Cardiovascular:  Negative for chest pain and palpitations.   Gastrointestinal:  Negative for abdominal pain, constipation, diarrhea, nausea and vomiting.   Genitourinary:  Negative for dysuria and hematuria.   Musculoskeletal:  Positive for arthralgias and back pain. Negative for gait problem and myalgias.   Neurological:  Positive for tremors and numbness. Negative for dizziness, syncope, weakness, light-headedness and headaches.   Psychiatric/Behavioral:  Negative for suicidal ideas. The patient is not nervous/anxious.        Objective     /66   Pulse 82   Ht 4' 11.75\" (1.518 m)   Wt 46.5 kg (102 lb 9.6 oz)   SpO2 96%   BMI 20.21 kg/m²     Physical Exam  Vitals and nursing note reviewed.   Constitutional:       General: She is not in acute distress.     Appearance: She is well-developed. She is not diaphoretic.   HENT:      Head: Normocephalic and atraumatic.      " Right Ear: Hearing, tympanic membrane, ear canal and external ear normal.      Left Ear: Hearing, tympanic membrane, ear canal and external ear normal.      Nose: Nose normal. No mucosal edema or rhinorrhea.      Mouth/Throat:      Pharynx: No oropharyngeal exudate or posterior oropharyngeal erythema.   Eyes:      Extraocular Movements: Extraocular movements intact.   Cardiovascular:      Rate and Rhythm: Normal rate and regular rhythm.      Heart sounds: Normal heart sounds. No murmur heard.     No friction rub. No gallop.   Pulmonary:      Effort: Pulmonary effort is normal. Prolonged expiration present. No respiratory distress.      Breath sounds: Normal breath sounds. No wheezing or rales.   Musculoskeletal:         General: Normal range of motion.      Cervical back: Normal range of motion and neck supple.   Lymphadenopathy:      Cervical: No cervical adenopathy.   Skin:     General: Skin is warm and dry.      Findings: No rash.   Neurological:      Mental Status: She is alert and oriented to person, place, and time.      Motor: Tremor present.   Psychiatric:         Behavior: Behavior normal.         Thought Content: Thought content normal.         Judgment: Judgment normal.       Administrative Statements

## 2024-08-12 ENCOUNTER — OFFICE VISIT (OUTPATIENT)
Dept: PRIMARY CARE CLINIC | Facility: CLINIC | Age: 74
End: 2024-08-12
Payer: MEDICARE

## 2024-08-12 ENCOUNTER — TELEPHONE (OUTPATIENT)
Dept: PRIMARY CARE CLINIC | Facility: CLINIC | Age: 74
End: 2024-08-12
Payer: MEDICARE

## 2024-08-12 VITALS
DIASTOLIC BLOOD PRESSURE: 56 MMHG | BODY MASS INDEX: 29.57 KG/M2 | WEIGHT: 177.5 LBS | HEART RATE: 74 BPM | HEIGHT: 65 IN | RESPIRATION RATE: 20 BRPM | TEMPERATURE: 98 F | SYSTOLIC BLOOD PRESSURE: 122 MMHG | OXYGEN SATURATION: 98 %

## 2024-08-12 DIAGNOSIS — F41.9 ANXIETY: Primary | Chronic | ICD-10-CM

## 2024-08-12 PROCEDURE — 1126F AMNT PAIN NOTED NONE PRSNT: CPT | Mod: CPTII,S$GLB,, | Performed by: FAMILY MEDICINE

## 2024-08-12 PROCEDURE — 3074F SYST BP LT 130 MM HG: CPT | Mod: CPTII,S$GLB,, | Performed by: FAMILY MEDICINE

## 2024-08-12 PROCEDURE — 99999 PR PBB SHADOW E&M-EST. PATIENT-LVL V: CPT | Mod: PBBFAC,,, | Performed by: FAMILY MEDICINE

## 2024-08-12 PROCEDURE — 99213 OFFICE O/P EST LOW 20 MIN: CPT | Mod: S$GLB,,, | Performed by: FAMILY MEDICINE

## 2024-08-12 PROCEDURE — 3078F DIAST BP <80 MM HG: CPT | Mod: CPTII,S$GLB,, | Performed by: FAMILY MEDICINE

## 2024-08-12 PROCEDURE — 3044F HG A1C LEVEL LT 7.0%: CPT | Mod: CPTII,S$GLB,, | Performed by: FAMILY MEDICINE

## 2024-08-12 PROCEDURE — 1160F RVW MEDS BY RX/DR IN RCRD: CPT | Mod: CPTII,S$GLB,, | Performed by: FAMILY MEDICINE

## 2024-08-12 PROCEDURE — 1159F MED LIST DOCD IN RCRD: CPT | Mod: CPTII,S$GLB,, | Performed by: FAMILY MEDICINE

## 2024-08-12 NOTE — TELEPHONE ENCOUNTER
Call to pt on 08/02/2024 at 1631 and again today. Pt is taking 1/2 tablet of Sertraline 25 mg. Pt states she took the full ordered dose for the first week and it made her too drowsy the next day, has been taken 1/2 tablet and reports tx as effective. See also, prior notes.     ----- Message from Moon Lion MD sent at 8/1/2024  9:06 AM CDT -----  Regarding: ozempic & sertraline  I can reorder but meanwhile, what dose did [facility] send?  Has [facility] been contacted to see what we should do w the one they sent?    There are 2 [facility]'s listed as her preferred pharmacies - is either one ok to use? If not, please remove incorrect one.    What about the sertraline?? I never got an answer on if she had increased to 50 mg QHS and how she is doing w the medication (if she's taking it)  If she is taking, I recommend increasing to the 50 mg dose - does she want this sent to [facility] or [facility]?  ----- Message -----  From: Ruthann Brady MA  Sent: 7/31/2024   8:41 AM CDT  To: Moon Lion MD    Good morning, pt came in on Friday to get 1 mg  Ozempic but [facility] sent the wrong dose for pt. Pt only has one dose left that she is taking today.     SJ  ----- Message -----  From: Moon Lion MD  Sent: 7/30/2024   6:51 PM CDT  To: Ayad Mustafa Staff    [facility] has [facility] - if message below not viewed please call w information below:   Mammogram is normal - no sign of cancer.    Please message or call with any questions or concerns!  Thank you!  Moon Lion MD, MPH  [facility]

## 2024-08-12 NOTE — PROGRESS NOTES
Patient Care Team:  Moon Lion MD as PCP - General (Internal Medicine)  Delia Liu LPN as Care Coordinator (Internal Medicine)  Soo Bar as Digital Medicine Health   Dale Matute MD as Consulting Physician (Cardiology)  Hammad Hardy OD as Consulting Physician (Optometry)  Olivia Irwin MD as Consulting Physician (Otolaryngology)  Gretel De Leon, PharmD as Hypertension Digital Medicine Clinician  Brenden Landaverde MD as Consulting Physician (Nephrology)  Gretel De Leon PharmD as Hyperlipidemia Digital Medicine Clinician  Gretel De Leon PharmD as Diabetes Digital Medicine Clinician  Moon Lion MD as Hyperlipidemia Digital Medicine Responsible Provider (Internal Medicine)  Moon Lion MD as Diabetes Digital Medicine Responsible Provider (Internal Medicine)  Moon Lion MD as Hypertension Digital Medicine Responsible Provider (Internal Medicine)  Medicare, [employer] Managed as Diabetes Digital Medicine Contract  Medicare, [employer] Managed as Hypertension Digital Medicine Contract  Chacho Bergman, PharmD as Pharmacist  Moon Lion MD  Future Appointments   Date Time Provider Department Center   9/10/2024 10:30 AM LABORATORY, [facility]   9/17/2024  2:00 PM Brenden Landaverde MD [facility] NEPHRO [facility]   9/24/2024  3:40 PM Moon Lion MD [facility]   3/3/2025  9:40 AM Hammad Hardy OD [facility] OPHTHAL [facility]       Subjective:      Patient ID: Kristyn Garza is a 73 y.o. female.    Chief Complaint: No chief complaint on file.      HPI  Pt of Dr. Lion, here to check on anxiety. New on sertraline 25 mg x 1 mo. Taking 12.5 mg at night, helps with anxiety, still feel drowsy during the day. So only takes when needed, not every night, about twice a week.   + dry mouth    Review of Systems  PHQ-4 Score: 3   Last worry score 2    PMHx, active ongoing problems, labs and medications reviewed    Objective:  "  BP (!) 122/56 (BP Location: Right arm, Patient Position: Sitting, BP Method: Large (Manual))   Pulse 74   Temp 98.2 °F (36.8 °C)   Resp 20   Ht 5' 4.5" (1.638 m)   Wt 80.5 kg (177 lb 7.5 oz)   SpO2 98%   BMI 29.99 kg/m²     Physical Exam  Vitals and nursing note reviewed.   Constitutional:       General: She is not in acute distress.     Appearance: Normal appearance. She is not toxic-appearing.   Cardiovascular:      Rate and Rhythm: Normal rate and regular rhythm.      Pulses: Normal pulses.      Heart sounds: Normal heart sounds.   Pulmonary:      Effort: Pulmonary effort is normal.      Breath sounds: Normal breath sounds.   Skin:     General: Skin is warm.      Capillary Refill: Capillary refill takes less than 2 seconds.   Neurological:      General: No focal deficit present.      Mental Status: She is alert and oriented to person, place, and time.         Assessment and Plan     1. Anxiety  Comments:  Sxs controlled on sertraline 12.5 biw. cont.        Discharge instructions     The following issues were discussed: The encounter diagnosis was Anxiety.    "

## 2024-08-12 NOTE — PATIENT INSTRUCTIONS
Try taking setraline after supper rather than before bed.   Ok to use half of 25 mg as needed only, not every night  Fall prevention discussed     If you are feeling unwell, we'd like to be the first ones to know here at [facility]! Please give us a call. Same day appointments are our top priority to keep you well and out of the emergency rooms and hospitals. Call [phone] for our direct line. After hours advice is always available. Please call [phone] after hours to speak to the on-call team.

## 2024-08-20 RX ORDER — SERTRALINE HYDROCHLORIDE 25 MG/1
25 TABLET, FILM COATED ORAL NIGHTLY
Qty: 30 TABLET | Refills: 5 | Status: SHIPPED | OUTPATIENT
Start: 2024-08-20 | End: 2025-02-16

## 2024-08-21 ENCOUNTER — PATIENT MESSAGE (OUTPATIENT)
Dept: PRIMARY CARE CLINIC | Facility: CLINIC | Age: 74
End: 2024-08-21
Payer: MEDICARE

## 2024-08-26 ENCOUNTER — PATIENT MESSAGE (OUTPATIENT)
Dept: OTHER | Facility: OTHER | Age: 74
End: 2024-08-26
Payer: MEDICARE

## 2024-08-26 ENCOUNTER — PATIENT MESSAGE (OUTPATIENT)
Dept: ADMINISTRATIVE | Facility: OTHER | Age: 74
End: 2024-08-26
Payer: MEDICARE

## 2024-09-03 ENCOUNTER — OFFICE VISIT (OUTPATIENT)
Dept: PRIMARY CARE CLINIC | Facility: CLINIC | Age: 74
End: 2024-09-03
Payer: MEDICARE

## 2024-09-03 VITALS
DIASTOLIC BLOOD PRESSURE: 62 MMHG | TEMPERATURE: 97 F | HEIGHT: 65 IN | BODY MASS INDEX: 29.12 KG/M2 | WEIGHT: 174.81 LBS | SYSTOLIC BLOOD PRESSURE: 118 MMHG | HEART RATE: 81 BPM | OXYGEN SATURATION: 97 %

## 2024-09-03 DIAGNOSIS — I15.9 SECONDARY HYPERTENSION: Chronic | ICD-10-CM

## 2024-09-03 DIAGNOSIS — E78.2 COMBINED HYPERLIPIDEMIA ASSOCIATED WITH TYPE 2 DIABETES MELLITUS: Chronic | ICD-10-CM

## 2024-09-03 DIAGNOSIS — Z23 NEED FOR VACCINATION: ICD-10-CM

## 2024-09-03 DIAGNOSIS — E27.9 ADRENAL NODULE: Chronic | ICD-10-CM

## 2024-09-03 DIAGNOSIS — F41.9 ANXIETY: Primary | Chronic | ICD-10-CM

## 2024-09-03 DIAGNOSIS — E11.69 COMBINED HYPERLIPIDEMIA ASSOCIATED WITH TYPE 2 DIABETES MELLITUS: Chronic | ICD-10-CM

## 2024-09-03 DIAGNOSIS — E26.01 ALDOSTERONE EXCESS (CONN SYNDROME): Chronic | ICD-10-CM

## 2024-09-03 PROBLEM — N25.81 SECONDARY HYPERPARATHYROIDISM OF RENAL ORIGIN: Status: RESOLVED | Noted: 2021-10-26 | Resolved: 2024-09-03

## 2024-09-03 PROCEDURE — 1126F AMNT PAIN NOTED NONE PRSNT: CPT | Mod: CPTII,S$GLB,, | Performed by: INTERNAL MEDICINE

## 2024-09-03 PROCEDURE — 99214 OFFICE O/P EST MOD 30 MIN: CPT | Mod: S$GLB,,, | Performed by: INTERNAL MEDICINE

## 2024-09-03 PROCEDURE — 3074F SYST BP LT 130 MM HG: CPT | Mod: CPTII,S$GLB,, | Performed by: INTERNAL MEDICINE

## 2024-09-03 PROCEDURE — 90653 IIV ADJUVANT VACCINE IM: CPT | Mod: S$GLB,,, | Performed by: INTERNAL MEDICINE

## 2024-09-03 PROCEDURE — 3044F HG A1C LEVEL LT 7.0%: CPT | Mod: CPTII,S$GLB,, | Performed by: INTERNAL MEDICINE

## 2024-09-03 PROCEDURE — 3078F DIAST BP <80 MM HG: CPT | Mod: CPTII,S$GLB,, | Performed by: INTERNAL MEDICINE

## 2024-09-03 PROCEDURE — G0008 ADMIN INFLUENZA VIRUS VAC: HCPCS | Mod: S$GLB,,, | Performed by: INTERNAL MEDICINE

## 2024-09-03 PROCEDURE — 99999 PR PBB SHADOW E&M-EST. PATIENT-LVL V: CPT | Mod: PBBFAC,,, | Performed by: INTERNAL MEDICINE

## 2024-09-03 NOTE — PROGRESS NOTES
Kristyn Garza  09/03/2024  [id number]    Moon Lion MD  Patient Care Team:  Moon Lion MD as PCP - General (Internal Medicine)  Delia Liu LPN as Care Coordinator (Internal Medicine)  Soo Bar as Digital Medicine Health   Dale Matute MD as Consulting Physician (Cardiology)  Hammad Hardy OD as Consulting Physician (Optometry)  Olivia Irwin MD as Consulting Physician (Otolaryngology)  Gretel De Leon, PharmD as Hypertension Digital Medicine Clinician  Brenden Landaverde MD as Consulting Physician (Nephrology)  Gretel De Leon PharmD as Hyperlipidemia Digital Medicine Clinician  Gretel De Leon PharmD as Diabetes Digital Medicine Clinician  Moon Lion MD as Hyperlipidemia Digital Medicine Responsible Provider (Internal Medicine)  Moon Lion MD as Diabetes Digital Medicine Responsible Provider (Internal Medicine)  Moon Lion MD as Hypertension Digital Medicine Responsible Provider (Internal Medicine)  Medicare, [employer] Managed as Diabetes Digital Medicine Contract  Medicare, [employer] Managed as Hypertension Digital Medicine Contract  Chacho Bergman PharmD as Pharmacist    Visit Type: Follow-up    Ms. Kristyn Garza is a 73 year old female here for scheduled d/u.      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 She's enrolled w digital medicine program for diabetes, hyperlipidemia and hypertension.     History of Present Illness    CHIEF COMPLAINT:  Ms. Garza presents today for general follow-up.    MENTAL HEALTH:  She reports that low-dose sertraline is helping with anxiety, mood, sleep, and nervousness. She denies any current mental health concerns.    RESPIRATORY:  She has exercise-induced asthma and was advised by Dr. Corado to use albuterol before  "exercising. She denies wheezing or breathing difficulties at rest.    CARDIOVASCULAR:  Home blood pressure readings over the past week ranged from 103/66 to 121/67. She denies dizziness or lightheadedness when changing positions. She reports occasional "extra heartbeats" but denies palpitations, chest pain, fluttering, or shortness of breath.     DIABETES MANAGEMENT:  Her diabetes management is progressing well. Recent fasting blood sugar readings (August 22nd-31st) ranged from 90 to 131 mg/dL, within her goal of  mg/dL. She denies symptoms of hypoglycemia or hyperglycemia.    LIPID MANAGEMENT:  Her last lipid panel in March showed favorable results with LDL <100 mg/dL and HDL at 54 mg/dL. She's satisfied with these results but goal for LDL <70 mg/dL with Repatha. She's willing to modify her diet for further improvement.     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 H/o low am cortisol. She takes potassium supplements and denies new or worsening symptoms related to her adrenal condition. She has an upcoming nephrology appointment on the 17th and recalls a past phone consultation with endocrinology. She is open to us sending e-consult to endocrinology if warranted.    IMMUNIZATIONS:  She is due for several vaccines including flu, COVID, RSV, shingles, and tetanus. She's willing to get the flu vaccine today but expresses concern about receiving multiple vaccines simultaneously due to a past negative experience.    ENT:  She wears a hearing aid in her left ear and reports only a slight decrease in hearing on that side. She uses Debrox once weekly for earwax management as recommended. She reports recent sinus problems and postnasal drip, currently using Zyrtec for allergy symptoms. She denies runny nose but mentions occasional morning stuffiness. She needs a Flonase refill and has been advised to use saline nasal flush twice daily. " Examination revealed slight puffiness of turbinates.    WEIGHT MANAGEMENT: She reports successful weight loss, achieving her goal of getting under 200 lbs with a current BMI of 29.5. Reports significant appetite changes since starting Ozempic.      EXERCISE:  She attends the gym 3-4 times per week, exercising for one hour per session, usually between 7:00 and 9:00 AM. She enjoys her gym routine for its structure and social aspects.     ROS:  Constitutional: +weight loss, -dizziness, -lightheadedness  ENT: +hearing loss, +nasal congestion, -rhinorrhea  Respiratory: -difficulty breathing, -shortness of breath, -wheezing  Cardiovascular: -chest pain, -palpitations  Musculoskeletal: -muscle cramps  Psychiatric: -depression, +anxiety        PHQ-4 Score: 3     From last (virtual) visit w me 6/26/24  Ms. Garza did feel that the buspirone helped w anxiety but woke w swollen lips three different times she tried taking it  Fasting glucose 80-90's  's  PHQ-4 Score: 3     Upcoming appointments:   Date Provider Specialty Appt Notes    9/10/2024  Lab [facility]    9/17/2024 Brenden Landaverde MD Nephrology annual    11/26/2024 Moon Lion MD Primary Care Three month f/u    3/3/2025 Hammad Hardy OD Ophthalmology annual      The following were reviewed: Active problem list, medication list, allergies, family history, social history, and Health Maintenance.     Medications have been reviewed and reconciled with patient at visit today.    Exam:  Vitals:    09/03/24 1535   BP: 118/62   Pulse: 81   Temp: 96.8 °F (36 °C)     Weight: 79.3 kg (174 lb 13.2 oz)   Body mass index is 29.55 kg/m².    BP Readings from Last 3 Encounters:   09/03/24 118/62   08/12/24 (!) 122/56   06/27/24 122/71      Wt Readings from Last 3 Encounters:   09/03/24 1535 79.3 kg (174 lb 13.2 oz)   08/12/24 1537 80.5 kg (177 lb 7.5 oz)   07/26/24 1218 81.1 kg (178 lb 12.7 oz)     Physical Exam  Vitals reviewed.   Constitutional:       General: She is  not in acute distress.     Appearance: Normal appearance. She is not ill-appearing.   HENT:      Head: Normocephalic and atraumatic.      Right Ear: Tympanic membrane, ear canal and external ear normal.      Left Ear: Tympanic membrane, ear canal and external ear normal.      Nose: Nose normal.      Comments: Pink mildly edematous turbinates     Mouth/Throat:      Mouth: Mucous membranes are moist.      Pharynx: Oropharynx is clear. No oropharyngeal exudate or posterior oropharyngeal erythema.   Eyes:      Extraocular Movements: Extraocular movements intact.      Conjunctiva/sclera: Conjunctivae normal.      Comments: glasses   Neck:      Vascular: No carotid bruit.   Cardiovascular:      Rate and Rhythm: Normal rate and regular rhythm.      Heart sounds: No murmur heard.  Pulmonary:      Effort: Pulmonary effort is normal. No respiratory distress.      Breath sounds: No wheezing, rhonchi or rales.   Abdominal:      General: Bowel sounds are normal.      Palpations: Abdomen is soft.      Tenderness: There is no abdominal tenderness. There is no guarding.   Musculoskeletal:      Right lower leg: No edema.      Left lower leg: No edema.   Lymphadenopathy:      Cervical: No cervical adenopathy.   Skin:     General: Skin is warm and dry.   Neurological:      Mental Status: She is alert and oriented to person, place, and time. Mental status is at baseline.      Gait: Gait abnormal.   Psychiatric:         Mood and Affect: Mood normal.         Behavior: Behavior normal.         Thought Content: Thought content normal.         Judgment: Judgment normal.        Laboratory Reviewed  Lab Results   Component Value Date    WBC 9.96 03/01/2024    HGB 13.9 03/01/2024    HCT 41.8 03/01/2024     03/01/2024    MCV 94 03/01/2024    CHOL 160 03/01/2024    TRIG 51 03/01/2024    HDL 54 03/01/2024    LDLCALC 95.8 03/01/2024    ALT 36 03/01/2024    AST 32 03/01/2024     06/27/2024    K 4.3 06/27/2024     06/27/2024     "CREATININE 1.2 06/27/2024    BUN 14 06/27/2024    CO2 24 06/27/2024    MG 2.4 06/19/2023    TSH 0.509 03/01/2024    FREET4 0.96 03/01/2024    HGBA1C 5.8 (H) 06/27/2024     Lab Results   Component Value Date    PTH 61.0 02/10/2023    CALCIUM 9.8 06/27/2024    PHOS 3.5 06/27/2024      Lab Results   Component Value Date    PGJTVHPB11 785 02/10/2023   No results found for: "FOLATE" No results found for: "UIBC", "IRON", "TRANS", "TRANSFERRIN", "TIBC", "LABIRON", "FESATURATED"   Lab Results   Component Value Date    EGFRNORACEVR 47.2 (A) 06/27/2024    ALBUMIN 3.9 06/27/2024     Lab Results   Component Value Date    [id number] 35 06/27/2024      Assessment:   73 y.o. female with multiple co-morbid illnesses here for continued follow up of medical problems.      The primary encounter diagnosis was Anxiety. Diagnoses of Secondary hypertension, Aldosterone excess (Conn syndrome), Adrenal nodule, Combined hyperlipidemia associated with type 2 diabetes mellitus, and Need for vaccination were also pertinent to this visit.      Plan:   1. Anxiety    2. Secondary hypertension    3. Aldosterone excess (Conn syndrome)    4. Adrenal nodule  Overview:  CT Abd/Pelvis 3/02/22: Bilateral small adrenal gland nodules which are technically indeterminate.    CT Chest w/o contras 12/09/22t: Upper Abdomen: Previously described small bilateral adrenal nodules appear unchanged in size.        5. Combined hyperlipidemia associated with type 2 diabetes mellitus  -     Lipid Panel; Future; Expected date: 09/10/2024    6. Need for vaccination  -     Influenza - Trivalent (Adjuvanted)         Health Maintenance         Date Due Completion Date    Shingles Vaccine (2 of 3) 04/20/2011 2/23/2011    TETANUS VACCINE 02/23/2021 2/23/2011    RSV Vaccine (Age 60+ and Pregnant patients) (1 - 1-dose 75+ series) Never done ---    Colorectal Cancer Screening 03/18/2024 3/18/2014    Override on 6/1/2006: Done    COVID-19 Vaccine (4 - 2024-25 season) 09/01/2024 " 12/15/2021    Foot Exam 11/27/2024 11/27/2023    Override on 9/23/2015: Done    Override on 7/16/2015: Done    Override on 3/23/2015: Done    Override on 12/3/2014: Done    Override on 4/29/2014: Done    Hemoglobin A1c 12/27/2024 6/27/2024    Eye Exam 03/01/2025 3/1/2024    Override on 7/20/2020: Done    Override on 4/30/2019: Done    Override on 4/24/2018: Done    Override on 5/17/2017: Done    Override on 5/16/2016: Done    Override on 5/13/2015: Done    LDCT Lung Screen 06/27/2025 6/27/2024    Diabetes Urine Screening 06/27/2025 6/27/2024    Mammogram 07/26/2025 7/26/2024    Override on 3/13/2013: Done    Lipid Panel 09/10/2025 9/10/2024    DEXA Scan 05/26/2026 5/26/2021    Override on 3/10/2011: Done (normal repeat in 5 years)            -Patient's lab results were reviewed and discussed with patient  -Treatment options and alternatives were discussed with the patient. Patient expressed understanding. Patient was given the opportunity to ask questions and be an active participant in their medical care. Patient had no further questions or concerns at this time.     Follow up: Follow up in about 3 months (around 12/3/2024) for Follow Up w me.    Care Plan/Goals: Reviewed Yes   Goals         80 <= Glucose <= 180 (pt-stated)       11/15/22  On track        Blood Pressure < 130/80       LDL CHOLESTEROL < 70       Take at least one BP reading per week at various times of the day       11/15/22  On track        Weight (lb) < 90.7 kg (200 lb) (pt-stated)       Update 11/15/22    On track - weight < 200 lbs - new goal 10 lbs wt loss over the next 2 mos.    Goal 5% weight loss - 9-10 lbs - 175 seth    Barriers? Sweet-tooth    Overcoming? Try substitute fruit? Daily walking - current step average about 5,000 steps - new goal 7,000 steps.    Time frame 3 mos - mid Jan 2023.                After visit summary printed and given to patient upon discharge.  Patient goals and care plan are included in After visit  summary.    TOTAL TIME evaluating and managing this patient for this encounter was 35 minutes. This time was spent personally by me on some of the following activities: review of patient's past medical history, assessing age-appropriate health maintenance needs, review of any interval history, review and interpretation of lab results, review and interpretation of imaging test results, review and interpretation of cardiology test results, reviewing consulting specialist notes, obtaining history from the patient and family, examination of the patient, medication reconciliation, managing and/or ordering prescription medications, ordering imaging tests, ordering referral to subspecialty provider(s), educating patient and answering their questions about diagnosis, treatment plan, and goals of treatment, discussing planned follow-up and final documentation of the visit. This time was exclusive of any separately billable procedures for this patient and exclusive of time spent treating any other patients.     This note was generated with the assistance of ambient listening technology. Verbal consent was obtained by the patient and accompanying visitor(s) for the recording of patient appointment to facilitate this note. I attest to having reviewed and edited the generated note for accuracy, though some syntax or spelling errors may persist. Please contact the author of this note for any clarification.

## 2024-09-03 NOTE — PATIENT INSTRUCTIONS
If you are feeling unwell, we'd like to be the first ones to know here at [facility]! Please give us a call. Same day appointments are our top priority to keep you well and out of the emergency rooms and hospitals. Call [phone] for our direct line. After hours advice is always available. Please call [phone] after hours to speak to the on-call team.      Recommend Tetanus, Shingles, and Covid and RSV vaccines that can be scheduled at your pharmacy of choice.    Recommend acetaminophen today and tomorrow after flu vaccine    Debrox or other earwax remover - 5 drops each ear canal once weekly    Recommend intra-nasal saline water flush at least 2x daily   If using intra-nasal steriod spray (like flonase) recommend to use daily x 2-4 weeks

## 2024-09-10 ENCOUNTER — LAB VISIT (OUTPATIENT)
Dept: LAB | Facility: HOSPITAL | Age: 74
End: 2024-09-10
Attending: INTERNAL MEDICINE
Payer: MEDICARE

## 2024-09-10 DIAGNOSIS — N18.30 STAGE 3 CHRONIC KIDNEY DISEASE, UNSPECIFIED WHETHER STAGE 3A OR 3B CKD: Chronic | ICD-10-CM

## 2024-09-10 DIAGNOSIS — E78.2 COMBINED HYPERLIPIDEMIA ASSOCIATED WITH TYPE 2 DIABETES MELLITUS: Chronic | ICD-10-CM

## 2024-09-10 DIAGNOSIS — E11.69 COMBINED HYPERLIPIDEMIA ASSOCIATED WITH TYPE 2 DIABETES MELLITUS: Chronic | ICD-10-CM

## 2024-09-10 LAB
ANION GAP SERPL CALC-SCNC: 11 MMOL/L (ref 8–16)
BASOPHILS # BLD AUTO: 0.06 K/UL (ref 0–0.2)
BASOPHILS NFR BLD: 0.6 % (ref 0–1.9)
BUN SERPL-MCNC: 14 MG/DL (ref 8–23)
CALCIUM SERPL-MCNC: 9.3 MG/DL (ref 8.7–10.5)
CHLORIDE SERPL-SCNC: 107 MMOL/L (ref 95–110)
CHOLEST SERPL-MCNC: 131 MG/DL (ref 120–199)
CHOLEST/HDLC SERPL: 3.2 {RATIO} (ref 2–5)
CO2 SERPL-SCNC: 24 MMOL/L (ref 23–29)
CREAT SERPL-MCNC: 1.2 MG/DL (ref 0.5–1.4)
DIFFERENTIAL METHOD BLD: ABNORMAL
EOSINOPHIL # BLD AUTO: 0.3 K/UL (ref 0–0.5)
EOSINOPHIL NFR BLD: 2.9 % (ref 0–8)
ERYTHROCYTE [DISTWIDTH] IN BLOOD BY AUTOMATED COUNT: 13.8 % (ref 11.5–14.5)
EST. GFR  (NO RACE VARIABLE): 47.2 ML/MIN/1.73 M^2
GLUCOSE SERPL-MCNC: 77 MG/DL (ref 70–110)
HCT VFR BLD AUTO: 38.1 % (ref 37–48.5)
HDLC SERPL-MCNC: 41 MG/DL (ref 40–75)
HDLC SERPL: 31.3 % (ref 20–50)
HGB BLD-MCNC: 12.6 G/DL (ref 12–16)
IMM GRANULOCYTES # BLD AUTO: 0.03 K/UL (ref 0–0.04)
IMM GRANULOCYTES NFR BLD AUTO: 0.3 % (ref 0–0.5)
LDLC SERPL CALC-MCNC: 78 MG/DL (ref 63–159)
LYMPHOCYTES # BLD AUTO: 4 K/UL (ref 1–4.8)
LYMPHOCYTES NFR BLD: 38.8 % (ref 18–48)
MCH RBC QN AUTO: 32.1 PG (ref 27–31)
MCHC RBC AUTO-ENTMCNC: 33.1 G/DL (ref 32–36)
MCV RBC AUTO: 97 FL (ref 82–98)
MONOCYTES # BLD AUTO: 0.8 K/UL (ref 0.3–1)
MONOCYTES NFR BLD: 8 % (ref 4–15)
NEUTROPHILS # BLD AUTO: 5 K/UL (ref 1.8–7.7)
NEUTROPHILS NFR BLD: 49.4 % (ref 38–73)
NONHDLC SERPL-MCNC: 90 MG/DL
NRBC BLD-RTO: 0 /100 WBC
PLATELET # BLD AUTO: 264 K/UL (ref 150–450)
PMV BLD AUTO: 10.1 FL (ref 9.2–12.9)
POTASSIUM SERPL-SCNC: 4.2 MMOL/L (ref 3.5–5.1)
RBC # BLD AUTO: 3.92 M/UL (ref 4–5.4)
SODIUM SERPL-SCNC: 142 MMOL/L (ref 136–145)
TRIGL SERPL-MCNC: 60 MG/DL (ref 30–150)
WBC # BLD AUTO: 10.19 K/UL (ref 3.9–12.7)

## 2024-09-10 PROCEDURE — 80048 BASIC METABOLIC PNL TOTAL CA: CPT | Performed by: INTERNAL MEDICINE

## 2024-09-10 PROCEDURE — 80061 LIPID PANEL: CPT | Performed by: INTERNAL MEDICINE

## 2024-09-10 PROCEDURE — 36415 COLL VENOUS BLD VENIPUNCTURE: CPT | Performed by: INTERNAL MEDICINE

## 2024-09-10 PROCEDURE — 85025 COMPLETE CBC W/AUTO DIFF WBC: CPT | Performed by: INTERNAL MEDICINE

## 2024-09-11 NOTE — PROGRESS NOTES
Pt has [facility] - if message below not viewed please call w information below:   Lipid panel looks great! Please continue with Repatha.    Please message or call with any questions or concerns!  Thank you!  Moon Lion MD, MPH  [facility]

## 2024-09-12 ENCOUNTER — OFFICE VISIT (OUTPATIENT)
Dept: PRIMARY CARE CLINIC | Facility: CLINIC | Age: 74
End: 2024-09-12
Payer: MEDICARE

## 2024-09-12 VITALS
HEIGHT: 64 IN | BODY MASS INDEX: 28.06 KG/M2 | HEART RATE: 77 BPM | TEMPERATURE: 97 F | WEIGHT: 164.38 LBS | DIASTOLIC BLOOD PRESSURE: 60 MMHG | SYSTOLIC BLOOD PRESSURE: 118 MMHG | OXYGEN SATURATION: 97 %

## 2024-09-12 DIAGNOSIS — M79.644 FINGER PAIN, RIGHT: Primary | ICD-10-CM

## 2024-09-12 LAB — URATE SERPL-MCNC: 5.3 MG/DL (ref 2.4–5.7)

## 2024-09-12 PROCEDURE — 84550 ASSAY OF BLOOD/URIC ACID: CPT | Performed by: FAMILY MEDICINE

## 2024-09-12 PROCEDURE — 99999 PR PBB SHADOW E&M-EST. PATIENT-LVL V: CPT | Mod: PBBFAC,,, | Performed by: FAMILY MEDICINE

## 2024-09-12 RX ORDER — PREDNISONE 20 MG/1
40 TABLET ORAL DAILY
Qty: 6 TABLET | Refills: 0 | Status: SHIPPED | OUTPATIENT
Start: 2024-09-12 | End: 2024-09-15

## 2024-09-12 NOTE — PATIENT INSTRUCTIONS
Rx prednisone 40 mg daily for 3 days  Uric acid level. Will call you with result and discuss plan    If you are feeling unwell, we'd [facility] to [facility] the [facility] ones to know [facility]! Please give us a call. Same day appointments are our top priority to keep you well and out of the emergency rooms and hospitals. Call [phone] for our direct line. After hours advice is always available. Please call [phone] after hours to speak to the on-call team.

## 2024-09-12 NOTE — PROGRESS NOTES
Patient Care Team:  Moon Lion MD as PCP - General (Internal Medicine)  Delia Liu LPN as Care Coordinator (Internal Medicine)  Soo Bar as Digital Medicine Health   Dale Matute MD as Consulting Physician (Cardiology)  Hammad Hardy OD as Consulting Physician (Optometry)  Olivia Irwin MD as Consulting Physician (Otolaryngology)  Gretel De Leon, PharmD as Hypertension Digital Medicine Clinician  Brenden Landaverde MD as Consulting Physician (Nephrology)  Gretel De Leon PharmD as Hyperlipidemia Digital Medicine Clinician  Gretel De Leon PharmD as Diabetes Digital Medicine Clinician  Moon Lion MD as Hyperlipidemia Digital Medicine Responsible Provider (Internal Medicine)  Moon Lion MD as Diabetes Digital Medicine Responsible Provider (Internal Medicine)  Moon Lion MD as Hypertension Digital Medicine Responsible Provider (Internal Medicine)  Medicare, [employer] Managed as Diabetes Digital Medicine Contract  Medicare, [employer] Managed as Hypertension Digital Medicine Contract  Chacho Bergman PharmD as Pharmacist  Moon Lion MD  Future Appointments   Date Time Provider Department Center   9/17/2024  2:00 PM Brenden Landaverde MD [facility] NEPHRO [place]   11/4/2024  8:20 AM Moon Lion MD [facility]   11/26/2024  1:00 PM Moon Lion MD [facility]   3/3/2025  9:40 AM Hammad Hardy OD [facility] OPHTHAL [facility]       Subjective:      Patient ID: Kristyn Garza is a 73 y.o. female.    Chief Complaint: Hand Pain (Rt hand ring finger swollen since 09/09/24)      HPI  Right ring finger pain sudden onset 2 days. No trauma. No known hx of gout. Tylenol didn't help  Had shrimp and okra day before sx onset    Pertinent PMHx- DM2, CKD3    Review of Systems   Constitutional:  Negative for chills and fever.     PHQ-4 Score: 3   Last worry score 3    PMHx, active ongoing problems, labs and medications  "reviewed    Objective:   /60 (BP Location: Left arm, Patient Position: Sitting)   Pulse 77   Temp 96.7 °F (35.9 °C) (Temporal)   Ht 5' 4" (1.626 m)   Wt 74.6 kg (164 lb 5.7 oz)   SpO2 97%   BMI 28.21 kg/m²     Physical Exam  Vitals and nursing note reviewed.   Constitutional:       General: She is not in acute distress.     Appearance: She is not toxic-appearing.   Musculoskeletal:      Comments: Right 4th finger - swelling/warmth/erythema/exquisite pain PIP joint   Neurological:      Mental Status: She is alert and oriented to person, place, and time.         Assessment and Plan     1. Finger pain, right  Comments:  work up for gout and symptomatic control  Orders:  -     Uric Acid; Future; Expected date: 09/12/2024  -     predniSONE (DELTASONE) 20 MG tablet; Take 2 tablets (40 mg total) by mouth once daily. for 3 days  Dispense: 6 tablet; Refill: 0        Discharge instructions     The following issues were discussed: The encounter diagnosis was Finger pain, right.    Rx prednisone 40 mg daily for 3 days.   Uric acid level. Will call you with result and discuss plan    "

## 2024-09-17 ENCOUNTER — OFFICE VISIT (OUTPATIENT)
Dept: NEPHROLOGY | Facility: CLINIC | Age: 74
End: 2024-09-17
Payer: MEDICARE

## 2024-09-17 VITALS
DIASTOLIC BLOOD PRESSURE: 60 MMHG | WEIGHT: 174.19 LBS | SYSTOLIC BLOOD PRESSURE: 115 MMHG | HEIGHT: 65 IN | HEART RATE: 71 BPM | BODY MASS INDEX: 29.02 KG/M2

## 2024-09-17 DIAGNOSIS — I10 PRIMARY HYPERTENSION: Primary | ICD-10-CM

## 2024-09-17 PROCEDURE — 99215 OFFICE O/P EST HI 40 MIN: CPT | Mod: S$GLB,,, | Performed by: INTERNAL MEDICINE

## 2024-09-17 PROCEDURE — 3074F SYST BP LT 130 MM HG: CPT | Mod: CPTII,S$GLB,, | Performed by: INTERNAL MEDICINE

## 2024-09-17 PROCEDURE — 1126F AMNT PAIN NOTED NONE PRSNT: CPT | Mod: CPTII,S$GLB,, | Performed by: INTERNAL MEDICINE

## 2024-09-17 PROCEDURE — 3044F HG A1C LEVEL LT 7.0%: CPT | Mod: CPTII,S$GLB,, | Performed by: INTERNAL MEDICINE

## 2024-09-17 PROCEDURE — 99999 PR PBB SHADOW E&M-EST. PATIENT-LVL III: CPT | Mod: PBBFAC,,, | Performed by: INTERNAL MEDICINE

## 2024-09-17 PROCEDURE — 1159F MED LIST DOCD IN RCRD: CPT | Mod: CPTII,S$GLB,, | Performed by: INTERNAL MEDICINE

## 2024-09-17 PROCEDURE — 3078F DIAST BP <80 MM HG: CPT | Mod: CPTII,S$GLB,, | Performed by: INTERNAL MEDICINE

## 2024-09-17 NOTE — PROGRESS NOTES
Renal clinic f/u note:  Date of clinic visit: 9/17/24  Reason for f/u and chief c/o: HTN      HPI: Pt is a 74 y/o female with HTN and DM who presents for f/u.Pt was last seen in renal clinic about 15 months ago. Pt is feeling well today, no new or acute c/o's. Has been compliant with avoiding salt intake, compliant with meds.    To review, based on seeing hypokalemia, a secondary (endocrine) form of HTN was suspected. Hypokalemia has been sporadic and periodic (not persistent); however, pt has been taking spironolactone and an ACE-I. TSH has been normal. Pt has no thyroid issues. W/u for endocrine causes of HTN was done. A 24 hour urine showed somewhat elevated aldosterone. CT abd showed bilateral adrenal lesions. On her last visit with us in March 2022, primary hyperaldosteronism was suspected, but the case was not clear. Therefore, pt was sent to Endocrine for their opinion.            PAST MEDICAL HISTORY: HTN, DM-2, carpal tunnel syndrome, Eczema, Elevated CPK, Multinodular thyroid, Obesity, hyperlipidemia, Pneumonia, Postmenopausal, Statin intolerance, Tobacco dependence     PAST SURGICAL HISTORY:  She  has a past surgical history that includes Partial hysterectomy (1991); Colonoscopy; Fine needle aspiration (3/2011); Cyst Removal (2015); and Hysterectomy.     SOCIAL HISTORY:  She  reports that she quit smoking about 7 years ago. Her smoking use included cigarettes. She has a 12.50 pack-year smoking history. She has never used smokeless tobacco. She reports that she drinks alcohol. She reports that she does not use drugs.     FAMILY MEDICAL HISTORY:  Her family history includes Cataracts in her brother, brother, and sister; Dementia in her mother, sister, and sister; Diabetes in her mother; Heart disease in her sister; Hypertension in her father and mother; No Known Problems in her son and son.               Review of patient's allergies indicates:   Allergen Reactions    Statins-hmg-coa reductase inhibitors    "      Muscle Cramps       Meds reviewed     Current Outpatient Medications:     albuterol (VENTOLIN HFA) 90 mcg/actuation inhaler, Inhale 2 puffs into the lungs every 4 (four) hours as needed for Wheezing or Shortness of Breath (before exercise). Rescue, Disp: 18 g, Rfl: 11    amLODIPine (NORVASC) 10 MG tablet, TAKE 1 TABLET ONE TIME DAILY, Disp: 90 tablet, Rfl: 3    aspirin 81 MG Chew, Take 81 mg by mouth once daily., Disp: , Rfl:     benazepriL (LOTENSIN) 40 MG tablet, Take 1 tablet (40 mg total) by mouth once daily., Disp: 90 tablet, Rfl: 3    blood sugar diagnostic (ACCU-CHEK SMARTVIEW TEST STRIP) Strp, TO CHECK BLOOD GLUCOSE ONE TIMES DAILY, Disp: 50 strip, Rfl: 1    calcium-vitamin D3 500 mg(1,250mg) -200 unit per tablet, Take 1 tablet by mouth once daily. , Disp: , Rfl:     cetirizine (ZYRTEC) 10 MG tablet, Take 1 tablet (10 mg total) by mouth once daily., Disp: 30 tablet, Rfl: 12    CRANBERRY EXTRACT ORAL, Take 1 tablet by mouth once daily., Disp: , Rfl:     evolocumab (REPATHA SURECLICK) 140 mg/mL PnIj, Inject 1 mL (140 mg total) into the skin every 14 (fourteen) days., Disp: 2 mL, Rfl: 5    metoprolol succinate (TOPROL-XL) 25 MG 24 hr tablet, TAKE 1 TABLET ONE TIME DAILY, Disp: 180 tablet, Rfl: 3    MULTIVITAMIN W-MINERALS/LUTEIN (CENTRUM SILVER ORAL), Take 1 tablet by mouth once daily., Disp: , Rfl:     pen needle, diabetic (BD ULTRA-FINE MICRO PEN NEEDLE) 32 gauge x 1/4" Ndle, 1 Needle by Misc.(Non-Drug; Combo Route) route once a week., Disp: 30 each, Rfl: 1    potassium chloride (K-TAB) 20 mEq, Take 1 tablet (20 mEq total) by mouth once daily., Disp: 90 tablet, Rfl: 1    semaglutide (OZEMPIC) 1 mg/dose (4 mg/3 mL), Inject 1 mg into the skin every 7 days., Disp: 3 mL, Rfl: 2    sertraline (ZOLOFT) 25 MG tablet, Take 1 tablet (25 mg total) by mouth every evening., Disp: 30 tablet, Rfl: 5    spironolactone (ALDACTONE) 50 MG tablet, TAKE 1 TABLET EVERY DAY, Disp: 90 tablet, Rfl: 3    azelastine (ASTELIN) " 137 mcg (0.1 %) nasal spray, 1 spray (137 mcg total) by Nasal route 2 (two) times daily as needed for Rhinitis., Disp: 30 mL, Rfl: 11    blood glucose control, normal Soln, 1 Bottle by Misc.(Non-Drug; Combo Route) route once daily. For Accu Check Amber  use twice daily prn dx: E11.9, Disp: 1 each, Rfl: 0    blood-glucose meter (ACCU-CHEK AMBER) Misc, 1 kit by Misc.(Non-Drug; Combo Route) route once. For Accu Check Amber  use twice daily prn dx: E11.9 for 1 dose, Disp: 1 each, Rfl: 0          REVIEW OF SYSTEMS:  Patient has no fever, fatigue, visual changes, chest pain, edema, cough, dyspnea, nausea, vomiting, constipation, diarrhea, arthralgias, pruritis, dizziness, weakness, depression, confusion.        PHYSICAL EXAM: Blood pressure 115/60, weight 79.0 Kg, from 82.0 Kg, from 83.3 Kg  in NAD, ambulatory  Speech and thought process: normal  RRR s1 and s2 audible  CTA B no rales no wheezed  Abd soft, NT  Ext's no edema        Labs reviewed  BMP  Lab Results   Component Value Date     09/10/2024    K 4.2 09/10/2024     09/10/2024    CO2 24 09/10/2024    BUN 14 09/10/2024    CREATININE 1.2 09/10/2024    CALCIUM 9.3 09/10/2024    ANIONGAP 11 09/10/2024    EGFRNORACEVR 47.2 (A) 09/10/2024     Lab Results   Component Value Date    WBC 10.19 09/10/2024    HGB 12.6 09/10/2024    HCT 38.1 09/10/2024    MCV 97 09/10/2024     09/10/2024                        24 hour urine reviewed, K 42, aldosterone 34, cortisol normal  Serum metanephrine 25  Early am ziggy/renin 95/7.4     Urine prot/cr zero     CT abd: reviewed  Bilateral small adrenal gland nodules which are technically indeterminate.  Further evaluation/MRI could be performed for characterization.     Repeat CT abd from Sept 2022 reviewed  There is a 16 mm nodule involving the medial limb of the left adrenal nodule which demonstrates absolute washout of 73% and relative washout of 56%, in keeping with a benign adrenal adenoma.  There is a 13 mm adrenal  "nodule involving the lateral limb of the right adrenal gland which demonstrates precontrast Hounsfield attenuation of 6.0, in keeping with a benign lipid rich adenoma.  Bilateral adrenal nodules are unchanged in size from prior.           IMPRESSION AND RECOMMENDATIONS:  74 y/o female with HTN presents for f/u:     1. Renal: stable renal function. Cr normal.      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 The adrenal nodules are incidentalomas  VS: mild hyperaldosteronism with hyperplasia  VS: early stage adrenal gland adenoma       2. DM-2: noted, reviewed.  No proteinuria noted.  Lack of proteinuria likely suggests that diabetic nephropathy is not present  Noted normal 24 hour urine for cortisol      Plans and recommendations:  As reviewed above  Opportunity for questions provided.  Total time spent 40 minutes including time needed to review the records, the   patient evaluation, documentation, face-to-face discussion with the patient,   more than 50% of the time was spent on coordination of care and counseling.    Level " V visit.  RTC 12 months     Brenden Landaverde MD

## 2024-09-19 ENCOUNTER — PATIENT MESSAGE (OUTPATIENT)
Dept: ADMINISTRATIVE | Facility: OTHER | Age: 74
End: 2024-09-19
Payer: MEDICARE

## 2024-09-29 NOTE — ASSESSMENT & PLAN NOTE
Discussed possible e-consult to Endocrine (for Conn's syndrome/adrenal excess) however pt is stable, doing well - will defer further work-up or Endo consult at this time

## 2024-10-01 ENCOUNTER — PATIENT MESSAGE (OUTPATIENT)
Dept: ADMINISTRATIVE | Facility: OTHER | Age: 74
End: 2024-10-01
Payer: MEDICARE

## 2024-10-02 ENCOUNTER — OFFICE VISIT (OUTPATIENT)
Dept: PRIMARY CARE CLINIC | Facility: CLINIC | Age: 74
End: 2024-10-02
Payer: MEDICARE

## 2024-10-02 ENCOUNTER — TELEPHONE (OUTPATIENT)
Dept: PRIMARY CARE CLINIC | Facility: CLINIC | Age: 74
End: 2024-10-02
Payer: MEDICARE

## 2024-10-02 ENCOUNTER — NURSE TRIAGE (OUTPATIENT)
Dept: ADMINISTRATIVE | Facility: CLINIC | Age: 74
End: 2024-10-02
Payer: MEDICARE

## 2024-10-02 VITALS
SYSTOLIC BLOOD PRESSURE: 118 MMHG | HEIGHT: 65 IN | BODY MASS INDEX: 28.1 KG/M2 | HEART RATE: 90 BPM | WEIGHT: 168.63 LBS | TEMPERATURE: 98 F | OXYGEN SATURATION: 98 % | DIASTOLIC BLOOD PRESSURE: 70 MMHG

## 2024-10-02 DIAGNOSIS — N18.31 STAGE 3A CHRONIC KIDNEY DISEASE: Chronic | ICD-10-CM

## 2024-10-02 DIAGNOSIS — M25.532 LEFT WRIST PAIN: Primary | ICD-10-CM

## 2024-10-02 DIAGNOSIS — M77.8 LEFT WRIST TENDINITIS: ICD-10-CM

## 2024-10-02 DIAGNOSIS — M25.532 ACUTE PAIN OF LEFT WRIST: ICD-10-CM

## 2024-10-02 PROCEDURE — 99999 PR PBB SHADOW E&M-EST. PATIENT-LVL V: CPT | Mod: PBBFAC,,,

## 2024-10-02 RX ORDER — METHYLPREDNISOLONE 4 MG/1
TABLET ORAL
Qty: 21 EACH | Refills: 0 | Status: SHIPPED | OUTPATIENT
Start: 2024-10-02 | End: 2024-10-23

## 2024-10-02 RX ORDER — MELOXICAM 7.5 MG/1
TABLET ORAL
Qty: 90 TABLET | Refills: 0 | Status: SHIPPED | OUTPATIENT
Start: 2024-10-02 | End: 2024-10-03

## 2024-10-02 NOTE — TELEPHONE ENCOUNTER
----- Message from Brooke sent at 10/2/2024  8:35 AM CDT -----  Contact: [phone]  Patient states Triage nurse stated that she should be madhu'd for an appt within 4 hour, and they would call the office, Patient has swelling in the Left hand with pain. Patient has not received a call about an appt. Please call and advise.    Thank you and have a great day.

## 2024-10-02 NOTE — ASSESSMENT & PLAN NOTE
Started on Medrol Dosepak per direction in the PAC.  May take Tylenol extra strength 1 tablet 3 times a day as needed while on Medrol Dosepak.  Patient is advised to start on Mobic 7.5 mg only after the completion of the Medrol Dosepak as given

## 2024-10-02 NOTE — PROGRESS NOTES
[facility]  Kristyn Garza  10/02/2024  [id number]    Moon Lion MD  Patient Care Team:  Moon Lion MD as PCP - General (Internal Medicine)  Delia Liu LPN as Care Coordinator (Internal Medicine)  Soo Bar as Digital Medicine Health   Dale Matute MD as Consulting Physician (Cardiology)  Hammad Hardy OD as Consulting Physician (Optometry)  Olivia Irwin MD as Consulting Physician (Otolaryngology)  Gretel De Leon, PharmD as Hypertension Digital Medicine Clinician  Brenden Landaverde MD as Consulting Physician (Nephrology)  Gretel De Leon PharmD as Hyperlipidemia Digital Medicine Clinician  Gretel De Leon PharmD as Diabetes Digital Medicine Clinician  Moon Lion MD as Hyperlipidemia Digital Medicine Responsible Provider (Internal Medicine)  Moon Lion MD as Diabetes Digital Medicine Responsible Provider (Internal Medicine)  Moon Lion MD as Hypertension Digital Medicine Responsible Provider (Internal Medicine)  Medicare, [employer] Managed as Diabetes Digital Medicine Contract  Medicare, [employer] Managed as Hypertension Digital Medicine Contract  Chacho Bergman PharmD as Pharmacist      Chief Complaint:Hand Pain (RECURRING NOW LOCATED IN LEFT HAND )         Assessment/Plan:  1. Left wrist pain  -    advised patient to get x-ray done only if the pain and the swelling does not subsides .  Order for X-Ray Wrist 2 View Left; Future; Expected date: 10/02/2024    2. Acute pain of left wrist  Assessment & Plan:  If the pain does not subside by 5th day, get the x-ray done.  Patient has not had all or any other kind of trauma which is suspicious for any fractures at this point.      3. Left wrist tendinitis  Assessment & Plan:  Started on Medrol Dosepak per direction in the PAC.  May take Tylenol extra strength 1 tablet 3 times a day as needed while on Medrol Dosepak.  Patient is advised to start on Mobic 7.5 mg only after  the completion of the Medrol Dosepak as given      4. Stage 3a chronic kidney disease  Assessment & Plan:  I reviewed her recent blood work for BUN and creatinine and it has been stable      Other orders  -     methylPREDNISolone (MEDROL DOSEPACK) 4 mg tablet; use as directed  Dispense: 21 each; Refill: 0  -     meloxicam (MOBIC) 7.5 MG tablet; Start this medication only after the completion of the given dose paci  Dispense: 90 tablet; Refill: 0          Worry Score: 2  History of Present Illness:  History of Present Illness    CHIEF COMPLAINT:  Kristyn presents today with finger pain and swelling.    FINGER PAIN AND SWELLING:  She reports experiencing finger pain and swelling. Approximately one month ago, her ring finger swelled and throbbed, which resolved after a 3-day course of prednisone. Currently, her thumb is swollen, painful, and warm. She rates the pain as 8 out of 10. She denies any recent bites or stings. She mentions doing some gardening work recently but does not recall any specific injury. Tylenol is not providing relief for her current symptoms.    MEDICAL HISTORY:  She has a history of hypertension and diabetes.    MEDICATIONS:  Current medications include aspirin, spironolactone, metoprolol, benazepril, and amlodipine. She was taken off Advil due to blood pressure and diabetes concerns. She is currently taking Tylenol for pain management.    RECENT DIAGNOSTIC TESTS:  Blood work done early last month showed normal uric acid levels and normal kidney function, including BUN and creatinine.    SOCIAL HISTORY:  She reports recent gardening activities, but does not remember any kind of trauma or any insect bites.        Recent Fall:  []Yes  [x]No  Activity:  []Vigorous [x]Moderate []Sedentary  Appetite:  [x]Good  []Fair  []Poor  Mood: [x]Stable []Anxious []Depressed   Insomnia: []Yes  [x]No    The following were reviewed: Active problem list, medication list, allergies, family history, social history, and  Health Maintenance.     History:  Past Medical History:   Diagnosis Date    Anxiety 05/27/2024    Carpal tunnel syndrome     CKD (chronic kidney disease) stage 3, GFR 30-59 ml/min     Followed by Nephrology    Colon cancer screening 03/18/2014    COVID-19 08/30/2023    CTS (carpal tunnel syndrome) 06/18/2013    Diabetes mellitus, type 2     Dizziness 06/19/2023    Eczema     Elevated CPK     History of hypokalemia 06/18/2013    History of hypokalemia 06/18/2013    Hypokalemia     Major depressive disorder, single episode, mild 05/27/2024    Multinodular thyroid     Followed by ENT    Obesity     Obesity (BMI 30.0-34.9) 05/12/2015    Other and unspecified hyperlipidemia     Pneumonia     in her 20s; hospitalized    Postmenopausal     No history of abnormal pap smear    Secondary hyperparathyroidism of renal origin 10/26/2021    Statin intolerance     caused mylagia, elevated CPK    Tobacco dependence     resolved    Unspecified essential hypertension      Past Surgical History:   Procedure Laterality Date    COLONOSCOPY      CYST REMOVAL  2015    On the head    FINE NEEDLE ASPIRATION  3/2011    thyroid nodules x3 (negative per patient)    HYSTERECTOMY      PARTIAL HYSTERECTOMY  1991    Due to fibroids     Family History   Problem Relation Name Age of Onset    Hypertension Mother      Diabetes Mother      Dementia Mother          Alzheimer's dementia    Hypertension Father      Heart disease Sister          MI/CAD    Cataracts Sister      Dementia Sister      No Known Problems Son      No Known Problems Son      Cataracts Brother      Cataracts Brother      Dementia Sister      Cancer Neg Hx      Stroke Neg Hx      Melanoma Neg Hx      Psoriasis Neg Hx      Lupus Neg Hx      Eczema Neg Hx      COPD Neg Hx      Kidney disease Neg Hx       Patient Active Problem List   Diagnosis    Multiple thyroid nodules    Cupping of optic disc    Nuclear sclerosis    Combined hyperlipidemia associated with type 2 diabetes mellitus     Hypertension associated with diabetes    CKD (chronic kidney disease) stage 3, GFR 30-59 ml/min    Postmenopausal    Statin intolerance    Abnormal ECG    LVH (left ventricular hypertrophy)    Family history of cardiovascular disorder    PAC (premature atrial contraction)    Type 2 diabetes mellitus with kidney complication, without long-term current use of insulin    Endolymphatic hydrops of left ear    Gastroesophageal reflux disease    ZHOU (dyspnea on exertion)    Diastolic dysfunction    Secondary hypertension    Adrenal nodule    Pulmonary nodules    Aortic atherosclerosis    Controlled type 2 diabetes mellitus with diabetic neuropathy    Anxiety    Aldosterone excess (Conn syndrome)    Nicotine dependence, cigarettes, in remission    Acute pain of left wrist    Left wrist tendinitis     Review of patient's allergies indicates:   Allergen Reactions    Buspar [buspirone] Other (See Comments)     Lip swelling    Statins-hmg-coa reductase inhibitors      Muscle Cramps       Medications:  Current Outpatient Medications on File Prior to Visit   Medication Sig Dispense Refill    albuterol (VENTOLIN HFA) 90 mcg/actuation inhaler Inhale 2 puffs into the lungs every 4 (four) hours as needed for Wheezing or Shortness of Breath (before exercise). Rescue 18 g 11    amLODIPine (NORVASC) 10 MG tablet TAKE 1 TABLET ONE TIME DAILY 90 tablet 3    aspirin 81 MG Chew Take 81 mg by mouth once daily.      azelastine (ASTELIN) 137 mcg (0.1 %) nasal spray 1 spray (137 mcg total) by Nasal route 2 (two) times daily as needed for Rhinitis. 30 mL 11    benazepriL (LOTENSIN) 40 MG tablet Take 1 tablet (40 mg total) by mouth once daily. 90 tablet 3    blood sugar diagnostic (ACCU-CHEK SMARTVIEW TEST STRIP) Strp TO CHECK BLOOD GLUCOSE ONE TIMES DAILY 50 strip 1    calcium-vitamin D3 500 mg(1,250mg) -200 unit per tablet Take 1 tablet by mouth once daily.       cetirizine (ZYRTEC) 10 MG tablet Take 1 tablet (10 mg total) by mouth once  "daily. 30 tablet 12    CRANBERRY EXTRACT ORAL Take 1 tablet by mouth once daily.      evolocumab (REPATHA SURECLICK) 140 mg/mL PnIj Inject 1 mL (140 mg total) into the skin every 14 (fourteen) days. 2 mL 5    metoprolol succinate (TOPROL-XL) 25 MG 24 hr tablet TAKE 1 TABLET ONE TIME DAILY 180 tablet 3    MULTIVITAMIN W-MINERALS/LUTEIN (CENTRUM SILVER ORAL) Take 1 tablet by mouth once daily.      pen needle, diabetic (BD ULTRA-FINE MICRO PEN NEEDLE) 32 gauge x 1/4" Ndle 1 Needle by Misc.(Non-Drug; Combo Route) route once a week. 30 each 1    potassium chloride (K-TAB) 20 mEq Take 1 tablet (20 mEq total) by mouth once daily. 90 tablet 1    semaglutide (OZEMPIC) 1 mg/dose (4 mg/3 mL) Inject 1 mg into the skin every 7 days. 3 mL 2    sertraline (ZOLOFT) 25 MG tablet Take 1 tablet (25 mg total) by mouth every evening. 30 tablet 5    spironolactone (ALDACTONE) 50 MG tablet TAKE 1 TABLET EVERY DAY 90 tablet 3    blood glucose control, normal Soln 1 Bottle by Misc.(Non-Drug; Combo Route) route once daily. For Accu Check Amber  use twice daily prn dx: E11.9 1 each 0    blood-glucose meter (ACCU-CHEK AMBER) Misc 1 kit by Misc.(Non-Drug; Combo Route) route once. For Accu Check Amber  use twice daily prn dx: E11.9 for 1 dose 1 each 0     No current facility-administered medications on file prior to visit.       Medications have been reviewed and reconciled with patient at visit today.      Exam:  Vitals:    10/02/24 1313   BP: 118/70   Pulse: 90   Temp: 98 °F (36.7 °C)     Weight: 76.5 kg (168 lb 10.4 oz)   Body mass index is 28.07 kg/m².    BP Readings from Last 3 Encounters:   10/02/24 118/70   09/17/24 115/60   09/12/24 118/60     Wt Readings from Last 3 Encounters:   10/02/24 76.5 kg (168 lb 10.4 oz)   09/17/24 79 kg (174 lb 2.6 oz)   09/12/24 74.6 kg (164 lb 5.7 oz)       Review of Systems   Constitutional: Negative.    HENT: Negative.     Respiratory: Negative.     Cardiovascular: Negative.    Gastrointestinal: Negative.  "   Musculoskeletal:  Positive for joint pain (Left wrist pain with limited range of motion secondary to pain).   Skin:         Skin around the left wrist and lower part of the hand is slightly warm to touch,   Neurological:  Negative for focal weakness and weakness.      Physical Exam  Constitutional:       General: She is in acute distress.      Appearance: Normal appearance.   HENT:      Head: Normocephalic and atraumatic.   Cardiovascular:      Comments: Regular rate and rhythm  Pulmonary:      Effort: Pulmonary effort is normal.      Breath sounds: Normal breath sounds.   Abdominal:      General: Bowel sounds are normal.      Palpations: Abdomen is soft.   Musculoskeletal:         General: Swelling (Swelling of the left wrist) and tenderness (Tender to touch) present. No deformity or signs of injury.      Comments: Limited range of motion of the left wrist secondary to pain and edema   Skin:     Comments: Warm to touch on the medial aspect of the left wrist, no signs of infection or insect bite   Neurological:      General: No focal deficit present.      Mental Status: She is alert.            Health Maintenance  Health Maintenance Topics with due status: Not Due       Topic Last Completion Date    DEXA Scan 05/26/2021    Eye Exam 03/01/2024    LDCT Lung Screen 06/27/2024    Diabetes Urine Screening 06/27/2024    Hemoglobin A1c 06/27/2024    Mammogram 07/26/2024    Lipid Panel 09/10/2024     Health Maintenance Due   Topic Date Due    Shingles Vaccine (2 of 3) 04/20/2011    TETANUS VACCINE  02/23/2021    RSV Vaccine (Age 60+ and Pregnant patients) (1 - 1-dose 75+ series) Never done    Colorectal Cancer Screening  03/18/2024    COVID-19 Vaccine (4 - 2024-25 season) 09/01/2024    Foot Exam  11/27/2024         -Patient's lab results were reviewed and discussed with patient  -Treatment options and alternatives were discussed with the patient. Patient expressed understanding. Patient was given the opportunity to ask  questions and be an active participant in their medical care. Patient had no further questions or concerns at this time.       Future Appointments   Date Time Provider Department Center   10/7/2024 10:00 AM JP XR1 [facility]    11/4/2024  8:20 AM Moon Lion MD [facility]   11/26/2024  1:00 PM Moon Lion MD [facility]   3/3/2025  9:40 AM Hammad Hardy, OD [facility]      No follow-ups on file.       After visit summary printed and given to patient upon discharge.  Patient goals and care plan are included in After visit summary.  Health maintenance needs, recent test results and goals of care discussed with pt and questions answered.    .    Ha Friend MD  [facility] [address] [place] [address]      This note was generated with the assistance of ambient listening technology. Verbal consent was obtained by the patient and accompanying visitor(s) for the recording of patient appointment to facilitate this note. I attest to having reviewed and edited the generated note for accuracy, though some syntax or spelling errors may persist. Please contact the author of this note for any clarification.     This office note has been dictated.

## 2024-10-02 NOTE — ASSESSMENT & PLAN NOTE
If the pain does not subside by 5th day, get the x-ray done.  Patient has not had all or any other kind of trauma which is suspicious for any fractures at this point.

## 2024-10-02 NOTE — PATIENT INSTRUCTIONS
Start Medrol dose pack as directed on the pack  Start on the new medication Mobic 7.5 mg once a day only after the completion of dose pack   May take Tylenol Extra strength 1 tab up to three times a day for pain if needed.  If not getting better, call us back in 3-4 days, will get Xray of the left wrist

## 2024-10-02 NOTE — TELEPHONE ENCOUNTER
"Pt states couple weeks ago went to  because ring finger had swollen was instructed it may be gout and she had test ran but never heard anything back on results states yesterday left hand started swell and wants an apt with pcp. Per protocol instructed pt to be seen by pcp within 4 hrs.  Reason for Disposition   [1] SEVERE pain (e.g., excruciating, unable to use hand at all) AND [2] not improved after 2 hours of pain medicine    Additional Information   Negative: [1] Similar pain previously AND [2] it was from "heart attack"   Negative: [1] Similar pain previously AND [2] it was from "angina" AND [3] not relieved by nitroglycerin   Negative: Sounds like a life-threatening emergency to the triager   Negative: Entire hand is cool or blue in comparison to other side   Negative: [1] Swollen joint AND [2] fever   Negative: [1] Red area or streak AND [2] fever   Negative: Patient sounds very sick or weak to the triager    Protocols used: Hand Pain-A-AH    "

## 2024-10-03 RX ORDER — MELOXICAM 7.5 MG/1
7.5 TABLET ORAL DAILY
Qty: 90 TABLET | Refills: 0 | Status: SHIPPED | OUTPATIENT
Start: 2024-10-03 | End: 2025-01-01

## 2024-10-07 ENCOUNTER — PATIENT MESSAGE (OUTPATIENT)
Dept: PRIMARY CARE CLINIC | Facility: CLINIC | Age: 74
End: 2024-10-07
Payer: MEDICARE

## 2024-10-07 ENCOUNTER — TELEPHONE (OUTPATIENT)
Dept: PRIMARY CARE CLINIC | Facility: CLINIC | Age: 74
End: 2024-10-07
Payer: MEDICARE

## 2024-10-07 ENCOUNTER — HOSPITAL ENCOUNTER (OUTPATIENT)
Dept: RADIOLOGY | Facility: HOSPITAL | Age: 74
Discharge: HOME | End: 2024-10-07
Attending: FAMILY MEDICINE
Payer: MEDICARE

## 2024-10-07 DIAGNOSIS — M25.532 LEFT WRIST PAIN: ICD-10-CM

## 2024-10-07 PROCEDURE — 73100 X-RAY EXAM OF WRIST: CPT | Mod: TC,FY,PO,LT

## 2024-10-07 PROCEDURE — 73100 X-RAY EXAM OF WRIST: CPT | Mod: 26,LT,, | Performed by: RADIOLOGY

## 2024-10-07 NOTE — PROGRESS NOTES
The patient location is: ***  The chief complaint leading to consultation is: ***  Visit type: Virtual visit with synchronous audio and video  Total time spent with patient: ***  Each patient to whom he or she provides medical services by telemedicine is:  (1) informed of the relationship between the physician and patient and the respective role of any other health care provider with respect to management of the patient; and (2) notified that he or she may decline to receive medical services by telemedicine and may withdraw from such care at any time.      Subjective:      Patient ID: Kristyn Garza is a 73 y.o. female.    Chief Complaint: No chief complaint on file.      HPI:    HPI    Recent Fall:  []Yes  []No  Activity:  []Vigorous []Moderate []Sedentary  Appetite:  []Good  []Fair  []Poor  Mood: []Stable []Anxious []Depressed   Insomnia: []Yes  []No        9/3/2024   PHQ-9 Depression Patient Health Questionnaire   Over the last two weeks how often have you been bothered by little interest or pleasure in doing things 0   Over the last two weeks how often have you been bothered by feeling down, depressed or hopeless 0            2/28/2019    10:48 AM   GAD7   1. Feeling nervous, anxious, or on edge? 0   2. Not being able to stop or control worrying? 0       Review of Systems  ROS      Objective:   Mental Health Screening  PHQ-9       MARIE-7       Vital Signs  There were no vitals taken for this visit.  Physical Exam  Physical Exam      Assessment:   73 y.o. female here for primary care visit       Plan:   {There are no diagnoses linked to this encounter. (Refresh or delete this SmartLink)}  No follow-ups on file.

## 2024-10-07 NOTE — TELEPHONE ENCOUNTER
Staff contacted pt to confirm that x ray results were viewed thru the portal along with follow up instructions; Pt had no further questions at this time     ----- Message from Ha Friend MD sent at 10/7/2024 11:50 AM CDT -----  I sent a message to patient about her left wrist x-ray, if he calls back tell her to review her results in her MyChart.  If any other questions, just let me know

## 2024-10-11 RX ORDER — POTASSIUM CHLORIDE 20 MEQ/1
20 TABLET, EXTENDED RELEASE ORAL
Qty: 90 TABLET | Refills: 3 | Status: SHIPPED | OUTPATIENT
Start: 2024-10-11

## 2024-10-17 ENCOUNTER — TELEPHONE (OUTPATIENT)
Dept: PRIMARY CARE CLINIC | Facility: CLINIC | Age: 74
End: 2024-10-17
Payer: MEDICARE

## 2024-10-21 ENCOUNTER — PATIENT MESSAGE (OUTPATIENT)
Dept: ADMINISTRATIVE | Facility: OTHER | Age: 74
End: 2024-10-21
Payer: MEDICARE

## 2024-10-27 ENCOUNTER — HOSPITAL ENCOUNTER (EMERGENCY)
Facility: HOSPITAL | Age: 74
Discharge: HOME | End: 2024-10-28
Attending: EMERGENCY MEDICINE
Payer: MEDICARE

## 2024-10-27 DIAGNOSIS — M25.532 LEFT WRIST PAIN: Primary | ICD-10-CM

## 2024-10-27 PROCEDURE — 99283 EMERGENCY DEPT VISIT LOW MDM: CPT

## 2024-10-28 VITALS
HEART RATE: 84 BPM | TEMPERATURE: 98 F | RESPIRATION RATE: 18 BRPM | SYSTOLIC BLOOD PRESSURE: 129 MMHG | WEIGHT: 170.19 LBS | BODY MASS INDEX: 29.06 KG/M2 | DIASTOLIC BLOOD PRESSURE: 64 MMHG | OXYGEN SATURATION: 96 % | HEIGHT: 64 IN

## 2024-10-28 LAB
ALBUMIN SERPL BCP-MCNC: 3.6 G/DL (ref 3.5–5.2)
ALP SERPL-CCNC: 74 U/L (ref 40–150)
ALT SERPL W/O P-5'-P-CCNC: 21 U/L (ref 10–44)
ANION GAP SERPL CALC-SCNC: 10 MMOL/L (ref 8–16)
AST SERPL-CCNC: 20 U/L (ref 10–40)
BASOPHILS # BLD AUTO: 0.02 K/UL (ref 0–0.2)
BASOPHILS NFR BLD: 0.2 % (ref 0–1.9)
BILIRUB SERPL-MCNC: 0.4 MG/DL (ref 0.1–1)
BUN SERPL-MCNC: 14 MG/DL (ref 8–23)
CALCIUM SERPL-MCNC: 9.2 MG/DL (ref 8.7–10.5)
CHLORIDE SERPL-SCNC: 108 MMOL/L (ref 95–110)
CO2 SERPL-SCNC: 22 MMOL/L (ref 23–29)
CREAT SERPL-MCNC: 1 MG/DL (ref 0.5–1.4)
CRP SERPL-MCNC: 25.3 MG/L (ref 0–8.2)
DIFFERENTIAL METHOD BLD: ABNORMAL
EOSINOPHIL # BLD AUTO: 0.1 K/UL (ref 0–0.5)
EOSINOPHIL NFR BLD: 0.9 % (ref 0–8)
ERYTHROCYTE [DISTWIDTH] IN BLOOD BY AUTOMATED COUNT: 13.2 % (ref 11.5–14.5)
EST. GFR  (NO RACE VARIABLE): 59 ML/MIN/1.73 M^2
GLUCOSE SERPL-MCNC: 107 MG/DL (ref 70–110)
HCT VFR BLD AUTO: 35.1 % (ref 37–48.5)
HGB BLD-MCNC: 11.9 G/DL (ref 12–16)
IMM GRANULOCYTES # BLD AUTO: 0.03 K/UL (ref 0–0.04)
IMM GRANULOCYTES NFR BLD AUTO: 0.3 % (ref 0–0.5)
LYMPHOCYTES # BLD AUTO: 2.3 K/UL (ref 1–4.8)
LYMPHOCYTES NFR BLD: 20.3 % (ref 18–48)
MCH RBC QN AUTO: 31.6 PG (ref 27–31)
MCHC RBC AUTO-ENTMCNC: 33.9 G/DL (ref 32–36)
MCV RBC AUTO: 93 FL (ref 82–98)
MONOCYTES # BLD AUTO: 0.9 K/UL (ref 0.3–1)
MONOCYTES NFR BLD: 8.2 % (ref 4–15)
NEUTROPHILS # BLD AUTO: 7.9 K/UL (ref 1.8–7.7)
NEUTROPHILS NFR BLD: 70.1 % (ref 38–73)
NRBC BLD-RTO: 0 /100 WBC
PLATELET # BLD AUTO: 216 K/UL (ref 150–450)
PMV BLD AUTO: 9.7 FL (ref 9.2–12.9)
POTASSIUM SERPL-SCNC: 3.5 MMOL/L (ref 3.5–5.1)
PROT SERPL-MCNC: 7.2 G/DL (ref 6–8.4)
RBC # BLD AUTO: 3.77 M/UL (ref 4–5.4)
SODIUM SERPL-SCNC: 140 MMOL/L (ref 136–145)
WBC # BLD AUTO: 11.23 K/UL (ref 3.9–12.7)

## 2024-10-28 PROCEDURE — 25000003 PHARM REV CODE 250: Performed by: EMERGENCY MEDICINE

## 2024-10-28 PROCEDURE — 85025 COMPLETE CBC W/AUTO DIFF WBC: CPT | Performed by: EMERGENCY MEDICINE

## 2024-10-28 PROCEDURE — 80053 COMPREHEN METABOLIC PANEL: CPT | Performed by: EMERGENCY MEDICINE

## 2024-10-28 PROCEDURE — 86140 C-REACTIVE PROTEIN: CPT | Performed by: EMERGENCY MEDICINE

## 2024-10-28 RX ORDER — HYDROCODONE BITARTRATE AND ACETAMINOPHEN 5; 325 MG/1; MG/1
1 TABLET ORAL
Status: COMPLETED | OUTPATIENT
Start: 2024-10-28 | End: 2024-10-28

## 2024-10-28 RX ORDER — HYDROCODONE BITARTRATE AND ACETAMINOPHEN 5; 325 MG/1; MG/1
1 TABLET ORAL EVERY 6 HOURS PRN
Qty: 12 TABLET | Refills: 0 | Status: SHIPPED | OUTPATIENT
Start: 2024-10-28

## 2024-10-28 RX ADMIN — HYDROCODONE BITARTRATE AND ACETAMINOPHEN 1 TABLET: 5; 325 TABLET ORAL at 12:10

## 2024-10-29 ENCOUNTER — OFFICE VISIT (OUTPATIENT)
Dept: PRIMARY CARE CLINIC | Facility: CLINIC | Age: 74
End: 2024-10-29
Payer: MEDICARE

## 2024-10-29 VITALS
TEMPERATURE: 99 F | WEIGHT: 168.88 LBS | OXYGEN SATURATION: 97 % | RESPIRATION RATE: 20 BRPM | HEART RATE: 76 BPM | DIASTOLIC BLOOD PRESSURE: 64 MMHG | SYSTOLIC BLOOD PRESSURE: 120 MMHG | BODY MASS INDEX: 28.83 KG/M2 | HEIGHT: 64 IN

## 2024-10-29 DIAGNOSIS — M77.8 LEFT WRIST TENDINITIS: Primary | ICD-10-CM

## 2024-10-29 DIAGNOSIS — M19.90 ARTHRITIS: ICD-10-CM

## 2024-10-29 PROCEDURE — 99999 PR PBB SHADOW E&M-EST. PATIENT-LVL V: CPT | Mod: PBBFAC,,,

## 2024-10-29 PROCEDURE — 1101F PT FALLS ASSESS-DOCD LE1/YR: CPT | Mod: CPTII,S$GLB,,

## 2024-10-29 PROCEDURE — 3288F FALL RISK ASSESSMENT DOCD: CPT | Mod: CPTII,S$GLB,,

## 2024-10-29 PROCEDURE — 3074F SYST BP LT 130 MM HG: CPT | Mod: CPTII,S$GLB,,

## 2024-10-29 PROCEDURE — 1159F MED LIST DOCD IN RCRD: CPT | Mod: CPTII,S$GLB,,

## 2024-10-29 PROCEDURE — 3078F DIAST BP <80 MM HG: CPT | Mod: CPTII,S$GLB,,

## 2024-10-29 PROCEDURE — 99214 OFFICE O/P EST MOD 30 MIN: CPT | Mod: S$GLB,,,

## 2024-10-29 PROCEDURE — 1125F AMNT PAIN NOTED PAIN PRSNT: CPT | Mod: CPTII,S$GLB,,

## 2024-10-29 PROCEDURE — 3044F HG A1C LEVEL LT 7.0%: CPT | Mod: CPTII,S$GLB,,

## 2024-10-29 PROCEDURE — 1160F RVW MEDS BY RX/DR IN RCRD: CPT | Mod: CPTII,S$GLB,,

## 2024-10-29 RX ORDER — DICLOFENAC SODIUM 10 MG/G
2 GEL TOPICAL DAILY
Qty: 20 G | Refills: 0 | Status: SHIPPED | OUTPATIENT
Start: 2024-10-29

## 2024-11-04 ENCOUNTER — OFFICE VISIT (OUTPATIENT)
Dept: PRIMARY CARE CLINIC | Facility: CLINIC | Age: 74
End: 2024-11-04
Payer: MEDICARE

## 2024-11-04 VITALS
DIASTOLIC BLOOD PRESSURE: 60 MMHG | WEIGHT: 168.44 LBS | TEMPERATURE: 97 F | HEART RATE: 91 BPM | OXYGEN SATURATION: 98 % | BODY MASS INDEX: 28.76 KG/M2 | HEIGHT: 64 IN | SYSTOLIC BLOOD PRESSURE: 106 MMHG

## 2024-11-04 DIAGNOSIS — Z12.11 SCREEN FOR COLON CANCER: ICD-10-CM

## 2024-11-04 DIAGNOSIS — M19.90 INFLAMMATORY ARTHRITIS: Primary | ICD-10-CM

## 2024-11-04 PROCEDURE — 3044F HG A1C LEVEL LT 7.0%: CPT | Mod: CPTII,S$GLB,, | Performed by: INTERNAL MEDICINE

## 2024-11-04 PROCEDURE — 99999 PR PBB SHADOW E&M-EST. PATIENT-LVL V: CPT | Mod: PBBFAC,,, | Performed by: INTERNAL MEDICINE

## 2024-11-04 PROCEDURE — 3074F SYST BP LT 130 MM HG: CPT | Mod: CPTII,S$GLB,, | Performed by: INTERNAL MEDICINE

## 2024-11-04 PROCEDURE — 3078F DIAST BP <80 MM HG: CPT | Mod: CPTII,S$GLB,, | Performed by: INTERNAL MEDICINE

## 2024-11-04 PROCEDURE — 1125F AMNT PAIN NOTED PAIN PRSNT: CPT | Mod: CPTII,S$GLB,, | Performed by: INTERNAL MEDICINE

## 2024-11-04 PROCEDURE — 99215 OFFICE O/P EST HI 40 MIN: CPT | Mod: S$GLB,,, | Performed by: INTERNAL MEDICINE

## 2024-11-04 NOTE — PROGRESS NOTES
Kristyn Garza  11/04/2024  [id number]    Moon Lion MD  Patient Care Team:  Moon Lion MD as PCP - General (Internal Medicine)  Dale Matute MD as Consulting Physician (Cardiology)  Hammad Hardy OD as Consulting Physician (Optometry)  Olivia Irwin MD as Consulting Physician (Otolaryngology)  Gretel De Leon PharmD as Hypertension Digital Medicine Clinician  Brenden Landaverde MD as Consulting Physician (Nephrology)  Gretel De Leon PharmD as Hyperlipidemia Digital Medicine Clinician  Gretel De Leon PharmD as Diabetes Digital Medicine Clinician  Moon Lion MD as Hyperlipidemia Digital Medicine Responsible Provider (Internal Medicine)  Moon Lion MD as Diabetes Digital Medicine Responsible Provider (Internal Medicine)  Moon Lion MD as Hypertension Digital Medicine Responsible Provider (Internal Medicine)  Chacho Bergman PharmD as Pharmacist  Medicare, [employer] Managed as Diabetes Digital Medicine Contract  Medicare, [employer] Managed as Hypertension Digital Medicine Contract    Visit Type: Follow-up    Ms. Kristyn Garza is a 73 year old female here for scheduled d/u.      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 She's enrolled w digital medicine program for diabetes, hyperlipidemia and hypertension.    Last month was seen in [facility] by both Dr. Friend and ISABELA Kim as well as Urgent Care and ED for L arm pain extending from wrist to shoulder.     History of Present Illness    CHIEF COMPLAINT:  Ms. Garza presents today for follow-up of joint pain.    JOINT PAIN:  She reports joint pain that began in late September/early October. Initially, the pain affected her left wrist and shoulder. Currently, she experiences pain predominantly in her right shoulder,  "which is more severe than the left. Pain in her left hand and wrist radiates up to her elbow when lifting heavy objects. She also reports numbness and tingling in the fingertips of her left hand. The pain is described as worse at night, affecting both hands. Previously, she experienced swelling and warmth in her left wrist, which has now resolved. She denies involvement of other joints such as hips or knees.    TREATMENT HISTORY:  She experienced some relief with a Medrol Dosepak prescribed in early October. Although prednisone was prescribed by Urgent Care on 10/26, she did not fill or take this medication. Currently, she takes meloxicam and Tylenol for pain management. A wrist brace provided by the emergency room helps significantly, but she forgot to wear it today.    MEDICATION SIDE EFFECTS:  She reports experiencing nausea and vomiting with a pain medication prescribed by urgent care, which she believes may have been tramadol. The medication caused severe nausea and vomiting, to the extent that she could not keep down even water. She expresses confusion about the various pain medications prescribed from different healthcare providers.    VACCINATIONS:  She has received her flu vaccine and expresses willingness to receive the RSV vaccine.       PHQ-4 Score: 3     From LOV w me 9/03/24  MENTAL HEALTH:  She reports that low-dose sertraline is helping with anxiety, mood, sleep, and nervousness.    RESPIRATORY:  She has exercise-induced asthma and was advised by Dr. Corado to use albuterol before exercising. She denies wheezing or breathing difficulties at rest.  CARDIOVASCULAR:  Home blood pressure readings over the past week ranged from 103/66 to 121/67. She denies dizziness or lightheadedness when changing positions. She reports occasional "extra heartbeats" but denies palpitations, chest pain, fluttering, or shortness of breath.   DIABETES MANAGEMENT:  Her diabetes management is progressing well. Recent fasting " blood sugar readings (August 22nd-31st) ranged from 90 to 131 mg/dL, within her goal of  mg/dL. She denies symptoms of hypoglycemia or hyperglycemia.  LIPID MANAGEMENT:  Her last lipid panel in March showed favorable results with LDL <100 mg/dL and HDL at 54 mg/dL. She's satisfied with these results but goal for LDL <70 mg/dL with Repatha. She's willing to modify her diet for further improvement.   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 H/o low am cortisol. She takes potassium supplements and denies new or worsening symptoms related to her adrenal condition. She has an upcoming nephrology appointment on the 17th and recalls a past phone consultation with endocrinology. She is open to us sending e-consult to endocrinology if warranted.   IMMUNIZATIONS:  She is due for several vaccines including flu, COVID, RSV, shingles, and tetanus. She's willing to get the flu vaccine today but expresses concern about receiving multiple vaccines simultaneously due to a past negative experience.  ENT:  She wears a hearing aid in her left ear and reports only a slight decrease in hearing on that side. She uses Debrox once weekly for earwax management as recommended. She reports recent sinus problems and postnasal drip, currently using Zyrtec for allergy symptoms. She denies runny nose but mentions occasional morning stuffiness. She needs a Flonase refill and has been advised to use saline nasal flush twice daily.   WEIGHT MANAGEMENT:   She reports successful weight loss, achieving her goal of getting under 200 lbs with a current BMI of 29.5. Reports significant appetite changes since starting Ozempic.    EXERCISE:  She attends the gym 3-4 times per week, exercising for one hour per session, usually between 7:00 and 9:00 AM. She enjoys her gym routine for its structure and social aspects.     Hosp/ED/Urgent Care:  10/27/24 ED L wrist  pain  10/26/24 Urgent Care L shoulder pain    Recent appointments:   10/29/24 [facility] Julio L wrist tendinitis  10/02/24 [facility] Phuc L wrist pain  9/12/24 [facility] Y Perera R finger pain    Upcoming appointments:  Future Appointments       Date Provider Specialty Appt Notes    11/29/2024 Moon Lion MD Primary Care f/u    12/13/2024  Lab dr pantoja    2/10/2025  Lab dr pantoja    2/17/2025 Abram Vásquez MD Rheumatology 3 month fu//dt    3/3/2025 Hammad Hardy, OD Ophthalmology annual           The following were reviewed: Active problem list, medication list, allergies, family history, social history, and Health Maintenance.     Medications have been reviewed and reconciled with patient at visit today.    Exam:  Vitals:    11/04/24 0838   BP: 106/60   Pulse: 91   Temp: 97.1 °F (36.2 °C)     Weight: 76.4 kg (168 lb 6.9 oz)   Body mass index is 28.91 kg/m².    BP Readings from Last 3 Encounters:   11/04/24 106/60   10/29/24 120/64   10/27/24 129/64      Wt Readings from Last 3 Encounters:   11/04/24 0838 76.4 kg (168 lb 6.9 oz)   10/29/24 1408 76.6 kg (168 lb 14 oz)   10/27/24 2351 77.2 kg (170 lb 3.2 oz)       Physical Exam  Vitals reviewed.   Constitutional:       General: She is not in acute distress.     Appearance: Normal appearance. She is not ill-appearing.   HENT:      Head: Normocephalic and atraumatic.      Right Ear: External ear normal.      Left Ear: External ear normal.      Nose: Nose normal.      Mouth/Throat:      Mouth: Mucous membranes are moist.      Pharynx: Oropharynx is clear.   Eyes:      Extraocular Movements: Extraocular movements intact.      Conjunctiva/sclera: Conjunctivae normal.   Cardiovascular:      Rate and Rhythm: Normal rate.   Pulmonary:      Effort: Pulmonary effort is normal. No respiratory distress.   Musculoskeletal:      Comments: Hand/Wrist - Right: Right hand MCP joints appear erythematous.  Shoulder - Right: No obvious swelling of right shoulder.  Shoulder - Left: No  "redness or swelling of left shoulder.    Skin:     General: Skin is warm and dry.   Neurological:      Mental Status: She is alert. Mental status is at baseline.   Psychiatric:         Behavior: Behavior normal.        Laboratory Reviewed   Lab Results   Component Value Date    WBC 11.23 10/28/2024    HGB 11.9 (L) 10/28/2024    HCT 35.1 (L) 10/28/2024     10/28/2024    MCV 93 10/28/2024    CHOL 131 09/10/2024    TRIG 60 09/10/2024    HDL 41 09/10/2024    LDLCALC 78.0 09/10/2024    ALT 21 10/28/2024    AST 20 10/28/2024     10/28/2024    K 3.5 10/28/2024     10/28/2024    CREATININE 1.0 10/28/2024    BUN 14 10/28/2024    CO2 22 (L) 10/28/2024    MG 2.4 06/19/2023    TSH 0.509 03/01/2024    FREET4 0.96 03/01/2024    HGBA1C 5.8 (H) 06/27/2024    CRP 25.3 (H) 10/28/2024     Lab Results   Component Value Date    PTH 61.0 02/10/2023    CALCIUM 9.2 10/28/2024    PHOS 3.5 06/27/2024      Lab Results   Component Value Date    TJYZBXKM92 785 02/10/2023   No results found for: "FOLATE" No results found for: "UIBC", "IRON", "TRANS", "TRANSFERRIN", "TIBC", "LABIRON", "FESATURATED"   Lab Results   Component Value Date    EGFRNORACEVR 59 (A) 10/28/2024    ALBUMIN 3.6 10/28/2024     Lab Results   Component Value Date    [id number] 35 06/27/2024 9/12/24 uric acid 5.3    Xray L wrist 10/07/24 No evidence of acute or recent injury.  Degenerative joint space narrowing in the radiocarpal joint.    [facility]  Xray L shoulder 10/26/24 The soft tissues are unremarkable.   There is no evidence for acute fracture.   The glenohumeral joint and AC joint are unremarkable.     Assessment:   73 y.o. female with multiple co-morbid illnesses here for continued follow up of medical problems.      The primary encounter diagnosis was Inflammatory arthritis. A diagnosis of Screen for colon cancer was also pertinent to this visit.      Plan:   1. Inflammatory arthritis  Assessment & Plan:  Affecting both shoulders and " left hand wrist to shoulder  Onset late Sept/early Oct      2. Screen for colon cancer  -     Ambulatory referral/consult to Endo Procedure ; Future; Expected date: 11/05/2024         Health Maintenance         Date Due Completion Date    Shingles Vaccine (1 of 2) 04/20/2011 2/23/2011    TETANUS VACCINE 02/23/2021 2/23/2011    RSV Vaccine (Age 60+ and Pregnant patients) (1 - 1-dose 75+ series) Never done ---    Colorectal Cancer Screening 03/18/2024 3/18/2014    Override on 6/1/2006: Done    COVID-19 Vaccine (4 - 2024-25 season) 09/01/2024 12/15/2021    Foot Exam 11/27/2024 11/27/2023    Override on 9/23/2015: Done    Override on 7/16/2015: Done    Override on 3/23/2015: Done    Override on 12/3/2014: Done    Override on 4/29/2014: Done    Hemoglobin A1c 12/27/2024 6/27/2024    Eye Exam 03/01/2025 3/1/2024    Override on 7/20/2020: Done    Override on 4/30/2019: Done    Override on 4/24/2018: Done    Override on 5/17/2017: Done    Override on 5/16/2016: Done    Override on 5/13/2015: Done    LDCT Lung Screen 06/27/2025 6/27/2024    Diabetes Urine Screening 06/27/2025 6/27/2024    Mammogram 07/26/2025 7/26/2024    Override on 3/13/2013: Done    Lipid Panel 09/10/2025 9/10/2024    DEXA Scan 05/26/2026 5/26/2021    Override on 3/10/2011: Done (normal repeat in 5 years)            -Patient's lab results were reviewed and discussed with patient  -Treatment options and alternatives were discussed with the patient. Patient expressed understanding. Patient was given the opportunity to ask questions and be an active participant in their medical care. Patient had no further questions or concerns at this time.     Follow up: Follow up in about 25 days (around 11/29/2024) for Follow Up w me as scheduled.    Care Plan/Goals: Reviewed No   Goals         80 <= Glucose <= 180 (pt-stated)       11/15/22  On track        Blood Pressure < 130/80       LDL CHOLESTEROL < 70       Take at least one BP reading per week at various  times of the day       11/15/22  On track        Weight (lb) < 90.7 kg (200 lb) (pt-stated)       Update 11/15/22    On track - weight < 200 lbs - new goal 10 lbs wt loss over the next 2 mos.    Goal 5% weight loss - 9-10 lbs - 175 seth    Barriers? Sweet-tooth    Overcoming? Try substitute fruit? Daily walking - current step average about 5,000 steps - new goal 7,000 steps.    Time frame 3 mos - mid Jan 2023.                After visit summary printed and given to patient upon discharge.  Patient goals and care plan are included in After visit summary.    TOTAL TIME evaluating and managing this patient for this encounter was 41 minutes. This time was spent personally by me on some of the following activities: review of patient's past medical history, assessing age-appropriate health maintenance needs, review of any interval history, review and interpretation of lab results, review and interpretation of imaging test results, review and interpretation of cardiology test results, reviewing consulting specialist notes, obtaining history from the patient and family, examination of the patient, medication reconciliation, managing and/or ordering prescription medications, ordering imaging tests, ordering referral to subspecialty provider(s), educating patient and answering their questions about diagnosis, treatment plan, and goals of treatment, discussing planned follow-up and final documentation of the visit. This time was exclusive of any separately billable procedures for this patient and exclusive of time spent treating any other patients.     This note was generated with the assistance of ambient listening technology. Verbal consent was obtained by the patient and accompanying visitor(s) for the recording of patient appointment to facilitate this note. I attest to having reviewed and edited the generated note for accuracy, though some syntax or spelling errors may persist. Please contact the author of this note for  any clarification.

## 2024-11-04 NOTE — PATIENT INSTRUCTIONS
If you are feeling unwell, we'd like to be the first ones to know here at [facility]! Please give us a call. Same day appointments are our top priority to keep you well and out of the emergency rooms and hospitals. Call [phone] for our direct line. After hours advice is always available. Please call [phone] after hours to speak to the on-call team.      Recommend Tetanus, Shingles, and Covid and RSV vaccines that can be scheduled at your pharmacy of choice.    Ok to continue meloxicam once daily   Ok to acetaminophen up to 2,000 mg daily  (No more than 4x 500 mg tabs in one day if taking daily)

## 2024-11-06 DIAGNOSIS — M25.532 LEFT WRIST PAIN: ICD-10-CM

## 2024-11-06 DIAGNOSIS — M19.90 INFLAMMATORY ARTHRITIS: Primary | ICD-10-CM

## 2024-11-06 NOTE — TELEPHONE ENCOUNTER
Spoke with pt and sent refill request to provider.    PADMINI        ----- Message from Brooke sent at 11/6/2024  4:36 PM CST -----  Contact: [phone]  Patient is calling to check if pain medication was sent in, patient states she saw the  On Monday and Dr. Lion stated that she would call in a pain medication. Please call and advise.    Thank you and have a great day.

## 2024-11-09 ENCOUNTER — PATIENT MESSAGE (OUTPATIENT)
Dept: PRIMARY CARE CLINIC | Facility: CLINIC | Age: 74
End: 2024-11-09
Payer: MEDICARE

## 2024-11-09 RX ORDER — HYDROCODONE BITARTRATE AND ACETAMINOPHEN 5; 325 MG/1; MG/1
1 TABLET ORAL EVERY 6 HOURS PRN
Qty: 14 TABLET | Refills: 0 | Status: SHIPPED | OUTPATIENT
Start: 2024-11-09 | End: 2024-11-16

## 2024-11-11 ENCOUNTER — OFFICE VISIT (OUTPATIENT)
Dept: RHEUMATOLOGY | Facility: CLINIC | Age: 74
End: 2024-11-11
Payer: MEDICARE

## 2024-11-11 ENCOUNTER — HOSPITAL ENCOUNTER (OUTPATIENT)
Dept: RADIOLOGY | Facility: HOSPITAL | Age: 74
Discharge: HOME | End: 2024-11-11
Attending: INTERNAL MEDICINE
Payer: MEDICARE

## 2024-11-11 VITALS
HEIGHT: 64 IN | HEART RATE: 76 BPM | SYSTOLIC BLOOD PRESSURE: 116 MMHG | BODY MASS INDEX: 28.83 KG/M2 | WEIGHT: 168.88 LBS | DIASTOLIC BLOOD PRESSURE: 70 MMHG

## 2024-11-11 DIAGNOSIS — M79.642 BILATERAL HAND PAIN: ICD-10-CM

## 2024-11-11 DIAGNOSIS — M79.642 BILATERAL HAND PAIN: Primary | ICD-10-CM

## 2024-11-11 DIAGNOSIS — M19.90 ARTHRITIS: ICD-10-CM

## 2024-11-11 DIAGNOSIS — M79.641 BILATERAL HAND PAIN: Primary | ICD-10-CM

## 2024-11-11 DIAGNOSIS — M79.641 BILATERAL HAND PAIN: ICD-10-CM

## 2024-11-11 PROCEDURE — 1159F MED LIST DOCD IN RCRD: CPT | Mod: CPTII,S$GLB,, | Performed by: INTERNAL MEDICINE

## 2024-11-11 PROCEDURE — G2211 COMPLEX E/M VISIT ADD ON: HCPCS | Mod: S$GLB,,, | Performed by: INTERNAL MEDICINE

## 2024-11-11 PROCEDURE — 3074F SYST BP LT 130 MM HG: CPT | Mod: CPTII,S$GLB,, | Performed by: INTERNAL MEDICINE

## 2024-11-11 PROCEDURE — 73130 X-RAY EXAM OF HAND: CPT | Mod: 26,50,, | Performed by: RADIOLOGY

## 2024-11-11 PROCEDURE — 99999 PR PBB SHADOW E&M-EST. PATIENT-LVL V: CPT | Mod: PBBFAC,,, | Performed by: INTERNAL MEDICINE

## 2024-11-11 PROCEDURE — 3078F DIAST BP <80 MM HG: CPT | Mod: CPTII,S$GLB,, | Performed by: INTERNAL MEDICINE

## 2024-11-11 PROCEDURE — 3044F HG A1C LEVEL LT 7.0%: CPT | Mod: CPTII,S$GLB,, | Performed by: INTERNAL MEDICINE

## 2024-11-11 PROCEDURE — 99204 OFFICE O/P NEW MOD 45 MIN: CPT | Mod: S$GLB,,, | Performed by: INTERNAL MEDICINE

## 2024-11-11 PROCEDURE — 1160F RVW MEDS BY RX/DR IN RCRD: CPT | Mod: CPTII,S$GLB,, | Performed by: INTERNAL MEDICINE

## 2024-11-11 PROCEDURE — 73130 X-RAY EXAM OF HAND: CPT | Mod: TC,50,FY,PO

## 2024-11-11 PROCEDURE — 1126F AMNT PAIN NOTED NONE PRSNT: CPT | Mod: CPTII,S$GLB,, | Performed by: INTERNAL MEDICINE

## 2024-11-11 RX ORDER — METHYLPREDNISOLONE 4 MG/1
TABLET ORAL
COMMUNITY
Start: 2024-10-02 | End: 2024-11-11

## 2024-11-11 RX ORDER — METHYLPREDNISOLONE 4 MG/1
TABLET ORAL
Qty: 21 TABLET | Refills: 0 | Status: SHIPPED | OUTPATIENT
Start: 2024-11-11

## 2024-11-11 RX ORDER — PREDNISONE 20 MG/1
TABLET ORAL
COMMUNITY
Start: 2024-09-12 | End: 2024-11-11

## 2024-11-11 RX ORDER — TRAMADOL HYDROCHLORIDE 50 MG/1
50 TABLET ORAL EVERY 6 HOURS PRN
COMMUNITY
Start: 2024-10-26

## 2024-11-11 NOTE — PROGRESS NOTES
RHEUMATOLOGY CLINIC INITIAL VISIT    Reason for consult:-  Arthritis  Chief complaints, HPI, ROS, EXAM, Assessment & Plans:-  Kristyn Elena a 73 y.o. pleasant female comes in with arthritis.  She was at [facility] until few weeks ago when she had sudden onset pain left upper extremity extending her wrist.  Had severe swelling and redness around left wrist.  Mild chronic bilateral hand pain.  But 1st such episode of severe pain with swelling involving her left upper extremity.  Pain swelling improved with steroid pack and pain medications.  No personal or family history of psoriasis.  Still has mild persistent pain her left hand.  Mild swelling present.  No history of podagra.  Rheumatological review of system negative otherwise.  No recent infection or vaccination.  Physical examination synovitis of left 2nd 3rd MCP with mild tenderness.  No synovitis of right 2nd and 3rd MCP.  Mild effusion of left wrist.  Other joints were unremarkable.  1. Bilateral hand pain    2. Arthritis      Problem List Items Addressed This Visit       Arthritis    Relevant Medications    methylPREDNISolone (MEDROL DOSEPACK) 4 mg tablet    Other Relevant Orders    BERYL Screen w/Reflex    C-Reactive Protein    Cyclic Citrullinated Peptide Antibody, IgG    Sedimentation rate    Comprehensive Metabolic Panel    CBC Auto Differential    Rheumatoid Factor    X-Ray Hand 3 View Bilateral    Uric Acid     Other Visit Diagnoses       Bilateral hand pain    -  Primary    Relevant Orders    BERYL Screen w/Reflex    C-Reactive Protein    Cyclic Citrullinated Peptide Antibody, IgG    Sedimentation rate    Comprehensive Metabolic Panel    CBC Auto Differential    Rheumatoid Factor    X-Ray Hand 3 View Bilateral    Uric Acid           Latest Reference Range & Units 10/28/24 00:28   WBC 3.90 - 12.70 K/uL 11.23   RBC 4.00 - 5.40 M/uL 3.77 (L)   Hemoglobin 12.0 - 16.0 g/dL 11.9 (L)   Hematocrit 37.0 - 48.5 % 35.1 (L)   MCV 82 - 98 fL 93   MCH 27.0 - 31.0  pg 31.6 (H)   MCHC 32.0 - 36.0 g/dL 33.9   RDW 11.5 - 14.5 % 13.2   Platelet Count 150 - 450 K/uL 216   MPV 9.2 - 12.9 fL 9.7   Gran % 38.0 - 73.0 % 70.1   Lymph % 18.0 - 48.0 % 20.3   Mono % 4.0 - 15.0 % 8.2   Eos % 0.0 - 8.0 % 0.9   Basophil % 0.0 - 1.9 % 0.2   Immature Granulocytes 0.0 - 0.5 % 0.3   Gran # (ANC) 1.8 - 7.7 K/uL 7.9 (H)   Lymph # 1.0 - 4.8 K/uL 2.3   Mono # 0.3 - 1.0 K/uL 0.9   Eos # 0.0 - 0.5 K/uL 0.1   Baso # 0.00 - 0.20 K/uL 0.02   Immature Grans (Abs) 0.00 - 0.04 K/uL 0.03   nRBC 0 /100 WBC 0   Differential Method  Automated   Sodium 136 - 145 mmol/L 140   Potassium 3.5 - 5.1 mmol/L 3.5   Chloride 95 - 110 mmol/L 108   CO2 23 - 29 mmol/L 22 (L)   Anion Gap 8 - 16 mmol/L 10   BUN 8 - 23 mg/dL 14   Creatinine 0.5 - 1.4 mg/dL 1.0   eGFR >60 mL/min/1.73 m^2 59 !   Glucose 70 - 110 mg/dL 107   Calcium 8.7 - 10.5 mg/dL 9.2   ALP 40 - 150 U/L 74   PROTEIN TOTAL 6.0 - 8.4 g/dL 7.2   Albumin 3.5 - 5.2 g/dL 3.6   BILIRUBIN TOTAL 0.1 - 1.0 mg/dL 0.4   AST 10 - 40 U/L 20   ALT 10 - 44 U/L 21   CRP 0.0 - 8.2 mg/L 25.3 (H)   (L): Data is abnormally low  (H): Data is abnormally high  !: Data is abnormal      Acute onset inflammatory arthritis involving small and large joints of left upper extremity most severe involvement her wrist raises possibility of acute pseudogout.  But x-ray shows no evidence of CPPD arthropathy.  Referred to get MRI bilateral to look for any underlying synovitis.  Repeat inflammatory markers and serologies today.  Continue Mobic.  If another episode of severe flare, take Medrol Dosepak and contact me.  Treatment decisions to be made based on serologies and MRI results.  Check x-ray of bilateral hands today.  I have explained all of the above in detail and the patient understands all of the current recommendation(s). I have answered all questions to the best of my ability and to their complete satisfaction.         # Follow up in about 3 months (around 2/11/2025).      Past Medical  History:   Diagnosis Date    Anxiety 2024    Carpal tunnel syndrome     CKD (chronic kidney disease) stage 3, GFR 30-59 ml/min     Followed by Nephrology    Colon cancer screening 2014    COVID-19 2023    CTS (carpal tunnel syndrome) 2013    Diabetes mellitus, type 2     Dizziness 2023    Eczema     Elevated CPK     History of hypokalemia 2013    History of hypokalemia 2013    Hypokalemia     Major depressive disorder, single episode, mild 2024    Multinodular thyroid     Followed by ENT    Obesity     Obesity (BMI 30.0-34.9) 2015    Other and unspecified hyperlipidemia     Pneumonia     in her 20s; hospitalized    Postmenopausal     No history of abnormal pap smear    Secondary hyperparathyroidism of renal origin 10/26/2021    Statin intolerance     caused mylagia, elevated CPK    Tobacco dependence     resolved    Unspecified essential hypertension        Past Surgical History:   Procedure Laterality Date    COLONOSCOPY      CYST REMOVAL      On the head    FINE NEEDLE ASPIRATION  3/2011    thyroid nodules x3 (negative per patient)    HYSTERECTOMY      PARTIAL HYSTERECTOMY      Due to fibroids        Social History     Tobacco Use    Smoking status: Former     Current packs/day: 0.00     Average packs/day: 1 pack/day for 43.4 years (43.4 ttl pk-yrs)     Types: Cigarettes     Start date: 1970     Quit date: 2013     Years since quittin.4    Smokeless tobacco: Never   Substance Use Topics    Alcohol use: Yes     Alcohol/week: 0.0 standard drinks of alcohol     Comment: Rarely drinks wine    Drug use: No       Family History   Problem Relation Name Age of Onset    Hypertension Mother      Diabetes Mother      Dementia Mother          Alzheimer's dementia    Hypertension Father      Heart disease Sister          MI/CAD    Cataracts Sister      Dementia Sister      No Known Problems Son      No Known Problems Son      Cataracts Brother       Cataracts Brother      Dementia Sister      Cancer Neg Hx      Stroke Neg Hx      Melanoma Neg Hx      Psoriasis Neg Hx      Lupus Neg Hx      Eczema Neg Hx      COPD Neg Hx      Kidney disease Neg Hx         Review of patient's allergies indicates:   Allergen Reactions    Buspar [buspirone] Other (See Comments)     Lip swelling    Statins-hmg-coa reductase inhibitors      Muscle Cramps       Medication List with Changes/Refills   New Medications    METHYLPREDNISOLONE (MEDROL DOSEPACK) 4 MG TABLET    use as directed   Current Medications    ALBUTEROL (VENTOLIN HFA) 90 MCG/ACTUATION INHALER    Inhale 2 puffs into the lungs every 4 (four) hours as needed for Wheezing or Shortness of Breath (before exercise). Rescue    AMLODIPINE (NORVASC) 10 MG TABLET    TAKE 1 TABLET ONE TIME DAILY    ASPIRIN 81 MG CHEW    Take 81 mg by mouth once daily.    AZELASTINE (ASTELIN) 137 MCG (0.1 %) NASAL SPRAY    1 spray (137 mcg total) by Nasal route 2 (two) times daily as needed for Rhinitis.    BENAZEPRIL (LOTENSIN) 40 MG TABLET    Take 1 tablet (40 mg total) by mouth once daily.    BLOOD GLUCOSE CONTROL, NORMAL SOLN    1 Bottle by Misc.(Non-Drug; Combo Route) route once daily. For Accu Check Amber  use twice daily prn dx: E11.9    BLOOD SUGAR DIAGNOSTIC (ACCU-CHEK SMARTVIEW TEST STRIP) STRP    TO CHECK BLOOD GLUCOSE ONE TIMES DAILY    BLOOD-GLUCOSE METER (ACCU-CHEK AMBER) MISC    1 kit by Misc.(Non-Drug; Combo Route) route once. For Accu Check Amber  use twice daily prn dx: E11.9 for 1 dose    CALCIUM-VITAMIN D3 500 MG(1,250MG) -200 UNIT PER TABLET    Take 1 tablet by mouth once daily.     CETIRIZINE (ZYRTEC) 10 MG TABLET    Take 1 tablet (10 mg total) by mouth once daily.    CRANBERRY EXTRACT ORAL    Take 1 tablet by mouth once daily.    DICLOFENAC SODIUM (VOLTAREN ARTHRITIS PAIN) 1 % GEL    Apply 2 g topically once daily.    EVOLOCUMAB (REPATHA SURECLICK) 140 MG/ML PNIJ    Inject 1 mL (140 mg total) into the skin every 14 (fourteen)  "days.    HYDROCODONE-ACETAMINOPHEN (NORCO) 5-325 MG PER TABLET    Take 1 tablet by mouth every 6 (six) hours as needed for Pain.    MELOXICAM (MOBIC) 7.5 MG TABLET    Take 1 tablet (7.5 mg total) by mouth once daily. Start this medication only after the completion of the given dose pack    METOPROLOL SUCCINATE (TOPROL-XL) 25 MG 24 HR TABLET    TAKE 1 TABLET ONE TIME DAILY    MULTIVITAMIN W-MINERALS/LUTEIN (CENTRUM SILVER ORAL)    Take 1 tablet by mouth once daily.    PEN NEEDLE, DIABETIC (BD ULTRA-FINE MICRO PEN NEEDLE) 32 GAUGE X 1/4" NDLE    1 Needle by Misc.(Non-Drug; Combo Route) route once a week.    POTASSIUM CHLORIDE SA (K-DUR,KLOR-CON) 20 MEQ TABLET    TAKE 1 TABLET ONE TIME DAILY    SERTRALINE (ZOLOFT) 25 MG TABLET    Take 1 tablet (25 mg total) by mouth every evening.    SPIRONOLACTONE (ALDACTONE) 50 MG TABLET    TAKE 1 TABLET EVERY DAY    TRAMADOL (ULTRAM) 50 MG TABLET    Take 50 mg by mouth every 6 (six) hours as needed.   Discontinued Medications    METHYLPREDNISOLONE (MEDROL DOSEPACK) 4 MG TABLET    TAKE 6 TABLETS ON DAY 1 AS DIRECTED ON PACKAGE AND DECREASE BY 1 TAB EACH DAY FOR A TOTAL OF 6 DAYS    PREDNISONE (DELTASONE) 20 MG TABLET    TAKE 2 TABLETS (40 MG) BY MOUTH ONCE DAILY FOR 3 DAYS         Thank you for allowing me to participate in the care Alexander Garza.    Disclaimer: This note was prepared using voice recognition system and is likely to have sound alike errors and is not proof read.  Please call me with any questions.      Answers submitted by the patient for this visit:  Rheumatology Questionnaire (Submitted on 11/7/2024)  fever: No  eye redness: No  mouth sores: No  headaches: No  shortness of breath: No  chest pain: No  trouble swallowing: No  diarrhea: No  constipation: No  unexpected weight change: No  genital sore: No  dysuria: No  During the last 3 days, have you had a skin rash?: No  Bruises or bleeds easily: No  cough: No    "

## 2024-11-12 ENCOUNTER — TELEPHONE (OUTPATIENT)
Dept: RHEUMATOLOGY | Facility: CLINIC | Age: 74
End: 2024-11-12
Payer: MEDICARE

## 2024-11-12 DIAGNOSIS — M05.79 RHEUMATOID ARTHRITIS INVOLVING MULTIPLE SITES WITH POSITIVE RHEUMATOID FACTOR: Primary | ICD-10-CM

## 2024-11-12 RX ORDER — FOLIC ACID 1 MG/1
1 TABLET ORAL DAILY
Qty: 90 TABLET | Refills: 2 | Status: SHIPPED | OUTPATIENT
Start: 2024-11-12

## 2024-11-12 RX ORDER — METHOTREXATE 2.5 MG/1
10 TABLET ORAL
Qty: 16 TABLET | Refills: 2 | Status: SHIPPED | OUTPATIENT
Start: 2024-11-12

## 2024-11-12 NOTE — TELEPHONE ENCOUNTER
----- Message from Abram Vásquez MD sent at 11/12/2024  6:08 AM CST -----  Lab results suggestive of rheumatoid arthritis.  Start methotrexate once weekly with folic acid daily.  Educate patient on methotrexate.  Safety-all 4 to be repeated in 4 weeks and in 12 weeks.  Advice to take Medrol Dosepak for flares.  Dr. ALONZO

## 2024-11-12 NOTE — TELEPHONE ENCOUNTER
Lab results and recommendations reviewed with patient and she verbalized understanding.  4 week lab appt scheduled

## 2024-11-13 NOTE — TELEPHONE ENCOUNTER
Clinical Pharmacy Progress Note: Medication Education     Patient was counseled by pharmacist on new medication methotrexate and its indications, side effects, and reasons for taking this medication.   - Educated patient on mechanism of action, frequency and route of administration, onset of action, and safety profile of methotrexate.   - Encouraged pt to practice proper hand hygiene considering increased risk of infection with biologics. Pt to make us aware if she ever experiences s/sx of an infection including fever, chills, URI symptoms or UTI symptoms.   - Labs up to date: CBC/CMP  - Recommended against use of NSAIDs including motrin,ibuprofen, advil, aleve etc     Patient was provided educational drug card/handout with her dispensed medication and has begun to read it.   Patient expressed understanding and had no further questions.      Thank you for allowing us to participate in this patient's care.    Chiquis Browning, PharmD  Ambulatory Clinical Pharmacist- Rheumatology

## 2024-11-18 ENCOUNTER — PATIENT MESSAGE (OUTPATIENT)
Dept: ADMINISTRATIVE | Facility: HOSPITAL | Age: 74
End: 2024-11-18
Payer: MEDICARE

## 2024-11-21 DIAGNOSIS — Z78.9 STATIN INTOLERANCE: ICD-10-CM

## 2024-11-21 DIAGNOSIS — E78.2 COMBINED HYPERLIPIDEMIA ASSOCIATED WITH TYPE 2 DIABETES MELLITUS: ICD-10-CM

## 2024-11-21 DIAGNOSIS — E11.69 COMBINED HYPERLIPIDEMIA ASSOCIATED WITH TYPE 2 DIABETES MELLITUS: ICD-10-CM

## 2024-11-21 RX ORDER — EVOLOCUMAB 140 MG/ML
140 INJECTION, SOLUTION SUBCUTANEOUS
Qty: 2 ML | Refills: 5 | Status: ACTIVE | OUTPATIENT
Start: 2024-11-21

## 2024-11-29 ENCOUNTER — OFFICE VISIT (OUTPATIENT)
Dept: PRIMARY CARE CLINIC | Facility: CLINIC | Age: 74
End: 2024-11-29
Payer: MEDICARE

## 2024-11-29 VITALS
WEIGHT: 170.19 LBS | HEIGHT: 64 IN | BODY MASS INDEX: 29.06 KG/M2 | TEMPERATURE: 97 F | DIASTOLIC BLOOD PRESSURE: 60 MMHG | HEART RATE: 74 BPM | OXYGEN SATURATION: 96 % | SYSTOLIC BLOOD PRESSURE: 122 MMHG

## 2024-11-29 DIAGNOSIS — Z79.899 DRUG-INDUCED IMMUNODEFICIENCY: ICD-10-CM

## 2024-11-29 DIAGNOSIS — N18.31 TYPE 2 DIABETES MELLITUS WITH STAGE 3A CHRONIC KIDNEY DISEASE, WITHOUT LONG-TERM CURRENT USE OF INSULIN: Primary | Chronic | ICD-10-CM

## 2024-11-29 DIAGNOSIS — H61.21 IMPACTED CERUMEN OF RIGHT EAR: ICD-10-CM

## 2024-11-29 DIAGNOSIS — M19.90 INFLAMMATORY ARTHRITIS: Chronic | ICD-10-CM

## 2024-11-29 DIAGNOSIS — E11.22 TYPE 2 DIABETES MELLITUS WITH STAGE 3A CHRONIC KIDNEY DISEASE, WITHOUT LONG-TERM CURRENT USE OF INSULIN: Primary | Chronic | ICD-10-CM

## 2024-11-29 DIAGNOSIS — D84.821 DRUG-INDUCED IMMUNODEFICIENCY: ICD-10-CM

## 2024-11-29 PROCEDURE — 99999 PR PBB SHADOW E&M-EST. PATIENT-LVL V: CPT | Mod: PBBFAC,,, | Performed by: INTERNAL MEDICINE

## 2024-11-29 NOTE — PROGRESS NOTES
Kristyn Garza  11/29/2024  [id number]    Moon Lion MD  Patient Care Team:  Moon Lion MD as PCP - General (Internal Medicine)  Dale Matute MD as Consulting Physician (Cardiology)  Hammad Hardy OD as Consulting Physician (Optometry)  Olivia Irwin MD as Consulting Physician (Otolaryngology)  Gretel De Leon PharmD as Hypertension Digital Medicine Clinician  Brenden Landaverde MD as Consulting Physician (Nephrology)  Gretel De Leon PharmD as Hyperlipidemia Digital Medicine Clinician  Gretel De Leon PharmD as Diabetes Digital Medicine Clinician  Moon Lion MD as Hyperlipidemia Digital Medicine Responsible Provider (Internal Medicine)  Moon Lion MD as Diabetes Digital Medicine Responsible Provider (Internal Medicine)  Moon Lion MD as Hypertension Digital Medicine Responsible Provider (Internal Medicine)  Chacho Bergman PharmD as Pharmacist  Medicare, [employer] Managed as Diabetes Digital Medicine Contract  Medicare, [employer] Managed as Hypertension Digital Medicine Contract    Visit Type: Follow-up    Ms. Kristyn Garza is a 73 year old female here for scheduled d/u.      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 She's enrolled w digital medicine program for diabetes, hyperlipidemia and hypertension.     Last month was seen in [facility] by both Dr. Friend and ISABEAL Kim as well as Urgent Care and ED for L arm pain extending from wrist to shoulder. Has since met with Rheumatology and diagnosed w inflammatory arthritis, suspect sero-neg RA, recently started methotrexate 10 mg weekly, folic acid daily with plan for repeat labwork mid Dec.    History of Present Illness    CHIEF COMPLAINT:  Ms. Garza presents today for follow-up.     RHEUMATOID ARTHRITIS:  She is  suspected to have seronegative rheumatoid arthritis, with a negative rheumatoid factor. She experiences morning stiffness and reports increase right hand pain today after cooking a lot yesterday. She is currently taking Methotrexate once a week and folic acid daily. Compression gloves, topical diclofenac and oral acetaminophen help with hand pain. Shoulders not bothering her currently.    VACCINATIONS:  She has not had the flu, RSV, or shingles vaccines.    UPCOMING TESTS AND LABS:  She has labs scheduled for December 13th. Her last lipid levels and A1C were reported as good. Her renal function was noted to be slightly decreased, but will be rechecked with the upcoming December labs. A colonoscopy has been discussed for the beginning of the year but is not yet scheduled.    ENT CONCERNS:  She manages her ear care at home but has not addressed since LOV w me. She also reports new sinus drainage and phlegm but denies having a cold or other respiratory symptoms. She has not been using any specific treatments for her sinus problems.    FOOT EXAM:  She reports experiencing itching sometimes on her toes. She denies any pain, numbness, or tingling in her feet.    ROS:  General: -fever, -chills, -fatigue, -weight gain, -weight loss  Eyes: -vision changes, -redness, -discharge  ENT: -ear pain, -nasal congestion, -sore throat  Cardiovascular: -chest pain, -palpitations, -lower extremity edema  Respiratory: -cough, -shortness of breath  Gastrointestinal: -abdominal pain, +nausea, -vomiting, -diarrhea, -constipation, -blood in stool  Genitourinary: -dysuria, -hematuria, -frequency  Musculoskeletal: +joint pain, -muscle pain  Skin: -rash, -lesion, +itching  Neurological: -headache, -dizziness, -numbness, -tingling  Psychiatric: -anxiety, -depression, -sleep difficulty        PHQ-4 Score: 3     From LOV w me 11/04/24  JOINT PAIN:  She reports joint pain that began in late September/early October. Initially, the pain affected her  left wrist and shoulder. Currently, she experiences pain predominantly in her right shoulder, which is more severe than the left. Pain in her left hand and wrist radiates up to her elbow when lifting heavy objects. She also reports numbness and tingling in the fingertips of her left hand. The pain is described as worse at night, affecting both hands. Previously, she experienced swelling and warmth in her left wrist, which has now resolved. She denies involvement of other joints such as hips or knees.  TREATMENT HISTORY:  She experienced some relief with a Medrol Dosepak prescribed in early October. Although prednisone was prescribed by Urgent Care on 10/26, she did not fill or take this medication. Currently, she takes meloxicam and Tylenol for pain management. A wrist brace provided by the emergency room helps significantly, but she forgot to wear it today.  MEDICATION SIDE EFFECTS:  She reports experiencing nausea and vomiting with a pain medication prescribed by urgent care, which she believes may have been tramadol. The medication caused severe nausea and vomiting, to the extent that she could not keep down even water. She expresses confusion about the various pain medications prescribed from different healthcare providers.  VACCINATIONS:  She has received her flu vaccine and expresses willingness to receive the RSV vaccine.  PHQ-4 Score: 3     Recent appointments:   11/11/24 Rheum Dr. CABALLERO inflammatory arthritis  Labs suggestive of sero-neg RA    Upcoming appointments:  Future Appointments       Date Provider Specialty Appt Notes    12/13/2024  Lab dr caballero    2/6/2025 Lizzeth Kim FNP-C Primary Care [facility]    2/10/2025  Lab dr caballero    2/17/2025 Abram Vásquez MD Rheumatology 3 month fu//dt    2/21/2025 Moon Lion MD Primary Care Three month f/u    3/3/2025 Hammad Hardy, BERTA Ophthalmology annual           The following were reviewed: Active problem list, medication list, allergies, family  history, social history, and Health Maintenance.     Medications have been reviewed and reconciled with patient at visit today.    Exam:   Vitals:    11/29/24 0818   BP: 122/60   Pulse: 74   Temp: 96.9 °F (36.1 °C)     Weight: 77.2 kg (170 lb 3.1 oz)   Body mass index is 29.21 kg/m².    BP Readings from Last 3 Encounters:   11/29/24 122/60   11/11/24 116/70   11/04/24 106/60      Wt Readings from Last 3 Encounters:   11/29/24 0818 77.2 kg (170 lb 3.1 oz)   11/11/24 1020 76.6 kg (168 lb 14 oz)   11/04/24 0838 76.4 kg (168 lb 6.9 oz)      Physical Exam  Vitals reviewed.   Constitutional:       General: She is not in acute distress.     Appearance: Normal appearance. She is not ill-appearing.   HENT:      Head: Normocephalic and atraumatic.      Right Ear: External ear normal. There is impacted cerumen.      Left Ear: Tympanic membrane, ear canal and external ear normal.      Nose: Nose normal.      Mouth/Throat:      Mouth: Mucous membranes are moist.      Pharynx: Oropharynx is clear.   Eyes:      General: No scleral icterus.     Extraocular Movements: Extraocular movements intact.      Conjunctiva/sclera: Conjunctivae normal.      Comments: glasses   Cardiovascular:      Rate and Rhythm: Normal rate and regular rhythm.      Heart sounds: No murmur heard.  Pulmonary:      Effort: Pulmonary effort is normal. No respiratory distress.      Comments: Fine crackles w inhalation B upper lobes posteriorly  Abdominal:      General: Bowel sounds are normal.      Palpations: Abdomen is soft.      Tenderness: There is no abdominal tenderness. There is no guarding.   Musculoskeletal:      Right lower leg: No edema.      Left lower leg: No edema.   Skin:     General: Skin is warm and dry.      Findings: No bruising or rash.   Neurological:      Mental Status: She is alert. Mental status is at baseline.      Motor: No weakness.      Coordination: Coordination normal.   Psychiatric:         Mood and Affect: Mood normal.          "Behavior: Behavior normal.         Thought Content: Thought content normal.         Judgment: Judgment normal.           Foot Exam  Protective Sensation (w/ 10 gram monofilament):  Right: Intact  Left: Intact    Visual Inspection:  Normal -  Bilateral  Dry skin but no rash, peeling or skin breakdown    Pedal Pulses:   Right: Present  Left: Present    Laboratory Reviewed  Lab Results   Component Value Date    WBC 10.99 11/11/2024    HGB 12.1 11/11/2024    HCT 37.7 11/11/2024     11/11/2024    MCV 98 11/11/2024    CHOL 131 09/10/2024    TRIG 60 09/10/2024    HDL 41 09/10/2024    LDLCALC 78.0 09/10/2024    ALT 25 11/11/2024    AST 30 11/11/2024     11/11/2024    K 4.7 11/11/2024     11/11/2024    CREATININE 1.3 11/11/2024    BUN 22 11/11/2024    CO2 22 (L) 11/11/2024    MG 2.4 06/19/2023    TSH 0.509 03/01/2024    FREET4 0.96 03/01/2024    HGBA1C 5.8 (H) 06/27/2024    CRP 5.4 11/11/2024     Lab Results   Component Value Date    PTH 61.0 02/10/2023    CALCIUM 9.8 11/11/2024    PHOS 3.5 06/27/2024      Lab Results   Component Value Date    KXAOVPLW53 785 02/10/2023   No results found for: "FOLATE" No results found for: "UIBC", "IRON", "TRANS", "TRANSFERRIN", "TIBC", "LABIRON", "FESATURATED"   Lab Results   Component Value Date    EGFRNORACEVR 42.9 (A) 11/11/2024    ALBUMIN 3.6 11/11/2024     Lab Results   Component Value Date    [id number] 35 06/27/2024 11/11/24 Uric acid 5.8 RF < 13 CCPAb IgG 267.5 BERYL screen + 1:80 speckled BERYL proflie neg    Xray Hand B 11/11/24: Advanced degenerative changes are seen bilaterally consistent with osteoarthritis. No discrete articular erosions or other radiographic evidence of inflammatory arthropathy.     Assessment:   73 y.o. female with multiple co-morbid illnesses here for continued follow up of medical problems.      The primary encounter diagnosis was Type 2 diabetes mellitus with stage 3a chronic kidney disease, without long-term current use of insulin. " Diagnoses of Inflammatory arthritis, Impacted cerumen of right ear, and Drug-induced immunodeficiency were also pertinent to this visit.      Plan:   1. Type 2 diabetes mellitus with stage 3a chronic kidney disease, without long-term current use of insulin  -     Hemoglobin A1C; Future; Expected date: 12/13/2024    2. Inflammatory arthritis  Assessment & Plan:  Symptoms wax and wane - worse when waking in am - exacerbated by cold or excessive use - recently started MTX - cont f/u Rheum as scheduled      3. Impacted cerumen of right ear  Assessment & Plan:  Ear wash here today - recommend weekly earcare @ home      4. Drug-induced immunodeficiency  Assessment & Plan:  Completed steriod Tx - recently started MTX per Rheum recs - f/u repeat labwork in Dec - f/u Rheum as scheduled - encouraged vaccines           Health Maintenance         Date Due Completion Date    Shingles Vaccine (1 of 2) 04/20/2011 2/23/2011    TETANUS VACCINE 02/23/2021 2/23/2011    RSV Vaccine (Age 60+ and Pregnant patients) (1 - 1-dose 75+ series) Never done ---    Colorectal Cancer Screening 03/18/2024 3/18/2014    Override on 6/1/2006: Done    COVID-19 Vaccine (4 - 2024-25 season) 09/01/2024 12/15/2021    Hemoglobin A1c 12/27/2024 6/27/2024    Eye Exam 03/01/2025 3/1/2024    Override on 7/20/2020: Done    Override on 4/30/2019: Done    Override on 4/24/2018: Done    Override on 5/17/2017: Done    Override on 5/16/2016: Done    Override on 5/13/2015: Done    LDCT Lung Screen 06/27/2025 6/27/2024    Diabetes Urine Screening 06/27/2025 6/27/2024    Mammogram 07/26/2025 7/26/2024    Override on 3/13/2013: Done    Lipid Panel 09/10/2025 9/10/2024    Foot Exam 11/29/2025 11/29/2024    Override on 9/23/2015: Done    Override on 7/16/2015: Done    Override on 3/23/2015: Done    Override on 12/3/2014: Done    Override on 4/29/2014: Done    DEXA Scan 05/26/2026 5/26/2021    Override on 3/10/2011: Done (normal repeat in 5 years)            -Patient's lab  results were reviewed and discussed with patient  -Treatment options and alternatives were discussed with the patient. Patient expressed understanding. Patient was given the opportunity to ask questions and be an active participant in their medical care. Patient had no further questions or concerns at this time.     Follow up: Follow up in about 12 weeks (around 2/21/2025) for Follow Up for FRANCIA Kim and gricel box 2-4 weeks after.    Care Plan/Goals: Reviewed No   Goals         80 <= Glucose <= 180 (pt-stated)       11/15/22  On track        Blood Pressure < 130/80       LDL CHOLESTEROL < 70       Take at least one BP reading per week at various times of the day       11/15/22  On track        Weight (lb) < 90.7 kg (200 lb) (pt-stated)       Update 11/15/22    On track - weight < 200 lbs - new goal 10 lbs wt loss over the next 2 mos.    Goal 5% weight loss - 9-10 lbs - 175 seth    Barriers? Sweet-tooth    Overcoming? Try substitute fruit? Daily walking - current step average about 5,000 steps - new goal 7,000 steps.    Time frame 3 mos - mid Jan 2023.                After visit summary printed and given to patient upon discharge.  Patient goals and care plan are included in After visit summary.    TOTAL TIME evaluating and managing this patient for this encounter was 51 minutes. This time was spent personally by me on some of the following activities: review of patient's past medical history, assessing age-appropriate health maintenance needs, review of any interval history, review and interpretation of lab results, review and interpretation of imaging test results, review and interpretation of cardiology test results, reviewing consulting specialist notes, obtaining history from the patient and family, examination of the patient, medication reconciliation, managing and/or ordering prescription medications, ordering imaging tests, ordering referral to subspecialty provider(s), educating patient and answering their  questions about diagnosis, treatment plan, and goals of treatment, discussing planned follow-up and final documentation of the visit. This time was exclusive of any separately billable procedures for this patient and exclusive of time spent treating any other patients.     This note was generated with the assistance of ambient listening technology. Verbal consent was obtained by the patient and accompanying visitor(s) for the recording of patient appointment to facilitate this note. I attest to having reviewed and edited the generated note for accuracy, though some syntax or spelling errors may persist. Please contact the author of this note for any clarification.

## 2024-11-29 NOTE — ASSESSMENT & PLAN NOTE
Completed steriod Tx - recently started MTX per Rheum recs - f/u repeat labwork in Dec - f/u Rheum as scheduled - encouraged vaccines

## 2024-11-29 NOTE — ASSESSMENT & PLAN NOTE
Symptoms wax and wane - worse when waking in am - exacerbated by cold or excessive use - recently started MTX - cont f/u Rheum as scheduled

## 2024-12-03 DIAGNOSIS — M19.90 ARTHRITIS: ICD-10-CM

## 2024-12-03 DIAGNOSIS — M77.8 LEFT WRIST TENDINITIS: ICD-10-CM

## 2024-12-03 RX ORDER — DICLOFENAC SODIUM 10 MG/G
2 GEL TOPICAL DAILY
Qty: 20 G | Refills: 0 | Status: SHIPPED | OUTPATIENT
Start: 2024-12-03

## 2024-12-13 ENCOUNTER — LAB VISIT (OUTPATIENT)
Dept: LAB | Facility: HOSPITAL | Age: 74
End: 2024-12-13
Attending: INTERNAL MEDICINE
Payer: MEDICARE

## 2024-12-13 DIAGNOSIS — M19.90 ARTHRITIS: ICD-10-CM

## 2024-12-13 DIAGNOSIS — M79.641 BILATERAL HAND PAIN: ICD-10-CM

## 2024-12-13 DIAGNOSIS — N18.31 TYPE 2 DIABETES MELLITUS WITH STAGE 3A CHRONIC KIDNEY DISEASE, WITHOUT LONG-TERM CURRENT USE OF INSULIN: Chronic | ICD-10-CM

## 2024-12-13 DIAGNOSIS — M79.642 BILATERAL HAND PAIN: ICD-10-CM

## 2024-12-13 DIAGNOSIS — E11.22 TYPE 2 DIABETES MELLITUS WITH STAGE 3A CHRONIC KIDNEY DISEASE, WITHOUT LONG-TERM CURRENT USE OF INSULIN: Chronic | ICD-10-CM

## 2024-12-13 LAB
ALBUMIN SERPL BCP-MCNC: 4 G/DL (ref 3.5–5.2)
ALP SERPL-CCNC: 84 U/L (ref 40–150)
ALT SERPL W/O P-5'-P-CCNC: 32 U/L (ref 10–44)
ANION GAP SERPL CALC-SCNC: 8 MMOL/L (ref 8–16)
AST SERPL-CCNC: 28 U/L (ref 10–40)
BASOPHILS # BLD AUTO: 0.05 K/UL (ref 0–0.2)
BASOPHILS NFR BLD: 0.6 % (ref 0–1.9)
BILIRUB SERPL-MCNC: 0.4 MG/DL (ref 0.1–1)
BUN SERPL-MCNC: 15 MG/DL (ref 8–23)
CALCIUM SERPL-MCNC: 10 MG/DL (ref 8.7–10.5)
CHLORIDE SERPL-SCNC: 107 MMOL/L (ref 95–110)
CO2 SERPL-SCNC: 27 MMOL/L (ref 23–29)
CREAT SERPL-MCNC: 1 MG/DL (ref 0.5–1.4)
CRP SERPL-MCNC: 4.2 MG/L (ref 0–8.2)
DIFFERENTIAL METHOD BLD: ABNORMAL
EOSINOPHIL # BLD AUTO: 0.3 K/UL (ref 0–0.5)
EOSINOPHIL NFR BLD: 3.3 % (ref 0–8)
ERYTHROCYTE [DISTWIDTH] IN BLOOD BY AUTOMATED COUNT: 14.8 % (ref 11.5–14.5)
ERYTHROCYTE [SEDIMENTATION RATE] IN BLOOD BY WESTERGREN METHOD: 21 MM/HR (ref 0–36)
EST. GFR  (NO RACE VARIABLE): 58 ML/MIN/1.73 M^2
ESTIMATED AVG GLUCOSE: 123 MG/DL (ref 68–131)
GLUCOSE SERPL-MCNC: 98 MG/DL (ref 70–110)
HBA1C MFR BLD: 5.9 % (ref 4–5.6)
HCT VFR BLD AUTO: 37.6 % (ref 37–48.5)
HGB BLD-MCNC: 12.4 G/DL (ref 12–16)
IMM GRANULOCYTES # BLD AUTO: 0.03 K/UL (ref 0–0.04)
IMM GRANULOCYTES NFR BLD AUTO: 0.3 % (ref 0–0.5)
LYMPHOCYTES # BLD AUTO: 3.5 K/UL (ref 1–4.8)
LYMPHOCYTES NFR BLD: 38.5 % (ref 18–48)
MCH RBC QN AUTO: 32 PG (ref 27–31)
MCHC RBC AUTO-ENTMCNC: 33 G/DL (ref 32–36)
MCV RBC AUTO: 97 FL (ref 82–98)
MONOCYTES # BLD AUTO: 0.6 K/UL (ref 0.3–1)
MONOCYTES NFR BLD: 6.3 % (ref 4–15)
NEUTROPHILS # BLD AUTO: 4.6 K/UL (ref 1.8–7.7)
NEUTROPHILS NFR BLD: 51 % (ref 38–73)
NRBC BLD-RTO: 0 /100 WBC
PLATELET # BLD AUTO: 281 K/UL (ref 150–450)
PMV BLD AUTO: 9.7 FL (ref 9.2–12.9)
POTASSIUM SERPL-SCNC: 4.6 MMOL/L (ref 3.5–5.1)
PROT SERPL-MCNC: 7.6 G/DL (ref 6–8.4)
RBC # BLD AUTO: 3.88 M/UL (ref 4–5.4)
SODIUM SERPL-SCNC: 142 MMOL/L (ref 136–145)
WBC # BLD AUTO: 9.06 K/UL (ref 3.9–12.7)

## 2024-12-13 PROCEDURE — 83036 HEMOGLOBIN GLYCOSYLATED A1C: CPT | Performed by: INTERNAL MEDICINE

## 2024-12-13 PROCEDURE — 86140 C-REACTIVE PROTEIN: CPT | Performed by: INTERNAL MEDICINE

## 2024-12-13 PROCEDURE — 85025 COMPLETE CBC W/AUTO DIFF WBC: CPT | Performed by: INTERNAL MEDICINE

## 2024-12-13 PROCEDURE — 80053 COMPREHEN METABOLIC PANEL: CPT | Performed by: INTERNAL MEDICINE

## 2024-12-13 PROCEDURE — 85651 RBC SED RATE NONAUTOMATED: CPT | Performed by: INTERNAL MEDICINE

## 2024-12-15 NOTE — PROGRESS NOTES
"Pt has [facility] - if message below not viewed please call w information below:   A1c remains in the "prediabetic" range - with discontinuation of systemic steriod would expect further improvement in blood sugars.  Are you still taking semaglutide? If so, what dose and do you need a refill?    Please message or call with any questions or concerns!  Thank you!  Moon Lion MD, MPH  [facility] "

## 2024-12-16 ENCOUNTER — TELEPHONE (OUTPATIENT)
Dept: PRIMARY CARE CLINIC | Facility: CLINIC | Age: 74
End: 2024-12-16
Payer: MEDICARE

## 2024-12-16 RX ORDER — BENAZEPRIL HYDROCHLORIDE 40 MG/1
40 TABLET ORAL DAILY
Qty: 90 TABLET | Refills: 3 | Status: SHIPPED | OUTPATIENT
Start: 2024-12-16

## 2024-12-16 NOTE — TELEPHONE ENCOUNTER
"Pt viewed results via [employer].        ----- Message from Moon Lion MD sent at 12/15/2024  9:09 AM CST -----  Pt has [facility] - if message below not viewed please call w information below:   A1c remains in the "prediabetic" range - with discontinuation of systemic steriod would expect further improvement in blood sugars.  Are you still taking semaglutide? If so, what dose and do you need a refill?    Please message or call with any questions or concerns!  Thank you!  Moon Lion MD, MPH  [facility]   "

## 2024-12-23 DIAGNOSIS — I15.2 HYPERTENSION ASSOCIATED WITH DIABETES: ICD-10-CM

## 2024-12-23 DIAGNOSIS — E11.59 HYPERTENSION ASSOCIATED WITH DIABETES: ICD-10-CM

## 2024-12-23 RX ORDER — SPIRONOLACTONE 50 MG/1
50 TABLET, FILM COATED ORAL
Qty: 90 TABLET | Refills: 3 | Status: SHIPPED | OUTPATIENT
Start: 2024-12-23

## 2024-12-24 ENCOUNTER — PATIENT MESSAGE (OUTPATIENT)
Dept: ADMINISTRATIVE | Facility: OTHER | Age: 74
End: 2024-12-24
Payer: MEDICARE

## 2025-01-02 RX ORDER — MELOXICAM 7.5 MG/1
TABLET ORAL
Qty: 90 TABLET | Refills: 0 | Status: SHIPPED | OUTPATIENT
Start: 2025-01-02

## 2025-01-23 ENCOUNTER — PATIENT MESSAGE (OUTPATIENT)
Dept: ADMINISTRATIVE | Facility: OTHER | Age: 75
End: 2025-01-23
Payer: MEDICARE

## 2025-01-25 DIAGNOSIS — M05.79 RHEUMATOID ARTHRITIS INVOLVING MULTIPLE SITES WITH POSITIVE RHEUMATOID FACTOR: ICD-10-CM

## 2025-01-27 RX ORDER — METHOTREXATE 2.5 MG/1
10 TABLET ORAL
Qty: 16 TABLET | Refills: 2 | Status: SHIPPED | OUTPATIENT
Start: 2025-01-27

## 2025-01-31 ENCOUNTER — NURSE TRIAGE (OUTPATIENT)
Dept: ADMINISTRATIVE | Facility: CLINIC | Age: 75
End: 2025-01-31
Payer: MEDICARE

## 2025-01-31 ENCOUNTER — OFFICE VISIT (OUTPATIENT)
Dept: FAMILY MEDICINE | Facility: CLINIC | Age: 75
End: 2025-01-31
Payer: MEDICARE

## 2025-01-31 ENCOUNTER — TELEPHONE (OUTPATIENT)
Dept: RHEUMATOLOGY | Facility: CLINIC | Age: 75
End: 2025-01-31
Payer: MEDICARE

## 2025-01-31 VITALS
BODY MASS INDEX: 29.62 KG/M2 | TEMPERATURE: 98 F | HEIGHT: 64 IN | OXYGEN SATURATION: 96 % | SYSTOLIC BLOOD PRESSURE: 126 MMHG | DIASTOLIC BLOOD PRESSURE: 80 MMHG | WEIGHT: 173.5 LBS | HEART RATE: 78 BPM

## 2025-01-31 DIAGNOSIS — E11.59 HYPERTENSION ASSOCIATED WITH DIABETES: Chronic | ICD-10-CM

## 2025-01-31 DIAGNOSIS — N18.31 STAGE 3A CHRONIC KIDNEY DISEASE: Chronic | ICD-10-CM

## 2025-01-31 DIAGNOSIS — E78.2 COMBINED HYPERLIPIDEMIA ASSOCIATED WITH TYPE 2 DIABETES MELLITUS: Chronic | ICD-10-CM

## 2025-01-31 DIAGNOSIS — E11.69 COMBINED HYPERLIPIDEMIA ASSOCIATED WITH TYPE 2 DIABETES MELLITUS: Chronic | ICD-10-CM

## 2025-01-31 DIAGNOSIS — I15.2 HYPERTENSION ASSOCIATED WITH DIABETES: Chronic | ICD-10-CM

## 2025-01-31 DIAGNOSIS — E11.22 TYPE 2 DIABETES MELLITUS WITH STAGE 3A CHRONIC KIDNEY DISEASE, WITHOUT LONG-TERM CURRENT USE OF INSULIN: Chronic | ICD-10-CM

## 2025-01-31 DIAGNOSIS — M79.642 LEFT HAND PAIN: Primary | ICD-10-CM

## 2025-01-31 DIAGNOSIS — K21.9 GASTROESOPHAGEAL REFLUX DISEASE, UNSPECIFIED WHETHER ESOPHAGITIS PRESENT: ICD-10-CM

## 2025-01-31 DIAGNOSIS — N18.31 TYPE 2 DIABETES MELLITUS WITH STAGE 3A CHRONIC KIDNEY DISEASE, WITHOUT LONG-TERM CURRENT USE OF INSULIN: Chronic | ICD-10-CM

## 2025-01-31 DIAGNOSIS — E11.40 CONTROLLED TYPE 2 DIABETES MELLITUS WITH DIABETIC NEUROPATHY, WITHOUT LONG-TERM CURRENT USE OF INSULIN: ICD-10-CM

## 2025-01-31 DIAGNOSIS — M19.90 INFLAMMATORY ARTHRITIS: ICD-10-CM

## 2025-01-31 PROCEDURE — 99999 PR PBB SHADOW E&M-EST. PATIENT-LVL V: CPT | Mod: PBBFAC,,,

## 2025-01-31 RX ORDER — DEXAMETHASONE SODIUM PHOSPHATE 4 MG/ML
4 INJECTION, SOLUTION INTRA-ARTICULAR; INTRALESIONAL; INTRAMUSCULAR; INTRAVENOUS; SOFT TISSUE
Status: COMPLETED | OUTPATIENT
Start: 2025-01-31 | End: 2025-01-31

## 2025-01-31 RX ADMIN — DEXAMETHASONE SODIUM PHOSPHATE 4 MG: 4 INJECTION, SOLUTION INTRA-ARTICULAR; INTRALESIONAL; INTRAMUSCULAR; INTRAVENOUS; SOFT TISSUE at 02:01

## 2025-01-31 NOTE — PROGRESS NOTES
Kristyn Garza  02/13/2025  [id number]    [place] MD      Chief Complaint:  Chief Complaint   Patient presents with    Hand Pain       History of Present Illness:  Mrs. Garza presents to clinic with c/o pain to her L hand. States that it hurts to open and close her hand. Feels the cooler weather is making pain worse. She has extensive history with joint pain as she has inflammatory arthritis and is being seen by rheumatology. She recently received medrol pack and Norco 5mg per rheumatology. Patient states she has been taking this medication without relief from pain. Prior to being prescribed these medications, she was taking Tylenol which typically helps to manage pain, but because of the severity it did not. She has upcoming appointment with rheumatology for f/u.       History:  Past Medical History:   Diagnosis Date    Anxiety 05/27/2024    Carpal tunnel syndrome     CKD (chronic kidney disease) stage 3, GFR 30-59 ml/min     Followed by Nephrology    Colon cancer screening 03/18/2014    COVID-19 08/30/2023    CTS (carpal tunnel syndrome) 06/18/2013    Diabetes mellitus, type 2     Dizziness 06/19/2023    Eczema     Elevated CPK     Gastroesophageal reflux disease 07/29/2020    History of hypokalemia 06/18/2013    History of hypokalemia 06/18/2013    Hypokalemia     Major depressive disorder, single episode, mild 05/27/2024    Multinodular thyroid     Followed by ENT    Nicotine dependence, cigarettes, in remission 06/27/2024    Obesity     Obesity (BMI 30.0-34.9) 05/12/2015    Other and unspecified hyperlipidemia     Pneumonia     in her 20s; hospitalized    Postmenopausal     No history of abnormal pap smear    Secondary hyperparathyroidism of renal origin 10/26/2021    Statin intolerance     caused mylagia, elevated CPK    Tobacco dependence     resolved    Unspecified essential hypertension      Past Surgical History:   Procedure Laterality Date    COLONOSCOPY      CYST REMOVAL  2015    On the head     FINE NEEDLE ASPIRATION  3/2011    thyroid nodules x3 (negative per patient)    HYSTERECTOMY      PARTIAL HYSTERECTOMY      Due to fibroids     Family History   Problem Relation Name Age of Onset    Hypertension Mother      Diabetes Mother      Dementia Mother          Alzheimer's dementia    Hypertension Father      Heart disease Sister          MI/CAD    Cataracts Sister      Dementia Sister      No Known Problems Son      No Known Problems Son      Cataracts Brother      Cataracts Brother      Dementia Sister      Cancer Neg Hx      Stroke Neg Hx      Melanoma Neg Hx      Psoriasis Neg Hx      Lupus Neg Hx      Eczema Neg Hx      COPD Neg Hx      Kidney disease Neg Hx       Social History     Socioeconomic History    Marital status:     Number of children: 2    Highest education level: Some college, no degree   Occupational History    Occupation: Retired   Tobacco Use    Smoking status: Former     Current packs/day: 0.00     Average packs/day: 1 pack/day for 43.4 years (43.4 ttl pk-yrs)     Types: Cigarettes     Start date: 1970     Quit date: 2013     Years since quittin.7    Smokeless tobacco: Never   Substance and Sexual Activity    Alcohol use: Yes     Alcohol/week: 0.0 standard drinks of alcohol     Comment: Rarely drinks wine    Drug use: No    Sexual activity: Not Currently     Partners: Male     Birth control/protection: Condom   Social History Narrative    She wears seatbelt. . 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      Social Drivers of Health     Financial Resource Strain: Low Risk  (2025)    Overall Financial Resource Strain (CARDIA)     Difficulty of Paying Living Expenses: Not very hard   Food Insecurity: No Food Insecurity (2025)    Hunger Vital Sign     Worried About Running Out of Food in the Last Year: Never true     Ran Out of Food in the Last Year: Never true   Transportation Needs: No Transportation Needs (2025)    PRAPARE - Transportation      Lack of Transportation (Medical): No     Lack of Transportation (Non-Medical): No   Physical Activity: Inactive (2/6/2025)    Exercise Vital Sign     Days of Exercise per Week: 0 days     Minutes of Exercise per Session: 0 min   Stress: No Stress Concern Present (2/6/2025)    [employer] [place] - Occupational Stress Questionnaire     Feeling of Stress : Not at all   Housing Stability: Unknown (2/6/2025)    Housing Stability Vital Sign     Unable to Pay for Housing in the Last Year: No     Homeless in the Last Year: No        Review of patient's allergies indicates:   Allergen Reactions    Buspar [buspirone] Other (See Comments)     Lip swelling    Statins-hmg-coa reductase inhibitors      Muscle Cramps       The following were reviewed at this visit: active problem list, medication list, allergies, family history, social history, and health maintenance.    Medications:  Current Outpatient Medications on File Prior to Visit   Medication Sig Dispense Refill    albuterol (VENTOLIN HFA) 90 mcg/actuation inhaler Inhale 2 puffs into the lungs every 4 (four) hours as needed for Wheezing or Shortness of Breath (before exercise). Rescue 18 g 11    amLODIPine (NORVASC) 10 MG tablet TAKE 1 TABLET ONE TIME DAILY 90 tablet 3    aspirin 81 MG Chew Take 81 mg by mouth once daily.      azelastine (ASTELIN) 137 mcg (0.1 %) nasal spray 1 spray (137 mcg total) by Nasal route 2 (two) times daily as needed for Rhinitis. 30 mL 11    benazepriL (LOTENSIN) 40 MG tablet TAKE 1 TABLET (40 MG TOTAL) BY MOUTH ONCE DAILY. 90 tablet 3    blood glucose control, normal Soln 1 Bottle by Misc.(Non-Drug; Combo Route) route once daily. For Accu Check Amber  use twice daily prn dx: E11.9 1 each 0    blood sugar diagnostic (ACCU-CHEK SMARTVIEW TEST STRIP) Strp TO CHECK BLOOD GLUCOSE ONE TIMES DAILY 50 strip 1    blood-glucose meter (ACCU-CHEK AMBER) Misc 1 kit by Misc.(Non-Drug; Combo Route) route once. For Accu Check Amber  use  "twice daily prn dx: E11.9 for 1 dose 1 each 0    calcium-vitamin D3 500 mg(1,250mg) -200 unit per tablet Take 1 tablet by mouth once daily.       cetirizine (ZYRTEC) 10 MG tablet Take 1 tablet (10 mg total) by mouth once daily. 30 tablet 12    CRANBERRY EXTRACT ORAL Take 1 tablet by mouth once daily.      diclofenac sodium (VOLTAREN ARTHRITIS PAIN) 1 % Gel Apply 2 g topically once daily. 20 g 0    evolocumab (REPATHA SURECLICK) 140 mg/mL PnIj Inject 1 mL (140 mg total) into the skin every 14 (fourteen) days. 2 mL 5    folic acid (FOLVITE) 1 MG tablet Take 1 tablet (1 mg total) by mouth once daily. 90 tablet 2    meloxicam (MOBIC) 7.5 MG tablet TAKE 1 TABLET BY MOUTH ONCE DAILY. START THIS MEDICATION ONLY AFTER COMPLETION OF GIVEN DOSE PACK 90 tablet 0    methotrexate 2.5 MG Tab TAKE 4 TABLETS BY MOUTH EVERY 7 DAYS. 16 tablet 2    methylPREDNISolone (MEDROL DOSEPACK) 4 mg tablet use as directed 21 tablet 0    metoprolol succinate (TOPROL-XL) 25 MG 24 hr tablet TAKE 1 TABLET ONE TIME DAILY 180 tablet 3    MULTIVITAMIN W-MINERALS/LUTEIN (CENTRUM SILVER ORAL) Take 1 tablet by mouth once daily.      pen needle, diabetic (BD ULTRA-FINE MICRO PEN NEEDLE) 32 gauge x 1/4" Ndle 1 Needle by Misc.(Non-Drug; Combo Route) route once a week. 30 each 1    potassium chloride SA (K-DUR,KLOR-CON) 20 MEQ tablet TAKE 1 TABLET ONE TIME DAILY 90 tablet 3    sertraline (ZOLOFT) 25 MG tablet Take 1 tablet (25 mg total) by mouth every evening. 30 tablet 5    spironolactone (ALDACTONE) 50 MG tablet TAKE 1 TABLET EVERY DAY 90 tablet 3     No current facility-administered medications on file prior to visit.       Medications have been reviewed and reconciled with patient at this visit.      Exam:  Wt Readings from Last 3 Encounters:   02/06/25 77.5 kg (170 lb 13.7 oz)   01/31/25 78.7 kg (173 lb 8 oz)   11/29/24 77.2 kg (170 lb 3.1 oz)     Temp Readings from Last 3 Encounters:   02/06/25 96.9 °F (36.1 °C) (Temporal)   01/31/25 98.1 °F (36.7 °C) " (Tympanic)   11/29/24 96.9 °F (36.1 °C) (Temporal)     BP Readings from Last 3 Encounters:   02/06/25 120/68   01/31/25 126/80   11/29/24 122/60     Pulse Readings from Last 3 Encounters:   02/06/25 69   01/31/25 78   11/29/24 74     Body mass index is 29.78 kg/m².  Review of Systems   Respiratory:  Negative for shortness of breath.    Cardiovascular:  Negative for chest pain.   Musculoskeletal:  Positive for joint pain (L hand).   Neurological:  Negative for dizziness.   Psychiatric/Behavioral: Negative.       Physical Exam  Constitutional:       Appearance: Normal appearance.   HENT:      Head: Normocephalic and atraumatic.   Eyes:      Extraocular Movements: Extraocular movements intact.      Conjunctiva/sclera: Conjunctivae normal.   Cardiovascular:      Rate and Rhythm: Normal rate and regular rhythm.      Pulses: Normal pulses.      Heart sounds: Normal heart sounds.   Pulmonary:      Effort: Pulmonary effort is normal.      Breath sounds: Normal breath sounds.   Abdominal:      General: Bowel sounds are normal.      Palpations: Abdomen is soft.   Musculoskeletal:      Left hand: No swelling, tenderness or bony tenderness. Decreased range of motion. Decreased strength.   Skin:     General: Skin is warm.      Capillary Refill: Capillary refill takes less than 2 seconds.   Neurological:      General: No focal deficit present.      Mental Status: She is alert and oriented to person, place, and time.   Psychiatric:         Mood and Affect: Mood normal.         Behavior: Behavior normal.         Thought Content: Thought content normal.         Judgment: Judgment normal.         Laboratory Reviewed ({Yes)  Lab Results   Component Value Date    WBC 10.25 02/10/2025    HGB 12.3 02/10/2025    HCT 37.2 02/10/2025     02/10/2025    CHOL 131 09/10/2024    TRIG 60 09/10/2024    HDL 41 09/10/2024    ALT 40 02/10/2025    AST 19 02/10/2025     02/10/2025    K 4.6 02/10/2025     02/10/2025    CREATININE 1.1  02/10/2025    BUN 13 02/10/2025    CO2 27 02/10/2025    TSH 0.509 03/01/2024    HGBA1C 5.9 (H) 12/13/2024       Kristyn was seen today for hand pain.    Diagnoses and all orders for this visit:    Left hand pain  -     dexAMETHasone injection 4 mg    Inflammatory arthritis  -     dexAMETHasone injection 4 mg    Combined hyperlipidemia associated with type 2 diabetes mellitus    Hypertension associated with diabetes    Stage 3a chronic kidney disease    Type 2 diabetes mellitus with stage 3a chronic kidney disease, without long-term current use of insulin    Gastroesophageal reflux disease, unspecified whether esophagitis present    Controlled type 2 diabetes mellitus with diabetic neuropathy, without long-term current use of insulin      Plan:  1) decadron 4mg IM x 1 in office  2) stop medrol pack for now. Continue Norco PRN pain  3) keep upcoming appointment with rheumatology.     Follow up: Follow up if symptoms worsen or fail to improve.    After visit summary was printed and given to patient upon discharge today.  Patient goals and care plan are included in After Visit Summary.

## 2025-01-31 NOTE — TELEPHONE ENCOUNTER
----- Message from Gabrieltomasdanica sent at 1/31/2025  9:12 AM CST -----  Contact: AXEL CHAVEZ [[id number]]  ..Type:  Patient Requesting Call    Who Called:AXEL CHAVEZ [[id number]]  Does the patient know what this is regarding?:pt states she is taking medication and still in pain and wants to speak with the provider   Would the patient rather a call back or a response via [facility]? call  Best Call Back Number:.[phone] ([place])     Additional Information:   pt has temp of 102.6, hr of 122.

## 2025-01-31 NOTE — TELEPHONE ENCOUNTER
"Patient c/o hand pain. Protocol is to be seen in clinic today. An appointment was scheduled for the patient. Advised the patient to call back with any further questions or if symptoms worsen.      Reason for Disposition   SEVERE pain (e.g., excruciating, unable to use hand at all)    Additional Information   Negative: Similar pain previously and it was from "heart attack"   Negative: Similar pain previously from 'angina' and not relieved by nitroglycerin   Negative: Sounds like a life-threatening emergency to the triager   Negative: Entire hand is cool or blue in comparison to other side   Negative: Swollen joint and fever   Negative: Red area or streak and fever   Negative: Patient sounds very sick or weak to the triager    Protocols used: Hand Pain-A-[place]    "

## 2025-01-31 NOTE — TELEPHONE ENCOUNTER
Patient did go to her pcp and received a steroid shot for the pain and she will wait to see you on the 17th to discuss further

## 2025-01-31 NOTE — TELEPHONE ENCOUNTER
Spoke w/ patient and she states that she is in extreme pain. She took two pills of the medrol dosepack and she states that it is not helping. Patient has been unable to sleep. She is having pain from her left elbow to her hand.

## 2025-02-06 ENCOUNTER — RESEARCH ENCOUNTER (OUTPATIENT)
Dept: RESEARCH | Facility: HOSPITAL | Age: 75
End: 2025-02-06
Payer: MEDICARE

## 2025-02-06 ENCOUNTER — OFFICE VISIT (OUTPATIENT)
Dept: PRIMARY CARE CLINIC | Facility: CLINIC | Age: 75
End: 2025-02-06
Payer: MEDICARE

## 2025-02-06 VITALS
HEART RATE: 69 BPM | HEIGHT: 64 IN | WEIGHT: 170.88 LBS | TEMPERATURE: 97 F | DIASTOLIC BLOOD PRESSURE: 68 MMHG | BODY MASS INDEX: 29.17 KG/M2 | SYSTOLIC BLOOD PRESSURE: 120 MMHG | OXYGEN SATURATION: 96 %

## 2025-02-06 DIAGNOSIS — E26.01 ALDOSTERONE EXCESS (CONN SYNDROME): Chronic | ICD-10-CM

## 2025-02-06 DIAGNOSIS — E11.40 CONTROLLED TYPE 2 DIABETES MELLITUS WITH DIABETIC NEUROPATHY, WITHOUT LONG-TERM CURRENT USE OF INSULIN: ICD-10-CM

## 2025-02-06 DIAGNOSIS — N18.31 STAGE 3A CHRONIC KIDNEY DISEASE: Chronic | ICD-10-CM

## 2025-02-06 DIAGNOSIS — I15.2 HYPERTENSION ASSOCIATED WITH DIABETES: Chronic | ICD-10-CM

## 2025-02-06 DIAGNOSIS — E11.69 COMBINED HYPERLIPIDEMIA ASSOCIATED WITH TYPE 2 DIABETES MELLITUS: Chronic | ICD-10-CM

## 2025-02-06 DIAGNOSIS — I70.0 AORTIC ATHEROSCLEROSIS: Chronic | ICD-10-CM

## 2025-02-06 DIAGNOSIS — R91.8 PULMONARY NODULES: Chronic | ICD-10-CM

## 2025-02-06 DIAGNOSIS — Z00.00 ENCOUNTER FOR PREVENTIVE HEALTH EXAMINATION: Primary | ICD-10-CM

## 2025-02-06 DIAGNOSIS — E78.2 COMBINED HYPERLIPIDEMIA ASSOCIATED WITH TYPE 2 DIABETES MELLITUS: Chronic | ICD-10-CM

## 2025-02-06 DIAGNOSIS — M19.90 INFLAMMATORY ARTHRITIS: ICD-10-CM

## 2025-02-06 DIAGNOSIS — E11.59 HYPERTENSION ASSOCIATED WITH DIABETES: Chronic | ICD-10-CM

## 2025-02-06 DIAGNOSIS — F41.9 ANXIETY: Chronic | ICD-10-CM

## 2025-02-06 PROBLEM — M25.532 ACUTE PAIN OF LEFT WRIST: Status: RESOLVED | Noted: 2024-10-02 | Resolved: 2025-02-06

## 2025-02-06 PROBLEM — F17.211 NICOTINE DEPENDENCE, CIGARETTES, IN REMISSION: Status: RESOLVED | Noted: 2024-06-27 | Resolved: 2025-02-06

## 2025-02-06 PROBLEM — K21.9 GASTROESOPHAGEAL REFLUX DISEASE: Status: RESOLVED | Noted: 2020-07-29 | Resolved: 2025-02-06

## 2025-02-06 PROCEDURE — 99999 PR PBB SHADOW E&M-EST. PATIENT-LVL V: CPT | Mod: PBBFAC,,,

## 2025-02-06 PROCEDURE — 3288F FALL RISK ASSESSMENT DOCD: CPT | Mod: CPTII,S$GLB,,

## 2025-02-06 PROCEDURE — 3074F SYST BP LT 130 MM HG: CPT | Mod: CPTII,S$GLB,,

## 2025-02-06 PROCEDURE — G0439 PPPS, SUBSEQ VISIT: HCPCS | Mod: S$GLB,,,

## 2025-02-06 PROCEDURE — 1160F RVW MEDS BY RX/DR IN RCRD: CPT | Mod: CPTII,S$GLB,,

## 2025-02-06 PROCEDURE — 1159F MED LIST DOCD IN RCRD: CPT | Mod: CPTII,S$GLB,,

## 2025-02-06 PROCEDURE — 1126F AMNT PAIN NOTED NONE PRSNT: CPT | Mod: CPTII,S$GLB,,

## 2025-02-06 PROCEDURE — 1170F FXNL STATUS ASSESSED: CPT | Mod: CPTII,S$GLB,,

## 2025-02-06 PROCEDURE — 1101F PT FALLS ASSESS-DOCD LE1/YR: CPT | Mod: CPTII,S$GLB,,

## 2025-02-06 PROCEDURE — 1123F ACP DISCUSS/DSCN MKR DOCD: CPT | Mod: CPTII,S$GLB,,

## 2025-02-06 PROCEDURE — 3072F LOW RISK FOR RETINOPATHY: CPT | Mod: CPTII,S$GLB,,

## 2025-02-06 PROCEDURE — 3078F DIAST BP <80 MM HG: CPT | Mod: CPTII,S$GLB,,

## 2025-02-06 NOTE — ASSESSMENT & PLAN NOTE
Recommend getting CT Chest lung Screening completed   F/U w/ Pulmonology   No acute respiratory distressed noted

## 2025-02-06 NOTE — ASSESSMENT & PLAN NOTE
HGB A1c 5.9  Recommend Diet change and increase exercise      Lab Results   Component Value Date    CHOL 131 09/10/2024    HDL 41 09/10/2024    LDLCALC 78.0 09/10/2024    TRIG 60 09/10/2024     Chronic, stable  Continue Repatha   Include vigorous activity and weight loss in healthcare plan  Focus on consumption of fruits, vegetables, fiber, and monounsaturated fats (DASH diet and Mediterranean Diet).

## 2025-02-06 NOTE — PROGRESS NOTES
"  Kristyn Garza presented for a follow-up Medicare AWV today. The following components were reviewed and updated:    Medical history  Family History  Social history  Allergies and Current Medications  Health Risk Assessment  Health Maintenance  Care Team    **See Completed Assessments for Annual Wellness visit with in the encounter summary    The following assessments were completed:  Depression Screening  Cognitive function Screening  Timed Get Up Test  Whisper Test  Nutrition Screening      Time asked: 10 minutes after 11 o'clock (11:10)    Opioid documentation for eAWV      Patient does not have a current opioid prescription.         Vitals:    02/06/25 1305   BP: 120/68   BP Location: Left arm   Patient Position: Sitting   Pulse: 69   Temp: 96.9 °F (36.1 °C)   TempSrc: Temporal   SpO2: 96%   Weight: 77.5 kg (170 lb 13.7 oz)   Height: 5' 4" (1.626 m)     Body mass index is 29.33 kg/m².                Physical Exam  Vitals reviewed.   Constitutional:       General: She is awake.      Appearance: Normal appearance. She is overweight.   HENT:      Head: Normocephalic.      Right Ear: Tympanic membrane, ear canal and external ear normal.      Left Ear: Tympanic membrane, ear canal and external ear normal.      Nose: Nose normal.      Mouth/Throat:      Mouth: Mucous membranes are moist.      Pharynx: Oropharynx is clear.   Eyes:      General: Lids are normal.      Extraocular Movements: Extraocular movements intact.      Conjunctiva/sclera: Conjunctivae normal.      Pupils: Pupils are equal, round, and reactive to light.      Comments: Glasses on   Cardiovascular:      Rate and Rhythm: Normal rate and regular rhythm.      Pulses: Normal pulses.      Heart sounds: Normal heart sounds.   Pulmonary:      Effort: Pulmonary effort is normal.      Breath sounds: Normal breath sounds.   Abdominal:      General: Bowel sounds are normal.      Palpations: Abdomen is soft.   Musculoskeletal:         General: Normal range of " motion.      Cervical back: Normal range of motion and neck supple.   Skin:     General: Skin is warm and dry.      Capillary Refill: Capillary refill takes less than 2 seconds.   Neurological:      General: No focal deficit present.      Mental Status: She is alert and oriented to person, place, and time. Mental status is at baseline.   Psychiatric:         Mood and Affect: Mood normal.         Behavior: Behavior normal. Behavior is cooperative.         Thought Content: Thought content normal.         Judgment: Judgment normal.               Health Maintenance         Date Due Completion Date    Shingles Vaccine (1 of 2) 04/20/2011 2/23/2011    TETANUS VACCINE 02/23/2021 2/23/2011    RSV Vaccine (Age 60+ and Pregnant patients) (1 - 1-dose 75+ series) Never done ---    Colonoscopy 03/18/2024 3/18/2014    Override on 6/1/2006: Done    COVID-19 Vaccine (4 - 2024-25 season) 09/01/2024 12/15/2021    Diabetic Eye Exam 03/01/2025 3/1/2024    Override on 7/20/2020: Done    Override on 4/30/2019: Done    Override on 4/24/2018: Done    Override on 5/17/2017: Done    Override on 5/16/2016: Done    Override on 5/13/2015: Done    Hemoglobin A1c 06/13/2025 12/13/2024    LDCT Lung Screen 06/27/2025 6/27/2024    Diabetes Urine Screening 06/27/2025 6/27/2024    Mammogram 07/26/2025 7/26/2024    Override on 3/13/2013: Done    Lipid Panel 09/10/2025 9/10/2024    Foot Exam 02/06/2026 2/6/2025    Override on 9/23/2015: Done    Override on 7/16/2015: Done    Override on 3/23/2015: Done    Override on 12/3/2014: Done    Override on 4/29/2014: Done    DEXA Scan 05/26/2026 5/26/2021    Override on 3/10/2011: Done (normal repeat in 5 years)              PROBLEM LIST ITEMS ADDRESSED THIS VISIT:    1. Encounter for preventive health examination  Assessment & Plan:  Up-To-Date w/ recommended age appropirate screening   Up-To-Date w/ recommended vaccines   Review for Opioid Screening: Pt does not have Rx for Opioids    Review for Substance Use  Disorders: Patient does not use illicit substances as reported        2. Controlled type 2 diabetes mellitus with diabetic neuropathy, without long-term current use of insulin  Overview:  Neuropathic sx hands/feet.    Assessment & Plan:  Stop taking Ozempic, due to ongoing nausea   Using Repatha  Managing diabetes w/ diet and exercise   Hemoglobin A1C   Date Value Ref Range Status   12/13/2024 5.9 (H) 4.0 - 5.6 % Final     Comment:     ADA Screening Guidelines:  5.7-6.4%  Consistent with prediabetes  >or=6.5%  Consistent with diabetes    High levels of fetal hemoglobin interfere with the HbA1C  assay. Heterozygous hemoglobin variants (HbS, HgC, etc)do  not significantly interfere with this assay.   However, presence of multiple variants may affect accuracy.     06/27/2024 5.8 (H) 4.0 - 5.6 % Final     Comment:     ADA Screening Guidelines:  5.7-6.4%  Consistent with prediabetes  >or=6.5%  Consistent with diabetes    High levels of fetal hemoglobin interfere with the HbA1C  assay. Heterozygous hemoglobin variants (HbS, HgC, etc)do  not significantly interfere with this assay.   However, presence of multiple variants may affect accuracy.     03/01/2024 7.5 (H) 4.0 - 5.6 % Final     Comment:     ADA Screening Guidelines:  5.7-6.4%  Consistent with prediabetes  >or=6.5%  Consistent with diabetes    High levels of fetal hemoglobin interfere with the HbA1C  assay. Heterozygous hemoglobin variants (HbS, HgC, etc)do  not significantly interfere with this assay.   However, presence of multiple variants may affect accuracy.            3. Aldosterone excess (Conn syndrome)  Overview:  Previous Endocrine consult 2022 recommended to cont medical management for suspected renin-mediated aldosterone excess - ie cont spironolactone - and deferring extensive w/u to r/o primary hyperaldosteronism - if pt becomes more symptomatic consider further f/u w endocrine     Assessment & Plan:  Continue spironolactone & potassium  supplement  Closely monitor  Potassium 3.5 - 5.1 mmol/L 4.6 4.7 3.5     Cortisol, 8 AM 4.30 - 22.40 ug/dL <1.00 Low  <1.00 Low          4. Inflammatory arthritis  Assessment & Plan:  Exacerbated by cold weather & overuse, left wrist discomfort   Keep Rheumatologist appointment   Recommend warm compressions to left hand as tolerated   Continue to monitor        5. Aortic atherosclerosis  Overview:  Incidental finding on recent imaging:  CT Chest w/o Contrast Heart and Great vessels: Scattered atherosclerotic plaque noted involving the thoracic aorta and aortic branch vessels, including the coronary arteries.  No pericardial effusion.      Assessment & Plan:  Monitor BP, blood glucose and cholesterol  Maintain a low fat diet, low sodium diet  Recommend exercise at least 30 minutes daily.    Continue Repatha, HTN management & Diabetes Management       6. Combined hyperlipidemia associated with type 2 diabetes mellitus  Overview:  Statin intolerance - goal LDL < 70 w Repatha    Assessment & Plan:  HGB A1c 5.9  Recommend Diet change and increase exercise      Lab Results   Component Value Date    CHOL 131 09/10/2024    HDL 41 09/10/2024    LDLCALC 78.0 09/10/2024    TRIG 60 09/10/2024     Chronic, stable  Continue Repatha   Include vigorous activity and weight loss in healthcare plan  Focus on consumption of fruits, vegetables, fiber, and monounsaturated fats (DASH diet and Mediterranean Diet).         7. Hypertension associated with diabetes  Assessment & Plan:  Controlled this visit  Continue HTN management  Continue daily activity, low sodium diet, weight loss efforts  BP Readings from Last 3 Encounters:   02/06/25 120/68   01/31/25 126/80   11/29/24 122/60       Continue monitoring blood glucose at home  HGB A1c 5.9   Controlled w/ diet and exercise   No longer taking medication for diabetes management       8. Anxiety  Assessment & Plan:  Haven't taken Zoloft for 6 months, mood stable  Self calm when getting  anxious  Recommend interaction w/ family   Encourage to start back exercising       9. Stage 3a chronic kidney disease  Assessment & Plan:  Lab Results   Component Value Date    EGFRNORACEVR 58 (A) 12/13/2024    EGFRNORACEVR 42.9 (A) 11/11/2024    EGFRNORACEVR 59 (A) 10/28/2024    CREATININE 1.0 12/13/2024    CREATININE 1.3 11/11/2024    CREATININE 1.0 10/28/2024    BUN 15 12/13/2024    BUN 22 11/11/2024    BUN 14 10/28/2024    ALBUMIN 4.0 12/13/2024    ALBUMIN 3.6 11/11/2024    ALBUMIN 3.6 10/28/2024   (    Managing blood pressure   Eating foods that are good for the heart and low in sodium   Exercising regularly as tolerated  Getting enough sleep   Managing blood sugar if you have diabetes   Avoiding medications that can further damage the kidneys such as NSAIDs (ibuprofen, naproxen)       10. Pulmonary nodules  Overview:  Incidental finding on imaging:  CT abd/pelvis 3/02/22:Small right lower lobe lung nodule.  Correlation with nonemergent chest CT could be considered for further evaluation.  F/u imaging:  CT Chest w/o contrast 12/09/22:  Multiple solid noncalcified pulmonary nodules throughout the lungs bilaterally with the largest measuring an average of 7 mm in the apex of the left upper lobe.  For multiple solid nodules with any 6 mm or greater, Fleischner Society guidelines recommend follow up with non-contrast chest CT at 3-6 months and 18-24 months after discovery.    Assessment & Plan:  Recommend getting CT Chest lung Screening completed   F/U w/ Pulmonology   No acute respiratory distressed noted             Advance Care Planning     Date: 02/06/2025    Power of   I initiated the process of voluntary advance care planning today and explained the importance of this process to the patient.  I introduced the concept of advance directives to the patient, as well. Then the patient received detailed information about the importance of designating a Health Care Power of  (HCPOA). She was also  "instructed to communicate with this person about their wishes for future healthcare, should she become sick and lose decision-making capacity. The patient has previously appointed a HCPOA. After our discussion, the patient has decided to complete a HCPOA and has appointed her son, health care agent:  Dale Garza & Alli Garza  & health care agent number:  [phone] (Dale) & [phone] (Alli) . I encouraged her to communicate with this person about their wishes for future healthcare, should she become sick and lose decision-making capacity.      A total of 5 min was spent on advance care planning, goals of care discussion, emotional support, formulating and communicating prognosis and exploring burden/benefit of various approaches of treatment. This discussion occurred on a fully voluntary basis with the verbal consent of the patient and/or family.    Advance Care Planning     Date: 02/06/2025    Living Will  During this visit, I engaged the patient  in the voluntary advance care planning process.  The patient and I reviewed the role for advance directives and their purpose in directing future healthcare if the patient's unable to speak for him/herself.  At this point in time, the patient does have full decision-making capacity.  We discussed different extreme health states that she could experience, and reviewed what kind of medical care she would want in those situations.  The patient communicated that if she were comatose and had little chance of a meaningful recovery, she would want machines/life-sustaining treatments used. In addition to the above preference, other important end-of-life issues for the patient include  "if vitals are stable and have a chance at life then desires to remain on life-sustaining treatments, but if brain dead desires to remove any machines or life-sustaining treatment" . The patient has completed a living will to reflect these preferences and The patient has already designated a " healthcare power of  to make decisions on her behalf.  I spent a total of 10 minutes engaging the patient in this advance care planning discussion.                  Provided Kristyn with a 5-10 year written screening schedule and personal prevention plan. Recommendations were developed using the USPSTF age appropriate recommendations. Education, counseling, and referrals were provided as needed.  After Visit Summary printed and given to patient which includes a list of additional screenings\tests needed.        Future Appointments   Date Time Provider Department Center   2/10/2025 10:25 AM LABORATORY, [facility]   2/17/2025 11:15 AM Abram Vásquez MD [facility] [place]   2/21/2025  9:00 AM Moon Lion MD BS [facility]    2/26/2025  9:40 AM PRE-ADMIT, ENDO -BR [facility] PREADMT Kev Arce   3/17/2025  9:20 AM Hammad Hardy, OD [facility] O[facility]   5/27/2025  8:30 AM Hammad Hardy, OD [facility]PH[facility]              Lizzeth Kim, JENNIFER, FNP-C  [facility] [address] [place] [address]  [phone] ph  [phone] fax      I offered to discuss advanced care planning, including how to pick a person who would make decisions for you if you were unable to make them for yourself, called a health care power of , and what kind of decisions you might make such as use of life sustaining treatments such as ventilators and tube feeding when faced with a life limiting illness recorded on a living will that they will need to know. (How you want to be cared for as you near the end of your natural life)     X Patient is interested in learning more about how to make advanced directives.  I provided them paperwork and offered to discuss this with them.

## 2025-02-06 NOTE — PROGRESS NOTES
Study Title: DAMIEN: Real-world Evidence to Advance Multi-Cancer Early Detection Health Equity (REACH/Galleri-Medicare study)    Protocol IRB #: [id number]     Sponsor: [employer]    : Tommy Lawson MD    Patient eligibility was checked prior to enrollment in the study. Patient met the following inclusion and exclusion criteria:     INCLUSION CRITERIA  Participant age is 50 years or older with Medicare coverage at the time of signing the Informed Consent form  Participant is eligible to receive the Galleri test, based on a determination by the study investigator or designee  Participant is capable of giving signed Informed Consent that is legally effective (consent provided by LAR is not permitted)  Participant is able to comprehend and respond to questions in participant questionnaires.    EXCLUSION CRITERIA  Participant having had a previous Galleri test not associated with this study  Participant is undergoing or referred for diagnostic evaluation due to clinical suspicion for cancer  Participant has a personal history of invasive or hematologic malignancy, diagnosed within the last 3 years prior to expected enrollment date or diagnosed greater than 3 years prior to expected enrollment date and never treated  Participant has had definitive treatment for invasive or hematologic malignancy within the 3 years prior to expected enrollment date  Participant is not able to comply with protocol procedures  Participant is not a current patient at a participating center  Participant is currently enrolled or was previously enrolled in another [facility]-sponsored study  Participant is current or previous employee/contractor of [employer]  Participant is currently pregnant (by participant's self-report of pregnancy status)    DOCUMENTATION OF INFORMED CONSENT    Prior to the Informed Consent (IC) being signed, or any study protocol required data collection, testing, procedure, or intervention being performed, the  following was done and/or discussed:  Patient was given a copy of the IC for review   Purpose of the study and qualifications to participate   Study design, Follow up schedule, and tests or procedures done at each visit  Confidentiality and HIPAA Authorization for Release of Medical Records for the research trial/ subject's rights/research related injury  Risk, Benefits, Alternative Treatments, Compensation and Costs  Participation in the research trial is voluntary and patient may withdraw at anytime  Contact information for study related questions    Patient verbalizes understanding of the above: Yes  Contact information for CRC and PI given to patient: Yes  Patient able to adequately summarize: the purpose of the study, the risks associated with the study, and all procedures, testing, and follow-ups associated with the study: Yes    Patient signed the informed consent form for the research study with an IRB approval date of [id number]. Each page of the consent form was reviewed with patient and all questions answered satisfactorily. Patient received a copy of the consent form. The original consent was scanned into electronic medical records.    INSURANCE VERIFICATION    Thoroughly discussed with patient that the study team does not expect them to be billed for this test. However, by participating in this trial, we cannot guarantee that they will not receive a bill, as cost ultimately depends on the details of their Medicare coverage. Patient voiced understanding.      BLOOD DRAW    Following IC being signed and prior to blood draw, patient completed all baseline/pretest questionnaires. The following specimens were collected from the pt at the time of this encounter via peripheral blood draw.    Blood draw location: Left Arm  Needle used: 21 gauge butterfly needle  Blood draw amount: 20ml  Blood draw time: 1250 PM

## 2025-02-06 NOTE — PATIENT INSTRUCTIONS
Counseling and Referral of Other Preventative  (Italic type indicates deductible and co-insurance are waived)    Patient Name: Kristyn Garza  Today's Date: 2/6/2025    Health Maintenance       Date Due Completion Date    Shingles Vaccine (1 of 2) 04/20/2011 2/23/2011    TETANUS VACCINE 02/23/2021 2/23/2011    RSV Vaccine (Age 60+ and Pregnant patients) (1 - 1-dose 75+ series) Never done ---    Colonoscopy 03/18/2024 3/18/2014    Override on 6/1/2006: Done    COVID-19 Vaccine (4 - 2024-25 season) 09/01/2024 12/15/2021    Diabetic Eye Exam 03/01/2025 3/1/2024    Override on 7/20/2020: Done    Override on 4/30/2019: Done    Override on 4/24/2018: Done    Override on 5/17/2017: Done    Override on 5/16/2016: Done    Override on 5/13/2015: Done    Hemoglobin A1c 06/13/2025 12/13/2024    LDCT Lung Screen 06/27/2025 6/27/2024    Diabetes Urine Screening 06/27/2025 6/27/2024    Mammogram 07/26/2025 7/26/2024    Override on 3/13/2013: Done    Lipid Panel 09/10/2025 9/10/2024    Foot Exam 11/29/2025 11/29/2024    Override on 9/23/2015: Done    Override on 7/16/2015: Done    Override on 3/23/2015: Done    Override on 12/3/2014: Done    Override on 4/29/2014: Done    DEXA Scan 05/26/2026 5/26/2021    Override on 3/10/2011: Done (normal repeat in 5 years)        No orders of the defined types were placed in this encounter.    The following information is provided to all patients.  This information is to help you find resources for any of the problems found today that may be affecting your health:                  Living healthy guide: www.[facility].[place].gov      Understanding Diabetes: www.diabetes.org      Eating healthy: www.cdc.gov/healthyweight      CDC home safety checklist: www.cdc.gov/steadi/patient.html      Agency on Aging: www.goea.[place].gov      Alcoholics anonymous (AA): www.aa.org      Physical Activity: www.barney.nih.gov/bg1iyaw      Tobacco use: www.quitwithusla.org          Statement Selected

## 2025-02-06 NOTE — ASSESSMENT & PLAN NOTE
Up-To-Date w/ recommended age appropirate screening   Up-To-Date w/ recommended vaccines   Review for Opioid Screening: Pt does not have Rx for Opioids    Review for Substance Use Disorders: Patient does not use illicit substances as reported

## 2025-02-06 NOTE — ASSESSMENT & PLAN NOTE
Exacerbated by cold weather & overuse, left wrist discomfort   Keep Rheumatologist appointment   Recommend warm compressions to left hand as tolerated   Continue to monitor

## 2025-02-06 NOTE — ASSESSMENT & PLAN NOTE
Haven't taken Zoloft for 6 months, mood stable  Self calm when getting anxious  Recommend interaction w/ family   Encourage to start back exercising

## 2025-02-06 NOTE — ASSESSMENT & PLAN NOTE
Stop taking Ozempic, due to ongoing nausea   Using Repatha  Managing diabetes w/ diet and exercise   Hemoglobin A1C   Date Value Ref Range Status   12/13/2024 5.9 (H) 4.0 - 5.6 % Final     Comment:     ADA Screening Guidelines:  5.7-6.4%  Consistent with prediabetes  >or=6.5%  Consistent with diabetes    High levels of fetal hemoglobin interfere with the HbA1C  assay. Heterozygous hemoglobin variants (HbS, HgC, etc)do  not significantly interfere with this assay.   However, presence of multiple variants may affect accuracy.     06/27/2024 5.8 (H) 4.0 - 5.6 % Final     Comment:     ADA Screening Guidelines:  5.7-6.4%  Consistent with prediabetes  >or=6.5%  Consistent with diabetes    High levels of fetal hemoglobin interfere with the HbA1C  assay. Heterozygous hemoglobin variants (HbS, HgC, etc)do  not significantly interfere with this assay.   However, presence of multiple variants may affect accuracy.     03/01/2024 7.5 (H) 4.0 - 5.6 % Final     Comment:     ADA Screening Guidelines:  5.7-6.4%  Consistent with prediabetes  >or=6.5%  Consistent with diabetes    High levels of fetal hemoglobin interfere with the HbA1C  assay. Heterozygous hemoglobin variants (HbS, HgC, etc)do  not significantly interfere with this assay.   However, presence of multiple variants may affect accuracy.

## 2025-02-07 NOTE — ASSESSMENT & PLAN NOTE
Controlled this visit  Continue HTN management  Continue daily activity, low sodium diet, weight loss efforts  BP Readings from Last 3 Encounters:   02/06/25 120/68   01/31/25 126/80   11/29/24 122/60       Continue monitoring blood glucose at home  HGB A1c 5.9   Controlled w/ diet and exercise   No longer taking medication for diabetes management

## 2025-02-07 NOTE — ASSESSMENT & PLAN NOTE
Monitor BP, blood glucose and cholesterol  Maintain a low fat diet, low sodium diet  Recommend exercise at least 30 minutes daily.    Continue Repatha, HTN management & Diabetes Management

## 2025-02-07 NOTE — ASSESSMENT & PLAN NOTE
Lab Results   Component Value Date    EGFRNORACEVR 58 (A) 12/13/2024    EGFRNORACEVR 42.9 (A) 11/11/2024    EGFRNORACEVR 59 (A) 10/28/2024    CREATININE 1.0 12/13/2024    CREATININE 1.3 11/11/2024    CREATININE 1.0 10/28/2024    BUN 15 12/13/2024    BUN 22 11/11/2024    BUN 14 10/28/2024    ALBUMIN 4.0 12/13/2024    ALBUMIN 3.6 11/11/2024    ALBUMIN 3.6 10/28/2024   (    Managing blood pressure   Eating foods that are good for the heart and low in sodium   Exercising regularly as tolerated  Getting enough sleep   Managing blood sugar if you have diabetes   Avoiding medications that can further damage the kidneys such as NSAIDs (ibuprofen, naproxen)

## 2025-02-07 NOTE — ASSESSMENT & PLAN NOTE
Continue spironolactone & potassium supplement  Closely monitor  Potassium 3.5 - 5.1 mmol/L 4.6 4.7 3.5     Cortisol, 8 AM 4.30 - 22.40 ug/dL <1.00 Low  <1.00 Low

## 2025-02-10 ENCOUNTER — LAB VISIT (OUTPATIENT)
Dept: LAB | Facility: HOSPITAL | Age: 75
End: 2025-02-10
Attending: INTERNAL MEDICINE
Payer: MEDICARE

## 2025-02-10 DIAGNOSIS — M19.90 ARTHRITIS: ICD-10-CM

## 2025-02-10 DIAGNOSIS — M79.642 BILATERAL HAND PAIN: ICD-10-CM

## 2025-02-10 DIAGNOSIS — M79.641 BILATERAL HAND PAIN: ICD-10-CM

## 2025-02-10 LAB
ALBUMIN SERPL BCP-MCNC: 3.8 G/DL (ref 3.5–5.2)
ALP SERPL-CCNC: 81 U/L (ref 40–150)
ALT SERPL W/O P-5'-P-CCNC: 40 U/L (ref 10–44)
ANION GAP SERPL CALC-SCNC: 8 MMOL/L (ref 8–16)
AST SERPL-CCNC: 19 U/L (ref 10–40)
BASOPHILS # BLD AUTO: 0.06 K/UL (ref 0–0.2)
BASOPHILS NFR BLD: 0.6 % (ref 0–1.9)
BILIRUB SERPL-MCNC: 0.3 MG/DL (ref 0.1–1)
BUN SERPL-MCNC: 13 MG/DL (ref 8–23)
CALCIUM SERPL-MCNC: 9.6 MG/DL (ref 8.7–10.5)
CHLORIDE SERPL-SCNC: 108 MMOL/L (ref 95–110)
CO2 SERPL-SCNC: 27 MMOL/L (ref 23–29)
CREAT SERPL-MCNC: 1.1 MG/DL (ref 0.5–1.4)
CRP SERPL-MCNC: 3.4 MG/L (ref 0–8.2)
DIFFERENTIAL METHOD BLD: ABNORMAL
EOSINOPHIL # BLD AUTO: 0.3 K/UL (ref 0–0.5)
EOSINOPHIL NFR BLD: 3.2 % (ref 0–8)
ERYTHROCYTE [DISTWIDTH] IN BLOOD BY AUTOMATED COUNT: 14.5 % (ref 11.5–14.5)
ERYTHROCYTE [SEDIMENTATION RATE] IN BLOOD BY WESTERGREN METHOD: 7 MM/HR (ref 0–36)
EST. GFR  (NO RACE VARIABLE): 52 ML/MIN/1.73 M^2
GLUCOSE SERPL-MCNC: 103 MG/DL (ref 70–110)
HCT VFR BLD AUTO: 37.2 % (ref 37–48.5)
HGB BLD-MCNC: 12.3 G/DL (ref 12–16)
IMM GRANULOCYTES # BLD AUTO: 0.05 K/UL (ref 0–0.04)
IMM GRANULOCYTES NFR BLD AUTO: 0.5 % (ref 0–0.5)
LYMPHOCYTES # BLD AUTO: 3.2 K/UL (ref 1–4.8)
LYMPHOCYTES NFR BLD: 30.7 % (ref 18–48)
MCH RBC QN AUTO: 32.8 PG (ref 27–31)
MCHC RBC AUTO-ENTMCNC: 33.1 G/DL (ref 32–36)
MCV RBC AUTO: 99 FL (ref 82–98)
MONOCYTES # BLD AUTO: 0.8 K/UL (ref 0.3–1)
MONOCYTES NFR BLD: 7.5 % (ref 4–15)
NEUTROPHILS # BLD AUTO: 5.9 K/UL (ref 1.8–7.7)
NEUTROPHILS NFR BLD: 57.5 % (ref 38–73)
NRBC BLD-RTO: 0 /100 WBC
PLATELET # BLD AUTO: 253 K/UL (ref 150–450)
PMV BLD AUTO: 9.3 FL (ref 9.2–12.9)
POTASSIUM SERPL-SCNC: 4.6 MMOL/L (ref 3.5–5.1)
PROT SERPL-MCNC: 7.2 G/DL (ref 6–8.4)
RBC # BLD AUTO: 3.75 M/UL (ref 4–5.4)
SODIUM SERPL-SCNC: 143 MMOL/L (ref 136–145)
WBC # BLD AUTO: 10.25 K/UL (ref 3.9–12.7)

## 2025-02-10 PROCEDURE — 85025 COMPLETE CBC W/AUTO DIFF WBC: CPT | Performed by: INTERNAL MEDICINE

## 2025-02-10 PROCEDURE — 80053 COMPREHEN METABOLIC PANEL: CPT | Performed by: INTERNAL MEDICINE

## 2025-02-10 PROCEDURE — 36415 COLL VENOUS BLD VENIPUNCTURE: CPT | Performed by: INTERNAL MEDICINE

## 2025-02-10 PROCEDURE — 86140 C-REACTIVE PROTEIN: CPT | Performed by: INTERNAL MEDICINE

## 2025-02-10 PROCEDURE — 85651 RBC SED RATE NONAUTOMATED: CPT | Performed by: INTERNAL MEDICINE

## 2025-02-14 NOTE — PROGRESS NOTES
RHEUMATOLOGY CLINIC FOLLOW UP VISIT  Chief complaints, HPI, ROS, EXAM, Assessment & Plans:-  Kristyn Elena a 74 y.o. pleasant female comes in for follow-up visit.  She follows in the Rheumatology Clinic for seropositive rheumatoid arthritis and thumb osteoarthritis..  On methotrexate 10 mg weekly with folic acid for rheumatoid and Voltaren gel for osteoarthritis.  Complains of worsening pain of the base of left thumb with intermittent swelling.  No significant flare of rheumatoid arthritis.  Rheumatological review of system negative otherwise.  Physical exam shows mild synovitis at the base of left thumb with squaring of CMC joint.  No synovitis of small or large joints.    1. Rheumatoid arthritis involving multiple sites with positive rheumatoid factor    2. Drug-induced immunodeficiency    3. Encounter for medication monitoring    4. Arthritis of carpometacarpal (CMC) joint of left thumb      Problem List Items Addressed This Visit       Arthritis of carpometacarpal (CMC) joint of left thumb    Relevant Medications    diclofenac sodium (VOLTAREN ARTHRITIS PAIN) 1 % Gel    Drug-induced immunodeficiency    Encounter for medication monitoring    Rheumatoid arthritis involving multiple sites with positive rheumatoid factor - Primary       Labs reviewed today:-   Latest Reference Range & Units 02/10/25 10:05   WBC 3.90 - 12.70 K/uL 10.25   RBC 4.00 - 5.40 M/uL 3.75 (L)   Hemoglobin 12.0 - 16.0 g/dL 12.3   Hematocrit 37.0 - 48.5 % 37.2   MCV 82 - 98 fL 99 (H)   MCH 27.0 - 31.0 pg 32.8 (H)   MCHC 32.0 - 36.0 g/dL 33.1   RDW 11.5 - 14.5 % 14.5   Platelet Count 150 - 450 K/uL 253   MPV 9.2 - 12.9 fL 9.3   Gran % 38.0 - 73.0 % 57.5   Lymph % 18.0 - 48.0 % 30.7   Mono % 4.0 - 15.0 % 7.5   Eos % 0.0 - 8.0 % 3.2   Basophil % 0.0 - 1.9 % 0.6   Immature Granulocytes 0.0 - 0.5 % 0.5   Gran # (ANC) 1.8 - 7.7 K/uL 5.9   Lymph # 1.0 - 4.8 K/uL 3.2   Mono # 0.3 - 1.0 K/uL 0.8    Eos # 0.0 - 0.5 K/uL 0.3   Baso # 0.00 - 0.20 K/uL 0.06   Immature Grans (Abs) 0.00 - 0.04 K/uL 0.05 (H)   nRBC 0 /100 WBC 0   Differential Method  Automated   Sed Rate 0 - 36 mm/Hr 7   Sodium 136 - 145 mmol/L 143   Potassium 3.5 - 5.1 mmol/L 4.6   Chloride 95 - 110 mmol/L 108   CO2 23 - 29 mmol/L 27   Anion Gap 8 - 16 mmol/L 8   BUN 8 - 23 mg/dL 13   Creatinine 0.5 - 1.4 mg/dL 1.1   eGFR >60 mL/min/1.73 m^2 52 !   Glucose 70 - 110 mg/dL 103   Calcium 8.7 - 10.5 mg/dL 9.6   ALP 40 - 150 U/L 81   PROTEIN TOTAL 6.0 - 8.4 g/dL 7.2   Albumin 3.5 - 5.2 g/dL 3.8   BILIRUBIN TOTAL 0.1 - 1.0 mg/dL 0.3   AST 10 - 40 U/L 19   ALT 10 - 44 U/L 40   CRP 0.0 - 8.2 mg/L 3.4   (L): Data is abnormally low  (H): Data is abnormally high  !: Data is abnormal     Latest Reference Range & Units Most Recent   BERYL Screen Negative <1:80  Positive !  11/11/24 11:10   BERYL Titer 1  1:80  11/11/24 11:10   BERYL PATTERN 1  Speckled  11/11/24 11:10   ds DNA Ab Negative 1:10  Negative 1:10  11/11/24 11:10   Anti-SSA Antibody 0.00 - 0.99 Ratio 0.04  11/11/24 11:10   Anti-SSA Interpretation Negative  Negative  11/11/24 11:10   Anti-SSB Antibody 0.00 - 0.99 Ratio 0.05  11/11/24 11:10   Anti-SSB Interpretation Negative  Negative  11/11/24 11:10   Anti Sm Antibody 0.00 - 0.99 Ratio 0.07  11/11/24 11:10   Anti-Sm Interpretation Negative  Negative  11/11/24 11:10   Anti Sm/RNP Antibody 0.00 - 0.99 Ratio 0.05  11/11/24 11:10   Anti-Sm/RNP Interpretation Negative  Negative  11/11/24 11:10   CCP Antibodies <5.0 U/mL 267.5 (H)  11/11/24 11:10   Rheumatoid Factor 0.0 - 15.0 IU/mL <13.0  11/11/24 11:10   !: Data is abnormal  (H): Data is abnormally high        Well controlled seropositive nonerosive rheumatoid arthritis on methotrexate and folic acid.  Continue with safety labs as advised.  Safety labs reviewed for today or within normal limits.  CMC joint osteoarthritis.  Continue p.r.n. Voltaren gel.  Inject corticosteroid if needed.  Drug induced  immunodeficiency due to use of immunosuppressive drugs. Monitor carefully for infections. Advised to get immediate medical care if any infection. Also advised strict adherence to age appropriate vaccinations and cancer screenings with PCP.  Hold methotrexate if any infection  I have explained all of the above in detail and the patient understands all of the current recommendation(s). I have answered all questions to the best of my ability and to their complete satisfaction.       # Follow up in about 6 months (around 8/17/2025).      Disclaimer: This note was prepared using voice recognition system and is likely to have sound alike errors and is not proof read.  Please call me with any questions.    Answers submitted by the patient for this visit:  Rheumatology Questionnaire (Submitted on 2/17/2025)  fever: No  eye redness: No  mouth sores: No  headaches: No  shortness of breath: No  chest pain: No  trouble swallowing: No  diarrhea: No  constipation: No  unexpected weight change: No  genital sore: No  dysuria: No  During the last 3 days, have you had a skin rash?: No  Bruises or bleeds easily: No  cough: No

## 2025-02-17 ENCOUNTER — OFFICE VISIT (OUTPATIENT)
Dept: RHEUMATOLOGY | Facility: CLINIC | Age: 75
End: 2025-02-17
Payer: MEDICARE

## 2025-02-17 VITALS
WEIGHT: 174.63 LBS | HEART RATE: 67 BPM | BODY MASS INDEX: 29.81 KG/M2 | SYSTOLIC BLOOD PRESSURE: 143 MMHG | DIASTOLIC BLOOD PRESSURE: 78 MMHG | HEIGHT: 64 IN

## 2025-02-17 DIAGNOSIS — M18.12 ARTHRITIS OF CARPOMETACARPAL (CMC) JOINT OF LEFT THUMB: ICD-10-CM

## 2025-02-17 DIAGNOSIS — D84.821 DRUG-INDUCED IMMUNODEFICIENCY: ICD-10-CM

## 2025-02-17 DIAGNOSIS — M05.79 RHEUMATOID ARTHRITIS INVOLVING MULTIPLE SITES WITH POSITIVE RHEUMATOID FACTOR: Primary | ICD-10-CM

## 2025-02-17 DIAGNOSIS — Z79.899 DRUG-INDUCED IMMUNODEFICIENCY: ICD-10-CM

## 2025-02-17 DIAGNOSIS — Z51.81 ENCOUNTER FOR MEDICATION MONITORING: ICD-10-CM

## 2025-02-17 RX ORDER — DICLOFENAC SODIUM 10 MG/G
2 GEL TOPICAL DAILY
Qty: 100 G | Refills: 11 | Status: SHIPPED | OUTPATIENT
Start: 2025-02-17

## 2025-02-18 ENCOUNTER — TELEPHONE (OUTPATIENT)
Dept: RESEARCH | Facility: HOSPITAL | Age: 75
End: 2025-02-18
Payer: MEDICARE

## 2025-02-20 ENCOUNTER — TELEPHONE (OUTPATIENT)
Dept: RESEARCH | Facility: HOSPITAL | Age: 75
End: 2025-02-20
Payer: MEDICARE

## 2025-02-20 NOTE — TELEPHONE ENCOUNTER
Study Title: DAMIEN: Real-world Evidence to Advance Multi-Cancer Early Detection Health Equity (DAMIEN/[employer]-Medicare study)  Protocol IRB #: [id number]  IRB Approval Date: 02 July 2024  Sponsor: [employer]  : Tommy Lawson MD     [facility] ID: [id number]     Results of the [employer] [place] Multi Cancer Early Detection test and were reviewed by PI Dr Lawson. Patient was called and notified of test results, there was no signal detected.     Patient reminded, this was a screening test, so we still highly encourage them to continue other normal health screenings  and to continue to adhere to guideline-recommended cancer screenings.     Patient was asked about completing follow-up questionnaire online and was agreeable.

## 2025-02-21 ENCOUNTER — OFFICE VISIT (OUTPATIENT)
Dept: PRIMARY CARE CLINIC | Facility: CLINIC | Age: 75
End: 2025-02-21
Payer: MEDICARE

## 2025-02-21 VITALS
BODY MASS INDEX: 29.57 KG/M2 | DIASTOLIC BLOOD PRESSURE: 60 MMHG | TEMPERATURE: 97 F | HEIGHT: 64 IN | OXYGEN SATURATION: 98 % | HEART RATE: 69 BPM | SYSTOLIC BLOOD PRESSURE: 120 MMHG | WEIGHT: 173.19 LBS

## 2025-02-21 DIAGNOSIS — N18.31 STAGE 3A CHRONIC KIDNEY DISEASE: Chronic | ICD-10-CM

## 2025-02-21 DIAGNOSIS — L72.11 PILAR CYST OF SCALP: Primary | ICD-10-CM

## 2025-02-21 DIAGNOSIS — M05.79 RHEUMATOID ARTHRITIS INVOLVING MULTIPLE SITES WITH POSITIVE RHEUMATOID FACTOR: ICD-10-CM

## 2025-02-21 DIAGNOSIS — E11.69 COMBINED HYPERLIPIDEMIA ASSOCIATED WITH TYPE 2 DIABETES MELLITUS: Chronic | ICD-10-CM

## 2025-02-21 DIAGNOSIS — E78.2 COMBINED HYPERLIPIDEMIA ASSOCIATED WITH TYPE 2 DIABETES MELLITUS: Chronic | ICD-10-CM

## 2025-02-21 DIAGNOSIS — J30.89 NON-SEASONAL ALLERGIC RHINITIS, UNSPECIFIED TRIGGER: ICD-10-CM

## 2025-02-21 DIAGNOSIS — I15.9 SECONDARY HYPERTENSION: Chronic | ICD-10-CM

## 2025-02-21 DIAGNOSIS — M19.90 INFLAMMATORY ARTHRITIS: ICD-10-CM

## 2025-02-21 PROBLEM — Z51.81 ENCOUNTER FOR MEDICATION MONITORING: Status: RESOLVED | Noted: 2025-02-17 | Resolved: 2025-02-21

## 2025-02-21 PROBLEM — F41.9 ANXIETY: Chronic | Status: RESOLVED | Noted: 2024-05-27 | Resolved: 2025-02-21

## 2025-02-21 PROBLEM — Z00.00 ENCOUNTER FOR PREVENTIVE HEALTH EXAMINATION: Status: RESOLVED | Noted: 2025-02-06 | Resolved: 2025-02-21

## 2025-02-21 PROBLEM — H61.21 IMPACTED CERUMEN OF RIGHT EAR: Status: RESOLVED | Noted: 2024-11-29 | Resolved: 2025-02-21

## 2025-02-21 PROCEDURE — 99214 OFFICE O/P EST MOD 30 MIN: CPT | Mod: S$GLB,,, | Performed by: INTERNAL MEDICINE

## 2025-02-21 PROCEDURE — 3072F LOW RISK FOR RETINOPATHY: CPT | Mod: CPTII,S$GLB,, | Performed by: INTERNAL MEDICINE

## 2025-02-21 PROCEDURE — 3288F FALL RISK ASSESSMENT DOCD: CPT | Mod: CPTII,S$GLB,, | Performed by: INTERNAL MEDICINE

## 2025-02-21 PROCEDURE — 1125F AMNT PAIN NOTED PAIN PRSNT: CPT | Mod: CPTII,S$GLB,, | Performed by: INTERNAL MEDICINE

## 2025-02-21 PROCEDURE — 1160F RVW MEDS BY RX/DR IN RCRD: CPT | Mod: CPTII,S$GLB,, | Performed by: INTERNAL MEDICINE

## 2025-02-21 PROCEDURE — 1159F MED LIST DOCD IN RCRD: CPT | Mod: CPTII,S$GLB,, | Performed by: INTERNAL MEDICINE

## 2025-02-21 PROCEDURE — 99999 PR PBB SHADOW E&M-EST. PATIENT-LVL V: CPT | Mod: PBBFAC,,, | Performed by: INTERNAL MEDICINE

## 2025-02-21 PROCEDURE — 3078F DIAST BP <80 MM HG: CPT | Mod: CPTII,S$GLB,, | Performed by: INTERNAL MEDICINE

## 2025-02-21 PROCEDURE — 3074F SYST BP LT 130 MM HG: CPT | Mod: CPTII,S$GLB,, | Performed by: INTERNAL MEDICINE

## 2025-02-21 PROCEDURE — 1101F PT FALLS ASSESS-DOCD LE1/YR: CPT | Mod: CPTII,S$GLB,, | Performed by: INTERNAL MEDICINE

## 2025-02-21 NOTE — PROGRESS NOTES
Kristyn Garza  02/21/2025  [id number]    Moon Lion MD  Patient Care Team:  Moon Lion MD as PCP - General (Internal Medicine)  Dale Matute MD as Consulting Physician (Cardiology)  Hammad Hardy OD as Consulting Physician (Optometry)  Olivia Irwin MD as Consulting Physician (Otolaryngology)  Gretel De Leon PharmD as Hypertension Digital Medicine Clinician  Brenden Landaverde MD as Consulting Physician (Nephrology)  Gretel De Leon PharmD as Hyperlipidemia Digital Medicine Clinician  Gretel De Leon PharmD as Diabetes Digital Medicine Clinician  Moon Lion MD as Hyperlipidemia Digital Medicine Responsible Provider (Internal Medicine)  Moon Lion MD as Diabetes Digital Medicine Responsible Provider (Internal Medicine)  Moon Lion MD as Hypertension Digital Medicine Responsible Provider (Internal Medicine)  Chacho Bergman PharmD as Pharmacist  Medicare, [employer] Managed as Diabetes Digital Medicine Contract  Medicare, [employer] Managed as Hypertension Digital Medicine Contract    Visit Type: Follow-up    Ms. Kristyn Garza is a 74 year old female here for scheduled d/u.      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 She's enrolled w digital medicine program for diabetes, hyperlipidemia and hypertension.    History of Present Illness    CHIEF COMPLAINT:  Ms. Garza presents today with cold/allergy symptoms    UPPER RESPIRATORY SYMPTOMS:  She reports post-nasal drainage and rhinorrhea. She denies fever or feeling unwell. She reports possible exposure to viral illness from 6-month-old great-granddaughter who was symptomatic with upper respiratory infection two days prior to symptom onset.    ARTHRITIS:  She reports arthritis symptoms in left hand with intermittent flare  ups, characterized by pain that worsens at night and numbness in two fingers. She manages pain with Tylenol Extra Strength Arthritis 650mg at night - advised ok to take up to 3x daily.    PRE-DIABETES:  She was diagnosed with diabetes in March of this year following years w prediabetes diagnosis. She reports good blood sugar management monitoring more closely since March. Home fasting blood sugar readings are typically around 90, with a recent reading of 120 yesterday.     BLOOD PRESSURE:  Home blood pressure readings have been within normal range, with systolic values ranging from 112 to 115 mmHg. Denies dizziness or lightheadedness moving from lying or sitting to stand.    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 Previous treatment 8-10 years ago involved surgical lancing and removal of cyst contents, with biopsy negative for malignancy.     MEDICATIONS:  She takes methotrexate (four small pills) weekly on Wednesdays with daily folic acid. She continues potassium and Repatha without issues. She has discontinued sertraline, previously taken for nocturnal anxiety, and reports improvement in these symptoms.       PHQ-4 Score: 0     From LOV w me 11/29/24  RHEUMATOID ARTHRITIS:  She is suspected to have seronegative rheumatoid arthritis, with a negative rheumatoid factor. She experiences morning stiffness and reports increase right hand pain today after cooking a lot yesterday. She is currently taking Methotrexate once a week and folic acid daily. Compression gloves, topical diclofenac and oral acetaminophen help with hand pain. Shoulders not bothering her currently.  VACCINATIONS:  She has not had the flu, RSV, or shingles vaccines.  UPCOMING TESTS AND LABS:  She has labs scheduled for December 13th. Her last lipid levels and A1C were reported as good. Her renal function was noted to be slightly decreased, but will be rechecked with the  upcoming December labs. A colonoscopy has been discussed for the beginning of the year but is not yet scheduled.  ENT CONCERNS:  She manages her ear care at home but has not addressed since LOV w me. She also reports new sinus drainage and phlegm but denies having a cold or other respiratory symptoms. She has not been using any specific treatments for her sinus problems.  FOOT EXAM:  She reports experiencing itching sometimes on her toes. She denies any pain, numbness, or tingling in her feet.    Hosp/ED/Urgent Care:    Recent appointments:   2/17/25 Rheum Dr. Vásquez RA  2/06/25 O[facility]+ Kim [facility]  1/31/25 Alcides MAHONEY hand pain    Upcoming appointments:  Future Appointments       Date Provider Specialty Appt Notes    2/26/2025  Pre-Admission Testing  Arrive at: Telehealth colonoscopy    3/17/2025 Hammad Hardy, BERTA Ophthalmology annual  MARKO Flaco w Pt    3/31/2025 Christian Palomino MD Surgery Pilar cyst of scalp [L72.11]    5/16/2025 Moon Lion MD Primary Care 3 month f/u    5/19/2025  Lab dr pantoja    5/27/2025 Hammad Hardy, BERTA Ophthalmology Yearly exam    9/15/2025  Lab dr pantoja    9/22/2025 Abram Vásquez MD Rheumatology 6 month fu//dt           The following were reviewed: Active problem list, medication list, allergies, family history, social history, and Health Maintenance.     Medications have been reviewed and reconciled with patient at visit today.    Exam:  Vitals:    02/21/25 0849   BP: 120/60   Pulse: 69   Temp: 96.6 °F (35.9 °C)     Weight: 78.5 kg (173 lb 2.7 oz)   Body mass index is 29.72 kg/m².    BP Readings from Last 3 Encounters:   02/21/25 120/60   02/17/25 (!) 143/78   02/06/25 120/68     Wt Readings from Last 3 Encounters:   02/21/25 0849 78.5 kg (173 lb 2.7 oz)   02/17/25 1100 79.2 kg (174 lb 9.7 oz)   02/06/25 1305 77.5 kg (170 lb 13.7 oz)      Physical Exam  Vitals reviewed.   Constitutional:       General: She is not in acute distress.     Appearance: Normal  appearance. She is not ill-appearing.   HENT:      Head: Normocephalic and atraumatic.      Right Ear: Tympanic membrane, ear canal and external ear normal.      Left Ear: Tympanic membrane, ear canal and external ear normal.      Ears:      Comments: Decreased hearing on L - easily removes and replaces hearing aid     Nose: Nose normal.      Mouth/Throat:      Mouth: Mucous membranes are moist.      Pharynx: Oropharynx is clear. No oropharyngeal exudate or posterior oropharyngeal erythema.   Eyes:      General: No scleral icterus.     Extraocular Movements: Extraocular movements intact.      Conjunctiva/sclera: Conjunctivae normal.      Comments: glasses   Neck:      Vascular: No carotid bruit.   Cardiovascular:      Rate and Rhythm: Normal rate and regular rhythm.      Heart sounds: No murmur heard.  Pulmonary:      Effort: Pulmonary effort is normal. No respiratory distress.      Breath sounds: No wheezing, rhonchi or rales.   Abdominal:      General: Bowel sounds are normal. There is no distension.      Palpations: Abdomen is soft.      Tenderness: There is no abdominal tenderness. There is no guarding.   Musculoskeletal:      Right lower leg: No edema.      Left lower leg: No edema.      Comments: Decent B  strength  Ambulates independently w/o AD   Lymphadenopathy:      Cervical: No cervical adenopathy.   Skin:     General: Skin is warm and dry.   Neurological:      Mental Status: She is alert and oriented to person, place, and time. Mental status is at baseline.   Psychiatric:         Mood and Affect: Mood normal.         Behavior: Behavior normal.         Thought Content: Thought content normal.         Judgment: Judgment normal.        Laboratory Reviewed  Lab Results   Component Value Date    WBC 10.25 02/10/2025    HGB 12.3 02/10/2025    HCT 37.2 02/10/2025     02/10/2025    MCV 99 (H) 02/10/2025    CHOL 131 09/10/2024    TRIG 60 09/10/2024    HDL 41 09/10/2024    LDLCALC 78.0 09/10/2024    ALT  "40 02/10/2025    AST 19 02/10/2025     02/10/2025    K 4.6 02/10/2025     02/10/2025    CREATININE 1.1 02/10/2025    BUN 13 02/10/2025    CO2 27 02/10/2025    MG 2.4 06/19/2023    TSH 0.509 03/01/2024    FREET4 0.96 03/01/2024    HGBA1C 5.9 (H) 12/13/2024    CRP 3.4 02/10/2025     Lab Results   Component Value Date    PTH 61.0 02/10/2023    CALCIUM 9.6 02/10/2025    PHOS 3.5 06/27/2024      Lab Results   Component Value Date    BJFPBXRY13 785 02/10/2023   No results found for: "FOLATE" No results found for: "UIBC", "IRON", "TRANS", "TRANSFERRIN", "TIBC", "LABIRON", "FESATURATED"   Lab Results   Component Value Date    EGFRNORACEVR 52 (A) 02/10/2025    ALBUMIN 3.8 02/10/2025     Lab Results   Component Value Date    [id number] 35 06/27/2024        Assessment:   74 y.o. female with multiple co-morbid illnesses here for continued follow up of medical problems.      The primary encounter diagnosis was Pilar cyst of scalp. Diagnoses of Inflammatory arthritis, Combined hyperlipidemia associated with type 2 diabetes mellitus, Secondary hypertension, Stage 3a chronic kidney disease, Rheumatoid arthritis involving multiple sites with positive rheumatoid factor, and Non-seasonal allergic rhinitis, unspecified trigger were also pertinent to this visit.      Plan:   1. Pilar cyst of scalp  -     Cancel: Ambulatory referral/consult to Plastic Surgery; Future; Expected date: 02/28/2025  -     Ambulatory referral/consult to General Surgery; Future; Expected date: 02/28/2025    2. Inflammatory arthritis  Assessment & Plan:  Cont current Rx and f/u w Rheum      3. Combined hyperlipidemia associated with type 2 diabetes mellitus  Overview:  Statin intolerance - goal LDL < 70 w Repatha    Assessment & Plan:  Cont repatha - managing diabetes w diet (consider metformin? SGLT2 inhibitor?)       4. Secondary hypertension  Overview:  Previous Endocrine consult 2022 recommended to cont medical management for suspected " renin-mediated aldosterone excess - ie cont spironolactone - and deferring extensive w/u to r/o primary hyperaldosteronism - if pt becomes more symptomatic consider further f/u w endocrine     Per Nephrology Dr. Landaverde 9/17/24:   DDX: primary HTN. The adrenal nodules are incidentalomas  VS: mild hyperaldosteronism with hyperplasia  VS: early stage adrenal gland adenoma  RTC 12 months        Assessment & Plan:  BP good (at goal) w current Rx: cont spironolactone, amlodipine, benazepril, metoprolol      5. Stage 3a chronic kidney disease  Assessment & Plan:  Borderline, stable - cont ace-I (consider add SGLT2 inhibitor?)      6. Rheumatoid arthritis involving multiple sites with positive rheumatoid factor  Assessment & Plan:  Reports that she is taking methotrexate/folate as prescribed - advised maintain f/u w Rheum Dr. CABALLERO      7. Non-seasonal allergic rhinitis, unspecified trigger  Assessment & Plan:  Vs mild viral illness? Symptomatic Tx - let us know if no improvement           Health Maintenance         Date Due Completion Date    Shingles Vaccine (1 of 2) 04/20/2011 2/23/2011    TETANUS VACCINE 02/23/2021 2/23/2011    RSV Vaccine (Age 60+ and Pregnant patients) (1 - 1-dose 75+ series) Never done ---    Colonoscopy 03/18/2024 3/18/2014    Override on 6/1/2006: Done    COVID-19 Vaccine (4 - 2024-25 season) 09/01/2024 12/15/2021    Diabetic Eye Exam 03/01/2025 3/1/2024    Override on 7/20/2020: Done    Override on 4/30/2019: Done    Override on 4/24/2018: Done    Override on 5/17/2017: Done    Override on 5/16/2016: Done    Override on 5/13/2015: Done    Hemoglobin A1c 06/13/2025 12/13/2024    LDCT Lung Screen 06/27/2025 6/27/2024    Diabetes Urine Screening 06/27/2025 6/27/2024    Mammogram 07/26/2025 7/26/2024    Override on 3/13/2013: Done    Lipid Panel 09/10/2025 9/10/2024    Foot Exam 02/06/2026 2/6/2025    Override on 9/23/2015: Done    Override on 7/16/2015: Done    Override on 3/23/2015: Done    Override on  12/3/2014: Done    Override on 4/29/2014: Done    DEXA Scan 05/26/2026 5/26/2021    Override on 3/10/2011: Done (normal repeat in 5 years)          -Patient's lab results were reviewed and discussed with patient  -Treatment options and alternatives were discussed with the patient. Patient expressed understanding. Patient was given the opportunity to ask questions and be an active participant in their medical care. Patient had no further questions or concerns at this time.     Follow up: Follow up in about 3 months (around 5/21/2025) for Follow Up.    Care Plan/Goals: Reviewed Yes   Goals         80 <= Glucose <= 180 (pt-stated)       2/21/25  On track    11/15/22  On track        Blood Pressure < 130/80       Update 2/21/25  On track        Exercise at least 150 minutes per week. (pt-stated)       Update 2/21/25   Not on track  Referral O65+ [facility]    Update 1/17/2023  Daily walking - current step average about 7,000 steps - new goal 10,000 steps. Walk to park, around park and back 3x/week.    Time frame 3 mos - mid April 2023.          LDL CHOLESTEROL < 70       Take at least one BP reading per week at various times of the day       11/15/22  On track        Weight (lb) < 90.7 kg (200 lb) (pt-stated)       Update 2/21/25  On track weight stable around 175    Update 11/15/22    On track - weight < 200 lbs - new goal 10 lbs wt loss over the next 2 mos.    Goal 5% weight loss - 9-10 lbs - 175 seth    Barriers? Sweet-tooth    Overcoming? Try substitute fruit? Daily walking - current step average about 5,000 steps - new goal 7,000 steps.    Time frame 3 mos - mid Jan 2023.                After visit summary printed and given to patient upon discharge.  Patient goals and care plan are included in After visit summary.    TOTAL TIME evaluating and managing this patient for this encounter was 37 minutes. This time was spent personally by me on some of the following activities: review of patient's past medical history,  assessing age-appropriate health maintenance needs, review of any interval history, review and interpretation of lab results, review and interpretation of imaging test results, review and interpretation of cardiology test results, reviewing consulting specialist notes, obtaining history from the patient and family, examination of the patient, medication reconciliation, managing and/or ordering prescription medications, ordering imaging tests, ordering referral to subspecialty provider(s), educating patient and answering their questions about diagnosis, treatment plan, and goals of treatment, discussing planned follow-up and final documentation of the visit. This time was exclusive of any separately billable procedures for this patient and exclusive of time spent treating any other patients.     This note was generated with the assistance of ambient listening technology. Verbal consent was obtained by the patient and accompanying visitor(s) for the recording of patient appointment to facilitate this note. I attest to having reviewed and edited the generated note for accuracy, though some syntax or spelling errors may persist. Please contact the author of this note for any clarification.

## 2025-02-21 NOTE — PATIENT INSTRUCTIONS
If you are feeling unwell, we'd [facility] the [facility] to [facility] Please give us a call. Same day appointments are our top priority to keep you well and out of the emergency rooms and hospitals. Call [phone] for our direct line. After hours advice is always available. Please call [phone] after hours to speak to the on-call team.      Recommend Tetanus, Shingles, and Covid and RSV vaccines that can be scheduled at your pharmacy of choice.    Ok to take acetaminophen 650 mg up to 3x daily

## 2025-02-23 ENCOUNTER — PATIENT MESSAGE (OUTPATIENT)
Dept: ADMINISTRATIVE | Facility: OTHER | Age: 75
End: 2025-02-23
Payer: MEDICARE

## 2025-02-23 PROBLEM — J30.89 NON-SEASONAL ALLERGIC RHINITIS: Status: ACTIVE | Noted: 2025-02-21

## 2025-02-23 PROBLEM — L72.11 PILAR CYST OF SCALP: Chronic | Status: ACTIVE | Noted: 2025-02-21

## 2025-02-23 PROBLEM — M05.79 RHEUMATOID ARTHRITIS INVOLVING MULTIPLE SITES WITH POSITIVE RHEUMATOID FACTOR: Chronic | Status: ACTIVE | Noted: 2025-02-17

## 2025-02-23 NOTE — ASSESSMENT & PLAN NOTE
Reports that she is taking methotrexate/folate as prescribed - advised maintain f/u w Rheum Dr. CABALLERO

## 2025-02-26 ENCOUNTER — HOSPITAL ENCOUNTER (OUTPATIENT)
Dept: PREADMISSION TESTING | Facility: HOSPITAL | Age: 75
Discharge: HOME | End: 2025-02-26
Attending: FAMILY MEDICINE
Payer: MEDICARE

## 2025-02-26 DIAGNOSIS — Z12.11 SCREEN FOR COLON CANCER: Primary | ICD-10-CM

## 2025-02-26 RX ORDER — SODIUM, POTASSIUM,MAG SULFATES 17.5-3.13G
1 SOLUTION, RECONSTITUTED, ORAL ORAL DAILY
Qty: 1 KIT | Refills: 0 | Status: SHIPPED | OUTPATIENT
Start: 2025-02-26 | End: 2025-02-28

## 2025-03-03 ENCOUNTER — OFFICE VISIT (OUTPATIENT)
Dept: SURGERY | Facility: CLINIC | Age: 75
End: 2025-03-03
Payer: MEDICARE

## 2025-03-03 VITALS
BODY MASS INDEX: 30.22 KG/M2 | DIASTOLIC BLOOD PRESSURE: 69 MMHG | HEIGHT: 64 IN | WEIGHT: 177 LBS | HEART RATE: 71 BPM | SYSTOLIC BLOOD PRESSURE: 129 MMHG

## 2025-03-03 DIAGNOSIS — L72.11 PILAR CYST OF SCALP: ICD-10-CM

## 2025-03-03 PROCEDURE — 3074F SYST BP LT 130 MM HG: CPT | Mod: CPTII,S$GLB,, | Performed by: SURGERY

## 2025-03-03 PROCEDURE — 1159F MED LIST DOCD IN RCRD: CPT | Mod: CPTII,S$GLB,, | Performed by: SURGERY

## 2025-03-03 PROCEDURE — 99203 OFFICE O/P NEW LOW 30 MIN: CPT | Mod: S$GLB,,, | Performed by: SURGERY

## 2025-03-03 PROCEDURE — 99999 PR PBB SHADOW E&M-EST. PATIENT-LVL IV: CPT | Mod: PBBFAC,,, | Performed by: SURGERY

## 2025-03-03 PROCEDURE — 1160F RVW MEDS BY RX/DR IN RCRD: CPT | Mod: CPTII,S$GLB,, | Performed by: SURGERY

## 2025-03-03 PROCEDURE — 3078F DIAST BP <80 MM HG: CPT | Mod: CPTII,S$GLB,, | Performed by: SURGERY

## 2025-03-03 PROCEDURE — 3072F LOW RISK FOR RETINOPATHY: CPT | Mod: CPTII,S$GLB,, | Performed by: SURGERY

## 2025-03-03 PROCEDURE — 1126F AMNT PAIN NOTED NONE PRSNT: CPT | Mod: CPTII,S$GLB,, | Performed by: SURGERY

## 2025-03-03 NOTE — PROGRESS NOTES
History & Physical    Subjective     History of Present Illness:  Patient is a 74 y.o. female referred for scalp cyst.  She reports noticing a cyst that has been growing for the past few months.  She has had a couple previous which she underwent removal for last time 10-12 years ago.    No chief complaint on file.      Review of patient's allergies indicates:   Allergen Reactions    Buspar [buspirone] Other (See Comments)     Lip swelling    Statins-hmg-coa reductase inhibitors      Muscle Cramps       Current Medications[1]    Past Medical History:   Diagnosis Date    Anxiety 05/27/2024    Carpal tunnel syndrome     CKD (chronic kidney disease) stage 3, GFR 30-59 ml/min     Followed by Nephrology    Colon cancer screening 03/18/2014    COVID-19 08/30/2023    CTS (carpal tunnel syndrome) 06/18/2013    Diabetes mellitus, type 2     Dizziness 06/19/2023    Eczema     Elevated CPK     Gastroesophageal reflux disease 07/29/2020    History of hypokalemia 06/18/2013    History of hypokalemia 06/18/2013    Hypokalemia     Major depressive disorder, single episode, mild 05/27/2024    Multinodular thyroid     Followed by ENT    Nicotine dependence, cigarettes, in remission 06/27/2024    Obesity     Obesity (BMI 30.0-34.9) 05/12/2015    Other and unspecified hyperlipidemia     Pneumonia     in her 20s; hospitalized    Postmenopausal     No history of abnormal pap smear    Secondary hyperparathyroidism of renal origin 10/26/2021    Statin intolerance     caused mylagia, elevated CPK    Tobacco dependence     resolved    Unspecified essential hypertension      Past Surgical History:   Procedure Laterality Date    COLONOSCOPY      CYST REMOVAL  2015    On the head    FINE NEEDLE ASPIRATION  3/2011    thyroid nodules x3 (negative per patient)    HYSTERECTOMY      PARTIAL HYSTERECTOMY  1991    Due to fibroids     Family History   Problem Relation Name Age of Onset    Hypertension Mother      Diabetes Mother      Dementia Mother    "       Alzheimer's dementia    Hypertension Father      Heart disease Sister          MI/CAD    Cataracts Sister      Dementia Sister      No Known Problems Son      No Known Problems Son      Cataracts Brother      Cataracts Brother      Dementia Sister      Cancer Neg Hx      Stroke Neg Hx      Melanoma Neg Hx      Psoriasis Neg Hx      Lupus Neg Hx      Eczema Neg Hx      COPD Neg Hx      Kidney disease Neg Hx       Social History[2]     Review of Systems:  Review of Systems   Constitutional:  Negative for activity change, chills, fatigue, fever and unexpected weight change.   HENT:  Positive for hearing loss and rhinorrhea. Negative for trouble swallowing.    Eyes:  Positive for discharge. Negative for visual disturbance.   Respiratory:  Negative for cough, chest tightness, shortness of breath, wheezing and stridor.    Cardiovascular:  Negative for chest pain, palpitations and leg swelling.   Gastrointestinal:  Negative for abdominal distention, abdominal pain, blood in stool, constipation, diarrhea, nausea and vomiting.   Endocrine: Negative for polydipsia and polyuria.   Genitourinary:  Negative for difficulty urinating, dysuria, frequency, hematuria, menstrual problem and urgency.   Musculoskeletal:  Positive for arthralgias and joint swelling. Negative for neck pain.   Skin:  Negative for color change, pallor, rash and wound.   Neurological:  Negative for weakness and headaches.   Hematological:  Does not bruise/bleed easily.   Psychiatric/Behavioral:  Negative for confusion and dysphoric mood.           Objective     Vital Signs (Most Recent)  Pulse: 71 (03/03/25 1412)  BP: 129/69 (03/03/25 1412)  5' 4" (1.626 m)  80.3 kg (177 lb 0.5 oz)     Physical Exam:  Physical Exam  Vitals reviewed.   Constitutional:       Appearance: She is well-developed.   HENT:      Head: Normocephalic and atraumatic.     Cardiovascular:      Rate and Rhythm: Normal rate.   Pulmonary:      Effort: Pulmonary effort is normal. " "  Musculoskeletal:      Cervical back: Neck supple.   Skin:     General: Skin is warm and dry.   Neurological:      Mental Status: She is alert and oriented to person, place, and time.            Assessment and Plan     74-year-old female with scalp cyst    PLAN:    Schedule for excision in minor procedure room              [1]   Current Outpatient Medications   Medication Sig Dispense Refill    albuterol (VENTOLIN HFA) 90 mcg/actuation inhaler Inhale 2 puffs into the lungs every 4 (four) hours as needed for Wheezing or Shortness of Breath (before exercise). Rescue 18 g 11    amLODIPine (NORVASC) 10 MG tablet TAKE 1 TABLET ONE TIME DAILY 90 tablet 3    aspirin 81 MG Chew Take 81 mg by mouth once daily.      benazepriL (LOTENSIN) 40 MG tablet TAKE 1 TABLET (40 MG TOTAL) BY MOUTH ONCE DAILY. 90 tablet 3    blood sugar diagnostic (ACCU-CHEK SMARTVIEW TEST STRIP) Strp TO CHECK BLOOD GLUCOSE ONE TIMES DAILY 50 strip 1    calcium-vitamin D3 500 mg(1,250mg) -200 unit per tablet Take 1 tablet by mouth once daily.       cetirizine (ZYRTEC) 10 MG tablet Take 1 tablet (10 mg total) by mouth once daily. 30 tablet 12    CRANBERRY EXTRACT ORAL Take 1 tablet by mouth once daily.      diclofenac sodium (VOLTAREN ARTHRITIS PAIN) 1 % Gel Apply 2 g topically once daily. 100 g 11    evolocumab (REPATHA SURECLICK) 140 mg/mL PnIj Inject 1 mL (140 mg total) into the skin every 14 (fourteen) days. 2 mL 5    folic acid (FOLVITE) 1 MG tablet Take 1 tablet (1 mg total) by mouth once daily. 90 tablet 2    methotrexate 2.5 MG Tab TAKE 4 TABLETS BY MOUTH EVERY 7 DAYS. 16 tablet 2    MULTIVITAMIN W-MINERALS/LUTEIN (CENTRUM SILVER ORAL) Take 1 tablet by mouth once daily.      pen needle, diabetic (BD ULTRA-FINE MICRO PEN NEEDLE) 32 gauge x 1/4" Ndle 1 Needle by Misc.(Non-Drug; Combo Route) route once a week. 30 each 1    potassium chloride SA (K-DUR,KLOR-CON) 20 MEQ tablet TAKE 1 TABLET ONE TIME DAILY 90 tablet 3    spironolactone (ALDACTONE) 50 " MG tablet TAKE 1 TABLET EVERY DAY 90 tablet 3    azelastine (ASTELIN) 137 mcg (0.1 %) nasal spray 1 spray (137 mcg total) by Nasal route 2 (two) times daily as needed for Rhinitis. 30 mL 11    blood glucose control, normal Soln 1 Bottle by Misc.(Non-Drug; Combo Route) route once daily. For Accu Check Amber  use twice daily prn dx: E11.9 1 each 0    blood-glucose meter (ACCU-CHEK AMBER) Misc 1 kit by Misc.(Non-Drug; Combo Route) route once. For Accu Check Amber  use twice daily prn dx: E11.9 for 1 dose 1 each 0    metoprolol succinate (TOPROL-XL) 25 MG 24 hr tablet TAKE 1 TABLET ONE TIME DAILY 180 tablet 3     No current facility-administered medications for this visit.   [2]   Social History  Tobacco Use    Smoking status: Former     Current packs/day: 0.00     Average packs/day: 1 pack/day for 43.4 years (43.4 ttl pk-yrs)     Types: Cigarettes     Start date: 1970     Quit date: 2013     Years since quittin.7    Smokeless tobacco: Never   Substance Use Topics    Alcohol use: Yes     Alcohol/week: 0.0 standard drinks of alcohol     Comment: Rarely drinks wine    Drug use: No

## 2025-03-04 DIAGNOSIS — M05.79 RHEUMATOID ARTHRITIS INVOLVING MULTIPLE SITES WITH POSITIVE RHEUMATOID FACTOR: ICD-10-CM

## 2025-03-04 DIAGNOSIS — M18.12 ARTHRITIS OF CARPOMETACARPAL (CMC) JOINT OF LEFT THUMB: ICD-10-CM

## 2025-03-04 RX ORDER — DICLOFENAC SODIUM 10 MG/G
2 GEL TOPICAL DAILY
Qty: 100 G | Refills: 11 | Status: SHIPPED | OUTPATIENT
Start: 2025-03-04

## 2025-03-04 RX ORDER — FOLIC ACID 1 MG/1
1 TABLET ORAL DAILY
Qty: 90 TABLET | Refills: 2 | Status: SHIPPED | OUTPATIENT
Start: 2025-03-04

## 2025-03-04 RX ORDER — METHOTREXATE 2.5 MG/1
10 TABLET ORAL
Qty: 48 TABLET | Refills: 0 | Status: SHIPPED | OUTPATIENT
Start: 2025-03-04

## 2025-03-07 ENCOUNTER — DOCUMENTATION ONLY (OUTPATIENT)
Dept: REHABILITATION | Facility: HOSPITAL | Age: 75
End: 2025-03-07
Payer: MEDICARE

## 2025-03-07 NOTE — PROGRESS NOTES
Toro:    Leg hurting on the side   Hasn't been to the gym since December   Used to use treadmill or elliptical   Tries to get in 5k steps a day   Starting to still stiff       Wants to go over a routine next time and bring it to [facility]

## 2025-03-12 ENCOUNTER — OFFICE VISIT (OUTPATIENT)
Dept: PRIMARY CARE CLINIC | Facility: CLINIC | Age: 75
End: 2025-03-12
Payer: MEDICARE

## 2025-03-12 ENCOUNTER — TELEPHONE (OUTPATIENT)
Dept: PRIMARY CARE CLINIC | Facility: CLINIC | Age: 75
End: 2025-03-12
Payer: MEDICARE

## 2025-03-12 VITALS
HEIGHT: 64 IN | HEART RATE: 82 BPM | BODY MASS INDEX: 29.82 KG/M2 | DIASTOLIC BLOOD PRESSURE: 58 MMHG | OXYGEN SATURATION: 98 % | WEIGHT: 174.69 LBS | TEMPERATURE: 97 F | SYSTOLIC BLOOD PRESSURE: 108 MMHG

## 2025-03-12 DIAGNOSIS — J30.89 NON-SEASONAL ALLERGIC RHINITIS, UNSPECIFIED TRIGGER: Primary | ICD-10-CM

## 2025-03-12 DIAGNOSIS — H10.12 ALLERGIC CONJUNCTIVITIS OF LEFT EYE: ICD-10-CM

## 2025-03-12 PROCEDURE — 3288F FALL RISK ASSESSMENT DOCD: CPT | Mod: CPTII,S$GLB,, | Performed by: INTERNAL MEDICINE

## 2025-03-12 PROCEDURE — 1159F MED LIST DOCD IN RCRD: CPT | Mod: CPTII,S$GLB,, | Performed by: INTERNAL MEDICINE

## 2025-03-12 PROCEDURE — 1101F PT FALLS ASSESS-DOCD LE1/YR: CPT | Mod: CPTII,S$GLB,, | Performed by: INTERNAL MEDICINE

## 2025-03-12 PROCEDURE — 3072F LOW RISK FOR RETINOPATHY: CPT | Mod: CPTII,S$GLB,, | Performed by: INTERNAL MEDICINE

## 2025-03-12 PROCEDURE — 99999 PR PBB SHADOW E&M-EST. PATIENT-LVL V: CPT | Mod: PBBFAC,,, | Performed by: INTERNAL MEDICINE

## 2025-03-12 PROCEDURE — 99214 OFFICE O/P EST MOD 30 MIN: CPT | Mod: S$GLB,,, | Performed by: INTERNAL MEDICINE

## 2025-03-12 PROCEDURE — 1125F AMNT PAIN NOTED PAIN PRSNT: CPT | Mod: CPTII,S$GLB,, | Performed by: INTERNAL MEDICINE

## 2025-03-12 PROCEDURE — 3078F DIAST BP <80 MM HG: CPT | Mod: CPTII,S$GLB,, | Performed by: INTERNAL MEDICINE

## 2025-03-12 PROCEDURE — 3074F SYST BP LT 130 MM HG: CPT | Mod: CPTII,S$GLB,, | Performed by: INTERNAL MEDICINE

## 2025-03-12 RX ORDER — LEVOCETIRIZINE DIHYDROCHLORIDE 5 MG/1
5 TABLET, FILM COATED ORAL NIGHTLY PRN
Qty: 30 TABLET | Refills: 2 | Status: SHIPPED | OUTPATIENT
Start: 2025-03-12 | End: 2025-06-10

## 2025-03-12 RX ORDER — OLOPATADINE HYDROCHLORIDE 1 MG/ML
1 SOLUTION/ DROPS OPHTHALMIC 2 TIMES DAILY
Qty: 5 ML | Refills: 1 | Status: SHIPPED | OUTPATIENT
Start: 2025-03-12 | End: 2026-03-12

## 2025-03-12 NOTE — PROGRESS NOTES
"Kristyn Garza  03/12/2025  [id number]    Moon Lion MD  Patient Care Team:  Moon Lion MD as PCP - General (Internal Medicine)  Dale Matute MD as Consulting Physician (Cardiology)  Hammad Hardy OD as Consulting Physician (Optometry)  Olivia Irwin MD as Consulting Physician (Otolaryngology)  Gretel De Leon PharmD as Hypertension Digital Medicine Clinician  Brenden Landaverde MD as Consulting Physician (Nephrology)  Gretel De Leon PharmD as Hyperlipidemia Digital Medicine Clinician  Gretel De Leon PharmD as Diabetes Digital Medicine Clinician  Moon Lion MD as Hyperlipidemia Digital Medicine Responsible Provider (Internal Medicine)  Moon Lion MD as Diabetes Digital Medicine Responsible Provider (Internal Medicine)  Moon Lion MD as Hypertension Digital Medicine Responsible Provider (Internal Medicine)  Chacho Bergman PharmD as Pharmacist  Medicare, [employer] Managed as Diabetes Digital Medicine Contract  Medicare, [employer] Managed as Hypertension Digital Medicine Contract    Visit Type: Follow-up    Ms. Kristyn Garza is a 74 year old female here w c/o "pink eye".      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 She's enrolled w digital medicine program for diabetes, hyperlipidemia and hypertension.    [facility]    History of Present Illness    CHIEF COMPLAINT:  Ms. Garza presents today with irritated left eye    OCULAR SYMPTOMS:  She reports bilateral itchy eyes for the past week. More recently with crusting and discharge from left eye initially starting in the interior corner. She now notes eye redness as well. She denies eye pain or vision changes.    ALLERGIES:  She has seasonal allergies with symptoms of runny nose and sneezing, typically " occurring during spring.    SURGICAL HISTORY:  She has colonoscopy scheduled for Friday. She has upcoming pilar cyst scalp surgery scheduled for the 19th.    ROS:  General: -fever, -chills, -fatigue, -weight gain, -weight loss  Eyes: -vision changes, +redness, +discharge, +eye itchiness  ENT: -ear pain, -nasal congestion, -sore throat, +runny nose, +frequent sneezing  Respiratory: -cough, -shortness of breath  Gastrointestinal: -abdominal pain, -nausea, -vomiting, -diarrhea       PHQ-4 Score: 0     From LOV w me 2/21/25  UPPER RESPIRATORY SYMPTOMS:  She reports post-nasal drainage and rhinorrhea. She denies fever or feeling unwell. She reports possible exposure to viral illness from 6-month-old great-granddaughter who was symptomatic with upper respiratory infection two days prior to symptom onset.  ARTHRITIS:  She reports arthritis symptoms in left hand with intermittent flare ups, characterized by pain that worsens at night and numbness in two fingers. She manages pain with Tylenol Extra Strength Arthritis 650mg at night - advised ok to take up to 3x daily.  PRE-DIABETES:  She was diagnosed with diabetes in March of this year following years w prediabetes diagnosis. She reports good blood sugar management monitoring more closely since March. Home fasting blood sugar readings are typically around 90, with a recent reading of 120 yesterday.   BLOOD PRESSURE:  Home blood pressure readings have been within normal range, with systolic values ranging from 112 to 115 mmHg. Denies dizziness or lightheadedness moving from lying or sitting to stand.  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 Previous treatment 8-10 years ago involved surgical lancing and removal of cyst contents, with biopsy negative for malignancy.   MEDICATIONS:  She takes methotrexate (four small pills) weekly on Wednesdays with daily folic acid. She continues potassium and  Repatha without issues. She has discontinued sertraline, previously taken for nocturnal anxiety, and reports improvement in these symptoms.  PHQ-4 Score: 0     Recent appointments:   3/03/25 Surg Candelario pilar cyst scalp    Upcoming appointments:  Future Appointments       Date Provider Specialty Appt Notes    3/14/2025 Alberto Espino MD Endoscopy     3/17/2025 Hammad Hardy, BERTA Ophthalmology annual  MARKO Flaco w Pt    3/19/2025 Christian Palomino MD Surgery scalp cyst    5/16/2025 Moon Lion MD Primary Care 3 month f/u    5/19/2025  Lab dr pantoja    5/27/2025 Hammad Hardy, BERTA Ophthalmology Yearly exam    9/15/2025  Lab dr pantoja    9/22/2025 Abram Vásquez MD Rheumatology 6 month fu//dt           The following were reviewed: Active problem list, medication list, allergies, family history, social history, and Health Maintenance.     Medications have been reviewed and reconciled with patient at visit today.    Exam: sat 98%  Vitals:    03/12/25 1443   BP: (!) 108/58   Pulse: 82   Temp: 97.3 °F (36.3 °C)     Weight: 79.2 kg (174 lb 11.4 oz)   Body mass index is 29.99 kg/m².    BP Readings from Last 3 Encounters:   03/12/25 (!) 108/58   03/03/25 129/69   02/21/25 120/60      Wt Readings from Last 3 Encounters:   03/12/25 1443 79.2 kg (174 lb 11.4 oz)   03/03/25 1412 80.3 kg (177 lb 0.5 oz)   02/21/25 0849 78.5 kg (173 lb 2.7 oz)      Physical Exam  Vitals reviewed.   Constitutional:       General: She is not in acute distress.     Appearance: Normal appearance. She is not ill-appearing.   HENT:      Head: Normocephalic and atraumatic.      Right Ear: External ear normal.      Left Ear: External ear normal.      Nose: Nose normal.      Mouth/Throat:      Mouth: Mucous membranes are moist.      Pharynx: Oropharynx is clear. No oropharyngeal exudate or posterior oropharyngeal erythema.   Eyes:      General: No scleral icterus.     Extraocular Movements: Extraocular movements intact.       "Conjunctiva/sclera: Conjunctivae normal.      Comments: B Aarcus senilis   L eye w injected sclera - no discharge or crusting - no lesions or foreign bodies noted   Cardiovascular:      Rate and Rhythm: Normal rate.   Pulmonary:      Effort: Pulmonary effort is normal. No respiratory distress.   Skin:     General: Skin is warm and dry.   Neurological:      Mental Status: She is alert. Mental status is at baseline.      Gait: Gait normal.   Psychiatric:         Behavior: Behavior normal.        Laboratory Reviewed  Lab Results   Component Value Date    WBC 10.25 02/10/2025    HGB 12.3 02/10/2025    HCT 37.2 02/10/2025     02/10/2025    MCV 99 (H) 02/10/2025    CHOL 131 09/10/2024    TRIG 60 09/10/2024    HDL 41 09/10/2024    LDLCALC 78.0 09/10/2024    ALT 40 02/10/2025    AST 19 02/10/2025     02/10/2025    K 4.6 02/10/2025     02/10/2025    CREATININE 1.1 02/10/2025    BUN 13 02/10/2025    CO2 27 02/10/2025    MG 2.4 06/19/2023    TSH 0.509 03/01/2024    FREET4 0.96 03/01/2024    HGBA1C 5.9 (H) 12/13/2024    CRP 3.4 02/10/2025     Lab Results   Component Value Date    PTH 61.0 02/10/2023    CALCIUM 9.6 02/10/2025    PHOS 3.5 06/27/2024      Lab Results   Component Value Date    HMRDNWLQ35 785 02/10/2023   No results found for: "FOLATE" No results found for: "UIBC", "IRON", "TRANS", "TRANSFERRIN", "TIBC", "LABIRON", "FESATURATED"   Lab Results   Component Value Date    EGFRNORACEVR 52 (A) 02/10/2025    ALBUMIN 3.8 02/10/2025     Lab Results   Component Value Date    [id number] 35 06/27/2024          Assessment:   74 y.o. female with multiple co-morbid illnesses here for continued follow up of medical problems.      The primary encounter diagnosis was Non-seasonal allergic rhinitis, unspecified trigger. A diagnosis of Allergic conjunctivitis of left eye was also pertinent to this visit.      Plan:   1. Non-seasonal allergic rhinitis, unspecified trigger  Assessment & Plan:  levocetirizine (XYZAL) " 5 MG tablet          Take 1 tablet (5 mg total) by mouth nightly as needed for Allergies. - recommend take nightly x 2 weeks for seasonal allergy flares, Starting Wed 3/12/2025, Until Tue 6/10/2025 at 2359, Normal         2. Allergic conjunctivitis of left eye  Assessment & Plan:  Recommend warm wet washcloth to affected eye every 2 hrs or so until discharge/crusting resolved  Recommend PF eye drops for dry eye - can use as often as needed for dry, itching or irritated eye    olopatadine (PATANOL) 0.1 % ophthalmic solution 2x daily prn         Other orders  -     olopatadine (PATANOL) 0.1 % ophthalmic solution; Place 1 drop into the left eye 2 (two) times daily. As needed for itchy, irritated or watery eyes  Dispense: 5 mL; Refill: 1  -     levocetirizine (XYZAL) 5 MG tablet; Take 1 tablet (5 mg total) by mouth nightly as needed for Allergies. - recommend take nightly x 2 weeks for seasonal allergy flares  Dispense: 30 tablet; Refill: 2         Health Maintenance         Date Due Completion Date    Shingles Vaccine (1 of 2) 04/20/2011 2/23/2011    TETANUS VACCINE 02/23/2021 2/23/2011    RSV Vaccine (Age 60+ and Pregnant patients) (1 - 1-dose 75+ series) Never done ---    Colonoscopy 03/18/2024 3/18/2014    Override on 6/1/2006: Done    COVID-19 Vaccine (4 - 2024-25 season) 09/01/2024 12/15/2021    Diabetic Eye Exam 03/01/2025 3/1/2024    Override on 7/20/2020: Done    Override on 4/30/2019: Done    Override on 4/24/2018: Done    Override on 5/17/2017: Done    Override on 5/16/2016: Done    Override on 5/13/2015: Done    Hemoglobin A1c 06/13/2025 12/13/2024    LDCT Lung Screen 06/27/2025 6/27/2024    Diabetes Urine Screening 06/27/2025 6/27/2024    Mammogram 07/26/2025 7/26/2024    Override on 3/13/2013: Done    Lipid Panel 09/10/2025 9/10/2024    Foot Exam 02/06/2026 2/6/2025    Override on 9/23/2015: Done    Override on 7/16/2015: Done    Override on 3/23/2015: Done    Override on 12/3/2014: Done    Override on  4/29/2014: Done    DEXA Scan 05/26/2026 5/26/2021    Override on 3/10/2011: Done (normal repeat in 5 years)          -Patient's lab results were reviewed and discussed with patient  -Treatment options and alternatives were discussed with the patient. Patient expressed understanding. Patient was given the opportunity to ask questions and be an active participant in their medical care. Patient had no further questions or concerns at this time.     Follow up: Follow up for Follow Up as scheduled 5/16/25.    Care Plan/Goals: Reviewed No   Goals         80 <= Glucose <= 180 (pt-stated)       2/21/25  On track    11/15/22  On track        Blood Pressure < 130/80       Update 2/21/25  On track        Exercise at least 150 minutes per week. (pt-stated)       Update 2/21/25   Not on track  Referral O[facility]    Update 1/17/2023  Daily walking - current step average about 7,000 steps - new goal 10,000 steps. Walk to park, around park and back 3x/week.    Time frame 3 mos - mid April 2023.          LDL CHOLESTEROL < 70       Take at least one BP reading per week at various times of the day       11/15/22  On track        Weight (lb) < 90.7 kg (200 lb) (pt-stated)       Update 2/21/25  On track weight stable around 175    Update 11/15/22    On track - weight < 200 lbs - new goal 10 lbs wt loss over the next 2 mos.    Goal 5% weight loss - 9-10 lbs - 175 seth    Barriers? Sweet-tooth    Overcoming? Try substitute fruit? Daily walking - current step average about 5,000 steps - new goal 7,000 steps.    Time frame 3 mos - mid Jan 2023.                After visit summary printed and given to patient upon discharge.  Patient goals and care plan are included in After visit summary.    TOTAL TIME evaluating and managing this patient for this encounter was 38 minutes. This time was spent personally by me on some of the following activities: review of patient's past medical history, assessing age-appropriate health maintenance needs,  review of any interval history, review and interpretation of lab results, review and interpretation of imaging test results, review and interpretation of cardiology test results, reviewing consulting specialist notes, obtaining history from the patient and family, examination of the patient, medication reconciliation, managing and/or ordering prescription medications, ordering imaging tests, ordering referral to subspecialty provider(s), educating patient and answering their questions about diagnosis, treatment plan, and goals of treatment, discussing planned follow-up and final documentation of the visit. This time was exclusive of any separately billable procedures for this patient and exclusive of time spent treating any other patients.     This note was generated with the assistance of ambient listening technology. Verbal consent was obtained by the patient and accompanying visitor(s) for the recording of patient appointment to facilitate this note. I attest to having reviewed and edited the generated note for accuracy, though some syntax or spelling errors may persist. Please contact the author of this note for any clarification.

## 2025-03-12 NOTE — TELEPHONE ENCOUNTER
----- Message from Brooke sent at 3/12/2025  9:12 AM CDT -----  Contact: [phone]  .[email or web address] Patient is calling for Medical Advice regarding: possible pink eye (left) itchy, with yellow drainageHow long has patient had these symptoms: 1 week Pharmacy name and phone#: [id number] [place] [facility] [address] [place] [address]Zuslg: [phone] Fax: [phone] wants a call back or thru [facility]: callComments: Patient has an appt tomorrow but would like to come in today.Please advise patient replies from provider may take up to 48 hours.

## 2025-03-12 NOTE — PATIENT INSTRUCTIONS
If you are feeling unwell, we'd like to be the first ones to know here at [facility]! Please give us a call. Same day appointments are our top priority to keep you well and out of the emergency rooms and hospitals. Call [phone] for our direct line. After hours advice is always available. Please call [phone] after hours to speak to the on-call team.      Recommend warm wet washcloth to affected eye every 2 hrs or so until discharge/crusting resolved  Recommend PF eye drops for dry eye - can use as often as needed for dry, itching or irritated eye    Let us know if left eye still bothering you after treating for 3-5 days

## 2025-03-12 NOTE — ASSESSMENT & PLAN NOTE
Recommend warm wet washcloth to affected eye every 2 hrs or so until discharge/crusting resolved  Recommend PF eye drops for dry eye - can use as often as needed for dry, itching or irritated eye    olopatadine (PATANOL) 0.1 % ophthalmic solution 2x daily prn

## 2025-03-12 NOTE — ASSESSMENT & PLAN NOTE
levocetirizine (XYZAL) 5 MG tablet          Take 1 tablet (5 mg total) by mouth nightly as needed for Allergies. - recommend take nightly x 2 weeks for seasonal allergy flares, Starting Wed 3/12/2025, Until Tue 6/10/2025 at 2359, Normal

## 2025-03-13 RX ORDER — LEVOCETIRIZINE DIHYDROCHLORIDE 5 MG/1
5 TABLET, FILM COATED ORAL NIGHTLY PRN
Qty: 90 TABLET | OUTPATIENT
Start: 2025-03-13 | End: 2025-06-11

## 2025-03-14 ENCOUNTER — HOSPITAL ENCOUNTER (OUTPATIENT)
Dept: ENDOSCOPY | Facility: HOSPITAL | Age: 75
Discharge: HOME | End: 2025-03-14
Attending: INTERNAL MEDICINE | Admitting: COLON & RECTAL SURGERY
Payer: MEDICARE

## 2025-03-14 ENCOUNTER — ANESTHESIA (OUTPATIENT)
Dept: ENDOSCOPY | Facility: HOSPITAL | Age: 75
End: 2025-03-14
Payer: MEDICARE

## 2025-03-14 ENCOUNTER — ANESTHESIA EVENT (OUTPATIENT)
Dept: ENDOSCOPY | Facility: HOSPITAL | Age: 75
End: 2025-03-14
Payer: MEDICARE

## 2025-03-14 VITALS
TEMPERATURE: 98 F | DIASTOLIC BLOOD PRESSURE: 50 MMHG | RESPIRATION RATE: 18 BRPM | HEART RATE: 65 BPM | BODY MASS INDEX: 29.88 KG/M2 | HEIGHT: 64 IN | WEIGHT: 175 LBS | SYSTOLIC BLOOD PRESSURE: 102 MMHG | OXYGEN SATURATION: 97 %

## 2025-03-14 DIAGNOSIS — Z12.11 SCREEN FOR COLON CANCER: ICD-10-CM

## 2025-03-14 LAB — POCT GLUCOSE: 109 MG/DL (ref 70–110)

## 2025-03-14 PROCEDURE — 63600175 PHARM REV CODE 636 W HCPCS: Performed by: NURSE ANESTHETIST, CERTIFIED REGISTERED

## 2025-03-14 PROCEDURE — 45385 COLONOSCOPY W/LESION REMOVAL: CPT | Mod: PT | Performed by: COLON & RECTAL SURGERY

## 2025-03-14 PROCEDURE — 88305 TISSUE EXAM BY PATHOLOGIST: CPT | Mod: 26,,, | Performed by: PATHOLOGY

## 2025-03-14 PROCEDURE — 27201012 HC FORCEPS, HOT/COLD, DISP

## 2025-03-14 PROCEDURE — 45380 COLONOSCOPY AND BIOPSY: CPT | Mod: 59,PT | Performed by: COLON & RECTAL SURGERY

## 2025-03-14 PROCEDURE — 37000009 HC ANESTHESIA EA ADD 15 MINS

## 2025-03-14 PROCEDURE — 27201089 HC SNARE, DISP (ANY)

## 2025-03-14 PROCEDURE — 88305 TISSUE EXAM BY PATHOLOGIST: CPT | Performed by: PATHOLOGY

## 2025-03-14 PROCEDURE — 37000008 HC ANESTHESIA 1ST 15 MINUTES

## 2025-03-14 PROCEDURE — 45380 COLONOSCOPY AND BIOPSY: CPT | Mod: 59,PT,, | Performed by: COLON & RECTAL SURGERY

## 2025-03-14 PROCEDURE — 45385 COLONOSCOPY W/LESION REMOVAL: CPT | Mod: PT,,, | Performed by: COLON & RECTAL SURGERY

## 2025-03-14 RX ORDER — PROPOFOL 10 MG/ML
VIAL (ML) INTRAVENOUS
Status: DISCONTINUED | OUTPATIENT
Start: 2025-03-14 | End: 2025-03-14

## 2025-03-14 RX ORDER — LIDOCAINE HYDROCHLORIDE 10 MG/ML
INJECTION, SOLUTION EPIDURAL; INFILTRATION; INTRACAUDAL; PERINEURAL
Status: DISCONTINUED | OUTPATIENT
Start: 2025-03-14 | End: 2025-03-14

## 2025-03-14 RX ADMIN — PROPOFOL 20 MG: 10 INJECTION, EMULSION INTRAVENOUS at 10:03

## 2025-03-14 RX ADMIN — PROPOFOL 50 MG: 10 INJECTION, EMULSION INTRAVENOUS at 10:03

## 2025-03-14 RX ADMIN — LIDOCAINE HYDROCHLORIDE 50 MG: 10 SOLUTION INTRAVENOUS at 10:03

## 2025-03-14 NOTE — H&P
[facility] ([facility])  Colon and Rectal Surgery  History & Physical    Patient Name: Kristyn Garza  MRN: [id number]  Admission Date: 3/14/2025  Attending Physician: Albetro Espino MD  Primary Care Provider: Moon Lion MD    Patient information was obtained from patient and medical records.    Subjective:     Chief Complaint/Reason for Admission: Here for Colonoscopy    History of Present Illness:  Patient is a 74 y.o. female presents for colonoscopy. Last cscope in 2014 without polyps or lesions. No hematochezia, melena or change in bowel habits. No personal or fam hx of CRC, polyps or IBD.    Current Outpatient Medications on File Prior to Encounter   Medication Sig    amLODIPine (NORVASC) 10 MG tablet TAKE 1 TABLET ONE TIME DAILY    aspirin 81 MG Chew Take 81 mg by mouth once daily.    benazepriL (LOTENSIN) 40 MG tablet TAKE 1 TABLET (40 MG TOTAL) BY MOUTH ONCE DAILY.    calcium-vitamin D3 500 mg(1,250mg) -200 unit per tablet Take 1 tablet by mouth once daily.     CRANBERRY EXTRACT ORAL Take 1 tablet by mouth once daily.    diclofenac sodium (VOLTAREN ARTHRITIS PAIN) 1 % Gel Apply 2 g topically once daily.    evolocumab (REPATHA SURECLICK) 140 mg/mL PnIj Inject 1 mL (140 mg total) into the skin every 14 (fourteen) days.    folic acid (FOLVITE) 1 MG tablet Take 1 tablet (1 mg total) by mouth once daily.    methotrexate 2.5 MG Tab Take 4 tablets (10 mg total) by mouth every 7 days.    metoprolol succinate (TOPROL-XL) 25 MG 24 hr tablet TAKE 1 TABLET ONE TIME DAILY    MULTIVITAMIN W-MINERALS/LUTEIN (CENTRUM SILVER ORAL) Take 1 tablet by mouth once daily.    potassium chloride SA (K-DUR,KLOR-CON) 20 MEQ tablet TAKE 1 TABLET ONE TIME DAILY    spironolactone (ALDACTONE) 50 MG tablet TAKE 1 TABLET EVERY DAY    albuterol (VENTOLIN HFA) 90 mcg/actuation inhaler Inhale 2 puffs into the lungs every 4 (four) hours as needed for Wheezing or Shortness of Breath (before exercise). Rescue    azelastine (ASTELIN)  "137 mcg (0.1 %) nasal spray 1 spray (137 mcg total) by Nasal route 2 (two) times daily as needed for Rhinitis.    blood glucose control, normal Soln 1 Bottle by Misc.(Non-Drug; Combo Route) route once daily. For Accu Check Amber  use twice daily prn dx: E11.9    blood sugar diagnostic (ACCU-CHEK SMARTVIEW TEST STRIP) Strp TO CHECK BLOOD GLUCOSE ONE TIMES DAILY    blood-glucose meter (ACCU-CHEK AMBER) Misc 1 kit by Misc.(Non-Drug; Combo Route) route once. For Accu Check Amber  use twice daily prn dx: E11.9 for 1 dose    cetirizine (ZYRTEC) 10 MG tablet Take 1 tablet (10 mg total) by mouth once daily.    levocetirizine (XYZAL) 5 MG tablet Take 1 tablet (5 mg total) by mouth nightly as needed for Allergies. - recommend take nightly x 2 weeks for seasonal allergy flares    olopatadine (PATANOL) 0.1 % ophthalmic solution Place 1 drop into the left eye 2 (two) times daily. As needed for itchy, irritated or watery eyes    pen needle, diabetic (BD ULTRA-FINE MICRO PEN NEEDLE) 32 gauge x 1/4" Ndle 1 Needle by Misc.(Non-Drug; Combo Route) route once a week.     No current facility-administered medications on file prior to encounter.       Review of patient's allergies indicates:   Allergen Reactions    Buspar [buspirone] Other (See Comments)     Lip swelling    Statins-hmg-coa reductase inhibitors      Muscle Cramps       Past Medical History:   Diagnosis Date    Anxiety 05/27/2024    Carpal tunnel syndrome     CKD (chronic kidney disease) stage 3, GFR 30-59 ml/min     Followed by Nephrology    Colon cancer screening 03/18/2014    COVID-19 08/30/2023    CTS (carpal tunnel syndrome) 06/18/2013    Diabetes mellitus, type 2     Dizziness 06/19/2023    Eczema     Elevated CPK     Gastroesophageal reflux disease 07/29/2020    History of hypokalemia 06/18/2013    History of hypokalemia 06/18/2013    Hypokalemia     Major depressive disorder, single episode, mild 05/27/2024    Multinodular thyroid     Followed by ENT    Nicotine " dependence, cigarettes, in remission 2024    Obesity     Obesity (BMI 30.0-34.9) 2015    Other and unspecified hyperlipidemia     Pneumonia     in her 20s; hospitalized    Postmenopausal     No history of abnormal pap smear    Secondary hyperparathyroidism of renal origin 10/26/2021    Statin intolerance     caused mylagia, elevated CPK    Tobacco dependence     resolved    Unspecified essential hypertension      Past Surgical History:   Procedure Laterality Date    COLONOSCOPY      CYST REMOVAL      On the head    FINE NEEDLE ASPIRATION  3/2011    thyroid nodules x3 (negative per patient)    HYSTERECTOMY      PARTIAL HYSTERECTOMY      Due to fibroids     Family History       Problem Relation (Age of Onset)    Cataracts Sister, Brother, Brother    Dementia Mother, Sister, Sister    Diabetes Mother    Heart disease Sister    Hypertension Mother, Father    No Known Problems Son, Son          Tobacco Use    Smoking status: Former     Current packs/day: 0.00     Average packs/day: 1 pack/day for 43.4 years (43.4 ttl pk-yrs)     Types: Cigarettes     Start date: 1970     Quit date: 2013     Years since quittin.8    Smokeless tobacco: Never   Substance and Sexual Activity    Alcohol use: Not Currently     Comment: Rarely drinks wine    Drug use: No    Sexual activity: Not Currently     Partners: Male     Birth control/protection: Condom     Review of Systems   Constitutional:  Negative for activity change, appetite change, chills, fatigue, fever and unexpected weight change.   HENT:  Negative for congestion, ear pain, sore throat and trouble swallowing.    Eyes:  Negative for pain, redness and itching.   Respiratory:  Negative for cough, shortness of breath and wheezing.    Cardiovascular:  Negative for chest pain, palpitations and leg swelling.   Gastrointestinal:  Negative for abdominal distention, abdominal pain, anal bleeding, blood in stool, constipation, diarrhea, nausea, rectal  pain and vomiting.   Endocrine: Negative for cold intolerance, heat intolerance and polyuria.   Genitourinary:  Negative for dysuria, flank pain, frequency and hematuria.   Musculoskeletal:  Negative for gait problem, joint swelling and neck pain.   Skin:  Negative for color change, rash and wound.   Allergic/Immunologic: Negative for environmental allergies and immunocompromised state.   Neurological:  Negative for dizziness, speech difficulty, weakness and numbness.   Psychiatric/Behavioral:  Negative for agitation, confusion and hallucinations.      Objective:     Vital Signs (Most Recent):  Temp: 97.5 °F (36.4 °C) (03/14/25 0928)  Pulse: 71 (03/14/25 0928)  Resp: 18 (03/14/25 0928)  BP: (!) 154/75 (03/14/25 0928)  SpO2: 98 % (03/14/25 0928) Vital Signs (24h Range):  Temp:  [97.5 °F (36.4 °C)] 97.5 °F (36.4 °C)  Pulse:  [71] 71  Resp:  [18] 18  SpO2:  [98 %] 98 %  BP: (154)/(75) 154/75     Weight: 79.4 kg (175 lb)  Body mass index is 30.04 kg/m².    Physical Exam  Constitutional:       Appearance: She is well-developed.   HENT:      Head: Normocephalic and atraumatic.   Eyes:      Conjunctiva/sclera: Conjunctivae normal.   Neck:      Thyroid: No thyromegaly.   Cardiovascular:      Rate and Rhythm: Normal rate and regular rhythm.   Pulmonary:      Effort: Pulmonary effort is normal. No respiratory distress.   Abdominal:      General: There is no distension.      Palpations: Abdomen is soft. There is no mass.      Tenderness: There is no abdominal tenderness.   Musculoskeletal:         General: No tenderness. Normal range of motion.      Cervical back: Normal range of motion.   Skin:     General: Skin is warm and dry.      Capillary Refill: Capillary refill takes less than 2 seconds.      Findings: No rash.   Neurological:      Mental Status: She is alert and oriented to person, place, and time.           Assessment/Plan:     Patient is a 74 y.o. female who presents for colonoscopy     - Ok to proceed to endoscopy  suite for colonoscopy  - Consent obtained. All risks, benefits and alternatives fully explained to patient, including but not limited to bleeding, infection, perforation, and missed polyps. All questions appropriately answered to patient's satisfaction. Consent signed and placed on chart.    There are no hospital problems to display for this patient.    VTE Risk Mitigation (From admission, onward)      None            Alberto Espino MD  Colon and Rectal Surgery  [facility] ([facility])

## 2025-03-14 NOTE — PLAN OF CARE
Dr Espino at bedside to update patient and family on results and recs. AVS sent to [facility]. Post sedation/procedure precautions discussed. Verbalized understanding.

## 2025-03-14 NOTE — ANESTHESIA PREPROCEDURE EVALUATION
03/14/2025  Kristyn Garza is a 74 y.o., female.      Pre-op Assessment    I have reviewed the Patient Summary Reports.     I have reviewed the Nursing Notes. I have reviewed the NPO Status.   I have reviewed the Medications.     Review of Systems  Anesthesia Hx:  No problems with previous Anesthesia             Denies Family Hx of Anesthesia complications.    Denies Personal Hx of Anesthesia complications.                    Cardiovascular:     Hypertension    Dysrhythmias        ZHOU                        Hypertension         Pulmonary:  Pneumonia    Shortness of breath                  Renal/:  Chronic Renal Disease        Kidney Function/Disease, Chronic Kidney Disease (CKD)             Hepatic/GI:     GERD                Musculoskeletal:  Arthritis               Neurological:    Neuromuscular Disease,                                 Neuromuscular Disease   Endocrine:  Diabetes           Psych:  Psychiatric History                  Physical Exam  General: Well nourished, Cooperative, Alert and Oriented    Airway:  Mallampati: II   Mouth Opening: Normal  TM Distance: Normal  Tongue: Normal  Neck ROM: Normal ROM    Dental:  Dentures    Heart:  · The left ventricle is normal in size with concentric remodeling and normal systolic function.    Normal sinus rhythm   Minimal voltage criteria for LVH, may be normal variant   Borderline Abnormal ECG   When compared with ECG of 07-APR-2022 09:29,   No significant change was found   Confirmed by LUCIANO RUANO MD ([id number]) on 4/20/2022 5:14:24 PM       · The estimated ejection fraction is 60%.  · Normal left ventricular diastolic function.  · Normal right ventricular size with normal right ventricular systolic function.  · Mild pulmonic regurgitation.  · Normal central venous pressure (3 mmHg).        Anesthesia Plan  Type of Anesthesia, risks & benefits  discussed:    Anesthesia Type: MAC  Intra-op Monitoring Plan: Standard ASA Monitors  Post Op Pain Control Plan: multimodal analgesia and IV/PO Opioids PRN  Induction:  IV  ASA Score: 2    Ready For Surgery From Anesthesia Perspective.     .

## 2025-03-14 NOTE — BRIEF OP NOTE
[facility] ([facility])  Brief Operative Note     SUMMARY     Surgery Date: 3/14/2025     Surgeon: Alberto sEpino MD    Pre-op Diagnosis:  Screen for Colon Cancer    Post-op Diagnosis:  Screen for Colon Cancer    Procedure: Colonoscopy    Anesthesia: MAC    Description of the findings of the procedure: polyps removed    Estimated Blood Loss: minimal         Specimens:   Specimen (24h ago, onward)       Start     Ordered    03/14/25 1044  Specimen to Pathology, Surgery Gastrointestinal tract  Once        Comments: Pre-op Diagnosis: Screen for colon cancerProcedures: COLONOSCOPY1. Ascending polypectomy 2. Sigmoid polypectomy     References:    Click here for ordering Quick Tip   Question Answer Comment   Procedure Type: Gastrointestinal tract    Release to patient Immediate        03/14/25 1054

## 2025-03-14 NOTE — ANESTHESIA POSTPROCEDURE EVALUATION
Anesthesia Post Evaluation    Patient: Kristyn Garza    Procedure(s) Performed: * No procedures listed *    Final Anesthesia Type: MAC      Patient location during evaluation: GI PACU  Patient participation: Yes- Able to Participate  Level of consciousness: awake and alert  Post-procedure vital signs: reviewed and stable  Pain management: adequate  Airway patency: patent    PONV status at discharge: No PONV  Anesthetic complications: no      Cardiovascular status: hemodynamically stable  Respiratory status: unassisted, spontaneous ventilation and room air  Hydration status: euvolemic  Follow-up not needed.              Vitals Value Taken Time   /50 03/14/25 11:08   Temp 36.6 °C (97.8 °F) 03/14/25 11:08   Pulse 65 03/14/25 11:08   Resp 18 03/14/25 11:08   SpO2 97 % 03/14/25 11:08         Event Time   Out of Recovery 11:14:28         Pain/Eliseo Score: Eliseo Score: 10 (3/14/2025 11:09 AM)

## 2025-03-14 NOTE — TRANSFER OF CARE
"Anesthesia Transfer of Care Note    Patient: Kristyn Garza    Procedure(s) Performed: * No procedures listed *    Patient location: GI    Anesthesia Type: MAC    Transport from OR: Transported from OR on room air with adequate spontaneous ventilation    Post pain: adequate analgesia    Post assessment: no apparent anesthetic complications    Post vital signs: stable    Level of consciousness: awake    Nausea/Vomiting: no nausea/vomiting    Complications: none    Transfer of care protocol was followed      Last vitals: Visit Vitals  BP (!) 101/55   Pulse 65   Temp 36.6 °C (97.8 °F)   Resp 18   Ht 5' 4" (1.626 m)   Wt 79.4 kg (175 lb)   SpO2 97%   Breastfeeding No   BMI 30.04 kg/m²     "

## 2025-03-15 ENCOUNTER — RESULTS FOLLOW-UP (OUTPATIENT)
Dept: PRIMARY CARE CLINIC | Facility: CLINIC | Age: 75
End: 2025-03-15

## 2025-03-15 NOTE — PROGRESS NOTES
[facility] - if message below not viewed please call w information below:     2 small polyps removed - depending on what the pathology shows they'll let us know when/if repeat colonoscopy is recommended.    Please message or call with any questions or concerns!  Thank you!  Moon Lion MD, MPH  [facility]

## 2025-03-17 ENCOUNTER — TELEPHONE (OUTPATIENT)
Dept: PRIMARY CARE CLINIC | Facility: CLINIC | Age: 75
End: 2025-03-17
Payer: MEDICARE

## 2025-03-17 ENCOUNTER — OFFICE VISIT (OUTPATIENT)
Dept: OPHTHALMOLOGY | Facility: CLINIC | Age: 75
End: 2025-03-17
Payer: MEDICARE

## 2025-03-17 VITALS
DIASTOLIC BLOOD PRESSURE: 50 MMHG | WEIGHT: 175 LBS | OXYGEN SATURATION: 97 % | SYSTOLIC BLOOD PRESSURE: 102 MMHG | TEMPERATURE: 98 F | HEART RATE: 65 BPM | HEIGHT: 64 IN | RESPIRATION RATE: 18 BRPM | BODY MASS INDEX: 29.88 KG/M2

## 2025-03-17 DIAGNOSIS — N18.31 TYPE 2 DIABETES MELLITUS WITH STAGE 3A CHRONIC KIDNEY DISEASE, WITHOUT LONG-TERM CURRENT USE OF INSULIN: ICD-10-CM

## 2025-03-17 DIAGNOSIS — E11.9 TYPE 2 DIABETES MELLITUS WITHOUT RETINOPATHY: Primary | ICD-10-CM

## 2025-03-17 DIAGNOSIS — E11.36 DIABETIC CATARACT OF BOTH EYES: ICD-10-CM

## 2025-03-17 DIAGNOSIS — H40.013 OPEN ANGLE WITH BORDERLINE FINDINGS OF BOTH EYES: ICD-10-CM

## 2025-03-17 DIAGNOSIS — H25.043 POSTERIOR SUBCAPSULAR AGE-RELATED CATARACT OF BOTH EYES: ICD-10-CM

## 2025-03-17 DIAGNOSIS — H25.013 CORTICAL AGE-RELATED CATARACT OF BOTH EYES: ICD-10-CM

## 2025-03-17 DIAGNOSIS — H52.4 MYOPIA WITH ASTIGMATISM AND PRESBYOPIA, BILATERAL: ICD-10-CM

## 2025-03-17 DIAGNOSIS — H52.203 MYOPIA WITH ASTIGMATISM AND PRESBYOPIA, BILATERAL: ICD-10-CM

## 2025-03-17 DIAGNOSIS — H52.13 MYOPIA WITH ASTIGMATISM AND PRESBYOPIA, BILATERAL: ICD-10-CM

## 2025-03-17 DIAGNOSIS — E11.22 TYPE 2 DIABETES MELLITUS WITH STAGE 3A CHRONIC KIDNEY DISEASE, WITHOUT LONG-TERM CURRENT USE OF INSULIN: ICD-10-CM

## 2025-03-17 DIAGNOSIS — H25.13 NUCLEAR SCLEROSIS, BILATERAL: ICD-10-CM

## 2025-03-17 PROCEDURE — 1159F MED LIST DOCD IN RCRD: CPT | Mod: CPTII,S$GLB,, | Performed by: OPTOMETRIST

## 2025-03-17 PROCEDURE — 92133 CPTRZD OPH DX IMG PST SGM ON: CPT | Mod: S$GLB,,, | Performed by: OPTOMETRIST

## 2025-03-17 PROCEDURE — 92014 COMPRE OPH EXAM EST PT 1/>: CPT | Mod: S$GLB,,, | Performed by: OPTOMETRIST

## 2025-03-17 PROCEDURE — 92015 DETERMINE REFRACTIVE STATE: CPT | Mod: S$GLB,,, | Performed by: OPTOMETRIST

## 2025-03-17 PROCEDURE — 2023F DILAT RTA XM W/O RTNOPTHY: CPT | Mod: CPTII,S$GLB,, | Performed by: OPTOMETRIST

## 2025-03-17 PROCEDURE — 99999 PR PBB SHADOW E&M-EST. PATIENT-LVL IV: CPT | Mod: PBBFAC,,, | Performed by: OPTOMETRIST

## 2025-03-17 PROCEDURE — 1126F AMNT PAIN NOTED NONE PRSNT: CPT | Mod: CPTII,S$GLB,, | Performed by: OPTOMETRIST

## 2025-03-17 PROCEDURE — 1160F RVW MEDS BY RX/DR IN RCRD: CPT | Mod: CPTII,S$GLB,, | Performed by: OPTOMETRIST

## 2025-03-17 NOTE — PROGRESS NOTES
HPI     Diabetic Eye Exam            Comments: Diagnosed with diabetes in 2015  Lab Results       Component                Value               Date                       HGBA1C                   5.9 (H)             12/13/2024              Vision changes since last eye exam?: No changes that she noticed. Only   itchy eyes due to seasonal allergies    Any eye pain today: No    Other ocular symptoms: No     Interested in contact lens fitting today? No                          Comments    DM 2015          Last edited by Alec Alegre on 3/17/2025 10:00 AM.            Assessment /Plan     For exam results, see Encounter Report.      Type 2 diabetes mellitus without retinopathy  There was no diabetic retinopathy present in either eye today.   Recommended that pt continue care with PCP and/or specialists regarding diabetes.  Follow-up dilated eye exam recommended in 12 months, sooner with any vision changes or new concerns.      Nuclear sclerosis, bilateral  Cortical age-related cataract of both eyes  Posterior subcapsular age-related cataract of both eyes  Diabetic cataract of both eyes  Cataracts not significantly affecting activities of daily living and therefore surgery is not indicated at this time.   Will continue to monitor over the next 12 months. Pt to call or RTC with any significant change in vision prior to next visit.       Open angle with borderline findings of both eyes  Stable IOPs and gOCT compared to previous  No evidence of glaucoma at this time but based on risk factors recommend to continue monitoring.         Myopia with astigmatism and presbyopia, bilateral  Eyeglass Final Rx       Eyeglass Final Rx         Sphere Cylinder Axis Add    Right -2.25 +0.50 010 +2.75    Left -2.75 +0.50 035 +2.75      Type: PAL    Expiration Date: 3/17/2026                      RTC 1 yr for dilated eye exam w/ gOCT or PRN if any problems.   Discussed above and answered questions.

## 2025-03-17 NOTE — PROGRESS NOTES
HPI     Diabetic Eye Exam            Comments: Diagnosed with diabetes in 2015  Lab Results       Component                Value               Date                       HGBA1C                   5.9 (H)             12/13/2024              Vision changes since last eye exam?: No changes that she noticed. Only   itchy eyes due to seasonal allergies    Any eye pain today: No    Other ocular symptoms: No     Interested in contact lens fitting today? No                          Comments    DM 2015          Last edited by Alec Alegre on 3/17/2025 10:00 AM.            Assessment /Plan     For exam results, see Encounter Report.      Type 2 diabetes mellitus without retinopathy  There was no diabetic retinopathy present in either eye today.   Recommended that pt continue care with PCP and/or specialists regarding diabetes.  Follow-up dilated eye exam recommended in 12 months, sooner with any vision changes or new concerns.      Open angle with borderline findings of both eyes  -     Posterior Segment OCT Optic Nerve- Both eyes  Stable IOPs and gOCT compared to previous scans. Nice, symmetrical GCL: 30/30  No evidence of glaucoma at this time but based on risk factors recommend to continue monitoring.       Nuclear sclerosis, bilateral  Cortical age-related cataract of both eyes  Posterior subcapsular age-related cataract of both eyes  Diabetic cataract of both eyes  Visually significant cataract.  Patient is at the option stage.   Cataract surgery will improve vision, but necessity is dependent on patient's symptoms.   Pt declines surgical consult at this time.  Will continue to monitor over the next 12 months. Pt to call or RTC with any significant change in vision prior to next visit.        Myopia with astigmatism and presbyopia, bilateral  Eyeglass Final Rx       Eyeglass Final Rx         Sphere Cylinder Axis Add    Right -2.25 +0.50 010 +2.75    Left -2.75 +0.50 035 +2.75      Type: PAL    Expiration Date: 3/17/2026                       RTC 1 yr for dilated eye exam w/ gOCT or PRN if any problems.   Discussed above and answered questions.

## 2025-03-17 NOTE — TELEPHONE ENCOUNTER
Pt viewed results via [employer].        ----- Message from Moon Lion MD sent at 3/15/2025 11:06 AM CDT -----  [facility] - if message below not viewed please call w information below:     2 small polyps removed - depending on what the pathology shows they'll let us know when/if repeat colonoscopy is recommended.    Please message or call with any questions or concerns!  Thank you!  Moon Lion MD, MPH  [facility]   ----- Message -----  From: Alberto Espino MD  Sent: 3/14/2025  10:57 AM CDT  To: Moon Lion MD

## 2025-03-18 ENCOUNTER — RESULTS FOLLOW-UP (OUTPATIENT)
Dept: SURGERY | Facility: CLINIC | Age: 75
End: 2025-03-18

## 2025-03-18 LAB
FINAL PATHOLOGIC DIAGNOSIS: NORMAL
GROSS: NORMAL
Lab: NORMAL

## 2025-03-21 ENCOUNTER — PATIENT MESSAGE (OUTPATIENT)
Dept: ADMINISTRATIVE | Facility: OTHER | Age: 75
End: 2025-03-21
Payer: MEDICARE

## 2025-03-23 ENCOUNTER — PATIENT MESSAGE (OUTPATIENT)
Dept: ADMINISTRATIVE | Facility: OTHER | Age: 75
End: 2025-03-23
Payer: MEDICARE

## 2025-04-09 RX ORDER — LEVOCETIRIZINE DIHYDROCHLORIDE 5 MG/1
5 TABLET, FILM COATED ORAL NIGHTLY
Qty: 90 TABLET | Refills: 0 | Status: SHIPPED | OUTPATIENT
Start: 2025-04-09

## 2025-04-10 ENCOUNTER — PATIENT MESSAGE (OUTPATIENT)
Dept: PRIMARY CARE CLINIC | Facility: CLINIC | Age: 75
End: 2025-04-10
Payer: MEDICARE

## 2025-04-13 ENCOUNTER — HOSPITAL ENCOUNTER (EMERGENCY)
Facility: HOSPITAL | Age: 75
Discharge: HOME | End: 2025-04-13
Attending: EMERGENCY MEDICINE
Payer: MEDICARE

## 2025-04-13 VITALS
SYSTOLIC BLOOD PRESSURE: 167 MMHG | DIASTOLIC BLOOD PRESSURE: 79 MMHG | HEART RATE: 86 BPM | TEMPERATURE: 98 F | HEIGHT: 64 IN | OXYGEN SATURATION: 99 % | RESPIRATION RATE: 18 BRPM | WEIGHT: 172 LBS | BODY MASS INDEX: 29.37 KG/M2

## 2025-04-13 DIAGNOSIS — R52 PAIN: ICD-10-CM

## 2025-04-13 DIAGNOSIS — M25.532 LEFT WRIST PAIN: Primary | ICD-10-CM

## 2025-04-13 DIAGNOSIS — M19.90 ARTHRITIS: ICD-10-CM

## 2025-04-13 PROCEDURE — 63600175 PHARM REV CODE 636 W HCPCS: Mod: JZ,TB

## 2025-04-13 PROCEDURE — 96372 THER/PROPH/DIAG INJ SC/IM: CPT

## 2025-04-13 PROCEDURE — 25000003 PHARM REV CODE 250: Performed by: NURSE PRACTITIONER

## 2025-04-13 PROCEDURE — 99284 EMERGENCY DEPT VISIT MOD MDM: CPT | Mod: 25

## 2025-04-13 RX ORDER — PREDNISONE 20 MG/1
20 TABLET ORAL DAILY
Qty: 5 TABLET | Refills: 0 | Status: SHIPPED | OUTPATIENT
Start: 2025-04-13 | End: 2025-04-18

## 2025-04-13 RX ORDER — ONDANSETRON 4 MG/1
4 TABLET, ORALLY DISINTEGRATING ORAL
Status: COMPLETED | OUTPATIENT
Start: 2025-04-13 | End: 2025-04-13

## 2025-04-13 RX ORDER — HYDROCODONE BITARTRATE AND ACETAMINOPHEN 5; 325 MG/1; MG/1
1 TABLET ORAL
Refills: 0 | Status: COMPLETED | OUTPATIENT
Start: 2025-04-13 | End: 2025-04-13

## 2025-04-13 RX ORDER — KETOROLAC TROMETHAMINE 30 MG/ML
15 INJECTION, SOLUTION INTRAMUSCULAR; INTRAVENOUS
Status: COMPLETED | OUTPATIENT
Start: 2025-04-13 | End: 2025-04-13

## 2025-04-13 RX ADMIN — KETOROLAC TROMETHAMINE 15 MG: 30 INJECTION, SOLUTION INTRAMUSCULAR at 11:04

## 2025-04-13 RX ADMIN — ONDANSETRON 4 MG: 4 TABLET, ORALLY DISINTEGRATING ORAL at 10:04

## 2025-04-13 RX ADMIN — HYDROCODONE BITARTRATE AND ACETAMINOPHEN 1 TABLET: 5; 325 TABLET ORAL at 10:04

## 2025-04-13 NOTE — FIRST PROVIDER EVALUATION
"Medical screening examination initiated.  I have conducted a focused provider triage encounter, findings are as follows:    Brief history of present illness:  74-year-old female with past medical history of rheumatoid arthritis presents to ER complaining of pain to left wrist started last night.  Patient reports this is typical of her arthritis flare-up.  Denies injury or trauma.  Denies fever.  Reports no relief with Tylenol.    Vitals:    04/13/25 1007   BP: (!) 159/74   BP Location: Right arm   Pulse: 90   Resp: 18   Temp: 98.4 °F (36.9 °C)   TempSrc: Oral   SpO2: 97%   Weight: 78 kg (172 lb)   Height: 5' 4" (1.626 m)       Pertinent physical exam:  Neurovascular exam is normal.    Brief workup plan:  Imaging, analgesic    Preliminary workup initiated; this workup will be continued and followed by the physician or advanced practice provider that is assigned to the patient when roomed.  "

## 2025-04-14 ENCOUNTER — OFFICE VISIT (OUTPATIENT)
Dept: PRIMARY CARE CLINIC | Facility: CLINIC | Age: 75
End: 2025-04-14
Payer: MEDICARE

## 2025-04-14 VITALS
TEMPERATURE: 97 F | OXYGEN SATURATION: 97 % | HEART RATE: 73 BPM | HEIGHT: 64 IN | SYSTOLIC BLOOD PRESSURE: 114 MMHG | WEIGHT: 169.06 LBS | BODY MASS INDEX: 28.86 KG/M2 | DIASTOLIC BLOOD PRESSURE: 62 MMHG

## 2025-04-14 DIAGNOSIS — E11.22 TYPE 2 DIABETES MELLITUS WITH STAGE 3A CHRONIC KIDNEY DISEASE, WITHOUT LONG-TERM CURRENT USE OF INSULIN: Chronic | ICD-10-CM

## 2025-04-14 DIAGNOSIS — N18.31 TYPE 2 DIABETES MELLITUS WITH STAGE 3A CHRONIC KIDNEY DISEASE, WITHOUT LONG-TERM CURRENT USE OF INSULIN: Chronic | ICD-10-CM

## 2025-04-14 DIAGNOSIS — M19.90 INFLAMMATORY ARTHRITIS: Primary | Chronic | ICD-10-CM

## 2025-04-14 DIAGNOSIS — N18.31 STAGE 3A CHRONIC KIDNEY DISEASE: Chronic | ICD-10-CM

## 2025-04-14 PROCEDURE — 3074F SYST BP LT 130 MM HG: CPT | Mod: CPTII,S$GLB,,

## 2025-04-14 PROCEDURE — 3288F FALL RISK ASSESSMENT DOCD: CPT | Mod: CPTII,S$GLB,,

## 2025-04-14 PROCEDURE — 99213 OFFICE O/P EST LOW 20 MIN: CPT | Mod: S$GLB,,,

## 2025-04-14 PROCEDURE — 1159F MED LIST DOCD IN RCRD: CPT | Mod: CPTII,S$GLB,,

## 2025-04-14 PROCEDURE — 1101F PT FALLS ASSESS-DOCD LE1/YR: CPT | Mod: CPTII,S$GLB,,

## 2025-04-14 PROCEDURE — 1125F AMNT PAIN NOTED PAIN PRSNT: CPT | Mod: CPTII,S$GLB,,

## 2025-04-14 PROCEDURE — 99999 PR PBB SHADOW E&M-EST. PATIENT-LVL V: CPT | Mod: PBBFAC,,,

## 2025-04-14 PROCEDURE — 3078F DIAST BP <80 MM HG: CPT | Mod: CPTII,S$GLB,,

## 2025-04-14 PROCEDURE — 1160F RVW MEDS BY RX/DR IN RCRD: CPT | Mod: CPTII,S$GLB,,

## 2025-04-14 RX ORDER — TRAMADOL HYDROCHLORIDE 50 MG/1
50 TABLET ORAL EVERY 6 HOURS PRN
Qty: 16 TABLET | Refills: 0 | Status: SHIPPED | OUTPATIENT
Start: 2025-04-14 | End: 2025-04-24

## 2025-04-14 NOTE — PATIENT INSTRUCTIONS
If you are feeling unwell, we'd like to [facility] the first ones to know [facility]! Please give us a call. Same day appointments are our top priority to keep you well and out of the emergency rooms and hospitals. Call [phone] for our direct line. After hours advice is always available. Please call [phone] after hours to speak to the on-call team.      Advise alternating warm and cool compressions to left wrist.   Advise wearing compression gloves to help w/ swelling as tolerated.   Use OTC tylenol arthritis.

## 2025-04-14 NOTE — ASSESSMENT & PLAN NOTE
Continue course of prednisone   Recommend alternating warm and cool compress to left wrist  F/u w/ Dr. CABALLERO (rheumatologist)   Advise to use Diclofenac gel and OTC Tylenol arthritis

## 2025-04-14 NOTE — ASSESSMENT & PLAN NOTE
Monitor blood glucose while taking prednisone course  Advise to increase water intake and avoid any NSAIDs to prevent kidney damage.

## 2025-04-14 NOTE — ASSESSMENT & PLAN NOTE
Lab Results   Component Value Date    EGFRNORACEVR 52 (A) 02/10/2025    EGFRNORACEVR 58 (A) 12/13/2024    EGFRNORACEVR 42.9 (A) 11/11/2024    CREATININE 1.1 02/10/2025    CREATININE 1.0 12/13/2024    CREATININE 1.3 11/11/2024    BUN 13 02/10/2025    BUN 15 12/13/2024    BUN 22 11/11/2024    ALBUMIN 3.8 02/10/2025    ALBUMIN 4.0 12/13/2024    ALBUMIN 3.6 11/11/2024   (    Discuss the following with patient:  Managing blood pressure   Eating foods that are good for the heart and low in sodium   Exercising regularly as tolerated  Getting enough sleep   Managing blood sugar if you have diabetes   Avoiding medications that can further damage the kidneys such as NSAIDs (ibuprofen, naproxen)

## 2025-04-14 NOTE — PROGRESS NOTES
Interval History:  HR still intermittently elevated, still asymptomatic and reports feeling well  Changed wound vac yesterday - seems to be holding suction   Uneventful night for patient       Medications:  Continuous Infusions:  Scheduled Meds:   ciprofloxacin HCl  500 mg Oral Q12H    metoprolol succinate  50 mg Oral Daily    polyethylene glycol  17 g Oral Daily    pregabalin  225 mg Oral QHS    senna-docusate 8.6-50 mg  1 tablet Oral Daily     PRN Meds:bisacodyl, ondansetron, oxyCODONE, oxyCODONE, promethazine (PHENERGAN) IVPB, sodium chloride 0.9%, sodium chloride 0.9%, zolpidem     Objective:     Vital Signs (Most Recent):  Temp: 97.7 °F (36.5 °C) (04/23/19 0409)  Pulse: 108 (04/23/19 0708)  Resp: 17 (04/23/19 0409)  BP: 117/80 (04/23/19 0409)  SpO2: 98 % (04/23/19 0409) Vital Signs (24h Range):  Temp:  [96.4 °F (35.8 °C)-98.2 °F (36.8 °C)] 97.7 °F (36.5 °C)  Pulse:  [101-134] 108  Resp:  [17-18] 17  SpO2:  [98 %-100 %] 98 %  BP: (114-148)/(70-99) 117/80         Physical Exam   Constitutional: He is oriented to person, place, and time. He appears well-developed and well-nourished.   Neck: No JVD present. No tracheal deviation present.   Cardiovascular: Normal rate and regular rhythm.   Pulmonary/Chest: Effort normal. No respiratory distress.   Abdominal: Soft. He exhibits no distension. There is no tenderness. There is no guarding.   Musculoskeletal: He exhibits no edema.   L AKA with wound vac, minimal edema, pink and healthy appearing tissue   R AKA incision c/d/i    Neurological: He is alert and oriented to person, place, and time.   Skin: Skin is warm and dry.   Nursing note and vitals reviewed.      Significant Labs:  CBC:   Recent Labs   Lab 04/23/19  0325   WBC 9.77   RBC 3.60*   HGB 9.7*   HCT 28.1*   *   MCV 78*   MCH 26.9*   MCHC 34.5     CMP:   Recent Labs   Lab 04/23/19  0325   *   CALCIUM 9.3   ALBUMIN 2.7*   PROT 6.9      K 4.0   CO2 29      BUN 13   CREATININE 0.6  Kristyn Garza  04/14/2025  [id number]    Moon Lion MD        Visit Type:an urgent visit for a new problem    Chief Complaint:  Chief Complaint   Patient presents with    Follow-up     Hospital follow up        History of Present Illness: Follow-up      History of Present Illness    CHIEF COMPLAINT:  Kristyn presents today for severe hand pain and swelling.    HISTORY OF PRESENT ILLNESS:  She reports pain and swelling in both hands, left hand more severely affected. Pain is rated 8/10, primarily affecting the thumb and wrist regions with significant swelling in the left hand. She recently visited the ER where X-ray showed inflammation throughout the entire hand. She was prescribed prednisone daily which has provided minimal relief, though pain is less severe than before. During the ER visit, she received Narco for pain management but experienced hallucinations, specifically seeing clothes floating out of the closet. She was also given anti-nausea medication.    MEDICAL HISTORY:  She has rheumatoid arthritis diagnosed by Dr. CABALLERO and a history of gout. Her blood sugar readings have been ranging between 112-130.    CURRENT MEDICATIONS:  She takes Methotrexate 4 pills weekly on Wednesdays, folic acid daily, and uses Diclofenac gel as needed for pain management. She reports Tylenol arthritis was effective for knee pain.    ALTERNATIVE THERAPIES:  She prepares and consumes a morning tea containing turmeric, ginger, and cinnamon for symptom management.         Recent Fall:  []Yes  [x]No  Activity:  []Vigorous [x]Moderate []Sedentary  Appetite:  [x]Good  []Fair  []Poor  Mood: [x]Stable []Anxious []Depressed   Insomnia: []Yes  [x]No    Hosp/ED/Urgent Care: 04/13/25---ED c/o wrist pain    Recent appointments: 03/12/25 O65+ w/ MD Ayad    Upcoming Appointments  Future Appointments       Date Provider Specialty Appt Notes    4/28/2025 Christian Palomino MD Surgery Pilar cyst of scalp    5/16/2025 Moon Lion MD    ALKPHOS 129   ALT 34   AST 61*   BILITOT 0.1       Significant Diagnostics:  I have reviewed all pertinent imaging results/findings within the past 24 hours.   Primary Care 3 month f/u    5/19/2025  Lab dr pantoja    9/15/2025  Lab dr pantoja    9/22/2025 Abram Vásquez MD Rheumatology 6 month fu//dt    3/19/2026 Hammad Hardy, BERTA Ophthalmology dilated exam w gOCT yearly              Review of Systems   Musculoskeletal:  Positive for joint pain (left wrist pain).       History:  Past Medical History:   Diagnosis Date    Anxiety 05/27/2024    Carpal tunnel syndrome     CKD (chronic kidney disease) stage 3, GFR 30-59 ml/min     Followed by Nephrology    Colon cancer screening 03/18/2014    COVID-19 08/30/2023    CTS (carpal tunnel syndrome) 06/18/2013    Diabetes mellitus, type 2     Dizziness 06/19/2023    Eczema     Elevated CPK     Gastroesophageal reflux disease 07/29/2020    History of hypokalemia 06/18/2013    History of hypokalemia 06/18/2013    Hypokalemia     Major depressive disorder, single episode, mild 05/27/2024    Multinodular thyroid     Followed by ENT    Nicotine dependence, cigarettes, in remission 06/27/2024    Obesity     Obesity (BMI 30.0-34.9) 05/12/2015    Other and unspecified hyperlipidemia     Pneumonia     in her 20s; hospitalized    Postmenopausal     No history of abnormal pap smear    Secondary hyperparathyroidism of renal origin 10/26/2021    Statin intolerance     caused mylagia, elevated CPK    Tobacco dependence     resolved    Unspecified essential hypertension      Review of patient's allergies indicates:   Allergen Reactions    Buspar [buspirone] Other (See Comments)     Lip swelling    Statins-hmg-coa reductase inhibitors      Muscle Cramps         Medications:  Medications Ordered Prior to Encounter[1]      Medications have been reviewed and reconciled with patient at this visit.  Barriers to medications reviewed with patient.      Exam:  Wt Readings from Last 3 Encounters:   04/14/25 76.7 kg (169 lb 1.5 oz)   04/13/25 78 kg (172 lb)   03/14/25 79.4 kg (175 lb)     Temp Readings from Last 3 Encounters:   04/14/25 97.1 °F (36.2 °C)  (Temporal)   04/13/25 98.3 °F (36.8 °C)   03/14/25 97.8 °F (36.6 °C)     BP Readings from Last 3 Encounters:   04/14/25 114/62   04/13/25 (!) 167/79   03/14/25 (!) 102/50     Pulse Readings from Last 3 Encounters:   04/14/25 73   04/13/25 86   03/14/25 65     Body mass index is 29.02 kg/m².      Physical Exam  Vitals reviewed.   Constitutional:       Appearance: Normal appearance.   HENT:      Head: Normocephalic.   Cardiovascular:      Rate and Rhythm: Normal rate and regular rhythm.      Pulses: Normal pulses.      Heart sounds: Normal heart sounds.   Pulmonary:      Effort: Pulmonary effort is normal.      Breath sounds: Normal breath sounds.   Abdominal:      General: Abdomen is flat.      Palpations: Abdomen is soft.   Musculoskeletal:      Left wrist: Swelling and tenderness present. Decreased range of motion.      Cervical back: Normal range of motion.   Skin:     General: Skin is warm and dry.      Capillary Refill: Capillary refill takes less than 2 seconds.   Neurological:      Mental Status: She is alert.   Psychiatric:         Mood and Affect: Mood normal.         Behavior: Behavior normal.         Thought Content: Thought content normal.         Judgment: Judgment normal.         Laboratory Reviewed:     Lab Results   Component Value Date    WBC 10.25 02/10/2025    HGB 12.3 02/10/2025    HCT 37.2 02/10/2025     02/10/2025    CHOL 131 09/10/2024    TRIG 60 09/10/2024    HDL 41 09/10/2024    ALT 40 02/10/2025    AST 19 02/10/2025     02/10/2025    K 4.6 02/10/2025     02/10/2025    CREATININE 1.1 02/10/2025    BUN 13 02/10/2025    CO2 27 02/10/2025    TSH 0.509 03/01/2024    HGBA1C 5.9 (H) 12/13/2024       Screening or Prevention Patient's value Goal Complete/Controlled?   HgA1C Testing and Control   Lab Results   Component Value Date    HGBA1C 5.9 (H) 12/13/2024      Annually/Less than 8% Yes   Lipid profile : 09/10/2024 Annually Yes   LDL control Lab Results   Component Value Date     LDLCALC 78.0 09/10/2024    Annually/Less than 100 mg/dl  Yes   Nephropathy screening Lab Results   Component Value Date    LABMICR 47.0 06/27/2024     Lab Results   Component Value Date    PROTEINUA Negative 01/18/2022    Annually Yes   Blood pressure BP Readings from Last 1 Encounters:   04/14/25 114/62    Less than 140/90 Yes   Dilated retinal exam : 03/17/2025 Annually Yes   Foot exam   : 02/06/2025 Annually Yes       Health Maintenance  Health Maintenance Topics with due status: Not Due       Topic Last Completion Date    DEXA Scan 05/26/2021    LDCT Lung Screen 06/27/2024    Diabetes Urine Screening 06/27/2024    Mammogram 07/26/2024    Lipid Panel 09/10/2024    Hemoglobin A1c 12/13/2024    Foot Exam 02/06/2025    Colonoscopy 03/14/2025    Diabetic Eye Exam 03/17/2025     Health Maintenance Due   Topic Date Due    Shingles Vaccine (1 of 2) 04/20/2011    TETANUS VACCINE  02/23/2021    RSV Vaccine (Age 60+ and Pregnant patients) (1 - 1-dose 75+ series) Never done    COVID-19 Vaccine (4 - 2024-25 season) 09/01/2024         1. Inflammatory arthritis  Assessment & Plan:  Continue course of prednisone   Recommend alternating warm and cool compress to left wrist  F/u w/ Dr. CABALLERO (rheumatologist)   Advise to use Diclofenac gel and OTC Tylenol arthritis     Orders:  -     traMADoL (ULTRAM) 50 mg tablet; Take 1 tablet (50 mg total) by mouth every 6 (six) hours as needed for Pain.  Dispense: 16 tablet; Refill: 0    2. Stage 3a chronic kidney disease  Assessment & Plan:  Lab Results   Component Value Date    EGFRNORACEVR 52 (A) 02/10/2025    EGFRNORACEVR 58 (A) 12/13/2024    EGFRNORACEVR 42.9 (A) 11/11/2024    CREATININE 1.1 02/10/2025    CREATININE 1.0 12/13/2024    CREATININE 1.3 11/11/2024    BUN 13 02/10/2025    BUN 15 12/13/2024    BUN 22 11/11/2024    ALBUMIN 3.8 02/10/2025    ALBUMIN 4.0 12/13/2024    ALBUMIN 3.6 11/11/2024   (    Discuss the following with patient:  Managing blood pressure   Eating foods that are good  for the heart and low in sodium   Exercising regularly as tolerated  Getting enough sleep   Managing blood sugar if you have diabetes   Avoiding medications that can further damage the kidneys such as NSAIDs (ibuprofen, naproxen)       3. Type 2 diabetes mellitus with stage 3a chronic kidney disease, without long-term current use of insulin  Assessment & Plan:  Monitor blood glucose while taking prednisone course  Advise to increase water intake and avoid any NSAIDs to prevent kidney damage.                Worry Score: 2    Care Plan/Goals: Reviewed    Goals         80 <= Glucose <= 180 (pt-stated)       2/21/25  On track    11/15/22  On track        Blood Pressure < 130/80       Update 2/21/25  On track        Exercise at least 150 minutes per week. (pt-stated)       Update 2/21/25   Not on track  Referral O65+ [facility]    Update 1/17/2023  Daily walking - current step average about 7,000 steps - new goal 10,000 steps. Walk to park, around park and back 3x/week.    Time frame 3 mos - mid April 2023.          LDL CHOLESTEROL < 70       Take at least one BP reading per week at various times of the day       11/15/22  On track        Weight (lb) < 90.7 kg (200 lb) (pt-stated)       Update 2/21/25  On track weight stable around 175    Update 11/15/22    On track - weight < 200 lbs - new goal 10 lbs wt loss over the next 2 mos.    Goal 5% weight loss - 9-10 lbs - 175 seth    Barriers? Sweet-tooth    Overcoming? Try substitute fruit? Daily walking - current step average about 5,000 steps - new goal 7,000 steps.    Time frame 3 mos - mid Jan 2023.                  Follow up: Follow up in about 1 month (around 5/16/2025) for RUTHANN Lion MD .      The following issues were discussed: The primary encounter diagnosis was Inflammatory arthritis. Diagnoses of Stage 3a chronic kidney disease and Type 2 diabetes mellitus with stage 3a chronic kidney disease, without long-term current use of insulin were also pertinent to this  visit.    Health maintenance needs, recent test results and goals of care discussed with patient and questions answered. Patient was given the opportunity to ask questions and be an active participant in their medical care and questions answered.  Patient had no further questions or concerns at this time.     After visit summary printed and given to patient upon discharge.  Patient goals and care plan are included in After visit summary.    TOTAL TIME evaluating and managing this patient for this encounter was  20 minutes. This time was spent personally by me on some of the following activities: review of patient's past medical history, assessing age-appropriate health maintenance needs, review of any interval history, review and interpretation of lab results, review and interpretation of imaging test results, review and interpretation of cardiology test results, reviewing consulting specialist notes, obtaining history from the patient and family, examination of the patient, medication reconciliation, managing and/or ordering prescription medications, ordering imaging tests, ordering referral to subspecialty provider(s), educating patient and answering their questions about diagnosis, treatment plan, and goals of treatment, discussing planned follow-up and final documentation of the visit. This time was exclusive of any separately billable procedures for this patient and exclusive of time spent treating any other patients.    This note was generated with the assistance of ambient listening technology. Verbal consent was obtained by the patient and accompanying visitor(s) for the recording of patient appointment to facilitate this note. I attest to having reviewed and edited the generated note for accuracy, though some syntax or spelling errors may persist. Please contact the author of this note for any clarification.               JENNIFER Dominguez, FNP-C  [facility] [address]  "[place] [address]   [phone] ph  [phone] fax           [1]   Current Outpatient Medications on File Prior to Visit   Medication Sig Dispense Refill    albuterol (VENTOLIN HFA) 90 mcg/actuation inhaler Inhale 2 puffs into the lungs every 4 (four) hours as needed for Wheezing or Shortness of Breath (before exercise). Rescue 18 g 11    amLODIPine (NORVASC) 10 MG tablet TAKE 1 TABLET ONE TIME DAILY 90 tablet 3    aspirin 81 MG Chew Take 81 mg by mouth once daily.      benazepriL (LOTENSIN) 40 MG tablet TAKE 1 TABLET (40 MG TOTAL) BY MOUTH ONCE DAILY. 90 tablet 3    blood sugar diagnostic (ACCU-CHEK SMARTVIEW TEST STRIP) Strp TO CHECK BLOOD GLUCOSE ONE TIMES DAILY 50 strip 1    calcium-vitamin D3 500 mg(1,250mg) -200 unit per tablet Take 1 tablet by mouth once daily.       CRANBERRY EXTRACT ORAL Take 1 tablet by mouth once daily.      diclofenac sodium (VOLTAREN ARTHRITIS PAIN) 1 % Gel Apply 2 g topically once daily. 100 g 11    evolocumab (REPATHA SURECLICK) 140 mg/mL PnIj Inject 1 mL (140 mg total) into the skin every 14 (fourteen) days. 2 mL 5    folic acid (FOLVITE) 1 MG tablet Take 1 tablet (1 mg total) by mouth once daily. 90 tablet 2    levocetirizine (XYZAL) 5 MG tablet Take 1 tablet (5 mg total) by mouth every evening. 90 tablet 0    MULTIVITAMIN W-MINERALS/LUTEIN (CENTRUM SILVER ORAL) Take 1 tablet by mouth once daily.      olopatadine (PATANOL) 0.1 % ophthalmic solution Place 1 drop into the left eye 2 (two) times daily. As needed for itchy, irritated or watery eyes 5 mL 1    pen needle, diabetic (BD ULTRA-FINE MICRO PEN NEEDLE) 32 gauge x 1/4" Ndle 1 Needle by Misc.(Non-Drug; Combo Route) route once a week. 30 each 1    potassium chloride SA (K-DUR,KLOR-CON) 20 MEQ tablet TAKE 1 TABLET ONE TIME DAILY 90 tablet 3    predniSONE (DELTASONE) 20 MG tablet Take 1 tablet (20 mg total) by mouth once daily. for 5 days 5 tablet 0    spironolactone (ALDACTONE) 50 MG tablet TAKE 1 TABLET EVERY DAY 90 tablet 3    " azelastine (ASTELIN) 137 mcg (0.1 %) nasal spray 1 spray (137 mcg total) by Nasal route 2 (two) times daily as needed for Rhinitis. 30 mL 11    blood glucose control, normal Soln 1 Bottle by Misc.(Non-Drug; Combo Route) route once daily. For Accu Check Amber  use twice daily prn dx: E11.9 1 each 0    blood-glucose meter (ACCU-CHEK AMBER) Misc 1 kit by Misc.(Non-Drug; Combo Route) route once. For Accu Check Amber  use twice daily prn dx: E11.9 for 1 dose 1 each 0    metoprolol succinate (TOPROL-XL) 25 MG 24 hr tablet TAKE 1 TABLET ONE TIME DAILY 180 tablet 3     No current facility-administered medications on file prior to visit.

## 2025-04-25 ENCOUNTER — PATIENT MESSAGE (OUTPATIENT)
Dept: ADMINISTRATIVE | Facility: OTHER | Age: 75
End: 2025-04-25
Payer: MEDICARE

## 2025-04-27 NOTE — ED PROVIDER NOTES
ED Provider Note - 4/13/2025    History     Chief Complaint   Patient presents with    Wrist Pain     Arthritis flare up left wrist/ hand started yesterday     Left wrist pain without injury that started yesterday. Reports chronic pain in left wrist.             l      Review of patient's allergies indicates:   Allergen Reactions    Buspar [buspirone] Other (See Comments)     Lip swelling    Statins-hmg-coa reductase inhibitors      Muscle Cramps     Past Medical History:   Diagnosis Date    Anxiety 05/27/2024    Carpal tunnel syndrome     CKD (chronic kidney disease) stage 3, GFR 30-59 ml/min     Followed by Nephrology    Colon cancer screening 03/18/2014    COVID-19 08/30/2023    CTS (carpal tunnel syndrome) 06/18/2013    Diabetes mellitus, type 2     Dizziness 06/19/2023    Eczema     Elevated CPK     Gastroesophageal reflux disease 07/29/2020    History of hypokalemia 06/18/2013    History of hypokalemia 06/18/2013    Hypokalemia     Major depressive disorder, single episode, mild 05/27/2024    Multinodular thyroid     Followed by ENT    Nicotine dependence, cigarettes, in remission 06/27/2024    Obesity     Obesity (BMI 30.0-34.9) 05/12/2015    Other and unspecified hyperlipidemia     Pneumonia     in her 20s; hospitalized    Postmenopausal     No history of abnormal pap smear    Secondary hyperparathyroidism of renal origin 10/26/2021    Statin intolerance     caused mylagia, elevated CPK    Tobacco dependence     resolved    Unspecified essential hypertension      Past Surgical History:   Procedure Laterality Date    COLONOSCOPY      CYST REMOVAL  2015    On the head    FINE NEEDLE ASPIRATION  3/2011    thyroid nodules x3 (negative per patient)    HYSTERECTOMY      PARTIAL HYSTERECTOMY  1991    Due to fibroids     Family History   Problem Relation Name Age of Onset    Hypertension Mother      Diabetes Mother      Dementia Mother          Alzheimer's dementia    Hypertension Father      Heart disease Sister    "       MI/CAD    Cataracts Sister      Dementia Sister      No Known Problems Son      No Known Problems Son      Cataracts Brother      Cataracts Brother      Dementia Sister      Cancer Neg Hx      Stroke Neg Hx      Melanoma Neg Hx      Psoriasis Neg Hx      Lupus Neg Hx      Eczema Neg Hx      COPD Neg Hx      Kidney disease Neg Hx       Social History[1]     Review of Systems   Constitutional:  Negative for fever.   HENT:  Negative for sore throat.    Respiratory:  Negative for shortness of breath.    Gastrointestinal:  Negative for nausea.   Genitourinary:  Negative for dysuria.   Musculoskeletal:  Negative for back pain, gait problem and joint swelling.        Left wrist pain   Skin:  Negative for rash and wound.   Neurological:  Negative for dizziness, weakness and numbness.   Hematological:  Does not bruise/bleed easily.     The patient's list of active medical problems, social history, medications, and allergies as documented per RN staff has been reviewed.     Physical Exam     Vitals:    04/13/25 1007 04/13/25 1025 04/13/25 1151   BP: (!) 159/74  (!) 167/79   Pulse: 90  86   Resp: 18 16 18   Temp: 98.4 °F (36.9 °C)  98.3 °F (36.8 °C)   TempSrc: Oral     SpO2: 97%  99%   Weight: 78 kg (172 lb)     Height: 5' 4" (1.626 m)       Physical Exam    Constitutional: She appears well-developed. She is cooperative. No distress.   HENT:   Head: Normocephalic and atraumatic.   Eyes: EOM are normal. Pupils are equal, round, and reactive to light.   Neck: Neck supple.   Normal range of motion.  Cardiovascular:  Normal rate and regular rhythm.           Pulmonary/Chest: Breath sounds normal. No respiratory distress.   Abdominal: Abdomen is soft. Bowel sounds are normal.   Musculoskeletal:         General: No edema. Normal range of motion.      Left hand: No swelling, deformity, lacerations or tenderness. Normal range of motion. Normal sensation. There is no disruption of two-point discrimination. Normal capillary " refill. Normal pulse.      Cervical back: Normal range of motion and neck supple.     Neurological: She is alert and oriented to person, place, and time. GCS score is 15. GCS eye subscore is 4. GCS verbal subscore is 5. GCS motor subscore is 6.   Skin: Skin is warm. Capillary refill takes less than 2 seconds.   Psychiatric: She has a normal mood and affect.       ED Procedures   Procedures    MDM  Differential Diagnoses   Based on available history, the working differential diagnoses considered during this evaluation include but are not limited to sprain/strain, contusion, fracture, dislocation, arthritis      LABS     Labs Reviewed - No data to display        Notable findings from my independent interpretations of the labs (if ordered) include:       Imaging     Imaging Results              X-Ray Wrist Complete Left (Final result)  Result time 04/13/25 11:20:25      Final result by Ney Rodriguez MD (04/13/25 11:20:25)                   Impression:      No acute abnormality identified.    Finalized on: 4/13/2025 11:20 AM By:  Ney Rodriguez MD  [facility]# [id number]      2025-04-13 11:22:30.002     [facility]               Narrative:    EXAM:  XR WRIST COMPLETE 3 VIEWS LEFT    CLINICAL HISTORY:  Left wrist pain.    FINDINGS: No acute displaced fracture.  Joint alignment is anatomic.  Osseous degenerative changes.                                         X-Rays:   Independently Interpreted Readings:   Other Readings:  New acute findings                  EKG          ED Management/Discussion     Medications   HYDROcodone-acetaminophen 5-325 mg per tablet 1 tablet (1 tablet Oral Given 4/13/25 1025)   ondansetron disintegrating tablet 4 mg (4 mg Oral Given 4/13/25 1024)   ketorolac injection 15 mg (15 mg Intramuscular Given 4/13/25 1146)                                                   Patient has been instructed regarding the benefit of cold compresses during the immediate period following soft tissue injury along with  elevation and relative rest pending symptomatic improvement.              On final assessment, the patient appears suitable for discharge.  I see no indication of an emergent process beyond that addressed during our encounter but have duly counseled the patient/family regarding the need for prompt follow-up as well as the indications that should prompt immediate return to the emergency room.  The patient/family has been provided with language -specific verbal and printed direction regarding our final diagnosis(es) as well as instructions regarding use of OTC and/or Rx medications intended to manage the patient's aforementioned conditions including:  ED Prescriptions       Medication Sig Dispense Start Date End Date Auth. Provider    predniSONE (DELTASONE) 20 MG tablet () Take 1 tablet (20 mg total) by mouth once daily. for 5 days 5 tablet 2025 Destiney Figueroa NP              Patient has been advised of the following recommended follow-up instructions:  Follow-up Information       Follow up With Specialties Details Why Contact Info    [facility] - Emergency Dept. Emergency Medicine  If symptoms worsen [address] [address] [phone]    Moon Lion MD Internal Medicine   [address] [facility] [address] [phone]            The patient/family communicates understanding of all this information and all remaining questions and concerns were addressed at this time.      Referrals:  No orders of the defined types were placed in this encounter.            CLINICAL IMPRESSION    ICD-10-CM ICD-9-CM   1. Left wrist pain  M25.532 719.43   2. Pain  R52 780.96   3. Arthritis  M19.90 716.90        ED Disposition Condition    Discharge Stable            Social Drivers of Health     Tobacco Use: Medium Risk (2025)    Patient History     Smoking Tobacco Use: Former     Smokeless Tobacco Use: Never     Passive Exposure: Not on file   Alcohol Use:  Not At Risk (2025)    AUDIT-C     Frequency of Alcohol Consumption: Monthly or less     Average Number of Drinks: 1 or 2     Frequency of Binge Drinking: Never   Financial Resource Strain: Low Risk  (2025)    Overall Financial Resource Strain (CARDIA)     Difficulty of Paying Living Expenses: Not very hard   Food Insecurity: No Food Insecurity (2025)    Hunger Vital Sign     Worried About Running Out of Food in the Last Year: Never true     Ran Out of Food in the Last Year: Never true   Transportation Needs: No Transportation Needs (2025)    PRAPARE - Transportation     Lack of Transportation (Medical): No     Lack of Transportation (Non-Medical): No   Physical Activity: Inactive (2025)    Exercise Vital Sign     Days of Exercise per Week: 0 days     Minutes of Exercise per Session: 0 min   Stress: No Stress Concern Present (2025)    [employer] - Occupational Stress Questionnaire     Feeling of Stress : Not at all   Housing Stability: Unknown (2025)    Housing Stability Vital Sign     Unable to Pay for Housing in the Last Year: No     Number of Times Moved in the Last Year: Not on file     Homeless in the Last Year: No   Depression: Low Risk  (2025)    Depression     Last PHQ-4: Flowsheet Data: 0   Utilities: Not At Risk (2025)    [facility] Utilities     Threatened with loss of utilities: No   Health Literacy: Adequate Health Literacy (2025)     Health Literacy     Frequency of need for help with medical instructions: Never   Social Isolation: Moderately Integrated (2024)    Social Isolation     Social Isolation: 2           [1]   Social History  Tobacco Use    Smoking status: Former     Current packs/day: 0.00     Average packs/day: 1 pack/day for 43.4 years (43.4 ttl pk-yrs)     Types: Cigarettes     Start date: 1970     Quit date: 2013     Years since quittin.9    Smokeless tobacco: Never   Substance Use Topics    Alcohol  use: Not Currently     Comment: Rarely drinks wine    Drug use: No        Destiney Figueroa NP  04/27/25 1133

## 2025-05-07 ENCOUNTER — PROCEDURE VISIT (OUTPATIENT)
Dept: SURGERY | Facility: CLINIC | Age: 75
End: 2025-05-07
Payer: MEDICARE

## 2025-05-07 VITALS
WEIGHT: 173.06 LBS | BODY MASS INDEX: 29.55 KG/M2 | TEMPERATURE: 98 F | SYSTOLIC BLOOD PRESSURE: 127 MMHG | DIASTOLIC BLOOD PRESSURE: 72 MMHG | HEART RATE: 69 BPM | HEIGHT: 64 IN

## 2025-05-07 DIAGNOSIS — L72.11 PILAR CYST OF SCALP: Primary | ICD-10-CM

## 2025-05-07 PROCEDURE — 11422 EXC H-F-NK-SP B9+MARG 1.1-2: CPT | Mod: S$GLB,,, | Performed by: SURGERY

## 2025-05-08 NOTE — PROCEDURES
Exc, Cyst    Date/Time: 5/7/2025 2:00 PM    Performed by: Christian Palomino MD  Authorized by: Christian Palomino MD    Consent Done?:  Yes (Written)  Timeout: prior to procedure the correct patient, procedure, and site was verified    Prep: patient was prepped and draped in usual sterile fashion    Local anesthesia used?: Yes    Anesthesia:  Local infiltration  Local anesthetic:  Lidocaine 1% with epinephrine  Anesthetic total (ml):  5  Assistants?: No     I was present for the entire procedure.  Indications:  Cyst  Body area:  Scalp  Position:  Supine  Anesthesia:  Local infiltration  Local anesthetic:  Lidocaine 1% with epinephrine  Excision type:  Skin  Malignancy:  Benign  Excision size (cm):  2  Scalpel size:  15  Incision type:  Single straight  Specimens?: Yes     Hemostasis was obtained.  Estimated blood loss (cc):  0  Wound closure:  Simple  Wound repair size (cm):  2  Sutures: 3-0 nylon.  Dressings: bacitracin ointment.   Needle, instrument, and sponge counts were correct.   Patient tolerated the procedure well with no immediate complications.   Post-operative instructions were provided for the patient.   Patient was discharged and will follow up for wound check.

## 2025-05-16 ENCOUNTER — OFFICE VISIT (OUTPATIENT)
Dept: PRIMARY CARE CLINIC | Facility: CLINIC | Age: 75
End: 2025-05-16
Payer: MEDICARE

## 2025-05-16 VITALS
HEART RATE: 75 BPM | DIASTOLIC BLOOD PRESSURE: 62 MMHG | TEMPERATURE: 97 F | HEIGHT: 64 IN | BODY MASS INDEX: 29.28 KG/M2 | SYSTOLIC BLOOD PRESSURE: 112 MMHG | WEIGHT: 171.5 LBS | OXYGEN SATURATION: 98 %

## 2025-05-16 DIAGNOSIS — Z12.31 ENCOUNTER FOR SCREENING MAMMOGRAM FOR MALIGNANT NEOPLASM OF BREAST: ICD-10-CM

## 2025-05-16 DIAGNOSIS — R91.8 PULMONARY NODULES: Chronic | ICD-10-CM

## 2025-05-16 DIAGNOSIS — Z78.0 POST-MENOPAUSAL: ICD-10-CM

## 2025-05-16 DIAGNOSIS — E11.22 TYPE 2 DIABETES MELLITUS WITH STAGE 3A CHRONIC KIDNEY DISEASE, WITHOUT LONG-TERM CURRENT USE OF INSULIN: Chronic | ICD-10-CM

## 2025-05-16 DIAGNOSIS — E78.2 COMBINED HYPERLIPIDEMIA ASSOCIATED WITH TYPE 2 DIABETES MELLITUS: Chronic | ICD-10-CM

## 2025-05-16 DIAGNOSIS — E11.69 COMBINED HYPERLIPIDEMIA ASSOCIATED WITH TYPE 2 DIABETES MELLITUS: Chronic | ICD-10-CM

## 2025-05-16 DIAGNOSIS — N18.31 TYPE 2 DIABETES MELLITUS WITH STAGE 3A CHRONIC KIDNEY DISEASE, WITHOUT LONG-TERM CURRENT USE OF INSULIN: Chronic | ICD-10-CM

## 2025-05-16 DIAGNOSIS — E11.59 HYPERTENSION ASSOCIATED WITH DIABETES: Chronic | ICD-10-CM

## 2025-05-16 DIAGNOSIS — I15.2 HYPERTENSION ASSOCIATED WITH DIABETES: Chronic | ICD-10-CM

## 2025-05-16 DIAGNOSIS — M05.79 RHEUMATOID ARTHRITIS INVOLVING MULTIPLE SITES WITH POSITIVE RHEUMATOID FACTOR: Primary | Chronic | ICD-10-CM

## 2025-05-16 PROBLEM — Z12.11 SCREEN FOR COLON CANCER: Status: RESOLVED | Noted: 2025-02-17 | Resolved: 2025-05-16

## 2025-05-16 PROBLEM — L72.11 PILAR CYST OF SCALP: Chronic | Status: RESOLVED | Noted: 2025-02-21 | Resolved: 2025-05-16

## 2025-05-16 PROBLEM — E11.40 CONTROLLED TYPE 2 DIABETES MELLITUS WITH DIABETIC NEUROPATHY: Status: RESOLVED | Noted: 2023-02-10 | Resolved: 2025-05-16

## 2025-05-16 PROBLEM — H10.12 ALLERGIC CONJUNCTIVITIS OF LEFT EYE: Status: RESOLVED | Noted: 2025-03-12 | Resolved: 2025-05-16

## 2025-05-16 PROCEDURE — 1125F AMNT PAIN NOTED PAIN PRSNT: CPT | Mod: CPTII,S$GLB,, | Performed by: INTERNAL MEDICINE

## 2025-05-16 PROCEDURE — 1101F PT FALLS ASSESS-DOCD LE1/YR: CPT | Mod: CPTII,S$GLB,, | Performed by: INTERNAL MEDICINE

## 2025-05-16 PROCEDURE — 3288F FALL RISK ASSESSMENT DOCD: CPT | Mod: CPTII,S$GLB,, | Performed by: INTERNAL MEDICINE

## 2025-05-16 PROCEDURE — 1160F RVW MEDS BY RX/DR IN RCRD: CPT | Mod: CPTII,S$GLB,, | Performed by: INTERNAL MEDICINE

## 2025-05-16 PROCEDURE — 3078F DIAST BP <80 MM HG: CPT | Mod: CPTII,S$GLB,, | Performed by: INTERNAL MEDICINE

## 2025-05-16 PROCEDURE — 1159F MED LIST DOCD IN RCRD: CPT | Mod: CPTII,S$GLB,, | Performed by: INTERNAL MEDICINE

## 2025-05-16 PROCEDURE — 99999 PR PBB SHADOW E&M-EST. PATIENT-LVL V: CPT | Mod: PBBFAC,,, | Performed by: INTERNAL MEDICINE

## 2025-05-16 PROCEDURE — 99214 OFFICE O/P EST MOD 30 MIN: CPT | Mod: S$GLB,,, | Performed by: INTERNAL MEDICINE

## 2025-05-16 PROCEDURE — 3074F SYST BP LT 130 MM HG: CPT | Mod: CPTII,S$GLB,, | Performed by: INTERNAL MEDICINE

## 2025-05-16 RX ORDER — AMLODIPINE BESYLATE 10 MG/1
10 TABLET ORAL
Qty: 90 TABLET | Refills: 3 | Status: SHIPPED | OUTPATIENT
Start: 2025-05-16

## 2025-05-16 NOTE — PATIENT INSTRUCTIONS
"If you are feeling unwell, we'd [facility] the [facility] ones to [facility] Please give us a call. Same day appointments are our top priority to keep you well and out of the emergency rooms and hospitals. Call [phone] for our direct line. After hours advice is always available. Please call [phone] after hours to speak to the on-call team.      Recommend Tetanus, Shingles, and Covid and RSV vaccines that can be scheduled at your pharmacy of choice.    Please contact your Rheumatologist Dr. ADA brito increased issues w "Chance" (aka: rheumatoid arthritis)     "
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
## 2025-05-18 NOTE — ASSESSMENT & PLAN NOTE
Initial CT's chest ordered for f/u incidental finding of Pulm nodule  Clarify smoking history to determine if cont annual LDCT chest CT is indicated - no current Resp c/o

## 2025-05-19 ENCOUNTER — RESULTS FOLLOW-UP (OUTPATIENT)
Dept: RHEUMATOLOGY | Facility: CLINIC | Age: 75
End: 2025-05-19

## 2025-05-19 ENCOUNTER — LAB VISIT (OUTPATIENT)
Dept: LAB | Facility: HOSPITAL | Age: 75
End: 2025-05-19
Attending: INTERNAL MEDICINE
Payer: MEDICARE

## 2025-05-19 DIAGNOSIS — M79.641 BILATERAL HAND PAIN: ICD-10-CM

## 2025-05-19 DIAGNOSIS — E11.22 TYPE 2 DIABETES MELLITUS WITH STAGE 3A CHRONIC KIDNEY DISEASE, WITHOUT LONG-TERM CURRENT USE OF INSULIN: Chronic | ICD-10-CM

## 2025-05-19 DIAGNOSIS — N18.31 TYPE 2 DIABETES MELLITUS WITH STAGE 3A CHRONIC KIDNEY DISEASE, WITHOUT LONG-TERM CURRENT USE OF INSULIN: Chronic | ICD-10-CM

## 2025-05-19 DIAGNOSIS — Z78.0 POST-MENOPAUSAL: ICD-10-CM

## 2025-05-19 DIAGNOSIS — M79.642 BILATERAL HAND PAIN: ICD-10-CM

## 2025-05-19 DIAGNOSIS — M19.90 ARTHRITIS: ICD-10-CM

## 2025-05-19 LAB
25(OH)D3+25(OH)D2 SERPL-MCNC: 59 NG/ML (ref 30–96)
ABSOLUTE EOSINOPHIL (OHS): 0.25 K/UL
ABSOLUTE MONOCYTE (OHS): 0.83 K/UL (ref 0.3–1)
ABSOLUTE NEUTROPHIL COUNT (OHS): 5.18 K/UL (ref 1.8–7.7)
ALBUMIN SERPL BCP-MCNC: 3.8 G/DL (ref 3.5–5.2)
ALP SERPL-CCNC: 90 UNIT/L (ref 40–150)
ALT SERPL W/O P-5'-P-CCNC: 20 UNIT/L (ref 10–44)
ANION GAP (OHS): 9 MMOL/L (ref 8–16)
AST SERPL-CCNC: 19 UNIT/L (ref 11–45)
BASOPHILS # BLD AUTO: 0.04 K/UL
BASOPHILS NFR BLD AUTO: 0.4 %
BILIRUB SERPL-MCNC: 0.3 MG/DL (ref 0.1–1)
BUN SERPL-MCNC: 17 MG/DL (ref 8–23)
CALCIUM SERPL-MCNC: 9.5 MG/DL (ref 8.7–10.5)
CHLORIDE SERPL-SCNC: 107 MMOL/L (ref 95–110)
CO2 SERPL-SCNC: 25 MMOL/L (ref 23–29)
CREAT SERPL-MCNC: 1.1 MG/DL (ref 0.5–1.4)
CRP SERPL-MCNC: 15.2 MG/L
EAG (OHS): 131 MG/DL (ref 68–131)
ERYTHROCYTE [DISTWIDTH] IN BLOOD BY AUTOMATED COUNT: 14.3 % (ref 11.5–14.5)
ERYTHROCYTE [SEDIMENTATION RATE] IN BLOOD: 23 MM/HR
GFR SERPLBLD CREATININE-BSD FMLA CKD-EPI: 52 ML/MIN/1.73/M2
GLUCOSE SERPL-MCNC: 130 MG/DL (ref 70–110)
HBA1C MFR BLD: 6.2 % (ref 4–5.6)
HCT VFR BLD AUTO: 35.2 % (ref 37–48.5)
HGB BLD-MCNC: 11.6 GM/DL (ref 12–16)
IMM GRANULOCYTES # BLD AUTO: 0.03 K/UL (ref 0–0.04)
IMM GRANULOCYTES NFR BLD AUTO: 0.3 % (ref 0–0.5)
LYMPHOCYTES # BLD AUTO: 2.67 K/UL (ref 1–4.8)
MCH RBC QN AUTO: 32.9 PG (ref 27–31)
MCHC RBC AUTO-ENTMCNC: 33 G/DL (ref 32–36)
MCV RBC AUTO: 100 FL (ref 82–98)
NUCLEATED RBC (/100WBC) (OHS): 0 /100 WBC
PLATELET # BLD AUTO: 282 K/UL (ref 150–450)
PMV BLD AUTO: 9.5 FL (ref 9.2–12.9)
POTASSIUM SERPL-SCNC: 4.5 MMOL/L (ref 3.5–5.1)
PROT SERPL-MCNC: 7.7 GM/DL (ref 6–8.4)
RBC # BLD AUTO: 3.53 M/UL (ref 4–5.4)
RELATIVE EOSINOPHIL (OHS): 2.8 %
RELATIVE LYMPHOCYTE (OHS): 29.7 % (ref 18–48)
RELATIVE MONOCYTE (OHS): 9.2 % (ref 4–15)
RELATIVE NEUTROPHIL (OHS): 57.6 % (ref 38–73)
SODIUM SERPL-SCNC: 141 MMOL/L (ref 136–145)
WBC # BLD AUTO: 9 K/UL (ref 3.9–12.7)

## 2025-05-19 PROCEDURE — 82306 VITAMIN D 25 HYDROXY: CPT

## 2025-05-19 PROCEDURE — 82310 ASSAY OF CALCIUM: CPT

## 2025-05-19 PROCEDURE — 83036 HEMOGLOBIN GLYCOSYLATED A1C: CPT

## 2025-05-19 PROCEDURE — 86140 C-REACTIVE PROTEIN: CPT

## 2025-05-19 PROCEDURE — 85025 COMPLETE CBC W/AUTO DIFF WBC: CPT

## 2025-05-19 PROCEDURE — 85652 RBC SED RATE AUTOMATED: CPT

## 2025-05-19 PROCEDURE — 36415 COLL VENOUS BLD VENIPUNCTURE: CPT

## 2025-05-19 RX ORDER — BLOOD SUGAR DIAGNOSTIC
STRIP MISCELLANEOUS
Qty: 100 STRIP | Refills: 3 | Status: SHIPPED | OUTPATIENT
Start: 2025-05-19

## 2025-05-19 NOTE — PROGRESS NOTES
Your lab results requires no change in medications.  Continue medications as advised in the last visit.  Dr. ALONZO

## 2025-05-20 DIAGNOSIS — E78.2 COMBINED HYPERLIPIDEMIA ASSOCIATED WITH TYPE 2 DIABETES MELLITUS: ICD-10-CM

## 2025-05-20 DIAGNOSIS — E11.69 COMBINED HYPERLIPIDEMIA ASSOCIATED WITH TYPE 2 DIABETES MELLITUS: ICD-10-CM

## 2025-05-20 DIAGNOSIS — Z78.9 STATIN INTOLERANCE: ICD-10-CM

## 2025-05-21 ENCOUNTER — OFFICE VISIT (OUTPATIENT)
Dept: SURGERY | Facility: CLINIC | Age: 75
End: 2025-05-21
Payer: MEDICARE

## 2025-05-21 ENCOUNTER — PATIENT MESSAGE (OUTPATIENT)
Dept: PRIMARY CARE CLINIC | Facility: CLINIC | Age: 75
End: 2025-05-21
Payer: MEDICARE

## 2025-05-21 VITALS
BODY MASS INDEX: 28.6 KG/M2 | SYSTOLIC BLOOD PRESSURE: 134 MMHG | HEART RATE: 71 BPM | WEIGHT: 167.56 LBS | DIASTOLIC BLOOD PRESSURE: 81 MMHG | HEIGHT: 64 IN

## 2025-05-21 DIAGNOSIS — L72.11 PILAR CYST OF SCALP: Primary | ICD-10-CM

## 2025-05-21 DIAGNOSIS — F17.211 NICOTINE DEPENDENCE, CIGARETTES, IN REMISSION: Primary | ICD-10-CM

## 2025-05-21 PROCEDURE — 99024 POSTOP FOLLOW-UP VISIT: CPT | Mod: S$GLB,,,

## 2025-05-21 PROCEDURE — 1126F AMNT PAIN NOTED NONE PRSNT: CPT | Mod: CPTII,S$GLB,,

## 2025-05-21 PROCEDURE — 1159F MED LIST DOCD IN RCRD: CPT | Mod: CPTII,S$GLB,,

## 2025-05-21 PROCEDURE — 1160F RVW MEDS BY RX/DR IN RCRD: CPT | Mod: CPTII,S$GLB,,

## 2025-05-21 PROCEDURE — 3075F SYST BP GE 130 - 139MM HG: CPT | Mod: CPTII,S$GLB,,

## 2025-05-21 PROCEDURE — 99999 PR PBB SHADOW E&M-EST. PATIENT-LVL IV: CPT | Mod: PBBFAC,,,

## 2025-05-21 PROCEDURE — 3079F DIAST BP 80-89 MM HG: CPT | Mod: CPTII,S$GLB,,

## 2025-05-21 NOTE — PROGRESS NOTES
History & Physical    Subjective     History of Present Illness:  Patient is a 74 y.o. female referred for scalp cyst.  She reports noticing a cyst that has been growing for the past few months.  She has had a couple previous which she underwent removal for last time 10-12 years ago.    Interval History:  Since the last clinic visit, the patient underwent scalp cyst excision in clinic. No complaints today. No fevers and chills. Presents for stitch removal.    Chief Complaint   Patient presents with    Suture / Staple Removal     S/p pilar cyst excision       Review of patient's allergies indicates:   Allergen Reactions    Buspar [buspirone] Other (See Comments)     Lip swelling    Statins-hmg-coa reductase inhibitors      Muscle Cramps       Current Medications[1]    Past Medical History:   Diagnosis Date    Anxiety 05/27/2024    Carpal tunnel syndrome     CKD (chronic kidney disease) stage 3, GFR 30-59 ml/min     Followed by Nephrology    Colon cancer screening 03/18/2014    COVID-19 08/30/2023    CTS (carpal tunnel syndrome) 06/18/2013    Diabetes mellitus, type 2     Dizziness 06/19/2023    Eczema     Elevated CPK     Gastroesophageal reflux disease 07/29/2020    History of hypokalemia 06/18/2013    History of hypokalemia 06/18/2013    Hypokalemia     Major depressive disorder, single episode, mild 05/27/2024    Multinodular thyroid     Followed by ENT    Nicotine dependence, cigarettes, in remission 06/27/2024    Obesity     Obesity (BMI 30.0-34.9) 05/12/2015    Other and unspecified hyperlipidemia     Pilar cyst of scalp 02/21/2025    Pneumonia     in her 20s; hospitalized    Postmenopausal     No history of abnormal pap smear    Secondary hyperparathyroidism of renal origin 10/26/2021    Statin intolerance     caused mylagia, elevated CPK    Tobacco dependence     resolved    Unspecified essential hypertension      Past Surgical History:   Procedure Laterality Date    COLONOSCOPY      CYST REMOVAL  2015    On  the head    FINE NEEDLE ASPIRATION  3/2011    thyroid nodules x3 (negative per patient)    HYSTERECTOMY      PARTIAL HYSTERECTOMY  1991    Due to fibroids     Family History   Problem Relation Name Age of Onset    Hypertension Mother      Diabetes Mother      Dementia Mother          Alzheimer's dementia    Hypertension Father      Heart disease Sister          MI/CAD    Cataracts Sister      Dementia Sister      No Known Problems Son      No Known Problems Son      Cataracts Brother      Cataracts Brother      Dementia Sister      Cancer Neg Hx      Stroke Neg Hx      Melanoma Neg Hx      Psoriasis Neg Hx      Lupus Neg Hx      Eczema Neg Hx      COPD Neg Hx      Kidney disease Neg Hx       Social History[2]     Review of Systems:  Review of Systems   Constitutional:  Negative for chills and fever.   Respiratory:  Negative for shortness of breath.    Cardiovascular:  Negative for chest pain.   Skin:  Negative for color change and wound.          Objective     Vital Signs (Most Recent)              Physical Exam:  Physical Exam  Vitals reviewed.   Constitutional:       General: She is not in acute distress.     Appearance: She is well-developed. She is not toxic-appearing.   HENT:      Head: Normocephalic and atraumatic.     Cardiovascular:      Rate and Rhythm: Normal rate.   Pulmonary:      Effort: Pulmonary effort is normal.   Musculoskeletal:      Cervical back: Neck supple.   Skin:     General: Skin is warm and dry.   Neurological:      Mental Status: She is alert and oriented to person, place, and time.            Assessment and Plan     74-year-old female with scalp cyst s/p removal who has recovered well.    - local wound care as needed  - RTC as needed      Parul Light PA-C  [facility]               [1]   Current Outpatient Medications   Medication Sig Dispense Refill    ACCU-CHEK SMARTVIEW TEST STRIP Strp TEST BLOOD SUGAR ONE TIME DAILY 100 strip 3    albuterol (VENTOLIN HFA) 90  "mcg/actuation inhaler Inhale 2 puffs into the lungs every 4 (four) hours as needed for Wheezing or Shortness of Breath (before exercise). Rescue 18 g 11    amLODIPine (NORVASC) 10 MG tablet TAKE 1 TABLET EVERY DAY 90 tablet 3    aspirin 81 MG Chew Take 81 mg by mouth once daily.      benazepriL (LOTENSIN) 40 MG tablet TAKE 1 TABLET (40 MG TOTAL) BY MOUTH ONCE DAILY. 90 tablet 3    calcium-vitamin D3 500 mg(1,250mg) -200 unit per tablet Take 1 tablet by mouth once daily.       CRANBERRY EXTRACT ORAL Take 1 tablet by mouth once daily.      diclofenac sodium (VOLTAREN ARTHRITIS PAIN) 1 % Gel Apply 2 g topically once daily. 100 g 11    evolocumab (REPATHA SURECLICK) 140 mg/mL PnIj Inject 1 mL (140 mg total) into the skin every 14 (fourteen) days. 2 mL 5    folic acid (FOLVITE) 1 MG tablet Take 1 tablet (1 mg total) by mouth once daily. 90 tablet 2    levocetirizine (XYZAL) 5 MG tablet Take 1 tablet (5 mg total) by mouth every evening. 90 tablet 0    MULTIVITAMIN W-MINERALS/LUTEIN (CENTRUM SILVER ORAL) Take 1 tablet by mouth once daily.      pen needle, diabetic (BD ULTRA-FINE MICRO PEN NEEDLE) 32 gauge x 1/4" Ndle 1 Needle by Misc.(Non-Drug; Combo Route) route once a week. 30 each 1    potassium chloride SA (K-DUR,KLOR-CON) 20 MEQ tablet TAKE 1 TABLET ONE TIME DAILY 90 tablet 3    spironolactone (ALDACTONE) 50 MG tablet TAKE 1 TABLET EVERY DAY 90 tablet 3    azelastine (ASTELIN) 137 mcg (0.1 %) nasal spray 1 spray (137 mcg total) by Nasal route 2 (two) times daily as needed for Rhinitis. 30 mL 11    blood glucose control, normal Soln 1 Bottle by Misc.(Non-Drug; Combo Route) route once daily. For Accu Check Amber  use twice daily prn dx: E11.9 1 each 0    blood-glucose meter (ACCU-CHEK AMBER) Misc 1 kit by Misc.(Non-Drug; Combo Route) route once. For Accu Check Amber  use twice daily prn dx: E11.9 for 1 dose 1 each 0    metoprolol succinate (TOPROL-XL) 25 MG 24 hr tablet TAKE 1 TABLET ONE TIME DAILY 180 tablet 3     No " current facility-administered medications for this visit.   [2]   Social History  Tobacco Use    Smoking status: Former     Current packs/day: 0.00     Average packs/day: 1 pack/day for 43.4 years (43.4 ttl pk-yrs)     Types: Cigarettes     Start date: 1970     Quit date: 2013     Years since quittin.0    Smokeless tobacco: Never   Substance Use Topics    Alcohol use: Not Currently     Comment: Rarely drinks wine    Drug use: No

## 2025-05-24 RX ORDER — EVOLOCUMAB 140 MG/ML
140 INJECTION, SOLUTION SUBCUTANEOUS
Qty: 2 ML | Refills: 5 | Status: ACTIVE | OUTPATIENT
Start: 2025-05-24

## 2025-05-24 NOTE — PROGRESS NOTES
"Pt has [facility] - if message below not viewed please call w information below:   Vitamin D looks good  A1c remains in the "prediabetes"range.  Please continue w current plan of care.    Please message or call with any questions or concerns!  Thank you!  Moon Lion MD, MPH  [facility]   "

## 2025-05-26 ENCOUNTER — TELEPHONE (OUTPATIENT)
Dept: PRIMARY CARE CLINIC | Facility: CLINIC | Age: 75
End: 2025-05-26
Payer: MEDICARE

## 2025-05-26 NOTE — TELEPHONE ENCOUNTER
"Pt viewed message and results in [employer].          ----- Message from Moon Lion MD sent at 5/24/2025 11:16 AM CDT -----  Pt has [facility] - if message below not viewed please call w information below:   Vitamin D looks good  A1c remains in the "prediabetes"range.  Please continue w current plan of care.    Please message or call with any questions or concerns!  Thank you!  Moon Lion MD, MPH  [facility]   ----- Message -----  From: Lab, Background User  Sent: 5/19/2025  10:48 AM CDT  To: Moon Lion MD    "

## 2025-06-13 DIAGNOSIS — M19.90 INFLAMMATORY ARTHRITIS: Chronic | ICD-10-CM

## 2025-06-13 RX ORDER — TRAMADOL HYDROCHLORIDE 50 MG/1
50 TABLET, FILM COATED ORAL EVERY 8 HOURS PRN
Qty: 20 TABLET | Refills: 0 | Status: SHIPPED | OUTPATIENT
Start: 2025-06-13 | End: 2025-06-20

## 2025-06-19 DIAGNOSIS — M05.79 RHEUMATOID ARTHRITIS INVOLVING MULTIPLE SITES WITH POSITIVE RHEUMATOID FACTOR: ICD-10-CM

## 2025-06-19 RX ORDER — FOLIC ACID 1 MG/1
1000 TABLET ORAL
Qty: 100 TABLET | Refills: 3 | Status: SHIPPED | OUTPATIENT
Start: 2025-06-19

## 2025-06-25 ENCOUNTER — PATIENT MESSAGE (OUTPATIENT)
Dept: ADMINISTRATIVE | Facility: OTHER | Age: 75
End: 2025-06-25
Payer: MEDICARE

## 2025-06-25 ENCOUNTER — NURSE TRIAGE (OUTPATIENT)
Dept: ADMINISTRATIVE | Facility: CLINIC | Age: 75
End: 2025-06-25
Payer: MEDICARE

## 2025-06-26 ENCOUNTER — OFFICE VISIT (OUTPATIENT)
Dept: PRIMARY CARE CLINIC | Facility: CLINIC | Age: 75
End: 2025-06-26
Payer: MEDICARE

## 2025-06-26 ENCOUNTER — TELEPHONE (OUTPATIENT)
Dept: PRIMARY CARE CLINIC | Facility: CLINIC | Age: 75
End: 2025-06-26
Payer: MEDICARE

## 2025-06-26 DIAGNOSIS — M05.79 RHEUMATOID ARTHRITIS INVOLVING MULTIPLE SITES WITH POSITIVE RHEUMATOID FACTOR: ICD-10-CM

## 2025-06-26 DIAGNOSIS — E11.59 HYPERTENSION ASSOCIATED WITH DIABETES: Chronic | ICD-10-CM

## 2025-06-26 DIAGNOSIS — J30.89 NON-SEASONAL ALLERGIC RHINITIS, UNSPECIFIED TRIGGER: Primary | Chronic | ICD-10-CM

## 2025-06-26 DIAGNOSIS — I15.2 HYPERTENSION ASSOCIATED WITH DIABETES: Chronic | ICD-10-CM

## 2025-06-26 RX ORDER — FLUTICASONE PROPIONATE 50 MCG
2 SPRAY, SUSPENSION (ML) NASAL DAILY
Qty: 16 G | Refills: 6 | Status: SHIPPED | OUTPATIENT
Start: 2025-06-26

## 2025-06-26 RX ORDER — METHOTREXATE 2.5 MG/1
10 TABLET ORAL
Qty: 48 TABLET | Refills: 0 | Status: SHIPPED | OUTPATIENT
Start: 2025-06-26

## 2025-06-26 NOTE — ASSESSMENT & PLAN NOTE
Take BP now to check if low---if 120/80 hold medication  Recheck after lunch---if BP 130s/80 proceed w/ taking BP med     111/68 recheck while on the phone

## 2025-06-26 NOTE — ASSESSMENT & PLAN NOTE
Advise to continue xyzal as needed for nasal congestion/runny nose  Use nasal spray as needed  Recommend hot steam to give sinus relief

## 2025-06-26 NOTE — PROGRESS NOTES
"Audio Only Telehealth Visit     The patient location is: at home; [place]  The chief complaint leading to consultation is: Lightheadedness   Visit type: Virtual visit with audio only (telephone)  Total time spent in medical discussion with patient: 18 minutes  Total time spent on date of the encounter:22 minutes       The reason for the audio only service rather than synchronous audio and video virtual visit was related to technical difficulties or patient preference/necessity.       Each patient to whom I provide medical services by telemedicine is:  (1) informed of the relationship between the physician and patient and the respective role of any other health care provider with respect to management of the patient; and (2) notified that they may decline to receive medical services by telemedicine and may withdraw from such care at any time. Patient verbally consented to receive this service via voice-only telephone call.       History of Present Illness    CHIEF COMPLAINT:  Kristyn presents today for lightheadedness and medication refills.    HISTORY OF PRESENT ILLNESS:  She reports experiencing lightheadedness since Tuesday after taking Tramadol half tablet for hand pain a couple days prior. She describes initial episode of room spinning in slow motion when sitting up, which resolved after closing eyes. She experiences potential instability when bending over and feels she might "keel over" with positional changes. The lightheadedness has persisted through Wednesday and this morning. She also notes morning nasal stuffiness with clear sinus drainage, without cough, which may be contributing to morning lightheadedness.    BLOOD PRESSURE:  Her blood pressure today was 128/70, with yesterday's reading at 96/56. Blood pressure has been ranging in 120s systolic to 60s diastolic. Heart rate has been consistently in the 60s.    BLOOD SUGAR:  Current blood sugar is 126, with readings ranging between 120s to " 180s.    MUSCULOSKELETAL:  She experienced a left hand pain flare-up last week, for which she received tramadol. While cleaning her house, she developed right hand pain with intermittent characteristics.    CURRENT MEDICATIONS:  She takes amlodipine, benazepril, metoprolol, and spironolactone for blood pressure management, which she held this morning per medical team's instructions. Additional medications include potassium supplement for history of low potassium, methotrexate 2.5 mg (4 tablets weekly), and folic acid. She has been taking half-tablets of Tramadol due to excessive sleepiness.           Assessment and plan:     1. Non-seasonal allergic rhinitis, unspecified trigger  Assessment & Plan:  Advise to continue xyzal as needed for nasal congestion/runny nose  Use nasal spray as needed  Recommend hot steam to give sinus relief     Orders:  -     fluticasone propionate (FLONASE) 50 mcg/actuation nasal spray; 2 sprays (100 mcg total) by Each Nostril route once daily.  Dispense: 16 g; Refill: 6    2. Hypertension associated with diabetes  Assessment & Plan:  Take BP now to check if low---if 120/80 hold medication  Recheck after lunch---if BP 130s/80 proceed w/ taking BP med     111/68 recheck while on the phone       3. Rheumatoid arthritis involving multiple sites with positive rheumatoid factor  Assessment & Plan:  Reports that she is taking methotrexate/folate as prescribed - advised maintain f/u w Rheum Dr. CABALLERO  Refilled methotrexate   Currently taking Tramadol half tablet as needed every 8 hours for pain---advise to hold medication at this time     Orders:  -     methotrexate 2.5 MG Tab; Take 4 tablets (10 mg total) by mouth every 7 days.  Dispense: 48 tablet; Refill: 0       Lab Results   Component Value Date    WBC 9.00 05/19/2025    HGB 11.6 (L) 05/19/2025    RBC 3.53 (L) 05/19/2025    HCT 35.2 (L) 05/19/2025     05/19/2025     (H) 05/19/2025    CHOL 131 09/10/2024    TRIG 60 09/10/2024    HDL  "41 09/10/2024    LDLCALC 78.0 09/10/2024    ALT 20 05/19/2025    AST 19 05/19/2025     05/19/2025    K 4.5 05/19/2025     05/19/2025    CREATININE 1.1 05/19/2025    BUN 17 05/19/2025    CO2 25 05/19/2025    MG 2.4 06/19/2023    TSH 0.509 03/01/2024    FREET4 0.96 03/01/2024    HGBA1C 6.2 (H) 05/19/2025    CRP 15.2 (H) 05/19/2025     Lab Results   Component Value Date    PTH 61.0 02/10/2023    CALCIUM 9.5 05/19/2025    PHOS 3.5 06/27/2024      Lab Results   Component Value Date    BKMIDCBT06 785 02/10/2023   No results found for: "FOLATE" No results found for: "UIBC", "IRON", "TRANS", "TRANSFERRIN", "TIBC", "LABIRON", "FESATURATED"   Lab Results   Component Value Date    EGFRNORACEVR 52 (L) 05/19/2025    ALBUMIN 3.8 05/19/2025     Lab Results   Component Value Date    XCBBLEUE85ZW 59 05/19/2025          This service was not originating from a related E/M service provided within the previous 7 days nor will  to an E/M service or procedure within the next 24 hours or my soonest available appointment.  Prevailing standard of care was able to be met in this audio-only visit.               JENNIFER Dominguez, FNP-C  [facility] [place] [address]   [phone] ph  [phone] fax   "

## 2025-06-26 NOTE — TELEPHONE ENCOUNTER
"Patient reports her blood pressure was 96/56 earlier. Patient checked while on the phone and it is 120/69. Disposition provided - see MD within 3 days. Unable to schedule within time frame, will route message to PCP clinic .Instructed to call back with additional questions or worsening of symptoms. Patient verbalized understanding.     Reason for Disposition   Brief (now gone) dizziness or lightheadedness after standing up or eating    Additional Information   Negative: Started suddenly after an allergic medicine, an allergic food, or bee sting   Negative: Shock suspected (e.g., cold/pale/clammy skin, too weak to stand, low BP, rapid pulse)   Negative: Difficult to awaken or acting confused (e.g., disoriented, slurred speech)   Negative: Fainted   Negative: [1] Systolic BP < 90 AND [2] dizzy, lightheaded, or weak   Negative: Chest pain   Negative: Bleeding (e.g., vomiting blood, rectal bleeding or tarry stools, severe vaginal bleeding)(Exception: fainted from sight of small amount of blood; small cut or abrasion)   Negative: Extra heart beats or heart is beating fast  (i.e., "palpitations")   Negative: Sounds like a life-threatening emergency to the triager   Negative: [1] Systolic BP < 80 AND [2] NOT dizzy, lightheaded or weak   Negative: Abdominal pain   Negative: Fever > 100.4 F (38.0 C)   Negative: Major surgery in the past month   Negative: [1] Drinking very little AND [2] dehydration suspected (e.g., no urine > 12 hours, very dry mouth, very lightheaded)   Negative: [1] Fall in systolic BP > 20 mm Hg from normal AND [2] dizzy, lightheaded, or weak   Negative: Patient sounds very sick or weak to the triager   Negative: [1] Systolic BP < 90 AND [2] NOT dizzy, lightheaded or weak   Negative: [1] Systolic BP  AND [2] taking blood pressure medications AND [3] dizzy, lightheaded or weak   Negative: [1] Systolic BP  AND [2] taking blood pressure medications AND [3] NOT dizzy, lightheaded or weak   " Negative: [1] Fall in systolic BP > 20 mm Hg from normal AND [2] NOT dizzy, lightheaded, or weak   Negative: Fall in systolic BP > 20 mmHg after standing up   Negative: Fall in systolic BP > 20 mmHg after eating a meal   Negative: Diastolic BP < 50 mm Hg    Protocols used: Low Blood Pressure-A-AH

## 2025-06-26 NOTE — ASSESSMENT & PLAN NOTE
Reports that she is taking methotrexate/folate as prescribed - advised maintain f/u w Rheum Dr. CABALLERO  Refilled methotrexate   Currently taking Tramadol half tablet as needed every 8 hours for pain---advise to hold medication at this time

## 2025-07-11 ENCOUNTER — LAB VISIT (OUTPATIENT)
Dept: LAB | Facility: HOSPITAL | Age: 75
End: 2025-07-11
Attending: INTERNAL MEDICINE
Payer: MEDICARE

## 2025-07-11 DIAGNOSIS — E53.8 FOLATE DEFICIENCY: ICD-10-CM

## 2025-07-11 DIAGNOSIS — E53.8 B12 DEFICIENCY: ICD-10-CM

## 2025-07-11 DIAGNOSIS — D75.89 MACROCYTOSIS: ICD-10-CM

## 2025-07-11 DIAGNOSIS — E78.2 COMBINED HYPERLIPIDEMIA ASSOCIATED WITH TYPE 2 DIABETES MELLITUS: Chronic | ICD-10-CM

## 2025-07-11 DIAGNOSIS — E04.2 MULTIPLE THYROID NODULES: Chronic | ICD-10-CM

## 2025-07-11 DIAGNOSIS — I15.2 HYPERTENSION ASSOCIATED WITH DIABETES: Chronic | ICD-10-CM

## 2025-07-11 DIAGNOSIS — E79.0 HYPERURICEMIA: ICD-10-CM

## 2025-07-11 DIAGNOSIS — E11.59 HYPERTENSION ASSOCIATED WITH DIABETES: Chronic | ICD-10-CM

## 2025-07-11 DIAGNOSIS — E11.69 COMBINED HYPERLIPIDEMIA ASSOCIATED WITH TYPE 2 DIABETES MELLITUS: Chronic | ICD-10-CM

## 2025-07-11 DIAGNOSIS — Z79.631 METHOTREXATE, LONG TERM, CURRENT USE: ICD-10-CM

## 2025-07-11 DIAGNOSIS — D64.9 ANEMIA, UNSPECIFIED TYPE: ICD-10-CM

## 2025-07-11 LAB
ALBUMIN/CREAT UR: ABNORMAL
CHOLEST SERPL-MCNC: 142 MG/DL (ref 120–199)
CHOLEST/HDLC SERPL: 3.4 {RATIO} (ref 2–5)
CREAT UR-MCNC: >450 MG/DL (ref 15–325)
FOLATE SERPL-MCNC: >40 NG/ML (ref 4–24)
HDLC SERPL-MCNC: 42 MG/DL (ref 40–75)
HDLC SERPL: 29.6 % (ref 20–50)
LDLC SERPL CALC-MCNC: 85.8 MG/DL (ref 63–159)
MICROALBUMIN UR-MCNC: 28 UG/ML (ref ?–5000)
NONHDLC SERPL-MCNC: 100 MG/DL
TRIGL SERPL-MCNC: 71 MG/DL (ref 30–150)
TSH SERPL-ACNC: 0.65 UIU/ML (ref 0.4–4)
URATE SERPL-MCNC: 5.4 MG/DL (ref 2.4–5.7)
VIT B12 SERPL-MCNC: 984 PG/ML (ref 210–950)

## 2025-07-11 PROCEDURE — 82607 VITAMIN B-12: CPT

## 2025-07-11 PROCEDURE — 36415 COLL VENOUS BLD VENIPUNCTURE: CPT | Mod: PO

## 2025-07-11 PROCEDURE — 84443 ASSAY THYROID STIM HORMONE: CPT

## 2025-07-11 PROCEDURE — 80061 LIPID PANEL: CPT

## 2025-07-11 PROCEDURE — 82043 UR ALBUMIN QUANTITATIVE: CPT

## 2025-07-11 PROCEDURE — 82746 ASSAY OF FOLIC ACID SERUM: CPT

## 2025-07-11 PROCEDURE — 84550 ASSAY OF BLOOD/URIC ACID: CPT

## 2025-07-13 ENCOUNTER — DOCUMENTATION ONLY (OUTPATIENT)
Dept: PRIMARY CARE CLINIC | Facility: CLINIC | Age: 75
End: 2025-07-13
Payer: MEDICARE

## 2025-07-13 PROBLEM — E79.0 HYPERURICEMIA: Status: RESOLVED | Noted: 2025-07-11 | Resolved: 2025-07-13

## 2025-07-13 PROBLEM — D75.89 MACROCYTOSIS: Chronic | Status: ACTIVE | Noted: 2025-07-11

## 2025-07-13 PROBLEM — Z79.631 METHOTREXATE, LONG TERM, CURRENT USE: Chronic | Status: ACTIVE | Noted: 2025-07-11

## 2025-07-14 ENCOUNTER — TELEPHONE (OUTPATIENT)
Dept: PRIMARY CARE CLINIC | Facility: CLINIC | Age: 75
End: 2025-07-14
Payer: MEDICARE

## 2025-07-14 NOTE — TELEPHONE ENCOUNTER
Pt viewed results via [facility].        ----- Message from Moon Lion MD sent at 7/12/2025 11:08 AM CDT -----  Pt has [facility] - if message below not viewed please call w information below:     Good day Ms. Garza your urine creatinine level is up but minimal protein in the urine which is good. Sometimes can see elevated urine creatinine with chronic kidney disease, high protein intake,   dehydration or after exercise.     Folate and B12 levels are elevated. My understanding is that this can be seen because the methotrexate interferes with the metabolism of folate. If you are taking the methotrexate as prescribed,   please continue folate supplementation at current does unless advised otherwise by your Rheumatologist.    The B12 is a little elevated - this can sometimes be falsely elevated but if taking B12 supplement recommend to decrease dose or frequency.    Uric acid, TSH are good.   Lipid levels ok - at or close to goal.         ----- Message -----  From: Lab, Background User  Sent: 7/11/2025   6:20 PM CDT  To: Moon Lion MD

## 2025-07-16 ENCOUNTER — TELEPHONE (OUTPATIENT)
Dept: RHEUMATOLOGY | Facility: CLINIC | Age: 75
End: 2025-07-16
Payer: MEDICARE

## 2025-07-16 NOTE — TELEPHONE ENCOUNTER
Copied from CRM #[id number]. Topic: Appointments - Appointment Access  >> Jul 16, 2025  8:41 AM Med Assistant Robyn wrote:  Type:  Needing Return Call    Who Called: Kristyn  Needs Call Back For: Wanting to change Sept. Appointment to [facility]  Would the patient rather a call back or a response via [facility]?  Call  Best Call Back Number: [phone]  Additional Information:

## 2025-07-16 NOTE — TELEPHONE ENCOUNTER
Patient aware that Dr CABALLERO next opening at [facility] is not until February but to remind us at her September appt to schedule follow up at [facility]

## 2025-07-30 ENCOUNTER — HOSPITAL ENCOUNTER (OUTPATIENT)
Dept: RADIOLOGY | Facility: HOSPITAL | Age: 75
Discharge: HOME | End: 2025-07-30
Attending: INTERNAL MEDICINE
Payer: MEDICARE

## 2025-07-30 DIAGNOSIS — F17.211 NICOTINE DEPENDENCE, CIGARETTES, IN REMISSION: ICD-10-CM

## 2025-07-30 DIAGNOSIS — Z12.31 ENCOUNTER FOR SCREENING MAMMOGRAM FOR MALIGNANT NEOPLASM OF BREAST: ICD-10-CM

## 2025-07-30 PROCEDURE — 71271 CT THORAX LUNG CANCER SCR C-: CPT | Mod: TC

## 2025-07-30 PROCEDURE — 77063 BREAST TOMOSYNTHESIS BI: CPT | Mod: 26,,, | Performed by: RADIOLOGY

## 2025-07-30 PROCEDURE — 71271 CT THORAX LUNG CANCER SCR C-: CPT | Mod: 26,,, | Performed by: RADIOLOGY

## 2025-07-30 PROCEDURE — 77067 SCR MAMMO BI INCL CAD: CPT | Mod: TC

## 2025-07-30 PROCEDURE — 77067 SCR MAMMO BI INCL CAD: CPT | Mod: 26,,, | Performed by: RADIOLOGY

## 2025-07-31 DIAGNOSIS — N18.31 STAGE 3A CHRONIC KIDNEY DISEASE: Primary | ICD-10-CM

## 2025-07-31 RX ORDER — POTASSIUM CHLORIDE 20 MEQ/1
20 TABLET, EXTENDED RELEASE ORAL DAILY
Qty: 90 TABLET | Refills: 0 | Status: SHIPPED | OUTPATIENT
Start: 2025-07-31

## 2025-07-31 RX ORDER — METOPROLOL SUCCINATE 25 MG/1
25 TABLET, EXTENDED RELEASE ORAL
Qty: 90 TABLET | Refills: 3 | Status: SHIPPED | OUTPATIENT
Start: 2025-07-31

## 2025-07-31 NOTE — TELEPHONE ENCOUNTER
Informed pt of results, pt VU. No further questions.         ----- Message from Moon Lion MD sent at 7/31/2025  9:46 AM CDT -----  Pt has [facility] - if message below not viewed please call w information below:   Mammogram is normal - no sign of cancer    Please message or call with any questions or concerns!  Thank you!  Moon Lion MD, MPH  [facility]   ----- Message -----  From: Reza Georges MD  Sent: 7/31/2025   8:28 AM CDT  To: Moon Lion MD

## 2025-08-05 ENCOUNTER — DOCUMENTATION ONLY (OUTPATIENT)
Dept: PRIMARY CARE CLINIC | Facility: CLINIC | Age: 75
End: 2025-08-05
Payer: MEDICARE

## 2025-08-05 RX ORDER — LEVOCETIRIZINE DIHYDROCHLORIDE 5 MG/1
5 TABLET, FILM COATED ORAL NIGHTLY
Qty: 90 TABLET | Refills: 3 | Status: SHIPPED | OUTPATIENT
Start: 2025-08-05

## 2025-08-06 ENCOUNTER — DOCUMENTATION ONLY (OUTPATIENT)
Dept: PRIMARY CARE CLINIC | Facility: CLINIC | Age: 75
End: 2025-08-06
Payer: MEDICARE

## 2025-08-07 NOTE — TELEPHONE ENCOUNTER
Outpatient Care Management   - Patient Assessment    Patient: Annabel Suarez  MRN:  [id number]  Date of Service:  8/7/2025  Completed by:  RENAY Guevara  Referral Date:     Reason for Visit   Patient presents with    OPCM Enrollment Call    Social Work Assessment       Brief Summary:  received a referral from outpatient provider for the following Low/Mod SW psychosocial needs for assistance with insurance. The patient also requests assistance with Advanced Care Planning. Care plan was created in collaboration with patient/caregiver input.   completed the SDOH questionnaire.     Consult received to assist pt with insurance coverage problems; Sw received a call from pt and pt was informed that a consult was received regarding her having insurance issues; pt stated that she applied for Medicaid outside of [facility] and got approved for the month of May, but got denied for the month of June; Sw completed an assessment for OPCM enrollment and pt gave permission for her mother (Melina Suarez/[phone]) to be contacted when she's not available for duration of enrollment.    Patient reported that she's not  and don't have children; pt also reported that she lives at home with mother and younger sister and her 2 dogs. Patient reported that she has a total of 5 siblings and have both parents.    Patient reported that she don't have an Advance Directive and didn't know what an Advance Directive was; Sw educated pt on Advance Care Planning and pt requested information to be mailed to her address; Sw verified pt's address and pt was told that information would be mailed to her address; pt verbalized understanding.    Patient reported that she works full-time and also reported that she wear glasses and stated that she has a dr's appointment next week with eye dr to discuss removal of cataract; pt stated that she don't use any equipment to assist with ambulation and in regards  Specialty Pharmacy - Refill Coordination    Specialty Medication Orders Linked to Encounter      Most Recent Value   Medication #1  evolocumab (REPATHA SURECLICK) 140 mg/mL PnIj (Order#[id number], Rx#[id number])          Refill Questions - Documented Responses      Most Recent Value   Relationship to patient of person spoken to?  Self   HIPAA/medical authority confirmed?  Yes   Any changes in contact preferences or allowed representatives?  No   Has the patient had any insurance changes?  No   Has the patient had any changes to specialty medication, dose, or instructions?  No   Has the patient started taking any new medications, herbals, or supplements?  No   Has the patient been diagnosed with any new medical conditions?  No   Does the patient have any new allergies to medications or foods?  No   Does the patient have any concerns about side effects?  No   Can the patient store medication/sharps container properly (at the correct temperature, away from children/pets, etc.)?  Yes   Can the patient call emergency services (911) in the event of an emergency?  Yes   Does the patient have any concerns or questions about taking or administering this medication as prescribed?  No   How many doses did the patient miss in the past 4 weeks or since the last fill?  0   How many doses does the patient have on hand?  0   Does the number of doses/days supply remaining match pharmacy expected amounts?  Yes   Does the patient feel that this medication is effective?  Yes   During the past 4 weeks, has patient missed any activities due to condition or medication?  No   During the past 4 weeks, did patient have any of the following urgent care visits?  None   How will the patient receive the medication?  Mail   When does the patient need to receive the medication?  11/17/20   Shipping Address  Home   Address in [facility] confirmed and updated if neccessary?  Yes   Expected Copay ($)  0   Is the patient able to afford the  medication copay?  Yes   Payment Method  zero copay   Days supply of Refill  28   Would patient like to speak to a pharmacist?  No   Do you want to trigger an intervention?  No   Do you want to trigger an additional referral task?  No   Refill activity completed?  Yes   Refill activity plan  Refill scheduled   Shipment/Pickup Date:  11/16/20          Current Outpatient Medications   Medication Sig    amLODIPine-benazepriL (LOTREL) 5-40 mg per capsule TAKE 1 CAPSULE BY MOUTH EVERY DAY    aspirin 81 MG Chew Take 81 mg by mouth once daily.    blood glucose control, normal Soln 1 Bottle by Misc.(Non-Drug; Combo Route) route once daily. For Accu Check Amber  use twice daily prn dx: E11.9    blood sugar diagnostic Strp 1 strip by Misc.(Non-Drug; Combo Route) route once daily.    blood-glucose meter (ACCU-CHEK AMBER) Misc 1 kit by Misc.(Non-Drug; Combo Route) route once. For Accu Check Amber  use twice daily prn dx: E11.9 for 1 dose    calcium-vitamin D3 500 mg(1,250mg) -200 unit per tablet Take 1 tablet by mouth once daily.     CRANBERRY EXTRACT ORAL Take 1 tablet by mouth once daily.    evolocumab (REPATHA SURECLICK) 140 mg/mL PnIj Inject 1 mL (140 mg total) into the skin every 14 (fourteen) days.    lancets Misc 1 lancet by Misc.(Non-Drug; Combo Route) route 3 (three) times daily. For Acc-Chek Amber   DX :E11.9    loratadine (CLARITIN) 10 mg tablet TAKE 1 TABLET BY MOUTH EVERY DAY    meclizine (ANTIVERT) 25 mg tablet Take 1 tablet (25 mg total) by mouth 3 (three) times daily as needed for Dizziness or Nausea.    MULTIVITAMIN W-MINERALS/LUTEIN (CENTRUM SILVER ORAL) Take 1 tablet by mouth once daily.    pantoprazole (PROTONIX) 40 MG tablet TAKE 1 TABLET BY MOUTH DAILY AS NEEDED    spironolactone (ALDACTONE) 50 MG tablet Take 1 tablet (50 mg total) by mouth once daily.    triamcinolone (NASACORT) 55 mcg nasal inhaler USE 2 SPRAYS BY NASAL ROUTE ONCE DAILY   Last reviewed on 10/26/2020  8:50 AM by Zoë Montejo  to transportation pt stated that she utilize [employer].    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    Sw informed pt that she would be contacted in a week to follow-up on her receiving Advance Care Planning Booklet and getting an update on Financial Assistance application; pt verbalized understanding; Sw will continue to follow pt to assist with needs and concerns.    RENAY Guevara    Ext. [phone]  Pager #[phone]       MD Thomas    Review of patient's allergies indicates:   Allergen Reactions    Statins-hmg-coa reductase inhibitors      Muscle Cramps    Last reviewed on  11/13/2020 1:25 PM by Geeta Oquendo      Tasks added this encounter   12/6/2020 - Refill Call (Auto Added)   Tasks due within next 3 months   No tasks due.     Geeta Oquendo  [facility]  [address] [place] [id number]  Phone: [phone]  Fax: [phone]

## 2025-08-08 ENCOUNTER — OFFICE VISIT (OUTPATIENT)
Dept: PRIMARY CARE CLINIC | Facility: CLINIC | Age: 75
End: 2025-08-08
Payer: MEDICARE

## 2025-08-08 VITALS
SYSTOLIC BLOOD PRESSURE: 110 MMHG | HEART RATE: 70 BPM | HEIGHT: 64 IN | OXYGEN SATURATION: 98 % | TEMPERATURE: 97 F | BODY MASS INDEX: 27.96 KG/M2 | DIASTOLIC BLOOD PRESSURE: 62 MMHG | WEIGHT: 163.81 LBS

## 2025-08-08 DIAGNOSIS — E78.2 COMBINED HYPERLIPIDEMIA ASSOCIATED WITH TYPE 2 DIABETES MELLITUS: Chronic | ICD-10-CM

## 2025-08-08 DIAGNOSIS — M18.12 ARTHRITIS OF CARPOMETACARPAL (CMC) JOINT OF LEFT THUMB: ICD-10-CM

## 2025-08-08 DIAGNOSIS — M19.90 INFLAMMATORY ARTHRITIS: Chronic | ICD-10-CM

## 2025-08-08 DIAGNOSIS — N18.31 STAGE 3A CHRONIC KIDNEY DISEASE: ICD-10-CM

## 2025-08-08 DIAGNOSIS — E11.59 HYPERTENSION ASSOCIATED WITH DIABETES: Chronic | ICD-10-CM

## 2025-08-08 DIAGNOSIS — G62.9 NEUROPATHY: ICD-10-CM

## 2025-08-08 DIAGNOSIS — E11.69 COMBINED HYPERLIPIDEMIA ASSOCIATED WITH TYPE 2 DIABETES MELLITUS: Chronic | ICD-10-CM

## 2025-08-08 DIAGNOSIS — I15.2 HYPERTENSION ASSOCIATED WITH DIABETES: Chronic | ICD-10-CM

## 2025-08-08 DIAGNOSIS — M05.79 RHEUMATOID ARTHRITIS INVOLVING MULTIPLE SITES WITH POSITIVE RHEUMATOID FACTOR: Primary | Chronic | ICD-10-CM

## 2025-08-08 PROCEDURE — 99999 PR PBB SHADOW E&M-EST. PATIENT-LVL V: CPT | Mod: PBBFAC,,,

## 2025-08-08 RX ORDER — GABAPENTIN 100 MG/1
100 CAPSULE ORAL 2 TIMES DAILY
Qty: 90 CAPSULE | Refills: 0 | Status: SHIPPED | OUTPATIENT
Start: 2025-08-08 | End: 2025-09-22

## 2025-08-08 RX ORDER — DICLOFENAC SODIUM 10 MG/G
4 GEL TOPICAL DAILY
Qty: 100 G | Refills: 1 | Status: SHIPPED | OUTPATIENT
Start: 2025-08-08

## 2025-08-08 NOTE — PROGRESS NOTES
"Kristyn Garza  08/08/2025  [id number]    Moon Lion MD        Visit Type:a scheduled routine follow-up visit    Chief Complaint:  Chief Complaint   Patient presents with    Follow-up     Overall health       History of Present Illness: Follow-up  Pertinent negatives include no abdominal pain, chest pain, chills, congestion, coughing, fever, headaches, myalgias, nausea, rash, sore throat, vomiting or weakness.     History of Present Illness    CHIEF COMPLAINT:  Kristyn presents today for follow-up of rheumatoid arthritis.    RHEUMATOID ARTHRITIS:  She reports ongoing rheumatoid arthritis with flares occurring twice monthly, lasting 3-4 days. Symptoms characteristically begin with hand stiffness, progressing to significant functional impairment with hand redness, warmth, and swelling. Cold exposure, particularly at [employer], triggers symptom exacerbation. Recently, car washing activity precipitated a flare, potentially due to hand moisture and subsequent temperature change. Symptoms primarily affect fingertips, with stiffness preventing full hand closure during flare episodes. She uses stress balls to maintain hand mobility. Symptoms impact daily activities and sleep, with occasional nighttime hand discomfort.    NEUROPATHY:  She reports persistent numbness in fingertips occurring approximately 75% of the time, localized to fingertips only with no foot involvement. She describes the sensation as feeling "asleep" and expresses concern about potential progression. She notes symptoms seem associated with potential flare-ups and is uncertain about underlying cause, questioning whether symptoms are related to neuropathy or poor circulation.    OSTEOARTHRITIS:  She reports chronic bilateral knee pain with occasional catching sensation when rising from seated position. She currently denies active inflammatory flare-ups.    EXERCISE LIMITATIONS:  She reports significant exercise limitations, currently able to walk but " unable to perform gym activities due to -related pain. She describes being restricted to walking and resting and expresses frustration with reduced physical activity and inability to engage in previous exercise routines.    MEDICATIONS:  She takes methotrexate 4 pills weekly on Wednesdays and folic acid weekly. She uses Tramadol for pain during flares, which concerns her due to potential habit-forming nature. She uses diclofenac gel for pain relief, which is currently ineffective. She expresses frustration with current pain management, indicating that current interventions are not adequately controlling symptoms.    DIET AND GI:  She reports consuming a diet rich in fruits, which she describes as her preferred midnight snack to help manage sugar levels. She is currently experiencing mild constipation, which she attributes to excessive bread intake.    RECENT TESTS AND LABS:  CT of chest was completed. Bone density scan was performed with normal results. Mammogram was completed last week with normal results. Folate was elevated, likely secondary to methotrexate medication. Uric acid level was normal with no indication of gout. Thyroid levels were within normal limits.         Recent Fall:  []Yes  [x]No  Activity:  []Vigorous []Moderate [x]Sedentary  Appetite:  [x]Good  []Fair  []Poor  Mood: [x]Stable []Anxious []Depressed   Insomnia: []Yes  [x]No    Hosp/ED/Urgent Care:  04/13/25 ED    Recent appointments:   04/14/25 [facility]/ Me  05/16/25 [facility]/ Ayad      Upcoming Appointments  Future Appointments       Date Provider Specialty Appt Notes    9/15/2025  Lab dr pantoja    9/22/2025 Abram Vásquez MD Rheumatology 6 month fu//dt    9/23/2025 Brenden Landaverde MD Nephrology annual    10/31/2025 Moon Lion MD Primary Care Three month f/u    3/19/2026 Hammad Hardy, OD Ophthalmology dilated exam w gOCT yearly              Review of Systems   Constitutional:  Negative for chills and fever.   HENT:  Negative  for congestion, ear pain, sinus pain and sore throat.    Eyes:  Negative for blurred vision and pain.   Respiratory:  Negative for cough and shortness of breath.    Cardiovascular:  Negative for chest pain and leg swelling.   Gastrointestinal:  Negative for abdominal pain, nausea and vomiting.   Genitourinary:  Negative for dysuria.   Musculoskeletal:  Positive for joint pain (bilateral hands and bilateral knees). Negative for back pain, falls and myalgias.   Skin:  Negative for itching and rash.   Neurological:  Negative for dizziness, weakness and headaches.   Psychiatric/Behavioral:  Negative for suicidal ideas. The patient does not have insomnia.        History:  Past Medical History:   Diagnosis Date    Anemia 07/11/2025    Anxiety 05/27/2024    Carpal tunnel syndrome     CKD (chronic kidney disease) stage 3, GFR 30-59 ml/min     Followed by Nephrology    Colon cancer screening 03/18/2014    COVID-19 08/30/2023    CTS (carpal tunnel syndrome) 06/18/2013    Diabetes mellitus, type 2     Dizziness 06/19/2023    Eczema     Elevated CPK     Gastroesophageal reflux disease 07/29/2020    History of hypokalemia 06/18/2013    History of hypokalemia 06/18/2013    Hyperuricemia 07/11/2025    Hypokalemia     Major depressive disorder, single episode, mild 05/27/2024    Multinodular thyroid     Followed by ENT    Nicotine dependence, cigarettes, in remission 06/27/2024    Obesity     Obesity (BMI 30.0-34.9) 05/12/2015    Other and unspecified hyperlipidemia     Pilar cyst of scalp 02/21/2025    Pneumonia     in her 20s; hospitalized    Postmenopausal     No history of abnormal pap smear    Secondary hyperparathyroidism of renal origin 10/26/2021    Statin intolerance     caused mylagia, elevated CPK    Tobacco dependence     resolved    Unspecified essential hypertension      Review of patient's allergies indicates:   Allergen Reactions    Buspar [buspirone] Other (See Comments)     Lip swelling    Statins-hmg-coa  reductase inhibitors      Muscle Cramps         Medications:  Medications Ordered Prior to Encounter[1]      Medications have been reviewed and reconciled with patient at this visit.  Barriers to medications reviewed with patient.      Exam:  Wt Readings from Last 3 Encounters:   08/08/25 74.3 kg (163 lb 12.8 oz)   07/11/25 74.2 kg (163 lb 9.3 oz)   05/21/25 76 kg (167 lb 8.8 oz)     Temp Readings from Last 3 Encounters:   08/08/25 97.4 °F (36.3 °C) (Temporal)   07/11/25 97.3 °F (36.3 °C) (Temporal)   05/16/25 96.9 °F (36.1 °C) (Temporal)     BP Readings from Last 3 Encounters:   08/08/25 110/62   07/11/25 110/60   05/21/25 134/81     Pulse Readings from Last 3 Encounters:   08/08/25 70   07/11/25 75   05/21/25 71     Body mass index is 28.12 kg/m².      Physical Exam  Vitals reviewed.   Constitutional:       Appearance: Normal appearance. She is normal weight.   HENT:      Head: Normocephalic.      Right Ear: Tympanic membrane, ear canal and external ear normal.      Left Ear: Tympanic membrane, ear canal and external ear normal.      Nose: Nose normal.      Mouth/Throat:      Mouth: Mucous membranes are moist.      Pharynx: Oropharynx is clear.   Eyes:      Extraocular Movements: Extraocular movements intact.      Conjunctiva/sclera: Conjunctivae normal.      Pupils: Pupils are equal, round, and reactive to light.   Cardiovascular:      Rate and Rhythm: Normal rate and regular rhythm.      Pulses: Normal pulses.      Heart sounds: Normal heart sounds.   Pulmonary:      Effort: Pulmonary effort is normal.      Breath sounds: Normal breath sounds.   Abdominal:      General: Bowel sounds are normal.      Palpations: Abdomen is soft.   Musculoskeletal:         General: Normal range of motion.      Cervical back: Normal range of motion and neck supple.   Skin:     General: Skin is warm and dry.      Capillary Refill: Capillary refill takes less than 2 seconds.   Neurological:      General: No focal deficit present.       Mental Status: She is alert and oriented to person, place, and time. Mental status is at baseline.      GCS: GCS eye subscore is 4. GCS verbal subscore is 5. GCS motor subscore is 6.      Gait: Gait is intact.   Psychiatric:         Attention and Perception: Attention and perception normal.         Mood and Affect: Mood and affect normal.         Speech: Speech normal.         Behavior: Behavior normal. Behavior is cooperative.         Thought Content: Thought content normal.         Cognition and Memory: Cognition and memory normal.         Judgment: Judgment normal.         Laboratory Reviewed:     Lab Results   Component Value Date    WBC 9.00 05/19/2025    HGB 11.6 (L) 05/19/2025    HCT 35.2 (L) 05/19/2025     05/19/2025    CHOL 142 07/11/2025    TRIG 71 07/11/2025    HDL 42 07/11/2025    ALT 20 05/19/2025    AST 19 05/19/2025     05/19/2025    K 4.5 05/19/2025     05/19/2025    CREATININE 1.1 05/19/2025    BUN 17 05/19/2025    CO2 25 05/19/2025    TSH 0.646 07/11/2025    HGBA1C 6.2 (H) 05/19/2025       Screening or Prevention Patient's value Goal Complete/Controlled?   HgA1C Testing and Control   Lab Results   Component Value Date    HGBA1C 6.2 (H) 05/19/2025      Annually/Less than 8% Yes   Lipid profile : 07/11/2025 Annually Yes   LDL control Lab Results   Component Value Date    LDLCALC 85.8 07/11/2025    Annually/Less than 100 mg/dl  Yes   Nephropathy screening Lab Results   Component Value Date    LABMICR 28.0 07/11/2025     Lab Results   Component Value Date    PROTEINUA Negative 01/18/2022    Annually Yes   Blood pressure BP Readings from Last 1 Encounters:   08/08/25 110/62    Less than 140/90 Yes   Dilated retinal exam : 03/17/2025 Annually Yes   Foot exam   : 02/06/2025 Annually Yes       Health Maintenance  Health Maintenance Topics with due status: Not Due       Topic Last Completion Date    Influenza Vaccine 09/03/2024    Foot Exam 02/06/2025    Colonoscopy 03/14/2025     Diabetic Eye Exam 03/17/2025    Hemoglobin A1c 05/19/2025    Lipid Panel 07/11/2025    LDCT Lung Screen 07/30/2025    Mammogram 07/30/2025    DEXA Scan 07/30/2025    Diabetes Urine Screening 08/01/2025     Health Maintenance Due   Topic Date Due    Shingles Vaccine (1 of 2) 04/20/2011    TETANUS VACCINE  02/23/2021    RSV Vaccine (Age 60+ and Pregnant patients) (1 - 1-dose 75+ series) Never done    COVID-19 Vaccine (4 - 2024-25 season) 09/01/2024         1. Rheumatoid arthritis involving multiple sites with positive rheumatoid factor  Assessment & Plan:  Discuss triggers to avoid such as cold environment  Continue folate supplement     Orders:  -     diclofenac sodium (VOLTAREN ARTHRITIS PAIN) 1 % Gel; Apply 4 g topically once daily.  Dispense: 100 g; Refill: 1    2. Inflammatory arthritis  Assessment & Plan:  Chronic, stable   Recommend alternating warm and cool compress to left wrist  F/u w/ Dr. CABALLERO (rheumatologist)   Advise to use Diclofenac gel and OTC Tylenol arthritis     Orders:  -     diclofenac sodium (VOLTAREN ARTHRITIS PAIN) 1 % Gel; Apply 4 g topically once daily.  Dispense: 100 g; Refill: 1    3. Neuropathy  -     gabapentin (NEURONTIN) 100 MG capsule; Take 1 capsule (100 mg total) by mouth 2 (two) times daily.  Dispense: 90 capsule; Refill: 0    4. Hypertension associated with diabetes  Assessment & Plan:  Controlled this visit  Continue HTN management   Continue daily activity, low sodium diet, weight loss efforts  BP Readings from Last 3 Encounters:   08/08/25 110/62   07/11/25 110/60   05/21/25 134/81     Continue to monitor blood glucose at home   Discuss diet change      5. Stage 3a chronic kidney disease  Assessment & Plan:  Lab Results   Component Value Date    EGFRNORACEVR 52 (L) 05/19/2025    EGFRNORACEVR 52 (A) 02/10/2025    EGFRNORACEVR 58 (A) 12/13/2024    CREATININE 1.1 05/19/2025    CREATININE 1.1 02/10/2025    CREATININE 1.0 12/13/2024    BUN 17 05/19/2025    BUN 13 02/10/2025    BUN 15  12/13/2024    ALBUMIN 3.8 05/19/2025    ALBUMIN 3.8 02/10/2025    ALBUMIN 4.0 12/13/2024   (    Discuss the following with patient:  Managing blood pressure   Eating foods that are good for the heart and low in sodium   Exercising regularly as tolerated  Getting enough sleep   Managing blood sugar if you have diabetes   Avoiding medications that can further damage the kidneys such as NSAIDs (ibuprofen, naproxen)       6. Arthritis of carpometacarpal (CMC) joint of left thumb  Assessment & Plan:  Chronic  Discuss triggers to avoid   Continue using arthritis medicine     Orders:  -     diclofenac sodium (VOLTAREN ARTHRITIS PAIN) 1 % Gel; Apply 4 g topically once daily.  Dispense: 100 g; Refill: 1    7. Combined hyperlipidemia associated with type 2 diabetes mellitus  Overview:  Statin intolerance - goal LDL < 70 w Repatha    Assessment & Plan:  Stable   Tolerating repatha   Hgb A1c prediabetes     Lab Results   Component Value Date    CHOL 142 07/11/2025    HDL 42 07/11/2025    LDLCALC 85.8 07/11/2025    TRIG 71 07/11/2025     Chronic, stable  Continue STATIN  Include vigorous activity and weight loss in healthcare plan  Focus on consumption of fruits, vegetables, fiber, and monounsaturated fats (DASH diet and Mediterranean Diet).                  Worry Score: 2    Care Plan/Goals: Reviewed    Goals         80 <= Glucose <= 180 (pt-stated)       7/11/25  On track    2/21/25  On track    11/15/22  On track        Blood Pressure < 130/80       Update 7/11/25  On track    Update 2/21/25  On track        Exercise at least 150 minutes per week. (pt-stated)       Update 2/21/25   Not on track  Referral [facility]    Update 1/17/2023  Daily walking - current step average about 7,000 steps - new goal 10,000 steps. Walk to park, around park and back 3x/week.    Time frame 3 mos - mid April 2023.          LDL CHOLESTEROL < 70       Take at least one BP reading per week at various times of the day       11/15/22  On track         Weight (lb) < 90.7 kg (200 lb) (pt-stated)       Update 2/21/25  On track weight stable around 175    Update 11/15/22    On track - weight < 200 lbs - new goal 10 lbs wt loss over the next 2 mos.    Goal 5% weight loss - 9-10 lbs - 175 seth    Barriers? Sweet-tooth    Overcoming? Try substitute fruit? Daily walking - current step average about 5,000 steps - new goal 7,000 steps.    Time frame 3 mos - mid Jan 2023.                  Follow up: Follow up in about 12 weeks (around 10/31/2025) for RUTHANN Lion MD.      The following issues were discussed: The primary encounter diagnosis was Rheumatoid arthritis involving multiple sites with positive rheumatoid factor. Diagnoses of Inflammatory arthritis, Neuropathy, Hypertension associated with diabetes, Stage 3a chronic kidney disease, Arthritis of carpometacarpal (CMC) joint of left thumb, and Combined hyperlipidemia associated with type 2 diabetes mellitus were also pertinent to this visit.    Health maintenance needs, recent test results and goals of care discussed with patient and questions answered. Patient was given the opportunity to ask questions and be an active participant in their medical care and questions answered.  Patient had no further questions or concerns at this time.     After visit summary printed and given to patient upon discharge.  Patient goals and care plan are included in After visit summary.    TOTAL TIME evaluating and managing this patient for this encounter was  36 minutes. This time was spent personally by me on some of the following activities: review of patient's past medical history, assessing age-appropriate health maintenance needs, review of any interval history, review and interpretation of lab results, review and interpretation of imaging test results, review and interpretation of cardiology test results, reviewing consulting specialist notes, obtaining history from the patient and family, examination of the patient,  medication reconciliation, managing and/or ordering prescription medications, ordering imaging tests, ordering referral to subspecialty provider(s), educating patient and answering their questions about diagnosis, treatment plan, and goals of treatment, discussing planned follow-up and final documentation of the visit. This time was exclusive of any separately billable procedures for this patient and exclusive of time spent treating any other patients.    This note was generated with the assistance of ambient listening technology. Verbal consent was obtained by the patient and accompanying visitor(s) for the recording of patient appointment to facilitate this note. I attest to having reviewed and edited the generated note for accuracy, though some syntax or spelling errors may persist. Please contact the author of this note for any clarification.               JENNIFER Dominguez, FNP-C  [facility] [address] [place] [address]   [phone] ph  [phone] fax           [1]   Current Outpatient Medications on File Prior to Visit   Medication Sig Dispense Refill    ACCU-CHEK SMARTVIEW TEST STRIP Strp TEST BLOOD SUGAR ONE TIME DAILY 100 strip 3    albuterol (VENTOLIN HFA) 90 mcg/actuation inhaler Inhale 2 puffs into the lungs every 4 (four) hours as needed for Wheezing or Shortness of Breath (before exercise). Rescue 18 g 11    amLODIPine (NORVASC) 10 MG tablet TAKE 1 TABLET EVERY DAY 90 tablet 3    aspirin 81 MG Chew Take 81 mg by mouth once daily.      benazepriL (LOTENSIN) 40 MG tablet TAKE 1 TABLET (40 MG TOTAL) BY MOUTH ONCE DAILY. 90 tablet 3    calcium-vitamin D3 500 mg(1,250mg) -200 unit per tablet Take 1 tablet by mouth once daily.       CRANBERRY EXTRACT ORAL Take 1 tablet by mouth once daily.      evolocumab (REPATHA SURECLICK) 140 mg/mL PnIj Inject 1 mL (140 mg total) into the skin every 14 (fourteen) days. 2 mL 5    fluticasone propionate (FLONASE) 50 mcg/actuation nasal  "spray 2 sprays (100 mcg total) by Each Nostril route once daily. 16 g 6    folic acid (FOLVITE) 1 MG tablet TAKE 1 TABLET ONE TIME DAILY 100 tablet 3    levocetirizine (XYZAL) 5 MG tablet TAKE 1 TABLET EVERY EVENING 90 tablet 3    methotrexate 2.5 MG Tab Take 4 tablets (10 mg total) by mouth every 7 days. 48 tablet 0    metoprolol succinate (TOPROL-XL) 25 MG 24 hr tablet TAKE 1 TABLET EVERY DAY 90 tablet 3    MULTIVITAMIN W-MINERALS/LUTEIN (CENTRUM SILVER ORAL) Take 1 tablet by mouth once daily.      pen needle, diabetic (BD ULTRA-FINE MICRO PEN NEEDLE) 32 gauge x 1/4" Ndle 1 Needle by Misc.(Non-Drug; Combo Route) route once a week. 30 each 1    potassium chloride SA (K-DUR,KLOR-CON) 20 MEQ tablet Take 1 tablet (20 mEq total) by mouth once daily. 90 tablet 0    spironolactone (ALDACTONE) 50 MG tablet TAKE 1 TABLET EVERY DAY 90 tablet 3    [DISCONTINUED] diclofenac sodium (VOLTAREN ARTHRITIS PAIN) 1 % Gel Apply 2 g topically once daily. 100 g 11    azelastine (ASTELIN) 137 mcg (0.1 %) nasal spray 1 spray (137 mcg total) by Nasal route 2 (two) times daily as needed for Rhinitis. 30 mL 11    blood glucose control, normal Soln 1 Bottle by Misc.(Non-Drug; Combo Route) route once daily. For Accu Check Amber  use twice daily prn dx: E11.9 1 each 0    blood-glucose meter (ACCU-CHEK AMBER) Misc 1 kit by Misc.(Non-Drug; Combo Route) route once. For Accu Check Amber  use twice daily prn dx: E11.9 for 1 dose 1 each 0     No current facility-administered medications on file prior to visit.     "

## 2025-08-08 NOTE — PATIENT INSTRUCTIONS
If you are feeling unwell, we'd like to be the first ones to know here at [facility]! Please give us a call. Same day appointments are our top priority to keep you well and out of the emergency rooms and hospitals. Call [phone] for our direct line. After hours advice is always available. Please call [phone] after hours to speak to the on-call team.      Start taking gabapentin morning and night dose. If develop increase tiredness in the morning then take gabapentin in evening and night.

## 2025-08-09 NOTE — ASSESSMENT & PLAN NOTE
Stable   Tolerating repatha   Hgb A1c prediabetes     Lab Results   Component Value Date    CHOL 142 07/11/2025    HDL 42 07/11/2025    LDLCALC 85.8 07/11/2025    TRIG 71 07/11/2025     Chronic, stable  Continue STATIN  Include vigorous activity and weight loss in healthcare plan  Focus on consumption of fruits, vegetables, fiber, and monounsaturated fats (DASH diet and Mediterranean Diet).

## 2025-08-09 NOTE — ASSESSMENT & PLAN NOTE
Chronic, stable   Recommend alternating warm and cool compress to left wrist  F/u w/ Dr. CABALLERO (rheumatologist)   Advise to use Diclofenac gel and OTC Tylenol arthritis

## 2025-08-09 NOTE — ASSESSMENT & PLAN NOTE
Controlled this visit  Continue HTN management   Continue daily activity, low sodium diet, weight loss efforts  BP Readings from Last 3 Encounters:   08/08/25 110/62   07/11/25 110/60   05/21/25 134/81     Continue to monitor blood glucose at home   Discuss diet change

## 2025-08-09 NOTE — ASSESSMENT & PLAN NOTE
Lab Results   Component Value Date    EGFRNORACEVR 52 (L) 05/19/2025    EGFRNORACEVR 52 (A) 02/10/2025    EGFRNORACEVR 58 (A) 12/13/2024    CREATININE 1.1 05/19/2025    CREATININE 1.1 02/10/2025    CREATININE 1.0 12/13/2024    BUN 17 05/19/2025    BUN 13 02/10/2025    BUN 15 12/13/2024    ALBUMIN 3.8 05/19/2025    ALBUMIN 3.8 02/10/2025    ALBUMIN 4.0 12/13/2024   (    Discuss the following with patient:  Managing blood pressure   Eating foods that are good for the heart and low in sodium   Exercising regularly as tolerated  Getting enough sleep   Managing blood sugar if you have diabetes   Avoiding medications that can further damage the kidneys such as NSAIDs (ibuprofen, naproxen)

## 2025-09-04 ENCOUNTER — TELEPHONE (OUTPATIENT)
Dept: PRIMARY CARE CLINIC | Facility: CLINIC | Age: 75
End: 2025-09-04
Payer: MEDICARE